# Patient Record
Sex: FEMALE | Race: BLACK OR AFRICAN AMERICAN | Employment: OTHER | URBAN - METROPOLITAN AREA
[De-identification: names, ages, dates, MRNs, and addresses within clinical notes are randomized per-mention and may not be internally consistent; named-entity substitution may affect disease eponyms.]

---

## 2017-01-30 ENCOUNTER — OFFICE VISIT (OUTPATIENT)
Dept: NEUROLOGY | Age: 57
End: 2017-01-30

## 2017-01-30 VITALS
BODY MASS INDEX: 33.49 KG/M2 | HEIGHT: 65 IN | HEART RATE: 86 BPM | WEIGHT: 201 LBS | SYSTOLIC BLOOD PRESSURE: 150 MMHG | DIASTOLIC BLOOD PRESSURE: 80 MMHG | OXYGEN SATURATION: 98 %

## 2017-01-30 DIAGNOSIS — D47.2 IGG MONOCLONAL PROTEIN DISORDER: Primary | ICD-10-CM

## 2017-01-30 DIAGNOSIS — R20.0 NUMBNESS AND TINGLING OF LEFT SIDE OF FACE: ICD-10-CM

## 2017-01-30 DIAGNOSIS — R20.2 NUMBNESS AND TINGLING OF LEFT SIDE OF FACE: ICD-10-CM

## 2017-01-30 NOTE — PATIENT INSTRUCTIONS
1.  Refer to rheumatology  2. Follow-up as needed      A Healthy Lifestyle: Care Instructions  Your Care Instructions  A healthy lifestyle can help you feel good, stay at a healthy weight, and have plenty of energy for both work and play. A healthy lifestyle is something you can share with your whole family. A healthy lifestyle also can lower your risk for serious health problems, such as high blood pressure, heart disease, and diabetes. You can follow a few steps listed below to improve your health and the health of your family. Follow-up care is a key part of your treatment and safety. Be sure to make and go to all appointments, and call your doctor if you are having problems. Its also a good idea to know your test results and keep a list of the medicines you take. How can you care for yourself at home? · Do not eat too much sugar, fat, or fast foods. You can still have dessert and treats now and then. The goal is moderation. · Start small to improve your eating habits. Pay attention to portion sizes, drink less juice and soda pop, and eat more fruits and vegetables. ¨ Eat a healthy amount of food. A 3-ounce serving of meat, for example, is about the size of a deck of cards. Fill the rest of your plate with vegetables and whole grains. ¨ Limit the amount of soda and sports drinks you have every day. Drink more water when you are thirsty. ¨ Eat at least 5 servings of fruits and vegetables every day. It may seem like a lot, but it is not hard to reach this goal. A serving or helping is 1 piece of fruit, 1 cup of vegetables, or 2 cups of leafy, raw vegetables. Have an apple or some carrot sticks as an afternoon snack instead of a candy bar. Try to have fruits and/or vegetables at every meal.  · Make exercise part of your daily routine. You may want to start with simple activities, such as walking, bicycling, or slow swimming. Try to be active 30 to 60 minutes every day.  You do not need to do all 30 to 60 minutes all at once. For example, you can exercise 3 times a day for 10 or 20 minutes. Moderate exercise is safe for most people, but it is always a good idea to talk to your doctor before starting an exercise program.  · Keep moving. Kathy Wolff the lawn, work in the garden, or [employer]. Take the stairs instead of the elevator at work. · If you smoke, quit. People who smoke have an increased risk for heart attack, stroke, cancer, and other lung illnesses. Quitting is hard, but there are ways to boost your chance of quitting tobacco for good. ¨ Use nicotine gum, patches, or lozenges. ¨ Ask your doctor about stop-smoking programs and medicines. ¨ Keep trying. In addition to reducing your risk of diseases in the future, you will notice some benefits soon after you stop using tobacco. If you have shortness of breath or asthma symptoms, they will likely get better within a few weeks after you quit. · Limit how much alcohol you drink. Moderate amounts of alcohol (up to 2 drinks a day for men, 1 drink a day for women) are okay. But drinking too much can lead to liver problems, high blood pressure, and other health problems. Family health  If you have a family, there are many things you can do together to improve your health. · Eat meals together as a family as often as possible. · Eat healthy foods. This includes fruits, vegetables, lean meats and dairy, and whole grains. · Include your family in your fitness plan. Most people think of activities such as jogging or tennis as the way to fitness, but there are many ways you and your family can be more active. Anything that makes you breathe hard and gets your heart pumping is exercise. Here are some tips:  ¨ Walk to do errands or to take your child to school or the bus. ¨ Go for a family bike ride after dinner instead of watching TV. Where can you learn more? Go to http://elsie-baljeet.info/.   Enter R096 in the search box to learn more about \"A Healthy Lifestyle: Care Instructions. \"  Current as of: July 26, 2016  Content Version: 11.1  © 3253-3139 [employer]. Care instructions adapted under license by [employer] (which disclaims liability or warranty for this information). If you have questions about a medical condition or this instruction, always ask your healthcare professional. [address] any warranty or liability for your use of this information.

## 2017-01-30 NOTE — LETTER
1/31/2017 8:57 AM 
 
RE:    [facility] [place] [address] [id number] Thank you for agreeing to see Bandar Cuellar I am referring my patient to you for evaluation of IgG monoclonal proteins with kappa light chain. Please see her 
pertinent patient information below. NEUROLOGY follow-up note REFERRED BY: 
Vu Manzo MD 
 
01/30/17 Chief Complaint Patient presents with  Follow-up Numbness in left side of face better Subjective Aye Canas is a 60 y.o. female who presented to the neurology office for management of left facial numbness. To recap, she states that it started suddenly and it is in the left V2 and V3 distribution along with some numbness on the left side of the tongue. It is a constant numbness with no aggravating or relieving factors. She has not noticed any weakness associated with that. It is moderate in severity. She did also have some left eye pain but that has subsided. She does also have poorly controlled diabetes. 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 Rest of the blood work was normal.  She states that the left facial numbness is getting better. Current Outpatient Prescriptions Medication Sig  
 atorvastatin (LIPITOR) 40 mg tablet Take 1 Tab by mouth daily. For cholesterol  sodium chloride (YULIA 128) 2 % ophthalmic solution Administer 2 Drops to both eyes two (2) times a day.  carvedilol (COREG) 12.5 mg tablet TAKE 1 TABLET BY MOUTH TWICE A DAY WITH MEALS  valsartan (DIOVAN) 40 mg tablet TAKE 1 TABLET BY MOUTH EVERY DAY  Cholecalciferol, Vitamin D3, (VITAMIN D3) 1,000 unit cap Take  by mouth daily.  calcium 500 mg Tab Take 600 mg by mouth daily.  cyanocobalamin (VITAMIN B-12) 1,000 mcg tablet Take 2,000 mcg by mouth daily. No current facility-administered medications for this visit.    
 
REVIEW OF SYSTEMS:  
 A ten system review of constitutional, cardiovascular, respiratory, musculoskeletal, endocrine, skin, SHEENT, genitourinary, psychiatric and neurologic systems was obtained and is unremarkable except as stated in HPI EXAMINATION:  
Visit Vitals  /80  Pulse 86  Ht 5' 5\" (1.651 m)  Wt 201 lb (91.2 kg)  SpO2 98%  BMI 33.45 kg/m2 General:  
General appearance: Pt is in no acute distress Distal pulses are preserved Fundoscopic Exam: Normal 
 
Neurological Examination:  
Mental Status: AAO x3. Speech is fluent. Follows commands, has normal fund of knowledge, attention, short term recall, comprehension and insight. Cranial Nerves: Visual fields are full. PERRL, Extraocular movements are full. Facial sensation is decreased in the left V2 and V3 distribution. Facial movement intact, symmetric. Hearing intact to conversation. Palate elevates symmetrically. Shoulder shrug symmetric. Tongue midline. Motor: Strength is 5/5 in all 4 ext. No atrophy. Tone: Normal 
 
Sensation: Decreased pinprick sensation in the left side of the face Coordination/Cerebellar: Intact to finger-nose-finger Gait: Romberg is negative and casual gait is normal.  
 
Skin: No significant bruising or lacerations. Laboratory review:  
Results for orders placed or performed in visit on 12/07/16 VITAMIN B12 Result Value Ref Range Vitamin B12 411 211 - 946 pg/mL TSH AND FREE T4 Result Value Ref Range TSH 1.100 0.450 - 4.500 uIU/mL T4, Free 1.07 0.82 - 1.77 ng/dL SPEP AND HANSEL, SERUM Result Value Ref Range Immunoglobulin G, Qt. 941 700 - 1600 mg/dL Immunoglobulin A, Qt. 202 87 - 352 mg/dL Immunoglobulin M, Qt. 22 (L) 26 - 217 mg/dL Albumin 3.6 2.9 - 4.4 g/dL Alpha-1-Globulin 0.2 0.0 - 0.4 g/dL Alpha-2-Globulin 0.8 0.4 - 1.0 g/dL Beta Globulin 1.1 0.7 - 1.3 g/dL Gamma Globulin 0.9 0.4 - 1.8 g/dL M-Mika 0.3 (H) Not Observed g/dL Globulin, total 3.0 2.2 - 3.9 g/dL A/G ratio 1.3 0.7 - 1.7 Immunofixation Result Comment Please note Comment ANGIOTENSIN CONVERTING ENZYME Result Value Ref Range Angiotensin Converting Enzyme (ACE) 25 14 - 82 U/L  
BHARATI COMPREHENSIVE PLUS PANEL Result Value Ref Range Anti-DNA (DS) Ab, QT <1 0 - 9 IU/mL  
 RNP Abs <0.2 0.0 - 0.9 AI Epps Abs <0.2 0.0 - 0.9 AI Epps/RNP Abs <0.2 0.0 - 0.9 AI Scleroderma-70 Ab <0.2 0.0 - 0.9 AI Sjogren's Anti-SS-A <0.2 0.0 - 0.9 AI Sjogren's Anti-SS-B <0.2 0.0 - 0.9 AI Antichromatin Ab <0.2 0.0 - 0.9 AI Anti Ribosomal P Ab <0.2 0.0 - 0.9 AI Anti-Delmi-1 <0.2 0.0 - 0.9 AI Centromere B Ab <0.2 0.0 - 0.9 AI See below Comment CBC WITH AUTOMATED DIFF Result Value Ref Range WBC 8.2 3.4 - 10.8 x10E3/uL  
 RBC 4.46 3.77 - 5.28 x10E6/uL HGB 13.7 11.1 - 15.9 g/dL HCT 41.9 34.0 - 46.6 % MCV 94 79 - 97 fL  
 MCH 30.7 26.6 - 33.0 pg  
 MCHC 32.7 31.5 - 35.7 g/dL  
 RDW 14.5 12.3 - 15.4 % PLATELET 580 401 - 623 x10E3/uL NEUTROPHILS 55 % Lymphocytes 36 % MONOCYTES 7 % EOSINOPHILS 2 % BASOPHILS 0 %  
 ABS. NEUTROPHILS 4.5 1.4 - 7.0 x10E3/uL Abs Lymphocytes 2.9 0.7 - 3.1 x10E3/uL  
 ABS. MONOCYTES 0.6 0.1 - 0.9 x10E3/uL  
 ABS. EOSINOPHILS 0.1 0.0 - 0.4 x10E3/uL  
 ABS. BASOPHILS 0.0 0.0 - 0.2 x10E3/uL IMMATURE GRANULOCYTES 0 %  
 ABS. IMM. GRANS. 0.0 0.0 - 0.1 x10E3/uL METABOLIC PANEL, COMPREHENSIVE Result Value Ref Range Glucose 117 (H) 65 - 99 mg/dL BUN 14 6 - 24 mg/dL Creatinine 0.87 0.57 - 1.00 mg/dL GFR est non-AA 74 >59 mL/min/1.73 GFR est AA 85 >59 mL/min/1.73  
 BUN/Creatinine ratio 16 9 - 23 Sodium 142 136 - 144 mmol/L Potassium 3.9 3.5 - 5.2 mmol/L Chloride 105 97 - 106 mmol/L  
 CO2 24 18 - 29 mmol/L Calcium 9.1 8.7 - 10.2 mg/dL Protein, total 6.6 6.0 - 8.5 g/dL Albumin 4.1 3.5 - 5.5 g/dL GLOBULIN, TOTAL 2.5 1.5 - 4.5 g/dL A-G Ratio 1.6 1.1 - 2.5 Bilirubin, total 0.6 0.0 - 1.2 mg/dL Alk. phosphatase 80 39 - 117 IU/L  
 AST 14 0 - 40 IU/L  
 ALT 16 0 - 32 IU/L  
CRP, HIGH SENSITIVITY Result Value Ref Range C-Reactive Protein, Cardiac 1.17 0.00 - 3.00 mg/L  
 
12/7/2016 Vitamin B12 411, thyroid function test normal, SPEP reveals presence of monoclonal free lambda light chains. Suggest urine HANSEL for Bence-Lei proteins. Immunofixation shows IgG monoclonal proteins with kappa light chain. ACE 25, BHARATI normal CBC normal, CMP normal, CRP 1.17 Imaging review: CT head and cervical spine are normal. I personally reviewed the images in PACS and this is my impression. Documentation review: 
None Assessment/Plan:  
Neil Rivas is a 60 y.o. female who presented to the neurology office for management of left facial numbness for the last 6 months. The left facial numbness is getting better. The etiology is unclear. Imaging studies of the brain are normal.  MRI could not be performed because of the defibrillator. 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 I do plan to send her to hematology for evaluation of monoclonal proteins. Follow-up as needed Rina Montgomery MD 
Neurologist and Clinical Neurophysiologist 
 
CC: Kingsley Hunter MD 
Fax: [phone] This note will not be viewable in [address] I appreciate your assistance in Ms. Ansari's care  and look forward to your findings and recommendations. Sincerely, Rina Montgomery MD

## 2017-01-30 NOTE — LETTER
1/30/2017 2:01 PM 
 
Patient:    Garret Harrell YOB: 1960 Date of Visit:    1/30/2017 Dear Fatemeh Lim MD 
 
Thank you for referring Ms. Zarina Esquivel to me for evaluation/treatment. Below are the relevant portions of my assessment and plan of care. NEUROLOGY follow-up note REFERRED BY: 
Fatemeh Lim MD 
 
01/30/17 Chief Complaint Patient presents with  Follow-up Numbness in left side of face better Subjective Garret aHrrell is a 60 y.o. female who presented to the neurology office for management of left facial numbness. To recap, she states that it started suddenly and it is in the left V2 and V3 distribution along with some numbness on the left side of the tongue. It is a constant numbness with no aggravating or relieving factors. She has not noticed any weakness associated with that. It is moderate in severity. She did also have some left eye pain but that has subsided. She does also have poorly controlled diabetes. 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 Rest of the blood work was normal.  She states that the left facial numbness is getting better. Current Outpatient Prescriptions Medication Sig  
 atorvastatin (LIPITOR) 40 mg tablet Take 1 Tab by mouth daily. For cholesterol  sodium chloride (YULIA 128) 2 % ophthalmic solution Administer 2 Drops to both eyes two (2) times a day.  carvedilol (COREG) 12.5 mg tablet TAKE 1 TABLET BY MOUTH TWICE A DAY WITH MEALS  valsartan (DIOVAN) 40 mg tablet TAKE 1 TABLET BY MOUTH EVERY DAY  Cholecalciferol, Vitamin D3, (VITAMIN D3) 1,000 unit cap Take  by mouth daily.  calcium 500 mg Tab Take 600 mg by mouth daily.  cyanocobalamin (VITAMIN B-12) 1,000 mcg tablet Take 2,000 mcg by mouth daily. No current facility-administered medications for this visit.    
 
REVIEW OF SYSTEMS:  
 A ten system review of constitutional, cardiovascular, respiratory, musculoskeletal, endocrine, skin, SHEENT, genitourinary, psychiatric and neurologic systems was obtained and is unremarkable except as stated in HPI EXAMINATION:  
Visit Vitals  /80  Pulse 86  Ht 5' 5\" (1.651 m)  Wt 201 lb (91.2 kg)  SpO2 98%  BMI 33.45 kg/m2 General:  
General appearance: Pt is in no acute distress Distal pulses are preserved Fundoscopic Exam: Normal 
 
Neurological Examination:  
Mental Status: AAO x3. Speech is fluent. Follows commands, has normal fund of knowledge, attention, short term recall, comprehension and insight. Cranial Nerves: Visual fields are full. PERRL, Extraocular movements are full. Facial sensation is decreased in the left V2 and V3 distribution. Facial movement intact, symmetric. Hearing intact to conversation. Palate elevates symmetrically. Shoulder shrug symmetric. Tongue midline. Motor: Strength is 5/5 in all 4 ext. No atrophy. Tone: Normal 
 
Sensation: Decreased pinprick sensation in the left side of the face Coordination/Cerebellar: Intact to finger-nose-finger Gait: Romberg is negative and casual gait is normal.  
 
Skin: No significant bruising or lacerations. Laboratory review:  
Results for orders placed or performed in visit on 12/07/16 VITAMIN B12 Result Value Ref Range Vitamin B12 411 211 - 946 pg/mL TSH AND FREE T4 Result Value Ref Range TSH 1.100 0.450 - 4.500 uIU/mL T4, Free 1.07 0.82 - 1.77 ng/dL SPEP AND HANSEL, SERUM Result Value Ref Range Immunoglobulin G, Qt. 941 700 - 1600 mg/dL Immunoglobulin A, Qt. 202 87 - 352 mg/dL Immunoglobulin M, Qt. 22 (L) 26 - 217 mg/dL Albumin 3.6 2.9 - 4.4 g/dL Alpha-1-Globulin 0.2 0.0 - 0.4 g/dL Alpha-2-Globulin 0.8 0.4 - 1.0 g/dL Beta Globulin 1.1 0.7 - 1.3 g/dL Gamma Globulin 0.9 0.4 - 1.8 g/dL M-Mika 0.3 (H) Not Observed g/dL Globulin, total 3.0 2.2 - 3.9 g/dL A/G ratio 1.3 0.7 - 1.7 Immunofixation Result Comment Please note Comment ANGIOTENSIN CONVERTING ENZYME Result Value Ref Range Angiotensin Converting Enzyme (ACE) 25 14 - 82 U/L  
BHARATI COMPREHENSIVE PLUS PANEL Result Value Ref Range Anti-DNA (DS) Ab, QT <1 0 - 9 IU/mL  
 RNP Abs <0.2 0.0 - 0.9 AI Epps Abs <0.2 0.0 - 0.9 AI Epps/RNP Abs <0.2 0.0 - 0.9 AI Scleroderma-70 Ab <0.2 0.0 - 0.9 AI Sjogren's Anti-SS-A <0.2 0.0 - 0.9 AI Sjogren's Anti-SS-B <0.2 0.0 - 0.9 AI Antichromatin Ab <0.2 0.0 - 0.9 AI Anti Ribosomal P Ab <0.2 0.0 - 0.9 AI Anti-Delmi-1 <0.2 0.0 - 0.9 AI Centromere B Ab <0.2 0.0 - 0.9 AI See below Comment CBC WITH AUTOMATED DIFF Result Value Ref Range WBC 8.2 3.4 - 10.8 x10E3/uL  
 RBC 4.46 3.77 - 5.28 x10E6/uL HGB 13.7 11.1 - 15.9 g/dL HCT 41.9 34.0 - 46.6 % MCV 94 79 - 97 fL  
 MCH 30.7 26.6 - 33.0 pg  
 MCHC 32.7 31.5 - 35.7 g/dL  
 RDW 14.5 12.3 - 15.4 % PLATELET 768 279 - 893 x10E3/uL NEUTROPHILS 55 % Lymphocytes 36 % MONOCYTES 7 % EOSINOPHILS 2 % BASOPHILS 0 %  
 ABS. NEUTROPHILS 4.5 1.4 - 7.0 x10E3/uL Abs Lymphocytes 2.9 0.7 - 3.1 x10E3/uL  
 ABS. MONOCYTES 0.6 0.1 - 0.9 x10E3/uL  
 ABS. EOSINOPHILS 0.1 0.0 - 0.4 x10E3/uL  
 ABS. BASOPHILS 0.0 0.0 - 0.2 x10E3/uL IMMATURE GRANULOCYTES 0 %  
 ABS. IMM. GRANS. 0.0 0.0 - 0.1 x10E3/uL METABOLIC PANEL, COMPREHENSIVE Result Value Ref Range Glucose 117 (H) 65 - 99 mg/dL BUN 14 6 - 24 mg/dL Creatinine 0.87 0.57 - 1.00 mg/dL GFR est non-AA 74 >59 mL/min/1.73 GFR est AA 85 >59 mL/min/1.73  
 BUN/Creatinine ratio 16 9 - 23 Sodium 142 136 - 144 mmol/L Potassium 3.9 3.5 - 5.2 mmol/L Chloride 105 97 - 106 mmol/L  
 CO2 24 18 - 29 mmol/L Calcium 9.1 8.7 - 10.2 mg/dL Protein, total 6.6 6.0 - 8.5 g/dL Albumin 4.1 3.5 - 5.5 g/dL GLOBULIN, TOTAL 2.5 1.5 - 4.5 g/dL A-G Ratio 1.6 1.1 - 2.5 Bilirubin, total 0.6 0.0 - 1.2 mg/dL Alk. phosphatase 80 39 - 117 IU/L  
 AST 14 0 - 40 IU/L  
 ALT 16 0 - 32 IU/L  
CRP, HIGH SENSITIVITY Result Value Ref Range C-Reactive Protein, Cardiac 1.17 0.00 - 3.00 mg/L  
 
12/7/2016 Vitamin B12 411, thyroid function test normal, SPEP reveals presence of monoclonal free lambda light chains. Suggest urine HANSEL for Bence-Lei proteins. Immunofixation shows IgG monoclonal proteins with kappa light chain. ACE 25, BHARATI normal CBC normal, CMP normal, CRP 1.17 Imaging review: CT head and cervical spine are normal. I personally reviewed the images in PACS and this is my impression. Documentation review: 
None Assessment/Plan:  
Tripp Seth is a 60 y.o. female who presented to the neurology office for management of left facial numbness for the last 6 months. The left facial numbness is getting better. The etiology is unclear. Imaging studies of the brain are normal.  MRI could not be performed because of the defibrillator. 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 I do plan to send her to hematology for evaluation of monoclonal proteins. Follow-up as needed Jeremy Sotomayor MD 
Neurologist and Clinical Neurophysiologist 
 
CC: Hammad Tello MD 
Fax: [phone] This note will not be viewable in [address] If you have questions, please do not hesitate to call me. I look forward to following Ms. Kodak Yeh along with you. Sincerely, Jeremy Sotomayor MD

## 2017-01-30 NOTE — PROGRESS NOTES
NEUROLOGY follow-up note      REFERRED BY:  Ray Begum MD    01/30/17    Chief Complaint   Patient presents with    Follow-up     Numbness in left side of face better       Subjective  Sintia Morrow is a 60 y.o. female who presented to the neurology office for management of left facial numbness. To recap, she states that it started suddenly and it is in the left V2 and V3 distribution along with some numbness on the left side of the tongue. It is a constant numbness with no aggravating or relieving factors. She has not noticed any weakness associated with that. It is moderate in severity. She did also have some left eye pain but that has subsided. She does also have poorly controlled diabetes. 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 Rest of the blood work was normal.  She states that the left facial numbness is getting better. Current Outpatient Prescriptions   Medication Sig    atorvastatin (LIPITOR) 40 mg tablet Take 1 Tab by mouth daily. For cholesterol    sodium chloride (YULIA 128) 2 % ophthalmic solution Administer 2 Drops to both eyes two (2) times a day.  carvedilol (COREG) 12.5 mg tablet TAKE 1 TABLET BY MOUTH TWICE A DAY WITH MEALS    valsartan (DIOVAN) 40 mg tablet TAKE 1 TABLET BY MOUTH EVERY DAY    Cholecalciferol, Vitamin D3, (VITAMIN D3) 1,000 unit cap Take  by mouth daily.  calcium 500 mg Tab Take 600 mg by mouth daily.  cyanocobalamin (VITAMIN B-12) 1,000 mcg tablet Take 2,000 mcg by mouth daily. No current facility-administered medications for this visit.       REVIEW OF SYSTEMS:   A ten system review of constitutional, cardiovascular, respiratory, musculoskeletal, endocrine, skin, SHEENT, genitourinary, psychiatric and neurologic systems was obtained and is unremarkable except as stated in HPI     EXAMINATION:   Visit Vitals    /80    Pulse 86    Ht 5' 5\" (1.651 m)    Wt 201 lb (91.2 kg)    SpO2 98%    BMI 33.45 kg/m2        General:   General appearance: Pt is in no acute distress   Distal pulses are preserved  Fundoscopic Exam: Normal    Neurological Examination:   Mental Status: AAO x3. Speech is fluent. Follows commands, has normal fund of knowledge, attention, short term recall, comprehension and insight. Cranial Nerves: Visual fields are full. PERRL, Extraocular movements are full. Facial sensation is decreased in the left V2 and V3 distribution. Facial movement intact, symmetric. Hearing intact to conversation. Palate elevates symmetrically. Shoulder shrug symmetric. Tongue midline. Motor: Strength is 5/5 in all 4 ext. No atrophy. Tone: Normal    Sensation: Decreased pinprick sensation in the left side of the face    Coordination/Cerebellar: Intact to finger-nose-finger     Gait: Romberg is negative and casual gait is normal.     Skin: No significant bruising or lacerations.     Laboratory review:   Results for orders placed or performed in visit on 12/07/16   VITAMIN B12   Result Value Ref Range    Vitamin B12 411 211 - 946 pg/mL   TSH AND FREE T4   Result Value Ref Range    TSH 1.100 0.450 - 4.500 uIU/mL    T4, Free 1.07 0.82 - 1.77 ng/dL   SPEP AND HANSEL, SERUM   Result Value Ref Range    Immunoglobulin G, Qt. 941 700 - 1600 mg/dL    Immunoglobulin A, Qt. 202 87 - 352 mg/dL    Immunoglobulin M, Qt. 22 (L) 26 - 217 mg/dL    Albumin 3.6 2.9 - 4.4 g/dL    Alpha-1-Globulin 0.2 0.0 - 0.4 g/dL    Alpha-2-Globulin 0.8 0.4 - 1.0 g/dL    Beta Globulin 1.1 0.7 - 1.3 g/dL    Gamma Globulin 0.9 0.4 - 1.8 g/dL    M-Mika 0.3 (H) Not Observed g/dL    Globulin, total 3.0 2.2 - 3.9 g/dL    A/G ratio 1.3 0.7 - 1.7    Immunofixation Result Comment     Please note Comment    ANGIOTENSIN CONVERTING ENZYME   Result Value Ref Range    Angiotensin Converting Enzyme (ACE) 25 14 - 82 U/L   BHARATI COMPREHENSIVE PLUS PANEL   Result Value Ref Range    Anti-DNA (DS) Ab, QT <1 0 - 9 IU/mL    RNP Abs <0.2 0.0 - 0.9 AI    Epps Abs <0.2 0.0 - 0.9 AI    Epps/RNP Abs <0.2 0.0 - 0.9 AI    Scleroderma-70 Ab <0.2 0.0 - 0.9 AI    Sjogren's Anti-SS-A <0.2 0.0 - 0.9 AI    Sjogren's Anti-SS-B <0.2 0.0 - 0.9 AI    Antichromatin Ab <0.2 0.0 - 0.9 AI    Anti Ribosomal P Ab <0.2 0.0 - 0.9 AI    Anti-Delmi-1 <0.2 0.0 - 0.9 AI    Centromere B Ab <0.2 0.0 - 0.9 AI    See below Comment    CBC WITH AUTOMATED DIFF   Result Value Ref Range    WBC 8.2 3.4 - 10.8 x10E3/uL    RBC 4.46 3.77 - 5.28 x10E6/uL    HGB 13.7 11.1 - 15.9 g/dL    HCT 41.9 34.0 - 46.6 %    MCV 94 79 - 97 fL    MCH 30.7 26.6 - 33.0 pg    MCHC 32.7 31.5 - 35.7 g/dL    RDW 14.5 12.3 - 15.4 %    PLATELET 176 608 - 953 x10E3/uL    NEUTROPHILS 55 %    Lymphocytes 36 %    MONOCYTES 7 %    EOSINOPHILS 2 %    BASOPHILS 0 %    ABS. NEUTROPHILS 4.5 1.4 - 7.0 x10E3/uL    Abs Lymphocytes 2.9 0.7 - 3.1 x10E3/uL    ABS. MONOCYTES 0.6 0.1 - 0.9 x10E3/uL    ABS. EOSINOPHILS 0.1 0.0 - 0.4 x10E3/uL    ABS. BASOPHILS 0.0 0.0 - 0.2 x10E3/uL    IMMATURE GRANULOCYTES 0 %    ABS. IMM. GRANS. 0.0 0.0 - 0.1 F91Q3/VA   METABOLIC PANEL, COMPREHENSIVE   Result Value Ref Range    Glucose 117 (H) 65 - 99 mg/dL    BUN 14 6 - 24 mg/dL    Creatinine 0.87 0.57 - 1.00 mg/dL    GFR est non-AA 74 >59 mL/min/1.73    GFR est AA 85 >59 mL/min/1.73    BUN/Creatinine ratio 16 9 - 23    Sodium 142 136 - 144 mmol/L    Potassium 3.9 3.5 - 5.2 mmol/L    Chloride 105 97 - 106 mmol/L    CO2 24 18 - 29 mmol/L    Calcium 9.1 8.7 - 10.2 mg/dL    Protein, total 6.6 6.0 - 8.5 g/dL    Albumin 4.1 3.5 - 5.5 g/dL    GLOBULIN, TOTAL 2.5 1.5 - 4.5 g/dL    A-G Ratio 1.6 1.1 - 2.5    Bilirubin, total 0.6 0.0 - 1.2 mg/dL    Alk. 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 Suggest urine HANSEL for Bence-Lei proteins. Immunofixation shows IgG monoclonal proteins with kappa light chain. ACE 25, BHARATI normal CBC normal, CMP normal, CRP 1.17    Imaging review:  CT head and cervical spine are normal. I personally reviewed the images in PACS and this is my impression. Documentation review:  None    Assessment/Plan:   Rosetta Lim is a 60 y.o. female who presented to the neurology office for management of left facial numbness for the last 6 months. The left facial numbness is getting better. The etiology is unclear. Imaging studies of the brain are normal.  MRI could not be performed because of the defibrillator. 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 I do plan to send her to hematology for evaluation of monoclonal proteins. Follow-up as needed   Leonides Goldberg, MD  Neurologist and Clinical Neurophysiologist    CC: Elida Webster MD  Fax: [phone]    This note will not be viewable in [address]

## 2017-01-30 NOTE — MR AVS SNAPSHOT
Visit Information Date & Time Provider Department Dept. Phone Encounter #  
 1/30/2017  1:40 PM Qi Mariscal MD [facility] [phone] [id number] Your Appointments 3/10/2017  9:15 AM  
6 MONTH with Chance Anaya, [address] [facility]) Appt Note: . [id number] [facility]  
[phone] [id number] [facility] 5/17/2017  9:00 AM  
ROUTINE CARE with Deacon Hollingsworth MD  
[facility]) Appt Note: 6 month follow up  
 [address] [facility] [place] [facility]"" [facility] [id number] [phone] [facility] [phone]  
  
    
  
 2/22/2017  8:00 AM  
REMOTE OFFICE VISIT with [facility]) Appt Note: [facility] AN [facility]ICE VISIT - [facility]  
 [phone] [facility]  
[phone] [id number] [facility] Upcoming Health Maintenance Date Due COLONOSCOPY 9/20/1976 PAP AKA CERVICAL CYTOLOGY 4/2/2015 EYE EXAM RETINAL OR DILATED Q1 4/15/2017 FOOT EXAM Q1 5/17/2017 HEMOGLOBIN A1C Q6M 5/17/2017 MICROALBUMIN Q1 5/17/2017 LIPID PANEL Q1 11/17/2017 BREAST CANCER SCRN MAMMOGRAM 3/8/2018 GLAUCOMA SCREENING Q2Y 4/26/2018 DTaP/Tdap/Td series (2 - Td) 5/17/2026 Allergies as of 1/30/2017  Review Complete On: 1/30/2017 By: Qi Mariscal MD  
  
 Severity Noted Reaction Type Reactions Ace Inhibitors  08/06/2010    Cough Current Immunizations  Reviewed on 11/7/2011 No immunizations on file. Not reviewed this visit You Were Diagnosed With   
  
 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  
 150/80 86 5' 5\" (1.651 m) 201 lb (91.2 kg) 98% 33.45 kg/m2 OB Status Smoking Status Hysterectomy Never Smoker Vitals History BMI and BSA Data Body Mass Index Body Surface Area  
 33.45 kg/m 2 2.05 m 2 Preferred Pharmacy Pharmacy Name Phone [facility] [address] [place] [address] [place] [address] [phone] Your Updated Medication List  
  
   
This list is accurate as of: 1/30/17  2:00 PM.  Always use your most recent med list.  
  
  
  
  
 atorvastatin 40 mg tablet Commonly known as:  LIPITOR Take 1 Tab by mouth daily. For cholesterol  
  
 calcium 500 mg Tab Take 600 mg by mouth daily. carvedilol 12.5 mg tablet Commonly known as:  COREG  
TAKE 1 TABLET BY MOUTH TWICE A DAY WITH MEALS  
  
 sodium chloride 2 % ophthalmic solution Commonly known as:  YULIA 128 Administer 2 Drops to both eyes two (2) times a day. valsartan 40 mg tablet Commonly known as:  DIOVAN  
TAKE 1 TABLET BY MOUTH EVERY DAY  
  
 VITAMIN B-12 1,000 mcg tablet Generic drug:  cyanocobalamin Take 2,000 mcg by mouth daily. VITAMIN D3 1,000 unit Cap Generic drug:  cholecalciferol Take  by mouth daily. 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 Please evaluate. Referral Information Referral ID Referred By [facility]erred To  
  
 [id number] [address] [place] [address] [address] [place], [address] [place] [address] Ave Phone: [phone] Fax: [phone] Visits Status Start Date End Date 1 New Request 1/30/17 1/30/18 If your referral has a status of pending review or denied, additional information will be sent to support the outcome of this decision. Patient Instructions 1. Refer to rheumatology 2.  Follow-up as needed A Healthy Lifestyle: Care Instructions Your Care Instructions A healthy lifestyle can help you feel good, stay at a healthy weight, and have plenty of energy for both work and play. A healthy lifestyle is something you can share with your whole family. A healthy lifestyle also can lower your risk for serious health problems, such as high blood pressure, heart disease, and diabetes. You can follow a few steps listed below to improve your health and the health of your family. Follow-up care is a key part of your treatment and safety. Be sure to make and go to all appointments, and call your doctor if you are having problems. Its also a good idea to know your test results and keep a list of the medicines you take. How can you care for yourself at home? · Do not eat too much sugar, fat, or fast foods. You can still have dessert and treats now and then. The goal is moderation. · Start small to improve your eating habits. Pay attention to portion sizes, drink less juice and soda pop, and eat more fruits and vegetables. ¨ Eat a healthy amount of food. A 3-ounce serving of meat, for example, is about the size of a deck of cards. Fill the rest of your plate with vegetables and whole grains. ¨ Limit the amount of soda and sports drinks you have every day. Drink more water when you are thirsty. ¨ Eat at least 5 servings of fruits and vegetables every day. It may seem like a lot, but it is not hard to reach this goal. A serving or helping is 1 piece of fruit, 1 cup of vegetables, or 2 cups of leafy, raw vegetables. Have an apple or some carrot sticks as an afternoon snack instead of a candy bar. Try to have fruits and/or vegetables at every meal. 
· Make exercise part of your daily routine. You may want to start with simple activities, such as walking, bicycling, or slow swimming. Try to be active 30 to 60 minutes every day.  You do not need to do all 30 to 60 minutes all at once. For example, you can exercise 3 times a day for 10 or 20 minutes. Moderate exercise is safe for most people, but it is always a good idea to talk to your doctor before starting an exercise program. 
· Keep moving. Clarisa Zuniga the lawn, work in the garden, or [employer]. Take the stairs instead of the elevator at work. · If you smoke, quit. People who smoke have an increased risk for heart attack, stroke, cancer, and other lung illnesses. Quitting is hard, but there are ways to boost your chance of quitting tobacco for good. ¨ Use nicotine gum, patches, or lozenges. ¨ Ask your doctor about stop-smoking programs and medicines. ¨ Keep trying. In addition to reducing your risk of diseases in the future, you will notice some benefits soon after you stop using tobacco. If you have shortness of breath or asthma symptoms, they will likely get better within a few weeks after you quit. · Limit how much alcohol you drink. Moderate amounts of alcohol (up to 2 drinks a day for men, 1 drink a day for women) are okay. But drinking too much can lead to liver problems, high blood pressure, and other health problems. Family health If you have a family, there are many things you can do together to improve your health. · Eat meals together as a family as often as possible. · Eat healthy foods. This includes fruits, vegetables, lean meats and dairy, and whole grains. · Include your family in your fitness plan. Most people think of activities such as jogging or tennis as the way to fitness, but there are many ways you and your family can be more active. Anything that makes you breathe hard and gets your heart pumping is exercise. Here are some tips: 
¨ Walk to do errands or to take your child to school or the bus. ¨ Go for a family bike ride after dinner instead of watching TV. Where can you learn more? Go to http://elsie-baljeet.info/. Enter Y231 in the search box to learn more about \"A Healthy Lifestyle: Care Instructions. \" Current as of: July 26, 2016 Content Version: 11.1 © 8517-8169 [employer]. Care instructions adapted under license by [employer] (which disclaims liability or warranty for this information). If you have questions about a medical condition or this instruction, always ask your healthcare professional. [address] any warranty or liability for your use of this information. Introducing hospitals & HEALTH SERVICES! Nhung Roca introduces [employer] patient portal. Now you can access parts of your medical record, email your doctor's office, and request medication refills online. 1. In your internet browser, go to [email or web address] 2. Click on the First Time User? Click Here link in the Sign In box. You will see the New Member Sign Up page. 3. Enter your [employer] Access Code exactly as it appears below. You will not need to use this code after youve completed the sign-up process. If you do not sign up before the expiration date, you must request a new code. · [employer] Access Code: [email or web address] Expires: 2/21/2017  8:23 AM 
 
4. Enter the last four digits of your Social Security Number (xxxx) and Date of Birth (mm/dd/yyyy) as indicated and click Submit. You will be taken to the next sign-up page. 5. Create a [employer] ID. This will be your [employer] login ID and cannot be changed, so think of one that is secure and easy to remember. 6. Create a [employer] password. You can change your password at any time. 7. Enter your Password Reset Question and Answer. This can be used at a later time if you forget your password. 8. Enter your e-mail address. You will receive e-mail notification when new information is available in [address] 9. Click Sign Up. You can now view and download portions of your medical record. 10. Click the Download Summary menu link to download a portable copy of your medical information. If you have questions, please visit the Frequently Asked Questions section of the [employer] website. Remember, [employer] is NOT to be used for urgent needs. For medical emergencies, dial 911. Now available from your iPhone and Android! Please provide this summary of care documentation to your next provider. Your primary care clinician is listed as Hope Krzysztof. If you have any questions after today's visit, please call [phone].

## 2017-01-31 NOTE — LETTER
1/31/2017 8:46 AM 
 
RE:    Reesa Blizzard [facility] [address] [phone] Thank you for agreeing to see Lara Alvares I am referring my patient to you for evaluation of ***. Please see her 
pertinent patient information below. No notes on file I appreciate your assistance in Ms. Ansari's care  and look forward to your findings and recommendations. Sincerely, Delphine Lopez MD

## 2017-02-13 ENCOUNTER — OFFICE VISIT (OUTPATIENT)
Dept: ONCOLOGY | Age: 57
End: 2017-02-13

## 2017-02-13 VITALS
TEMPERATURE: 97.8 F | WEIGHT: 199 LBS | HEIGHT: 65 IN | DIASTOLIC BLOOD PRESSURE: 88 MMHG | HEART RATE: 88 BPM | OXYGEN SATURATION: 97 % | BODY MASS INDEX: 33.15 KG/M2 | SYSTOLIC BLOOD PRESSURE: 140 MMHG | RESPIRATION RATE: 18 BRPM

## 2017-02-13 DIAGNOSIS — E53.8 VITAMIN B12 DEFICIENCY: ICD-10-CM

## 2017-02-13 DIAGNOSIS — G62.9 NEUROPATHY: ICD-10-CM

## 2017-02-13 DIAGNOSIS — D47.2 MGUS (MONOCLONAL GAMMOPATHY OF UNKNOWN SIGNIFICANCE): Primary | ICD-10-CM

## 2017-02-13 DIAGNOSIS — I42.9 CARDIOMYOPATHY (HCC): ICD-10-CM

## 2017-02-13 NOTE — PROGRESS NOTES
Hematology Consult    REASON FOR CONSULT: Paraproteinemia  REQUESTED BY: Dr. Barba Lexie: Ms. Shu Sevilla is a 60 y.o. female with DM who presents for evaluation of  Paraproteinemia    Patient has had left facial numbness which started in the summer of 2016. This started abruptly and was not associated with rashes, pain, jaw weakness. She has had some intermittent hyperemia of the L eye. No tearing. She has been evaluated by Dr. Garth Meyers with Neurology and an extensive work up was only notable for presence of a 0.3 g/dl M spike. Other tests were unremarkable: Vitamin B12 411, thyroid function test normal, ACE 25, BHARATI normal CBC normal, CMP normal, CRP 1.17, CT head and cervical spine unremarkable. She now presents to discuss the evaluation and management of her paraproteinemia. She still has some numbness of the L face, she describes this as a slightly decreased sensation to touch on the L as opposed to the R side of her face. Has no relieving or aggravating factors. Has had no trouble swallowing, recent rash, fevers, pain or ear infections. Never had a colonoscopy. She has regular bowel movements with out any hematochezia. She states that in 2010 she had developed sudden SOB after a flu like illness when she was found to have decompensated heart failure and left BBB. Has had a pacemaker since without any recent CP, SOB and a normal ECO. She has not noted any new aches, no infections, no urinary complains, abdominal pian, new rashes, headaches or falls. NO change in weight ir appetite, no night sweats.         Past Medical History   Diagnosis Date    Cardiomyopathy     Diabetes mellitus type 2, controlled ([address] 12/10/2015    Heart failure (HCC)     Hyperlipidemia     Hypertension      controlled    Orthostatic hypotension     Secondary cardiomyopathy, unspecified ([address]     Sinoatrial node dysfunction ([address]     Vitamin B12 deficiency 3/31/2010       Past Surgical History   Procedure Laterality Date    Hx hysterectomy         Allergies   Allergen Reactions    Ace Inhibitors Cough       Current Outpatient Prescriptions   Medication Sig Dispense Refill    atorvastatin (LIPITOR) 40 mg tablet Take 1 Tab by mouth daily. For cholesterol 30 Tab 12    sodium chloride (YULIA 128) 2 % ophthalmic solution Administer 2 Drops to both eyes two (2) times a day. 15 mL 12    carvedilol (COREG) 12.5 mg tablet TAKE 1 TABLET BY MOUTH TWICE A DAY WITH MEALS 180 Tab 3    valsartan (DIOVAN) 40 mg tablet TAKE 1 TABLET BY MOUTH EVERY DAY 90 Tab 3    Cholecalciferol, Vitamin D3, (VITAMIN D3) 1,000 unit cap Take  by mouth daily.  calcium 500 mg Tab Take 600 mg by mouth daily.  cyanocobalamin (VITAMIN B-12) 1,000 mcg tablet Take 2,000 mcg by mouth daily. Social History     Social History    Marital status:      Spouse name: N/A    Number of children: N/A    Years of education: N/A     Social History Main Topics    Smoking status: Never Smoker    Smokeless tobacco: Never Used    Alcohol use No    Drug use: No    Sexual activity: Yes     Partners: Male     Other Topics Concern    None     Social History Narrative    , 2 children, 28 and 31. Works at [employer], for 35 years. 5 grandchildren       Family History   Problem Relation Age of Onset   Mushtaq Canales Cancer Mother     Heart Disease Mother      pacemaker   Mushtaq Canales Diabetes Mother     Hypertension Sister     Hypertension Brother     Diabetes Brother     Hypertension Sister     Hypertension Sister     Hypertension Sister     Hypertension Sister     Diabetes Sister        ROS  A 12 point review of systems was obtained and is negative except as listed in HPI.   ECOG PS is 0      Physical Examination:   Visit Vitals    /88 (BP 1 Location: Left arm, BP Patient Position: Sitting)    Pulse 88    Temp 97.8 °F (36.6 °C) (Oral)    Resp 18    Ht 5' 5\" (1.651 m)    Wt 199 lb (90.3 kg)    SpO2 97%    BMI 33.12 kg/m2     General appearance - alert, well appearing, and in no distress  Mental status - oriented to person, place, and time  Mouth - mucous membranes moist, pharynx normal without lesions  Neck - supple, no significant adenopathy  Lymphatics - no palpable lymphadenopathy, no hepatosplenomegaly  Chest - clear to auscultation, no wheezes, rales or rhonchi, symmetric air entry  Heart - normal rate, regular rhythm, normal S1, S2, no murmurs, rubs, clicks or gallops  Abdomen - soft, nontender, nondistended, no masses or organomegaly, bowel sounds present  Back exam - full range of motion, no tenderness, palpable spasm or pain on motion  Neurological - normal speech, no focal findings or movement disorder noted  Musculoskeletal - no joint tenderness, deformity or swelling  Extremities - peripheral pulses normal, no pedal edema, no clubbing or cyanosis  Skin - normal coloration and turgor, no rashes, no suspicious skin lesions noted    LABS  Lab Results   Component Value Date/Time    WBC 8.2 12/07/2016 02:58 PM    HGB 13.7 12/07/2016 02:58 PM    HCT 41.9 12/07/2016 02:58 PM    PLATELET 179 31/13/1195 02:58 PM    MCV 94 12/07/2016 02:58 PM    ABS. NEUTROPHILS 4.5 12/07/2016 02:58 PM     Lab Results   Component Value Date/Time    Sodium 142 12/07/2016 02:58 PM    Potassium 3.9 12/07/2016 02:58 PM    Chloride 105 12/07/2016 02:58 PM    CO2 24 12/07/2016 02:58 PM    Glucose 117 12/07/2016 02:58 PM    BUN 14 12/07/2016 02:58 PM    Creatinine 0.87 12/07/2016 02:58 PM    GFR est AA 85 12/07/2016 02:58 PM    GFR est non-AA 74 12/07/2016 02:58 PM    Calcium 9.1 12/07/2016 02:58 PM     Lab Results   Component Value Date/Time    AST (SGOT) 14 12/07/2016 02:58 PM    Alk. phosphatase 80 12/07/2016 02:58 PM    Protein, total 6.6 12/07/2016 02:58 PM    Albumin 4.1 12/07/2016 02:58 PM    Globulin 3.8 07/27/2010 03:57 PM    A-G Ratio 1.6 12/07/2016 02:58 PM     Serum HANSEL reveals the presence of monoclonal free lambda light chains. Immunofixation shows IgA monoclonal protein with kappa light chain   Specificity. Immunoglobulin G, Qt. 941    Immunoglobulin A, Qt. 202    Immunoglobulin M, Qt. 22 (L)     M-Mika 0.3 (H)         CT head and spine reviewed personally    ASSESSMENT  Ms. Shu Sevilla is a 60 y.o. female with  1. MGUS: 0.3 g/dl of  IgA monoclonal protein with kappa light chain and possibly presence of monoclonal free light chains. No evidence of end organ dysfunction  2. 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 Poorly controlled DM. DISCUSSION    Discussed the spectrum of Plasma cell disorders (MGUS/ Smoldering Myeloma/ Multiple Myeloma/ Amyloidosis). We would need to proceed with further work up to evaluate this which may also involve a BM biopsy. The other question is the etiology of there asymmetrial facial nerve sensory neuropathy. Myeloma associated neuropathy is usually symmetric and usually associated with an IgM paraprotein. Amyloid/ POEMS be other possible explanations. Rarely, radiculopathy from direct compression due to an extra osseous plasmacytoma may lead to unilateral symptoms, which however seems unlikely based on her recent imaging. Hence, regardless of what the underlying plasma cell disorder might be, it may not be contributing to her neuropathy    PLAN    - SPEP with HANSEL, UPEP with HANSEL, serum free light chains, CBC, CMP, LDH, Beta 2 microglobulin, Skeletal survery, quantitative immunoglobulins    RTC 1-2 weeks.  Will also likely need a BM biopsy based on these results    Rosetta Perez MD

## 2017-02-13 NOTE — MR AVS SNAPSHOT
Visit Information Date & Time Provider Department Dept. Phone Encounter #  
 2/13/2017  1:30 PM Claudetta Cruel,  [facility] at [address] [id number] Your Appointments 3/6/2017 10:30 AM  
Follow Up with Claudetta Cruel, MD  
[address] [facility] at [facility] Appt Note: 3wk f/u  
 [address] [place] [address]  
[phone]  
  
   
 [address] [address] [facility] [address] [address]  
  
    
 3/10/2017  9:15 AM  
6 MONTH with Mariola Ernandez, [address] [facility]) Appt Note: . [address] [place] [address]  
[phone] [address] [place] [address] 5/17/2017  9:00 AM  
ROUTINE CARE with Na Mueller MD  
[facility]) Appt Note: 6 month follow up  
 [address] [place] [phone] [phone]  
  
    
  
 2/22/2017  8:00 AM  
REMOTE OFFICE VISIT with [facility]) Appt Note: NOT AN OFFICE VISIT - REMOTE BSC BIVICD  
 [phone] [facility]  
[phone] [address] [place] [address] Upcoming Health Maintenance Date Due COLONOSCOPY 9/20/1976 Pneumococcal 19-64 Highest Risk (1 of 3 - PCV13) 9/20/1977 PAP AKA CERVICAL CYTOLOGY 4/2/2015 EYE EXAM RETINAL OR DILATED Q1 4/15/2017 FOOT EXAM Q1 5/17/2017 HEMOGLOBIN A1C Q6M 5/17/2017 MICROALBUMIN Q1 5/17/2017 LIPID PANEL Q1 11/17/2017 BREAST CANCER SCRN MAMMOGRAM 3/8/2018 GLAUCOMA SCREENING Q2Y 4/26/2018 DTaP/Tdap/Td series (2 - Td) 5/17/2026 Allergies as of 2/13/2017  Review Complete On: 2/13/2017 By: Dinh Barker LPN Severity Noted Reaction Type Reactions Ace Inhibitors  08/06/2010    Cough Current Immunizations  Reviewed on 11/7/2011 No immunizations on file. Not reviewed this visit You Were Diagnosed With   
  
 Codes Comments MGUS (monoclonal gammopathy of unknown significance)    -  Primary ICD-10-CM: B49.9 ICD-9-CM: 273.1 Vitals BP Pulse Temp Resp Height(growth percentile) Weight(growth percentile) 140/88 (BP 1 Location: Left arm, BP Patient Position: Sitting) 88 97.8 °F (36.6 °C) (Oral) 18 5' 5\" (1.651 m) 199 lb (90.3 kg) SpO2 BMI OB Status Smoking Status 97% 33.12 kg/m2 Hysterectomy Never Smoker BMI and BSA Data Body Mass Index Body Surface Area  
 33.12 kg/m 2 2.04 m 2 Preferred Pharmacy Pharmacy Name Phone [facility] [address] [place] [address] [phone] Your Updated Medication List  
  
   
This list is accurate as of: 2/13/17  2:20 PM.  Always use your most recent med list.  
  
  
  
  
 atorvastatin 40 mg tablet Commonly known as:  LIPITOR Take 1 Tab by mouth daily. For cholesterol  
  
 calcium 500 mg Tab Take 600 mg by mouth daily. carvedilol 12.5 mg tablet Commonly known as:  COREG  
TAKE 1 TABLET BY MOUTH TWICE A DAY WITH MEALS  
  
 sodium chloride 2 % ophthalmic solution Commonly known as:  YULIA 128 Administer 2 Drops to both eyes two (2) times a day. valsartan 40 mg tablet Commonly known as:  DIOVAN  
TAKE 1 TABLET BY MOUTH EVERY DAY  
  
 VITAMIN B-12 1,000 mcg tablet Generic drug:  cyanocobalamin Take 2,000 mcg by mouth daily. VITAMIN D3 1,000 unit Cap Generic drug:  cholecalciferol Take  by mouth daily. We Performed the Following BETA-2 MICROGLOBULIN [id number] CPT(R)] CBC WITH AUTOMATED DIFF [48933 CPT(R)] FREE LIGHT CHAINS, KAPPA/LAMBDA, QT [02480 CPT(R)] IMMUNOELECTROPHORESIS [facility].) [id number] CPT(R)] IMMUNOFIXATION ELECT. URINE [JJG5003 Custom] IMMUNOGLOBULINS, G/A/M, QT. [34630 CPT(R)] LD [27428 CPT(R)] METABOLIC PANEL, COMPREHENSIVE [81758 CPT(R)] PROTEIN ELECTROPHORESIS, URINE RANDOM [57166 CPT(R)] PROTEIN ELECTROPHORESIS [83651 CPT(R)] To-Do List   
 02/14/2017 Imaging:  XR BONE SURVEY COMP Introducing [facility]! [place] introduces [employer] patient portal. Now you can access parts of your medical record, email your doctor's office, and request medication refills online. 1. In your internet browser, go to https://[facility]. [employer]/[facility] 2. Click on the First Time User? Click Here link in the Sign In box. You will see the New Member Sign Up page. 3. Enter your [employer] Access Code exactly as it appears below. You will not need to use this code after youve completed the sign-up process. If you do not sign up before the expiration date, you must request a new code. · [employer] Access Code: [email or web address] Expires: 2/21/2017  8:23 AM 
 
4. Enter the last four digits of your Social Security Number (xxxx) and Date of Birth (mm/dd/yyyy) as indicated and click Submit. You will be taken to the next sign-up page. 5. Create a [employer] ID. This will be your [employer] login ID and cannot be changed, so think of one that is secure and easy to remember. 6. Create a [employer] password. You can change your password at any time. 7. Enter your Password Reset Question and Answer. This can be used at a later time if you forget your password. 8. Enter your e-mail address. You will receive e-mail notification when new information is available in [address] 9. Click Sign Up. You can now view and download portions of your medical record. 10. Click the Download Summary menu link to download a portable copy of your medical information. If you have questions, please visit the Frequently Asked Questions section of the [employer] website. Remember, [employer] is NOT to be used for urgent needs. For medical emergencies, dial 911. Now available from your iPhone and Android! Please provide this summary of care documentation to your next provider. Your primary care clinician is listed as Alex Fabian. If you have any questions after today's visit, please call [phone].

## 2017-02-13 NOTE — PROGRESS NOTES
Chief Complaint   Patient presents with    Follow-up     Chief Complaint   Patient presents with   [facility].

## 2017-02-15 DIAGNOSIS — D89.2 PARAPROTEINEMIA: Primary | ICD-10-CM

## 2017-02-20 LAB
ALBUMIN MFR UR ELPH: 29.1 %
ALBUMIN SERPL ELPH-MCNC: 3.7 G/DL (ref 2.9–4.4)
ALBUMIN SERPL-MCNC: 4.2 G/DL (ref 3.5–5.5)
ALBUMIN/GLOB SERPL: 1.2 {RATIO} (ref 0.7–1.7)
ALBUMIN/GLOB SERPL: 1.6 {RATIO} (ref 1.1–2.5)
ALP SERPL-CCNC: 94 IU/L (ref 39–117)
ALPHA1 GLOB MFR UR ELPH: 3.6 %
ALPHA1 GLOB SERPL ELPH-MCNC: 0.2 G/DL (ref 0–0.4)
ALPHA2 GLOB MFR UR ELPH: 16 %
ALPHA2 GLOB SERPL ELPH-MCNC: 0.9 G/DL (ref 0.4–1)
ALT SERPL-CCNC: 19 IU/L (ref 0–32)
AST SERPL-CCNC: 15 IU/L (ref 0–40)
B-GLOBULIN MFR UR ELPH: 29.5 %
B-GLOBULIN SERPL ELPH-MCNC: 1.3 G/DL (ref 0.7–1.3)
B2 MICROGLOB SERPL-MCNC: 1.8 MG/L (ref 0.6–2.4)
BASOPHILS # BLD AUTO: 0 X10E3/UL (ref 0–0.2)
BASOPHILS NFR BLD AUTO: 0 %
BILIRUB SERPL-MCNC: 0.7 MG/DL (ref 0–1.2)
BUN SERPL-MCNC: 11 MG/DL (ref 6–24)
BUN/CREAT SERPL: 14 (ref 9–23)
CALCIUM SERPL-MCNC: 9.5 MG/DL (ref 8.7–10.2)
CHLORIDE SERPL-SCNC: 102 MMOL/L (ref 96–106)
CO2 SERPL-SCNC: 22 MMOL/L (ref 18–29)
CREAT SERPL-MCNC: 0.81 MG/DL (ref 0.57–1)
EOSINOPHIL # BLD AUTO: 0.1 X10E3/UL (ref 0–0.4)
EOSINOPHIL NFR BLD AUTO: 2 %
ERYTHROCYTE [DISTWIDTH] IN BLOOD BY AUTOMATED COUNT: 14.9 % (ref 12.3–15.4)
GAMMA GLOB MFR UR ELPH: 21.9 %
GAMMA GLOB SERPL ELPH-MCNC: 0.9 G/DL (ref 0.4–1.8)
GLOBULIN SER CALC-MCNC: 2.7 G/DL (ref 1.5–4.5)
GLOBULIN SER CALC-MCNC: 3.2 G/DL (ref 2.2–3.9)
GLUCOSE SERPL-MCNC: 222 MG/DL (ref 65–99)
HCT VFR BLD AUTO: 43.1 % (ref 34–46.6)
HGB BLD-MCNC: 14.4 G/DL (ref 11.1–15.9)
IGA SERPL-MCNC: 225 MG/DL (ref 87–352)
IGG SERPL-MCNC: 1002 MG/DL (ref 700–1600)
IGM SERPL-MCNC: 27 MG/DL (ref 26–217)
IMM GRANULOCYTES # BLD: 0 X10E3/UL (ref 0–0.1)
IMM GRANULOCYTES NFR BLD: 0 %
INTERPRETATION UR IFE-IMP: NORMAL
KAPPA LC FREE SER-MCNC: 17.98 MG/L (ref 3.3–19.4)
KAPPA LC FREE/LAMBDA FREE SER: 0.01 {RATIO} (ref 0.26–1.65)
LAMBDA LC FREE SERPL-MCNC: 2416 MG/L (ref 5.71–26.3)
LDH SERPL-CCNC: 174 IU/L (ref 119–226)
LYMPHOCYTES # BLD AUTO: 2.1 X10E3/UL (ref 0.7–3.1)
LYMPHOCYTES NFR BLD AUTO: 28 %
M PROTEIN MFR UR ELPH: NORMAL %
M PROTEIN SERPL ELPH-MCNC: 0.2 G/DL
MCH RBC QN AUTO: 31 PG (ref 26.6–33)
MCHC RBC AUTO-ENTMCNC: 33.4 G/DL (ref 31.5–35.7)
MCV RBC AUTO: 93 FL (ref 79–97)
MONOCYTES # BLD AUTO: 0.6 X10E3/UL (ref 0.1–0.9)
MONOCYTES NFR BLD AUTO: 8 %
NEUTROPHILS # BLD AUTO: 4.7 X10E3/UL (ref 1.4–7)
NEUTROPHILS NFR BLD AUTO: 62 %
PLATELET # BLD AUTO: 311 X10E3/UL (ref 150–379)
PLEASE NOTE, 011150: ABNORMAL
PLEASE NOTE:, 133800: NORMAL
POTASSIUM SERPL-SCNC: 4.3 MMOL/L (ref 3.5–5.2)
PROT PATTERN SERPL IFE-IMP: NORMAL
PROT SERPL-MCNC: 6.9 G/DL (ref 6–8.5)
PROT UR-MCNC: 44.1 MG/DL
RBC # BLD AUTO: 4.64 X10E6/UL (ref 3.77–5.28)
SODIUM SERPL-SCNC: 142 MMOL/L (ref 134–144)
WBC # BLD AUTO: 7.5 X10E3/UL (ref 3.4–10.8)

## 2017-02-21 ENCOUNTER — HOSPITAL ENCOUNTER (OUTPATIENT)
Dept: GENERAL RADIOLOGY | Age: 57
Discharge: HOME | End: 2017-02-21
Attending: INTERNAL MEDICINE
Payer: COMMERCIAL

## 2017-02-21 DIAGNOSIS — D47.2 MGUS (MONOCLONAL GAMMOPATHY OF UNKNOWN SIGNIFICANCE): ICD-10-CM

## 2017-02-21 PROCEDURE — 77075 RADEX OSSEOUS SURVEY COMPL: CPT

## 2017-02-22 ENCOUNTER — OFFICE VISIT (OUTPATIENT)
Dept: CARDIOLOGY CLINIC | Age: 57
End: 2017-02-22

## 2017-02-22 ENCOUNTER — TELEPHONE (OUTPATIENT)
Dept: NEUROLOGY | Age: 57
End: 2017-02-22

## 2017-02-22 DIAGNOSIS — Z95.810 AICD (AUTOMATIC CARDIOVERTER/DEFIBRILLATOR) PRESENT: Primary | ICD-10-CM

## 2017-02-22 DIAGNOSIS — I42.9 CARDIOMYOPATHY (HCC): ICD-10-CM

## 2017-02-22 DIAGNOSIS — I50.22 CHRONIC SYSTOLIC HEART FAILURE (HCC): ICD-10-CM

## 2017-02-24 ENCOUNTER — HOSPITAL ENCOUNTER (OUTPATIENT)
Dept: CT IMAGING | Age: 57
Discharge: HOME | End: 2017-02-24
Attending: INTERNAL MEDICINE
Payer: COMMERCIAL

## 2017-02-24 VITALS
WEIGHT: 194 LBS | RESPIRATION RATE: 16 BRPM | BODY MASS INDEX: 33.12 KG/M2 | HEIGHT: 64 IN | HEART RATE: 74 BPM | DIASTOLIC BLOOD PRESSURE: 69 MMHG | OXYGEN SATURATION: 95 % | TEMPERATURE: 98.5 F | SYSTOLIC BLOOD PRESSURE: 125 MMHG

## 2017-02-24 DIAGNOSIS — D89.2 PARAPROTEINEMIA: ICD-10-CM

## 2017-02-24 LAB
BASOPHILS # BLD AUTO: 0 K/UL (ref 0–0.1)
BASOPHILS # BLD: 0 % (ref 0–1)
EOSINOPHIL # BLD: 0.1 K/UL (ref 0–0.4)
EOSINOPHIL NFR BLD: 2 % (ref 0–7)
ERYTHROCYTE [DISTWIDTH] IN BLOOD BY AUTOMATED COUNT: 14.1 % (ref 11.5–14.5)
HCT VFR BLD AUTO: 41.4 % (ref 35–47)
HGB BLD-MCNC: 13.9 G/DL (ref 11.5–16)
LYMPHOCYTES # BLD AUTO: 35 % (ref 12–49)
LYMPHOCYTES # BLD: 2.7 K/UL (ref 0.8–3.5)
MCH RBC QN AUTO: 30.6 PG (ref 26–34)
MCHC RBC AUTO-ENTMCNC: 33.6 G/DL (ref 30–36.5)
MCV RBC AUTO: 91.2 FL (ref 80–99)
MONOCYTES # BLD: 0.7 K/UL (ref 0–1)
MONOCYTES NFR BLD AUTO: 9 % (ref 5–13)
NEUTS SEG # BLD: 4.1 K/UL (ref 1.8–8)
NEUTS SEG NFR BLD AUTO: 54 % (ref 32–75)
PLATELET # BLD AUTO: 277 K/UL (ref 150–400)
RBC # BLD AUTO: 4.54 M/UL (ref 3.8–5.2)
WBC # BLD AUTO: 7.6 K/UL (ref 3.6–11)

## 2017-02-24 PROCEDURE — 77030028872 HC BN BIOP NDL ON CNTRL TY TELE -C

## 2017-02-24 PROCEDURE — 38221 DX BONE MARROW BIOPSIES: CPT

## 2017-02-24 PROCEDURE — 74011000250 HC RX REV CODE- 250: Performed by: RADIOLOGY

## 2017-02-24 PROCEDURE — 88342 IMHCHEM/IMCYTCHM 1ST ANTB: CPT | Performed by: INTERNAL MEDICINE

## 2017-02-24 PROCEDURE — 85097 BONE MARROW INTERPRETATION: CPT | Performed by: INTERNAL MEDICINE

## 2017-02-24 PROCEDURE — 36415 COLL VENOUS BLD VENIPUNCTURE: CPT | Performed by: INTERNAL MEDICINE

## 2017-02-24 PROCEDURE — 77030003375 HC MRK BIOP BEEK -A

## 2017-02-24 PROCEDURE — 74011250636 HC RX REV CODE- 250/636: Performed by: RADIOLOGY

## 2017-02-24 PROCEDURE — 77030003666 HC NDL SPINAL BD -A

## 2017-02-24 PROCEDURE — 88311 DECALCIFY TISSUE: CPT | Performed by: INTERNAL MEDICINE

## 2017-02-24 PROCEDURE — 88365 INSITU HYBRIDIZATION (FISH): CPT | Performed by: INTERNAL MEDICINE

## 2017-02-24 PROCEDURE — 85025 COMPLETE CBC W/AUTO DIFF WBC: CPT | Performed by: INTERNAL MEDICINE

## 2017-02-24 PROCEDURE — 88313 SPECIAL STAINS GROUP 2: CPT | Performed by: INTERNAL MEDICINE

## 2017-02-24 PROCEDURE — 88305 TISSUE EXAM BY PATHOLOGIST: CPT | Performed by: INTERNAL MEDICINE

## 2017-02-24 RX ORDER — SODIUM CHLORIDE 9 MG/ML
25 INJECTION, SOLUTION INTRAVENOUS CONTINUOUS
Status: DISCONTINUED | OUTPATIENT
Start: 2017-02-24 | End: 2017-02-28 | Stop reason: HOSPADM

## 2017-02-24 RX ORDER — MIDAZOLAM HYDROCHLORIDE 1 MG/ML
5 INJECTION, SOLUTION INTRAMUSCULAR; INTRAVENOUS
Status: DISCONTINUED | OUTPATIENT
Start: 2017-02-24 | End: 2017-02-28 | Stop reason: HOSPADM

## 2017-02-24 RX ORDER — FENTANYL CITRATE 50 UG/ML
100 INJECTION, SOLUTION INTRAMUSCULAR; INTRAVENOUS
Status: DISCONTINUED | OUTPATIENT
Start: 2017-02-24 | End: 2017-02-28 | Stop reason: HOSPADM

## 2017-02-24 RX ADMIN — FENTANYL CITRATE 50 MCG: 50 INJECTION, SOLUTION INTRAMUSCULAR; INTRAVENOUS at 10:55

## 2017-02-24 RX ADMIN — FENTANYL CITRATE 25 MCG: 50 INJECTION, SOLUTION INTRAMUSCULAR; INTRAVENOUS at 10:48

## 2017-02-24 RX ADMIN — SODIUM BICARBONATE 2 ML: 0.2 INJECTION, SOLUTION INTRAVENOUS at 11:10

## 2017-02-24 RX ADMIN — FENTANYL CITRATE 25 MCG: 50 INJECTION, SOLUTION INTRAMUSCULAR; INTRAVENOUS at 10:42

## 2017-02-24 RX ADMIN — MIDAZOLAM HYDROCHLORIDE 1 MG: 1 INJECTION INTRAMUSCULAR; INTRAVENOUS at 10:42

## 2017-02-24 RX ADMIN — MIDAZOLAM HYDROCHLORIDE 1 MG: 1 INJECTION INTRAMUSCULAR; INTRAVENOUS at 10:46

## 2017-02-24 RX ADMIN — MIDAZOLAM HYDROCHLORIDE 2 MG: 1 INJECTION INTRAMUSCULAR; INTRAVENOUS at 10:53

## 2017-02-24 NOTE — DISCHARGE INSTRUCTIONS
[address] [facility]  Special Procedures/Radiology Department      Radiologist:  Dr Amanda Mohan      Date: 02/24/2017         Bone Marrow Biopsy    You may have an aching pain in the biopsy site tonight. Take Tylenol, as directed on the label, for pain, or take your prescribed pain medication. Avoid ibuprofen and aspirin for the next 48 hours as these drugs may cause you to bruise or bleed. Watch for signs of infection:  Redness, pain, drainage, fever and chills. If this occurs, call your doctor. Resume your previous diet and follow medication reconciliation form. Rest today. Because you received sedation, do not drive or sign any documents until tomorrow morning. It may take up to 7 days for your test results to become available to your physician. Call your physician if you have not heard anything after 7 business days. If you have any questions or concerns, please call [phone] and ask to speak to the nurse on-call.

## 2017-02-24 NOTE — ROUTINE PROCESS
D/c'd. Received + verbal feedback from the instructions provided. To pov via w/c. In NAD at time of d/c.

## 2017-02-24 NOTE — H&P
Radiology History and Physical    Patient: Flavio Morales 60 y.o. female       Chief Complaint: No chief complaint on file. History of Present Illness: for bone marrow bx    History:    Past Medical History:   Diagnosis Date    Cardiomyopathy     Diabetes mellitus type 2, controlled ([address] 12/10/2015    Heart failure (HCC)     Hyperlipidemia     Hypertension     controlled    Orthostatic hypotension     Secondary cardiomyopathy, unspecified ([address]     Sinoatrial node dysfunction ([address]     Vitamin B12 deficiency 3/31/2010     Family History   Problem Relation Age of Onset    Cancer Mother     Heart Disease Mother      pacemaker    Diabetes Mother     Hypertension Sister     Hypertension Brother     Diabetes Brother     Hypertension Sister     Hypertension Sister     Hypertension Sister     Hypertension Sister     Diabetes Sister      Social History     Social History    Marital status:      Spouse name: N/A    Number of children: N/A    Years of education: N/A     Occupational History    Not on file. Social History Main Topics    Smoking status: Never Smoker    Smokeless tobacco: Never Used    Alcohol use No    Drug use: No    Sexual activity: Yes     Partners: Male     Other Topics Concern    Not on file     Social History Narrative    , 2 children, 29 and 31. Works at [employer], for 35 years. 5 grandchildren       Allergies: Allergies   Allergen Reactions    Ace Inhibitors Cough       Current Medications:  Current Outpatient Prescriptions   Medication Sig    atorvastatin (LIPITOR) 40 mg tablet Take 1 Tab by mouth daily. For cholesterol    sodium chloride (Y[address] 128) 2 % ophthalmic solution Administer 2 Drops to both eyes two (2) times a day.     carvedilol (COREG) 12.5 mg tablet TAKE 1 TABLET BY MOUTH TWICE A DAY WITH MEALS    valsartan (DIOVAN) 40 mg tablet TAKE 1 TABLET BY MOUTH EVERY DAY    Cholecalciferol, Vitamin D3, (VITAMIN D3) 1,000 unit cap Take  by mouth daily.  calcium 500 mg Tab Take 600 mg by mouth daily.  cyanocobalamin (VITAMIN B-12) 1,000 mcg tablet Take 2,000 mcg by mouth daily. Current Facility-Administered Medications   Medication Dose Route Frequency    fentaNYL citrate (PF) injection 100 mcg  100 mcg IntraVENous Multiple    0.9% sodium chloride infusion  25 mL/hr IntraVENous CONTINUOUS    midazolam (VERSED) injection 5 mg  5 mg IntraVENous Multiple        Physical Exam:  Blood pressure 126/70, pulse 75, temperature 98.5 °F (36.9 °C), resp. rate 16, height 5' 4\" (1.626 m), weight 88 kg (194 lb), SpO2 96 %, not currently breastfeeding. LUNG: clear to auscultation bilaterally, HEART: regular rate and rhythm      Alerts:    Hospital Problems  Date Reviewed: 2/14/2017    None          Laboratory:      Recent Labs      02/24/17   0930   HGB  13.9   HCT  41.4   WBC  7.6   PLT  277         Plan of Care/Planned Procedure:  Risks, benefits, and alternatives reviewed with patient and she agrees to proceed with the procedure.        Paula Blankenship MD

## 2017-02-24 NOTE — IP AVS SNAPSHOT
Summary of Care Report The Summary of Care report has been created to help improve care coordination. Users with access to [employer] or [address] (Web-based application) may access additional patient information including the Discharge Summary. If you are not currently a [address] user and need more information, please call the number listed below in the [place] section and ask to be connected with Medical Records. Facility Information Name Address Phone [address] [address] [phone] Patient Information Patient Name Sex DOB Mardeen Holstein ([id number]) Female 1960 Discharge Information Admitting Provider Service Area Unit  
 (none) [address] [place] [address] / [phone] Discharge Provider Discharge Date/Time Discharge Disposition Destination (none) (none) (none) (none) Patient Language Language ENGLISH [13] Problem List as of 2017  Date Reviewed: 2017 Codes Priority Class Noted - Resolved Vitamin B12 deficiency ICD-10-CM: E53.8 ICD-9-CM: 266.2   3/31/2010 - Present RESOLVED: Heart failure (HCC) ICD-10-CM: I50.9 ICD-9-CM: 428.9   Unknown - 3/28/2011 Cardiomyopathy ([facility] ICD-10-CM: I42.9 ICD-9-CM: 425.4   3/28/2011 - Present Chronic systolic heart failure (HCC) ICD-10-CM: I50.22 ICD-9-CM: 428.22   2012 - Present Obesity, unspecified ICD-10-CM: E66.9 ICD-9-CM: 278.00   2012 - Present Other left bundle branch block ICD-10-CM: I44.7 ICD-9-CM: 426.3   2012 - Present AICD (automatic cardioverter/defibrillator) present ICD-10-CM: Z95.810 ICD-9-CM: V45.02   2013 - Present RESOLVED: Hypertension ICD-10-CM: I10 
ICD-9-CM: 401.9   Unknown - 2015 Overview Signed 2014 10:49 AM by Mary Howe NP  
  controlled Hyperlipidemia ICD-10-CM: E78.5 ICD-9-CM: 272.4   Unknown - Present Essential hypertension ICD-10-CM: I10 
ICD-9-CM: 401.9   11/17/2015 - Present Diabetes mellitus type 2, controlled ([facility] ICD-10-CM: E11.9 ICD-9-CM: 250.00   12/10/2015 - Present You are allergic to the following Allergen Reactions Ace Inhibitors Cough Current Discharge Medication List  
  
ASK your doctor about these medications Dose & Instructions Dispensing Information Comments  
 atorvastatin 40 mg tablet Commonly known as:  LIPITOR Dose:  40 mg Take 1 Tab by mouth daily. For cholesterol Quantity:  30 Tab Refills:  12  
   
 calcium 500 mg Tab Dose:  600 mg Take 600 mg by mouth daily. Refills:  0  
   
 carvedilol 12.5 mg tablet Commonly known as:  COREG  
 TAKE 1 TABLET BY MOUTH TWICE A DAY WITH MEALS Quantity:  180 Tab Refills:  3  
   
 sodium chloride 2 % ophthalmic solution Commonly known as:  YULIA 128 Dose:  2 Drop Administer 2 Drops to both eyes two (2) times a day. Quantity:  15 mL Refills:  12  
   
 valsartan 40 mg tablet Commonly known as:  DIOVAN  
 TAKE 1 TABLET BY MOUTH EVERY DAY Quantity:  90 Tab Refills:  3 VITAMIN B-12 1,000 mcg tablet Generic drug:  cyanocobalamin Dose:  2000 mcg Take 2,000 mcg by mouth daily. Refills:  0  
   
 VITAMIN D3 1,000 unit Cap Generic drug:  cholecalciferol Take  by mouth daily. Refills:  0 Follow-up Information None Discharge Instructions [facility] Special Procedures/Radiology Department Radiologist:  Dr Lennon Dire Date: 02/24/2017 Bone Marrow Biopsy You may have an aching pain in the biopsy site tonight. Take Tylenol, as directed on the label, for pain, or take your prescribed pain medication. Avoid ibuprofen and aspirin for the next 48 hours as these drugs may cause you to bruise or bleed. Watch for signs of infection:  Redness, pain, drainage, fever and chills. If this occurs, call your doctor. Resume your previous diet and follow medication reconciliation form. Rest today. Because you received sedation, do not drive or sign any documents until tomorrow morning. It may take up to 7 days for your test results to become available to your physician. Call your physician if you have not heard anything after 7 business days. If you have any questions or concerns, please call [phone] and ask to speak to the nurse on-call. Chart Review Routing History Recipient Method Report Sent By Dale Penny MD  
Phone: [phone] In Basket Notes Report Damián Barbosa MD [[id number]] 2/14/2017 12:24 PM 2/14/2017 David Butler MD  
Phone: [phone] In Basket Notes Report Damián Barbosa MD [[id number]] 2/14/2017 12:24 PM 2/14/2017

## 2017-03-06 ENCOUNTER — DOCUMENTATION ONLY (OUTPATIENT)
Dept: ONCOLOGY | Age: 57
End: 2017-03-06

## 2017-03-06 ENCOUNTER — OFFICE VISIT (OUTPATIENT)
Dept: ONCOLOGY | Age: 57
End: 2017-03-06

## 2017-03-06 ENCOUNTER — TELEPHONE (OUTPATIENT)
Dept: ONCOLOGY | Age: 57
End: 2017-03-06

## 2017-03-06 VITALS
RESPIRATION RATE: 20 BRPM | SYSTOLIC BLOOD PRESSURE: 157 MMHG | HEART RATE: 75 BPM | TEMPERATURE: 97.5 F | WEIGHT: 200.8 LBS | OXYGEN SATURATION: 96 % | BODY MASS INDEX: 34.28 KG/M2 | HEIGHT: 64 IN | DIASTOLIC BLOOD PRESSURE: 86 MMHG

## 2017-03-06 DIAGNOSIS — I42.9 CARDIOMYOPATHY (HCC): ICD-10-CM

## 2017-03-06 DIAGNOSIS — G62.9 NEUROPATHY: ICD-10-CM

## 2017-03-06 DIAGNOSIS — C90.00 MULTIPLE MYELOMA NOT HAVING ACHIEVED REMISSION (HCC): Primary | ICD-10-CM

## 2017-03-06 NOTE — PROGRESS NOTES
Had the opportunity to meet patient and , Ronnell Shakira. Provided chemo care information of VRD (velcade, revlimid and dexamethasone). Briefly reviewed expectations of care, specialty pharmacy. To return 3/13, Monday at 3:30 for teaching and form completion. To call with any additional questions until then.

## 2017-03-06 NOTE — PROGRESS NOTES
Hematology Consult    REASON FOR CONSULT: Multiple Myeloma  REQUESTED BY: Dr. Barba Lexie: Ms. Shu Sevilla is a 60 y.o. female with DM who presents to review results of recently done labs and BM biopsy for evaluation of paraproteinemia    Oncologic history  Patient had left facial numbness which started in the summer of 2016. This started abruptly and was not associated with rashes, pain, jaw weakness. She has had some intermittent hyperemia of the L eye. No tearing. She has been evaluated by Dr. Garth Meyers with Neurology and an extensive work up was only notable for presence of a 0.3 g/dl M spike. Other tests were unremarkable: Vitamin B12 411, thyroid function test normal, ACE 25, BHARATI normal CBC normal, CMP normal, CRP 1.17, CT head and cervical spine unremarkable. Further evaluation revealed findings consistent with Multiple Myeloma    CBC 2/24 Unremarkable. Kappa 17 mg/dl, Lambda 2416 (ratio 0.01), SPEP 0.2 g/dl M spike, HANSEL showed monoclonal free lambda light chains, UPEP negative, Beta 2 Microglobulin 1.8 mg/L, Skeletal survey negative for lytic lesions, Bone marrow biopsy on 2/24/17 showed a Normocellular marrow with mild monoclonal plasmacytosis (15-20%). Trilineage hematopoiesis with maturation. Past Medical History:   Diagnosis Date    Cardiomyopathy     Diabetes mellitus type 2, controlled ([address] 12/10/2015    Heart failure (HCC)     Hyperlipidemia     Hypertension     controlled    Orthostatic hypotension     Secondary cardiomyopathy, unspecified ([address]     Sinoatrial node dysfunction [facility]     Vitamin B12 deficiency 3/31/2010       Past Surgical History:   Procedure Laterality Date    HX HYSTERECTOMY         Allergies   Allergen Reactions    Ace Inhibitors Cough       Current Outpatient Prescriptions   Medication Sig Dispense Refill    atorvastatin (LIPITOR) 40 mg tablet Take 1 Tab by mouth daily.  For cholesterol 30 Tab 12    sodium chloride (YULIA 128) 2 % ophthalmic solution Administer 2 Drops to both eyes two (2) times a day. 15 mL 12    carvedilol (COREG) 12.5 mg tablet TAKE 1 TABLET BY MOUTH TWICE A DAY WITH MEALS 180 Tab 3    valsartan (DIOVAN) 40 mg tablet TAKE 1 TABLET BY MOUTH EVERY DAY 90 Tab 3    Cholecalciferol, Vitamin D3, (VITAMIN D3) 1,000 unit cap Take  by mouth daily.  calcium 500 mg Tab Take 600 mg by mouth daily.  cyanocobalamin (VITAMIN B-12) 1,000 mcg tablet Take 2,000 mcg by mouth daily. Social History     Social History    Marital status:      Spouse name: N/A    Number of children: N/A    Years of education: N/A     Social History Main Topics    Smoking status: Never Smoker    Smokeless tobacco: Never Used    Alcohol use No    Drug use: No    Sexual activity: Yes     Partners: Male     Other Topics Concern    Not on file     Social History Narrative    , 2 children, 29 and 32. Works at [employer], for 35 years. 5 grandchildren       Family History   Problem Relation Age of Onset   [facility] Cancer Mother     Heart Disease Mother      pacemaker   [facility] Diabetes Mother     Hypertension Sister     Hypertension Brother     Diabetes Brother     Hypertension Sister     Hypertension Sister     Hypertension Sister     Hypertension Sister     Diabetes Sister        ROS  A 12 point review of systems was obtained and is negative except as listed in HPI. She still has some numbness of the L face, she describes this as a slightly decreased sensation to touch on the L as opposed to the R side of her face. Has no relieving or aggravating factors. Has had no trouble swallowing, recent rash, fevers, pain or ear infections. No fevers, chills, CP, fatigue, SOB, bleeding, weight or appetite changes.  Has a pacemaker    ECOG PS is 0      Physical Examination:   Visit Vitals    Ht 5' 4\" (1.626 m)     General appearance - alert, well appearing, and in no distress  Mental status - oriented to person, place, and time  Mouth - mucous membranes moist, pharynx normal without lesions  Lymphatics - no palpable lymphadenopathy, no hepatosplenomegaly  Chest - clear to auscultation, no wheezes, rales or rhonchi, symmetric air entry  Heart - normal rate, regular rhythm, normal S1, S2, no murmurs, rubs, clicks or gallops  Abdomen - soft, nontender, nondistended, no masses or organomegaly, bowel sounds present  Back exam - full range of motion, no tenderness, palpable spasm or pain on motion  Neurological - normal speech, no focal findings or movement disorder noted  Musculoskeletal - no joint tenderness, deformity or swelling  Extremities - peripheral pulses normal, no pedal edema, no clubbing or cyanosis  Skin - normal coloration and turgor, no rashes, no suspicious skin lesions noted    LABS  Lab Results   Component Value Date/Time    WBC 7.6 02/24/2017 09:30 AM    HGB 13.9 02/24/2017 09:30 AM    HCT 41.4 02/24/2017 09:30 AM    PLATELET 021 45/26/2478 09:30 AM    MCV 91.2 02/24/2017 09:30 AM    ABS. NEUTROPHILS 4.1 02/24/2017 09:30 AM     Lab Results   Component Value Date/Time    Sodium 142 02/16/2017 09:18 AM    Potassium 4.3 02/16/2017 09:18 AM    Chloride 102 02/16/2017 09:18 AM    CO2 22 02/16/2017 09:18 AM    Glucose 222 02/16/2017 09:18 AM    BUN 11 02/16/2017 09:18 AM    Creatinine 0.81 02/16/2017 09:18 AM    GFR est AA 93 02/16/2017 09:18 AM    GFR est non-AA 80 02/16/2017 09:18 AM    Calcium 9.5 02/16/2017 09:18 AM     Lab Results   Component Value Date/Time    AST (SGOT) 15 02/16/2017 09:18 AM    Alk.  phosphatase 94 02/16/2017 09:18 AM    Protein, total 6.9 02/16/2017 09:18 AM    Albumin 4.2 02/16/2017 09:18 AM    Globulin 3.8 07/27/2010 03:57 PM    A-G Ratio 1.6 02/16/2017 09:18 AM     FLC 2/24/17   Free Kappa Lt Chains, serum 17.98      Free Lambda Lt Chains, serum 2416.00 (H)             Kappa/Lambda ratio, serum 0.01 (L)     HANSEL: Monoclonal light chains    M spike 0.2 g/L    No bence Lei proteinuria    Skeletal survey negative    Bone marrow biopsy reviewed    ASSESSMENT  Ms. Bartolo Marroquin is a 60 y.o. female with  1. Multiple Myeloma by revised IMWG criteria (Lancet Oncol 2014), due to Involved : uninvolved serum free light chain ratio > 100. Has no anemia, hypercalcemia, bone lesions or renal failure  1. 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 Poorly controlled DM. DISCUSSION    Discussed the natural history of multiple myeloma. Not curable yet, though with favorable molecular profile patients responding to therapy and receiving an auto transplant may live for decades. The other question is the etiology of there asymmetrial facial nerve sensory neuropathy. Myeloma associated neuropathy is usually symmetric and usually associated with an IgM paraprotein. Rarely, radiculopathy from direct compression due to an extra osseous plasmacytoma may lead to unilateral symptoms, which however seems unlikely based on her recent imaging. Her prior HANSEL had indicated an IgA Monoclonal band as well which may account for 15% of paraproteinemia associated neuropathy, as discussed above clinical features in her case are not consistent with this either. We discussed that the immediate goal is to rapidly decrease the abnormal protein and obtain a deep response. Consolidation with an autotransplant would attain the best chances of long term DFS. I reviewed VRD (Velcade, Revlimid and Dex)  and its side effects. Patient is not ready for a formal teach today. Information provided in written for MM and for the drug regimen    PLAN    - Chemotherapy teaching followed by initiation of VRd asap.    - I have reached out to [facility] and will refer patient to them at some point    RTC 2 weeks after starting Gege Cote MD

## 2017-03-08 ENCOUNTER — TELEPHONE (OUTPATIENT)
Dept: ONCOLOGY | Age: 57
End: 2017-03-08

## 2017-03-08 NOTE — TELEPHONE ENCOUNTER
David/pathology states    The add on FISH requested is was completed and within the report under addendum.

## 2017-03-09 ENCOUNTER — TELEPHONE (OUTPATIENT)
Dept: ONCOLOGY | Age: 57
End: 2017-03-09

## 2017-03-09 NOTE — TELEPHONE ENCOUNTER
Detailed message on patient identified VM that plan would be discussed at teaching visit 3/13/17/3p, but to return call if questions.

## 2017-03-09 NOTE — TELEPHONE ENCOUNTER
Ms. Regina Olson is a new patient of Dr. Aliyah Bassett and was a bit confused about what is to happen next for her. She states that she was told not to schedule a follow up when she left the office. She would just like to make sure she will be contacted with what is to happen next.

## 2017-03-10 ENCOUNTER — OFFICE VISIT (OUTPATIENT)
Dept: CARDIOLOGY CLINIC | Age: 57
End: 2017-03-10

## 2017-03-10 DIAGNOSIS — T45.1X5A CHEMOTHERAPY-INDUCED NAUSEA: ICD-10-CM

## 2017-03-10 DIAGNOSIS — C90.00 MULTIPLE MYELOMA NOT HAVING ACHIEVED REMISSION (HCC): Primary | ICD-10-CM

## 2017-03-10 DIAGNOSIS — R11.0 CHEMOTHERAPY-INDUCED NAUSEA: ICD-10-CM

## 2017-03-10 RX ORDER — PROCHLORPERAZINE MALEATE 10 MG
10 TABLET ORAL
Qty: 30 TAB | Refills: 2 | Status: SHIPPED | OUTPATIENT
Start: 2017-03-10 | End: 2017-03-17

## 2017-03-10 RX ORDER — DEXAMETHASONE 4 MG/1
TABLET ORAL
Qty: 60 TAB | Refills: 2 | Status: SHIPPED | OUTPATIENT
Start: 2017-03-10 | End: 2017-09-08 | Stop reason: SDUPTHER

## 2017-03-10 RX ORDER — LENALIDOMIDE 25 MG/1
25 CAPSULE ORAL DAILY
Qty: 14 CAP | Refills: 2 | Status: SHIPPED | OUTPATIENT
Start: 2017-03-10 | End: 2017-05-05 | Stop reason: SDUPTHER

## 2017-03-10 NOTE — PROGRESS NOTES
Chemo orders completed for VRD(bortezomib, lenalidomide, dexamethasone) to initiate treatment for Multiple Myeloma. Script for dexamethasone and compazine sent to pharmacy on file. Revlimid script printed and to nursing for specialty pharmacy approval and delivery. Due to increased risk of arterial and venous thromboembolic events, will need to be placed on DVT/PE prophylaxis. Ms. Harlo Nissen to take 325mg aspirin daily per Dr. Brittany Lamar.

## 2017-03-13 ENCOUNTER — TELEPHONE (OUTPATIENT)
Dept: ONCOLOGY | Age: 57
End: 2017-03-13

## 2017-03-13 NOTE — PROGRESS NOTES
Revlimid script and clinical documentation faxed complete to [facility] for processing. Call to [address] @ [phone]. Enrollment. Patient ID:  [id number]. Enrollment forms to be faxed.   (30 min)

## 2017-03-13 NOTE — TELEPHONE ENCOUNTER
Stated script transferred to [address] [facility] due to insurance. Phone:  [phone]  Fax:  [phone]  May need PA. Script and clinical info faxed to above #/complete.

## 2017-03-13 NOTE — PROGRESS NOTES
Chemo teach with patient and . Reviewed [facility] written info on velcade/revlimid and decadron. Reviewed 2 drug chemo journal for revlimid and decadron. Patient-physician agreement for [facility] program reviewed and copy to patient. Gave contact # of [facility] and explained role. Advise of need for DVT prophylaxis and to start aspirin 325 mg daily. Reviewed symptoms of DVT per Micromedex. Reviewed decadron and compazine scripts. Questions answered and consent for palliative therapy obtained. [facility] tour given. Copy of all info given to patient. Support given. Patient to contact [employer]  3/14/17 for patient survey. Patient-physician agreement faxed complete to [facility]. Will take provider survey on receipt of program enrollment. Anticipates start date for chemo 3/20 or 3/21 and prefers morning appts. Prior auth faxed to # on form/complete. Chemo orders faxed again to [facility] KEON OPIMARICARMEN/Farhana/Damaso MCGUIRE/consent and Revlimid Patient-Physician agreement to [facility] for scanning.

## 2017-03-14 ENCOUNTER — TELEPHONE (OUTPATIENT)
Dept: ONCOLOGY | Age: 57
End: 2017-03-14

## 2017-03-14 DIAGNOSIS — T45.1X5A IMMUNOSUPPRESSED DUE TO CHEMOTHERAPY (HCC): ICD-10-CM

## 2017-03-14 DIAGNOSIS — C90.00 MULTIPLE MYELOMA NOT HAVING ACHIEVED REMISSION (HCC): Primary | ICD-10-CM

## 2017-03-14 DIAGNOSIS — D84.821 IMMUNOSUPPRESSED DUE TO CHEMOTHERAPY (HCC): ICD-10-CM

## 2017-03-14 DIAGNOSIS — Z79.899 IMMUNOSUPPRESSED DUE TO CHEMOTHERAPY (HCC): ICD-10-CM

## 2017-03-14 RX ORDER — ACYCLOVIR 400 MG/1
400 TABLET ORAL 2 TIMES DAILY
Qty: 60 TAB | Refills: 3 | Status: SHIPPED | OUTPATIENT
Start: 2017-03-14 | End: 2018-09-21 | Stop reason: ALTCHOICE

## 2017-03-14 NOTE — TELEPHONE ENCOUNTER
Call returned to [facility]/Joan MCGUIRE on file. . Reviewed revlimid script. Pharmacy to contact patient for delivery.

## 2017-03-14 NOTE — PROGRESS NOTES
Acyclovir 400mg BID sent to pharmacy on file. Ms. Wade Perales needs to initiate this when chemotherapy started. Encourage OTC antacid of her choice such as ranitidine or PPI to initiate as well. Please let patient know.

## 2017-03-14 NOTE — TELEPHONE ENCOUNTER
HIPAA verified. Reviewed need for PPI and that acyclovir script sent in. Rational for meds reviewed. Patient verbalized understanding and will call office with any questions. Thanked for call.

## 2017-03-14 NOTE — TELEPHONE ENCOUNTER
Authorization:  [id number]  Valid for 30 days  Risk category:  Woman of non childbearing potential.    (4:34 min)

## 2017-03-14 NOTE — TELEPHONE ENCOUNTER
Voicemail: 3/13/17 @ 4:44 pm    [facility] at [address] calling to speak to someone. They need to clarify information for the Revlimid Prescription for the patient. Please call them back at [phone] and use the reference number below.     Reference Number: [id number]

## 2017-03-14 NOTE — PROGRESS NOTES
Chemo orders faxed to [facility] KEON CHRISTENSEN/Marie/Farhana.  Will need revlimid prior to starting chemo. Anticipates start week of 3/20/17.

## 2017-03-16 ENCOUNTER — TELEPHONE (OUTPATIENT)
Dept: ONCOLOGY | Age: 57
End: 2017-03-16

## 2017-03-16 NOTE — TELEPHONE ENCOUNTER
Li is calling to let the nurse know that [facility] April will be shipping this patients medication tomorrow.   Thank you,  Angelique Bunn

## 2017-03-17 ENCOUNTER — TELEPHONE (OUTPATIENT)
Dept: ONCOLOGY | Age: 57
End: 2017-03-17

## 2017-03-17 NOTE — TELEPHONE ENCOUNTER
Call to patient's cell phone. Message left to return call re:  revlimid recd. Need to review medications with patient (ie.   Aspirin/Acyclovir/gastric secretion inhibitor)

## 2017-03-17 NOTE — TELEPHONE ENCOUNTER
Patient calling back to speak to Pete. Patient is receiving medication on Saturday and wanted to know if she could start. Asked patient to call back on Monday to be given all the information for the patient.  Thanks

## 2017-03-17 NOTE — TELEPHONE ENCOUNTER
Call to [facility]/[phone]. Spoke with [facility]. Per [facility] med is to be delivered tomorrow.   Thanked for assist.

## 2017-03-20 ENCOUNTER — DOCUMENTATION ONLY (OUTPATIENT)
Dept: ONCOLOGY | Age: 57
End: 2017-03-20

## 2017-03-20 NOTE — TELEPHONE ENCOUNTER
ML for patient to return call. Advised not to start medication until chemo appt 3/23/17, but to return call. Need to review medications.

## 2017-03-20 NOTE — TELEPHONE ENCOUNTER
Voicemail: 3/20/17 @ 12:07 pm    Patient calling to speak to [facility], Dr. Narayanan [facility] Nurse. Please call the patient back [phone].  Thanks

## 2017-03-20 NOTE — TELEPHONE ENCOUNTER
HIPAA verified. Confirmed appt for 3/23/17 to begin treatment. Stated has revlimid. Safe handling of chemo reviewed. Reviewed medications:  Acyclovir/aspirin/nexium/decadron. Verbalized understanding. Is aware of plan and questions answered. To start VRD on 3/23/17.

## 2017-03-21 NOTE — TELEPHONE ENCOUNTER
Call to Ms. Harlo Nissen. HIPAA verified. Infusion appointment to begin chemotherapy moved from Thursday 3/23/17 to Friday 3/24/17 at 0800. She is to receive bortezomib on days 1,4,8,11 and chemotherapy is not done on the weekends in [place]. She stated understanding of necessary schedule change to Friday/Monday/Friday/Monday. Will also move her office follow up with Dr. Brittany Lamar to Friday 3/24/17. Our office will call her with updated appointment. Plan to go to [place] at 0800 to have labs carmen, up to our office for follow up, and back to [place] for chemo. She stated understanding of all information given.

## 2017-03-23 ENCOUNTER — HOSPITAL ENCOUNTER (OUTPATIENT)
Dept: INFUSION THERAPY | Age: 57
End: 2017-03-23
Payer: COMMERCIAL

## 2017-03-24 ENCOUNTER — HOSPITAL ENCOUNTER (OUTPATIENT)
Dept: INFUSION THERAPY | Age: 57
Discharge: HOME | End: 2017-03-24
Payer: COMMERCIAL

## 2017-03-24 ENCOUNTER — DOCUMENTATION ONLY (OUTPATIENT)
Dept: ONCOLOGY | Age: 57
End: 2017-03-24

## 2017-03-24 ENCOUNTER — OFFICE VISIT (OUTPATIENT)
Dept: ONCOLOGY | Age: 57
End: 2017-03-24

## 2017-03-24 VITALS
HEIGHT: 64 IN | DIASTOLIC BLOOD PRESSURE: 91 MMHG | HEART RATE: 69 BPM | BODY MASS INDEX: 34.08 KG/M2 | WEIGHT: 199.6 LBS | RESPIRATION RATE: 18 BRPM | OXYGEN SATURATION: 98 % | SYSTOLIC BLOOD PRESSURE: 159 MMHG | TEMPERATURE: 97.7 F

## 2017-03-24 VITALS — HEIGHT: 64 IN | WEIGHT: 199.5 LBS | BODY MASS INDEX: 34.06 KG/M2

## 2017-03-24 DIAGNOSIS — C90.00 MULTIPLE MYELOMA NOT HAVING ACHIEVED REMISSION (HCC): Primary | ICD-10-CM

## 2017-03-24 DIAGNOSIS — I42.9 CARDIOMYOPATHY (HCC): ICD-10-CM

## 2017-03-24 DIAGNOSIS — E11.9 CONTROLLED TYPE 2 DIABETES MELLITUS WITHOUT COMPLICATION, WITHOUT LONG-TERM CURRENT USE OF INSULIN (HCC): ICD-10-CM

## 2017-03-24 LAB
ALBUMIN SERPL BCP-MCNC: 3.7 G/DL (ref 3.5–5)
ALBUMIN/GLOB SERPL: 1.2 {RATIO} (ref 1.1–2.2)
ALP SERPL-CCNC: 103 U/L (ref 45–117)
ALT SERPL-CCNC: 23 U/L (ref 12–78)
ANION GAP BLD CALC-SCNC: 10 MMOL/L (ref 5–15)
AST SERPL W P-5'-P-CCNC: 9 U/L (ref 15–37)
BASOPHILS # BLD AUTO: 0 K/UL (ref 0–0.1)
BASOPHILS # BLD: 0 % (ref 0–1)
BILIRUB DIRECT SERPL-MCNC: 0.2 MG/DL (ref 0–0.2)
BILIRUB SERPL-MCNC: 0.6 MG/DL (ref 0.2–1)
BUN SERPL-MCNC: 10 MG/DL (ref 6–20)
BUN/CREAT SERPL: 11 (ref 12–20)
CALCIUM SERPL-MCNC: 8.7 MG/DL (ref 8.5–10.1)
CHLORIDE SERPL-SCNC: 107 MMOL/L (ref 97–108)
CO2 SERPL-SCNC: 23 MMOL/L (ref 21–32)
CREAT SERPL-MCNC: 0.91 MG/DL (ref 0.55–1.02)
EOSINOPHIL # BLD: 0.1 K/UL (ref 0–0.4)
EOSINOPHIL NFR BLD: 2 % (ref 0–7)
ERYTHROCYTE [DISTWIDTH] IN BLOOD BY AUTOMATED COUNT: 14.1 % (ref 11.5–14.5)
GLOBULIN SER CALC-MCNC: 3.2 G/DL (ref 2–4)
GLUCOSE SERPL-MCNC: 211 MG/DL (ref 65–100)
HCT VFR BLD AUTO: 42 % (ref 35–47)
HGB BLD-MCNC: 13.9 G/DL (ref 11.5–16)
IGA SERPL-MCNC: 208 MG/DL (ref 70–400)
IGG SERPL-MCNC: 968 MG/DL (ref 700–1600)
IGM SERPL-MCNC: 21 MG/DL (ref 40–230)
LYMPHOCYTES # BLD AUTO: 38 % (ref 12–49)
LYMPHOCYTES # BLD: 2.6 K/UL (ref 0.8–3.5)
MCH RBC QN AUTO: 30.3 PG (ref 26–34)
MCHC RBC AUTO-ENTMCNC: 33.1 G/DL (ref 30–36.5)
MCV RBC AUTO: 91.5 FL (ref 80–99)
MONOCYTES # BLD: 0.2 K/UL (ref 0–1)
MONOCYTES NFR BLD AUTO: 4 % (ref 5–13)
NEUTS SEG # BLD: 3.8 K/UL (ref 1.8–8)
NEUTS SEG NFR BLD AUTO: 56 % (ref 32–75)
PLATELET # BLD AUTO: 270 K/UL (ref 150–400)
POTASSIUM SERPL-SCNC: 3.9 MMOL/L (ref 3.5–5.1)
PROT SERPL-MCNC: 6.9 G/DL (ref 6.4–8.2)
RBC # BLD AUTO: 4.59 M/UL (ref 3.8–5.2)
SODIUM SERPL-SCNC: 140 MMOL/L (ref 136–145)
WBC # BLD AUTO: 6.7 K/UL (ref 3.6–11)

## 2017-03-24 PROCEDURE — 85025 COMPLETE CBC W/AUTO DIFF WBC: CPT | Performed by: INTERNAL MEDICINE

## 2017-03-24 PROCEDURE — 80048 BASIC METABOLIC PNL TOTAL CA: CPT | Performed by: INTERNAL MEDICINE

## 2017-03-24 PROCEDURE — 74011250636 HC RX REV CODE- 250/636: Performed by: INTERNAL MEDICINE

## 2017-03-24 PROCEDURE — 84165 PROTEIN E-PHORESIS SERUM: CPT | Performed by: INTERNAL MEDICINE

## 2017-03-24 PROCEDURE — 36415 COLL VENOUS BLD VENIPUNCTURE: CPT | Performed by: INTERNAL MEDICINE

## 2017-03-24 PROCEDURE — 96401 CHEMO ANTI-NEOPL SQ/IM: CPT

## 2017-03-24 PROCEDURE — 80076 HEPATIC FUNCTION PANEL: CPT | Performed by: INTERNAL MEDICINE

## 2017-03-24 PROCEDURE — 83883 ASSAY NEPHELOMETRY NOT SPEC: CPT | Performed by: INTERNAL MEDICINE

## 2017-03-24 PROCEDURE — 82784 ASSAY IGA/IGD/IGG/IGM EACH: CPT | Performed by: INTERNAL MEDICINE

## 2017-03-24 PROCEDURE — 74011250636 HC RX REV CODE- 250/636

## 2017-03-24 PROCEDURE — 74011000250 HC RX REV CODE- 250: Performed by: INTERNAL MEDICINE

## 2017-03-24 RX ORDER — PROCHLORPERAZINE MALEATE 10 MG
5 TABLET ORAL
COMMUNITY
End: 2017-08-18

## 2017-03-24 RX ORDER — PANTOPRAZOLE SODIUM 40 MG/1
40 TABLET, DELAYED RELEASE ORAL DAILY
Qty: 30 TAB | Refills: 6 | Status: SHIPPED | OUTPATIENT
Start: 2017-03-24 | End: 2017-12-12 | Stop reason: ALTCHOICE

## 2017-03-24 RX ADMIN — SODIUM CHLORIDE 2.6 MG: 9 INJECTION INTRAMUSCULAR; INTRAVENOUS; SUBCUTANEOUS at 11:15

## 2017-03-24 NOTE — PROGRESS NOTES
[facility] Note:  1130Pt arrived at [facility] ambulatory and in no distress for first velcade. Assessment stableo no complaints voiced. Pt anxious about getting labs and needles. Talked with her about her home meds and the schedule. Narciso Said came down and did patient teaching. Saw MD    Medications received:  Velcade in left abdomen    1130 Tolerated treatment well, no adverse reaction noted. D/Cd from [facility] ambulatory and in no distress accompanied by spouse. Next appt Monday 1000  Visit Vitals    Ht 5' 4.17\" (1.63 m)    Wt 90.5 kg (199 lb 8 oz)    BMI 34.06 kg/m2     Recent Results (from the past 12 hour(s))   CBC WITH AUTOMATED DIFF    Collection Time: 03/24/17  8:36 AM   Result Value Ref Range    WBC 6.7 3.6 - 11.0 K/uL    RBC 4.59 3.80 - 5.20 M/uL    HGB 13.9 11.5 - 16.0 g/dL    HCT 42.0 35.0 - 47.0 %    MCV 91.5 80.0 - 99.0 FL    MCH 30.3 26.0 - 34.0 PG    MCHC 33.1 30.0 - 36.5 g/dL    RDW 14.1 11.5 - 14.5 %    PLATELET 372 869 - 747 K/uL    NEUTROPHILS 56 32 - 75 %    LYMPHOCYTES 38 12 - 49 %    MONOCYTES 4 (L) 5 - 13 %    EOSINOPHILS 2 0 - 7 %    BASOPHILS 0 0 - 1 %    ABS. NEUTROPHILS 3.8 1.8 - 8.0 K/UL    ABS. LYMPHOCYTES 2.6 0.8 - 3.5 K/UL    ABS. MONOCYTES 0.2 0.0 - 1.0 K/UL    ABS. EOSINOPHILS 0.1 0.0 - 0.4 K/UL    ABS.  BASOPHILS 0.0 0.0 - 0.1 K/UL   METABOLIC PANEL, BASIC    Collection Time: 03/24/17  8:36 AM   Result Value Ref Range    Sodium 140 136 - 145 mmol/L    Potassium 3.9 3.5 - 5.1 mmol/L    Chloride 107 97 - 108 mmol/L    CO2 23 21 - 32 mmol/L    Anion gap 10 5 - 15 mmol/L    Glucose 211 (H) 65 - 100 mg/dL    BUN 10 6 - 20 MG/DL    Creatinine 0.91 0.55 - 1.02 MG/DL    BUN/Creatinine ratio 11 (L) 12 - 20      GFR est AA >60 >60 ml/min/1.73m2    GFR est non-AA >60 >60 ml/min/1.73m2    Calcium 8.7 8.5 - 10.1 MG/DL   HEPATIC FUNCTION PANEL    Collection Time: 03/24/17  8:36 AM   Result Value Ref Range    Protein, total 6.9 6.4 - 8.2 g/dL    Albumin 3.7 3.5 - 5.0 g/dL    Globulin 3.2 2.0 - 4.0 g/dL    A-G Ratio 1.2 1.1 - 2.2      Bilirubin, total 0.6 0.2 - 1.0 MG/DL    Bilirubin, direct 0.2 0.0 - 0.2 MG/DL    Alk. phosphatase 103 45 - 117 U/L    AST (SGOT) 9 (L) 15 - 37 U/L    ALT (SGPT) 23 12 - 78 U/L   IMMUNOGLOBULINS, G/A/M, QT.     Collection Time: 03/24/17  8:36 AM   Result Value Ref Range    Immunoglobulin G 968 700 - 1600 mg/dL    Immunoglobulin A 208 70 - 400 mg/dL    Immunoglobulin M 21 (L) 40 - 230 mg/dL

## 2017-03-24 NOTE — PROGRESS NOTES
Met with patient in [facility] to review medications for current treatment. Chemo journal reviewed and revlimid and decadron denoted on calendar. Reviewed daily medications:  protonix/acyclovir and aspirin in detail with doses and reasons for use. Reviewed as needed use of compazine. Patient given written instructions and advised to bring journal with each visit. Patient able to verbalize medication frequency. Encouraged use of pill reminder or other system to facilitate med adherence. Patient appreciative of info. Will take decadron with food in am and revlimid at bedtime. Reviewed safe handling. Verbalized understanding. To call with any questions.

## 2017-03-24 NOTE — PROGRESS NOTES
Ember Edmondson is a 60 y.o. female here today for Multiple Myeloma f/u. Elevated B/P. Patient states her B/P is always elevated when she has to go to the doctor and [place] the interstate.

## 2017-03-24 NOTE — PROGRESS NOTES
Hematology follow up    REASON FOR VISIT: Multiple Myeloma  REQUESTED BY: Dr. Regine Canada: Ms. Antoinette Diamond is a 60 y.o. female with DM and newly diagnosed Multiple Myeloma here to start Cycle 1 of VRD. Oncologic history  Patient had left facial numbness which started in the summer of 2016. This started abruptly and was not associated with rashes, pain, jaw weakness. She has had some intermittent hyperemia of the L eye. No tearing. She has been evaluated by Dr. Guero Lee with Neurology and an extensive work up was only notable for presence of a 0.3 g/dl M spike. Other tests were unremarkable: Vitamin B12 411, thyroid function test normal, ACE 25, BHARATI normal CBC normal, CMP normal, CRP 1.17, CT head and cervical spine unremarkable. Further evaluation revealed findings consistent with Multiple Myeloma    CBC 2/24 Unremarkable. Kappa 17 mg/dl, Lambda 2416 (ratio 0.01), SPEP 0.2 g/dl M spike, HANSEL showed monoclonal free lambda light chains, UPEP negative, Beta 2 Microglobulin 1.8 mg/L, Skeletal survey negative for lytic lesions, Bone marrow biopsy on 2/24/17 showed a Normocellular marrow with mild monoclonal plasmacytosis (15-20%). Trilineage hematopoiesis with maturation. Past Medical History:   Diagnosis Date    Cardiomyopathy     Diabetes mellitus type 2, controlled ([facility] [address] 12/10/2015    Heart failure (HCC)     Hyperlipidemia     Hypertension     controlled    Orthostatic hypotension     Secondary cardiomyopathy, unspecified ([facility] [address]     Sinoatrial node dysfunction ([facility] [address]     Vitamin B12 deficiency 3/31/2010       Past Surgical History:   Procedure Laterality Date    HX HYSTERECTOMY         Allergies   Allergen Reactions    Ace Inhibitors Cough       Current Outpatient Prescriptions   Medication Sig Dispense Refill    atorvastatin (LIPITOR) 40 mg tablet Take 1 Tab by mouth daily.  For cholesterol 30 Tab 12    carvedilol (COREG) 12.5 mg tablet TAKE 1 TABLET BY MOUTH TWICE A DAY WITH MEALS 180 Tab 3    valsartan (DIOVAN) 40 mg tablet TAKE 1 TABLET BY MOUTH EVERY DAY 90 Tab 3    cyanocobalamin (VITAMIN B-12) 1,000 mcg tablet Take 2,000 mcg by mouth daily.  prochlorperazine (COMPAZINE) 10 mg tablet Take 5 mg by mouth every six (6) hours as needed.  acyclovir (ZOVIRAX) 400 mg tablet Take 1 Tab by mouth two (2) times a day. Indications: PREVENTION OF HERPES ZOSTER IN IMMUNOCOMPROMISED PATIENT 60 Tab 3    dexamethasone (DECADRON) 4 mg tablet Take 40mg (ten tablets) PO weekly on Days 1, 8, and 15 with chemotherapy  Indications: MULTIPLE MYELOMA 60 Tab 2    lenalidomide 25 mg cap Take 25 mg by mouth daily. Indications: MULTIPLE MYELOMA 14 Cap 2    sodium chloride (YULIA 128) 2 % ophthalmic solution Administer 2 Drops to both eyes two (2) times a day. 15 mL 12    Cholecalciferol, Vitamin D3, (VITAMIN D3) 1,000 unit cap Take  by mouth daily.  calcium 500 mg Tab Take 600 mg by mouth daily. Facility-Administered Medications Ordered in Other Visits   Medication Dose Route Frequency Provider Last Rate Last Dose    bortezomib (VELCADE) 2.6 mg in sodium chloride 0.9 % SQ chemo syringe  2.6 mg SubCUTAneous ONCE Link Moyer MD        [START ON 3/27/2017] bortezomib (VELCADE) 2.6 mg in sodium chloride 0.9 % SQ chemo syringe  2.6 mg SubCUTAneous ONCE Link Moyer MD           Social History     Social History    Marital status:      Spouse name: N/A    Number of children: N/A    Years of education: N/A     Social History Main Topics    Smoking status: Never Smoker    Smokeless tobacco: Never Used    Alcohol use No    Drug use: No    Sexual activity: Yes     Partners: Male     Other Topics Concern    None     Social History Narrative    , 2 children, 28 and 31. Works at [employer], for 35 years.  5 grandchildren       Family History   Problem Relation Age of Onset   Velta Ganser Cancer Mother     Heart Disease Mother      pacemaker    Diabetes Mother     Hypertension Sister     Hypertension Brother     Diabetes Brother     Hypertension Sister     Hypertension Sister     Hypertension Sister     Hypertension Sister     Diabetes Sister        ROS  A 12 point review of systems was obtained and is negative except as listed in HPI. She still has some numbness of the L face, she describes this as a slightly decreased sensation to touch on the L as opposed to the R side of her face. Has no relieving or aggravating factors. Has had no trouble swallowing, recent rash, fevers, pain or ear infections. No fevers, chills, CP, fatigue, SOB, bleeding, weight or appetite changes. Has a pacemaker    ECOG PS is 0      Physical Examination:   Visit Vitals    BP (!) 159/91 (BP 1 Location: Right arm, BP Patient Position: Sitting)    Pulse 69    Temp 97.7 °F (36.5 °C) (Oral)    Resp 18    Ht 5' 4.17\" (1.63 m)    Wt 199 lb 9.6 oz (90.5 kg)    SpO2 98%    BMI 34.08 kg/m2     General appearance - alert, well appearing, and in no distress  Mental status - oriented to person, place, and time  Mouth - mucous membranes moist, pharynx normal without lesions  Lymphatics - no palpable lymphadenopathy, no hepatosplenomegaly  Chest - clear to auscultation, no wheezes, rales or rhonchi, symmetric air entry  Heart - normal rate, regular rhythm, normal S1, S2, no murmurs, rubs, clicks or gallops  Abdomen - soft, nontender, nondistended, no masses or organomegaly, bowel sounds present  Ext: No edema    LABS  Lab Results   Component Value Date/Time    WBC 6.7 03/24/2017 08:36 AM    HGB 13.9 03/24/2017 08:36 AM    HCT 42.0 03/24/2017 08:36 AM    PLATELET 631 36/65/4213 08:36 AM    MCV 91.5 03/24/2017 08:36 AM    ABS.  NEUTROPHILS 3.8 03/24/2017 08:36 AM     Lab Results   Component Value Date/Time    Sodium 142 02/16/2017 09:18 AM    Potassium 4.3 02/16/2017 09:18 AM    Chloride 102 02/16/2017 09:18 AM    CO2 22 02/16/2017 09:18 AM    Glucose 222 02/16/2017 09:18 AM    BUN 11 02/16/2017 09:18 AM    Creatinine 0.81 02/16/2017 09:18 AM    GFR est AA 93 02/16/2017 09:18 AM    GFR est non-AA 80 02/16/2017 09:18 AM    Calcium 9.5 02/16/2017 09:18 AM     Lab Results   Component Value Date/Time    AST (SGOT) 15 02/16/2017 09:18 AM    Alk. phosphatase 94 02/16/2017 09:18 AM    Protein, total 6.9 02/16/2017 09:18 AM    Albumin 4.2 02/16/2017 09:18 AM    Globulin 3.8 07/27/2010 03:57 PM    A-G Ratio 1.6 02/16/2017 09:18 AM     FLC 2/24/17   Free Kappa Lt Chains, serum 17.98      Free Lambda Lt Chains, serum 2416.00 (H)             Kappa/Lambda ratio, serum 0.01 (L)     HANSEL: Monoclonal light chains    M spike 0.2 g/L    No bence Lei proteinuria    Skeletal survey negative    Bone marrow biopsy reviewed    FISH molecular analysis:   Positive for IgH gene rearrangement (IgH complex FISH study pending); Normal results with 1, 5, 9, 13, 15, and 17 (TP53) probe sets. ASSESSMENT  Ms. Cristal Aaron is a 60 y.o. female with  1. Multiple Myeloma by revised IMWG criteria (Lancet Oncol 2014), due to Involved : uninvolved serum free light chain ratio > 100. Has no anemia, hypercalcemia, bone lesions or renal failure. FISH not resulted for accurate risk stratification. 1. 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 Poorly controlled DM. DISCUSSION    Discussed the natural history of multiple myeloma again today as last week [facility] was understandably overwhelmed. Not curable yet, though with favorable molecular profile patients responding to therapy and receiving an auto transplant may live for decades. The other question is the etiology of there asymmetrial facial nerve sensory neuropathy. Myeloma associated neuropathy is usually symmetric and usually associated with an IgM paraprotein. Rarely, radiculopathy from direct compression due to an extra osseous plasmacytoma may lead to unilateral symptoms, which however seems unlikely based on her recent imaging.  Her prior HANSEL had indicated an IgA Monoclonal band as well which may account for 15% of paraproteinemia associated neuropathy, as discussed above clinical features in her case are not consistent with this either. We discussed that the immediate goal is to rapidly decrease the abnormal protein and obtain a deep response. Consolidation with an autotransplant would attain the best chances of long term DFS. I reviewed VRD (Velcade, Revlimid and Dex)  and its side effects. She is here to start Cycle 1 day 1 of VRD and has questions about the schedule. I reviewed the schedule in detail. Each cycle is 21 days  Regimen: Velcade 1.3 mg/m² subcutaneously on days 1-4 then 8-11 and then 1 week off  Revlimid 25 mg from days 1 through 14  Dexamethasone 40 mg on days 1 8 and 15  Aspirin  Acyclovir      PLAN    - Start Cycle 1 day 1 Velcade, Revlimid and Dexamethasone  - RTC per protocol for days 4, 8, 11 of Velcade   - RTC to see me on days 1-8 of every cycle. - ASA daily and Acyclovir BID   - Compazine prn nausea  - Protonix while on Decadron  - I have reached out to [facility] .  Will plan on referring her after cycle 1        Saji Tam MD

## 2017-03-24 NOTE — PROGRESS NOTES
Problem: Knowledge Deficit  Goal: *Verbalizes understanding of procedures and medications  Outcome: Progressing Towards Goal  velcade

## 2017-03-24 NOTE — PATIENT INSTRUCTIONS
Take your chemotherapy as below    Lenalidomide 25 mg on days 1-14 ( for this cycle that would be 3/24 through 4/6/17)  Dexamethasone 40 mg on days 1, 8, 15 ( for cycle 1 that would be 3/24/17, 3/31/17 and 4/7/17)      You will be given Velcade under your skin in the [facility] on days 1, 4, 8, 11 of each cycle (for this cycle that would be 3/24, 3/27, 3/31, 4/3)    We repeat this treatment every 21 days (next cycle 4/14/17)    Take these medications daily to prevent complications from chemotherapy  Aspirin to prevent clots  Protonix to prevent ulcers    Take this medication 2 times daily to prevent complications from chemotherapy  Acyclovir     Dr. Gabriella Traore will see you every on days 1 and 8 of each cycle.

## 2017-03-27 ENCOUNTER — HOSPITAL ENCOUNTER (OUTPATIENT)
Dept: INFUSION THERAPY | Age: 57
Discharge: HOME | End: 2017-03-27
Payer: COMMERCIAL

## 2017-03-27 ENCOUNTER — TELEPHONE (OUTPATIENT)
Dept: ONCOLOGY | Age: 57
End: 2017-03-27

## 2017-03-27 VITALS
SYSTOLIC BLOOD PRESSURE: 156 MMHG | HEIGHT: 64 IN | DIASTOLIC BLOOD PRESSURE: 94 MMHG | TEMPERATURE: 97.1 F | RESPIRATION RATE: 18 BRPM | BODY MASS INDEX: 34.4 KG/M2 | HEART RATE: 68 BPM | WEIGHT: 201.5 LBS

## 2017-03-27 LAB
ALBUMIN SERPL ELPH-MCNC: 3.5 G/DL (ref 2.9–4.4)
ALBUMIN/GLOB SERPL: 1.1 {RATIO} (ref 0.7–1.7)
ALPHA1 GLOB SERPL ELPH-MCNC: 0.2 G/DL (ref 0–0.4)
ALPHA2 GLOB SERPL ELPH-MCNC: 0.8 G/DL (ref 0.4–1)
B-GLOBULIN SERPL ELPH-MCNC: 1.2 G/DL (ref 0.7–1.3)
BASOPHILS # BLD AUTO: 0 K/UL (ref 0–0.1)
BASOPHILS # BLD: 0 % (ref 0–1)
EOSINOPHIL # BLD: 0.1 K/UL (ref 0–0.4)
EOSINOPHIL NFR BLD: 1 % (ref 0–7)
ERYTHROCYTE [DISTWIDTH] IN BLOOD BY AUTOMATED COUNT: 13.9 % (ref 11.5–14.5)
GAMMA GLOB SERPL ELPH-MCNC: 0.9 G/DL (ref 0.4–1.8)
GLOBULIN SER CALC-MCNC: 3.1 G/DL (ref 2.2–3.9)
HCT VFR BLD AUTO: 41.1 % (ref 35–47)
HGB BLD-MCNC: 14.4 G/DL (ref 11.5–16)
IGA SERPL-MCNC: 216 MG/DL (ref 87–352)
IGG SERPL-MCNC: 918 MG/DL (ref 700–1600)
IGM SERPL-MCNC: 22 MG/DL (ref 26–217)
KAPPA LC FREE SER-MCNC: 17.96 MG/L (ref 3.3–19.4)
KAPPA LC FREE/LAMBDA FREE SER: 0.01 {RATIO} (ref 0.26–1.65)
LAMBDA LC FREE SERPL-MCNC: 1978 MG/L (ref 5.71–26.3)
LYMPHOCYTES # BLD AUTO: 38 % (ref 12–49)
LYMPHOCYTES # BLD: 4.5 K/UL (ref 0.8–3.5)
M PROTEIN SERPL ELPH-MCNC: 0.2 G/DL
MCH RBC QN AUTO: 31.4 PG (ref 26–34)
MCHC RBC AUTO-ENTMCNC: 35 G/DL (ref 30–36.5)
MCV RBC AUTO: 89.5 FL (ref 80–99)
MONOCYTES # BLD: 1.1 K/UL (ref 0–1)
MONOCYTES NFR BLD AUTO: 9 % (ref 5–13)
NEUTS SEG # BLD: 6.2 K/UL (ref 1.8–8)
NEUTS SEG NFR BLD AUTO: 52 % (ref 32–75)
PLATELET # BLD AUTO: 273 K/UL (ref 150–400)
PROT PATTERN SERPL IFE-IMP: ABNORMAL
PROT SERPL-MCNC: 6.6 G/DL (ref 6–8.5)
RBC # BLD AUTO: 4.59 M/UL (ref 3.8–5.2)
WBC # BLD AUTO: 12 K/UL (ref 3.6–11)

## 2017-03-27 PROCEDURE — 74011250636 HC RX REV CODE- 250/636: Performed by: INTERNAL MEDICINE

## 2017-03-27 PROCEDURE — 74011250636 HC RX REV CODE- 250/636

## 2017-03-27 PROCEDURE — 96401 CHEMO ANTI-NEOPL SQ/IM: CPT

## 2017-03-27 PROCEDURE — 36415 COLL VENOUS BLD VENIPUNCTURE: CPT | Performed by: INTERNAL MEDICINE

## 2017-03-27 PROCEDURE — 74011000250 HC RX REV CODE- 250: Performed by: INTERNAL MEDICINE

## 2017-03-27 PROCEDURE — 85025 COMPLETE CBC W/AUTO DIFF WBC: CPT | Performed by: INTERNAL MEDICINE

## 2017-03-27 RX ADMIN — SODIUM CHLORIDE 2.6 MG: 9 INJECTION INTRAMUSCULAR; INTRAVENOUS; SUBCUTANEOUS at 15:08

## 2017-03-27 NOTE — PROGRESS NOTES
Outpatient Infusion Center - Chemotherapy Progress Note    1350 Pt admit to [facility] for Velcade/C1D4 ambulatory in stable condition. Assessment completed. No new concerns voiced. CBC drawn peripherally and sent for processing. Visit Vitals    BP (!) 156/94    Pulse 68    Temp 97.1 °F (36.2 °C)    Resp 18    Ht 5' 4\" (1.626 m)    Wt 91.4 kg (201 lb 8 oz)    BMI 34.59 kg/m2       Medications:  Velcade (SC R abd)    1515 Pt tolerated treatment well. D/c home ambulatory in no distress. Pt aware of next [facility] appointment scheduled for 03/31/2017. Recent Results (from the past 12 hour(s))   CBC WITH AUTOMATED DIFF    Collection Time: 03/27/17  1:54 PM   Result Value Ref Range    WBC 12.0 (H) 3.6 - 11.0 K/uL    RBC 4.59 3.80 - 5.20 M/uL    HGB 14.4 11.5 - 16.0 g/dL    HCT 41.1 35.0 - 47.0 %    MCV 89.5 80.0 - 99.0 FL    MCH 31.4 26.0 - 34.0 PG    MCHC 35.0 30.0 - 36.5 g/dL    RDW 13.9 11.5 - 14.5 %    PLATELET 866 652 - 888 K/uL    NEUTROPHILS 52 32 - 75 %    LYMPHOCYTES 38 12 - 49 %    MONOCYTES 9 5 - 13 %    EOSINOPHILS 1 0 - 7 %    BASOPHILS 0 0 - 1 %    ABS. NEUTROPHILS 6.2 1.8 - 8.0 K/UL    ABS. LYMPHOCYTES 4.5 (H) 0.8 - 3.5 K/UL    ABS. MONOCYTES 1.1 (H) 0.0 - 1.0 K/UL    ABS. EOSINOPHILS 0.1 0.0 - 0.4 K/UL    ABS.  BASOPHILS 0.0 0.0 - 0.1 K/UL

## 2017-03-29 NOTE — TELEPHONE ENCOUNTER
Call to patient post cycle 1 VDR. HIPAA verified. Stated is feeling good. Denied questions about medications and is having no side effects. Thanked for call. Plan:  Day 8 on 3/31/17. To provider.

## 2017-03-30 ENCOUNTER — APPOINTMENT (OUTPATIENT)
Dept: INFUSION THERAPY | Age: 57
End: 2017-03-30
Payer: COMMERCIAL

## 2017-03-30 ENCOUNTER — DOCUMENTATION ONLY (OUTPATIENT)
Dept: ONCOLOGY | Age: 57
End: 2017-03-30

## 2017-03-30 NOTE — PROGRESS NOTES
Modified VRD orders completed and faxed to [facility]. She will initiate weekly bortezomib on 4/14/17.

## 2017-03-31 ENCOUNTER — HOSPITAL ENCOUNTER (OUTPATIENT)
Dept: INFUSION THERAPY | Age: 57
Discharge: HOME | End: 2017-03-31
Payer: COMMERCIAL

## 2017-03-31 ENCOUNTER — TELEPHONE (OUTPATIENT)
Dept: ONCOLOGY | Age: 57
End: 2017-03-31

## 2017-03-31 ENCOUNTER — OFFICE VISIT (OUTPATIENT)
Dept: ONCOLOGY | Age: 57
End: 2017-03-31

## 2017-03-31 VITALS
HEART RATE: 73 BPM | BODY MASS INDEX: 34.4 KG/M2 | WEIGHT: 201.5 LBS | RESPIRATION RATE: 18 BRPM | SYSTOLIC BLOOD PRESSURE: 177 MMHG | TEMPERATURE: 97.5 F | HEIGHT: 64 IN | DIASTOLIC BLOOD PRESSURE: 84 MMHG | OXYGEN SATURATION: 97 %

## 2017-03-31 VITALS
DIASTOLIC BLOOD PRESSURE: 90 MMHG | BODY MASS INDEX: 25.85 KG/M2 | RESPIRATION RATE: 20 BRPM | HEART RATE: 73 BPM | SYSTOLIC BLOOD PRESSURE: 153 MMHG | HEIGHT: 64 IN | OXYGEN SATURATION: 99 % | TEMPERATURE: 98.3 F | WEIGHT: 151.4 LBS

## 2017-03-31 DIAGNOSIS — G62.9 NEUROPATHY: ICD-10-CM

## 2017-03-31 DIAGNOSIS — C90.00 MULTIPLE MYELOMA NOT HAVING ACHIEVED REMISSION (HCC): Primary | ICD-10-CM

## 2017-03-31 DIAGNOSIS — E11.9 CONTROLLED TYPE 2 DIABETES MELLITUS WITHOUT COMPLICATION, WITHOUT LONG-TERM CURRENT USE OF INSULIN (HCC): ICD-10-CM

## 2017-03-31 DIAGNOSIS — I42.9 CARDIOMYOPATHY (HCC): ICD-10-CM

## 2017-03-31 PROCEDURE — 74011250636 HC RX REV CODE- 250/636

## 2017-03-31 PROCEDURE — 74011000250 HC RX REV CODE- 250: Performed by: INTERNAL MEDICINE

## 2017-03-31 PROCEDURE — 96401 CHEMO ANTI-NEOPL SQ/IM: CPT

## 2017-03-31 PROCEDURE — 74011250636 HC RX REV CODE- 250/636: Performed by: INTERNAL MEDICINE

## 2017-03-31 RX ADMIN — SODIUM CHLORIDE 2.6 MG: 9 INJECTION INTRAMUSCULAR; INTRAVENOUS; SUBCUTANEOUS at 11:18

## 2017-03-31 NOTE — TELEPHONE ENCOUNTER
Call to Suzan krishna. Authorization for revlimid refill [id number]. Completed [facility] request form to provider for signature.

## 2017-03-31 NOTE — PROGRESS NOTES
Sindhu Vaz is a 60 y.o. female here today for follow up. Having some discomfort to tongue, white film.

## 2017-03-31 NOTE — PROGRESS NOTES
Hematology follow up    REASON FOR VISIT: Multiple Myeloma  REQUESTED BY: Dr. Naresh Mcneal: Ms. Aurelia Yeung is a 60 y.o. female with DM and newly diagnosed Multiple Myeloma here to continue Cycle 1 of VRD. Oncologic history  Patient had left facial numbness which started in the summer of 2016. This started abruptly and was not associated with rashes, pain, jaw weakness. She has had some intermittent hyperemia of the L eye. No tearing. She has been evaluated by Dr. Andria Hill with Neurology and an extensive work up was only notable for presence of a 0.3 g/dl M spike. Other tests were unremarkable: Vitamin B12 411, thyroid function test normal, ACE 25, BHARATI normal CBC normal, CMP normal, CRP 1.17, CT head and cervical spine unremarkable. Further evaluation revealed findings consistent with Multiple Myeloma    CBC 2/24 Unremarkable. Kappa 17 mg/dl, Lambda 2416 (ratio 0.01), SPEP 0.2 g/dl M spike, HANSEL showed monoclonal free lambda light chains, UPEP negative, Beta 2 Microglobulin 1.8 mg/L, Skeletal survey negative for lytic lesions, Bone marrow biopsy on 2/24/17 showed a Normocellular marrow with mild monoclonal plasmacytosis (15-20%). Trilineage hematopoiesis with maturation. Treatment- Palliative  3/24/17: VRD Cycle 1    Past Medical History:   Diagnosis Date    Cardiomyopathy     Diabetes mellitus type 2, controlled ([address] 12/10/2015    Heart failure (HCC)     Hyperlipidemia     Hypertension     controlled    Orthostatic hypotension     Secondary cardiomyopathy, unspecified ([address]     Sinoatrial node dysfunction ([address]     Vitamin B12 deficiency 3/31/2010       Past Surgical History:   Procedure Laterality Date    HX HYSTERECTOMY         Allergies   Allergen Reactions    Ace Inhibitors Cough       Current Outpatient Prescriptions   Medication Sig Dispense Refill    prochlorperazine (COMPAZINE) 10 mg tablet Take 5 mg by mouth every six (6) hours as needed.       pantoprazole (PROTONIX) 40 mg tablet Take 1 Tab by mouth daily. 30 Tab 6    acyclovir (ZOVIRAX) 400 mg tablet Take 1 Tab by mouth two (2) times a day. Indications: PREVENTION OF HERPES ZOSTER IN IMMUNOCOMPROMISED PATIENT 60 Tab 3    dexamethasone (DECADRON) 4 mg tablet Take 40mg (ten tablets) PO weekly on Days 1, 8, and 15 with chemotherapy  Indications: MULTIPLE MYELOMA 60 Tab 2    lenalidomide 25 mg cap Take 25 mg by mouth daily. Indications: MULTIPLE MYELOMA 14 Cap 2    atorvastatin (LIPITOR) 40 mg tablet Take 1 Tab by mouth daily. For cholesterol 30 Tab 12    sodium chloride (YULIA 128) 2 % ophthalmic solution Administer 2 Drops to both eyes two (2) times a day. 15 mL 12    carvedilol (COREG) 12.5 mg tablet TAKE 1 TABLET BY MOUTH TWICE A DAY WITH MEALS 180 Tab 3    valsartan (DIOVAN) 40 mg tablet TAKE 1 TABLET BY MOUTH EVERY DAY 90 Tab 3    cyanocobalamin (VITAMIN B-12) 1,000 mcg tablet Take 2,000 mcg by mouth daily.  Cholecalciferol, Vitamin D3, (VITAMIN D3) 1,000 unit cap Take  by mouth daily.  calcium 500 mg Tab Take 600 mg by mouth daily. Facility-Administered Medications Ordered in Other Visits   Medication Dose Route Frequency Provider Last Rate Last Dose    bortezomib (VELCADE) 2.6 mg in sodium chloride 0.9 % SQ chemo syringe  2.6 mg SubCUTAneous ONCE Colette Roberts MD        [START ON 4/3/2017] bortezomib (VELCADE) 2.6 mg in sodium chloride 0.9 % SQ chemo syringe  2.6 mg SubCUTAneous ONCE Colette Roberts MD           Social History     Social History    Marital status:      Spouse name: N/A    Number of children: N/A    Years of education: N/A     Social History Main Topics    Smoking status: Never Smoker    Smokeless tobacco: Never Used    Alcohol use No    Drug use: No    Sexual activity: Yes     Partners: Male     Other Topics Concern    None     Social History Narrative    , 2 children, 28 and 31. Works at [id number], for 35 years.  5 grandchildren Family History   Problem Relation Age of Onset   Immanuel Hodges Cancer Mother     Heart Disease Mother      pacemaker   Immanuel Hodges Diabetes Mother     Hypertension Sister     Hypertension Brother     Diabetes Brother     Hypertension Sister     Hypertension Sister     Hypertension Sister     Hypertension Sister     Diabetes Sister        ROS  A 12 point review of systems was obtained and is negative except as listed in HPI. Tolerating treatment. She has a funny feeling of coating on her tongue. No nausea or vomiting. No leg swelling, no CP, SOB, numbness, headache, diarrhea. Has some constipation. Decreased sensation to touch on the L as opposed to the R side of her face. Has no relieving or aggravating factors. Has had no trouble swallowing, recent rash, fevers, pain or ear infections. ECOG PS is 0      Physical Examination:   Visit Vitals    /90    Pulse 73    Temp 98.3 °F (36.8 °C)    Resp 20    Ht 5' 4\" (1.626 m)    Wt 151 lb 6.4 oz (68.7 kg)    SpO2 99%    BMI 25.99 kg/m2     General appearance - alert, well appearing, and in no distress  Mental status - oriented to person, place, and time  Mouth - mucous membranes moist, pharynx normal without lesions  Lymphatics - no palpable lymphadenopathy, no hepatosplenomegaly  Chest - clear to auscultation, no wheezes, rales or rhonchi, symmetric air entry  Heart - normal rate, regular rhythm, normal S1, S2, no murmurs, rubs, clicks or gallops  Abdomen - soft, nontender, nondistended, no masses or organomegaly, bowel sounds present  Ext: No edema    LABS  Lab Results   Component Value Date/Time    WBC 12.0 03/27/2017 01:54 PM    HGB 14.4 03/27/2017 01:54 PM    HCT 41.1 03/27/2017 01:54 PM    PLATELET 514 64/88/1643 01:54 PM    MCV 89.5 03/27/2017 01:54 PM    ABS.  NEUTROPHILS 6.2 03/27/2017 01:54 PM     Lab Results   Component Value Date/Time    Sodium 140 03/24/2017 08:36 AM    Potassium 3.9 03/24/2017 08:36 AM    Chloride 107 03/24/2017 08:36 AM    CO2 23 03/24/2017 08:36 AM    Glucose 211 03/24/2017 08:36 AM    BUN 10 03/24/2017 08:36 AM    Creatinine 0.91 03/24/2017 08:36 AM    GFR est AA >60 03/24/2017 08:36 AM    GFR est non-AA >60 03/24/2017 08:36 AM    Calcium 8.7 03/24/2017 08:36 AM     Lab Results   Component Value Date/Time    AST (SGOT) 9 03/24/2017 08:36 AM    Alk. phosphatase 103 03/24/2017 08:36 AM    Protein, total 6.9 03/24/2017 08:36 AM    Protein, total 6.6 03/24/2017 08:36 AM    Albumin 3.7 03/24/2017 08:36 AM    Globulin 3.2 03/24/2017 08:36 AM    A-G Ratio 1.2 03/24/2017 08:36 AM     FLC 2/24/17   Free Kappa Lt Chains, serum 17.98      Free Lambda Lt Chains, serum 2416.00 (H)             Kappa/Lambda ratio, serum 0.01 (L)     HANSEL: Monoclonal light chains    M spike 0.2 g/L    No bence Lei proteinuria    Skeletal survey negative    Bone marrow biopsy reviewed    FISH molecular analysis:   Positive for IgH gene rearrangement (IgH complex FISH study pending); Normal results with 1, 5, 9, 13, 15, and 17 (TP53) probe sets. ASSESSMENT  Ms. Marleny Burger is a 60 y.o. female with  1. Multiple Myeloma by revised IMWG criteria (Lancet Oncol 2014), due to Involved : uninvolved serum free light chain ratio > 100. Has no anemia, hypercalcemia, bone lesions or renal failure. FISH not resulted for accurate risk stratification. 1. 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 Poorly controlled DM. Started palliative VRD Cycle 1. She is here for Cycle 1 day 8 today. Tolerating well. DISCUSSION    Multiple myeloma is Not curable yet, though with favorable molecular profile patients responding to therapy and receiving an auto transplant may live for decades. The other question is the etiology of there asymmetrial facial nerve sensory neuropathy. Myeloma associated neuropathy is usually symmetric and usually associated with an IgM paraprotein.  Rarely, radiculopathy from direct compression due to an extra osseous plasmacytoma may lead to unilateral symptoms, which however seems unlikely based on her recent imaging. Her prior HANSEL had indicated an IgA Monoclonal band as well which may account for 15% of paraproteinemia associated neuropathy, as discussed above clinical features in her case are not consistent with this either. We discussed that the immediate goal is to rapidly decrease the abnormal protein and obtain a deep response. Consolidation with an autotransplant would attain the best chances of long term DFS. Continue VRD as below for Cycle 1  Each cycle is 21 days  Regimen: Velcade 1.3 mg/m² subcutaneously on days 1-4 then 8-11 and then 1 week off  Revlimid 25 mg from days 1 through 14  Dexamethasone 40 mg on days 1 8 and 15  Aspirin  Acyclovir      PLAN    - Proceed with Cycle 1 day 8 Velcade, Revlimid and Dexamethasone  - RTC per protocol for days 4, 8, 11 of Velcade   - RTC to see me on D1 of every cycle  - ASA daily and Acyclovir BID   - Compazine prn nausea  - Protonix while on Decadron  - I have reached out to [facility] . Will plan on referring her after cycle 1    RTC D1 Cycle 2.  From Cycle 2 onwards will do Velcade weekly only    Misty Pagan MD

## 2017-03-31 NOTE — PROGRESS NOTES
1000 Pt admit to [facility] for C1 D8 VRD ambulatory in stable condition. Assessment completed, states \"my tongue feels weird. \" tongue evaluated, found to have thick white coating. Offered to contact provider, as patient has no appt. Patient states \"i was told to just come by her office today. \". No new concerns voiced. No labs due, patient ambulatory to MD office. .    Visit Vitals    /84 (BP 1 Location: Right arm, BP Patient Position: Sitting)    Pulse 73    Temp 97.5 °F (36.4 °C)    Resp 18    Ht 5' 4\" (1.626 m)    Wt 91.4 kg (201 lb 8 oz)    SpO2 97%    BMI 34.59 kg/m2         Medications:  Velcade 2.6 MG SQ in left abdomen    1120 Pt tolerated treatment well. D/c home ambulatory in no distress. Pt aware of next appointment scheduled for 4/3/17.

## 2017-04-03 ENCOUNTER — HOSPITAL ENCOUNTER (OUTPATIENT)
Dept: INFUSION THERAPY | Age: 57
Discharge: HOME | End: 2017-04-03
Payer: COMMERCIAL

## 2017-04-03 ENCOUNTER — TELEPHONE (OUTPATIENT)
Dept: ONCOLOGY | Age: 57
End: 2017-04-03

## 2017-04-03 ENCOUNTER — DOCUMENTATION ONLY (OUTPATIENT)
Dept: ONCOLOGY | Age: 57
End: 2017-04-03

## 2017-04-03 VITALS
TEMPERATURE: 97.1 F | WEIGHT: 201.4 LBS | HEART RATE: 70 BPM | BODY MASS INDEX: 34.38 KG/M2 | DIASTOLIC BLOOD PRESSURE: 90 MMHG | HEIGHT: 64 IN | SYSTOLIC BLOOD PRESSURE: 186 MMHG | RESPIRATION RATE: 18 BRPM | OXYGEN SATURATION: 95 %

## 2017-04-03 LAB
BASOPHILS # BLD AUTO: 0 K/UL (ref 0–0.1)
BASOPHILS # BLD: 0 % (ref 0–1)
EOSINOPHIL # BLD: 0.1 K/UL (ref 0–0.4)
EOSINOPHIL NFR BLD: 1 % (ref 0–7)
ERYTHROCYTE [DISTWIDTH] IN BLOOD BY AUTOMATED COUNT: 14.2 % (ref 11.5–14.5)
HCT VFR BLD AUTO: 38.4 % (ref 35–47)
HGB BLD-MCNC: 13.4 G/DL (ref 11.5–16)
LYMPHOCYTES # BLD AUTO: 26 % (ref 12–49)
LYMPHOCYTES # BLD: 2.8 K/UL (ref 0.8–3.5)
MCH RBC QN AUTO: 31.2 PG (ref 26–34)
MCHC RBC AUTO-ENTMCNC: 34.9 G/DL (ref 30–36.5)
MCV RBC AUTO: 89.5 FL (ref 80–99)
MONOCYTES # BLD: 1.7 K/UL (ref 0–1)
MONOCYTES NFR BLD AUTO: 16 % (ref 5–13)
NEUTS SEG # BLD: 6.1 K/UL (ref 1.8–8)
NEUTS SEG NFR BLD AUTO: 57 % (ref 32–75)
PLATELET # BLD AUTO: 205 K/UL (ref 150–400)
RBC # BLD AUTO: 4.29 M/UL (ref 3.8–5.2)
WBC # BLD AUTO: 10.6 K/UL (ref 3.6–11)

## 2017-04-03 PROCEDURE — 36415 COLL VENOUS BLD VENIPUNCTURE: CPT | Performed by: INTERNAL MEDICINE

## 2017-04-03 PROCEDURE — 96401 CHEMO ANTI-NEOPL SQ/IM: CPT

## 2017-04-03 PROCEDURE — 74011250636 HC RX REV CODE- 250/636: Performed by: INTERNAL MEDICINE

## 2017-04-03 PROCEDURE — 85025 COMPLETE CBC W/AUTO DIFF WBC: CPT | Performed by: INTERNAL MEDICINE

## 2017-04-03 PROCEDURE — 74011000250 HC RX REV CODE- 250: Performed by: INTERNAL MEDICINE

## 2017-04-03 PROCEDURE — 74011250636 HC RX REV CODE- 250/636

## 2017-04-03 RX ADMIN — SODIUM CHLORIDE 2.6 MG: 9 INJECTION INTRAMUSCULAR; INTRAVENOUS; SUBCUTANEOUS at 12:43

## 2017-04-03 NOTE — PROGRESS NOTES
Outpatient Infusion Center - Chemotherapy Progress Note    1110 Pt admit to [facility] for Cycle 1 Day 11 Velcade ambulatory in stable condition. Assessment completed. No new concerns voiced. Labs drawn peripherally and sent for processing. Chemotherapy Flowsheet 4/3/2017   Cycle C1D11   Date 4/3/2017   Drug / Regimen Velcade   Notes -         Visit Vitals    /90 (BP 1 Location: Right arm, BP Patient Position: Sitting)    Pulse 70    Temp 97.1 °F (36.2 °C)    Resp 18    Ht 5' 4\" (1.626 m)    Wt 91.4 kg (201 lb 6.4 oz)    SpO2 95%    BMI 34.57 kg/m2       Medications:  Velcade SQ to right abdomen    1255 Pt tolerated treatment well. D/c home ambulatory in no distress. Pt aware of next appointment scheduled for 4/14/17 at 0900 for Cycle 2 Day 1 Velcade. Recent Results (from the past 12 hour(s))   CBC WITH AUTOMATED DIFF    Collection Time: 04/03/17 11:19 AM   Result Value Ref Range    WBC 10.6 3.6 - 11.0 K/uL    RBC 4.29 3.80 - 5.20 M/uL    HGB 13.4 11.5 - 16.0 g/dL    HCT 38.4 35.0 - 47.0 %    MCV 89.5 80.0 - 99.0 FL    MCH 31.2 26.0 - 34.0 PG    MCHC 34.9 30.0 - 36.5 g/dL    RDW 14.2 11.5 - 14.5 %    PLATELET 070 462 - 796 K/uL    NEUTROPHILS 57 32 - 75 %    LYMPHOCYTES 26 12 - 49 %    MONOCYTES 16 (H) 5 - 13 %    EOSINOPHILS 1 0 - 7 %    BASOPHILS 0 0 - 1 %    ABS. NEUTROPHILS 6.1 1.8 - 8.0 K/UL    ABS. LYMPHOCYTES 2.8 0.8 - 3.5 K/UL    ABS. MONOCYTES 1.7 (H) 0.0 - 1.0 K/UL    ABS. EOSINOPHILS 0.1 0.0 - 0.4 K/UL    ABS.  BASOPHILS 0.0 0.0 - 0.1 K/UL

## 2017-04-05 ENCOUNTER — TELEPHONE (OUTPATIENT)
Dept: ONCOLOGY | Age: 57
End: 2017-04-05

## 2017-04-05 NOTE — TELEPHONE ENCOUNTER
Per Reza. Dr. Moustapha Baez is not on consult service in April and May. Requested if any provider can see patient. Advised will forward to provider and return call.

## 2017-04-07 NOTE — TELEPHONE ENCOUNTER
Call placed to transplant coordinator at [address] [facility] [address] office, message left to return call. Per provider, any provider at office may evaluate patient.

## 2017-04-12 ENCOUNTER — TELEPHONE (OUTPATIENT)
Dept: ONCOLOGY | Age: 57
End: 2017-04-12

## 2017-04-12 NOTE — TELEPHONE ENCOUNTER
HIPAA verified. Stated rash under nose started Thursday and spread around mouth. Stated applied warm packs and neosporin to white head areas. Stated area now red and peeling. Denied drainage. Stated some itching now. Denied fever/chills or tenderness mouth. Advised would forward to provider and notify of plan. Thanked for call.

## 2017-04-12 NOTE — TELEPHONE ENCOUNTER
Appt: 4/13/17 @ 10:15a with Quynh gill Ernestina. Call to patient. HIPAA verified. Advised of provider note re:  Benadryl and re;  appt 4/13/17. Verbalized understanding and thanked for call.

## 2017-04-13 ENCOUNTER — APPOINTMENT (OUTPATIENT)
Dept: INFUSION THERAPY | Age: 57
End: 2017-04-13
Payer: COMMERCIAL

## 2017-04-13 ENCOUNTER — OFFICE VISIT (OUTPATIENT)
Dept: ONCOLOGY | Age: 57
End: 2017-04-13

## 2017-04-13 VITALS
BODY MASS INDEX: 33.87 KG/M2 | OXYGEN SATURATION: 98 % | DIASTOLIC BLOOD PRESSURE: 84 MMHG | SYSTOLIC BLOOD PRESSURE: 162 MMHG | WEIGHT: 198.4 LBS | HEIGHT: 64 IN | RESPIRATION RATE: 20 BRPM | TEMPERATURE: 98.6 F | HEART RATE: 82 BPM

## 2017-04-13 DIAGNOSIS — R21 RASH OF FACE: Primary | ICD-10-CM

## 2017-04-13 RX ORDER — DEXAMETHASONE 4 MG/1
20 TABLET ORAL DAILY
Qty: 30 TAB | Refills: 0 | Status: SHIPPED | OUTPATIENT
Start: 2017-04-13 | End: 2017-08-22 | Stop reason: ALTCHOICE

## 2017-04-13 NOTE — PROGRESS NOTES
Hematology Follow Up    REASON FOR VISIT: Multiple Myeloma    HISTORY OF PRESENT ILLNESS: [facility] 60 y.o. female with DM and newly diagnosed Multiple Myeloma here to evaluate facial rash. Tomorrow, initiate cycle 2 VRD. Oncologic history  Patient had left facial numbness which started in the summer of 2016. This started abruptly and was not associated with rashes, pain, jaw weakness. She has had some intermittent hyperemia of the L eye. No tearing. She has been evaluated by Dr. Maninder Correa with Neurology and an extensive work up was only notable for presence of a 0.3 g/dl M spike. Other tests were unremarkable: Vitamin B12 411, thyroid function test normal, ACE 25, BHARATI normal CBC normal, CMP normal, CRP 1.17, CT head and cervical spine unremarkable. Further evaluation revealed findings consistent with Multiple Myeloma    CBC 2/24 Unremarkable. Kappa 17 mg/dl, Lambda 2416 (ratio 0.01), SPEP 0.2 g/dl M spike, HANSEL showed monoclonal free lambda light chains, UPEP negative, Beta 2 Microglobulin 1.8 mg/L, Skeletal survey negative for lytic lesions, Bone marrow biopsy on 2/24/17 showed a Normocellular marrow with mild monoclonal plasmacytosis (15-20%). Trilineage hematopoiesis with maturation. Treatment- Palliative  3/24/17: VRD Cycle 1  4/14/17: VRD Cycle 2    Past Medical History:   Diagnosis Date    Cardiomyopathy     Diabetes mellitus type 2, controlled ([address] 12/10/2015    Heart failure (HCC)     Hyperlipidemia     Hypertension     controlled    Orthostatic hypotension     Secondary cardiomyopathy, unspecified     Sinoatrial node dysfunction ([address]     Vitamin B12 deficiency 3/31/2010       Past Surgical History:   Procedure Laterality Date    HX HYSTERECTOMY         Allergies   Allergen Reactions    Ace Inhibitors Cough       Current Outpatient Prescriptions   Medication Sig Dispense Refill    dexamethasone (DECADRON) 4 mg tablet Take 5 Tabs by mouth daily for 6 days.  30 Tab 0    BORTEZOMIB INJECTION 2.6 mg by Injection route. Indications: SQ Day 1, 4, 8, 11 of each 21 Day Cycle.  prochlorperazine (COMPAZINE) 10 mg tablet Take 5 mg by mouth every six (6) hours as needed.  pantoprazole (PROTONIX) 40 mg tablet Take 1 Tab by mouth daily. 30 Tab 6    acyclovir (ZOVIRAX) 400 mg tablet Take 1 Tab by mouth two (2) times a day. Indications: PREVENTION OF HERPES ZOSTER IN IMMUNOCOMPROMISED PATIENT 60 Tab 3    dexamethasone (DECADRON) 4 mg tablet Take 40mg (ten tablets) PO weekly on Days 1, 8, and 15 with chemotherapy  Indications: MULTIPLE MYELOMA 60 Tab 2    lenalidomide 25 mg cap Take 25 mg by mouth daily. Indications: MULTIPLE MYELOMA 14 Cap 2    atorvastatin (LIPITOR) 40 mg tablet Take 1 Tab by mouth daily. For cholesterol 30 Tab 12    sodium chloride (YULIA 128) 2 % ophthalmic solution Administer 2 Drops to both eyes two (2) times a day. 15 mL 12    carvedilol (COREG) 12.5 mg tablet TAKE 1 TABLET BY MOUTH TWICE A DAY WITH MEALS 180 Tab 3    valsartan (DIOVAN) 40 mg tablet TAKE 1 TABLET BY MOUTH EVERY DAY 90 Tab 3    cyanocobalamin (VITAMIN B-12) 1,000 mcg tablet Take 2,000 mcg by mouth daily.  Cholecalciferol, Vitamin D3, (VITAMIN D3) 1,000 unit cap Take  by mouth daily.  calcium 500 mg Tab Take 600 mg by mouth daily.        Facility-Administered Medications Ordered in Other Visits   Medication Dose Route Frequency Provider Last Rate Last Dose    [START ON 4/14/2017] bortezomib (VELCADE) 2.6 mg in sodium chloride 0.9 % SQ chemo syringe  2.6 mg SubCUTAneous ONCE Misty Sin MD           Social History     Social History    Marital status:      Spouse name: N/A    Number of children: N/A    Years of education: N/A     Social History Main Topics    Smoking status: Never Smoker    Smokeless tobacco: Never Used    Alcohol use No    Drug use: No    Sexual activity: Yes     Partners: Male     Other Topics Concern    None     Social History Narrative , 2 children, 28 and 31. Works at [employer], for 35 years. 5 grandchildren       Family History   Problem Relation Age of Onset   Dugan Cancer Mother     Heart Disease Mother      pacemaker   Dugan Diabetes Mother     Hypertension Sister     Hypertension Brother     Diabetes Brother     Hypertension Sister     Hypertension Sister     Hypertension Sister     Hypertension Sister     Diabetes Sister        ROS  A 12 point review of systems was obtained and is negative except as listed in HPI. [facility] today for new facial rash. This began last Thursday with large vesicles that she describes at \"white heads\" around her nose that then spread to around her mouth. Rash is not present anywhere else on the body. She states she used warm compresses until the vesicles disappeared and then placed neosporin on the rash. This has been itchy, not painful. This has gradually gotten better since last week but is still bothersome to her. She is currently on her rest week of VRD and will initiate cycle 2 tomorrow. She denies any associated fevers or chills. Denies sores in her mouth or throat. She states that her appetite has been decreased and her taste has changed. She is trying to eat different foods and fruit. Denies nausea, vomiting, diarrhea. Denies headaches, dizziness. Denies shortness of breath, cough.      ECOG PS is 0    Physical Examination:   Visit Vitals    /84    Pulse 82    Temp 98.6 °F (37 °C)    Resp 20    Ht 5' 4\" (1.626 m)    Wt 198 lb 6.4 oz (90 kg)    SpO2 98%    BMI 34.06 kg/m2     General appearance - alert, well appearing, and in no distress  Mental status - oriented to person, place, and time  Face - macular rash noted under her nose and around her mouth, no vesicles noted, peeling skin  Mouth - mucous membranes moist, pharynx normal without lesions  Lymphatics - no palpable lymphadenopathy, no hepatosplenomegaly  Chest - clear to auscultation, no wheezes, rales or rhonchi, symmetric air entry  Heart - normal rate, regular rhythm, normal S1, S2, no murmurs, rubs, clicks or gallops  Abdomen - soft, nontender, nondistended, no masses or organomegaly, bowel sounds present  Ext - No edema    LABS  Lab Results   Component Value Date/Time    WBC 10.6 04/03/2017 11:19 AM    HGB 13.4 04/03/2017 11:19 AM    HCT 38.4 04/03/2017 11:19 AM    PLATELET 043 37/81/9859 11:19 AM    MCV 89.5 04/03/2017 11:19 AM    ABS. NEUTROPHILS 6.1 04/03/2017 11:19 AM     Lab Results   Component Value Date/Time    Sodium 140 03/24/2017 08:36 AM    Potassium 3.9 03/24/2017 08:36 AM    Chloride 107 03/24/2017 08:36 AM    CO2 23 03/24/2017 08:36 AM    Glucose 211 03/24/2017 08:36 AM    BUN 10 03/24/2017 08:36 AM    Creatinine 0.91 03/24/2017 08:36 AM    GFR est AA >60 03/24/2017 08:36 AM    GFR est non-AA >60 03/24/2017 08:36 AM    Calcium 8.7 03/24/2017 08:36 AM     Lab Results   Component Value Date/Time    AST (SGOT) 9 03/24/2017 08:36 AM    Alk. phosphatase 103 03/24/2017 08:36 AM    Protein, total 6.9 03/24/2017 08:36 AM    Protein, total 6.6 03/24/2017 08:36 AM    Albumin 3.7 03/24/2017 08:36 AM    Globulin 3.2 03/24/2017 08:36 AM    A-G Ratio 1.2 03/24/2017 08:36 AM     FLC 2/24/17   Free Kappa Lt Chains, serum 17.98      Free Lambda Lt Chains, serum 2416.00 (H)             Kappa/Lambda ratio, serum 0.01 (L)     HANSEL: Monoclonal light chains    M spike 0.2 g/L    No bence Lei proteinuria    Skeletal survey negative    Bone marrow biopsy reviewed    FISH molecular analysis:   Positive for IgH gene rearrangement (IgH complex FISH study pending); Normal results with 1, 5, 9, 13, 15, and 17 (TP53) probe sets. ASSESSMENT  Ms. Harish Varela is a 60 y.o. female with  1. Multiple Myeloma by revised IMWG criteria (Lancet Oncol 2014), due to Involved : uninvolved serum free light chain ratio > 100. Has no anemia, hypercalcemia, bone lesions or renal failure.  FISH not resulted for accurate risk stratification. 2.   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 Poorly controlled DM. 4.   Perioral macular rash    Started palliative VRD Cycle 1. She is here for Cycle 1 day 8 today. Tolerating well. DISCUSSION    Multiple myeloma is not curable yet, though with favorable molecular profile patients responding to therapy and receiving an auto transplant may live for decades. The other question is the etiology of there asymmetrial facial nerve sensory neuropathy. Myeloma associated neuropathy is usually symmetric and usually associated with an IgM paraprotein. Rarely, radiculopathy from direct compression due to an extra osseous plasmacytoma may lead to unilateral symptoms, which however seems unlikely based on her recent imaging. Her prior HANSEL had indicated an IgA Monoclonal band as well which may account for 15% of paraproteinemia associated neuropathy, as discussed above clinical features in her case are not consistent with this either. We discussed that the immediate goal is to rapidly decrease the abnormal protein and obtain a deep response. Consolidation with an autotransplant would attain the best chances of long term DFS. 1. Multiple Myeloma. Continue VRD as scheduled. Tomorrow will initiate cycle 2. Each cycle is 21 days. Regimen: Velcade 1.3 mg/m² subcutaneously on days 1-4 then 8-11 and then 1 week off. Revlimid 25 mg from days 1 through 14. Dexamethasone 40 mg on days 1, 8, and 15. Continue daily aspirin, acyclovir, PPI. 2. Perioral Rash, New. Unable to visualize at the time the rash erupted, but per her reports there were vesicles with some serous drainage and erythema. Rash has improved since that time and today appears macular with skin peeling. This is likely due to Revlimid. Improvement may be related to erupting one day prior to rest week.  Unable to appropriately grade rash without visualization, but per her reports appears to be grade 2-3. Per Revlimid guidelines, interruption of treatment would be recommended at that time. Currently, grade 1 rash. Encouraged use of benadryl 25mg PO for symptoms. Will add steroid cream.     PLAN    - Utilize PO benadryl and steroid cream on facial rash. Call if rash does not improve or worsens with initiation of cycle 2. At that time, will interrupt cycle 2 and hold Revlimid. - Encourage her to call with any new areas of rash. Encourage her to proceed to ED with any signs of angioedema to include difficulty breathing or difficulty swallowing.  - Day 1, Cycle 2 VRD tomorrow. - Encouraged her to establish Bone Marrow referral with [facility]. They contacted her during an inconvenient time yesterday. Nursing has spoken with [facility] who will reach back out to her. Seen with Dr. Surinder Vallecillo.     Dyllan Villegas NP

## 2017-04-13 NOTE — PROGRESS NOTES
Verdel Closs is a 60 y.o. female here today for follow up multiple myeloma. States she broke out in a facial rash following two doses of the compazine.

## 2017-04-13 NOTE — TELEPHONE ENCOUNTER
Patient in office today. Patient states that [facility] contacted her to set up appointment and she was unable to do so at that time. Call placed to [facility] message left for them to reach back out to patient to establish appointment.

## 2017-04-14 ENCOUNTER — HOSPITAL ENCOUNTER (OUTPATIENT)
Dept: INFUSION THERAPY | Age: 57
Discharge: HOME | End: 2017-04-14
Payer: COMMERCIAL

## 2017-04-14 VITALS
HEART RATE: 75 BPM | OXYGEN SATURATION: 99 % | RESPIRATION RATE: 18 BRPM | DIASTOLIC BLOOD PRESSURE: 82 MMHG | TEMPERATURE: 97.7 F | BODY MASS INDEX: 34.29 KG/M2 | WEIGHT: 200.84 LBS | SYSTOLIC BLOOD PRESSURE: 125 MMHG | HEIGHT: 64 IN

## 2017-04-14 LAB
ALBUMIN SERPL BCP-MCNC: 3.5 G/DL (ref 3.5–5)
ALBUMIN/GLOB SERPL: 1.1 {RATIO} (ref 1.1–2.2)
ALP SERPL-CCNC: 104 U/L (ref 45–117)
ALT SERPL-CCNC: 28 U/L (ref 12–78)
ANION GAP BLD CALC-SCNC: 8 MMOL/L (ref 5–15)
AST SERPL W P-5'-P-CCNC: 7 U/L (ref 15–37)
BASOPHILS # BLD AUTO: 0 K/UL (ref 0–0.1)
BASOPHILS # BLD: 0 % (ref 0–1)
BILIRUB DIRECT SERPL-MCNC: 0.2 MG/DL (ref 0–0.2)
BILIRUB SERPL-MCNC: 0.9 MG/DL (ref 0.2–1)
BUN SERPL-MCNC: 11 MG/DL (ref 6–20)
BUN/CREAT SERPL: 12 (ref 12–20)
CALCIUM SERPL-MCNC: 8.4 MG/DL (ref 8.5–10.1)
CHLORIDE SERPL-SCNC: 107 MMOL/L (ref 97–108)
CO2 SERPL-SCNC: 24 MMOL/L (ref 21–32)
CREAT SERPL-MCNC: 0.92 MG/DL (ref 0.55–1.02)
EOSINOPHIL # BLD: 0.2 K/UL (ref 0–0.4)
EOSINOPHIL NFR BLD: 2 % (ref 0–7)
ERYTHROCYTE [DISTWIDTH] IN BLOOD BY AUTOMATED COUNT: 14.3 % (ref 11.5–14.5)
GLOBULIN SER CALC-MCNC: 3.1 G/DL (ref 2–4)
GLUCOSE SERPL-MCNC: 295 MG/DL (ref 65–100)
HCT VFR BLD AUTO: 39.2 % (ref 35–47)
HGB BLD-MCNC: 13.2 G/DL (ref 11.5–16)
IGA SERPL-MCNC: 89 MG/DL (ref 70–400)
IGG SERPL-MCNC: 621 MG/DL (ref 700–1600)
IGM SERPL-MCNC: 21 MG/DL (ref 40–230)
LYMPHOCYTES # BLD AUTO: 25 % (ref 12–49)
LYMPHOCYTES # BLD: 1.8 K/UL (ref 0.8–3.5)
MCH RBC QN AUTO: 30.7 PG (ref 26–34)
MCHC RBC AUTO-ENTMCNC: 33.7 G/DL (ref 30–36.5)
MCV RBC AUTO: 91.2 FL (ref 80–99)
MONOCYTES # BLD: 0.7 K/UL (ref 0–1)
MONOCYTES NFR BLD AUTO: 10 % (ref 5–13)
NEUTS SEG # BLD: 4.4 K/UL (ref 1.8–8)
NEUTS SEG NFR BLD AUTO: 63 % (ref 32–75)
PLATELET # BLD AUTO: 322 K/UL (ref 150–400)
POTASSIUM SERPL-SCNC: 3.7 MMOL/L (ref 3.5–5.1)
PROT SERPL-MCNC: 6.6 G/DL (ref 6.4–8.2)
RBC # BLD AUTO: 4.3 M/UL (ref 3.8–5.2)
SODIUM SERPL-SCNC: 139 MMOL/L (ref 136–145)
WBC # BLD AUTO: 7.1 K/UL (ref 3.6–11)

## 2017-04-14 PROCEDURE — 80076 HEPATIC FUNCTION PANEL: CPT | Performed by: INTERNAL MEDICINE

## 2017-04-14 PROCEDURE — 85025 COMPLETE CBC W/AUTO DIFF WBC: CPT | Performed by: INTERNAL MEDICINE

## 2017-04-14 PROCEDURE — 74011250636 HC RX REV CODE- 250/636: Performed by: INTERNAL MEDICINE

## 2017-04-14 PROCEDURE — 36415 COLL VENOUS BLD VENIPUNCTURE: CPT | Performed by: INTERNAL MEDICINE

## 2017-04-14 PROCEDURE — 83883 ASSAY NEPHELOMETRY NOT SPEC: CPT | Performed by: INTERNAL MEDICINE

## 2017-04-14 PROCEDURE — 82784 ASSAY IGA/IGD/IGG/IGM EACH: CPT | Performed by: INTERNAL MEDICINE

## 2017-04-14 PROCEDURE — 74011250636 HC RX REV CODE- 250/636

## 2017-04-14 PROCEDURE — 96401 CHEMO ANTI-NEOPL SQ/IM: CPT

## 2017-04-14 PROCEDURE — 84165 PROTEIN E-PHORESIS SERUM: CPT | Performed by: INTERNAL MEDICINE

## 2017-04-14 PROCEDURE — 74011000250 HC RX REV CODE- 250: Performed by: INTERNAL MEDICINE

## 2017-04-14 PROCEDURE — 80048 BASIC METABOLIC PNL TOTAL CA: CPT | Performed by: INTERNAL MEDICINE

## 2017-04-14 RX ADMIN — SODIUM CHLORIDE 2.6 MG: 9 INJECTION INTRAMUSCULAR; INTRAVENOUS; SUBCUTANEOUS at 11:20

## 2017-04-14 NOTE — PROGRESS NOTES
Problem: Patient Education: Go to Education Activity  Goal: Patient/Family Education  Outcome: Progressing Towards Goal  Discussed velcade.

## 2017-04-14 NOTE — PROGRESS NOTES
[facility] - Chemotherapy Progress Note     0910 Pt admit to [facility] for Velcade/C2D1 ambulatory in stable condition. Assessment completed. No new concerns voiced. CBC drawn peripherally and sent for processing.      Patient Vitals for the past 12 hrs:   Temp Pulse Resp BP SpO2   04/14/17 0935 97.7 °F (36.5 °C) 75 18 125/82 99 %       Medications:  Velcade (SC Left abd)     1120 Pt tolerated treatment well. D/c home ambulatory in no distress. Pt aware of next OPI appointment scheduled for 04/21/2017. Recent Results (from the past 12 hour(s))   CBC WITH AUTOMATED DIFF    Collection Time: 04/14/17  9:21 AM   Result Value Ref Range    WBC 7.1 3.6 - 11.0 K/uL    RBC 4.30 3.80 - 5.20 M/uL    HGB 13.2 11.5 - 16.0 g/dL    HCT 39.2 35.0 - 47.0 %    MCV 91.2 80.0 - 99.0 FL    MCH 30.7 26.0 - 34.0 PG    MCHC 33.7 30.0 - 36.5 g/dL    RDW 14.3 11.5 - 14.5 %    PLATELET 213 015 - 264 K/uL    NEUTROPHILS 63 32 - 75 %    LYMPHOCYTES 25 12 - 49 %    MONOCYTES 10 5 - 13 %    EOSINOPHILS 2 0 - 7 %    BASOPHILS 0 0 - 1 %    ABS. NEUTROPHILS 4.4 1.8 - 8.0 K/UL    ABS. LYMPHOCYTES 1.8 0.8 - 3.5 K/UL    ABS. MONOCYTES 0.7 0.0 - 1.0 K/UL    ABS. EOSINOPHILS 0.2 0.0 - 0.4 K/UL    ABS.  BASOPHILS 0.0 0.0 - 0.1 K/UL   METABOLIC PANEL, BASIC    Collection Time: 04/14/17  9:21 AM   Result Value Ref Range    Sodium 139 136 - 145 mmol/L    Potassium 3.7 3.5 - 5.1 mmol/L    Chloride 107 97 - 108 mmol/L    CO2 24 21 - 32 mmol/L    Anion gap 8 5 - 15 mmol/L    Glucose 295 (H) 65 - 100 mg/dL    BUN 11 6 - 20 MG/DL    Creatinine 0.92 0.55 - 1.02 MG/DL    BUN/Creatinine ratio 12 12 - 20      GFR est AA >60 >60 ml/min/1.73m2    GFR est non-AA >60 >60 ml/min/1.73m2    Calcium 8.4 (L) 8.5 - 10.1 MG/DL   HEPATIC FUNCTION PANEL    Collection Time: 04/14/17  9:21 AM   Result Value Ref Range    Protein, total 6.6 6.4 - 8.2 g/dL    Albumin 3.5 3.5 - 5.0 g/dL    Globulin 3.1 2.0 - 4.0 g/dL    A-G Ratio 1.1 1.1 - 2.2      Bilirubin, total 0.9 0.2 - 1.0 MG/DL    Bilirubin, direct 0.2 0.0 - 0.2 MG/DL    Alk. phosphatase 104 45 - 117 U/L    AST (SGOT) 7 (L) 15 - 37 U/L    ALT (SGPT) 28 12 - 78 U/L   IMMUNOGLOBULINS, G/A/M, QT.     Collection Time: 04/14/17  9:21 AM   Result Value Ref Range    Immunoglobulin G 621 (L) 700 - 1600 mg/dL    Immunoglobulin A 89 70 - 400 mg/dL    Immunoglobulin M 21 (L) 40 - 230 mg/dL

## 2017-04-14 NOTE — PROGRESS NOTES
Brief follow up today in [facility]. Patient seen yesterday in the office for new rash, likely related to Revlimid. See previous note. Rash continues to improve today. Discussed that this may worsen with beginning of cycle today. Encouraged her to call provider on call as soon as rash appears if it does. She denies any new fevers, chills, pain.      Pt not seen by me  Agree with NP as above

## 2017-04-17 ENCOUNTER — APPOINTMENT (OUTPATIENT)
Dept: INFUSION THERAPY | Age: 57
End: 2017-04-17
Payer: COMMERCIAL

## 2017-04-17 LAB
ALBUMIN SERPL ELPH-MCNC: 3.3 G/DL (ref 2.9–4.4)
ALBUMIN/GLOB SERPL: 1.3 {RATIO} (ref 0.7–1.7)
ALPHA1 GLOB SERPL ELPH-MCNC: 0.1 G/DL (ref 0–0.4)
ALPHA2 GLOB SERPL ELPH-MCNC: 0.9 G/DL (ref 0.4–1)
B-GLOBULIN SERPL ELPH-MCNC: 1 G/DL (ref 0.7–1.3)
GAMMA GLOB SERPL ELPH-MCNC: 0.6 G/DL (ref 0.4–1.8)
GLOBULIN SER CALC-MCNC: 2.6 G/DL (ref 2.2–3.9)
IGA SERPL-MCNC: 104 MG/DL (ref 87–352)
IGG SERPL-MCNC: 653 MG/DL (ref 700–1600)
IGM SERPL-MCNC: 18 MG/DL (ref 26–217)
KAPPA LC FREE SER-MCNC: 10.92 MG/L (ref 3.3–19.4)
KAPPA LC FREE/LAMBDA FREE SER: 0.04 {RATIO} (ref 0.26–1.65)
LAMBDA LC FREE SERPL-MCNC: 266.16 MG/L (ref 5.71–26.3)
M PROTEIN SERPL ELPH-MCNC: ABNORMAL G/DL
PROT PATTERN SERPL IFE-IMP: ABNORMAL
PROT SERPL-MCNC: 5.9 G/DL (ref 6–8.5)

## 2017-04-19 NOTE — TELEPHONE ENCOUNTER
Spoke with Reza with [facility] transplant coordination. Request sent over for medical records. Pertinent information had previously been sent for scheduling of appointment. Reza states she has reached out to patient and unable to reach. Will attempt again to set up appointment, will need records.

## 2017-04-19 NOTE — TELEPHONE ENCOUNTER
Late entry:    Received call from Reza on 4/18. States that she will attempt tp reach out to patient again to establish appointment.

## 2017-04-20 ENCOUNTER — APPOINTMENT (OUTPATIENT)
Dept: INFUSION THERAPY | Age: 57
End: 2017-04-20
Payer: COMMERCIAL

## 2017-04-20 ENCOUNTER — DOCUMENTATION ONLY (OUTPATIENT)
Dept: ONCOLOGY | Age: 57
End: 2017-04-20

## 2017-04-20 ENCOUNTER — TELEPHONE (OUTPATIENT)
Dept: ONCOLOGY | Age: 57
End: 2017-04-20

## 2017-04-20 NOTE — TELEPHONE ENCOUNTER
Received return call from [facility].  Patient has an appointment for eval with Dr. Rae Alvarado on 5/9/17 at 9 am.

## 2017-04-20 NOTE — PROGRESS NOTES
Requested medical records sent to [address] [place] [address] coordinator. Request sent to pathology to mail slide to [facility].

## 2017-04-21 ENCOUNTER — HOSPITAL ENCOUNTER (OUTPATIENT)
Dept: INFUSION THERAPY | Age: 57
Discharge: HOME | End: 2017-04-21
Payer: COMMERCIAL

## 2017-04-21 ENCOUNTER — OFFICE VISIT (OUTPATIENT)
Dept: ONCOLOGY | Age: 57
End: 2017-04-21

## 2017-04-21 VITALS
HEIGHT: 64 IN | SYSTOLIC BLOOD PRESSURE: 130 MMHG | DIASTOLIC BLOOD PRESSURE: 76 MMHG | RESPIRATION RATE: 20 BRPM | OXYGEN SATURATION: 96 % | HEART RATE: 68 BPM | TEMPERATURE: 98.6 F | BODY MASS INDEX: 33.46 KG/M2 | WEIGHT: 196 LBS

## 2017-04-21 VITALS
RESPIRATION RATE: 18 BRPM | DIASTOLIC BLOOD PRESSURE: 84 MMHG | SYSTOLIC BLOOD PRESSURE: 139 MMHG | TEMPERATURE: 98.8 F | HEART RATE: 83 BPM | HEIGHT: 64 IN | BODY MASS INDEX: 33.46 KG/M2 | WEIGHT: 196 LBS

## 2017-04-21 DIAGNOSIS — G62.9 NEUROPATHY: ICD-10-CM

## 2017-04-21 DIAGNOSIS — R21 RASH OF FACE: ICD-10-CM

## 2017-04-21 DIAGNOSIS — E11.9 CONTROLLED TYPE 2 DIABETES MELLITUS WITHOUT COMPLICATION, WITHOUT LONG-TERM CURRENT USE OF INSULIN (HCC): ICD-10-CM

## 2017-04-21 DIAGNOSIS — C90.00 MULTIPLE MYELOMA NOT HAVING ACHIEVED REMISSION (HCC): Primary | ICD-10-CM

## 2017-04-21 LAB
BASO+EOS+MONOS # BLD AUTO: 0.6 K/UL (ref 0.2–1.2)
BASO+EOS+MONOS # BLD AUTO: 7 % (ref 3.2–16.9)
DIFFERENTIAL METHOD BLD: ABNORMAL
ERYTHROCYTE [DISTWIDTH] IN BLOOD BY AUTOMATED COUNT: 14.8 % (ref 11.8–15.8)
HCT VFR BLD AUTO: 37.8 % (ref 35–47)
HGB BLD-MCNC: 13.3 G/DL (ref 11.5–16)
LYMPHOCYTES # BLD AUTO: 17 % (ref 12–49)
LYMPHOCYTES # BLD: 1.5 K/UL (ref 0.8–3.5)
MCH RBC QN AUTO: 31.3 PG (ref 26–34)
MCHC RBC AUTO-ENTMCNC: 35.2 G/DL (ref 30–36.5)
MCV RBC AUTO: 88.9 FL (ref 80–99)
NEUTS SEG # BLD: 6.6 K/UL (ref 1.8–8)
NEUTS SEG NFR BLD AUTO: 76 % (ref 32–75)
PLATELET # BLD AUTO: 241 K/UL (ref 150–400)
RBC # BLD AUTO: 4.25 M/UL (ref 3.8–5.2)
WBC # BLD AUTO: 8.7 K/UL (ref 3.6–11)

## 2017-04-21 PROCEDURE — 74011250636 HC RX REV CODE- 250/636

## 2017-04-21 PROCEDURE — 36415 COLL VENOUS BLD VENIPUNCTURE: CPT | Performed by: INTERNAL MEDICINE

## 2017-04-21 PROCEDURE — 74011250636 HC RX REV CODE- 250/636: Performed by: INTERNAL MEDICINE

## 2017-04-21 PROCEDURE — 85025 COMPLETE CBC W/AUTO DIFF WBC: CPT | Performed by: INTERNAL MEDICINE

## 2017-04-21 PROCEDURE — 96401 CHEMO ANTI-NEOPL SQ/IM: CPT

## 2017-04-21 PROCEDURE — 74011000250 HC RX REV CODE- 250: Performed by: INTERNAL MEDICINE

## 2017-04-21 RX ADMIN — SODIUM CHLORIDE 2.6 MG: 9 INJECTION INTRAMUSCULAR; INTRAVENOUS; SUBCUTANEOUS at 10:59

## 2017-04-21 NOTE — PROGRESS NOTES
Hematology follow up    REASON FOR VISIT: Multiple Myeloma  REQUESTED BY: Dr. Africa Chun: Ms. Amanda Rollins is a 60 y.o. female with DM and newly diagnosed Multiple Myeloma here to continue Cycle 2 of VRD. Oncologic history  Patient had left facial numbness which started in the summer of 2016. This started abruptly and was not associated with rashes, pain, jaw weakness. She has had some intermittent hyperemia of the L eye. No tearing. She has been evaluated by Dr. America Alvarez with Neurology and an extensive work up was only notable for presence of a 0.3 g/dl M spike. Other tests were unremarkable: Vitamin B12 411, thyroid function test normal, ACE 25, BHARATI normal CBC normal, CMP normal, CRP 1.17, CT head and cervical spine unremarkable. Further evaluation revealed findings consistent with Multiple Myeloma    CBC 2/24 Unremarkable. Kappa 17 mg/dl, Lambda 2416 (ratio 0.01), SPEP 0.2 g/dl M spike, HANSEL showed monoclonal free lambda light chains, UPEP negative, Beta 2 Microglobulin 1.8 mg/L, Skeletal survey negative for lytic lesions, Bone marrow biopsy on 2/24/17 showed a Normocellular marrow with mild monoclonal plasmacytosis (15-20%). Trilineage hematopoiesis with maturation.     Treatment- Palliative  Pretreatment FLC : Kappa 17.9: Lambda 1978: 0.01  3/24/17: VRD Cycle 1   FLC : Kappa 10.9: Lambda 266 : 0.04  4/17/17: VRD Cycle 2 (switched to weekly Velcade)    Past Medical History:   Diagnosis Date    Cardiomyopathy     Diabetes mellitus type 2, controlled ([address] 12/10/2015    Heart failure (HCC)     Hyperlipidemia     Hypertension     controlled    Orthostatic hypotension     Secondary cardiomyopathy, unspecified     Sinoatrial node dysfunction ([address]     Vitamin B12 deficiency 3/31/2010       Past Surgical History:   Procedure Laterality Date    HX HYSTERECTOMY         Allergies   Allergen Reactions    Ace Inhibitors Cough       Current Outpatient Prescriptions   Medication Sig Dispense Refill    BORTEZOMIB INJECTION 2.6 mg by Injection route. Indications: SQ Day 1, 4, 8, 11 of each 21 Day Cycle.  pantoprazole (PROTONIX) 40 mg tablet Take 1 Tab by mouth daily. 30 Tab 6    acyclovir (ZOVIRAX) 400 mg tablet Take 1 Tab by mouth two (2) times a day. Indications: PREVENTION OF HERPES ZOSTER IN IMMUNOCOMPROMISED PATIENT 60 Tab 3    dexamethasone (DECADRON) 4 mg tablet Take 40mg (ten tablets) PO weekly on Days 1, 8, and 15 with chemotherapy  Indications: MULTIPLE MYELOMA 60 Tab 2    lenalidomide 25 mg cap Take 25 mg by mouth daily. Indications: MULTIPLE MYELOMA 14 Cap 2    atorvastatin (LIPITOR) 40 mg tablet Take 1 Tab by mouth daily. For cholesterol 30 Tab 12    sodium chloride (YULIA 128) 2 % ophthalmic solution Administer 2 Drops to both eyes two (2) times a day. 15 mL 12    carvedilol (COREG) 12.5 mg tablet TAKE 1 TABLET BY MOUTH TWICE A DAY WITH MEALS 180 Tab 3    valsartan (DIOVAN) 40 mg tablet TAKE 1 TABLET BY MOUTH EVERY DAY 90 Tab 3    dexamethasone (DECADRON) 4 mg tablet Take 5 Tabs by mouth daily for 6 days. 30 Tab 0    prochlorperazine (COMPAZINE) 10 mg tablet Take 5 mg by mouth every six (6) hours as needed.  Cholecalciferol, Vitamin D3, (VITAMIN D3) 1,000 unit cap Take  by mouth daily.  calcium 500 mg Tab Take 600 mg by mouth daily.  cyanocobalamin (VITAMIN B-12) 1,000 mcg tablet Take 2,000 mcg by mouth daily.        Facility-Administered Medications Ordered in Other Visits   Medication Dose Route Frequency Provider Last Rate Last Dose    bortezomib (VELCADE) 2.6 mg in sodium chloride 0.9 % SQ chemo syringe  2.6 mg SubCUTAneous ONCE Marivel Kurtz MD           Social History     Social History    Marital status:      Spouse name: N/A    Number of children: N/A    Years of education: N/A     Social History Main Topics    Smoking status: Never Smoker    Smokeless tobacco: Never Used    Alcohol use No    Drug use: No    Sexual activity: Yes Partners: Male     Other Topics Concern    None     Social History Narrative    , 2 children, 29 and 31. Works at [employer], for 35 years. 5 grandchildren       Family History   Problem Relation Age of Onset   [place] Re Cancer Mother     Heart Disease Mother      pacemaker   [place] Re Diabetes Mother     Hypertension Sister     Hypertension Brother     Diabetes Brother     Hypertension Sister     Hypertension Sister     Hypertension Sister     Hypertension Sister     Diabetes Sister        ROS    Tolerating treatment. Had a rash around her mouth 1 week ago secondary to revlimid. This has resolved (applied neosporin). Still has a funny feeling of coating on her tongue. No nausea or vomiting. No leg swelling, no CP, SOB, numbness, headache, diarrhea. Has some constipation. Decreased sensation to touch on the L as opposed to the R side of her face but this has improved. She does note intermittent numbness on finger tips. ECOG PS is 0      Physical Examination:   Visit Vitals    /76    Pulse 68    Temp 98.6 °F (37 °C)    Resp 20    Ht 5' 4\" (1.626 m)    Wt 196 lb (88.9 kg)    SpO2 96%    BMI 33.64 kg/m2     General appearance - alert, well appearing, and in no distress  Mental status - oriented to person, place, and time  Mouth - mucous membranes moist, pharynx normal without lesions  Lymphatics - no palpable lymphadenopathy, no hepatosplenomegaly  Chest - clear to auscultation, no wheezes, rales or rhonchi, symmetric air entry  Heart - normal rate, regular rhythm, normal S1, S2, no murmurs, rubs, clicks or gallops  Abdomen - soft, nontender, nondistended, no masses or organomegaly, bowel sounds present  Ext: No edema    LABS  Lab Results   Component Value Date/Time    WBC 8.7 04/21/2017 08:15 AM    HGB 13.3 04/21/2017 08:15 AM    HCT 37.8 04/21/2017 08:15 AM    PLATELET 790 01/87/9415 08:15 AM    MCV 88.9 04/21/2017 08:15 AM    ABS.  NEUTROPHILS 6.6 04/21/2017 08:15 AM     Lab Results   Component Value Date/Time    Sodium 139 04/14/2017 09:21 AM    Potassium 3.7 04/14/2017 09:21 AM    Chloride 107 04/14/2017 09:21 AM    CO2 24 04/14/2017 09:21 AM    Glucose 295 04/14/2017 09:21 AM    BUN 11 04/14/2017 09:21 AM    Creatinine 0.92 04/14/2017 09:21 AM    GFR est AA >60 04/14/2017 09:21 AM    GFR est non-AA >60 04/14/2017 09:21 AM    Calcium 8.4 04/14/2017 09:21 AM     Lab Results   Component Value Date/Time    AST (SGOT) 7 04/14/2017 09:21 AM    Alk. phosphatase 104 04/14/2017 09:21 AM    Protein, total 6.6 04/14/2017 09:21 AM    Protein, total 5.9 04/14/2017 09:21 AM    Albumin 3.5 04/14/2017 09:21 AM    Globulin 3.1 04/14/2017 09:21 AM    A-G Ratio 1.1 04/14/2017 09:21 AM     4/14/17  Free Kappa Lt Chains, serum 10.92  3.30 - 19.40 mg/L Final      Free Lambda Lt Chains, serum 266.16 (H) 5.71 - 26.30 mg/L Final     Kappa/Lambda ratio, serum 0.04 (L)          Bone marrow biopsy reviewed    FISH molecular analysis:   Positive for IgH gene rearrangement (IgH complex FISH study pending); Normal results with 1, 5, 9, 13, 15, and 17 (TP53) probe sets. ASSESSMENT  Ms. Nishi Sena is a 60 y.o. female with  1. Multiple Myeloma by revised IMWG criteria (Lancet Oncol 2014), due to Involved : uninvolved serum free light chain ratio > 100. Has no anemia, hypercalcemia, bone lesions or renal failure. Likely low risk  2. 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 Poorly controlled DM. 4. Grade 1 finger neuropathy sec to Velcade  5. Grade 1 rash sec to Revlimid: Resolved    Started palliative VRD Cycle 1 3/24/17    She is here for Cycle 2 day 8 today. Tolerating well. Rash resolved, has grade 1 neuropathy. Has > MT after cycle 1. Seeing [facility] BMT on 5/9/17    DISCUSSION    Multiple myeloma is Not curable yet, though with favorable molecular profile patients responding to therapy and receiving an auto transplant may live for decades.       The other question is the etiology of there asymmetrial facial nerve sensory neuropathy. Myeloma associated neuropathy is usually symmetric and usually associated with an IgM paraprotein. Rarely, radiculopathy from direct compression due to an extra osseous plasmacytoma may lead to unilateral symptoms, which however seems unlikely based on her recent imaging. Her prior HANSEL had indicated an IgA Monoclonal band as well which may account for 15% of paraproteinemia associated neuropathy, as discussed above clinical features in her case are not consistent with this either. We discussed that the immediate goal is to rapidly decrease the abnormal protein and obtain a deep response. Consolidation with an autotransplant would attain the best chances of long term DFS.     PLAN    - Proceed with Cycle 2 day 8 Velcade, Revlimid and Dexamethasone  - RTC per protocol for days 15, 22  - RTC to see me on D8 of every cycle  - Myeloma markers on day 1 of every cycle  - ASA daily and Acyclovir BID   - Compazine prn nausea  - Protonix while on Decadron  - [facility] BMP appointment on May 9      Jose Novak MD

## 2017-04-21 NOTE — PROGRESS NOTES
Outpatient Infusion Center - Chemotherapy Progress Note    0800 Pt admit to [facility] for Velcade C2 D8    ambulatory in stable condition. Assessment completed. No new concerns voiced. PIV labs drawn and sent for processing. Pt left [facility] for MD appt with MD. Upon returning, medication ordered    Chemotherapy Flowsheet 4/21/2017   Cycle C2 D8   Date 4/21/2017   Drug / Regimen Velcade   Notes -       Visit Vitals    /84 (BP 1 Location: Right arm, BP Patient Position: At rest)    Pulse 83    Temp 98.8 °F (37.1 °C)    Resp 18    Ht 5' 4\" (1.626 m)    Wt 88.9 kg (196 lb)    BMI 33.64 kg/m2       Medications:  Velcade (SQ right abdomen)    1100 Pt tolerated treatment well. D/c home ambulatory in no distress. Pt aware of next appointment scheduled for 4/28/17    Recent Results (from the past 12 hour(s))   CBC WITH 3 PART DIFF    Collection Time: 04/21/17  8:15 AM   Result Value Ref Range    WBC 8.7 3.6 - 11.0 K/uL    RBC 4.25 3.80 - 5.20 M/uL    HGB 13.3 11.5 - 16.0 g/dL    HCT 37.8 35.0 - 47.0 %    MCV 88.9 80.0 - 99.0 FL    MCH 31.3 26.0 - 34.0 PG    MCHC 35.2 30.0 - 36.5 g/dL    RDW 14.8 11.8 - 15.8 %    PLATELET 376 320 - 651 K/uL    NEUTROPHILS 76 (H) 32 - 75 %    MIXED CELLS 7 3.2 - 16.9 %    LYMPHOCYTES 17 12 - 49 %    ABS. NEUTROPHILS 6.6 1.8 - 8.0 K/UL    ABS. MIXED CELLS 0.6 0.2 - 1.2 K/uL    ABS.  LYMPHOCYTES 1.5 0.8 - 3.5 K/UL    DF AUTOMATED

## 2017-04-24 ENCOUNTER — APPOINTMENT (OUTPATIENT)
Dept: INFUSION THERAPY | Age: 57
End: 2017-04-24
Payer: COMMERCIAL

## 2017-04-27 ENCOUNTER — APPOINTMENT (OUTPATIENT)
Dept: INFUSION THERAPY | Age: 57
End: 2017-04-27
Payer: COMMERCIAL

## 2017-04-28 ENCOUNTER — HOSPITAL ENCOUNTER (OUTPATIENT)
Dept: INFUSION THERAPY | Age: 57
Discharge: HOME | End: 2017-04-28
Payer: COMMERCIAL

## 2017-04-28 VITALS
HEIGHT: 64 IN | TEMPERATURE: 98 F | DIASTOLIC BLOOD PRESSURE: 84 MMHG | SYSTOLIC BLOOD PRESSURE: 128 MMHG | RESPIRATION RATE: 16 BRPM | WEIGHT: 196 LBS | HEART RATE: 79 BPM | BODY MASS INDEX: 33.46 KG/M2

## 2017-04-28 LAB
BASOPHILS # BLD AUTO: 0 K/UL (ref 0–0.1)
BASOPHILS # BLD: 0 % (ref 0–1)
EOSINOPHIL # BLD: 0.3 K/UL (ref 0–0.4)
EOSINOPHIL NFR BLD: 4 % (ref 0–7)
ERYTHROCYTE [DISTWIDTH] IN BLOOD BY AUTOMATED COUNT: 14.7 % (ref 11.5–14.5)
HCT VFR BLD AUTO: 38.9 % (ref 35–47)
HGB BLD-MCNC: 13.5 G/DL (ref 11.5–16)
LYMPHOCYTES # BLD AUTO: 17 % (ref 12–49)
LYMPHOCYTES # BLD: 1.5 K/UL (ref 0.8–3.5)
MCH RBC QN AUTO: 30.9 PG (ref 26–34)
MCHC RBC AUTO-ENTMCNC: 34.7 G/DL (ref 30–36.5)
MCV RBC AUTO: 89 FL (ref 80–99)
MONOCYTES # BLD: 0.7 K/UL (ref 0–1)
MONOCYTES NFR BLD AUTO: 8 % (ref 5–13)
NEUTS SEG # BLD: 6.2 K/UL (ref 1.8–8)
NEUTS SEG NFR BLD AUTO: 71 % (ref 32–75)
PLATELET # BLD AUTO: 204 K/UL (ref 150–400)
RBC # BLD AUTO: 4.37 M/UL (ref 3.8–5.2)
WBC # BLD AUTO: 8.8 K/UL (ref 3.6–11)

## 2017-04-28 PROCEDURE — 96401 CHEMO ANTI-NEOPL SQ/IM: CPT

## 2017-04-28 PROCEDURE — 85025 COMPLETE CBC W/AUTO DIFF WBC: CPT | Performed by: INTERNAL MEDICINE

## 2017-04-28 PROCEDURE — 74011000250 HC RX REV CODE- 250: Performed by: INTERNAL MEDICINE

## 2017-04-28 PROCEDURE — 74011250636 HC RX REV CODE- 250/636

## 2017-04-28 PROCEDURE — 36415 COLL VENOUS BLD VENIPUNCTURE: CPT | Performed by: INTERNAL MEDICINE

## 2017-04-28 PROCEDURE — 74011250636 HC RX REV CODE- 250/636: Performed by: INTERNAL MEDICINE

## 2017-04-28 RX ADMIN — SODIUM CHLORIDE 2.6 MG: 9 INJECTION INTRAMUSCULAR; INTRAVENOUS; SUBCUTANEOUS at 12:42

## 2017-04-28 NOTE — PROGRESS NOTES
Outpatient Infusion Center - Chemotherapy Progress Note    5260 Pt admit to [facility] for Velcade/C2D15 ambulatory in stable condition. Assessment completed. Pt reports fatigue and bilateral cramping and feet. No new concerns voiced. CBC drawn peripherally from L hand and sent for processing. Visit Vitals    /84    Pulse 79    Temp 98 °F (36.7 °C)    Resp 16    Ht 5' 4\" (1.626 m)    Wt 88.9 kg (196 lb)    BMI 33.64 kg/m2       Medications:  Velcade (SQ L abd)    1250 Pt tolerated treatment well. D/c home ambulatory in no distress. Pt aware of next [facility] appointment scheduled for 05/05/2017. Recent Results (from the past 12 hour(s))   CBC WITH AUTOMATED DIFF    Collection Time: 04/28/17 11:19 AM   Result Value Ref Range    WBC 8.8 3.6 - 11.0 K/uL    RBC 4.37 3.80 - 5.20 M/uL    HGB 13.5 11.5 - 16.0 g/dL    HCT 38.9 35.0 - 47.0 %    MCV 89.0 80.0 - 99.0 FL    MCH 30.9 26.0 - 34.0 PG    MCHC 34.7 30.0 - 36.5 g/dL    RDW 14.7 (H) 11.5 - 14.5 %    PLATELET 762 539 - 503 K/uL    NEUTROPHILS 71 32 - 75 %    LYMPHOCYTES 17 12 - 49 %    MONOCYTES 8 5 - 13 %    EOSINOPHILS 4 0 - 7 %    BASOPHILS 0 0 - 1 %    ABS. NEUTROPHILS 6.2 1.8 - 8.0 K/UL    ABS. LYMPHOCYTES 1.5 0.8 - 3.5 K/UL    ABS. MONOCYTES 0.7 0.0 - 1.0 K/UL    ABS. EOSINOPHILS 0.3 0.0 - 0.4 K/UL    ABS.  BASOPHILS 0.0 0.0 - 0.1 K/UL

## 2017-05-04 ENCOUNTER — APPOINTMENT (OUTPATIENT)
Dept: INFUSION THERAPY | Age: 57
End: 2017-05-04
Payer: COMMERCIAL

## 2017-05-05 ENCOUNTER — TELEPHONE (OUTPATIENT)
Dept: ONCOLOGY | Age: 57
End: 2017-05-05

## 2017-05-05 ENCOUNTER — HOSPITAL ENCOUNTER (OUTPATIENT)
Dept: INFUSION THERAPY | Age: 57
Discharge: HOME | End: 2017-05-05
Payer: COMMERCIAL

## 2017-05-05 VITALS
RESPIRATION RATE: 16 BRPM | DIASTOLIC BLOOD PRESSURE: 94 MMHG | TEMPERATURE: 97.6 F | HEIGHT: 64 IN | BODY MASS INDEX: 32.71 KG/M2 | HEART RATE: 85 BPM | SYSTOLIC BLOOD PRESSURE: 149 MMHG | WEIGHT: 191.6 LBS

## 2017-05-05 DIAGNOSIS — C90.00 MULTIPLE MYELOMA NOT HAVING ACHIEVED REMISSION (HCC): ICD-10-CM

## 2017-05-05 LAB
ALBUMIN SERPL BCP-MCNC: 3.3 G/DL (ref 3.5–5)
ALBUMIN/GLOB SERPL: 1.1 {RATIO} (ref 1.1–2.2)
ALP SERPL-CCNC: 106 U/L (ref 45–117)
ALT SERPL-CCNC: 27 U/L (ref 12–78)
ANION GAP BLD CALC-SCNC: 11 MMOL/L (ref 5–15)
AST SERPL W P-5'-P-CCNC: 11 U/L (ref 15–37)
BASOPHILS # BLD AUTO: 0 K/UL (ref 0–0.1)
BASOPHILS # BLD: 0 % (ref 0–1)
BILIRUB DIRECT SERPL-MCNC: 0.2 MG/DL (ref 0–0.2)
BILIRUB SERPL-MCNC: 1.3 MG/DL (ref 0.2–1)
BUN SERPL-MCNC: 9 MG/DL (ref 6–20)
BUN/CREAT SERPL: 9 (ref 12–20)
CALCIUM SERPL-MCNC: 8.8 MG/DL (ref 8.5–10.1)
CHLORIDE SERPL-SCNC: 104 MMOL/L (ref 97–108)
CO2 SERPL-SCNC: 22 MMOL/L (ref 21–32)
CREAT SERPL-MCNC: 0.96 MG/DL (ref 0.55–1.02)
EOSINOPHIL # BLD: 0.1 K/UL (ref 0–0.4)
EOSINOPHIL NFR BLD: 2 % (ref 0–7)
ERYTHROCYTE [DISTWIDTH] IN BLOOD BY AUTOMATED COUNT: 14.6 % (ref 11.5–14.5)
GLOBULIN SER CALC-MCNC: 2.9 G/DL (ref 2–4)
GLUCOSE SERPL-MCNC: 455 MG/DL (ref 65–100)
HCT VFR BLD AUTO: 38.2 % (ref 35–47)
HGB BLD-MCNC: 13 G/DL (ref 11.5–16)
IGA SERPL-MCNC: 55 MG/DL (ref 70–400)
IGG SERPL-MCNC: 487 MG/DL (ref 700–1600)
IGM SERPL-MCNC: <21 MG/DL (ref 40–230)
LYMPHOCYTES # BLD AUTO: 23 % (ref 12–49)
LYMPHOCYTES # BLD: 1.4 K/UL (ref 0.8–3.5)
MCH RBC QN AUTO: 30.6 PG (ref 26–34)
MCHC RBC AUTO-ENTMCNC: 34 G/DL (ref 30–36.5)
MCV RBC AUTO: 89.9 FL (ref 80–99)
MONOCYTES # BLD: 0.7 K/UL (ref 0–1)
MONOCYTES NFR BLD AUTO: 11 % (ref 5–13)
NEUTS SEG # BLD: 4 K/UL (ref 1.8–8)
NEUTS SEG NFR BLD AUTO: 64 % (ref 32–75)
PLATELET # BLD AUTO: 217 K/UL (ref 150–400)
POTASSIUM SERPL-SCNC: 3.9 MMOL/L (ref 3.5–5.1)
PROT SERPL-MCNC: 6.2 G/DL (ref 6.4–8.2)
RBC # BLD AUTO: 4.25 M/UL (ref 3.8–5.2)
SODIUM SERPL-SCNC: 137 MMOL/L (ref 136–145)
WBC # BLD AUTO: 6.3 K/UL (ref 3.6–11)

## 2017-05-05 PROCEDURE — 85025 COMPLETE CBC W/AUTO DIFF WBC: CPT | Performed by: INTERNAL MEDICINE

## 2017-05-05 PROCEDURE — 84165 PROTEIN E-PHORESIS SERUM: CPT | Performed by: INTERNAL MEDICINE

## 2017-05-05 PROCEDURE — 36415 COLL VENOUS BLD VENIPUNCTURE: CPT | Performed by: INTERNAL MEDICINE

## 2017-05-05 PROCEDURE — 74011000250 HC RX REV CODE- 250: Performed by: INTERNAL MEDICINE

## 2017-05-05 PROCEDURE — 80048 BASIC METABOLIC PNL TOTAL CA: CPT | Performed by: INTERNAL MEDICINE

## 2017-05-05 PROCEDURE — 96401 CHEMO ANTI-NEOPL SQ/IM: CPT

## 2017-05-05 PROCEDURE — 82784 ASSAY IGA/IGD/IGG/IGM EACH: CPT | Performed by: INTERNAL MEDICINE

## 2017-05-05 PROCEDURE — 74011250636 HC RX REV CODE- 250/636

## 2017-05-05 PROCEDURE — 80076 HEPATIC FUNCTION PANEL: CPT | Performed by: INTERNAL MEDICINE

## 2017-05-05 PROCEDURE — 83883 ASSAY NEPHELOMETRY NOT SPEC: CPT | Performed by: INTERNAL MEDICINE

## 2017-05-05 PROCEDURE — 74011250636 HC RX REV CODE- 250/636: Performed by: INTERNAL MEDICINE

## 2017-05-05 RX ORDER — LENALIDOMIDE 25 MG/1
25 CAPSULE ORAL DAILY
Qty: 14 CAP | Refills: 4 | Status: SHIPPED | OUTPATIENT
Start: 2017-05-05 | End: 2017-05-12 | Stop reason: DRUGHIGH

## 2017-05-05 RX ADMIN — SODIUM CHLORIDE 2.6 MG: 9 INJECTION INTRAMUSCULAR; INTRAVENOUS; SUBCUTANEOUS at 11:17

## 2017-05-05 NOTE — PROGRESS NOTES
Outpatient Infusion Center - Chemotherapy Progress Note    9558 Pt admit to [facility] for Cycle 3 Day 1 Velcade ambulatory in stable condition. Assessment completed. No new concerns voiced. Labs drawn peripherally and sent for processing. Chemotherapy Flowsheet 5/5/2017   Cycle C3D1   Date 5/5/2017   Drug / Regimen Velcade         Visit Vitals    BP (!) 149/94 (BP 1 Location: Left arm, BP Patient Position: Sitting)    Pulse 85    Temp 97.6 °F (36.4 °C)    Resp 16    Ht 5' 4\" (1.626 m)    Wt 86.9 kg (191 lb 9.6 oz)    Breastfeeding No    BMI 32.89 kg/m2       Medications:  Velcade SQ to right abdomen    1120 Pt tolerated treatment well. D/c home ambulatory in no distress. Pt aware of next appointment scheduled for 5/12/17 at 9:00 for Cycle 3 Day 8 Velcade. Recent Results (from the past 12 hour(s))   CBC WITH AUTOMATED DIFF    Collection Time: 05/05/17  9:31 AM   Result Value Ref Range    WBC 6.3 3.6 - 11.0 K/uL    RBC 4.25 3.80 - 5.20 M/uL    HGB 13.0 11.5 - 16.0 g/dL    HCT 38.2 35.0 - 47.0 %    MCV 89.9 80.0 - 99.0 FL    MCH 30.6 26.0 - 34.0 PG    MCHC 34.0 30.0 - 36.5 g/dL    RDW 14.6 (H) 11.5 - 14.5 %    PLATELET 571 725 - 817 K/uL    NEUTROPHILS 64 32 - 75 %    LYMPHOCYTES 23 12 - 49 %    MONOCYTES 11 5 - 13 %    EOSINOPHILS 2 0 - 7 %    BASOPHILS 0 0 - 1 %    ABS. NEUTROPHILS 4.0 1.8 - 8.0 K/UL    ABS. LYMPHOCYTES 1.4 0.8 - 3.5 K/UL    ABS. MONOCYTES 0.7 0.0 - 1.0 K/UL    ABS. EOSINOPHILS 0.1 0.0 - 0.4 K/UL    ABS.  BASOPHILS 0.0 0.0 - 0.1 K/UL   METABOLIC PANEL, BASIC    Collection Time: 05/05/17  9:31 AM   Result Value Ref Range    Sodium 137 136 - 145 mmol/L    Potassium 3.9 3.5 - 5.1 mmol/L    Chloride 104 97 - 108 mmol/L    CO2 22 21 - 32 mmol/L    Anion gap 11 5 - 15 mmol/L    Glucose 455 (H) 65 - 100 mg/dL    BUN 9 6 - 20 MG/DL    Creatinine 0.96 0.55 - 1.02 MG/DL    BUN/Creatinine ratio 9 (L) 12 - 20      GFR est AA >60 >60 ml/min/1.73m2    GFR est non-AA 60 (L) >60 ml/min/1.73m2    Calcium 8.8 8.5 - 10.1 MG/DL   HEPATIC FUNCTION PANEL    Collection Time: 05/05/17  9:31 AM   Result Value Ref Range    Protein, total 6.2 (L) 6.4 - 8.2 g/dL    Albumin 3.3 (L) 3.5 - 5.0 g/dL    Globulin 2.9 2.0 - 4.0 g/dL    A-G Ratio 1.1 1.1 - 2.2      Bilirubin, total 1.3 (H) 0.2 - 1.0 MG/DL    Bilirubin, direct 0.2 0.0 - 0.2 MG/DL    Alk. phosphatase 106 45 - 117 U/L    AST (SGOT) 11 (L) 15 - 37 U/L    ALT (SGPT) 27 12 - 78 U/L   IMMUNOGLOBULINS, G/A/M, QT.     Collection Time: 05/05/17  9:31 AM   Result Value Ref Range    Immunoglobulin G 487 (L) 700 - 1600 mg/dL    Immunoglobulin A 55 (L) 70 - 400 mg/dL    Immunoglobulin M <21 (L) 40 - 230 mg/dL

## 2017-05-05 NOTE — TELEPHONE ENCOUNTER
Processed revlimid refill on urgent basis to [facility]. Confirmation rec'd  Authorization number [id number]. Encouraged patient to call [facility] for urgent delivery via [address].

## 2017-05-08 ENCOUNTER — APPOINTMENT (OUTPATIENT)
Dept: INFUSION THERAPY | Age: 57
End: 2017-05-08
Payer: COMMERCIAL

## 2017-05-08 LAB
ALBUMIN SERPL ELPH-MCNC: 3.1 G/DL (ref 2.9–4.4)
ALBUMIN/GLOB SERPL: 1.3 {RATIO} (ref 0.7–1.7)
ALPHA1 GLOB SERPL ELPH-MCNC: 0.2 G/DL (ref 0–0.4)
ALPHA2 GLOB SERPL ELPH-MCNC: 0.8 G/DL (ref 0.4–1)
B-GLOBULIN SERPL ELPH-MCNC: 1 G/DL (ref 0.7–1.3)
GAMMA GLOB SERPL ELPH-MCNC: 0.4 G/DL (ref 0.4–1.8)
GLOBULIN SER CALC-MCNC: 2.3 G/DL (ref 2.2–3.9)
IGA SERPL-MCNC: 87 MG/DL (ref 87–352)
IGG SERPL-MCNC: 614 MG/DL (ref 700–1600)
IGM SERPL-MCNC: 21 MG/DL (ref 26–217)
KAPPA LC FREE SER-MCNC: 9.35 MG/L (ref 3.3–19.4)
KAPPA LC FREE/LAMBDA FREE SER: 0.13 {RATIO} (ref 0.26–1.65)
LAMBDA LC FREE SERPL-MCNC: 73.21 MG/L (ref 5.71–26.3)
M PROTEIN SERPL ELPH-MCNC: ABNORMAL G/DL
PROT PATTERN SERPL IFE-IMP: ABNORMAL
PROT SERPL-MCNC: 5.4 G/DL (ref 6–8.5)

## 2017-05-12 ENCOUNTER — OFFICE VISIT (OUTPATIENT)
Dept: ONCOLOGY | Age: 57
End: 2017-05-12

## 2017-05-12 ENCOUNTER — HOSPITAL ENCOUNTER (OUTPATIENT)
Dept: INFUSION THERAPY | Age: 57
Discharge: HOME | End: 2017-05-12
Payer: COMMERCIAL

## 2017-05-12 VITALS
HEIGHT: 64 IN | OXYGEN SATURATION: 94 % | RESPIRATION RATE: 16 BRPM | SYSTOLIC BLOOD PRESSURE: 152 MMHG | TEMPERATURE: 98.1 F | HEART RATE: 80 BPM | BODY MASS INDEX: 32.18 KG/M2 | DIASTOLIC BLOOD PRESSURE: 87 MMHG | WEIGHT: 188.5 LBS

## 2017-05-12 VITALS
TEMPERATURE: 97.7 F | DIASTOLIC BLOOD PRESSURE: 89 MMHG | RESPIRATION RATE: 16 BRPM | WEIGHT: 189.4 LBS | HEIGHT: 64 IN | BODY MASS INDEX: 32.33 KG/M2 | SYSTOLIC BLOOD PRESSURE: 186 MMHG | HEART RATE: 89 BPM

## 2017-05-12 DIAGNOSIS — E11.9 CONTROLLED TYPE 2 DIABETES MELLITUS WITHOUT COMPLICATION, WITHOUT LONG-TERM CURRENT USE OF INSULIN (HCC): ICD-10-CM

## 2017-05-12 DIAGNOSIS — R25.2 CRAMPS OF LEFT LOWER EXTREMITY: ICD-10-CM

## 2017-05-12 DIAGNOSIS — G62.9 NEUROPATHY: ICD-10-CM

## 2017-05-12 DIAGNOSIS — C90.00 MULTIPLE MYELOMA NOT HAVING ACHIEVED REMISSION (HCC): Primary | ICD-10-CM

## 2017-05-12 LAB
BASOPHILS # BLD AUTO: 0 K/UL (ref 0–0.1)
BASOPHILS # BLD: 0 % (ref 0–1)
EOSINOPHIL # BLD: 0.1 K/UL (ref 0–0.4)
EOSINOPHIL NFR BLD: 2 % (ref 0–7)
ERYTHROCYTE [DISTWIDTH] IN BLOOD BY AUTOMATED COUNT: 15.3 % (ref 11.5–14.5)
HCT VFR BLD AUTO: 38 % (ref 35–47)
HGB BLD-MCNC: 13.1 G/DL (ref 11.5–16)
LYMPHOCYTES # BLD AUTO: 19 % (ref 12–49)
LYMPHOCYTES # BLD: 1.3 K/UL (ref 0.8–3.5)
MCH RBC QN AUTO: 31 PG (ref 26–34)
MCHC RBC AUTO-ENTMCNC: 34.5 G/DL (ref 30–36.5)
MCV RBC AUTO: 89.8 FL (ref 80–99)
MONOCYTES # BLD: 0.6 K/UL (ref 0–1)
MONOCYTES NFR BLD AUTO: 9 % (ref 5–13)
NEUTS SEG # BLD: 4.7 K/UL (ref 1.8–8)
NEUTS SEG NFR BLD AUTO: 70 % (ref 32–75)
PLATELET # BLD AUTO: 233 K/UL (ref 150–400)
RBC # BLD AUTO: 4.23 M/UL (ref 3.8–5.2)
WBC # BLD AUTO: 6.7 K/UL (ref 3.6–11)

## 2017-05-12 PROCEDURE — 85025 COMPLETE CBC W/AUTO DIFF WBC: CPT | Performed by: INTERNAL MEDICINE

## 2017-05-12 PROCEDURE — 36415 COLL VENOUS BLD VENIPUNCTURE: CPT | Performed by: INTERNAL MEDICINE

## 2017-05-12 PROCEDURE — 96401 CHEMO ANTI-NEOPL SQ/IM: CPT

## 2017-05-12 PROCEDURE — 74011000250 HC RX REV CODE- 250: Performed by: INTERNAL MEDICINE

## 2017-05-12 PROCEDURE — 74011250636 HC RX REV CODE- 250/636: Performed by: INTERNAL MEDICINE

## 2017-05-12 PROCEDURE — 74011250636 HC RX REV CODE- 250/636

## 2017-05-12 RX ORDER — DEXAMETHASONE 4 MG/1
TABLET ORAL
COMMUNITY
Start: 2017-05-09 | End: 2017-05-12 | Stop reason: SDUPTHER

## 2017-05-12 RX ORDER — LENALIDOMIDE 20 MG/1
20 CAPSULE ORAL DAILY
Qty: 14 CAP | Refills: 0 | Status: SHIPPED | OUTPATIENT
Start: 2017-05-12 | End: 2017-06-19 | Stop reason: SDUPTHER

## 2017-05-12 RX ADMIN — SODIUM CHLORIDE 2.6 MG: 9 INJECTION INTRAMUSCULAR; INTRAVENOUS; SUBCUTANEOUS at 10:58

## 2017-05-12 NOTE — PROGRESS NOTES
Outpatient Infusion Center - Chemotherapy Progress Note    1629 Pt admit to [facility] for Cycle 3 Day 8 Velcade ambulatory in stable condition. Assessment completed. No new concerns voiced. Labs drawn peripherally from left hand per protocol and sent for processing. 7383 Patient upstairs to MD appointment. 1020 Patient back in [facility]. Chemotherapy Flowsheet 5/12/2017   Cycle C3D8   Date 5/12/2017   Drug / Regimen Velcade         Visit Vitals    /87 (BP 1 Location: Right arm, BP Patient Position: Sitting)    Pulse 80    Temp 98.1 °F (36.7 °C)    Resp 16    Ht 5' 4\" (1.626 m)    Wt 85.5 kg (188 lb 8 oz)    SpO2 94%    BMI 32.36 kg/m2       Medications:  Velcade SQ left abdomen    1100 Pt tolerated treatment well. D/c home ambulatory in no distress. Pt aware of next appointment scheduled for 5/19/17 at 1100. Recent Results (from the past 12 hour(s))   CBC WITH AUTOMATED DIFF    Collection Time: 05/12/17  9:18 AM   Result Value Ref Range    WBC 6.7 3.6 - 11.0 K/uL    RBC 4.23 3.80 - 5.20 M/uL    HGB 13.1 11.5 - 16.0 g/dL    HCT 38.0 35.0 - 47.0 %    MCV 89.8 80.0 - 99.0 FL    MCH 31.0 26.0 - 34.0 PG    MCHC 34.5 30.0 - 36.5 g/dL    RDW 15.3 (H) 11.5 - 14.5 %    PLATELET 325 322 - 117 K/uL    NEUTROPHILS 70 32 - 75 %    LYMPHOCYTES 19 12 - 49 %    MONOCYTES 9 5 - 13 %    EOSINOPHILS 2 0 - 7 %    BASOPHILS 0 0 - 1 %    ABS. NEUTROPHILS 4.7 1.8 - 8.0 K/UL    ABS. LYMPHOCYTES 1.3 0.8 - 3.5 K/UL    ABS. MONOCYTES 0.6 0.0 - 1.0 K/UL    ABS. EOSINOPHILS 0.1 0.0 - 0.4 K/UL    ABS.  BASOPHILS 0.0 0.0 - 0.1 K/UL

## 2017-05-12 NOTE — PROGRESS NOTES
Hematology follow up    REASON FOR VISIT: Multiple Myeloma  REQUESTED BY: Dr. Janak Quinones: Ms. Madelin Metzger is a 60 y.o. female with DM and newly diagnosed Multiple Myeloma here to continue Cycle 3 of VRD. Oncologic history  Patient had left facial numbness which started in the summer of 2016. This started abruptly and was not associated with rashes, pain, jaw weakness. She has had some intermittent hyperemia of the L eye. No tearing. She has been evaluated by Dr. Pati Rivera with Neurology and an extensive work up was only notable for presence of a 0.3 g/dl M spike. Other tests were unremarkable: Vitamin B12 411, thyroid function test normal, ACE 25, BHARATI normal CBC normal, CMP normal, CRP 1.17, CT head and cervical spine unremarkable. Further evaluation revealed findings consistent with Multiple Myeloma    CBC 2/24 Unremarkable. Kappa 17 mg/dl, Lambda 2416 (ratio 0.01), SPEP 0.2 g/dl M spike, HANSEL showed monoclonal free lambda light chains, UPEP negative, Beta 2 Microglobulin 1.8 mg/L, Skeletal survey negative for lytic lesions, Bone marrow biopsy on 2/24/17 showed a Normocellular marrow with mild monoclonal plasmacytosis (15-20%). Trilineage hematopoiesis with maturation.     Treatment- Palliative  Pretreatment FLC : Kappa 17.9: Lambda 1978: 0.01  3/24/17: VRD Cycle 1   FLC : Kappa 10.9: Lambda 266 : 0.04  4/17/17: VRD Cycle 2 (switched to weekly Velcade)  FLC : Kappa 9.3: Lambda 73 : 0.13  5/5/17:  VRD Cycle 3    Past Medical History:   Diagnosis Date    Cardiomyopathy     Diabetes mellitus type 2, controlled ([address] 12/10/2015    Heart failure ([address]     Hyperlipidemia     Hypertension     controlled    Multiple myeloma ([address]     Orthostatic hypotension     Secondary cardiomyopathy, unspecified     Sinoatrial node dysfunction ([address]     Vitamin B12 deficiency 3/31/2010       Past Surgical History:   Procedure Laterality Date    HX HYSTERECTOMY         Allergies   Allergen Reactions  Ace Inhibitors Cough       Current Outpatient Prescriptions   Medication Sig Dispense Refill    lenalidomide 25 mg cap Take 25 mg by mouth daily. Indications: MULTIPLE MYELOMA 14 Cap 4    dexamethasone (DECADRON) 4 mg tablet Take 5 Tabs by mouth daily for 6 days. 30 Tab 0    BORTEZOMIB INJECTION 2.6 mg by Injection route. Indications: SQ Day 1, 4, 8, 11 of each 21 Day Cycle.  prochlorperazine (COMPAZINE) 10 mg tablet Take 5 mg by mouth every six (6) hours as needed.  pantoprazole (PROTONIX) 40 mg tablet Take 1 Tab by mouth daily. 30 Tab 6    acyclovir (ZOVIRAX) 400 mg tablet Take 1 Tab by mouth two (2) times a day. Indications: PREVENTION OF HERPES ZOSTER IN IMMUNOCOMPROMISED PATIENT 60 Tab 3    dexamethasone (DECADRON) 4 mg tablet Take 40mg (ten tablets) PO weekly on Days 1, 8, and 15 with chemotherapy  Indications: MULTIPLE MYELOMA 60 Tab 2    atorvastatin (LIPITOR) 40 mg tablet Take 1 Tab by mouth daily. For cholesterol 30 Tab 12    sodium chloride (YULIA 128) 2 % ophthalmic solution Administer 2 Drops to both eyes two (2) times a day. 15 mL 12    carvedilol (COREG) 12.5 mg tablet TAKE 1 TABLET BY MOUTH TWICE A DAY WITH MEALS 180 Tab 3    valsartan (DIOVAN) 40 mg tablet TAKE 1 TABLET BY MOUTH EVERY DAY 90 Tab 3    Cholecalciferol, Vitamin D3, (VITAMIN D3) 1,000 unit cap Take  by mouth daily.  calcium 500 mg Tab Take 600 mg by mouth daily.  cyanocobalamin (VITAMIN B-12) 1,000 mcg tablet Take 2,000 mcg by mouth daily.        Facility-Administered Medications Ordered in Other Visits   Medication Dose Route Frequency Provider Last Rate Last Dose    bortezomib (VELCADE) 2.6 mg in sodium chloride 0.9 % SQ chemo syringe  2.6 mg SubCUTAneous ONCE Faizan Sevilla MD           Social History     Social History    Marital status:      Spouse name: N/A    Number of children: N/A    Years of education: N/A     Social History Main Topics    Smoking status: Never Smoker    Smokeless tobacco: Never Used    Alcohol use No    Drug use: No    Sexual activity: Yes     Partners: Male     Other Topics Concern    Not on file     Social History Narrative    , 2 children, 28 and 31. Works at [employer], for 35 years. 5 grandchildren       Family History   Problem Relation Age of Onset   Summer Marcelino Cancer Mother     Heart Disease Mother      pacemaker   Summer Marcelino Diabetes Mother     Hypertension Sister     Hypertension Brother     Diabetes Brother     Hypertension Sister     Hypertension Sister     Hypertension Sister     Hypertension Sister     Diabetes Sister        ROS    Tolerating treatment. No nausea or vomiting. No leg swelling, no CP, SOB, numbness, headache, diarrhea. Has some constipation. Facial numbness is better. She does note intermittent numbness on finger tips. This is transient. Feet cramp sometimes. This pain could sometimes be severe. No leg swelling. Has altered taste    ECOG PS is 0      Physical Examination:   Visit Vitals    Wt 189 lb 6.4 oz (85.9 kg)    BMI 32.51 kg/m2     General appearance - alert, well appearing, and in no distress  Mental status - oriented to person, place, and time  Mouth - mucous membranes moist, pharynx normal without lesions  Lymphatics - no palpable lymphadenopathy, no hepatosplenomegaly  Chest - clear to auscultation, no wheezes, rales or rhonchi, symmetric air entry  Heart - normal rate, regular rhythm, normal S1, S2, no murmurs, rubs, clicks or gallops  Abdomen - soft, nontender, nondistended, no masses or organomegaly, bowel sounds present  Ext: No edema    LABS  Lab Results   Component Value Date/Time    WBC 6.3 05/05/2017 09:31 AM    HGB 13.0 05/05/2017 09:31 AM    HCT 38.2 05/05/2017 09:31 AM    PLATELET 351 19/42/3690 09:31 AM    MCV 89.9 05/05/2017 09:31 AM    ABS.  NEUTROPHILS 4.0 05/05/2017 09:31 AM     Lab Results   Component Value Date/Time    Sodium 137 05/05/2017 09:31 AM    Potassium 3.9 05/05/2017 09:31 AM Chloride 104 05/05/2017 09:31 AM    CO2 22 05/05/2017 09:31 AM    Glucose 455 05/05/2017 09:31 AM    BUN 9 05/05/2017 09:31 AM    Creatinine 0.96 05/05/2017 09:31 AM    GFR est AA >60 05/05/2017 09:31 AM    GFR est non-AA 60 05/05/2017 09:31 AM    Calcium 8.8 05/05/2017 09:31 AM     Lab Results   Component Value Date/Time    AST (SGOT) 11 05/05/2017 09:31 AM    Alk. phosphatase 106 05/05/2017 09:31 AM    Protein, total 6.2 05/05/2017 09:31 AM    Protein, total 5.4 05/05/2017 09:31 AM    Albumin 3.3 05/05/2017 09:31 AM    Globulin 2.9 05/05/2017 09:31 AM    A-G Ratio 1.1 05/05/2017 09:31 AM     5/5/17  Free Kappa Lt Chains, serum 9.35  3.30 - 19.40 mg/L Final      Comment:     (NOTE)   **Effective June 5, 2017 Free [place] Lt Chains,S**    reference interval will be changing to:    3.3 - 19.4        Free Lambda Lt Chains, serum 73.21 (H) 5.71 - 26.30 mg/L Final     Comment:     (NOTE)   **Effective June 5, 2017 Free Lambda Lt Chains,S**    reference interval will be changing to:    5.7 - 26.3        Kappa/Lambda ratio, serum 0.13 (L)            Bone marrow biopsy reviewed    FISH molecular analysis:    Positive for IgH gene rearrangement (IgH complex FISH study pending); Normal results with 1, 5, 9, 13, 15, and 17 (TP53) probe sets. ASSESSMENT  Ms. Jony Merida is a 60 y.o. female with  1. Multiple Myeloma by revised IMWG criteria (Lancet Oncol 2014), due to Involved : uninvolved serum free light chain ratio > 100. Has no anemia, hypercalcemia, bone lesions or renal failure. Likely low risk  2. 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 Poorly controlled DM. 4. Grade 1 finger neuropathy sec to Velcade  5. Grade 1 rash sec to Revlimid: Resolved  6. Grade 2-3 muscle cramps Revlimid    Started palliative VRD Cycle 1 on 3/24/17    She is here for Cycle 3 day 8 today. Tolerating well with a partial response after 2 cycles. Seen by Dr. Curtis Salcedo at [facility].  Planning autotransplant after about 4-5 cycles. Will plan on holding Revlimid for cycle 4 or 5 depending on response and transplant date. Has severe muscle cramps, will reduce dose cycle 4    DISCUSSION    Multiple myeloma is Not curable yet, though with favorable molecular profile patients responding to therapy and receiving an auto transplant may live for decades. The other question is the etiology of there asymmetrial facial nerve sensory neuropathy. Myeloma associated neuropathy is usually symmetric and usually associated with an IgM paraprotein. Rarely, radiculopathy from direct compression due to an extra osseous plasmacytoma may lead to unilateral symptoms, which however seems unlikely based on her recent imaging. Her prior HANSEL had indicated an IgA Monoclonal band as well which may account for 15% of paraproteinemia associated neuropathy, as discussed above clinical features in her case are not consistent with this either. We discussed that the immediate goal is to rapidly decrease the abnormal protein and obtain a deep response. Consolidation with an autotransplant would attain the best chances of long term DFS.  She has met with [facility] B[facility]    PLAN    - Proceed with Cycle 3 day 8 Velcade, Revlimid and Dexamethasone  - RTC per protocol for days 15, 22  - RTC to see me on D8 of every cycle  - Myeloma markers on day 1 of every cycle  - ASA daily and Acyclovir BID   - Compazine prn nausea  - Protonix while on Decadron  - Counseled to call with leg pain or swelling  - Decrease Revlimid to 20 mg with Cycle 4, for muscle cramps  - Follow with [facility], Dr. Gricel Thomas MD

## 2017-05-12 NOTE — PROGRESS NOTES
Malgorzata Hernandes is a 60 y.o. female here today for Multiple Myeloma. Patient states she has bilat ankle pain 2/10.

## 2017-05-15 ENCOUNTER — APPOINTMENT (OUTPATIENT)
Dept: INFUSION THERAPY | Age: 57
End: 2017-05-15
Payer: COMMERCIAL

## 2017-05-17 ENCOUNTER — OFFICE VISIT (OUTPATIENT)
Dept: FAMILY MEDICINE CLINIC | Age: 57
End: 2017-05-17

## 2017-05-17 ENCOUNTER — TELEPHONE (OUTPATIENT)
Dept: ONCOLOGY | Age: 57
End: 2017-05-17

## 2017-05-17 VITALS
TEMPERATURE: 98.1 F | OXYGEN SATURATION: 96 % | HEIGHT: 64 IN | RESPIRATION RATE: 14 BRPM | DIASTOLIC BLOOD PRESSURE: 84 MMHG | HEART RATE: 83 BPM | SYSTOLIC BLOOD PRESSURE: 133 MMHG | WEIGHT: 188.4 LBS | BODY MASS INDEX: 32.17 KG/M2

## 2017-05-17 DIAGNOSIS — Z79.4 CONTROLLED TYPE 2 DIABETES MELLITUS WITHOUT COMPLICATION, WITH LONG-TERM CURRENT USE OF INSULIN (HCC): Primary | ICD-10-CM

## 2017-05-17 DIAGNOSIS — E11.9 CONTROLLED TYPE 2 DIABETES MELLITUS WITHOUT COMPLICATION, WITH LONG-TERM CURRENT USE OF INSULIN (HCC): Primary | ICD-10-CM

## 2017-05-17 LAB — HBA1C MFR BLD HPLC: 12.5 %

## 2017-05-17 RX ORDER — LENALIDOMIDE 25 MG/1
CAPSULE ORAL
COMMUNITY
Start: 2017-05-05 | End: 2017-06-07 | Stop reason: SDUPTHER

## 2017-05-17 RX ORDER — GLIPIZIDE 5 MG/1
5 TABLET ORAL 2 TIMES DAILY
Qty: 60 TAB | Refills: 12 | Status: SHIPPED | OUTPATIENT
Start: 2017-05-17 | End: 2018-02-19 | Stop reason: ALTCHOICE

## 2017-05-17 RX ORDER — METFORMIN HYDROCHLORIDE 500 MG/1
500 TABLET ORAL 2 TIMES DAILY WITH MEALS
Qty: 60 TAB | Refills: 12 | Status: SHIPPED | OUTPATIENT
Start: 2017-05-17 | End: 2017-06-01 | Stop reason: ALTCHOICE

## 2017-05-17 RX ORDER — DEXAMETHASONE 4 MG/1
TABLET ORAL
Refills: 2 | COMMUNITY
Start: 2017-05-09 | End: 2017-06-07 | Stop reason: SDUPTHER

## 2017-05-17 NOTE — PROGRESS NOTES
HISTORY OF PRESENT ILLNESS  Rolo Quiroz is a 60 y.o. female. HPI Pt. Comes in for blood pressure, cholesterol, and diabetes check. No complaints of chest pain, shortness of breath, TIAs, claudication or edema. Has also been dxed with Myeloma since last seen. Currently taking velcade, revlimid, and dexamethasone. Seeing Dr. Jamshid Antonio. Chemo has caused some tingling in feet. ROS    Physical Exam   Constitutional: She is oriented to person, place, and time. She appears well-developed and well-nourished. Neck: No thyromegaly present. Cardiovascular: Normal rate, regular rhythm and normal heart sounds. No murmur heard. Pulmonary/Chest: Effort normal and breath sounds normal. She has no wheezes. Abdominal: Soft. Bowel sounds are normal. She exhibits no distension. There is no tenderness. There is no rebound and no guarding. Musculoskeletal: Normal range of motion. She exhibits no edema. Lymphadenopathy:     She has no cervical adenopathy. Neurological: She is alert and oriented to person, place, and time. Nursing note and vitals reviewed. ASSESSMENT and PLAN  Orders Placed This Encounter    METABOLIC PANEL, COMPREHENSIVE    LIPID PANEL    MICROALBUMIN, UR, RAND W/ MICROALBUMIN/CREA RATIO    AMB POC HEMOGLOBIN A1C    metFORMIN (GLUCOPHAGE) 500 mg tablet    glipiZIDE (GLUCOTROL) 5 mg tablet     Gabby Elver was seen today for hypertension, diabetes and cholesterol problem. Diagnoses and all orders for this visit:    Controlled type 2 diabetes mellitus without complication, with long-term current use of insulin ([facility])  -     METABOLIC PANEL, COMPREHENSIVE  -     LIPID PANEL  -     Cancel:  DIABETES FOOT EXAM  -     MICROALBUMIN, UR, RAND W/ MICROALBUMIN/CREA RATIO  -     AMB POC HEMOGLOBIN A1C    Other orders  -     metFORMIN (GLUCOPHAGE) 500 mg tablet; Take 1 Tab by mouth two (2) times daily (with meals). For diabetes  -     glipiZIDE (GLUCOTROL) 5 mg tablet;  Take 1 Tab by mouth two (2) times a day. Follow-up Disposition:  Return in about 4 weeks (around 6/14/2017).

## 2017-05-17 NOTE — PROGRESS NOTES
Chief Complaint   Patient presents with    Hypertension    Diabetes    Cholesterol Problem     1. Have you been to the ER, urgent care clinic since your last visit? Hospitalized since your last visit? No    2. Have you seen or consulted any other health care providers outside of the [address] [place] [address] since your last visit? Include any pap smears or colon screening. No     Health Maintenance Due   Topic Date Due    COLONOSCOPY  09/20/1976    Pneumococcal 19-64 Highest Risk (1 of 3 - PCV13) 09/20/1977    PAP AKA CERVICAL CYTOLOGY  04/02/2015    EYE EXAM RETINAL OR DILATED Q1  04/15/2017    FOOT EXAM Q1  05/17/2017    HEMOGLOBIN A1C Q6M  05/17/2017    MICROALBUMIN Q1  05/17/2017       The Patient sees Dr. Barbra Corral in [place] in this coming July. Pt refused retinal scan here.

## 2017-05-17 NOTE — MR AVS SNAPSHOT
Visit Information Date & Time Provider Department Dept. Phone Encounter #  
 5/17/2017  9:00 AM Jina Larose MD [facility] [phone] [id number] Follow-up Instructions Return in about 4 weeks (around 6/14/2017). Your Appointments 6/2/2017  9:30 AM  
Office Visit with Hayley Campuzano MD  
[facility]) Appt Note: infusion day [address] [facility] [address] [facility] [address]  
[phone]  
  
   
 [address] [address] [facility] [address]  
  
    
 6/7/2017  9:00 AM  
ESTABLISHED PATIENT with Jimmy Braden MD  
[facility]) Appt Note: 6 MO. F/U  
 [address] [facility]  
[phone] [address]  
  
    
 6/15/2017  1:30 PM  
PACEMAKER with PACEMAKER, [facility]) Appt Note: PM check b4 Jada Dandy [address]  
[phone] [address]  
  
    
 6/15/2017  1:45 PM  
ANNUAL with Smita Landon NP [facility]) Appt Note: PM check b4 Jada Dandy [address]  
[phone] [address]  
  
    
  
 5/24/2017  8:00 AM  
REMOTE OFFICE VISIT with [facility]) Appt Note: NOT AN OFFICE VISIT - REMOTE BSC ICD  
 [address]  
[phone] [address] Upcoming Health Maintenance Date Due COLONOSCOPY 9/20/1976 Pneumococcal 19-64 Highest Risk (1 of 3 - PCV13) 9/20/1977 PAP AKA CERVICAL CYTOLOGY 4/2/2015 EYE EXAM RETINAL OR DILATED Q1 4/15/2017 FOOT EXAM Q1 5/17/2017 HEMOGLOBIN A1C Q6M 5/17/2017 MICROALBUMIN Q1 5/17/2017 INFLUENZA AGE 9 TO ADULT 8/1/2017 LIPID PANEL Q1 11/17/2017 BREAST CANCER SCRN MAMMOGRAM 3/8/2018 GLAUCOMA SCREENING Q2Y 4/26/2018 DTaP/Tdap/Td series (2 - Td) 5/17/2026 Allergies as of 5/17/2017  Review Complete On: 5/17/2017 By: Ariane Farrell MD  
  
 Severity Noted Reaction Type Reactions Ace Inhibitors  08/06/2010    Cough Current Immunizations  Reviewed on 5/12/2017 No immunizations on file. Not reviewed this visit You Were Diagnosed With   
  
 Codes Comments Controlled type 2 diabetes mellitus without complication, with long-term current use of insulin (HCC)    -  Primary ICD-10-CM: E11.9, Z79.4 ICD-9-CM: 250.00, V58.67 Vitals BP Pulse Temp Resp Height(growth percentile) Weight(growth percentile) 133/84 83 98.1 °F (36.7 °C) (Oral) 14 5' 4\" (1.626 m) 188 lb 6.4 oz (85.5 kg) SpO2 BMI OB Status Smoking Status 96% 32.34 kg/m2 Hysterectomy Never Smoker Vitals History BMI and BSA Data Body Mass Index Body Surface Area  
 32.34 kg/m 2 1.96 m 2 Preferred Pharmacy Pharmacy Name Phone [facility] [address] [place] [address] [phone] Your Updated Medication List  
  
   
This list is accurate as of: 5/17/17  9:38 AM.  Always use your most recent med list.  
  
  
  
  
 acyclovir 400 mg tablet Commonly known as:  ZOVIRAX Take 1 Tab by mouth two (2) times a day. Indications: PREVENTION OF HERPES ZOSTER IN IMMUNOCOMPROMISED PATIENT  
  
 atorvastatin 40 mg tablet Commonly known as:  LIPITOR Take 1 Tab by mouth daily. For cholesterol BORTEZOMIB INJECTION  
2.6 mg by Injection route. Indications: SQ Day 1, 4, 8, 11 of each 21 Day Cycle. calcium 500 mg Tab Take 600 mg by mouth daily. carvedilol 12.5 mg tablet Commonly known as:  COREG  
TAKE 1 TABLET BY MOUTH TWICE A DAY WITH MEALS  
  
 * dexamethasone 4 mg tablet Commonly known as:  DECADRON Take 40mg (ten tablets) PO weekly on Days 1, 8, and 15 with chemotherapy  Indications: MULTIPLE MYELOMA * dexamethasone 4 mg tablet Commonly known as:  DECADRON Take 5 Tabs by mouth daily for 6 days. * dexamethasone 4 mg tablet Commonly known as:  DECADRON  
TAKE 10 TABLETS BY MOUTH ONCE A WEEK ON DAYS 1, 8 AND 15 WITH CHEMOTHERAPY  
  
 glipiZIDE 5 mg tablet Commonly known as:  Ludie Null Take 1 Tab by mouth two (2) times a day. * REVLIMID 25 mg Cap Generic drug:  lenalidomide * lenalidomide 20 mg Cap Commonly known as:  REVLIMID Take 20 mg by mouth daily. 2 weeks on/1 week off  Indications: MULTIPLE MYELOMA  
  
 metFORMIN 500 mg tablet Commonly known as:  GLUCOPHAGE Take 1 Tab by mouth two (2) times daily (with meals). For diabetes  
  
 pantoprazole 40 mg tablet Commonly known as:  PROTONIX Take 1 Tab by mouth daily. prochlorperazine 10 mg tablet Commonly known as:  COMPAZINE Take 5 mg by mouth every six (6) hours as needed. sodium chloride 2 % ophthalmic solution Commonly known as:  YULIA 128 Administer 2 Drops to both eyes two (2) times a day. valsartan 40 mg tablet Commonly known as:  DIOVAN  
TAKE 1 TABLET BY MOUTH EVERY DAY  
  
 VITAMIN B-12 1,000 mcg tablet Generic drug:  cyanocobalamin Take 2,000 mcg by mouth daily. VITAMIN D3 1,000 unit Cap Generic drug:  cholecalciferol Take  by mouth daily. * Notice: This list has 5 medication(s) that are the same as other medications prescribed for you. Read the directions carefully, and ask your doctor or other care provider to review them with you. Prescriptions Sent to Pharmacy Refills  
 metFORMIN (GLUCOPHAGE) 500 mg tablet 12 Sig: Take 1 Tab by mouth two (2) times daily (with meals). For diabetes  Class: Normal  
 Pharmacy: [address] [place] [address] Ph #: [phone] Route: Oral  
 glipiZIDE (GLUCOTROL) 5 mg tablet 12 Sig: Take 1 Tab by mouth two (2) times a day. Class: Normal  
 Pharmacy: [address] [place] [address] Ph #: [phone] Route: Oral  
  
We Performed the Following AMB POC HEMOGLOBIN A1C [81589 CPT(R)] LIPID PANEL [46793 CPT(R)] METABOLIC PANEL, COMPREHENSIVE [16832 CPT(R)] MICROALBUMIN, UR, RAND W/ MICROALBUMIN/CREA RATIO J8073421 CPT(R)] Follow-up Instructions Return in about 4 weeks (around 6/14/2017). To-Do List   
 05/19/2017 11:00 AM  
  Appointment with [facility] ([phone])  
  
 05/26/2017 9:00 AM  
  Appointment with Ye Samaniego [facility] ([phone])  
  
 06/02/2017 9:00 AM  
  Appointment with [address] [place] [address] ([phone])  
  
 06/09/2017 9:00 AM  
  Appointment with [facility] ([phone])  
  
 06/12/2017 3:20 PM  
  Appointment with [place] [address] at [facility] ([phone]) Shower or bathe using soap and water. Do not use deodorant, powder, perfumes, or lotion the day of your exam.  If your prior mammograms were not performed at [facility] please bring films with you or forward prior images 2 days before your procedure. Check in at registration 15min before your appointment time unless you were instructed to do otherwise. A script is not necessary, but if you have one, please bring it on the day of the mammogram or have it faxed to the department. [facility] [phone] [facility]  [phone] [facility] [address] JULIO  [phone] [facility] [phone] [address] Adriane Hutchinson [phone] Introducing [facility]!    
 Ray Lackey introduces [employer] patient portal. Now you can access parts of your medical record, email your doctor's office, and request medication refills online. 1. In your internet browser, go to [email or web address] 2. Click on the First Time User? Click Here link in the Sign In box. You will see the New Member Sign Up page. 3. Enter your [employer] Access Code exactly as it appears below. You will not need to use this code after youve completed the sign-up process. If you do not sign up before the expiration date, you must request a new code. · [employer] Access Code: [email or web address] Expires: 5/22/2017  9:35 AM 
 
4. Enter the last four digits of your Social Security Number (xxxx) and Date of Birth (mm/dd/yyyy) as indicated and click Submit. You will be taken to the next sign-up page. 5. Create a [employer] ID. This will be your [employer] login ID and cannot be changed, so think of one that is secure and easy to remember. 6. Create a [employer] password. You can change your password at any time. 7. Enter your Password Reset Question and Answer. This can be used at a later time if you forget your password. 8. Enter your e-mail address. You will receive e-mail notification when new information is available in [address] 9. Click Sign Up. You can now view and download portions of your medical record. 10. Click the Download Summary menu link to download a portable copy of your medical information. If you have questions, please visit the Frequently Asked Questions section of the [employer] website. Remember, [employer] is NOT to be used for urgent needs. For medical emergencies, dial 911. Now available from your iPhone and Android! Please provide this summary of care documentation to your next provider. Your primary care clinician is listed as Jose Woody. If you have any questions after today's visit, please call [phone].

## 2017-05-18 ENCOUNTER — APPOINTMENT (OUTPATIENT)
Dept: INFUSION THERAPY | Age: 57
End: 2017-05-18
Payer: COMMERCIAL

## 2017-05-18 LAB
ALBUMIN SERPL-MCNC: 4 G/DL (ref 3.5–5.5)
ALBUMIN/CREAT UR: 39 MG/G CREAT (ref 0–30)
ALBUMIN/GLOB SERPL: 2.2 {RATIO} (ref 1.2–2.2)
ALP SERPL-CCNC: 97 IU/L (ref 39–117)
ALT SERPL-CCNC: 17 IU/L (ref 0–32)
AST SERPL-CCNC: 9 IU/L (ref 0–40)
BILIRUB SERPL-MCNC: 1 MG/DL (ref 0–1.2)
BUN SERPL-MCNC: 8 MG/DL (ref 6–24)
BUN/CREAT SERPL: 10 (ref 9–23)
CALCIUM SERPL-MCNC: 8.9 MG/DL (ref 8.7–10.2)
CHLORIDE SERPL-SCNC: 100 MMOL/L (ref 96–106)
CHOLEST SERPL-MCNC: 182 MG/DL (ref 100–199)
CO2 SERPL-SCNC: 20 MMOL/L (ref 18–29)
CREAT SERPL-MCNC: 0.77 MG/DL (ref 0.57–1)
CREAT UR-MCNC: 41.3 MG/DL
GLOBULIN SER CALC-MCNC: 1.8 G/DL (ref 1.5–4.5)
GLUCOSE SERPL-MCNC: 424 MG/DL (ref 65–99)
HDLC SERPL-MCNC: 75 MG/DL
INTERPRETATION, 910389: NORMAL
LDLC SERPL CALC-MCNC: 79 MG/DL (ref 0–99)
Lab: NORMAL
Lab: NORMAL
MICROALBUMIN UR-MCNC: 16.1 UG/ML
POTASSIUM SERPL-SCNC: 4.1 MMOL/L (ref 3.5–5.2)
PROT SERPL-MCNC: 5.8 G/DL (ref 6–8.5)
SODIUM SERPL-SCNC: 139 MMOL/L (ref 134–144)
TRIGL SERPL-MCNC: 139 MG/DL (ref 0–149)
VLDLC SERPL CALC-MCNC: 28 MG/DL (ref 5–40)

## 2017-05-19 ENCOUNTER — HOSPITAL ENCOUNTER (OUTPATIENT)
Dept: INFUSION THERAPY | Age: 57
Discharge: HOME | End: 2017-05-19
Payer: COMMERCIAL

## 2017-05-19 VITALS
BODY MASS INDEX: 32.17 KG/M2 | DIASTOLIC BLOOD PRESSURE: 72 MMHG | WEIGHT: 188.4 LBS | HEART RATE: 88 BPM | RESPIRATION RATE: 16 BRPM | TEMPERATURE: 98.1 F | HEIGHT: 64 IN | SYSTOLIC BLOOD PRESSURE: 159 MMHG

## 2017-05-19 LAB
BASOPHILS # BLD AUTO: 0 K/UL (ref 0–0.1)
BASOPHILS # BLD: 0 % (ref 0–1)
EOSINOPHIL # BLD: 0.1 K/UL (ref 0–0.4)
EOSINOPHIL NFR BLD: 3 % (ref 0–7)
ERYTHROCYTE [DISTWIDTH] IN BLOOD BY AUTOMATED COUNT: 15.1 % (ref 11.5–14.5)
HCT VFR BLD AUTO: 36 % (ref 35–47)
HGB BLD-MCNC: 12.4 G/DL (ref 11.5–16)
LYMPHOCYTES # BLD AUTO: 23 % (ref 12–49)
LYMPHOCYTES # BLD: 1.3 K/UL (ref 0.8–3.5)
MCH RBC QN AUTO: 30.9 PG (ref 26–34)
MCHC RBC AUTO-ENTMCNC: 34.4 G/DL (ref 30–36.5)
MCV RBC AUTO: 89.8 FL (ref 80–99)
MONOCYTES # BLD: 0.3 K/UL (ref 0–1)
MONOCYTES NFR BLD AUTO: 5 % (ref 5–13)
NEUTS SEG # BLD: 3.7 K/UL (ref 1.8–8)
NEUTS SEG NFR BLD AUTO: 69 % (ref 32–75)
PLATELET # BLD AUTO: 208 K/UL (ref 150–400)
RBC # BLD AUTO: 4.01 M/UL (ref 3.8–5.2)
WBC # BLD AUTO: 5.4 K/UL (ref 3.6–11)

## 2017-05-19 PROCEDURE — 36415 COLL VENOUS BLD VENIPUNCTURE: CPT | Performed by: INTERNAL MEDICINE

## 2017-05-19 PROCEDURE — 74011250636 HC RX REV CODE- 250/636: Performed by: INTERNAL MEDICINE

## 2017-05-19 PROCEDURE — 96401 CHEMO ANTI-NEOPL SQ/IM: CPT

## 2017-05-19 PROCEDURE — 74011250636 HC RX REV CODE- 250/636

## 2017-05-19 PROCEDURE — 74011000250 HC RX REV CODE- 250: Performed by: INTERNAL MEDICINE

## 2017-05-19 PROCEDURE — 85025 COMPLETE CBC W/AUTO DIFF WBC: CPT | Performed by: INTERNAL MEDICINE

## 2017-05-19 RX ADMIN — SODIUM CHLORIDE 2.6 MG: 9 INJECTION INTRAMUSCULAR; INTRAVENOUS; SUBCUTANEOUS at 12:28

## 2017-05-19 NOTE — PROGRESS NOTES
Outpatient Infusion Center - Chemotherapy Progress Note    2931 Pt admit to [facility] for Velcade ambulatory in stable condition. Assessment completed. No new concerns voiced. Labs drawn and sent for processing. Chemotherapy Flowsheet 5/19/2017   Cycle C3D15   Date 5/19/2017   Drug / Regimen Velcade    Notes given          Visit Vitals    /72    Pulse 88    Temp 98.1 °F (36.7 °C)    Resp 16    Ht 5' 4\" (1.626 m)    Wt 85.5 kg (188 lb 6.4 oz)    BMI 32.34 kg/m2       Medications:  Velcade SQ right abd     1240 Pt tolerated treatment well. D/c home ambulatory in no distress. Pt aware of next appointment scheduled for 5/26/17. Recent Results (from the past 12 hour(s))   CBC WITH AUTOMATED DIFF    Collection Time: 05/19/17 11:23 AM   Result Value Ref Range    WBC 5.4 3.6 - 11.0 K/uL    RBC 4.01 3.80 - 5.20 M/uL    HGB 12.4 11.5 - 16.0 g/dL    HCT 36.0 35.0 - 47.0 %    MCV 89.8 80.0 - 99.0 FL    MCH 30.9 26.0 - 34.0 PG    MCHC 34.4 30.0 - 36.5 g/dL    RDW 15.1 (H) 11.5 - 14.5 %    PLATELET 200 663 - 504 K/uL    NEUTROPHILS 69 32 - 75 %    LYMPHOCYTES 23 12 - 49 %    MONOCYTES 5 5 - 13 %    EOSINOPHILS 3 0 - 7 %    BASOPHILS 0 0 - 1 %    ABS. NEUTROPHILS 3.7 1.8 - 8.0 K/UL    ABS. LYMPHOCYTES 1.3 0.8 - 3.5 K/UL    ABS. MONOCYTES 0.3 0.0 - 1.0 K/UL    ABS. EOSINOPHILS 0.1 0.0 - 0.4 K/UL    ABS.  BASOPHILS 0.0 0.0 - 0.1 K/UL

## 2017-05-22 ENCOUNTER — APPOINTMENT (OUTPATIENT)
Dept: INFUSION THERAPY | Age: 57
End: 2017-05-22
Payer: COMMERCIAL

## 2017-05-24 ENCOUNTER — OFFICE VISIT (OUTPATIENT)
Dept: CARDIOLOGY CLINIC | Age: 57
End: 2017-05-24

## 2017-05-24 DIAGNOSIS — I42.9 CARDIOMYOPATHY, UNSPECIFIED TYPE (HCC): ICD-10-CM

## 2017-05-24 DIAGNOSIS — I50.22 CHRONIC SYSTOLIC HEART FAILURE (HCC): ICD-10-CM

## 2017-05-24 DIAGNOSIS — Z95.810 AICD (AUTOMATIC CARDIOVERTER/DEFIBRILLATOR) PRESENT: Primary | ICD-10-CM

## 2017-05-25 ENCOUNTER — APPOINTMENT (OUTPATIENT)
Dept: INFUSION THERAPY | Age: 57
End: 2017-05-25
Payer: COMMERCIAL

## 2017-05-26 ENCOUNTER — HOSPITAL ENCOUNTER (OUTPATIENT)
Dept: INFUSION THERAPY | Age: 57
Discharge: HOME | End: 2017-05-26
Payer: COMMERCIAL

## 2017-05-26 VITALS
SYSTOLIC BLOOD PRESSURE: 131 MMHG | WEIGHT: 188 LBS | DIASTOLIC BLOOD PRESSURE: 78 MMHG | HEART RATE: 85 BPM | HEIGHT: 64 IN | RESPIRATION RATE: 16 BRPM | BODY MASS INDEX: 32.1 KG/M2 | OXYGEN SATURATION: 96 % | TEMPERATURE: 98.8 F

## 2017-05-26 LAB
ALBUMIN SERPL BCP-MCNC: 3.2 G/DL (ref 3.5–5)
ALBUMIN/GLOB SERPL: 1.1 {RATIO} (ref 1.1–2.2)
ALP SERPL-CCNC: 91 U/L (ref 45–117)
ALT SERPL-CCNC: 27 U/L (ref 12–78)
ANION GAP BLD CALC-SCNC: 9 MMOL/L (ref 5–15)
AST SERPL W P-5'-P-CCNC: 13 U/L (ref 15–37)
BASOPHILS # BLD AUTO: 0 K/UL (ref 0–0.1)
BASOPHILS # BLD: 0 % (ref 0–1)
BILIRUB DIRECT SERPL-MCNC: 0.3 MG/DL (ref 0–0.2)
BILIRUB SERPL-MCNC: 1.1 MG/DL (ref 0.2–1)
BUN SERPL-MCNC: 7 MG/DL (ref 6–20)
BUN/CREAT SERPL: 9 (ref 12–20)
CALCIUM SERPL-MCNC: 8.7 MG/DL (ref 8.5–10.1)
CHLORIDE SERPL-SCNC: 108 MMOL/L (ref 97–108)
CO2 SERPL-SCNC: 23 MMOL/L (ref 21–32)
CREAT SERPL-MCNC: 0.82 MG/DL (ref 0.55–1.02)
EOSINOPHIL # BLD: 0.1 K/UL (ref 0–0.4)
EOSINOPHIL NFR BLD: 2 % (ref 0–7)
ERYTHROCYTE [DISTWIDTH] IN BLOOD BY AUTOMATED COUNT: 15.8 % (ref 11.5–14.5)
GLOBULIN SER CALC-MCNC: 2.9 G/DL (ref 2–4)
GLUCOSE SERPL-MCNC: 253 MG/DL (ref 65–100)
HCT VFR BLD AUTO: 36.1 % (ref 35–47)
HGB BLD-MCNC: 12.2 G/DL (ref 11.5–16)
IGA SERPL-MCNC: 43 MG/DL (ref 70–400)
IGG SERPL-MCNC: 440 MG/DL (ref 700–1600)
IGM SERPL-MCNC: <21 MG/DL (ref 40–230)
LYMPHOCYTES # BLD AUTO: 21 % (ref 12–49)
LYMPHOCYTES # BLD: 1.2 K/UL (ref 0.8–3.5)
MCH RBC QN AUTO: 30.5 PG (ref 26–34)
MCHC RBC AUTO-ENTMCNC: 33.8 G/DL (ref 30–36.5)
MCV RBC AUTO: 90.3 FL (ref 80–99)
MONOCYTES # BLD: 0.5 K/UL (ref 0–1)
MONOCYTES NFR BLD AUTO: 8 % (ref 5–13)
NEUTS SEG # BLD: 4 K/UL (ref 1.8–8)
NEUTS SEG NFR BLD AUTO: 69 % (ref 32–75)
PLATELET # BLD AUTO: 226 K/UL (ref 150–400)
POTASSIUM SERPL-SCNC: 3.2 MMOL/L (ref 3.5–5.1)
PROT SERPL-MCNC: 6.1 G/DL (ref 6.4–8.2)
RBC # BLD AUTO: 4 M/UL (ref 3.8–5.2)
SODIUM SERPL-SCNC: 140 MMOL/L (ref 136–145)
WBC # BLD AUTO: 5.8 K/UL (ref 3.6–11)

## 2017-05-26 PROCEDURE — 84165 PROTEIN E-PHORESIS SERUM: CPT | Performed by: INTERNAL MEDICINE

## 2017-05-26 PROCEDURE — 80048 BASIC METABOLIC PNL TOTAL CA: CPT | Performed by: INTERNAL MEDICINE

## 2017-05-26 PROCEDURE — 83883 ASSAY NEPHELOMETRY NOT SPEC: CPT | Performed by: INTERNAL MEDICINE

## 2017-05-26 PROCEDURE — 80076 HEPATIC FUNCTION PANEL: CPT | Performed by: INTERNAL MEDICINE

## 2017-05-26 PROCEDURE — 82784 ASSAY IGA/IGD/IGG/IGM EACH: CPT | Performed by: INTERNAL MEDICINE

## 2017-05-26 PROCEDURE — 36415 COLL VENOUS BLD VENIPUNCTURE: CPT | Performed by: INTERNAL MEDICINE

## 2017-05-26 PROCEDURE — 74011250637 HC RX REV CODE- 250/637: Performed by: NURSE PRACTITIONER

## 2017-05-26 PROCEDURE — 74011250636 HC RX REV CODE- 250/636

## 2017-05-26 PROCEDURE — 85025 COMPLETE CBC W/AUTO DIFF WBC: CPT | Performed by: INTERNAL MEDICINE

## 2017-05-26 PROCEDURE — 96402 CHEMO HORMON ANTINEOPL SQ/IM: CPT

## 2017-05-26 PROCEDURE — 74011250636 HC RX REV CODE- 250/636: Performed by: INTERNAL MEDICINE

## 2017-05-26 PROCEDURE — 74011000250 HC RX REV CODE- 250: Performed by: INTERNAL MEDICINE

## 2017-05-26 RX ORDER — POTASSIUM CHLORIDE 750 MG/1
40 TABLET, FILM COATED, EXTENDED RELEASE ORAL
Status: COMPLETED | OUTPATIENT
Start: 2017-05-26 | End: 2017-05-26

## 2017-05-26 RX ADMIN — SODIUM CHLORIDE 2.6 MG: 9 INJECTION INTRAMUSCULAR; INTRAVENOUS; SUBCUTANEOUS at 11:39

## 2017-05-26 RX ADMIN — POTASSIUM CHLORIDE 40 MEQ: 750 TABLET, FILM COATED, EXTENDED RELEASE ORAL at 11:39

## 2017-05-26 NOTE — PROGRESS NOTES
5094 Pt admit to [facility] for C4D1 Velcade ambulatory in stable condition. Assessment completed. No new concerns voiced. Labs drawn per order and sent for processing. Labs reviewed, potassium 3.2, spoke with Per Mchugh NP new orders received. Medication ordered. Visit Vitals    /78    Pulse 85    Temp 98.8 °F (37.1 °C)    Resp 16    Ht 5' 4\" (1.626 m)    Wt 85.3 kg (188 lb)    SpO2 96%    BMI 32.27 kg/m2       Medications:  Velcade SQ Left abdomen  Potassium 40 mEq PO    1145 Pt tolerated treatment well. D/c home ambulatory in no distress. Pt aware of next appointment scheduled for 6/2/17. Recent Results (from the past 12 hour(s))   CBC WITH AUTOMATED DIFF    Collection Time: 05/26/17  9:27 AM   Result Value Ref Range    WBC 5.8 3.6 - 11.0 K/uL    RBC 4.00 3.80 - 5.20 M/uL    HGB 12.2 11.5 - 16.0 g/dL    HCT 36.1 35.0 - 47.0 %    MCV 90.3 80.0 - 99.0 FL    MCH 30.5 26.0 - 34.0 PG    MCHC 33.8 30.0 - 36.5 g/dL    RDW 15.8 (H) 11.5 - 14.5 %    PLATELET 472 117 - 983 K/uL    NEUTROPHILS 69 32 - 75 %    LYMPHOCYTES 21 12 - 49 %    MONOCYTES 8 5 - 13 %    EOSINOPHILS 2 0 - 7 %    BASOPHILS 0 0 - 1 %    ABS. NEUTROPHILS 4.0 1.8 - 8.0 K/UL    ABS. LYMPHOCYTES 1.2 0.8 - 3.5 K/UL    ABS. MONOCYTES 0.5 0.0 - 1.0 K/UL    ABS. EOSINOPHILS 0.1 0.0 - 0.4 K/UL    ABS.  BASOPHILS 0.0 0.0 - 0.1 K/UL   METABOLIC PANEL, BASIC    Collection Time: 05/26/17  9:27 AM   Result Value Ref Range    Sodium 140 136 - 145 mmol/L    Potassium 3.2 (L) 3.5 - 5.1 mmol/L    Chloride 108 97 - 108 mmol/L    CO2 23 21 - 32 mmol/L    Anion gap 9 5 - 15 mmol/L    Glucose 253 (H) 65 - 100 mg/dL    BUN 7 6 - 20 MG/DL    Creatinine 0.82 0.55 - 1.02 MG/DL    BUN/Creatinine ratio 9 (L) 12 - 20      GFR est AA >60 >60 ml/min/1.73m2    GFR est non-AA >60 >60 ml/min/1.73m2    Calcium 8.7 8.5 - 10.1 MG/DL   HEPATIC FUNCTION PANEL    Collection Time: 05/26/17  9:27 AM   Result Value Ref Range    Protein, total 6.1 (L) 6.4 - 8.2 g/dL    Albumin 3.2 (L) 3.5 - 5.0 g/dL    Globulin 2.9 2.0 - 4.0 g/dL    A-G Ratio 1.1 1.1 - 2.2      Bilirubin, total 1.1 (H) 0.2 - 1.0 MG/DL    Bilirubin, direct 0.3 (H) 0.0 - 0.2 MG/DL    Alk. phosphatase 91 45 - 117 U/L    AST (SGOT) 13 (L) 15 - 37 U/L    ALT (SGPT) 27 12 - 78 U/L   IMMUNOGLOBULINS, G/A/M, QT.     Collection Time: 05/26/17  9:27 AM   Result Value Ref Range    Immunoglobulin G 440 (L) 700 - 1600 mg/dL    Immunoglobulin A 43 (L) 70 - 400 mg/dL    Immunoglobulin M <21 (L) 40 - 230 mg/dL

## 2017-05-30 LAB
ALBUMIN SERPL ELPH-MCNC: 3.2 G/DL (ref 2.9–4.4)
ALBUMIN/GLOB SERPL: 1.3 {RATIO} (ref 0.7–1.7)
ALPHA1 GLOB SERPL ELPH-MCNC: 0.2 G/DL (ref 0–0.4)
ALPHA2 GLOB SERPL ELPH-MCNC: 0.9 G/DL (ref 0.4–1)
B-GLOBULIN SERPL ELPH-MCNC: 0.9 G/DL (ref 0.7–1.3)
GAMMA GLOB SERPL ELPH-MCNC: 0.3 G/DL (ref 0.4–1.8)
GLOBULIN SER CALC-MCNC: 2.4 G/DL (ref 2.2–3.9)
KAPPA LC FREE SER-MCNC: 10.39 MG/L (ref 3.3–19.4)
KAPPA LC FREE/LAMBDA FREE SER: 0.12 {RATIO} (ref 0.26–1.65)
LAMBDA LC FREE SERPL-MCNC: 84.28 MG/L (ref 5.71–26.3)
M PROTEIN SERPL ELPH-MCNC: ABNORMAL G/DL
PROT SERPL-MCNC: 5.6 G/DL (ref 6–8.5)

## 2017-05-31 LAB
IGA SERPL-MCNC: 53 MG/DL (ref 87–352)
IGG SERPL-MCNC: 427 MG/DL (ref 700–1600)
IGM SERPL-MCNC: 12 MG/DL (ref 26–217)
PROT PATTERN SERPL IFE-IMP: ABNORMAL

## 2017-06-02 ENCOUNTER — OFFICE VISIT (OUTPATIENT)
Dept: ONCOLOGY | Age: 57
End: 2017-06-02

## 2017-06-02 ENCOUNTER — HOSPITAL ENCOUNTER (OUTPATIENT)
Dept: INFUSION THERAPY | Age: 57
Discharge: HOME | End: 2017-06-02
Payer: COMMERCIAL

## 2017-06-02 ENCOUNTER — TELEPHONE (OUTPATIENT)
Dept: FAMILY MEDICINE CLINIC | Age: 57
End: 2017-06-02

## 2017-06-02 VITALS
OXYGEN SATURATION: 100 % | HEIGHT: 64 IN | DIASTOLIC BLOOD PRESSURE: 83 MMHG | WEIGHT: 185 LBS | RESPIRATION RATE: 16 BRPM | SYSTOLIC BLOOD PRESSURE: 162 MMHG | HEART RATE: 76 BPM | BODY MASS INDEX: 31.58 KG/M2 | TEMPERATURE: 96.7 F

## 2017-06-02 VITALS
SYSTOLIC BLOOD PRESSURE: 139 MMHG | HEART RATE: 79 BPM | RESPIRATION RATE: 20 BRPM | BODY MASS INDEX: 31.58 KG/M2 | TEMPERATURE: 97.7 F | HEIGHT: 64 IN | OXYGEN SATURATION: 99 % | DIASTOLIC BLOOD PRESSURE: 88 MMHG | WEIGHT: 185 LBS

## 2017-06-02 DIAGNOSIS — E11.9 CONTROLLED TYPE 2 DIABETES MELLITUS WITHOUT COMPLICATION, WITHOUT LONG-TERM CURRENT USE OF INSULIN (HCC): ICD-10-CM

## 2017-06-02 DIAGNOSIS — I42.9 CARDIOMYOPATHY, UNSPECIFIED TYPE (HCC): ICD-10-CM

## 2017-06-02 DIAGNOSIS — C90.00 MULTIPLE MYELOMA NOT HAVING ACHIEVED REMISSION (HCC): Primary | ICD-10-CM

## 2017-06-02 DIAGNOSIS — G62.9 NEUROPATHY: ICD-10-CM

## 2017-06-02 LAB
BASOPHILS # BLD AUTO: 0 K/UL (ref 0–0.1)
BASOPHILS # BLD: 0 % (ref 0–1)
EOSINOPHIL # BLD: 0.1 K/UL (ref 0–0.4)
EOSINOPHIL NFR BLD: 3 % (ref 0–7)
ERYTHROCYTE [DISTWIDTH] IN BLOOD BY AUTOMATED COUNT: 16.1 % (ref 11.5–14.5)
HCT VFR BLD AUTO: 37 % (ref 35–47)
HGB BLD-MCNC: 12.5 G/DL (ref 11.5–16)
LYMPHOCYTES # BLD AUTO: 25 % (ref 12–49)
LYMPHOCYTES # BLD: 1.2 K/UL (ref 0.8–3.5)
MCH RBC QN AUTO: 31.1 PG (ref 26–34)
MCHC RBC AUTO-ENTMCNC: 33.8 G/DL (ref 30–36.5)
MCV RBC AUTO: 92 FL (ref 80–99)
MONOCYTES # BLD: 0.8 K/UL (ref 0–1)
MONOCYTES NFR BLD AUTO: 15 % (ref 5–13)
NEUTS SEG # BLD: 2.9 K/UL (ref 1.8–8)
NEUTS SEG NFR BLD AUTO: 57 % (ref 32–75)
PLATELET # BLD AUTO: 244 K/UL (ref 150–400)
RBC # BLD AUTO: 4.02 M/UL (ref 3.8–5.2)
WBC # BLD AUTO: 5 K/UL (ref 3.6–11)

## 2017-06-02 PROCEDURE — 85025 COMPLETE CBC W/AUTO DIFF WBC: CPT | Performed by: INTERNAL MEDICINE

## 2017-06-02 PROCEDURE — 96401 CHEMO ANTI-NEOPL SQ/IM: CPT

## 2017-06-02 PROCEDURE — 74011250636 HC RX REV CODE- 250/636

## 2017-06-02 PROCEDURE — 74011250636 HC RX REV CODE- 250/636: Performed by: INTERNAL MEDICINE

## 2017-06-02 PROCEDURE — 74011000250 HC RX REV CODE- 250: Performed by: INTERNAL MEDICINE

## 2017-06-02 PROCEDURE — 36415 COLL VENOUS BLD VENIPUNCTURE: CPT | Performed by: INTERNAL MEDICINE

## 2017-06-02 RX ORDER — METFORMIN HYDROCHLORIDE 500 MG/1
TABLET ORAL
Refills: 12 | COMMUNITY
Start: 2017-05-17 | End: 2018-02-19 | Stop reason: ALTCHOICE

## 2017-06-02 RX ADMIN — BORTEZOMIB 2 MG: 3.5 INJECTION, POWDER, LYOPHILIZED, FOR SOLUTION INTRAVENOUS; SUBCUTANEOUS at 11:08

## 2017-06-02 NOTE — PROGRESS NOTES
Hematology follow up    REASON FOR VISIT: Multiple Myeloma  REQUESTED BY: Dr. Cristina Liz: Ms. Marquis Toledo is a 60 y.o. female with DM and newly diagnosed Multiple Myeloma here to continue Cycle 4 of VRD. Oncologic history  Patient had left facial numbness which started in the summer of 2016. This started abruptly and was not associated with rashes, pain, jaw weakness. She has had some intermittent hyperemia of the L eye. No tearing. She has been evaluated by Dr. Thais Dempsey with Neurology and an extensive work up was only notable for presence of a 0.3 g/dl M spike. Other tests were unremarkable: Vitamin B12 411, thyroid function test normal, ACE 25, BHARATI normal CBC normal, CMP normal, CRP 1.17, CT head and cervical spine unremarkable. Further evaluation revealed findings consistent with Multiple Myeloma    CBC 2/24 Unremarkable. Kappa 17 mg/dl, Lambda 2416 (ratio 0.01), SPEP 0.2 g/dl M spike, HANSEL showed monoclonal free lambda light chains, UPEP negative, Beta 2 Microglobulin 1.8 mg/L, Skeletal survey negative for lytic lesions, Bone marrow biopsy on 2/24/17 showed a Normocellular marrow with mild monoclonal plasmacytosis (15-20%). Trilineage hematopoiesis with maturation. Treatment- Palliative  Pretreatment FLC : Kappa 17.9: Lambda 1978: 0.01  3/24/17: VRD Cycle 1   FLC : Kappa 10.9: Lambda 266 : 0.04  4/17/17: VRD Cycle 2 (switched to weekly Velcade)  FLC : Kappa 9.3: Lambda 73 : 0.13  5/5/17:  VRD Cycle 3  FLC : Kappa 9.3: Lambda 84 : 0.12  6/2/17: Cycle 4 with dose reduction in revlimid to 20 mg due to cramps.       Past Medical History:   Diagnosis Date    Cardiomyopathy     Diabetes mellitus type 2, controlled ([address] 12/10/2015    Heart failure (HCC)     Hyperlipidemia     Hypertension     controlled    Multiple myeloma ([address]     Orthostatic hypotension     Secondary cardiomyopathy, unspecified     Sinoatrial node dysfunction ([address]     Vitamin B12 deficiency 3/31/2010 Past Surgical History:   Procedure Laterality Date    HX HYSTERECTOMY         Allergies   Allergen Reactions    Ace Inhibitors Cough    Metformin Other (comments)     Blurred  vision       Current Outpatient Prescriptions   Medication Sig Dispense Refill    dexamethasone (DECADRON) 4 mg tablet TAKE 10 TABLETS BY MOUTH ONCE A WEEK ON DAYS 1, 8 AND 15 WITH CHEMOTHERAPY  2    REVLIMID 25 mg cap       glipiZIDE (GLUCOTROL) 5 mg tablet Take 1 Tab by mouth two (2) times a day. 60 Tab 12    lenalidomide (REVLIMID) 20 mg cap Take 20 mg by mouth daily. 2 weeks on/1 week off  Indications: MULTIPLE MYELOMA 14 Cap 0    BORTEZOMIB INJECTION 2.6 mg by Injection route. Indications: SQ Day 1, 4, 8, 11 of each 21 Day Cycle.  pantoprazole (PROTONIX) 40 mg tablet Take 1 Tab by mouth daily. 30 Tab 6    acyclovir (ZOVIRAX) 400 mg tablet Take 1 Tab by mouth two (2) times a day. Indications: PREVENTION OF HERPES ZOSTER IN IMMUNOCOMPROMISED PATIENT 60 Tab 3    dexamethasone (DECADRON) 4 mg tablet Take 40mg (ten tablets) PO weekly on Days 1, 8, and 15 with chemotherapy  Indications: MULTIPLE MYELOMA 60 Tab 2    atorvastatin (LIPITOR) 40 mg tablet Take 1 Tab by mouth daily. For cholesterol 30 Tab 12    carvedilol (COREG) 12.5 mg tablet TAKE 1 TABLET BY MOUTH TWICE A DAY WITH MEALS 180 Tab 3    valsartan (DIOVAN) 40 mg tablet TAKE 1 TABLET BY MOUTH EVERY DAY 90 Tab 3    metFORMIN (GLUCOPHAGE) 500 mg tablet TAKE 1 TABLET TWICE A DAY WITH MEALS  12    dexamethasone (DECADRON) 4 mg tablet Take 5 Tabs by mouth daily for 6 days. 30 Tab 0    prochlorperazine (COMPAZINE) 10 mg tablet Take 5 mg by mouth every six (6) hours as needed.  sodium chloride (YULIA 128) 2 % ophthalmic solution Administer 2 Drops to both eyes two (2) times a day. 15 mL 12    Cholecalciferol, Vitamin D3, (VITAMIN D3) 1,000 unit cap Take  by mouth daily.  calcium 500 mg Tab Take 600 mg by mouth daily.       cyanocobalamin (VITAMIN B-12) 1,000 mcg tablet Take 2,000 mcg by mouth daily. Facility-Administered Medications Ordered in Other Visits   Medication Dose Route Frequency Provider Last Rate Last Dose    bortezomib (VELCADE) 2.6 mg in sodium chloride 0.9 % SQ chemo syringe  2.6 mg SubCUTAneous ONCE Inés Friend MD           Social History     Social History    Marital status:      Spouse name: N/A    Number of children: N/A    Years of education: N/A     Social History Main Topics    Smoking status: Never Smoker    Smokeless tobacco: Never Used    Alcohol use No    Drug use: No    Sexual activity: Yes     Partners: Male     Other Topics Concern    None     Social History Narrative    , 2 children, 28 and 31. Works at [employer], for 35 years. 5 grandchildren       Family History   Problem Relation Age of Onset   [facility] [place] Cancer Mother     Heart Disease Mother      pacemaker   [facility] [place] Diabetes Mother     Hypertension Sister     Hypertension Brother     Diabetes Brother     Hypertension Sister     Hypertension Sister     Hypertension Sister     Hypertension Sister     Diabetes Sister        ROS    Tolerating treatment. No nausea or vomiting. No leg swelling, no CP, SOB, numbness, headache, diarrhea. Has some constipation. Facial numbness is better. She does note intermittent numbness on finger tips. Now with persistent tingling in her toes with occasional numbness. Feet cramps resolved . Has altered taste. No leg swelling.    ECOG PS is 0      Physical Examination:   Visit Vitals    /88    Pulse 79    Temp 97.7 °F (36.5 °C)    Resp 20    Ht 5' 4\" (1.626 m)    Wt 185 lb (83.9 kg)    SpO2 99%    BMI 31.76 kg/m2     General appearance - alert, well appearing, and in no distress  Mental status - oriented to person, place, and time  Mouth - mucous membranes moist, pharynx normal without lesions  Lymphatics - no palpable lymphadenopathy, no hepatosplenomegaly  Chest - clear to auscultation, no wheezes, rales or rhonchi, symmetric air entry  Heart - normal rate, regular rhythm, normal S1, S2, no murmurs, rubs, clicks or gallops  Abdomen - soft, nontender, nondistended, no masses or organomegaly, bowel sounds present  Ext: No edema    LABS  Lab Results   Component Value Date/Time    WBC 5.8 05/26/2017 09:27 AM    HGB 12.2 05/26/2017 09:27 AM    HCT 36.1 05/26/2017 09:27 AM    PLATELET 976 83/25/1432 09:27 AM    MCV 90.3 05/26/2017 09:27 AM    ABS. NEUTROPHILS 4.0 05/26/2017 09:27 AM     Lab Results   Component Value Date/Time    Sodium 140 05/26/2017 09:27 AM    Potassium 3.2 05/26/2017 09:27 AM    Chloride 108 05/26/2017 09:27 AM    CO2 23 05/26/2017 09:27 AM    Glucose 253 05/26/2017 09:27 AM    BUN 7 05/26/2017 09:27 AM    Creatinine 0.82 05/26/2017 09:27 AM    GFR est AA >60 05/26/2017 09:27 AM    GFR est non-AA >60 05/26/2017 09:27 AM    Calcium 8.7 05/26/2017 09:27 AM     Lab Results   Component Value Date/Time    AST (SGOT) 13 05/26/2017 09:27 AM    Alk. phosphatase 91 05/26/2017 09:27 AM    Protein, total 6.1 05/26/2017 09:27 AM    Protein, total 5.6 05/26/2017 09:27 AM    Albumin 3.2 05/26/2017 09:27 AM    Globulin 2.9 05/26/2017 09:27 AM    A-G Ratio 1.1 05/26/2017 09:27 AM       Bone marrow biopsy reviewed    FISH molecular analysis:    Positive for IgH gene rearrangement (IgH complex FISH study pending); Normal results with 1, 5, 9, 13, 15, and 17 (TP53) probe sets. ASSESSMENT  Ms. Girish Brady is a 60 y.o. female with  1. Multiple Myeloma by revised IMWG criteria (Lancet Oncol 2014), due to Involved : uninvolved serum free light chain ratio > 100. Has no anemia, hypercalcemia, bone lesions or renal failure. Likely low risk  2. 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 Poorly controlled DM. 4. Grade 2 finger and toe neuropathy sec to Velcade  5. Grade 1 rash sec to Revlimid: Resolved  6.  Grade 2-3 muscle cramps Revlimid    Started palliative VRD Cycle 1 on 3/24/17    She is here for Cycle 4 day 1 today. Seen by Dr. Melissa Stewart at [facility]. Planning autotransplant after about 4-5 cycles. Dose reduced Revlimid for cycle 4, due to cramps and will hold for cycle 5. Dose reduced Velcade 1 mg/m2 given neuropathy. Has had a VGPR after 3 cycles. However response is now plateuing. Will discuss with Dr. Tang Roman    - Proceed with Cycle 4 day 1 Velcade, Revlimid and Dexamethasone with dose reductions as above  - RTC per protocol for days 8, 15, 22  - RTC to see me on D8 this cycle  - Myeloma markers on day 1 of every cycle  - ASA daily and Acyclovir BID   - Compazine prn nausea  - Protonix while on Decadron  - Counseled to call with leg pain or swelling  - Follow with [facility], Dr. Melissa Stewart    I will call Dr. Melissa Stewart to discuss current response, may need change in therapy if a deeper response is needed  (KPD).  If ok with current response will ot give Revlimid for cycle 5     RTC Day 8    Jose Novak MD

## 2017-06-02 NOTE — PROGRESS NOTES
Outpatient Infusion Center - Chemotherapy Progress Note    0900 Pt admit to [facility] for Velcade/C4D8 ambulatory in stable condition. Assessment completed. No new concerns voiced. Labs drawn peripherally and sent for processing. Pt upstairs to MD appt. [address] EVELINE Rene [facility] resulted, WNL; awaiting pt's return    1015 Return to [facility]; Notified by Scout Lamas, s/w ELIZABETH Velasquez, updated dose reduced orders to be faxed    Visit Vitals    /83    Pulse 76    Temp 96.7 °F (35.9 °C)    Resp 16    Ht 5' 4\" (1.626 m)    Wt 83.9 kg (185 lb)    SpO2 100%    Breastfeeding No    BMI 31.76 kg/m2       Medications:  Velcade (SQ R abd)    1115 Pt tolerated treatment well. D/c home ambulatory in no distress. Pt aware of next [facility] appointment scheduled for 06/09/2017. Recent Results (from the past 12 hour(s))   CBC WITH AUTOMATED DIFF    Collection Time: 06/02/17  9:07 AM   Result Value Ref Range    WBC 5.0 3.6 - 11.0 K/uL    RBC 4.02 3.80 - 5.20 M/uL    HGB 12.5 11.5 - 16.0 g/dL    HCT 37.0 35.0 - 47.0 %    MCV 92.0 80.0 - 99.0 FL    MCH 31.1 26.0 - 34.0 PG    MCHC 33.8 30.0 - 36.5 g/dL    RDW 16.1 (H) 11.5 - 14.5 %    PLATELET 833 539 - 784 K/uL    NEUTROPHILS 57 32 - 75 %    LYMPHOCYTES 25 12 - 49 %    MONOCYTES 15 (H) 5 - 13 %    EOSINOPHILS 3 0 - 7 %    BASOPHILS 0 0 - 1 %    ABS. NEUTROPHILS 2.9 1.8 - 8.0 K/UL    ABS. LYMPHOCYTES 1.2 0.8 - 3.5 K/UL    ABS. MONOCYTES 0.8 0.0 - 1.0 K/UL    ABS. EOSINOPHILS 0.1 0.0 - 0.4 K/UL    ABS.  BASOPHILS 0.0 0.0 - 0.1 K/UL

## 2017-06-05 ENCOUNTER — OFFICE VISIT (OUTPATIENT)
Dept: FAMILY MEDICINE CLINIC | Age: 57
End: 2017-06-05

## 2017-06-05 VITALS
BODY MASS INDEX: 32.27 KG/M2 | HEART RATE: 79 BPM | TEMPERATURE: 97.7 F | DIASTOLIC BLOOD PRESSURE: 84 MMHG | HEIGHT: 64 IN | RESPIRATION RATE: 14 BRPM | OXYGEN SATURATION: 93 % | SYSTOLIC BLOOD PRESSURE: 148 MMHG | WEIGHT: 189 LBS

## 2017-06-05 DIAGNOSIS — E11.9 CONTROLLED TYPE 2 DIABETES MELLITUS WITHOUT COMPLICATION, WITHOUT LONG-TERM CURRENT USE OF INSULIN (HCC): Primary | ICD-10-CM

## 2017-06-05 LAB — GLUCOSE POC: 181 MG/DL

## 2017-06-05 NOTE — PROGRESS NOTES
Chief Complaint   Patient presents with    Blurred Vision     after metformin . pt stopped Metformin      1. Have you been to the ER, urgent care clinic since your last visit? Hospitalized since your last visit? No    2. Have you seen or consulted any other health care providers outside of the [address] [place] [address] since your last visit? Include any pap smears or colon screening.  No     Health Maintenance Due   Topic Date Due    COLONOSCOPY  09/20/1976    Pneumococcal 19-64 Highest Risk (1 of 3 - PCV13) 09/20/1977    PAP AKA CERVICAL CYTOLOGY  04/02/2015    EYE EXAM RETINAL OR DILATED Q1  04/15/2017    FOOT EXAM Q1  05/17/2017

## 2017-06-05 NOTE — PROGRESS NOTES
HISTORY OF PRESENT ILLNESS  Courtney Gonzales is a 60 y.o. female. HPI Started on Metformin, eyesight got blurred, stopped taking it last Thursday, but is still having blurred vision. Was just dxed one week ago with diabetes, also having polydipsia and polyuria. ROS    Physical Exam   Constitutional: She is oriented to person, place, and time. She appears well-developed and well-nourished. Neck: No thyromegaly present. Cardiovascular: Normal rate, regular rhythm and normal heart sounds. No murmur heard. Pulmonary/Chest: Effort normal and breath sounds normal. She has no wheezes. Abdominal: Soft. Bowel sounds are normal. She exhibits no distension. There is no tenderness. There is no rebound and no guarding. Musculoskeletal: Normal range of motion. She exhibits no edema. Lymphadenopathy:     She has no cervical adenopathy. Neurological: She is alert and oriented to person, place, and time. Nursing note and vitals reviewed. ASSESSMENT and PLAN  Orders Placed This Encounter    AMB POC GLUCOSE BLOOD, BY GLUCOSE MONITORING DEVICE     Scot Wagner was seen today for blurred vision. Diagnoses and all orders for this visit:    Controlled type 2 diabetes mellitus without complication, without long-term current use of insulin ([facility])  -     AMB POC GLUCOSE BLOOD, BY GLUCOSE MONITORING DEVICE      Follow-up Disposition:  Return in about 4 weeks (around 7/3/2017).

## 2017-06-05 NOTE — MR AVS SNAPSHOT
Visit Information Date & Time Provider Department Dept. Phone Encounter #  
 6/5/2017 11:30 AM Mary Marin MD [facility] [phone] [id number] Your Appointments 6/7/2017  9:00 AM  
ESTABLISHED PATIENT with Salvatore Muse MD  
[facility]) Appt Note: 6 [place]. F/U  
 [address] 1400 [address] [place], [place] [phone] [address]  
  
    
 6/15/2017  1:30 PM  
PACEMAKER with PACEMAKER, [facility] [address]) Appt Note: PM check b4 Jacinta Class [address]  
[phone] [address]  
  
    
 6/15/2017  1:45 PM  
ANNUAL with Amy Sutherland NP [facility] [address] [place] [address] Road) Appt Note: PM check b4 Jacinta Class [address]  
[phone] [address]  
  
    
 6/19/2017  9:30 AM  
ROUTINE CARE with Mary Marin MD  
[address]) Appt Note: f/u  
 [address] [facility] [phone]  
[phone] [address] [phone]  
  
    
  
 8/23/2017  8:00 AM  
REMOTE OFFICE VISIT with [facility] [address] [facility]) Appt Note: NOT AN OFFICE VISIT - REMOTE BSC BIV ICD  
 [address]  
[phone] [address] Upcoming Health Maintenance Date Due COLONOSCOPY 9/20/1976 Pneumococcal 19-64 Highest Risk (1 of 3 - PCV13) 9/20/1977 PAP AKA CERVICAL CYTOLOGY 4/2/2015 EYE EXAM RETINAL OR DILATED Q1 4/15/2017 FOOT EXAM Q1 5/17/2017 INFLUENZA AGE 9 TO ADULT 8/1/2017 HEMOGLOBIN A1C Q6M 11/17/2017 BREAST CANCER SCRN MAMMOGRAM 3/8/2018 GLAUCOMA SCREENING Q2Y 4/26/2018 MICROALBUMIN Q1 5/17/2018 LIPID PANEL Q1 5/17/2018 DTaP/Tdap/Td series (2 - Td) 5/17/2026 Allergies as of 6/5/2017  Review Complete On: 6/5/2017 By: Mary Redmond MD  
  
 Severity Noted Reaction Type Reactions Ace Inhibitors  08/06/2010    Cough Metformin  06/01/2017    Other (comments) Blurred  vision Current Immunizations  Reviewed on 6/2/2017 No immunizations on file. Not reviewed this visit You Were Diagnosed With   
  
 Codes Comments Controlled type 2 diabetes mellitus without complication, without long-term current use of insulin ([facility]    -  Primary ICD-10-CM: E11.9 ICD-9-CM: 250.00 Vitals BP Pulse Temp Resp Height(growth percentile) Weight(growth percentile) 148/84 79 97.7 °F (36.5 °C) (Oral) 14 5' 4\" (1.626 m) 189 lb (85.7 kg) SpO2 BMI OB Status Smoking Status 93% 32.44 kg/m2 Hysterectomy Never Smoker Vitals History BMI and BSA Data Body Mass Index Body Surface Area  
 32.44 kg/m 2 1.97 m 2 Preferred Pharmacy Pharmacy Name Phone [facility] [address] [place] [address] [phone] Your Updated Medication List  
  
   
This list is accurate as of: 6/5/17  1:22 PM.  Always use your most recent med list.  
  
  
  
  
 acyclovir 400 mg tablet Commonly known as:  ZOVIRAX Take 1 Tab by mouth two (2) times a day. Indications: PREVENTION OF HERPES ZOSTER IN IMMUNOCOMPROMISED PATIENT  
  
 atorvastatin 40 mg tablet Commonly known as:  LIPITOR Take 1 Tab by mouth daily. For cholesterol BORTEZOMIB INJECTION  
2.6 mg by Injection route. Indications: SQ Day 1, 4, 8, 11 of each 21 Day Cycle. calcium 500 mg Tab Take 600 mg by mouth daily. carvedilol 12.5 mg tablet Commonly known as:  COREG  
TAKE 1 TABLET BY MOUTH TWICE A DAY WITH MEALS  
  
 * dexamethasone 4 mg tablet Commonly known as:  DECADRON Take 40mg (ten tablets) PO weekly on Days 1, 8, and 15 with chemotherapy  Indications: MULTIPLE MYELOMA * dexamethasone 4 mg tablet Commonly known as:  DECADRON Take 5 Tabs by mouth daily for 6 days. * dexamethasone 4 mg tablet Commonly known as:  DECADRON  
TAKE 10 TABLETS BY MOUTH ONCE A WEEK ON DAYS 1, 8 AND 15 WITH CHEMOTHERAPY  
  
 glipiZIDE 5 mg tablet Commonly known as:  Thamas Sida Take 1 Tab by mouth two (2) times a day. * REVLIMID 25 mg Cap Generic drug:  lenalidomide * lenalidomide 20 mg Cap Commonly known as:  REVLIMID Take 20 mg by mouth daily. 2 weeks on/1 week off  Indications: MULTIPLE MYELOMA  
  
 metFORMIN 500 mg tablet Commonly known as:  GLUCOPHAGE  
TAKE 1 TABLET TWICE A DAY WITH MEALS  
  
 pantoprazole 40 mg tablet Commonly known as:  PROTONIX Take 1 Tab by mouth daily. prochlorperazine 10 mg tablet Commonly known as:  COMPAZINE Take 5 mg by mouth every six (6) hours as needed. sodium chloride 2 % ophthalmic solution Commonly known as:  YULIA 128 Administer 2 Drops to both eyes two (2) times a day. valsartan 40 mg tablet Commonly known as:  DIOVAN  
TAKE 1 TABLET BY MOUTH EVERY DAY  
  
 VITAMIN B-12 1,000 mcg tablet Generic drug:  cyanocobalamin Take 2,000 mcg by mouth daily. VITAMIN D3 1,000 unit Cap Generic drug:  cholecalciferol Take  by mouth daily. * Notice: This list has 5 medication(s) that are the same as other medications prescribed for you. Read the directions carefully, and ask your doctor or other care provider to review them with you. We Performed the Following AMB POC GLUCOSE BLOOD, BY GLUCOSE MONITORING DEVICE [87212 CPT(R)] To-Do List   
 06/09/2017 9:00 AM  
  Appointment with  [address] ([phone])  
  
 06/12/2017 3:20 PM  
  Appointment with [facility] 1 at [address] [place] [address] [place] ([phone]) Shower or bathe using soap and water. Do not use deodorant, powder, perfumes, or lotion the day of your exam.  If your prior mammograms were not performed at [facility] 6 please bring films with you or forward prior images 2 days before your procedure. Check in at registration 15min before your appointment time unless you were instructed to do otherwise. A script is not necessary, but if you have one, please bring it on the day of the mammogram or have it faxed to the department. [facility] [phone] [facility]  [phone] [facility] [address] [place]  [phone] [facility] [phone] [facility] 7321 [facility] [phone] Introducing [facility] SERVICES! Ulisses Sanderson introduces tuul patient portal. Now you can access parts of your medical record, email your doctor's office, and request medication refills online. 1. In your internet browser, go to [email or web address] 2. Click on the First Time User? Click Here link in the Sign In box. You will see the New Member Sign Up page. 3. Enter your tuul Access Code exactly as it appears below. You will not need to use this code after youve completed the sign-up process. If you do not sign up before the expiration date, you must request a new code. · tuul Access Code: [email or web address] Expires: 8/24/2017  3:25 PM 
 
4. Enter the last four digits of your Social Security Number (xxxx) and Date of Birth (mm/dd/yyyy) as indicated and click Submit. You will be taken to the next sign-up page. 5. Create a KlickExt ID. This will be your tuul login ID and cannot be changed, so think of one that is secure and easy to remember. 6. Create a tuul password. You can change your password at any time. 7. Enter your Password Reset Question and Answer. This can be used at a later time if you forget your password. 8. Enter your e-mail address. You will receive e-mail notification when new information is available in [address] 9. Click Sign Up. You can now view and download portions of your medical record. 10. Click the Download Summary menu link to download a portable copy of your medical information. If you have questions, please visit the Frequently Asked Questions section of the [employer] website. Remember, [employer] is NOT to be used for urgent needs. For medical emergencies, dial 911. Now available from your iPhone and Android! Please provide this summary of care documentation to your next provider. Your primary care clinician is listed as Eleonora Bledsoe. If you have any questions after today's visit, please call [phone].

## 2017-06-07 ENCOUNTER — OFFICE VISIT (OUTPATIENT)
Dept: CARDIOLOGY CLINIC | Age: 57
End: 2017-06-07

## 2017-06-07 VITALS
WEIGHT: 187 LBS | HEART RATE: 82 BPM | OXYGEN SATURATION: 97 % | DIASTOLIC BLOOD PRESSURE: 90 MMHG | HEIGHT: 64 IN | SYSTOLIC BLOOD PRESSURE: 170 MMHG | BODY MASS INDEX: 31.92 KG/M2

## 2017-06-07 DIAGNOSIS — E11.9 CONTROLLED TYPE 2 DIABETES MELLITUS WITHOUT COMPLICATION, WITH LONG-TERM CURRENT USE OF INSULIN (HCC): ICD-10-CM

## 2017-06-07 DIAGNOSIS — I50.22 CHRONIC SYSTOLIC HEART FAILURE (HCC): ICD-10-CM

## 2017-06-07 DIAGNOSIS — E78.2 MIXED HYPERLIPIDEMIA: ICD-10-CM

## 2017-06-07 DIAGNOSIS — I10 ESSENTIAL HYPERTENSION: Primary | ICD-10-CM

## 2017-06-07 DIAGNOSIS — Z79.4 CONTROLLED TYPE 2 DIABETES MELLITUS WITHOUT COMPLICATION, WITH LONG-TERM CURRENT USE OF INSULIN (HCC): ICD-10-CM

## 2017-06-07 DIAGNOSIS — I44.7 OTHER LEFT BUNDLE BRANCH BLOCK: ICD-10-CM

## 2017-06-07 RX ORDER — ASPIRIN 325 MG
325 TABLET ORAL DAILY
COMMUNITY
End: 2017-12-12 | Stop reason: ALTCHOICE

## 2017-06-07 NOTE — MR AVS SNAPSHOT
Visit Information Date & Time Provider Department Dept. Phone Encounter #  
 6/7/2017  9:00 AM Rodolfo Marshall MD [facility] at [facility]  Your Appointments 6/8/2017  8:00 AM  
Any with Aurelianoelizabeth Rodriguezanna, [address] [facility]) Appt Note: education [address] [facility] [place] [facility] [phone]  
[phone] [address] [facility] [address] 6/15/2017  1:30 PM  
PACEMAKER with PACEMAKER, [address] [facility]) Appt Note: PM check b4 [place] [address] [facility] [address]  
[phone] [address] [facility] [address]  
  
    
 6/15/2017  1:45 PM  
ANNUAL with Anibal Barrera NP [facility]) Appt Note: PM check b4 [place] Tg [address] [facility] [address]  
[phone] [address] [facility] [address]  
  
    
 6/19/2017  9:30 AM  
ROUTINE CARE with Marla Marquez MD  
[facility]) Appt Note: f/u  
 [address] [facility] [phone]  
[phone] [address] [place] [id number]  
  
    
  
 8/23/2017  8:00 AM  
REMOTE OFFICE VISIT with [facility]) Appt Note: NOT AN OFFICE VISIT - REMOTE BSC BIV ICD  
 [facility] [facility] [address]  
[phone] [address] [facility] [address] Upcoming Health Maintenance Date Due COLONOSCOPY 9/20/1976 Pneumococcal 19-64 Highest Risk (1 of 3 - PCV13) 9/20/1977 PAP AKA CERVICAL CYTOLOGY 4/2/2015 EYE EXAM RETINAL OR DILATED Q1 4/15/2017 FOOT EXAM Q1 5/17/2017 INFLUENZA AGE 9 TO ADULT 8/1/2017 HEMOGLOBIN A1C Q6M 11/17/2017 BREAST CANCER SCRN MAMMOGRAM 3/8/2018 GLAUCOMA SCREENING Q2Y 4/26/2018 MICROALBUMIN Q1 5/17/2018 LIPID PANEL Q1 5/17/2018 DTaP/Tdap/Td series (2 - Td) 5/17/2026 Allergies as of 6/7/2017  Review Complete On: 6/5/2017 By: Jey Tarango MD  
  
 Severity Noted Reaction Type Reactions Ace Inhibitors  08/06/2010    Cough Metformin  06/01/2017    Other (comments) Blurred  vision Current Immunizations  Reviewed on 6/2/2017 No immunizations on file. Not reviewed this visit You Were Diagnosed With   
  
 Codes Comments Essential hypertension    -  Primary ICD-10-CM: I10 
ICD-9-CM: 401.9 Chronic systolic heart failure (HCC)     ICD-10-CM: I50.22 ICD-9-CM: 428.22 Mixed hyperlipidemia     ICD-10-CM: E78.2 ICD-9-CM: 272.2 Other left bundle branch block     ICD-10-CM: I44.7 ICD-9-CM: 426.3 Controlled type 2 diabetes mellitus without complication, with long-term current use of insulin (HCC)     ICD-10-CM: E11.9, Z79.4 ICD-9-CM: 250.00, V58.67 Vitals BP Pulse Height(growth percentile) Weight(growth percentile) SpO2 BMI  
 170/90 82 5' 4\" (1.626 m) 187 lb (84.8 kg) 97% 32.1 kg/m2 OB Status Smoking Status Hysterectomy Never Smoker Vitals History BMI and BSA Data Body Mass Index Body Surface Area  
 32.1 kg/m 2 1.96 m 2 Preferred Pharmacy Pharmacy Name Phone [facility] [address] [place] [address] [phone] Your Updated Medication List  
  
   
This list is accurate as of: 6/7/17  9:44 AM.  Always use your most recent med list.  
  
  
  
  
 acyclovir 400 mg tablet Commonly known as:  ZOVIRAX Take 1 Tab by mouth two (2) times a day. Indications: PREVENTION OF HERPES ZOSTER IN IMMUNOCOMPROMISED PATIENT  
  
 aspirin 325 mg tablet Commonly known as:  ASPIRIN Take 325 mg by mouth daily. atorvastatin 40 mg tablet Commonly known as:  LIPITOR Take 1 Tab by mouth daily. For cholesterol BORTEZOMIB INJECTION  
2.6 mg by Injection route. Indications: SQ Day 1, 4, 8, 11 of each 21 Day Cycle. calcium 500 mg Tab Take 600 mg by mouth daily. carvedilol 12.5 mg tablet Commonly known as:  COREG  
TAKE 1 TABLET BY MOUTH TWICE A DAY WITH MEALS  
  
 * dexamethasone 4 mg tablet Commonly known as:  DECADRON Take 40mg (ten tablets) PO weekly on Days 1, 8, and 15 with chemotherapy  Indications: MULTIPLE MYELOMA * dexamethasone 4 mg tablet Commonly known as:  DECADRON Take 5 Tabs by mouth daily for 6 days. glipiZIDE 5 mg tablet Commonly known as:  Arleta Papa Take 1 Tab by mouth two (2) times a day. lenalidomide 20 mg Cap Commonly known as:  REVLIMID Take 20 mg by mouth daily. 2 weeks on/1 week off  Indications: MULTIPLE MYELOMA  
  
 metFORMIN 500 mg tablet Commonly known as:  GLUCOPHAGE  
TAKE 1 TABLET TWICE A DAY WITH MEALS  
  
 pantoprazole 40 mg tablet Commonly known as:  PROTONIX Take 1 Tab by mouth daily. prochlorperazine 10 mg tablet Commonly known as:  COMPAZINE Take 5 mg by mouth every six (6) hours as needed. sodium chloride 2 % ophthalmic solution Commonly known as:  YULIA 128 Administer 2 Drops to both eyes two (2) times a day. valsartan 40 mg tablet Commonly known as:  DIOVAN  
TAKE 1 TABLET BY MOUTH EVERY DAY  
  
 VITAMIN B-12 1,000 mcg tablet Generic drug:  cyanocobalamin Take 2,000 mcg by mouth daily. VITAMIN D3 1,000 unit Cap Generic drug:  cholecalciferol Take  by mouth daily. * Notice: This list has 2 medication(s) that are the same as other medications prescribed for you. Read the directions carefully, and ask your doctor or other care provider to review them with you. We Performed the Following AMB POC EKG ROUTINE W/ 12 LEADS, INTER & REP [95290 CPT(R)] To-Do List   
 06/09/2017  9:00 AM  
  Appointment with [address] [facility] at [address] [place] [address] ([phone]) 06/12/2017 3:20 PM  
  Appointment with [facility] at [facility] ([phone]) Shower or bathe using soap and water. Do not use deodorant, powder, perfumes, or lotion the day of your exam.  If your prior mammograms were not performed at [facility] 6 please bring films with you or forward prior images 2 days before your procedure. Check in at registration 15min before your appointment time unless you were instructed to do otherwise. A script is not necessary, but if you have one, please bring it on the day of the mammogram or have it faxed to the department. [facility] [phone] [facility]  [phone] [address]  [phone] [facility] [phone] [address] [phone] Introducing [facility] SERVICES! Cal Armstrong introduces [employer] patient portal. Now you can access parts of your medical record, email your doctor's office, and request medication refills online. 1. In your internet browser, go to [email or web address] 2. Click on the First Time User? Click Here link in the Sign In box. You will see the New Member Sign Up page. 3. Enter your [employer] Access Code exactly as it appears below. You will not need to use this code after youve completed the sign-up process. If you do not sign up before the expiration date, you must request a new code. · [employer] Access Code: [email or web address] Expires: 8/24/2017  3:25 PM 
 
4. Enter the last four digits of your Social Security Number (xxxx) and Date of Birth (mm/dd/yyyy) as indicated and click Submit. You will be taken to the next sign-up page. 5. Create a [employer] ID. This will be your [employer] login ID and cannot be changed, so think of one that is secure and easy to remember. 6. Create a [employer] password. You can change your password at any time. 7. Enter your Password Reset Question and Answer. This can be used at a later time if you forget your password. 8. Enter your e-mail address. You will receive e-mail notification when new information is available in [address] 9. Click Sign Up. You can now view and download portions of your medical record. 10. Click the Download Summary menu link to download a portable copy of your medical information. If you have questions, please visit the Frequently Asked Questions section of the [employer] website. Remember, [employer] is NOT to be used for urgent needs. For medical emergencies, dial 911. Now available from your iPhone and Android! Please provide this summary of care documentation to your next provider. Your primary care clinician is listed as Alireza Reno. If you have any questions after today's visit, please call [phone].

## 2017-06-07 NOTE — PROGRESS NOTES
[facility]   [address] [facility], [address] [place] [address]                                          NEW PATIENT HPI/FOLLOW-UP      NAME:  Claire Bourne   :   1960   MRN:   [id number]   PCP:  Francesca Null MD           Subjective: The patient is a 60y.o. year old female h/o cardiomyopathy, DM type II, CHF, HTN, hyperlipidemia, multiple myeloma, AICD in situ who returns for a routine follow-up. Since the last visit, patient reports no new symptoms. Denies change in exercise tolerance, chest pain, edema, medication intolerance, palpitations, shortness of breath, PND/orthopnea wheezing, sputum, syncope, dizziness or light headedness. Doing satisfactorily. Review of Systems  General: Pt denies excessive weight gain or loss. Pt is able to conduct ADL's. Respiratory: Denies shortness of breath, BARRERA, wheezing or stridor.   Cardiovascular: Denies precordial pain, palpitations, edema or PND  Gastrointestinal: Denies poor appetite, indigestion, abdominal pain or blood in stool  Peripheral vascular: Denies claudication, leg cramps  Neuropsychiatric: Denies paresthesias,tingling,numbness,anxiety,depression,fatigue  Musculoskeletal: Denies pain,tenderness, soreness,swelling      Past Medical History:   Diagnosis Date    Cardiomyopathy     Diabetes mellitus type 2, controlled ([address] 12/10/2015    Heart failure ([address]     Hyperlipidemia     Hypertension     controlled    Multiple myeloma ([address]     Orthostatic hypotension     Secondary cardiomyopathy, unspecified     Sinoatrial node dysfunction ([address]     Vitamin B12 deficiency 3/31/2010     Patient Active Problem List    Diagnosis Date Noted    Multiple myeloma ([address]     Neuropathy 2017    Rash of face 2017    Controlled type 2 diabetes mellitus without complication, without long-term current use of insulin ([address] 2017    Multiple myeloma not having achieved remission ([address] 2017    Diabetes mellitus type 2, [facility] 12/10/2015    Essential hypertension 11/17/2015    Hyperlipidemia     AICD (automatic cardioverter/defibrillator) present 09/19/2013    Chronic systolic heart failure [facility] 04/09/2012    Obesity, unspecified 04/09/2012    Other left bundle branch block 04/09/2012    Cardiomyopathy ([facility] 03/28/2011    Vitamin B12 deficiency 03/31/2010      Past Surgical History:   Procedure Laterality Date    HX HYSTERECTOMY       Allergies   Allergen Reactions    Ace Inhibitors Cough    Metformin Other (comments)     Blurred  vision      Family History   Problem Relation Age of Onset    Cancer Mother     Heart Disease Mother      pacemaker    Diabetes Mother     Hypertension Sister     Hypertension Brother     Diabetes Brother     Hypertension Sister     Hypertension Sister     Hypertension Sister     Hypertension Sister     Diabetes Sister       Social History     Social History    Marital status:      Spouse name: N/A    Number of children: N/A    Years of education: N/A     Occupational History    Not on file. Social History Main Topics    Smoking status: Never Smoker    Smokeless tobacco: Never Used    Alcohol use No    Drug use: No    Sexual activity: Yes     Partners: Male     Other Topics Concern    Not on file     Social History Narrative    , 2 children, 29 and 31. Works at [employer], for 35 years. 5 grandchildren      Current Outpatient Prescriptions   Medication Sig    aspirin (ASPIRIN) 325 mg tablet Take 325 mg by mouth daily.  metFORMIN (GLUCOPHAGE) 500 mg tablet TAKE 1 TABLET TWICE A DAY WITH MEALS    glipiZIDE (GLUCOTROL) 5 mg tablet Take 1 Tab by mouth two (2) times a day.  lenalidomide (REVLIMID) 20 mg cap Take 20 mg by mouth daily. 2 weeks on/1 week off  Indications: MULTIPLE MYELOMA    BORTEZOMIB INJECTION 2.6 mg by Injection route. Indications: SQ Day 1, 4, 8, 11 of each 21 Day Cycle.     pantoprazole (PROTONIX) 40 mg tablet Take 1 Tab by mouth daily.  acyclovir (ZOVIRAX) 400 mg tablet Take 1 Tab by mouth two (2) times a day. Indications: PREVENTION OF HERPES ZOSTER IN IMMUNOCOMPROMISED PATIENT    dexamethasone (DECADRON) 4 mg tablet Take 40mg (ten tablets) PO weekly on Days 1, 8, and 15 with chemotherapy  Indications: MULTIPLE MYELOMA    atorvastatin (LIPITOR) 40 mg tablet Take 1 Tab by mouth daily. For cholesterol    carvedilol (COREG) 12.5 mg tablet TAKE 1 TABLET BY MOUTH TWICE A DAY WITH MEALS    valsartan (DIOVAN) 40 mg tablet TAKE 1 TABLET BY MOUTH EVERY DAY    dexamethasone (DECADRON) 4 mg tablet Take 5 Tabs by mouth daily for 6 days.  prochlorperazine (COMPAZINE) 10 mg tablet Take 5 mg by mouth every six (6) hours as needed.  sodium chloride ([address]ULIA 128) 2 % ophthalmic solution Administer 2 Drops to both eyes two (2) times a day.  Cholecalciferol, Vitamin D3, (VITAMIN D3) 1,000 unit cap Take  by mouth daily.  calcium 500 mg Tab Take 600 mg by mouth daily.  cyanocobalamin (VITAMIN B-12) 1,000 mcg tablet Take 2,000 mcg by mouth daily. No current facility-administered medications for this visit. I have reviewed the MAs notes, vitals, problem list, allergy list, medical history, family medical, social history and medications. Objective:     Physical Exam:     Vitals:    06/07/17 0904   BP: 170/90   Pulse: 82   SpO2: 97%   Weight: 187 lb (84.8 kg)   Height: 5' 4\" (1.626 m)    Body mass index is 32.1 kg/(m^2). General: WDWN adult female, in no acute distress. Full affect. HEENT: No carotid bruits, no JVD, trach is midline. Heart:  Normal S1/S2 negative S3 or S4. Regular, no murmur, gallop or rub.   Respiratory: Clear bilaterally, no wheezing or rales  Abdomen:   Soft, non-tender, bowel sounds are active.   Extremities:  No edema, normal cap refill, no cyanosis. Neuro: A&Ox3, speech clear, gait stable. Skin: Skin color is normal. No rashes or lesions.  No diaphoresis. Vascular: 2+ pulses symmetric in all extremities      Data Review:       Cardiographics:    EKG: paced    Cardiology Labs:    Results for orders placed or performed during the hospital encounter of 08/06/10   EKG, 12 LEAD, INITIAL   Result Value Ref Range    Ventricular Rate 60 BPM    Atrial Rate 60 BPM    P-R Interval 92 ms    QRS Duration 146 ms    Q-T Interval 530 ms    QTC Calculation (Bezet) 530 ms    Calculated R Axis -103 degrees    Calculated T Axis 58 degrees    Diagnosis       AV sequential or dual chamber electronic pacemaker  No previous ECGs available  Confirmed by Elzbieta Cooper ([id number]) on 8/7/2010 9:11:48 PM   Results for orders placed or performed in visit on 03/31/10   AMB POC EKG ROUTINE W/ 12 LEADS, INTER & REP    Narrative    LBBB, left ventricular hypertrophy       Lab Results   Component Value Date/Time    Cholesterol, total 182 05/17/2017 09:25 AM    HDL Cholesterol 75 05/17/2017 09:25 AM    LDL, calculated 79 05/17/2017 09:25 AM    Triglyceride 139 05/17/2017 09:25 AM    CHOL/HDL Ratio 4.3 10/08/2009 10:45 AM       Lab Results   Component Value Date/Time    Sodium 140 05/26/2017 09:27 AM    Potassium 3.2 05/26/2017 09:27 AM    Chloride 108 05/26/2017 09:27 AM    CO2 23 05/26/2017 09:27 AM    Anion gap 9 05/26/2017 09:27 AM    Glucose 253 05/26/2017 09:27 AM    BUN 7 05/26/2017 09:27 AM    Creatinine 0.82 05/26/2017 09:27 AM    BUN/Creatinine ratio 9 05/26/2017 09:27 AM    GFR est AA >60 05/26/2017 09:27 AM    GFR est non-AA >60 05/26/2017 09:27 AM    Calcium 8.7 05/26/2017 09:27 AM    Bilirubin, total 1.1 05/26/2017 09:27 AM    AST (SGOT) 13 05/26/2017 09:27 AM    Alk. phosphatase 91 05/26/2017 09:27 AM    Protein, total 6.1 05/26/2017 09:27 AM    Protein, total 5.6 05/26/2017 09:27 AM    Albumin 3.2 05/26/2017 09:27 AM    Globulin 2.9 05/26/2017 09:27 AM    A-G Ratio 1.1 05/26/2017 09:27 AM    ALT (SGPT) 27 05/26/2017 09:27 AM          Assessment:       ICD-10-CM ICD-9-CM    1. Essential hypertension I10 401.9 AMB POC EKG ROUTINE W/ 12 LEADS, INTER & REP   2. Chronic systolic heart failure (HCC) I50.22 428.22 AMB POC EKG ROUTINE W/ 12 LEADS, INTER & REP   3. Mixed hyperlipidemia E78.2 272.2    4. Other left bundle branch block I44.7 426.3    5. Controlled type 2 diabetes mellitus without complication, with long-term current use of insulin (HCC) E11.9 250.00     Z79.4 V58.67          Discussion: Patient presents at this time stable from a cardiac perspective. Pleased with present status. Plan: 1. Continue same meds. Lipid profile and labs followed by PCP. 2.Encouraged to exercise to tolerance, and follow low fat, low cholesterol, low sodium predominantly Plant-based (consider Mediterranean) diet. Call with questions or concerns. Will follow up any test results by phone and/or f/u here in office if needed. Harles Old 3.Follow up: 6 months    I have discussed the diagnosis with the patient and the intended plan as seen in the above orders. The patient has received an after-visit summary and questions were answered concerning future plans. I have discussed any concerning medication side effects and warnings with the patient as well. Patient seen and examined. All pertinent data reviewed. I have reviewed detailed note as outlined by Mona Law. Case discussed with Nursing/medical assistant staff and Mona Law. Patient cardiac stable. States she is undergoing medical rx for [facility] at [facility] and will need echoes to follow heart function. Expressed to her and  that we can perform and send to [facility] as an option if need be. Plans as outlined.       Saskia Richmond PA-C  6/7/2017     DEREK Cui

## 2017-06-08 ENCOUNTER — OFFICE VISIT (OUTPATIENT)
Dept: FAMILY MEDICINE CLINIC | Age: 57
End: 2017-06-08

## 2017-06-08 VITALS — SYSTOLIC BLOOD PRESSURE: 140 MMHG | DIASTOLIC BLOOD PRESSURE: 88 MMHG

## 2017-06-08 DIAGNOSIS — E11.9 CONTROLLED TYPE 2 DIABETES MELLITUS WITHOUT COMPLICATION, WITHOUT LONG-TERM CURRENT USE OF INSULIN (HCC): Primary | ICD-10-CM

## 2017-06-08 NOTE — PROGRESS NOTES
CC:  Diabetes Education     S/O: Tina Rodarte is a 60 y.o. female referred by Dr. Shannan Pérez for diabetes management with an A1c of 12.5% on 5/17/17. Current diabetes regimen consists of the following: metformin 500 mg twice daily, glipizide 5 mg twice daily. Patient denies the following signs/symptoms of low or high blood sugar, but she was having some blurry vision that is getting better. She has a strong family history of diabetes in her Mom and 5 brothers and sisters. Started on decadron once weekly for multiply myeloma and this may have been a contributor to elevated blood sugar. She is aware of this and will find out tomorrow when she sees her oncologist more about if she will stay on this and other treatments and the duration of treatment moving forward. Since getting the diagnosis of diabetes, pt has made changes in her diet which she said started with the multiply myeloma treatment when her taste buds changed. She has cut down on bread, candy, and ice cream and hardly eats sweets now. She has cut down on fried foods, chips, and sodas. She drinks a lot of water to help with the medications she takes for her multiple myeloma. Patient does not currently check her BG - have a meter in clinic to demonstrate to her today so that I can see if she is willing to get a meter / strips and start checking her BG. We check her sugar on it today and it is 163, post prandial after a \"bite or two of a banana and water\" - she does not think she can stick her finger to check the blood sugar and states she does not want a meter right now. She will think about it. Exercise consists of none currently; wants to get back into walking. Patient reports adherence with her medications. Patient denies adverse effects from her medications.     Past Medical History:   Diagnosis Date    Cardiomyopathy     Diabetes mellitus type 2, controlled ([address] 12/10/2015    Heart failure ([address]     Hyperlipidemia     Hypertension     controlled    Multiple myeloma (HCC)     Orthostatic hypotension     Secondary cardiomyopathy, unspecified     [facility]     Vitamin B12 deficiency 3/31/2010     Past Surgical History:   Procedure Laterality Date    HX HYSTERECTOMY       Family History   Problem Relation Age of Onset    Cancer Mother     Heart Disease Mother      pacemaker    Diabetes Mother     Hypertension Sister     Hypertension Brother     Diabetes Brother     Hypertension Sister     Hypertension Sister     Hypertension Sister     Hypertension Sister     Diabetes Sister      Social History     Social History    Marital status:      Spouse name: N/A    Number of children: N/A    Years of education: N/A     Social History Main Topics    Smoking status: Never Smoker    Smokeless tobacco: Never Used    Alcohol use No    Drug use: No    Sexual activity: Yes     Partners: Male     Other Topics Concern    Not on file     Social History Narrative    , 2 children, 28 and 32. Works at [employer], for 35 years. 5 grandchildren     Allergies   Allergen Reactions    Ace Inhibitors Cough     Current Outpatient Prescriptions   Medication Sig    metFORMIN (GLUCOPHAGE) 500 mg tablet TAKE 1 TABLET TWICE A DAY WITH MEALS    glipiZIDE (GLUCOTROL) 5 mg tablet Take 1 Tab by mouth two (2) times a day.  dexamethasone (DECADRON) 4 mg tablet Take 40mg (ten tablets) PO weekly on Days 1, 8, and 15 with chemotherapy  Indications: MULTIPLE MYELOMA    atorvastatin (LIPITOR) 40 mg tablet Take 1 Tab by mouth daily. For cholesterol    aspirin (ASPIRIN) 325 mg tablet Take 325 mg by mouth daily.  lenalidomide (REVLIMID) 20 mg cap Take 20 mg by mouth daily. 2 weeks on/1 week off  Indications: MULTIPLE MYELOMA    dexamethasone (DECADRON) 4 mg tablet Take 5 Tabs by mouth daily for 6 days.  BORTEZOMIB INJECTION 2.6 mg by Injection route.  Indications: SQ Day 1, 4, 8, 11 of each 21 Day Cycle.  prochlorperazine (COMPAZINE) 10 mg tablet Take 5 mg by mouth every six (6) hours as needed.  pantoprazole (PROTONIX) 40 mg tablet Take 1 Tab by mouth daily.  acyclovir (ZOVIRAX) 400 mg tablet Take 1 Tab by mouth two (2) times a day. Indications: PREVENTION OF HERPES ZOSTER IN IMMUNOCOMPROMISED PATIENT    sodium chloride (YULIA 128) 2 % ophthalmic solution Administer 2 Drops to both eyes two (2) times a day.  carvedilol (COREG) 12.5 mg tablet TAKE 1 TABLET BY MOUTH TWICE A DAY WITH MEALS    valsartan (DIOVAN) 40 mg tablet TAKE 1 TABLET BY MOUTH EVERY DAY    Cholecalciferol, Vitamin D3, (VITAMIN D3) 1,000 unit cap Take  by mouth daily.  calcium 500 mg Tab Take 600 mg by mouth daily.  cyanocobalamin (VITAMIN B-12) 1,000 mcg tablet Take 2,000 mcg by mouth daily. No current facility-administered medications for this visit. Facility-Administered Medications Ordered in Other Visits   Medication Dose Route Frequency    [START ON 6/9/2017] bortezomib (VELCADE) 2 mg in sodium chloride 0.9 % SQ chemo syringe  2 mg SubCUTAneous ONCE     Visit Vitals    /88       Data reviewed:  Lab Results   Component Value Date/Time    Hemoglobin A1c (POC) 12.5 05/17/2017 09:25 AM     Lab Results   Component Value Date/Time    Cholesterol, total 182 05/17/2017 09:25 AM    HDL Cholesterol 75 05/17/2017 09:25 AM    LDL, calculated 79 05/17/2017 09:25 AM    VLDL, calculated 28 05/17/2017 09:25 AM    Triglyceride 139 05/17/2017 09:25 AM    CHOL/HDL Ratio 4.3 10/08/2009 10:45 AM     Lab Results   Component Value Date/Time    ALT (SGPT) 27 05/26/2017 09:27 AM    AST (SGOT) 13 05/26/2017 09:27 AM    Alk.  phosphatase 91 05/26/2017 09:27 AM    Bilirubin, direct 0.3 05/26/2017 09:27 AM    Bilirubin, total 1.1 05/26/2017 09:27 AM     Lab Results   Component Value Date/Time    Creatinine 0.82 05/26/2017 09:27 AM     Lab Results   Component Value Date/Time    Microalb/Creat ratio (ug/mg creat.) 39.0 05/17/2017 09:25 AM    Microalbumin, urine 16.1 05/17/2017 09:25 AM       Assessment/Plan:     New diagnosis of diabetes with 12.5% A1c, likely steroids and genetics contributors with her strong family hx of diabetes. BG today in clinic is 163 which is is controlled postprandially, so she is responding to medications and it appears her BG is improving from a 12.5% A1c. If pt remains on steroids for tx of multiple myeloma, ideally, she would be able to check her BG as those days she may need intensified therapy for diabetes. In patients who are able to, NPH insulin is often effective for reducing afternoon spikes in BG typically seen when steroids are administered in this way. Discussed with patient. Will re address after she sees oncology later this week. Also room to increase both metformin and glipizide if indicated. Extensive Diabetes Education Reviewed with patient today and written materials given:   Education today focused mainly nutrition, healthy plate, meal planning, portion control with carbs, exercise, s/s of low and high BG and how to handle, medications for diabetes including exactly how they work, Reviewed monitoring, goals, meaning of A1c and how that related to BG, and preventing complications of diabetes, in addition to the \"ABCs\" of diabetes with monitoring cholesterol, BP, kidneys, eyes, and recommended vaccines to discuss with her doctors    My information given if any questions after session today. Pt has follow up with her PCP in 2 weeks; follow up with me as indicated. Patient verbalized understanding of information presented. Answered all of the patient's questions.       Milan Miles, MICHAELD, CDE

## 2017-06-09 ENCOUNTER — OFFICE VISIT (OUTPATIENT)
Dept: ONCOLOGY | Age: 57
End: 2017-06-09

## 2017-06-09 ENCOUNTER — HOSPITAL ENCOUNTER (OUTPATIENT)
Dept: INFUSION THERAPY | Age: 57
Discharge: HOME | End: 2017-06-09
Payer: COMMERCIAL

## 2017-06-09 VITALS
BODY MASS INDEX: 31.58 KG/M2 | WEIGHT: 185 LBS | HEART RATE: 81 BPM | HEIGHT: 64 IN | TEMPERATURE: 98.2 F | RESPIRATION RATE: 16 BRPM | DIASTOLIC BLOOD PRESSURE: 81 MMHG | OXYGEN SATURATION: 96 % | SYSTOLIC BLOOD PRESSURE: 136 MMHG

## 2017-06-09 VITALS
HEART RATE: 85 BPM | OXYGEN SATURATION: 97 % | TEMPERATURE: 97.4 F | DIASTOLIC BLOOD PRESSURE: 79 MMHG | HEIGHT: 64 IN | RESPIRATION RATE: 18 BRPM | BODY MASS INDEX: 31.18 KG/M2 | WEIGHT: 182.6 LBS | SYSTOLIC BLOOD PRESSURE: 136 MMHG

## 2017-06-09 DIAGNOSIS — C90.00 MULTIPLE MYELOMA NOT HAVING ACHIEVED REMISSION (HCC): Primary | ICD-10-CM

## 2017-06-09 DIAGNOSIS — I42.9 CARDIOMYOPATHY, UNSPECIFIED TYPE (HCC): ICD-10-CM

## 2017-06-09 DIAGNOSIS — E11.9 CONTROLLED TYPE 2 DIABETES MELLITUS WITHOUT COMPLICATION, WITHOUT LONG-TERM CURRENT USE OF INSULIN (HCC): ICD-10-CM

## 2017-06-09 DIAGNOSIS — G62.9 NEUROPATHY: ICD-10-CM

## 2017-06-09 LAB
BASOPHILS # BLD AUTO: 0 K/UL (ref 0–0.1)
BASOPHILS # BLD: 0 % (ref 0–1)
EOSINOPHIL # BLD: 0.1 K/UL (ref 0–0.4)
EOSINOPHIL NFR BLD: 3 % (ref 0–7)
ERYTHROCYTE [DISTWIDTH] IN BLOOD BY AUTOMATED COUNT: 16.9 % (ref 11.5–14.5)
HCT VFR BLD AUTO: 37.5 % (ref 35–47)
HGB BLD-MCNC: 12.4 G/DL (ref 11.5–16)
LYMPHOCYTES # BLD AUTO: 20 % (ref 12–49)
LYMPHOCYTES # BLD: 1.1 K/UL (ref 0.8–3.5)
MCH RBC QN AUTO: 30.9 PG (ref 26–34)
MCHC RBC AUTO-ENTMCNC: 33.1 G/DL (ref 30–36.5)
MCV RBC AUTO: 93.5 FL (ref 80–99)
MONOCYTES # BLD: 0.3 K/UL (ref 0–1)
MONOCYTES NFR BLD AUTO: 6 % (ref 5–13)
NEUTS SEG # BLD: 3.9 K/UL (ref 1.8–8)
NEUTS SEG NFR BLD AUTO: 71 % (ref 32–75)
PLATELET # BLD AUTO: 245 K/UL (ref 150–400)
RBC # BLD AUTO: 4.01 M/UL (ref 3.8–5.2)
WBC # BLD AUTO: 5.5 K/UL (ref 3.6–11)

## 2017-06-09 PROCEDURE — 74011250636 HC RX REV CODE- 250/636: Performed by: INTERNAL MEDICINE

## 2017-06-09 PROCEDURE — 74011250636 HC RX REV CODE- 250/636

## 2017-06-09 PROCEDURE — 36415 COLL VENOUS BLD VENIPUNCTURE: CPT | Performed by: INTERNAL MEDICINE

## 2017-06-09 PROCEDURE — 96401 CHEMO ANTI-NEOPL SQ/IM: CPT

## 2017-06-09 PROCEDURE — 74011000250 HC RX REV CODE- 250: Performed by: INTERNAL MEDICINE

## 2017-06-09 PROCEDURE — 85025 COMPLETE CBC W/AUTO DIFF WBC: CPT | Performed by: INTERNAL MEDICINE

## 2017-06-09 RX ADMIN — SODIUM CHLORIDE 2 MG: 9 INJECTION INTRAMUSCULAR; INTRAVENOUS; SUBCUTANEOUS at 12:04

## 2017-06-09 NOTE — PROGRESS NOTES
Hematology follow up    REASON FOR VISIT: Multiple Myeloma  REQUESTED BY: Dr. Janice Favre: Ms. Golden Rausch is a 60 y.o. female with DM and newly diagnosed Multiple Myeloma here to continue Cycle 4 of VRD. Oncologic history  Patient had left facial numbness which started in the summer of 2016. This started abruptly and was not associated with rashes, pain, jaw weakness. She has had some intermittent hyperemia of the L eye. No tearing. She has been evaluated by Dr. Lilibeth Chambers with Neurology and an extensive work up was only notable for presence of a 0.3 g/dl M spike. Other tests were unremarkable: Vitamin B12 411, thyroid function test normal, ACE 25, BHARATI normal CBC normal, CMP normal, CRP 1.17, CT head and cervical spine unremarkable. Further evaluation revealed findings consistent with Multiple Myeloma    CBC 2/24 Unremarkable. Kappa 17 mg/dl, Lambda 2416 (ratio 0.01), SPEP 0.2 g/dl M spike, HANSEL showed monoclonal free lambda light chains, UPEP negative, Beta 2 Microglobulin 1.8 mg/L, Skeletal survey negative for lytic lesions, Bone marrow biopsy on 2/24/17 showed a Normocellular marrow with mild monoclonal plasmacytosis (15-20%). Trilineage hematopoiesis with maturation. Treatment- Palliative  Pretreatment FLC : Kappa 17.9: Lambda 1978: 0.01  3/24/17: VRD Cycle 1   FLC : Kappa 10.9: Lambda 266 : 0.04  4/17/17: VRD Cycle 2 (switched to weekly Velcade)  FLC : Kappa 9.3: Lambda 73 : 0.13  5/5/17:  VRD Cycle 3  FLC : Kappa 9.3: Lambda 84 : 0.12  6/2/17: Cycle 4 with dose reduction in revlimid to 20 mg due to cramps.       Past Medical History:   Diagnosis Date    Cardiomyopathy     Diabetes mellitus type 2, controlled ([address] 12/10/2015    Heart failure (HCC)     Hyperlipidemia     Hypertension     controlled    Multiple myeloma ([address]     Orthostatic hypotension     Secondary cardiomyopathy, unspecified     Sinoatrial node dysfunction ([address]     Vitamin B12 deficiency 3/31/2010 Past Surgical History:   Procedure Laterality Date    HX HYSTERECTOMY         Allergies   Allergen Reactions    Ace Inhibitors Cough       Current Outpatient Prescriptions   Medication Sig Dispense Refill    aspirin (ASPIRIN) 325 mg tablet Take 325 mg by mouth daily.  metFORMIN (GLUCOPHAGE) 500 mg tablet TAKE 1 TABLET TWICE A DAY WITH MEALS  12    glipiZIDE (GLUCOTROL) 5 mg tablet Take 1 Tab by mouth two (2) times a day. 60 Tab 12    lenalidomide (REVLIMID) 20 mg cap Take 20 mg by mouth daily. 2 weeks on/1 week off  Indications: MULTIPLE MYELOMA 14 Cap 0    dexamethasone (DECADRON) 4 mg tablet Take 5 Tabs by mouth daily for 6 days. 30 Tab 0    BORTEZOMIB INJECTION 2.6 mg by Injection route. Indications: SQ Day 1, 4, 8, 11 of each 21 Day Cycle.  prochlorperazine (COMPAZINE) 10 mg tablet Take 5 mg by mouth every six (6) hours as needed.  pantoprazole (PROTONIX) 40 mg tablet Take 1 Tab by mouth daily. 30 Tab 6    acyclovir (ZOVIRAX) 400 mg tablet Take 1 Tab by mouth two (2) times a day. Indications: PREVENTION OF HERPES ZOSTER IN IMMUNOCOMPROMISED PATIENT 60 Tab 3    dexamethasone (DECADRON) 4 mg tablet Take 40mg (ten tablets) PO weekly on Days 1, 8, and 15 with chemotherapy  Indications: MULTIPLE MYELOMA 60 Tab 2    atorvastatin (LIPITOR) 40 mg tablet Take 1 Tab by mouth daily. For cholesterol 30 Tab 12    sodium chloride (YULIA 128) 2 % ophthalmic solution Administer 2 Drops to both eyes two (2) times a day. 15 mL 12    carvedilol (COREG) 12.5 mg tablet TAKE 1 TABLET BY MOUTH TWICE A DAY WITH MEALS 180 Tab 3    valsartan (DIOVAN) 40 mg tablet TAKE 1 TABLET BY MOUTH EVERY DAY 90 Tab 3    Cholecalciferol, Vitamin D3, (VITAMIN D3) 1,000 unit cap Take  by mouth daily.  calcium 500 mg Tab Take 600 mg by mouth daily.  cyanocobalamin (VITAMIN B-12) 1,000 mcg tablet Take 2,000 mcg by mouth daily.        Facility-Administered Medications Ordered in Other Visits   Medication Dose Route Frequency Provider Last Rate Last Dose    bortezomib (VELCADE) 2 mg in sodium chloride 0.9 % SQ chemo syringe  2 mg SubCUTAneous ONCE Joycelyn Brewster MD           Social History     Social History    Marital status:      Spouse name: N/A    Number of children: N/A    Years of education: N/A     Social History Main Topics    Smoking status: Never Smoker    Smokeless tobacco: Never Used    Alcohol use No    Drug use: No    Sexual activity: Yes     Partners: Male     Other Topics Concern    Not on file     Social History Narrative    , 2 children, 29 and 31. Works at [employer], for 35 years. 5 grandchildren       Family History   Problem Relation Age of Onset   [facility] Cancer Mother     Heart Disease Mother      pacemaker   [facility] Diabetes Mother     Hypertension Sister     Hypertension Brother     Diabetes Brother     Hypertension Sister     Hypertension Sister     Hypertension Sister     Hypertension Sister     Diabetes Sister        ROS    Tolerating treatment. No nausea or vomiting. No leg swelling, no CP, SOB, numbness, headache, diarrhea. Has some constipation. Facial numbness is better. She does note intermittent numbness on finger tips. Now with persistent tingling in her toes with occasional numbness. Feet cramps resolved . Has altered taste. No leg swelling.    ECOG PS is 0      Physical Examination:   Visit Vitals    /81 (BP 1 Location: Right arm, BP Patient Position: Sitting)    Pulse 81    Temp 98.2 °F (36.8 °C) (Oral)    Resp 16    Ht 5' 4\" (1.626 m)    Wt 185 lb (83.9 kg)    SpO2 96%    BMI 31.76 kg/m2     General appearance - alert, well appearing, and in no distress  Mental status - oriented to person, place, and time  Mouth - mucous membranes moist, pharynx normal without lesions  Lymphatics - no palpable lymphadenopathy, no hepatosplenomegaly  Chest - clear to auscultation, no wheezes, rales or rhonchi, symmetric air entry  Heart - normal rate, regular rhythm, normal S1, S2, no murmurs, rubs, clicks or gallops  Abdomen - soft, nontender, nondistended, no masses or organomegaly, bowel sounds present  Ext: No edema    LABS  Lab Results   Component Value Date/Time    WBC 5.0 06/02/2017 09:07 AM    HGB 12.5 06/02/2017 09:07 AM    HCT 37.0 06/02/2017 09:07 AM    PLATELET 781 84/85/9503 09:07 AM    MCV 92.0 06/02/2017 09:07 AM    ABS. NEUTROPHILS 2.9 06/02/2017 09:07 AM     Lab Results   Component Value Date/Time    Sodium 140 05/26/2017 09:27 AM    Potassium 3.2 05/26/2017 09:27 AM    Chloride 108 05/26/2017 09:27 AM    CO2 23 05/26/2017 09:27 AM    Glucose 253 05/26/2017 09:27 AM    BUN 7 05/26/2017 09:27 AM    Creatinine 0.82 05/26/2017 09:27 AM    GFR est AA >60 05/26/2017 09:27 AM    GFR est non-AA >60 05/26/2017 09:27 AM    Calcium 8.7 05/26/2017 09:27 AM     Lab Results   Component Value Date/Time    AST (SGOT) 13 05/26/2017 09:27 AM    Alk. phosphatase 91 05/26/2017 09:27 AM    Protein, total 6.1 05/26/2017 09:27 AM    Protein, total 5.6 05/26/2017 09:27 AM    Albumin 3.2 05/26/2017 09:27 AM    Globulin 2.9 05/26/2017 09:27 AM    A-G Ratio 1.1 05/26/2017 09:27 AM       Bone marrow biopsy reviewed    FISH molecular analysis:    Positive for IgH gene rearrangement (IgH complex FISH study pending); Normal results with 1, 5, 9, 13, 15, and 17 (TP53) probe sets. ASSESSMENT  Ms. Jony Merida is a 60 y.o. female with  1. Multiple Myeloma by revised IMWG criteria (Lancet Oncol 2014), due to Involved : uninvolved serum free light chain ratio > 100. Has no anemia, hypercalcemia, bone lesions or renal failure. Likely low risk  2. 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 Poorly controlled DM. 4. Grade 2 finger and toe neuropathy sec to Velcade  5. Grade 1 rash sec to Revlimid: Resolved  6. Grade 2-3 muscle cramps Revlimid    Started palliative VRD Cycle 1 on 3/24/17    She is here for Cycle 4 day 8 today.   Seen by  Efren at [facility]. Planning autotransplant after about 4-5 cycles. Dose reduced Revlimid for cycle 4, due to cramps and will hold for cycle 5. Dose reduced Velcade 1 mg/m2 given neuropathy. Has had a VGPR after 3 cycles. However response is now plateuing. I spoke with BMT on service physician at [facility] to discuss whether a change of treatment was indicated for a deeper response. He reiterated that a VGPR was adequate. We did not have this information available during the visit but Ms. Rosa Ahn was called later and updated about recommendations.  Will complete cycle 4 VRD and then hold Revlimid for cycle 5    PLAN    - Proceed with Cycle 4 day 8 Velcade, Revlimid and Dexamethasone with dose reductions as above  - RTC per protocol for days 15, 22  - RTC to see me on Cycle 5D1   - Myeloma markers on day 1 of every cycle  - ASA daily and Acyclovir BID   - Compazine prn nausea  - Protonix while on Decadron  - Counseled to call with leg pain or swelling  - Follow with [facility], Dr. Katie Randall- Their co-ordinator will reach out to her        Faizan Sevilla MD

## 2017-06-09 NOTE — PROGRESS NOTES
Remi Pardo is a 60 y.o. female here today for Multiple Myeloma f/u. Patient stated she still has tingling in her feet.

## 2017-06-09 NOTE — PROGRESS NOTES
[facility] Progress Note    Date: 2017    Name: Yasir Sample    MRN: [id number]         : 1960    Ms. Best Kaba Arrived ambulatory and in no distress for cycle 4 day 15 of Velcade regimen. Assessment was completed, no acute issues at this time, no new complaints voiced. Labs drawn and in process. Chemotherapy Flowsheet 2017   Cycle C4D15   Date 2017   Drug / Regimen Velcade   Notes given       0940:  Proceeded to appt with Dr. Froilan Garcia  Patient returned to clinic with no new orders. Ms. Saul Jiménez vitals were reviewed. Visit Vitals    /79 (BP 1 Location: Right arm, BP Patient Position: At rest)    Pulse 85    Temp 97.4 °F (36.3 °C)    Resp 18    Ht 5' 4\" (1.626 m)    Wt 82.8 kg (182 lb 9.6 oz)    SpO2 97%    BMI 31.34 kg/m2       Lab results were obtained and reviewed. Recent Results (from the past 12 hour(s))   CBC WITH AUTOMATED DIFF    Collection Time: 17  9:41 AM   Result Value Ref Range    WBC 5.5 3.6 - 11.0 K/uL    RBC 4.01 3.80 - 5.20 M/uL    HGB 12.4 11.5 - 16.0 g/dL    HCT 37.5 35.0 - 47.0 %    MCV 93.5 80.0 - 99.0 FL    MCH 30.9 26.0 - 34.0 PG    MCHC 33.1 30.0 - 36.5 g/dL    RDW 16.9 (H) 11.5 - 14.5 %    PLATELET 320 062 - 410 K/uL    NEUTROPHILS 71 32 - 75 %    LYMPHOCYTES 20 12 - 49 %    MONOCYTES 6 5 - 13 %    EOSINOPHILS 3 0 - 7 %    BASOPHILS 0 0 - 1 %    ABS. NEUTROPHILS 3.9 1.8 - 8.0 K/UL    ABS. LYMPHOCYTES 1.1 0.8 - 3.5 K/UL    ABS. MONOCYTES 0.3 0.0 - 1.0 K/UL    ABS. EOSINOPHILS 0.1 0.0 - 0.4 K/UL    ABS. BASOPHILS 0.0 0.0 - 0.1 K/UL       Chemo Received:   bortezomib (VELCADE) 2 mg in sodium chloride 0.9 % SQ chemo syringe     Ms. Ansari tolerated treatment well and was discharged from [place] [id number] in stable condition at 1210. She is to return on  at 0900 for her next appointment.     Margarito Kyle RN  2017

## 2017-06-12 ENCOUNTER — HOSPITAL ENCOUNTER (OUTPATIENT)
Dept: MAMMOGRAPHY | Age: 57
Discharge: HOME | End: 2017-06-12
Attending: FAMILY MEDICINE
Payer: COMMERCIAL

## 2017-06-12 DIAGNOSIS — Z12.31 VISIT FOR SCREENING MAMMOGRAM: ICD-10-CM

## 2017-06-12 PROCEDURE — 77067 SCR MAMMO BI INCL CAD: CPT

## 2017-06-13 ENCOUNTER — DOCUMENTATION ONLY (OUTPATIENT)
Dept: ONCOLOGY | Age: 57
End: 2017-06-13

## 2017-06-13 ENCOUNTER — TELEPHONE (OUTPATIENT)
Dept: ONCOLOGY | Age: 57
End: 2017-06-13

## 2017-06-15 ENCOUNTER — CLINICAL SUPPORT (OUTPATIENT)
Dept: CARDIOLOGY CLINIC | Age: 57
End: 2017-06-15

## 2017-06-15 ENCOUNTER — OFFICE VISIT (OUTPATIENT)
Dept: CARDIOLOGY CLINIC | Age: 57
End: 2017-06-15

## 2017-06-15 VITALS
RESPIRATION RATE: 16 BRPM | HEIGHT: 64 IN | OXYGEN SATURATION: 94 % | DIASTOLIC BLOOD PRESSURE: 78 MMHG | WEIGHT: 184.8 LBS | SYSTOLIC BLOOD PRESSURE: 120 MMHG | BODY MASS INDEX: 31.55 KG/M2 | HEART RATE: 74 BPM

## 2017-06-15 DIAGNOSIS — Z95.810 AICD (AUTOMATIC CARDIOVERTER/DEFIBRILLATOR) PRESENT: Primary | ICD-10-CM

## 2017-06-15 DIAGNOSIS — I50.22 CHRONIC SYSTOLIC HEART FAILURE (HCC): Primary | ICD-10-CM

## 2017-06-15 DIAGNOSIS — I50.22 CHRONIC SYSTOLIC HEART FAILURE (HCC): ICD-10-CM

## 2017-06-15 DIAGNOSIS — I10 ESSENTIAL HYPERTENSION: ICD-10-CM

## 2017-06-15 DIAGNOSIS — E78.2 MIXED HYPERLIPIDEMIA: ICD-10-CM

## 2017-06-15 DIAGNOSIS — I42.9 CARDIOMYOPATHY, UNSPECIFIED TYPE (HCC): ICD-10-CM

## 2017-06-15 DIAGNOSIS — Z95.810 AICD (AUTOMATIC CARDIOVERTER/DEFIBRILLATOR) PRESENT: ICD-10-CM

## 2017-06-15 DIAGNOSIS — E11.9 CONTROLLED TYPE 2 DIABETES MELLITUS WITHOUT COMPLICATION, WITHOUT LONG-TERM CURRENT USE OF INSULIN (HCC): ICD-10-CM

## 2017-06-15 NOTE — PROGRESS NOTES
Subjective:      Ade Ohara is a 60 y.o. female is here for her annual biv-icd. The patient denies chest pain/ shortness of breath, orthopnea, PND, LE edema, palpitations, syncope, or presyncope.        Patient Active Problem List    Diagnosis Date Noted    Multiple myeloma ([address]     Neuropathy 04/21/2017    Rash of face 04/21/2017    Controlled type 2 diabetes mellitus without complication, without long-term current use of insulin ([address] 03/31/2017    Multiple myeloma not having achieved remission ([address] 03/06/2017    Diabetes mellitus type 2, controlled ([address] 12/10/2015    Essential hypertension 11/17/2015    Hyperlipidemia     AICD (automatic cardioverter/defibrillator) present 09/19/2013    Chronic systolic heart failure ([address] 04/09/2012    Obesity, unspecified 04/09/2012    Other left bundle branch block 04/09/2012    Cardiomyopathy ([address] 03/28/2011    Vitamin B12 deficiency 03/31/2010      Jey Tarango MD  Past Medical History:   Diagnosis Date    Cardiomyopathy     Diabetes mellitus type 2, controlled ([address] 12/10/2015    Heart failure (HCC)     Hyperlipidemia     Hypertension     controlled    Multiple myeloma ([address]     Orthostatic hypotension     Secondary cardiomyopathy, unspecified     Sinoatrial node dysfunction ([address]     Vitamin B12 deficiency 3/31/2010      Past Surgical History:   Procedure Laterality Date    HX HYSTERECTOMY       Allergies   Allergen Reactions    Ace Inhibitors Cough    Compazine [Prochlorperazine] Nausea Only     rash      Family History   Problem Relation Age of Onset    Cancer Mother     Heart Disease Mother      pacemaker    Diabetes Mother     Breast Cancer Mother     Hypertension Sister     Hypertension Brother     Diabetes Brother     Hypertension Sister     Hypertension Sister     Hypertension Sister     Hypertension Sister     Diabetes Sister     negative for cardiac disease  Social History     Social History    Marital status:      Spouse name: N/A    Number of children: N/A    Years of education: N/A     Social History Main Topics    Smoking status: Never Smoker    Smokeless tobacco: Never Used    Alcohol use No    Drug use: No    Sexual activity: Yes     Partners: Male     Other Topics Concern    None     Social History Narrative    , 2 children, 28 and 31. Works at [employer], for 35 years. 5 grandchildren     Current Outpatient Prescriptions   Medication Sig    aspirin (ASPIRIN) 325 mg tablet Take 325 mg by mouth daily.  metFORMIN (GLUCOPHAGE) 500 mg tablet TAKE 1 TABLET TWICE A DAY WITH MEALS    glipiZIDE (GLUCOTROL) 5 mg tablet Take 1 Tab by mouth two (2) times a day.  lenalidomide (REVLIMID) 20 mg cap Take 20 mg by mouth daily. 2 weeks on/1 week off  Indications: MULTIPLE MYELOMA    BORTEZOMIB INJECTION 2.6 mg by Injection route. Indications: SQ Day 1, 4, 8, 11 of each 21 Day Cycle.  pantoprazole (PROTONIX) 40 mg tablet Take 1 Tab by mouth daily.  acyclovir (ZOVIRAX) 400 mg tablet Take 1 Tab by mouth two (2) times a day. Indications: PREVENTION OF HERPES ZOSTER IN IMMUNOCOMPROMISED PATIENT    dexamethasone (DECADRON) 4 mg tablet Take 40mg (ten tablets) PO weekly on Days 1, 8, and 15 with chemotherapy  Indications: MULTIPLE MYELOMA    atorvastatin (LIPITOR) 40 mg tablet Take 1 Tab by mouth daily. For cholesterol    carvedilol (COREG) 12.5 mg tablet TAKE 1 TABLET BY MOUTH TWICE A DAY WITH MEALS    valsartan (DIOVAN) 40 mg tablet TAKE 1 TABLET BY MOUTH EVERY DAY    dexamethasone (DECADRON) 4 mg tablet Take 5 Tabs by mouth daily for 6 days.  prochlorperazine (COMPAZINE) 10 mg tablet Take 5 mg by mouth every six (6) hours as needed.  sodium chloride (YULIA 128) 2 % ophthalmic solution Administer 2 Drops to both eyes two (2) times a day.  Cholecalciferol, Vitamin D3, (VITAMIN D3) 1,000 unit cap Take  by mouth daily.     calcium 500 mg Tab Take 600 mg by mouth daily.    cyanocobalamin (VITAMIN B-12) 1,000 mcg tablet Take 2,000 mcg by mouth daily. No current facility-administered medications for this visit. Vitals:    06/15/17 1355   BP: 120/78   Pulse: 74   Resp: 16   SpO2: 94%   Weight: 184 lb 12.8 oz (83.8 kg)   Height: 5' 4\" (1.626 m)       I have reviewed the nurses notes, vitals, problem list, allergy list, medical history, family, social history and medications. Review of Symptoms:    General: Pt denies excessive weight gain or loss. Pt is able to conduct ADL's  HEENT: Denies blurred vision, headaches, epistaxis and difficulty swallowing. Respiratory: Denies shortness of breath, BARRERA, wheezing or stridor. Cardiovascular: Denies precordial pain, palpitations, edema or PND  Gastrointestinal: Denies poor appetite, indigestion, abdominal pain or blood in stool  Urinary: Denies dysuria, pyuria  Musculoskeletal: Denies pain or swelling from muscles or joints  Neurologic: Denies tremor, paresthesias, or sensory motor disturbance  Skin: Denies rash, itching or texture change. Psych: Denies depression      Physical Exam:      General: Well developed, in no acute distress. HEENT: Eyes - PERRL, no jvd  Heart:  Normal S1/S2 negative S3 or S4. Regular, no murmur, gallop or rub.   Respiratory: Clear bilaterally x 4, no wheezing or rales  Abdomen:   Soft, non-tender, bowel sounds are active.   Extremities:  No edema, normal cap refill, no cyanosis. Musculoskeletal: No clubbing  Neuro: A&Ox3, speech clear, gait stable. Skin: Skin color is normal. No rashes or lesions.  Non diaphoretic  Vascular: 2+ pulses symmetric in all extremities    Cardiographics    Ekg: V paced, HR 73    Diagnosia BIV-ICD- 100% V paced  No events  3 year battery life      Results for orders placed or performed during the hospital encounter of 08/06/10   EKG, 12 LEAD, INITIAL   Result Value Ref Range    Ventricular Rate 60 BPM    Atrial Rate 60 BPM    P-R Interval 92 ms    QRS Duration 146 ms    Q-T Interval 530 ms    QTC Calculation (Bezet) 530 ms    Calculated R Axis -103 degrees    Calculated T Axis 58 degrees    Diagnosis       AV sequential or dual chamber electronic pacemaker  No previous ECGs available  Confirmed by Zachary Milan ([id number]) on 8/7/2010 9:11:48 PM   Results for orders placed or performed in visit on 03/31/10   AMB POC EKG ROUTINE W/ 12 LEADS, INTER & REP    Narrative    LBBB, left ventricular hypertrophy         Lab Results   Component Value Date/Time    WBC 5.5 06/09/2017 09:41 AM    HGB 12.4 06/09/2017 09:41 AM    HCT 37.5 06/09/2017 09:41 AM    PLATELET 029 48/65/9736 09:41 AM    MCV 93.5 06/09/2017 09:41 AM      Lab Results   Component Value Date/Time    Sodium 140 05/26/2017 09:27 AM    Potassium 3.2 05/26/2017 09:27 AM    Chloride 108 05/26/2017 09:27 AM    CO2 23 05/26/2017 09:27 AM    Anion gap 9 05/26/2017 09:27 AM    Glucose 253 05/26/2017 09:27 AM    BUN 7 05/26/2017 09:27 AM    Creatinine 0.82 05/26/2017 09:27 AM    BUN/Creatinine ratio 9 05/26/2017 09:27 AM    GFR est AA >60 05/26/2017 09:27 AM    GFR est non-AA >60 05/26/2017 09:27 AM    Calcium 8.7 05/26/2017 09:27 AM    Bilirubin, total 1.1 05/26/2017 09:27 AM    AST (SGOT) 13 05/26/2017 09:27 AM    Alk. phosphatase 91 05/26/2017 09:27 AM    Protein, total 6.1 05/26/2017 09:27 AM    Protein, total 5.6 05/26/2017 09:27 AM    Albumin 3.2 05/26/2017 09:27 AM    Globulin 2.9 05/26/2017 09:27 AM    A-G Ratio 1.1 05/26/2017 09:27 AM    ALT (SGPT) 27 05/26/2017 09:27 AM         Assessment:     Assessment:        ICD-10-CM ICD-9-CM    1. Chronic systolic heart failure (HCC) I50.22 428.22    2. Cardiomyopathy, unspecified type I42.9 425.4    3. Essential hypertension I10 401.9 AMB POC EKG ROUTINE W/ 12 LEADS, INTER & REP   4. Mixed hyperlipidemia E78.2 272.2    5. AICD (automatic cardioverter/defibrillator) present Z95.810 V45.02    6.  Controlled type 2 diabetes mellitus without complication, without long-term current use of insulin ([facility] E11.9 250.00      Orders Placed This Encounter    AMB POC EKG ROUTINE W/ 12 LEADS, INTER & REP     Order Specific Question:   Reason for Exam:     Answer:   routine        Plan:   Ms Amanda Rollins is here today for her annual BIV-ICD f/u appt and denies cardiac complaints with V pacing 100%. BP is normotensive. Continue medical management for CHF, HTN, DM, and hyperlipidemia  F/U in a year, device clinic 6 months. Thank you for allowing me to participate in [facility] 's care.     Johanna Ortiz MD, Norma Cool

## 2017-06-19 ENCOUNTER — OFFICE VISIT (OUTPATIENT)
Dept: FAMILY MEDICINE CLINIC | Age: 57
End: 2017-06-19

## 2017-06-19 VITALS
HEIGHT: 64 IN | RESPIRATION RATE: 18 BRPM | OXYGEN SATURATION: 98 % | HEART RATE: 68 BPM | SYSTOLIC BLOOD PRESSURE: 151 MMHG | TEMPERATURE: 97.3 F | BODY MASS INDEX: 31.41 KG/M2 | WEIGHT: 184 LBS | DIASTOLIC BLOOD PRESSURE: 80 MMHG

## 2017-06-19 DIAGNOSIS — E11.9 CONTROLLED TYPE 2 DIABETES MELLITUS WITHOUT COMPLICATION, WITHOUT LONG-TERM CURRENT USE OF INSULIN (HCC): Primary | ICD-10-CM

## 2017-06-19 LAB
GLUCOSE POC: 147 MG/DL
HBA1C MFR BLD HPLC: 9.6 %

## 2017-06-19 NOTE — TELEPHONE ENCOUNTER
Pt called and need a refill on lenalidomide 20 mg [facility]     Requested Prescriptions     Pending Prescriptions Disp Refills    lenalidomide (REVLIMID) 20 mg cap 14 Cap 0     Sig: Take 1 Cap by mouth daily.  2 weeks on/1 week off  Indications: MULTIPLE MYELOMA

## 2017-06-19 NOTE — MR AVS SNAPSHOT
Visit Information Date & Time Provider Department Dept. Phone Encounter #  
 6/19/2017  9:30 AM Lisa Lowe MD [facility] [phone] [id number] Your Appointments 6/30/2017  9:30 AM  
Any with Zulma Jones MD  
[facility]) Appt Note: Infusion day follow up [address] [facility] [address] [facility] [address]  
[phone]  
  
   
 [address] [address] [facility] [address]  
  
    
 12/12/2017  9:00 AM  
ESTABLISHED PATIENT with Herrera Salazar MD  
[facility] at [facility]) Appt Note: 6 mo. f/u  
 [address]  
[phone] [address]  
  
    
  
 8/23/2017  8:00 AM  
REMOTE OFFICE VISIT with [facility]) Appt Note: NOT AN OFFICE VISIT - REMOTE BSC BIV ICD  
 [address] [facility]  
[phone] [address] Upcoming Health Maintenance Date Due COLONOSCOPY 9/20/1976 Pneumococcal 19-64 Highest Risk (1 of 3 - PCV13) 9/20/1977 PAP AKA CERVICAL CYTOLOGY 4/2/2015 EYE EXAM RETINAL OR DILATED Q1 4/15/2017 FOOT EXAM Q1 5/17/2017 INFLUENZA AGE 9 TO ADULT 8/1/2017 HEMOGLOBIN A1C Q6M 11/17/2017 GLAUCOMA SCREENING Q2Y 4/26/2018 MICROALBUMIN Q1 5/17/2018 LIPID PANEL Q1 5/17/2018 BREAST CANCER SCRN MAMMOGRAM 6/12/2019 DTaP/Tdap/Td series (2 - Td) 5/17/2026 Allergies as of 6/19/2017  Review Complete On: 6/19/2017 By: Xi Jean LPN Severity Noted Reaction Type Reactions Ace Inhibitors  08/06/2010    Cough Compazine [Prochlorperazine]  06/15/2017    Nausea Only  
 rash Current Immunizations  Reviewed on 6/9/2017 No immunizations on file. Not reviewed this visit You Were Diagnosed With   
  
 Codes Comments Controlled type 2 diabetes mellitus without complication, without long-term current use of insulin ([facility]    -  Primary ICD-10-CM: E11.9 ICD-9-CM: 250.00 Vitals BP Pulse Temp Resp Height(growth percentile) Weight(growth percentile) 151/80 (BP 1 Location: Left arm, BP Patient Position: Sitting) 68 97.3 °F (36.3 °C) (Oral) 18 5' 4\" (1.626 m) 184 lb (83.5 kg) SpO2 BMI OB Status Smoking Status 98% 31.58 kg/m2 Hysterectomy Never Smoker Vitals History BMI and BSA Data Body Mass Index Body Surface Area 31.58 kg/m 2 1.94 m 2 Preferred Pharmacy Pharmacy Name Phone [facility] [address] [place] [address] [phone] Your Updated Medication List  
  
   
This list is accurate as of: 6/19/17 10:06 AM.  Always use your most recent med list.  
  
  
  
  
 acyclovir 400 mg tablet Commonly known as:  ZOVIRAX Take 1 Tab by mouth two (2) times a day. Indications: PREVENTION OF HERPES ZOSTER IN IMMUNOCOMPROMISED PATIENT  
  
 aspirin 325 mg tablet Commonly known as:  ASPIRIN Take 325 mg by mouth daily. atorvastatin 40 mg tablet Commonly known as:  LIPITOR Take 1 Tab by mouth daily. For cholesterol BORTEZOMIB INJECTION  
2.6 mg by Injection route. Indications: SQ Day 1, 4, 8, 11 of each 21 Day Cycle. calcium 500 mg Tab Take 600 mg by mouth daily. carvedilol 12.5 mg tablet Commonly known as:  COREG  
TAKE 1 TABLET BY MOUTH TWICE A DAY WITH MEALS  
  
 * dexamethasone 4 mg tablet Commonly known as:  DECADRON Take 40mg (ten tablets) PO weekly on Days 1, 8, and 15 with chemotherapy  Indications: MULTIPLE MYELOMA * dexamethasone 4 mg tablet Commonly known as:  DECADRON Take 5 Tabs by mouth daily for 6 days. glipiZIDE 5 mg tablet Commonly known as:  Arleta Papa Take 1 Tab by mouth two (2) times a day. lenalidomide 20 mg Cap Commonly known as:  REVLIMID Take 20 mg by mouth daily. 2 weeks on/1 week off  Indications: MULTIPLE MYELOMA  
  
 metFORMIN 500 mg tablet Commonly known as:  GLUCOPHAGE  
TAKE 1 TABLET TWICE A DAY WITH MEALS  
  
 pantoprazole 40 mg tablet Commonly known as:  PROTONIX Take 1 Tab by mouth daily. prochlorperazine 10 mg tablet Commonly known as:  COMPAZINE Take 5 mg by mouth every six (6) hours as needed. sodium chloride 2 % ophthalmic solution Commonly known as:  YULIA 128 Administer 2 Drops to both eyes two (2) times a day. valsartan 40 mg tablet Commonly known as:  DIOVAN  
TAKE 1 TABLET BY MOUTH EVERY DAY  
  
 VITAMIN B-12 1,000 mcg tablet Generic drug:  cyanocobalamin Take 2,000 mcg by mouth daily. VITAMIN D3 1,000 unit Cap Generic drug:  cholecalciferol Take  by mouth daily. * Notice: This list has 2 medication(s) that are the same as other medications prescribed for you. Read the directions carefully, and ask your doctor or other care provider to review them with you. We Performed the Following AMB POC GLUCOSE BLOOD, BY GLUCOSE MONITORING DEVICE [55479 CPT(R)] AMB POC HEMOGLOBIN A1C [26321 CPT(R)] To-Do List   
 06/23/2017 1:00 PM  
  Appointment with [address] [place] [address] ([phone])  
  
 06/30/2017 9:00 AM  
  Appointment with [address] [place] [address] ([phone])  
  
 07/07/2017 9:00 AM  
  Appointment with [address] [place] [address] ([phone])  
  
 07/14/2017 9:00 AM  
  Appointment with [address] [place] [address] ([phone])  
  
 07/21/2017 9:00 AM  
  Appointment with [address] [place] [address] ([phone])  
  
 07/28/2017 9:00 AM  
  Appointment with [address] [place] [address] ([phone]) Introducing [facility]!    
 Cal Armstrong introduces [employer] patient portal. Now you can access parts of your medical record, email your doctor's office, and request medication refills online. 1. In your internet browser, go to [email or web address]. [facility]/[employer] 2. Click on the First Time User? Click Here link in the Sign In box. You will see the New Member Sign Up page. 3. Enter your [email or web address] Access Code exactly as it appears below. You will not need to use this code after youve completed the sign-up process. If you do not sign up before the expiration date, you must request a new code. · [email or web address] Access Code: [email or web address] Expires: 8/24/2017  3:25 PM 
 
4. Enter the last four digits of your Social Security Number (xxxx) and Date of Birth (mm/dd/yyyy) as indicated and click Submit. You will be taken to the next sign-up page. 5. Create a [email or web address] ID. This will be your [email or web address] login ID and cannot be changed, so think of one that is secure and easy to remember. 6. Create a [email or web address] password. You can change your password at any time. 7. Enter your Password Reset Question and Answer. This can be used at a later time if you forget your password. 8. Enter your e-mail address. You will receive e-mail notification when new information is available in [address] 9. Click Sign Up. You can now view and download portions of your medical record. 10. Click the Download Summary menu link to download a portable copy of your medical information. If you have questions, please visit the Frequently Asked Questions section of the [email or web address] website. Remember, [email or web address] is NOT to be used for urgent needs. For medical emergencies, dial 911. Now available from your iPhone and Android! Please provide this summary of care documentation to your next provider. Your primary care clinician is listed as Suri Nance. If you have any questions after today's visit, please call [phone].

## 2017-06-20 ENCOUNTER — TELEPHONE (OUTPATIENT)
Dept: ONCOLOGY | Age: 57
End: 2017-06-20

## 2017-06-20 RX ORDER — LENALIDOMIDE 20 MG/1
20 CAPSULE ORAL DAILY
Qty: 14 CAP | Refills: 0 | Status: SHIPPED | OUTPATIENT
Start: 2017-06-20 | End: 2017-08-18

## 2017-06-23 ENCOUNTER — HOSPITAL ENCOUNTER (OUTPATIENT)
Dept: INFUSION THERAPY | Age: 57
Discharge: HOME | End: 2017-06-23
Payer: COMMERCIAL

## 2017-06-23 VITALS
WEIGHT: 184.6 LBS | OXYGEN SATURATION: 97 % | SYSTOLIC BLOOD PRESSURE: 131 MMHG | DIASTOLIC BLOOD PRESSURE: 75 MMHG | HEIGHT: 64 IN | HEART RATE: 70 BPM | TEMPERATURE: 98.6 F | BODY MASS INDEX: 31.51 KG/M2 | RESPIRATION RATE: 18 BRPM

## 2017-06-23 LAB
ALBUMIN SERPL BCP-MCNC: 3.3 G/DL (ref 3.5–5)
ALBUMIN/GLOB SERPL: 1.2 {RATIO} (ref 1.1–2.2)
ALP SERPL-CCNC: 76 U/L (ref 45–117)
ALT SERPL-CCNC: 22 U/L (ref 12–78)
ANION GAP BLD CALC-SCNC: 9 MMOL/L (ref 5–15)
AST SERPL W P-5'-P-CCNC: 14 U/L (ref 15–37)
BASOPHILS # BLD AUTO: 0 K/UL (ref 0–0.1)
BASOPHILS # BLD: 0 % (ref 0–1)
BILIRUB DIRECT SERPL-MCNC: 0.2 MG/DL (ref 0–0.2)
BILIRUB SERPL-MCNC: 1.3 MG/DL (ref 0.2–1)
BUN SERPL-MCNC: 7 MG/DL (ref 6–20)
BUN/CREAT SERPL: 11 (ref 12–20)
CALCIUM SERPL-MCNC: 8.3 MG/DL (ref 8.5–10.1)
CHLORIDE SERPL-SCNC: 110 MMOL/L (ref 97–108)
CO2 SERPL-SCNC: 25 MMOL/L (ref 21–32)
CREAT SERPL-MCNC: 0.64 MG/DL (ref 0.55–1.02)
EOSINOPHIL # BLD: 0.1 K/UL (ref 0–0.4)
EOSINOPHIL NFR BLD: 2 % (ref 0–7)
ERYTHROCYTE [DISTWIDTH] IN BLOOD BY AUTOMATED COUNT: 16.3 % (ref 11.5–14.5)
GLOBULIN SER CALC-MCNC: 2.8 G/DL (ref 2–4)
GLUCOSE SERPL-MCNC: 104 MG/DL (ref 65–100)
HCT VFR BLD AUTO: 36.3 % (ref 35–47)
HGB BLD-MCNC: 12.4 G/DL (ref 11.5–16)
IGG SERPL-MCNC: 494 MG/DL (ref 700–1600)
LYMPHOCYTES # BLD AUTO: 27 % (ref 12–49)
LYMPHOCYTES # BLD: 1.6 K/UL (ref 0.8–3.5)
MCH RBC QN AUTO: 31.6 PG (ref 26–34)
MCHC RBC AUTO-ENTMCNC: 34.2 G/DL (ref 30–36.5)
MCV RBC AUTO: 92.6 FL (ref 80–99)
MONOCYTES # BLD: 0.7 K/UL (ref 0–1)
MONOCYTES NFR BLD AUTO: 12 % (ref 5–13)
NEUTS SEG # BLD: 3.5 K/UL (ref 1.8–8)
NEUTS SEG NFR BLD AUTO: 59 % (ref 32–75)
PLATELET # BLD AUTO: 257 K/UL (ref 150–400)
POTASSIUM SERPL-SCNC: 3.6 MMOL/L (ref 3.5–5.1)
PROT SERPL-MCNC: 6.1 G/DL (ref 6.4–8.2)
RBC # BLD AUTO: 3.92 M/UL (ref 3.8–5.2)
SODIUM SERPL-SCNC: 144 MMOL/L (ref 136–145)
WBC # BLD AUTO: 5.9 K/UL (ref 3.6–11)

## 2017-06-23 PROCEDURE — 80048 BASIC METABOLIC PNL TOTAL CA: CPT | Performed by: INTERNAL MEDICINE

## 2017-06-23 PROCEDURE — 36415 COLL VENOUS BLD VENIPUNCTURE: CPT | Performed by: INTERNAL MEDICINE

## 2017-06-23 PROCEDURE — 82784 ASSAY IGA/IGD/IGG/IGM EACH: CPT | Performed by: INTERNAL MEDICINE

## 2017-06-23 PROCEDURE — 74011000250 HC RX REV CODE- 250: Performed by: INTERNAL MEDICINE

## 2017-06-23 PROCEDURE — 96401 CHEMO ANTI-NEOPL SQ/IM: CPT

## 2017-06-23 PROCEDURE — 84165 PROTEIN E-PHORESIS SERUM: CPT | Performed by: INTERNAL MEDICINE

## 2017-06-23 PROCEDURE — 74011250636 HC RX REV CODE- 250/636

## 2017-06-23 PROCEDURE — 85025 COMPLETE CBC W/AUTO DIFF WBC: CPT | Performed by: INTERNAL MEDICINE

## 2017-06-23 PROCEDURE — 80076 HEPATIC FUNCTION PANEL: CPT | Performed by: INTERNAL MEDICINE

## 2017-06-23 PROCEDURE — 74011250636 HC RX REV CODE- 250/636: Performed by: INTERNAL MEDICINE

## 2017-06-23 PROCEDURE — 83883 ASSAY NEPHELOMETRY NOT SPEC: CPT | Performed by: INTERNAL MEDICINE

## 2017-06-23 RX ADMIN — BORTEZOMIB 2 MG: 3.5 INJECTION, POWDER, LYOPHILIZED, FOR SOLUTION INTRAVENOUS; SUBCUTANEOUS at 15:11

## 2017-06-23 NOTE — PROGRESS NOTES
[facility] - Chemotherapy Progress Note    1320 Pt admit to [facility] for Velcade. Chemotherapy Flowsheet 6/23/2017   Cycle C5D1   Date 6/23/2017   Drug / Regimen Velcade   Notes Given     Pt ambulatory in stable condition. Assessment completed. No new concerns voiced. Labs drawn peripherally as ordered and sent. Recent Results (from the past 12 hour(s))   CBC WITH AUTOMATED DIFF    Collection Time: 06/23/17  1:17 PM   Result Value Ref Range    WBC 5.9 3.6 - 11.0 K/uL    RBC 3.92 3.80 - 5.20 M/uL    HGB 12.4 11.5 - 16.0 g/dL    HCT 36.3 35.0 - 47.0 %    MCV 92.6 80.0 - 99.0 FL    MCH 31.6 26.0 - 34.0 PG    MCHC 34.2 30.0 - 36.5 g/dL    RDW 16.3 (H) 11.5 - 14.5 %    PLATELET 688 658 - 585 K/uL    NEUTROPHILS 59 32 - 75 %    LYMPHOCYTES 27 12 - 49 %    MONOCYTES 12 5 - 13 %    EOSINOPHILS 2 0 - 7 %    BASOPHILS 0 0 - 1 %    ABS. NEUTROPHILS 3.5 1.8 - 8.0 K/UL    ABS. LYMPHOCYTES 1.6 0.8 - 3.5 K/UL    ABS. MONOCYTES 0.7 0.0 - 1.0 K/UL    ABS. EOSINOPHILS 0.1 0.0 - 0.4 K/UL    ABS. BASOPHILS 0.0 0.0 - 0.1 K/UL   METABOLIC PANEL, BASIC    Collection Time: 06/23/17  1:17 PM   Result Value Ref Range    Sodium 144 136 - 145 mmol/L    Potassium 3.6 3.5 - 5.1 mmol/L    Chloride 110 (H) 97 - 108 mmol/L    CO2 25 21 - 32 mmol/L    Anion gap 9 5 - 15 mmol/L    Glucose 104 (H) 65 - 100 mg/dL    BUN 7 6 - 20 MG/DL    Creatinine 0.64 0.55 - 1.02 MG/DL    BUN/Creatinine ratio 11 (L) 12 - 20      GFR est AA >60 >60 ml/min/1.73m2    GFR est non-AA >60 >60 ml/min/1.73m2    Calcium 8.3 (L) 8.5 - 10.1 MG/DL   HEPATIC FUNCTION PANEL    Collection Time: 06/23/17  1:17 PM   Result Value Ref Range    Protein, total 6.1 (L) 6.4 - 8.2 g/dL    Albumin 3.3 (L) 3.5 - 5.0 g/dL    Globulin 2.8 2.0 - 4.0 g/dL    A-G Ratio 1.2 1.1 - 2.2      Bilirubin, total 1.3 (H) 0.2 - 1.0 MG/DL    Bilirubin, direct 0.2 0.0 - 0.2 MG/DL    Alk.  phosphatase 76 45 - 117 U/L    AST (SGOT) 14 (L) 15 - 37 U/L    ALT (SGPT) 22 12 - 78 U/L   IMMUNOGLOBULIN G, QT    Collection Time: 06/23/17  1:17 PM   Result Value Ref Range    Immunoglobulin G 494 (L) 700 - 1600 mg/dL       Visit Vitals    /75 (BP 1 Location: Left arm, BP Patient Position: Sitting)    Pulse 70    Temp 98.6 °F (37 °C)    Resp 18    Ht 5' 4\" (1.626 m)    Wt 83.7 kg (184 lb 9.6 oz)    SpO2 97%    BMI 31.69 kg/m2       Medications:  Velcade-SQ-left abdominal tissue    1515 Pt tolerated treatment well. D/c home ambulatory in no distress. Pt aware of next [facility] appointment scheduled for 06/30/17 at 0900.

## 2017-06-29 LAB
ALBUMIN SERPL ELPH-MCNC: 3.5 G/DL (ref 2.9–4.4)
ALBUMIN/GLOB SERPL: 1.3 {RATIO} (ref 0.7–1.7)
ALPHA1 GLOB SERPL ELPH-MCNC: 0.2 G/DL (ref 0–0.4)
ALPHA2 GLOB SERPL ELPH-MCNC: 1 G/DL (ref 0.4–1)
B-GLOBULIN SERPL ELPH-MCNC: 1 G/DL (ref 0.7–1.3)
GAMMA GLOB SERPL ELPH-MCNC: 0.5 G/DL (ref 0.4–1.8)
GLOBULIN SER CALC-MCNC: 2.7 G/DL (ref 2.2–3.9)
IGA SERPL-MCNC: 35 MG/DL (ref 87–352)
IGG SERPL-MCNC: 454 MG/DL (ref 700–1600)
IGM SERPL-MCNC: 11 MG/DL (ref 26–217)
KAPPA LC FREE SER-MCNC: 9.5 MG/L (ref 3.3–19.4)
KAPPA LC FREE/LAMBDA FREE SER: 0.14 {RATIO} (ref 0.26–1.65)
LAMBDA LC FREE SERPL-MCNC: 70 MG/L (ref 5.7–26.3)
M PROTEIN SERPL ELPH-MCNC: NORMAL G/DL
PROT PATTERN SERPL IFE-IMP: ABNORMAL
PROT SERPL-MCNC: 6.2 G/DL (ref 6–8.5)

## 2017-06-30 ENCOUNTER — TELEPHONE (OUTPATIENT)
Dept: ONCOLOGY | Age: 57
End: 2017-06-30

## 2017-06-30 ENCOUNTER — HOSPITAL ENCOUNTER (OUTPATIENT)
Dept: INFUSION THERAPY | Age: 57
Discharge: HOME | End: 2017-06-30
Payer: COMMERCIAL

## 2017-06-30 ENCOUNTER — OFFICE VISIT (OUTPATIENT)
Dept: ONCOLOGY | Age: 57
End: 2017-06-30

## 2017-06-30 VITALS
RESPIRATION RATE: 20 BRPM | HEART RATE: 74 BPM | HEIGHT: 64 IN | DIASTOLIC BLOOD PRESSURE: 85 MMHG | SYSTOLIC BLOOD PRESSURE: 138 MMHG | WEIGHT: 185 LBS | TEMPERATURE: 97.3 F | OXYGEN SATURATION: 98 % | BODY MASS INDEX: 31.58 KG/M2

## 2017-06-30 VITALS
DIASTOLIC BLOOD PRESSURE: 79 MMHG | RESPIRATION RATE: 16 BRPM | HEIGHT: 64 IN | OXYGEN SATURATION: 99 % | HEART RATE: 81 BPM | WEIGHT: 185.6 LBS | TEMPERATURE: 97.9 F | BODY MASS INDEX: 31.69 KG/M2 | SYSTOLIC BLOOD PRESSURE: 167 MMHG

## 2017-06-30 DIAGNOSIS — G62.9 NEUROPATHY: ICD-10-CM

## 2017-06-30 DIAGNOSIS — E11.9 CONTROLLED TYPE 2 DIABETES MELLITUS WITHOUT COMPLICATION, WITHOUT LONG-TERM CURRENT USE OF INSULIN (HCC): ICD-10-CM

## 2017-06-30 DIAGNOSIS — C90.00 MULTIPLE MYELOMA NOT HAVING ACHIEVED REMISSION (HCC): Primary | ICD-10-CM

## 2017-06-30 DIAGNOSIS — I42.9 CARDIOMYOPATHY, UNSPECIFIED TYPE (HCC): ICD-10-CM

## 2017-06-30 LAB
BASO+EOS+MONOS # BLD AUTO: 0.6 K/UL (ref 0.2–1.2)
BASO+EOS+MONOS # BLD AUTO: 13 % (ref 3.2–16.9)
DIFFERENTIAL METHOD BLD: ABNORMAL
ERYTHROCYTE [DISTWIDTH] IN BLOOD BY AUTOMATED COUNT: 17.3 % (ref 11.8–15.8)
HCT VFR BLD AUTO: 35.1 % (ref 35–47)
HGB BLD-MCNC: 12 G/DL (ref 11.5–16)
LYMPHOCYTES # BLD AUTO: 23 % (ref 12–49)
LYMPHOCYTES # BLD: 1.1 K/UL (ref 0.8–3.5)
MCH RBC QN AUTO: 32.3 PG (ref 26–34)
MCHC RBC AUTO-ENTMCNC: 34.2 G/DL (ref 30–36.5)
MCV RBC AUTO: 94.6 FL (ref 80–99)
NEUTS SEG # BLD: 3.1 K/UL (ref 1.8–8)
NEUTS SEG NFR BLD AUTO: 64 % (ref 32–75)
PLATELET # BLD AUTO: 261 K/UL (ref 150–400)
RBC # BLD AUTO: 3.71 M/UL (ref 3.8–5.2)
WBC # BLD AUTO: 4.8 K/UL (ref 3.6–11)

## 2017-06-30 PROCEDURE — 74011000250 HC RX REV CODE- 250: Performed by: INTERNAL MEDICINE

## 2017-06-30 PROCEDURE — 74011250636 HC RX REV CODE- 250/636

## 2017-06-30 PROCEDURE — 74011250636 HC RX REV CODE- 250/636: Performed by: INTERNAL MEDICINE

## 2017-06-30 PROCEDURE — 96401 CHEMO ANTI-NEOPL SQ/IM: CPT

## 2017-06-30 PROCEDURE — 36415 COLL VENOUS BLD VENIPUNCTURE: CPT | Performed by: INTERNAL MEDICINE

## 2017-06-30 PROCEDURE — 85025 COMPLETE CBC W/AUTO DIFF WBC: CPT | Performed by: INTERNAL MEDICINE

## 2017-06-30 RX ORDER — LENALIDOMIDE 25 MG/1
CAPSULE ORAL
COMMUNITY
Start: 2017-05-05 | End: 2017-08-18

## 2017-06-30 RX ORDER — DEXAMETHASONE 4 MG/1
TABLET ORAL
Refills: 2 | COMMUNITY
Start: 2017-05-09 | End: 2017-08-22 | Stop reason: SDUPTHER

## 2017-06-30 RX ADMIN — BORTEZOMIB 1.4 MG: 3.5 INJECTION, POWDER, LYOPHILIZED, FOR SOLUTION INTRAVENOUS; SUBCUTANEOUS at 11:15

## 2017-06-30 NOTE — PROGRESS NOTES
Hematology follow up    REASON FOR VISIT: Multiple Myeloma  REQUESTED BY: Dr. Draper Simpler: Ms. Denice Ventura is a 60 y.o. female with DM and newly diagnosed Multiple Myeloma here to continue Cycle 4 of VRD. Oncologic history  Patient had left facial numbness which started in the summer of 2016. This started abruptly and was not associated with rashes, pain, jaw weakness. She has had some intermittent hyperemia of the L eye. No tearing. She has been evaluated by Dr. Wilton Durham with Neurology and an extensive work up was only notable for presence of a 0.3 g/dl M spike. Other tests were unremarkable: Vitamin B12 411, thyroid function test normal, ACE 25, BHARATI normal CBC normal, CMP normal, CRP 1.17, CT head and cervical spine unremarkable. Further evaluation revealed findings consistent with Multiple Myeloma    CBC 2/24 Unremarkable. Kappa 17 mg/dl, Lambda 2416 (ratio 0.01), SPEP 0.2 g/dl M spike, HANSEL showed monoclonal free lambda light chains, UPEP negative, Beta 2 Microglobulin 1.8 mg/L, Skeletal survey negative for lytic lesions, Bone marrow biopsy on 2/24/17 showed a Normocellular marrow with mild monoclonal plasmacytosis (15-20%). Trilineage hematopoiesis with maturation. Treatment- Palliative  Pretreatment FLC : Kappa 17.9: Lambda 1978: 0.01  3/24/17: VRD Cycle 1   FLC : Kappa 10.9: Lambda 266 : 0.04  4/17/17: VRD Cycle 2 (switched to weekly Velcade)  FLC : Kappa 9.3: Lambda 73 : 0.13  5/5/17:  VRD Cycle 3  FLC : Kappa 9.3: Lambda 84 : 0.12  6/2/17: Cycle 4 with dose reduction in revlimid to 20 mg due to cramps. FLC : Kappa 9.5: Lambda 70 : 0.14  6/30/17: Cycle 5 .  Stop Revlimid per [facility] BMT      Past Medical History:   Diagnosis Date    Cardiomyopathy     Diabetes mellitus type 2, controlled ([address] 12/10/2015    Heart failure (HCC)     Hyperlipidemia     Hypertension     controlled    Multiple myeloma (HCC)     Orthostatic hypotension     Secondary cardiomyopathy, unspecified  Sinoatrial node dysfunction (HCC)     Vitamin B12 deficiency 3/31/2010       Past Surgical History:   Procedure Laterality Date    HX HYSTERECTOMY         Allergies   Allergen Reactions    Ace Inhibitors Cough    Compazine [Prochlorperazine] Nausea Only     rash       Current Outpatient Prescriptions   Medication Sig Dispense Refill    dexamethasone (DECADRON) 4 mg tablet TAKE 10 TABLETS BY MOUTH ONCE A WEEK ON DAYS 1, 8 AND 15 WITH CHEMOTHERAPY  2    REVLIMID 25 mg cap       lenalidomide (REVLIMID) 20 mg cap Take 1 Cap by mouth daily. 2 weeks on/1 week off  Indications: MULTIPLE MYELOMA 14 Cap 0    aspirin (ASPIRIN) 325 mg tablet Take 325 mg by mouth daily.  glipiZIDE (GLUCOTROL) 5 mg tablet Take 1 Tab by mouth two (2) times a day. 60 Tab 12    BORTEZOMIB INJECTION 2.6 mg by Injection route. Indications: SQ Day 1, 4, 8, 11 of each 21 Day Cycle.  pantoprazole (PROTONIX) 40 mg tablet Take 1 Tab by mouth daily. 30 Tab 6    acyclovir (ZOVIRAX) 400 mg tablet Take 1 Tab by mouth two (2) times a day. Indications: PREVENTION OF HERPES ZOSTER IN IMMUNOCOMPROMISED PATIENT 60 Tab 3    dexamethasone (DECADRON) 4 mg tablet Take 40mg (ten tablets) PO weekly on Days 1, 8, and 15 with chemotherapy  Indications: MULTIPLE MYELOMA 60 Tab 2    atorvastatin (LIPITOR) 40 mg tablet Take 1 Tab by mouth daily. For cholesterol 30 Tab 12    carvedilol (COREG) 12.5 mg tablet TAKE 1 TABLET BY MOUTH TWICE A DAY WITH MEALS 180 Tab 3    valsartan (DIOVAN) 40 mg tablet TAKE 1 TABLET BY MOUTH EVERY DAY 90 Tab 3    metFORMIN (GLUCOPHAGE) 500 mg tablet TAKE 1 TABLET TWICE A DAY WITH MEALS  12    dexamethasone (DECADRON) 4 mg tablet Take 5 Tabs by mouth daily for 6 days. 30 Tab 0    prochlorperazine (COMPAZINE) 10 mg tablet Take 5 mg by mouth every six (6) hours as needed.  sodium chloride (YULIA 128) 2 % ophthalmic solution Administer 2 Drops to both eyes two (2) times a day.  15 mL 12    Cholecalciferol, Vitamin D3, (VITAMIN D3) 1,000 unit cap Take  by mouth daily.  calcium 500 mg Tab Take 600 mg by mouth daily.  cyanocobalamin (VITAMIN B-12) 1,000 mcg tablet Take 2,000 mcg by mouth daily. Facility-Administered Medications Ordered in Other Visits   Medication Dose Route Frequency Provider Last Rate Last Dose    bortezomib (VELCADE) 2 mg in sodium chloride 0.9 % SQ chemo syringe  2 mg SubCUTAneous ONCE Inés Friend MD           Social History     Social History    Marital status:      Spouse name: N/A    Number of children: N/A    Years of education: N/A     Social History Main Topics    Smoking status: Never Smoker    Smokeless tobacco: Never Used    Alcohol use No    Drug use: No    Sexual activity: Yes     Partners: Male     Other Topics Concern    None     Social History Narrative    , 2 children, 28 and 31. Works at [employer], for 35 years. 5 grandchildren       Family History   Problem Relation Age of Onset   Ellen Lopez Cancer Mother     Heart Disease Mother      pacemaker   Ellen Lopez Diabetes Mother     Breast Cancer Mother     Hypertension Sister     Hypertension Brother     Diabetes Brother     Hypertension Sister     Hypertension Sister     Hypertension Sister     Hypertension Sister     Diabetes Sister        ROS    Tolerating treatment. Her feet are tingling and cold all the time. No cramps, no swelling, no pain. She has had no CP, SOB, numbness, headache, diarrhea. Her BS has been high and was started on Glyburide and Metformin. Has altered taste. No leg swelling.    ECOG PS is 0      Physical Examination:   Visit Vitals    /85    Pulse 74    Temp 97.3 °F (36.3 °C)    Resp 20    Ht 5' 4\" (1.626 m)    Wt 185 lb (83.9 kg)    SpO2 98%    BMI 31.76 kg/m2     General appearance - alert, well appearing, and in no distress  Mental status - oriented to person, place, and time  Mouth - mucous membranes moist, pharynx normal without lesions  Lymphatics - no palpable lymphadenopathy, no hepatosplenomegaly  Chest - clear to auscultation, no wheezes, rales or rhonchi, symmetric air entry  Heart - normal rate, regular rhythm, normal S1, S2, no murmurs, rubs, clicks or gallops  Abdomen - soft, nontender, nondistended, no masses or organomegaly, bowel sounds present  Ext: No edema    LABS  Lab Results   Component Value Date/Time    WBC 4.8 06/30/2017 09:19 AM    HGB 12.0 06/30/2017 09:19 AM    HCT 35.1 06/30/2017 09:19 AM    PLATELET 546 48/93/1382 09:19 AM    MCV 94.6 06/30/2017 09:19 AM    ABS. NEUTROPHILS 3.1 06/30/2017 09:19 AM     Lab Results   Component Value Date/Time    Sodium 144 06/23/2017 01:17 PM    Potassium 3.6 06/23/2017 01:17 PM    Chloride 110 06/23/2017 01:17 PM    CO2 25 06/23/2017 01:17 PM    Glucose 104 06/23/2017 01:17 PM    BUN 7 06/23/2017 01:17 PM    Creatinine 0.64 06/23/2017 01:17 PM    GFR est AA >60 06/23/2017 01:17 PM    GFR est non-AA >60 06/23/2017 01:17 PM    Calcium 8.3 06/23/2017 01:17 PM     Lab Results   Component Value Date/Time    AST (SGOT) 14 06/23/2017 01:17 PM    Alk. phosphatase 76 06/23/2017 01:17 PM    Protein, total 6.1 06/23/2017 01:17 PM    Protein, total 6.2 06/23/2017 01:17 PM    Albumin 3.3 06/23/2017 01:17 PM    Globulin 2.8 06/23/2017 01:17 PM    A-G Ratio 1.2 06/23/2017 01:17 PM       Bone marrow biopsy reviewed    FISH molecular analysis:    Positive for IgH gene rearrangement (IgH complex FISH study pending); Normal results with 1, 5, 9, 13, 15, and 17 (TP53) probe sets. ASSESSMENT  Ms. Shabbir Mota is a 60 y.o. female with  1. Multiple Myeloma by revised IMWG criteria (Lancet Oncol 2014), due to Involved : uninvolved serum free light chain ratio > 100. Has no anemia, hypercalcemia, bone lesions or renal failure. Likely low risk  2. 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 Poorly controlled DM.   4. Grade 3 neuropathy    Started palliative VRD Cycle 1 on 3/24/17    Seen by Dr. Gurvinder Farfan at [facility]. Planning autotransplant after about 4-5 cycles. Dose reduced Revlimid for cycle 4, due to cramps and will hold for cycle 5. Dose reduced Velcade 1 mg/m2 given neuropathy. Will dose reduce the latter further to 0.7 g/m2    Has had a VGPR after 4 cycles. Response is now plateuing. I spoke with BMT on service physician at [facility] to discuss whether a change of treatment was indicated for a deeper response. He reiterated that a VGPR was adequate    Per [facility] recommendations will hold Revlimid hereon to aid in stem cell collection    PLAN    - Proceed with Cycle 5 day 1 Velcade, dose reduced to 0.7 g/m2. Stop Revlimid. Continue Dexamethasone   - If neuropathy worsens will stop further velcade  - RTC per protocol for days 8,15, 22  - Myeloma markers on day 1 of every cycle  - ASA daily and Acyclovir BID   - Compazine prn nausea  - Protonix while on Decadron      Will reach out to [facility] as its looking like the 6th cycle may not be feasible with increasing neurotoxicity.         Gabriela Miles MD

## 2017-06-30 NOTE — PROGRESS NOTES
Outpatient Infusion Center - Chemotherapy Progress Note    5333 Pt admit to [facility] for Cycle 5 Day 8 Velcade ambulatory in stable condition. Assessment completed. No new concerns voiced. Labs drawn peripherally by Sugar Acosta, phlebotomist, and sent for processing. 5106 Patient upstairs to MD appointment. [phone]  Patient back in [facility]. Call received from [facility], [address] from MD office stating they were reducing chemotherapy dose today. Orders received and scanned. Chemotherapy Flowsheet 6/30/2017   Cycle C5 D8   Date 6/30/2017   Drug / Regimen Velcade   Notes -         Visit Vitals    /79 (BP 1 Location: Left arm, BP Patient Position: Sitting)    Pulse 81    Temp 97.9 °F (36.6 °C)    Resp 16    Ht 5' 4\" (1.626 m)    Wt 84.2 kg (185 lb 9.6 oz)    SpO2 99%    BMI 31.86 kg/m2       Medications:  Velcade SQ to abdomen- dose reduced    1120 Pt tolerated treatment well. D/c home ambulatory in no distress. Pt aware of next appointment scheduled for 7/7/17 at 9:00 for Cycle 5 Day 15 Velcade. Recent Results (from the past 12 hour(s))   CBC WITH 3 PART DIFF    Collection Time: 06/30/17  9:19 AM   Result Value Ref Range    WBC 4.8 3.6 - 11.0 K/uL    RBC 3.71 (L) 3.80 - 5.20 M/uL    HGB 12.0 11.5 - 16.0 g/dL    HCT 35.1 35.0 - 47.0 %    MCV 94.6 80.0 - 99.0 FL    MCH 32.3 26.0 - 34.0 PG    MCHC 34.2 30.0 - 36.5 g/dL    RDW 17.3 (H) 11.8 - 15.8 %    PLATELET 485 200 - 374 K/uL    NEUTROPHILS 64 32 - 75 %    MIXED CELLS 13 3.2 - 16.9 %    LYMPHOCYTES 23 12 - 49 %    ABS. NEUTROPHILS 3.1 1.8 - 8.0 K/UL    ABS. MIXED CELLS 0.6 0.2 - 1.2 K/uL    ABS.  LYMPHOCYTES 1.1 0.8 - 3.5 K/UL    DF AUTOMATED

## 2017-06-30 NOTE — TELEPHONE ENCOUNTER
Call to patient on home and cell. LM to return call. She is aware to hold revlimid. Do not throw drug away. Must call [phone]/[employer]. They will advise procedures to dispose/return. Or, she can keep in dry room temperature safe storage away from moisture, heat and direct light. We can then determine if she will need after bone marrow transplant.

## 2017-06-30 NOTE — TELEPHONE ENCOUNTER
Call placed to bone marrow transplant office with [address], Dr. Serafin Sheffield. Requested patients office visit note to establish what plan is going forward. To be faxed over to office today.

## 2017-07-06 ENCOUNTER — TELEPHONE (OUTPATIENT)
Dept: ONCOLOGY | Age: 57
End: 2017-07-06

## 2017-07-06 NOTE — TELEPHONE ENCOUNTER
Call placed to [facility] transplant coordination to check on status of patient transplant. Per Valarie Melendez (coordinator) patient received cardiac clearance on 6/21. They will reach out to patient at this time regarding scheduling of pre-testing. Instructions to continue treatment until transplant has been scheduled. Updated that patient Revlimid has been stopped as per previous instruction. Per miguel, their office will update us with scheduled testing dates and plan. Thanked for call.

## 2017-07-07 ENCOUNTER — OFFICE VISIT (OUTPATIENT)
Dept: ONCOLOGY | Age: 57
End: 2017-07-07

## 2017-07-07 ENCOUNTER — HOSPITAL ENCOUNTER (OUTPATIENT)
Dept: INFUSION THERAPY | Age: 57
Discharge: HOME | End: 2017-07-07
Payer: COMMERCIAL

## 2017-07-07 VITALS
DIASTOLIC BLOOD PRESSURE: 90 MMHG | TEMPERATURE: 98.2 F | OXYGEN SATURATION: 98 % | RESPIRATION RATE: 16 BRPM | SYSTOLIC BLOOD PRESSURE: 158 MMHG | BODY MASS INDEX: 31.41 KG/M2 | HEART RATE: 73 BPM | HEIGHT: 64 IN | WEIGHT: 184 LBS

## 2017-07-07 VITALS
SYSTOLIC BLOOD PRESSURE: 145 MMHG | DIASTOLIC BLOOD PRESSURE: 83 MMHG | BODY MASS INDEX: 31.43 KG/M2 | RESPIRATION RATE: 16 BRPM | HEIGHT: 64 IN | WEIGHT: 184.08 LBS | OXYGEN SATURATION: 98 % | TEMPERATURE: 98.4 F | HEART RATE: 73 BPM

## 2017-07-07 DIAGNOSIS — G62.9 NEUROPATHY: ICD-10-CM

## 2017-07-07 DIAGNOSIS — I42.9 CARDIOMYOPATHY, UNSPECIFIED TYPE (HCC): ICD-10-CM

## 2017-07-07 DIAGNOSIS — C90.00 MULTIPLE MYELOMA NOT HAVING ACHIEVED REMISSION (HCC): Primary | ICD-10-CM

## 2017-07-07 DIAGNOSIS — E11.9 CONTROLLED TYPE 2 DIABETES MELLITUS WITHOUT COMPLICATION, WITHOUT LONG-TERM CURRENT USE OF INSULIN (HCC): ICD-10-CM

## 2017-07-07 LAB
BASOPHILS # BLD AUTO: 0 K/UL (ref 0–0.1)
BASOPHILS # BLD: 0 % (ref 0–1)
EOSINOPHIL # BLD: 0.1 K/UL (ref 0–0.4)
EOSINOPHIL NFR BLD: 2 % (ref 0–7)
ERYTHROCYTE [DISTWIDTH] IN BLOOD BY AUTOMATED COUNT: 16.5 % (ref 11.5–14.5)
HCT VFR BLD AUTO: 36.4 % (ref 35–47)
HGB BLD-MCNC: 12.3 G/DL (ref 11.5–16)
LYMPHOCYTES # BLD AUTO: 23 % (ref 12–49)
LYMPHOCYTES # BLD: 1.5 K/UL (ref 0.8–3.5)
MCH RBC QN AUTO: 32 PG (ref 26–34)
MCHC RBC AUTO-ENTMCNC: 33.8 G/DL (ref 30–36.5)
MCV RBC AUTO: 94.8 FL (ref 80–99)
MONOCYTES # BLD: 0.5 K/UL (ref 0–1)
MONOCYTES NFR BLD AUTO: 8 % (ref 5–13)
NEUTS SEG # BLD: 4.3 K/UL (ref 1.8–8)
NEUTS SEG NFR BLD AUTO: 67 % (ref 32–75)
PLATELET # BLD AUTO: 324 K/UL (ref 150–400)
RBC # BLD AUTO: 3.84 M/UL (ref 3.8–5.2)
WBC # BLD AUTO: 6.4 K/UL (ref 3.6–11)

## 2017-07-07 PROCEDURE — 85025 COMPLETE CBC W/AUTO DIFF WBC: CPT | Performed by: INTERNAL MEDICINE

## 2017-07-07 PROCEDURE — 36415 COLL VENOUS BLD VENIPUNCTURE: CPT | Performed by: INTERNAL MEDICINE

## 2017-07-07 RX ORDER — GABAPENTIN 300 MG/1
300 CAPSULE ORAL 3 TIMES DAILY
Qty: 90 CAP | Refills: 2 | Status: SHIPPED | OUTPATIENT
Start: 2017-07-07 | End: 2017-12-12 | Stop reason: ALTCHOICE

## 2017-07-07 NOTE — PROGRESS NOTES
Problem: Knowledge Deficit  Goal: *Verbalizes understanding of procedures and medications  Outcome: Progressing Towards Goal  Discussed neuropathy in feet

## 2017-07-07 NOTE — PROGRESS NOTES
Hematology follow up    REASON FOR VISIT: Multiple Myeloma  REQUESTED BY: Dr. Skyler Morelos: Ms. Aby Morgan is a 60 y.o. female with DM and newly diagnosed Multiple Myeloma here to continue Cycle 5 of VRD- D15    Oncologic history  Patient had left facial numbness which started in the summer of 2016. This started abruptly and was not associated with rashes, pain, jaw weakness. She has had some intermittent hyperemia of the L eye. No tearing. She has been evaluated by Dr. Reina Monroy with Neurology and an extensive work up was only notable for presence of a 0.3 g/dl M spike. Other tests were unremarkable: Vitamin B12 411, thyroid function test normal, ACE 25, BHARATI normal CBC normal, CMP normal, CRP 1.17, CT head and cervical spine unremarkable. Further evaluation revealed findings consistent with Multiple Myeloma    CBC 2/24 Unremarkable. Kappa 17 mg/dl, Lambda 2416 (ratio 0.01), SPEP 0.2 g/dl M spike, HANSEL showed monoclonal free lambda light chains, UPEP negative, Beta 2 Microglobulin 1.8 mg/L, Skeletal survey negative for lytic lesions, Bone marrow biopsy on 2/24/17 showed a Normocellular marrow with mild monoclonal plasmacytosis (15-20%). Trilineage hematopoiesis with maturation. Treatment- Palliative  Pretreatment FLC : Kappa 17.9: Lambda 1978: 0.01  3/24/17: VRD Cycle 1   FLC : Kappa 10.9: Lambda 266 : 0.04  4/17/17: VRD Cycle 2 (switched to weekly Velcade)  FLC : Kappa 9.3: Lambda 73 : 0.13  5/5/17:  VRD Cycle 3  FLC : Kappa 9.3: Lambda 84 : 0.12  6/2/17: Cycle 4 with dose reduction in revlimid to 20 mg due to cramps. FLC : Kappa 9.5: Lambda 70 : 0.14  6/30/17: Cycle 5 . Stop Revlimid per [facility] BMT.  Velcade dose reduced to 0.7 due to neuropathy      Past Medical History:   Diagnosis Date    Cardiomyopathy     Diabetes mellitus type 2, controlled ([facility] [address] 12/10/2015    Heart failure (HCC)     Hyperlipidemia     Hypertension     controlled    Multiple myeloma (HCC)     Orthostatic hypotension     Secondary cardiomyopathy, unspecified     Sinoatrial node dysfunction (HCC)     Vitamin B12 deficiency 3/31/2010       Past Surgical History:   Procedure Laterality Date    HX HYSTERECTOMY         Allergies   Allergen Reactions    Ace Inhibitors Cough    Compazine [Prochlorperazine] Nausea Only     rash       Current Outpatient Prescriptions   Medication Sig Dispense Refill    dexamethasone (DECADRON) 4 mg tablet TAKE 10 TABLETS BY MOUTH ONCE A WEEK ON DAYS 1, 8 AND 15 WITH CHEMOTHERAPY  2    REVLIMID 25 mg cap       aspirin (ASPIRIN) 325 mg tablet Take 325 mg by mouth daily.  metFORMIN (GLUCOPHAGE) 500 mg tablet TAKE 1 TABLET TWICE A DAY WITH MEALS  12    glipiZIDE (GLUCOTROL) 5 mg tablet Take 1 Tab by mouth two (2) times a day. 60 Tab 12    BORTEZOMIB INJECTION 2.6 mg by Injection route. Indications: SQ Day 1, 4, 8, 11 of each 21 Day Cycle.  pantoprazole (PROTONIX) 40 mg tablet Take 1 Tab by mouth daily. 30 Tab 6    acyclovir (ZOVIRAX) 400 mg tablet Take 1 Tab by mouth two (2) times a day. Indications: PREVENTION OF HERPES ZOSTER IN IMMUNOCOMPROMISED PATIENT 60 Tab 3    dexamethasone (DECADRON) 4 mg tablet Take 40mg (ten tablets) PO weekly on Days 1, 8, and 15 with chemotherapy  Indications: MULTIPLE MYELOMA 60 Tab 2    atorvastatin (LIPITOR) 40 mg tablet Take 1 Tab by mouth daily. For cholesterol 30 Tab 12    carvedilol (COREG) 12.5 mg tablet TAKE 1 TABLET BY MOUTH TWICE A DAY WITH MEALS 180 Tab 3    valsartan (DIOVAN) 40 mg tablet TAKE 1 TABLET BY MOUTH EVERY DAY 90 Tab 3    lenalidomide (REVLIMID) 20 mg cap Take 1 Cap by mouth daily. 2 weeks on/1 week off  Indications: MULTIPLE MYELOMA 14 Cap 0    dexamethasone (DECADRON) 4 mg tablet Take 5 Tabs by mouth daily for 6 days. 30 Tab 0    prochlorperazine (COMPAZINE) 10 mg tablet Take 5 mg by mouth every six (6) hours as needed.       sodium chloride (YULIA 128) 2 % ophthalmic solution Administer 2 Drops to both eyes two (2) times a day. 15 mL 12    Cholecalciferol, Vitamin D3, (VITAMIN D3) 1,000 unit cap Take  by mouth daily.  calcium 500 mg Tab Take 600 mg by mouth daily.  cyanocobalamin (VITAMIN B-12) 1,000 mcg tablet Take 2,000 mcg by mouth daily. Facility-Administered Medications Ordered in Other Visits   Medication Dose Route Frequency Provider Last Rate Last Dose    bortezomib (VELCADE) 1.4 mg in sodium chloride 0.9 % SQ chemo syringe  1.4 mg SubCUTAneous ONCE Eva Lira MD           Social History     Social History    Marital status:      Spouse name: N/A    Number of children: N/A    Years of education: N/A     Social History Main Topics    Smoking status: Never Smoker    Smokeless tobacco: Never Used    Alcohol use No    Drug use: No    Sexual activity: Yes     Partners: Male     Other Topics Concern    None     Social History Narrative    , 2 children, 28 and 31. Works at [employer], for 35 years. 5 grandchildren       Family History   Problem Relation Age of Onset   Pretty Kraft Cancer Mother     Heart Disease Mother      pacemaker   Pretty Kraft Diabetes Mother     Breast Cancer Mother     Hypertension Sister     Hypertension Brother     Diabetes Brother     Hypertension Sister     Hypertension Sister     Hypertension Sister     Hypertension Sister     Diabetes Sister        ROS    Tolerating treatment. Her feet are tingling and cold all the time. She has pain and burning. No cramps, no swelling, no pain. She has had no CP, SOB, numbness, headache, diarrhea. Her BS has been high and was started on Glyburide and Metformin. Has altered taste. No leg swelling.    ECOG PS is 0      Physical Examination:   Visit Vitals    /90    Pulse 73    Temp 98.2 °F (36.8 °C)    Resp 16    Ht 5' 4\" (1.626 m)    Wt 184 lb (83.5 kg)    SpO2 98%    BMI 31.58 kg/m2     General appearance - alert, well appearing, and in no distress  Mental status - oriented to person, place, and time  Mouth - mucous membranes moist, pharynx normal without lesions  Lymphatics - no palpable lymphadenopathy, no hepatosplenomegaly  Chest - clear to auscultation, no wheezes, rales or rhonchi, symmetric air entry  Heart - normal rate, regular rhythm, normal S1, S2, no murmurs, rubs, clicks or gallops  Abdomen - soft, nontender, nondistended, no masses or organomegaly, bowel sounds present  Ext: No edema    LABS  Lab Results   Component Value Date/Time    WBC 4.8 06/30/2017 09:19 AM    HGB 12.0 06/30/2017 09:19 AM    HCT 35.1 06/30/2017 09:19 AM    PLATELET 573 09/71/3622 09:19 AM    MCV 94.6 06/30/2017 09:19 AM    ABS. NEUTROPHILS 3.1 06/30/2017 09:19 AM     Lab Results   Component Value Date/Time    Sodium 144 06/23/2017 01:17 PM    Potassium 3.6 06/23/2017 01:17 PM    Chloride 110 06/23/2017 01:17 PM    CO2 25 06/23/2017 01:17 PM    Glucose 104 06/23/2017 01:17 PM    BUN 7 06/23/2017 01:17 PM    Creatinine 0.64 06/23/2017 01:17 PM    GFR est AA >60 06/23/2017 01:17 PM    GFR est non-AA >60 06/23/2017 01:17 PM    Calcium 8.3 06/23/2017 01:17 PM     Lab Results   Component Value Date/Time    AST (SGOT) 14 06/23/2017 01:17 PM    Alk. phosphatase 76 06/23/2017 01:17 PM    Protein, total 6.1 06/23/2017 01:17 PM    Protein, total 6.2 06/23/2017 01:17 PM    Albumin 3.3 06/23/2017 01:17 PM    Globulin 2.8 06/23/2017 01:17 PM    A-G Ratio 1.2 06/23/2017 01:17 PM       Bone marrow biopsy reviewed    FISH molecular analysis:    Positive for IgH gene rearrangement (IgH complex FISH study pending); Normal results with 1, 5, 9, 13, 15, and 17 (TP53) probe sets. ASSESSMENT  Ms. Brianne Nova is a 60 y.o. female with  1. Multiple Myeloma by revised IMWG criteria (Lancet Oncol 2014), due to Involved : uninvolved serum free light chain ratio > 100. Has no anemia, hypercalcemia, bone lesions or renal failure. Likely low risk  2. 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  Poorly controlled DM. 4. Grade 3 neuropathy    Started palliative VRD Cycle 1 on 3/24/17    Seen by Dr. Christine Holman at [facility]. Planning autotransplant after about 5- 6 cycles. Dose reduced Revlimid for cycle 4, due to cramps and will hold for cycle 5. Dose reduced Velcade 1 mg/m2 given neuropathy. Dose reduced latter further to 0.7 g/m2 for cycle 5 day 8. Today with persistent grade 3 neuropathy. I spoke with BMT on service physician at [facility] to discuss whether a change of treatment was indicated for a deeper response. He reiterated that a VGPR was adequate. Talked to Dr. Sammye Krabbe who suggested patient reach out to them so transplant scheduling moves forward. If no further velcade given, ok to give Dexamethasone only       PLAN    - Hold C5D15 Velcade. Have already stopped Revlimid. Continue Dexamethasone   - Neurontin 300 mg TID  - She will return on C5D22 for reassessment.  If Neuropathy grade 2 then will receive 0.7 mg/ m2 of Velcade and Dexamethasone  - RTC per protocol for C6D1 if still not transplanted  - Myeloma markers on day 1 of every cycle  - ASA daily and Acyclovir BID   - Compazine prn nausea  - Protonix while on Decadron    See weekly    Teddy Mascorro MD

## 2017-07-07 NOTE — PROGRESS NOTES
Outpatient Infusion Center - Chemotherapy Progress Note     0910 Pt admit to [facility] for Cycle 5 Day 15 Velcade ambulatory in stable condition. Assessment completed. No new concerns voiced. Labs drawn peripherally  and sent for processing.      0930 Patient upstairs to MD appointment. Per RN Cheri Base will be held today due to worsening numbness in feet.      Chemotherapy Flowsheet 7/7/2017   Cycle C5 D15   Date 7/7/2017   Drug / Regimen Velcade   Notes HELD         Patient Vitals for the past 12 hrs:   Temp Pulse Resp BP SpO2   07/07/17 0921 98.4 °F (36.9 °C) 73 16 145/83 98 %          Medications:  VELCADE HELD DUE TO NEUROPATHY      Pt Velcade dose held today per Zenaida Sears RN. D/c home ambulatory in no distress. Pt aware of next appointment scheduled for 7/14/17 at 9:00.

## 2017-07-10 ENCOUNTER — TELEPHONE (OUTPATIENT)
Dept: ONCOLOGY | Age: 57
End: 2017-07-10

## 2017-07-12 ENCOUNTER — DOCUMENTATION ONLY (OUTPATIENT)
Dept: ONCOLOGY | Age: 57
End: 2017-07-12

## 2017-07-13 ENCOUNTER — TELEPHONE (OUTPATIENT)
Dept: ONCOLOGY | Age: 57
End: 2017-07-13

## 2017-07-13 NOTE — TELEPHONE ENCOUNTER
Call placed to patient, message left on identified voicemail, advised patient that provider will be out of the office until Mon 7/17/ will need to follow up with provider regarding plan. Encouraged patient to contact office with any further needs in the interim.

## 2017-07-13 NOTE — TELEPHONE ENCOUNTER
Pt called because Dr. Anali Thurman had taken her off of Revlimid 20 mg tablet, was going to have a stem cell transplant but not right now.   Pt was seen at [facility] and was told to call Dr. Anali Thurman to get  Back on the medication

## 2017-07-13 NOTE — TELEPHONE ENCOUNTER
Call placed to BMT clinic to clarify, per Dr. Molly Villafuerte (and last note) plan was clarified with Dr. Chloe Feliz. Office to research and call back to advise.

## 2017-07-14 ENCOUNTER — HOSPITAL ENCOUNTER (OUTPATIENT)
Dept: INFUSION THERAPY | Age: 57
Discharge: HOME | End: 2017-07-14
Payer: COMMERCIAL

## 2017-07-14 VITALS
TEMPERATURE: 98.9 F | RESPIRATION RATE: 16 BRPM | SYSTOLIC BLOOD PRESSURE: 168 MMHG | DIASTOLIC BLOOD PRESSURE: 101 MMHG | HEART RATE: 84 BPM

## 2017-07-14 LAB
ALBUMIN SERPL BCP-MCNC: 3.5 G/DL (ref 3.5–5)
ALBUMIN/GLOB SERPL: 1.3 {RATIO} (ref 1.1–2.2)
ALP SERPL-CCNC: 82 U/L (ref 45–117)
ALT SERPL-CCNC: 20 U/L (ref 12–78)
ANION GAP BLD CALC-SCNC: 8 MMOL/L (ref 5–15)
AST SERPL W P-5'-P-CCNC: 8 U/L (ref 15–37)
BASOPHILS # BLD AUTO: 0 K/UL (ref 0–0.1)
BASOPHILS # BLD: 0 % (ref 0–1)
BILIRUB SERPL-MCNC: 1.1 MG/DL (ref 0.2–1)
BUN SERPL-MCNC: 9 MG/DL (ref 6–20)
BUN/CREAT SERPL: 11 (ref 12–20)
CALCIUM SERPL-MCNC: 8.5 MG/DL (ref 8.5–10.1)
CHLORIDE SERPL-SCNC: 108 MMOL/L (ref 97–108)
CO2 SERPL-SCNC: 26 MMOL/L (ref 21–32)
CREAT SERPL-MCNC: 0.8 MG/DL (ref 0.55–1.02)
EOSINOPHIL # BLD: 0.2 K/UL (ref 0–0.4)
EOSINOPHIL NFR BLD: 2 % (ref 0–7)
ERYTHROCYTE [DISTWIDTH] IN BLOOD BY AUTOMATED COUNT: 16.8 % (ref 11.5–14.5)
GLOBULIN SER CALC-MCNC: 2.6 G/DL (ref 2–4)
GLUCOSE SERPL-MCNC: 160 MG/DL (ref 65–100)
HCT VFR BLD AUTO: 38.1 % (ref 35–47)
HGB BLD-MCNC: 12.7 G/DL (ref 11.5–16)
IGG SERPL-MCNC: 536 MG/DL (ref 700–1600)
LYMPHOCYTES # BLD AUTO: 22 % (ref 12–49)
LYMPHOCYTES # BLD: 1.6 K/UL (ref 0.8–3.5)
MCH RBC QN AUTO: 31.9 PG (ref 26–34)
MCHC RBC AUTO-ENTMCNC: 33.3 G/DL (ref 30–36.5)
MCV RBC AUTO: 95.7 FL (ref 80–99)
MONOCYTES # BLD: 0.5 K/UL (ref 0–1)
MONOCYTES NFR BLD AUTO: 7 % (ref 5–13)
NEUTS SEG # BLD: 4.8 K/UL (ref 1.8–8)
NEUTS SEG NFR BLD AUTO: 69 % (ref 32–75)
PLATELET # BLD AUTO: 289 K/UL (ref 150–400)
POTASSIUM SERPL-SCNC: 3.5 MMOL/L (ref 3.5–5.1)
PROT SERPL-MCNC: 6.1 G/DL (ref 6.4–8.2)
RBC # BLD AUTO: 3.98 M/UL (ref 3.8–5.2)
SODIUM SERPL-SCNC: 142 MMOL/L (ref 136–145)
WBC # BLD AUTO: 7 K/UL (ref 3.6–11)

## 2017-07-14 PROCEDURE — 74011250636 HC RX REV CODE- 250/636: Performed by: INTERNAL MEDICINE

## 2017-07-14 PROCEDURE — 96401 CHEMO ANTI-NEOPL SQ/IM: CPT

## 2017-07-14 PROCEDURE — 82784 ASSAY IGA/IGD/IGG/IGM EACH: CPT | Performed by: INTERNAL MEDICINE

## 2017-07-14 PROCEDURE — 80053 COMPREHEN METABOLIC PANEL: CPT | Performed by: INTERNAL MEDICINE

## 2017-07-14 PROCEDURE — 36415 COLL VENOUS BLD VENIPUNCTURE: CPT | Performed by: INTERNAL MEDICINE

## 2017-07-14 PROCEDURE — 74011250636 HC RX REV CODE- 250/636

## 2017-07-14 PROCEDURE — 85025 COMPLETE CBC W/AUTO DIFF WBC: CPT | Performed by: INTERNAL MEDICINE

## 2017-07-14 PROCEDURE — 74011000250 HC RX REV CODE- 250: Performed by: INTERNAL MEDICINE

## 2017-07-14 PROCEDURE — 84165 PROTEIN E-PHORESIS SERUM: CPT | Performed by: INTERNAL MEDICINE

## 2017-07-14 PROCEDURE — 83883 ASSAY NEPHELOMETRY NOT SPEC: CPT | Performed by: INTERNAL MEDICINE

## 2017-07-14 RX ADMIN — BORTEZOMIB 1.4 MG: 3.5 INJECTION, POWDER, LYOPHILIZED, FOR SOLUTION INTRAVENOUS; SUBCUTANEOUS at 11:17

## 2017-07-14 NOTE — PROGRESS NOTES
Outpatient Infusion Center - Chemotherapy Progress Note    0915 Pt admit to [facility] for Velcade ambulatory in stable condition. Assessment completed. No new concerns voiced. Labs drawn peripherally and sent for processing. Ro Cosby NP at the bedside    Chemotherapy Flowsheet 7/14/2017   Cycle C6 D1   Date 7/14/2017   Drug / Regimen Velcade   Notes -       Visit Vitals    BP (!) 168/101 (BP 1 Location: Left arm, BP Patient Position: At rest)    Pulse 84    Temp 98.9 °F (37.2 °C)    Resp 16       Medications:  Velcade SQ left lower abdomen    1115 Pt tolerated treatment well. D/c home ambulatory in no distress. Pt aware of next appointment scheduled for 7/21/17 at 18 [facility] Results (from the past 12 hour(s))   CBC WITH AUTOMATED DIFF    Collection Time: 07/14/17  9:17 AM   Result Value Ref Range    WBC 7.0 3.6 - 11.0 K/uL    RBC 3.98 3.80 - 5.20 M/uL    HGB 12.7 11.5 - 16.0 g/dL    HCT 38.1 35.0 - 47.0 %    MCV 95.7 80.0 - 99.0 FL    MCH 31.9 26.0 - 34.0 PG    MCHC 33.3 30.0 - 36.5 g/dL    RDW 16.8 (H) 11.5 - 14.5 %    PLATELET 878 687 - 633 K/uL    NEUTROPHILS 69 32 - 75 %    LYMPHOCYTES 22 12 - 49 %    MONOCYTES 7 5 - 13 %    EOSINOPHILS 2 0 - 7 %    BASOPHILS 0 0 - 1 %    ABS. NEUTROPHILS 4.8 1.8 - 8.0 K/UL    ABS. LYMPHOCYTES 1.6 0.8 - 3.5 K/UL    ABS. MONOCYTES 0.5 0.0 - 1.0 K/UL    ABS. EOSINOPHILS 0.2 0.0 - 0.4 K/UL    ABS.  BASOPHILS 0.0 0.0 - 0.1 K/UL   METABOLIC PANEL, COMPREHENSIVE    Collection Time: 07/14/17  9:17 AM   Result Value Ref Range    Sodium 142 136 - 145 mmol/L    Potassium 3.5 3.5 - 5.1 mmol/L    Chloride 108 97 - 108 mmol/L    CO2 26 21 - 32 mmol/L    Anion gap 8 5 - 15 mmol/L    Glucose 160 (H) 65 - 100 mg/dL    BUN 9 6 - 20 MG/DL    Creatinine 0.80 0.55 - 1.02 MG/DL    BUN/Creatinine ratio 11 (L) 12 - 20      GFR est AA >60 >60 ml/min/1.73m2    GFR est non-AA >60 >60 ml/min/1.73m2    Calcium 8.5 8.5 - 10.1 MG/DL    Bilirubin, total 1.1 (H) 0.2 - 1.0 MG/DL    ALT (SGPT) 20 12 - 78 U/L    AST (SGOT) 8 (L) 15 - 37 U/L    Alk.  phosphatase 82 45 - 117 U/L    Protein, total 6.1 (L) 6.4 - 8.2 g/dL    Albumin 3.5 3.5 - 5.0 g/dL    Globulin 2.6 2.0 - 4.0 g/dL    A-G Ratio 1.3 1.1 - 2.2     IMMUNOGLOBULIN G, QT    Collection Time: 07/14/17  9:17 AM   Result Value Ref Range    Immunoglobulin G 536 (L) 700 - 1600 mg/dL

## 2017-07-14 NOTE — PROGRESS NOTES
Patient seen in infusion center for evaluation of cycle 6, day 1 RVD. Revlimid has been on hold due to planning to proceed with transplant. Patient had call from transplant coordinator and they wanted to admit on 8/11/17. She didn't feel comfortable moving that quickly and needed to get everything together with work commitments. She asked them to hold off. Told by transplant coordinator if she wasn't proceeding with transplant needed to go back on Revlimid. Velcade held cycle 5, day 15 due to neuropathy. States neuropathy now is much improved. Feet feel normal now. No symptoms in hands/fingers. Will proceed with cycle 6, day 1 Velcade as ordered. Will get confirmation with Dr. Ramya Zapien regarding resuming Revlimid. She has 7 day supply left, will need new prescription for rest of this cycle of therapy.

## 2017-07-17 ENCOUNTER — TELEPHONE (OUTPATIENT)
Dept: ONCOLOGY | Age: 57
End: 2017-07-17

## 2017-07-17 LAB
ALBUMIN SERPL ELPH-MCNC: 3.5 G/DL (ref 2.9–4.4)
ALBUMIN/GLOB SERPL: 1.4 {RATIO} (ref 0.7–1.7)
ALPHA1 GLOB SERPL ELPH-MCNC: 0.2 G/DL (ref 0–0.4)
ALPHA2 GLOB SERPL ELPH-MCNC: 0.9 G/DL (ref 0.4–1)
B-GLOBULIN SERPL ELPH-MCNC: 1 G/DL (ref 0.7–1.3)
GAMMA GLOB SERPL ELPH-MCNC: 0.4 G/DL (ref 0.4–1.8)
GLOBULIN SER CALC-MCNC: 2.5 G/DL (ref 2.2–3.9)
KAPPA LC FREE SER-MCNC: 9.4 MG/L (ref 3.3–19.4)
KAPPA LC FREE/LAMBDA FREE SER: 0.27 {RATIO} (ref 0.26–1.65)
LAMBDA LC FREE SERPL-MCNC: 34.9 MG/L (ref 5.7–26.3)
M PROTEIN SERPL ELPH-MCNC: NORMAL G/DL
PROT SERPL-MCNC: 6 G/DL (ref 6–8.5)

## 2017-07-18 LAB
IGA SERPL-MCNC: 54 MG/DL (ref 87–352)
IGG SERPL-MCNC: 499 MG/DL (ref 700–1600)
IGM SERPL-MCNC: 11 MG/DL (ref 26–217)
PROT PATTERN SERPL IFE-IMP: ABNORMAL

## 2017-07-18 NOTE — TELEPHONE ENCOUNTER
Call received from patient, HIPAA verified. Patient updated on provider recommendation not to take Revlimid this cycle. Patient verbalized understanding and thanked for call.

## 2017-07-18 NOTE — TELEPHONE ENCOUNTER
----- Message from Elliott Cortes NP sent at 7/17/2017  9:15 AM EDT -----  Regarding: FW:  Please call her and let her know Dr. Silver Chin doesn't want her to take Revlimid this cycle.       ----- Message -----     From: Zane Brennan MD     Sent: 7/15/2017   4:02 PM       To: Elliott Cortes NP  Subject: RE:                                              No Revlimid  ----- Message -----     From: Elliott Cortes NP     Sent: 7/14/2017  11:01 AM       To: Zane Brennan MD    She doesn't want to go to transplant now. Proceeding with cycle 6. Given Velcade and Dex today. Do you want to resume Revlimid?

## 2017-07-21 ENCOUNTER — HOSPITAL ENCOUNTER (OUTPATIENT)
Dept: INFUSION THERAPY | Age: 57
Discharge: HOME | End: 2017-07-21
Payer: COMMERCIAL

## 2017-07-21 VITALS
HEIGHT: 64 IN | BODY MASS INDEX: 31.92 KG/M2 | HEART RATE: 82 BPM | SYSTOLIC BLOOD PRESSURE: 136 MMHG | WEIGHT: 187 LBS | TEMPERATURE: 98.6 F | RESPIRATION RATE: 16 BRPM | DIASTOLIC BLOOD PRESSURE: 88 MMHG

## 2017-07-21 LAB
BASO+EOS+MONOS # BLD AUTO: 0.6 K/UL (ref 0.2–1.2)
BASO+EOS+MONOS # BLD AUTO: 8 % (ref 3.2–16.9)
DIFFERENTIAL METHOD BLD: ABNORMAL
ERYTHROCYTE [DISTWIDTH] IN BLOOD BY AUTOMATED COUNT: 16.6 % (ref 11.8–15.8)
HCT VFR BLD AUTO: 36.8 % (ref 35–47)
HGB BLD-MCNC: 12.9 G/DL (ref 11.5–16)
LYMPHOCYTES # BLD AUTO: 19 % (ref 12–49)
LYMPHOCYTES # BLD: 1.5 K/UL (ref 0.8–3.5)
MCH RBC QN AUTO: 33.9 PG (ref 26–34)
MCHC RBC AUTO-ENTMCNC: 35.1 G/DL (ref 30–36.5)
MCV RBC AUTO: 96.6 FL (ref 80–99)
NEUTS SEG # BLD: 5.4 K/UL (ref 1.8–8)
NEUTS SEG NFR BLD AUTO: 73 % (ref 32–75)
PLATELET # BLD AUTO: 221 K/UL (ref 150–400)
RBC # BLD AUTO: 3.81 M/UL (ref 3.8–5.2)
WBC # BLD AUTO: 7.5 K/UL (ref 3.6–11)

## 2017-07-21 PROCEDURE — 74011250636 HC RX REV CODE- 250/636: Performed by: INTERNAL MEDICINE

## 2017-07-21 PROCEDURE — 96401 CHEMO ANTI-NEOPL SQ/IM: CPT

## 2017-07-21 PROCEDURE — 36415 COLL VENOUS BLD VENIPUNCTURE: CPT | Performed by: INTERNAL MEDICINE

## 2017-07-21 PROCEDURE — 74011250636 HC RX REV CODE- 250/636

## 2017-07-21 PROCEDURE — 85025 COMPLETE CBC W/AUTO DIFF WBC: CPT | Performed by: INTERNAL MEDICINE

## 2017-07-21 PROCEDURE — 74011000250 HC RX REV CODE- 250: Performed by: INTERNAL MEDICINE

## 2017-07-21 RX ADMIN — BORTEZOMIB 1.4 MG: 3.5 INJECTION, POWDER, LYOPHILIZED, FOR SOLUTION INTRAVENOUS; SUBCUTANEOUS at 10:27

## 2017-07-21 NOTE — PROGRESS NOTES
Outpatient Infusion Center - Chemotherapy Progress Note    3116 Pt admit to [facility] for C6D8 Velcade ambulatory in stable condition. Assessment completed. No new concerns voiced. Labs drawn perpherially by Maribel Ferreira CPT per order and sent. Patient Vitals for the past 12 hrs:   Temp Pulse Resp BP   07/21/17 0907 98.6 °F (37 °C) 82 16 136/88     Medications:  Velcade SQ right ABD    1030 Pt tolerated treatment well. D/c home ambulatory in no distress. Pt aware of next [facility] appointment scheduled for 7/28/17. Recent Results (from the past 12 hour(s))   CBC WITH 3 PART DIFF    Collection Time: 07/21/17  9:14 AM   Result Value Ref Range    WBC 7.5 3.6 - 11.0 K/uL    RBC 3.81 3.80 - 5.20 M/uL    HGB 12.9 11.5 - 16.0 g/dL    HCT 36.8 35.0 - 47.0 %    MCV 96.6 80.0 - 99.0 FL    MCH 33.9 26.0 - 34.0 PG    MCHC 35.1 30.0 - 36.5 g/dL    RDW 16.6 (H) 11.8 - 15.8 %    PLATELET 837 083 - 021 K/uL    NEUTROPHILS 73 32 - 75 %    MIXED CELLS 8 3.2 - 16.9 %    LYMPHOCYTES 19 12 - 49 %    ABS. NEUTROPHILS 5.4 1.8 - 8.0 K/UL    ABS. MIXED CELLS 0.6 0.2 - 1.2 K/uL    ABS.  LYMPHOCYTES 1.5 0.8 - 3.5 K/UL    DF AUTOMATED

## 2017-07-28 ENCOUNTER — OFFICE VISIT (OUTPATIENT)
Dept: ONCOLOGY | Age: 57
End: 2017-07-28

## 2017-07-28 ENCOUNTER — HOSPITAL ENCOUNTER (OUTPATIENT)
Dept: INFUSION THERAPY | Age: 57
Discharge: HOME | End: 2017-07-28
Payer: COMMERCIAL

## 2017-07-28 VITALS
DIASTOLIC BLOOD PRESSURE: 89 MMHG | SYSTOLIC BLOOD PRESSURE: 156 MMHG | WEIGHT: 190 LBS | HEART RATE: 85 BPM | OXYGEN SATURATION: 98 % | HEIGHT: 64 IN | RESPIRATION RATE: 18 BRPM | TEMPERATURE: 97.8 F | BODY MASS INDEX: 32.44 KG/M2

## 2017-07-28 VITALS
DIASTOLIC BLOOD PRESSURE: 85 MMHG | BODY MASS INDEX: 32.54 KG/M2 | SYSTOLIC BLOOD PRESSURE: 135 MMHG | TEMPERATURE: 99 F | RESPIRATION RATE: 18 BRPM | HEIGHT: 64 IN | WEIGHT: 190.6 LBS | HEART RATE: 85 BPM

## 2017-07-28 DIAGNOSIS — I42.9 CARDIOMYOPATHY, UNSPECIFIED TYPE (HCC): ICD-10-CM

## 2017-07-28 DIAGNOSIS — G62.9 NEUROPATHY: ICD-10-CM

## 2017-07-28 DIAGNOSIS — C90.00 MULTIPLE MYELOMA NOT HAVING ACHIEVED REMISSION (HCC): Primary | ICD-10-CM

## 2017-07-28 LAB
BASO+EOS+MONOS # BLD AUTO: 0.5 K/UL (ref 0.2–1.2)
BASO+EOS+MONOS # BLD AUTO: 6 % (ref 3.2–16.9)
DIFFERENTIAL METHOD BLD: ABNORMAL
ERYTHROCYTE [DISTWIDTH] IN BLOOD BY AUTOMATED COUNT: 16.3 % (ref 11.8–15.8)
HCT VFR BLD AUTO: 36.7 % (ref 35–47)
HGB BLD-MCNC: 12.5 G/DL (ref 11.5–16)
LYMPHOCYTES # BLD AUTO: 23 % (ref 12–49)
LYMPHOCYTES # BLD: 1.7 K/UL (ref 0.8–3.5)
MCH RBC QN AUTO: 33.2 PG (ref 26–34)
MCHC RBC AUTO-ENTMCNC: 34.1 G/DL (ref 30–36.5)
MCV RBC AUTO: 97.6 FL (ref 80–99)
NEUTS SEG # BLD: 5.1 K/UL (ref 1.8–8)
NEUTS SEG NFR BLD AUTO: 71 % (ref 32–75)
PLATELET # BLD AUTO: 280 K/UL (ref 150–400)
RBC # BLD AUTO: 3.76 M/UL (ref 3.8–5.2)
WBC # BLD AUTO: 7.3 K/UL (ref 3.6–11)

## 2017-07-28 PROCEDURE — 96401 CHEMO ANTI-NEOPL SQ/IM: CPT

## 2017-07-28 PROCEDURE — 74011250636 HC RX REV CODE- 250/636: Performed by: INTERNAL MEDICINE

## 2017-07-28 PROCEDURE — 74011250636 HC RX REV CODE- 250/636

## 2017-07-28 PROCEDURE — 36415 COLL VENOUS BLD VENIPUNCTURE: CPT | Performed by: INTERNAL MEDICINE

## 2017-07-28 PROCEDURE — 74011000250 HC RX REV CODE- 250: Performed by: INTERNAL MEDICINE

## 2017-07-28 PROCEDURE — 85025 COMPLETE CBC W/AUTO DIFF WBC: CPT | Performed by: INTERNAL MEDICINE

## 2017-07-28 RX ADMIN — BORTEZOMIB 1.4 MG: 3.5 INJECTION, POWDER, LYOPHILIZED, FOR SOLUTION INTRAVENOUS; SUBCUTANEOUS at 11:58

## 2017-07-28 NOTE — PROGRESS NOTES
Outpatient Infusion Center - Chemotherapy Progress Note    0920 Pt admit to [facility] for Velcade ambulatory in stable condition. Assessment completed. No new concerns voiced. CBC drawn peripherally and sent for processing. Pt left [facility] for MD appt. Chemotherapy Flowsheet 7/28/2017   Cycle C6 D15   Date 7/28/2017   Drug / Regimen Velcade   Notes given       Visit Vitals    /85    Pulse 85    Temp 99 °F (37.2 °C)    Resp 18    Ht 5' 4.17\" (1.63 m)    Wt 86.5 kg (190 lb 9.6 oz)    BMI 32.54 kg/m2       Medications:  Velcade SQ left abdomen    1200 Pt tolerated treatment well. D/c home ambulatory in no distress. Pt has no follow appt at this time. Awaiting news from [facility] about transplant. Recent Results (from the past 12 hour(s))   CBC WITH 3 PART DIFF    Collection Time: 07/28/17  9:23 AM   Result Value Ref Range    WBC 7.3 3.6 - 11.0 K/uL    RBC 3.76 (L) 3.80 - 5.20 M/uL    HGB 12.5 11.5 - 16.0 g/dL    HCT 36.7 35.0 - 47.0 %    MCV 97.6 80.0 - 99.0 FL    MCH 33.2 26.0 - 34.0 PG    MCHC 34.1 30.0 - 36.5 g/dL    RDW 16.3 (H) 11.8 - 15.8 %    PLATELET 918 642 - 202 K/uL    NEUTROPHILS 71 32 - 75 %    MIXED CELLS 6 3.2 - 16.9 %    LYMPHOCYTES 23 12 - 49 %    ABS. NEUTROPHILS 5.1 1.8 - 8.0 K/UL    ABS. MIXED CELLS 0.5 0.2 - 1.2 K/uL    ABS.  LYMPHOCYTES 1.7 0.8 - 3.5 K/UL    DF AUTOMATED

## 2017-07-28 NOTE — PROGRESS NOTES
Hematology follow up    REASON FOR VISIT: Multiple Myeloma  REQUESTED BY: Dr. Susan Magallanes: Ms. Adah Kawasaki is a 60 y.o. female with DM and newly diagnosed Multiple Myeloma here to continue Cycle 5 of VRD- D15    Oncologic history  Patient had left facial numbness which started in the summer of 2016. This started abruptly and was not associated with rashes, pain, jaw weakness. She has had some intermittent hyperemia of the L eye. No tearing. She has been evaluated by Dr. Harish De La Torre with Neurology and an extensive work up was only notable for presence of a 0.3 g/dl M spike. Other tests were unremarkable: Vitamin B12 411, thyroid function test normal, ACE 25, BHARATI normal CBC normal, CMP normal, CRP 1.17, CT head and cervical spine unremarkable. Further evaluation revealed findings consistent with Multiple Myeloma    CBC 2/24 Unremarkable. Kappa 17 mg/dl, Lambda 2416 (ratio 0.01), SPEP 0.2 g/dl M spike, HANSEL showed monoclonal free lambda light chains, UPEP negative, Beta 2 Microglobulin 1.8 mg/L, Skeletal survey negative for lytic lesions, Bone marrow biopsy on 2/24/17 showed a Normocellular marrow with mild monoclonal plasmacytosis (15-20%). Trilineage hematopoiesis with maturation. Treatment- Palliative  Pretreatment FLC : Kappa 17.9: Lambda 1978: 0.01  3/24/17: VRD Cycle 1   FLC : Kappa 10.9: Lambda 266 : 0.04  4/17/17: VRD Cycle 2 (switched to weekly Velcade)  FLC : Kappa 9.3: Lambda 73 : 0.13  5/5/17:  VRD Cycle 3  FLC : Kappa 9.3: Lambda 84 : 0.12  6/2/17: Cycle 4 with dose reduction in revlimid to 20 mg due to cramps. FLC : Kappa 9.5: Lambda 70 : 0.14  6/30/17: Cycle 5 . Stop Revlimid per [facility] BMT.  Velcade dose reduced to 0.7 due to neuropathy   Kappa 9.4: Lambda 34: 0.27  7/28/17: C6     Past Medical History:   Diagnosis Date    Cardiomyopathy     Diabetes mellitus type 2, controlled ([address] 12/10/2015    Heart failure ([address]     Hyperlipidemia     Hypertension     controlled    Multiple myeloma (HCC)     Orthostatic hypotension     Secondary cardiomyopathy, unspecified     Sinoatrial node dysfunction (HCC)     Vitamin B12 deficiency 3/31/2010       Past Surgical History:   Procedure Laterality Date    HX HYSTERECTOMY         Allergies   Allergen Reactions    Ace Inhibitors Cough    Compazine [Prochlorperazine] Nausea Only     rash       Current Outpatient Prescriptions   Medication Sig Dispense Refill    gabapentin (NEURONTIN) 300 mg capsule Take 1 Cap by mouth three (3) times daily. Indications: NEUROPATHIC PAIN 90 Cap 2    dexamethasone (DECADRON) 4 mg tablet TAKE 10 TABLETS BY MOUTH ONCE A WEEK ON DAYS 1, 8 AND 15 WITH CHEMOTHERAPY  2    REVLIMID 25 mg cap       aspirin (ASPIRIN) 325 mg tablet Take 325 mg by mouth daily.  metFORMIN (GLUCOPHAGE) 500 mg tablet TAKE 1 TABLET TWICE A DAY WITH MEALS  12    glipiZIDE (GLUCOTROL) 5 mg tablet Take 1 Tab by mouth two (2) times a day. 60 Tab 12    pantoprazole (PROTONIX) 40 mg tablet Take 1 Tab by mouth daily. 30 Tab 6    acyclovir (ZOVIRAX) 400 mg tablet Take 1 Tab by mouth two (2) times a day. Indications: PREVENTION OF HERPES ZOSTER IN IMMUNOCOMPROMISED PATIENT 60 Tab 3    dexamethasone (DECADRON) 4 mg tablet Take 40mg (ten tablets) PO weekly on Days 1, 8, and 15 with chemotherapy  Indications: MULTIPLE MYELOMA 60 Tab 2    atorvastatin (LIPITOR) 40 mg tablet Take 1 Tab by mouth daily. For cholesterol 30 Tab 12    carvedilol (COREG) 12.5 mg tablet TAKE 1 TABLET BY MOUTH TWICE A DAY WITH MEALS 180 Tab 3    valsartan (DIOVAN) 40 mg tablet TAKE 1 TABLET BY MOUTH EVERY DAY 90 Tab 3    lenalidomide (REVLIMID) 20 mg cap Take 1 Cap by mouth daily. 2 weeks on/1 week off  Indications: MULTIPLE MYELOMA 14 Cap 0    dexamethasone (DECADRON) 4 mg tablet Take 5 Tabs by mouth daily for 6 days. 30 Tab 0    BORTEZOMIB INJECTION 2.6 mg by Injection route. Indications: SQ Day 1, 4, 8, 11 of each 21 Day Cycle.       prochlorperazine (COMPAZINE) 10 mg tablet Take 5 mg by mouth every six (6) hours as needed.  sodium chloride (YULIA 128) 2 % ophthalmic solution Administer 2 Drops to both eyes two (2) times a day. 15 mL 12    Cholecalciferol, Vitamin D3, (VITAMIN D3) 1,000 unit cap Take  by mouth daily.  calcium 500 mg Tab Take 600 mg by mouth daily.  cyanocobalamin (VITAMIN B-12) 1,000 mcg tablet Take 2,000 mcg by mouth daily. Facility-Administered Medications Ordered in Other Visits   Medication Dose Route Frequency Provider Last Rate Last Dose    bortezomib (VELCADE) 1.4 mg in sodium chloride 0.9 % SQ chemo syringe  1.4 mg SubCUTAneous ONCE Eda Sethi MD           Social History     Social History    Marital status:      Spouse name: N/A    Number of children: N/A    Years of education: N/A     Social History Main Topics    Smoking status: Never Smoker    Smokeless tobacco: Never Used    Alcohol use No    Drug use: No    Sexual activity: Yes     Partners: Male     Other Topics Concern    None     Social History Narrative    , 2 children, 28 and 31. Works at [employer], for 35 years. 5 grandchildren       Family History   Problem Relation Age of Onset   [facility] Cancer Mother     Heart Disease Mother      pacemaker   [facility] Diabetes Mother     Breast Cancer Mother     Hypertension Sister     Hypertension Brother     Diabetes Brother     Hypertension Sister     Hypertension Sister     Hypertension Sister     Hypertension Sister     Diabetes Sister        ROS    Tolerating treatment. Her feet are tingling and cold all the time, has some numbness, pain has resolved. No cramps, no swelling, no pain. She has had no CP, SOB, numbness, headache, diarrhea. She has a R eye lid stye. Has altered taste. No leg swelling.    ECOG PS is 0      Physical Examination:   Visit Vitals    /89    Pulse 85    Temp 97.8 °F (36.6 °C)    Resp 18    Ht 5' 4\" (1.626 m)    Wt 190 lb (86.2 kg)  SpO2 98%    BMI 32.61 kg/m2     General appearance - alert, well appearing, and in no distress  Mental status - oriented to person, place, and time  Eye: L upper lid stye  Mouth - mucous membranes moist, pharynx normal without lesions  Lymphatics - no palpable lymphadenopathy, no hepatosplenomegaly  Chest - clear to auscultation, no wheezes, rales or rhonchi, symmetric air entry  Heart - normal rate, regular rhythm, normal S1, S2, no murmurs, rubs, clicks or gallops  Abdomen - soft, nontender, nondistended, no masses or organomegaly, bowel sounds present  Ext: No edema    LABS  Lab Results   Component Value Date/Time    WBC 7.3 07/28/2017 09:23 AM    HGB 12.5 07/28/2017 09:23 AM    HCT 36.7 07/28/2017 09:23 AM    PLATELET 746 96/84/1601 09:23 AM    MCV 97.6 07/28/2017 09:23 AM    ABS. NEUTROPHILS 5.1 07/28/2017 09:23 AM     Lab Results   Component Value Date/Time    Sodium 142 07/14/2017 09:17 AM    Potassium 3.5 07/14/2017 09:17 AM    Chloride 108 07/14/2017 09:17 AM    CO2 26 07/14/2017 09:17 AM    Glucose 160 07/14/2017 09:17 AM    BUN 9 07/14/2017 09:17 AM    Creatinine 0.80 07/14/2017 09:17 AM    GFR est AA >60 07/14/2017 09:17 AM    GFR est non-AA >60 07/14/2017 09:17 AM    Calcium 8.5 07/14/2017 09:17 AM     Lab Results   Component Value Date/Time    AST (SGOT) 8 07/14/2017 09:17 AM    Alk. phosphatase 82 07/14/2017 09:17 AM    Protein, total 6.1 07/14/2017 09:17 AM    Protein, total 6.0 07/14/2017 09:17 AM    Albumin 3.5 07/14/2017 09:17 AM    Globulin 2.6 07/14/2017 09:17 AM    A-G Ratio 1.3 07/14/2017 09:17 AM       Bone marrow biopsy reviewed    FISH molecular analysis:    Positive for IgH gene rearrangement (IgH complex FISH study pending); Normal results with 1, 5, 9, 13, 15, and 17 (TP53) probe sets. ASSESSMENT  Ms. Jorge Low is a 60 y.o. female with  1. Multiple Myeloma by revised IMWG criteria (Lancet Oncol 2014), due to Involved : uninvolved serum free light chain ratio > 100.  Has no anemia, hypercalcemia, bone lesions or renal failure. Likely low risk  2. 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 Poorly controlled DM. 4. Grade 3 neuropathy on Velcade    Started palliative VRD Cycle 1 on 3/24/17    Seen by Dr. Agata Gan at [facility]. Planning autotransplant after about 5- 6 cycles. Dose reduced Revlimid for cycle 4, due to cramps and held after cycle 5 per [facility] instructions. Dose reduced Velcade 1 mg/m2 given neuropathy. Dose reduced latter further to 0.7 g/m2 for cycle 5 day 8. She has had improvement in neuropathy to grade 2 and continues to have a > VGPR    I have placed a call to [facility] as the patient delayed her transplant which was scheduled on 8/11/17. I would prefer to not put her back on Revelimid if this is a 2-4 week delay only but may have to resume if > 2 months. For now will continue with Vd only    PLAN    - Hold C6d1 Velcade at 0.7 mg/m2 . Have already stopped Revlimid.   Continue Dexamethasone   - Neurontin 300 mg TID  - RTC per protocol for C7D1 if still not transplanted  - ASA daily and Acyclovir BID   - Compazine prn nausea  - Protonix while on Decadron    RTC C7D1  Await call back from [facility] regarding Isaiah Nugent MD

## 2017-08-04 ENCOUNTER — HOSPITAL ENCOUNTER (OUTPATIENT)
Dept: INFUSION THERAPY | Age: 57
Discharge: HOME | End: 2017-08-04
Payer: COMMERCIAL

## 2017-08-04 VITALS
DIASTOLIC BLOOD PRESSURE: 71 MMHG | SYSTOLIC BLOOD PRESSURE: 119 MMHG | BODY MASS INDEX: 32.74 KG/M2 | HEART RATE: 77 BPM | WEIGHT: 191.8 LBS | RESPIRATION RATE: 18 BRPM | HEIGHT: 64 IN | TEMPERATURE: 97.6 F

## 2017-08-04 LAB
ALBUMIN SERPL BCP-MCNC: 3.4 G/DL (ref 3.5–5)
ALBUMIN/GLOB SERPL: 1.3 {RATIO} (ref 1.1–2.2)
ALP SERPL-CCNC: 81 U/L (ref 45–117)
ALT SERPL-CCNC: 21 U/L (ref 12–78)
ANION GAP BLD CALC-SCNC: 5 MMOL/L (ref 5–15)
AST SERPL W P-5'-P-CCNC: 8 U/L (ref 15–37)
BASOPHILS # BLD AUTO: 0 K/UL (ref 0–0.1)
BASOPHILS # BLD: 0 % (ref 0–1)
BILIRUB DIRECT SERPL-MCNC: 0.2 MG/DL (ref 0–0.2)
BILIRUB SERPL-MCNC: 0.9 MG/DL (ref 0.2–1)
BUN SERPL-MCNC: 13 MG/DL (ref 6–20)
BUN/CREAT SERPL: 14 (ref 12–20)
CALCIUM SERPL-MCNC: 8.8 MG/DL (ref 8.5–10.1)
CHLORIDE SERPL-SCNC: 109 MMOL/L (ref 97–108)
CO2 SERPL-SCNC: 30 MMOL/L (ref 21–32)
CREAT SERPL-MCNC: 0.94 MG/DL (ref 0.55–1.02)
EOSINOPHIL # BLD: 0.1 K/UL (ref 0–0.4)
EOSINOPHIL NFR BLD: 2 % (ref 0–7)
ERYTHROCYTE [DISTWIDTH] IN BLOOD BY AUTOMATED COUNT: 15.5 % (ref 11.5–14.5)
GLOBULIN SER CALC-MCNC: 2.7 G/DL (ref 2–4)
GLUCOSE SERPL-MCNC: 129 MG/DL (ref 65–100)
HCT VFR BLD AUTO: 38.4 % (ref 35–47)
HGB BLD-MCNC: 12.7 G/DL (ref 11.5–16)
IGG SERPL-MCNC: 580 MG/DL (ref 700–1600)
LYMPHOCYTES # BLD AUTO: 24 % (ref 12–49)
LYMPHOCYTES # BLD: 1.5 K/UL (ref 0.8–3.5)
MCH RBC QN AUTO: 32.1 PG (ref 26–34)
MCHC RBC AUTO-ENTMCNC: 33.1 G/DL (ref 30–36.5)
MCV RBC AUTO: 97 FL (ref 80–99)
MONOCYTES # BLD: 0.4 K/UL (ref 0–1)
MONOCYTES NFR BLD AUTO: 6 % (ref 5–13)
NEUTS SEG # BLD: 4.4 K/UL (ref 1.8–8)
NEUTS SEG NFR BLD AUTO: 68 % (ref 32–75)
PLATELET # BLD AUTO: 303 K/UL (ref 150–400)
POTASSIUM SERPL-SCNC: 4.1 MMOL/L (ref 3.5–5.1)
PROT SERPL-MCNC: 6.1 G/DL (ref 6.4–8.2)
RBC # BLD AUTO: 3.96 M/UL (ref 3.8–5.2)
SODIUM SERPL-SCNC: 144 MMOL/L (ref 136–145)
WBC # BLD AUTO: 6.3 K/UL (ref 3.6–11)

## 2017-08-04 PROCEDURE — 80048 BASIC METABOLIC PNL TOTAL CA: CPT | Performed by: INTERNAL MEDICINE

## 2017-08-04 PROCEDURE — 84165 PROTEIN E-PHORESIS SERUM: CPT | Performed by: INTERNAL MEDICINE

## 2017-08-04 PROCEDURE — 74011250636 HC RX REV CODE- 250/636: Performed by: INTERNAL MEDICINE

## 2017-08-04 PROCEDURE — 36415 COLL VENOUS BLD VENIPUNCTURE: CPT | Performed by: INTERNAL MEDICINE

## 2017-08-04 PROCEDURE — 74011000250 HC RX REV CODE- 250: Performed by: INTERNAL MEDICINE

## 2017-08-04 PROCEDURE — 83883 ASSAY NEPHELOMETRY NOT SPEC: CPT | Performed by: INTERNAL MEDICINE

## 2017-08-04 PROCEDURE — 80076 HEPATIC FUNCTION PANEL: CPT | Performed by: INTERNAL MEDICINE

## 2017-08-04 PROCEDURE — 82784 ASSAY IGA/IGD/IGG/IGM EACH: CPT | Performed by: INTERNAL MEDICINE

## 2017-08-04 PROCEDURE — 96401 CHEMO ANTI-NEOPL SQ/IM: CPT

## 2017-08-04 PROCEDURE — 74011250636 HC RX REV CODE- 250/636

## 2017-08-04 PROCEDURE — 85025 COMPLETE CBC W/AUTO DIFF WBC: CPT | Performed by: INTERNAL MEDICINE

## 2017-08-04 RX ADMIN — BORTEZOMIB 1.4 MG: 3.5 INJECTION, POWDER, LYOPHILIZED, FOR SOLUTION INTRAVENOUS; SUBCUTANEOUS at 12:38

## 2017-08-04 NOTE — PROGRESS NOTES
[facility] Outpatient Infusion Center Note:  1000Pt arrived at [facility] ambulatory and in no distress for C7D1. Assessment stable, no new complaints voiced. Has foot neuropathy; is diabetic. Is thinking about transplant. Pt dislikes needles. Medications received:  Velcade    1240 Tolerated treatment well, no adverse reaction noted. D/Cd from [facility] ambulatory and in no distress accompanied by self. Next appt 8/11  0900  Visit Vitals    /71    Pulse 77    Temp 97.6 °F (36.4 °C)    Resp 18    Ht 5' 4.17\" (1.63 m)    Wt 87 kg (191 lb 12.8 oz)    BMI 32.75 kg/m2     Recent Results (from the past 12 hour(s))   CBC WITH AUTOMATED DIFF    Collection Time: 08/04/17 10:13 AM   Result Value Ref Range    WBC 6.3 3.6 - 11.0 K/uL    RBC 3.96 3.80 - 5.20 M/uL    HGB 12.7 11.5 - 16.0 g/dL    HCT 38.4 35.0 - 47.0 %    MCV 97.0 80.0 - 99.0 FL    MCH 32.1 26.0 - 34.0 PG    MCHC 33.1 30.0 - 36.5 g/dL    RDW 15.5 (H) 11.5 - 14.5 %    PLATELET 833 992 - 583 K/uL    NEUTROPHILS 68 32 - 75 %    LYMPHOCYTES 24 12 - 49 %    MONOCYTES 6 5 - 13 %    EOSINOPHILS 2 0 - 7 %    BASOPHILS 0 0 - 1 %    ABS. NEUTROPHILS 4.4 1.8 - 8.0 K/UL    ABS. LYMPHOCYTES 1.5 0.8 - 3.5 K/UL    ABS. MONOCYTES 0.4 0.0 - 1.0 K/UL    ABS. EOSINOPHILS 0.1 0.0 - 0.4 K/UL    ABS.  BASOPHILS 0.0 0.0 - 0.1 K/UL   METABOLIC PANEL, BASIC    Collection Time: 08/04/17 10:13 AM   Result Value Ref Range    Sodium 144 136 - 145 mmol/L    Potassium 4.1 3.5 - 5.1 mmol/L    Chloride 109 (H) 97 - 108 mmol/L    CO2 30 21 - 32 mmol/L    Anion gap 5 5 - 15 mmol/L    Glucose 129 (H) 65 - 100 mg/dL    BUN 13 6 - 20 MG/DL    Creatinine 0.94 0.55 - 1.02 MG/DL    BUN/Creatinine ratio 14 12 - 20      GFR est AA >60 >60 ml/min/1.73m2    GFR est non-AA >60 >60 ml/min/1.73m2    Calcium 8.8 8.5 - 10.1 MG/DL   HEPATIC FUNCTION PANEL    Collection Time: 08/04/17 10:13 AM   Result Value Ref Range    Protein, total 6.1 (L) 6.4 - 8.2 g/dL    Albumin 3.4 (L) 3.5 - 5.0 g/dL    Globulin 2.7 2.0 - 4.0 g/dL    A-G Ratio 1.3 1.1 - 2.2      Bilirubin, total 0.9 0.2 - 1.0 MG/DL    Bilirubin, direct 0.2 0.0 - 0.2 MG/DL    Alk.  phosphatase 81 45 - 117 U/L    AST (SGOT) 8 (L) 15 - 37 U/L    ALT (SGPT) 21 12 - 78 U/L   IMMUNOGLOBULIN G, QT    Collection Time: 08/04/17 10:13 AM   Result Value Ref Range    Immunoglobulin G 580 (L) 700 - 1600 mg/dL

## 2017-08-07 LAB
ALBUMIN SERPL ELPH-MCNC: 3.5 G/DL (ref 2.9–4.4)
ALBUMIN/GLOB SERPL: 1.6 {RATIO} (ref 0.7–1.7)
ALPHA1 GLOB SERPL ELPH-MCNC: 0.1 G/DL (ref 0–0.4)
ALPHA2 GLOB SERPL ELPH-MCNC: 0.8 G/DL (ref 0.4–1)
B-GLOBULIN SERPL ELPH-MCNC: 1 G/DL (ref 0.7–1.3)
GAMMA GLOB SERPL ELPH-MCNC: 0.3 G/DL (ref 0.4–1.8)
GLOBULIN SER CALC-MCNC: 2.2 G/DL (ref 2.2–3.9)
KAPPA LC FREE SER-MCNC: 8.1 MG/L (ref 3.3–19.4)
KAPPA LC FREE/LAMBDA FREE SER: 0.25 {RATIO} (ref 0.26–1.65)
LAMBDA LC FREE SERPL-MCNC: 32.2 MG/L (ref 5.7–26.3)
M PROTEIN SERPL ELPH-MCNC: ABNORMAL G/DL
PROT SERPL-MCNC: 5.7 G/DL (ref 6–8.5)

## 2017-08-08 LAB
IGA SERPL-MCNC: 60 MG/DL (ref 87–352)
IGG SERPL-MCNC: 521 MG/DL (ref 700–1600)
IGM SERPL-MCNC: 10 MG/DL (ref 26–217)
PROT PATTERN SERPL IFE-IMP: ABNORMAL

## 2017-08-11 ENCOUNTER — HOSPITAL ENCOUNTER (OUTPATIENT)
Dept: INFUSION THERAPY | Age: 57
Discharge: HOME | End: 2017-08-11
Payer: COMMERCIAL

## 2017-08-11 ENCOUNTER — APPOINTMENT (OUTPATIENT)
Dept: INFUSION THERAPY | Age: 57
End: 2017-08-11

## 2017-08-11 VITALS
TEMPERATURE: 98.4 F | WEIGHT: 192 LBS | BODY MASS INDEX: 32.78 KG/M2 | DIASTOLIC BLOOD PRESSURE: 76 MMHG | HEIGHT: 64 IN | SYSTOLIC BLOOD PRESSURE: 145 MMHG | HEART RATE: 77 BPM | RESPIRATION RATE: 16 BRPM

## 2017-08-11 LAB
BASOPHILS # BLD AUTO: 0 K/UL (ref 0–0.1)
BASOPHILS # BLD: 0 % (ref 0–1)
EOSINOPHIL # BLD: 0.1 K/UL (ref 0–0.4)
EOSINOPHIL NFR BLD: 1 % (ref 0–7)
ERYTHROCYTE [DISTWIDTH] IN BLOOD BY AUTOMATED COUNT: 15 % (ref 11.5–14.5)
HCT VFR BLD AUTO: 38.7 % (ref 35–47)
HGB BLD-MCNC: 12.9 G/DL (ref 11.5–16)
LYMPHOCYTES # BLD AUTO: 17 % (ref 12–49)
LYMPHOCYTES # BLD: 1.5 K/UL (ref 0.8–3.5)
MCH RBC QN AUTO: 32.2 PG (ref 26–34)
MCHC RBC AUTO-ENTMCNC: 33.3 G/DL (ref 30–36.5)
MCV RBC AUTO: 96.5 FL (ref 80–99)
MONOCYTES # BLD: 0.5 K/UL (ref 0–1)
MONOCYTES NFR BLD AUTO: 6 % (ref 5–13)
NEUTS SEG # BLD: 6.5 K/UL (ref 1.8–8)
NEUTS SEG NFR BLD AUTO: 76 % (ref 32–75)
PLATELET # BLD AUTO: 245 K/UL (ref 150–400)
RBC # BLD AUTO: 4.01 M/UL (ref 3.8–5.2)
WBC # BLD AUTO: 8.6 K/UL (ref 3.6–11)

## 2017-08-11 PROCEDURE — 74011250636 HC RX REV CODE- 250/636

## 2017-08-11 PROCEDURE — 36415 COLL VENOUS BLD VENIPUNCTURE: CPT | Performed by: INTERNAL MEDICINE

## 2017-08-11 PROCEDURE — 85025 COMPLETE CBC W/AUTO DIFF WBC: CPT | Performed by: INTERNAL MEDICINE

## 2017-08-11 PROCEDURE — 74011250636 HC RX REV CODE- 250/636: Performed by: INTERNAL MEDICINE

## 2017-08-11 PROCEDURE — 74011000250 HC RX REV CODE- 250: Performed by: INTERNAL MEDICINE

## 2017-08-11 PROCEDURE — 96401 CHEMO ANTI-NEOPL SQ/IM: CPT

## 2017-08-11 RX ADMIN — BORTEZOMIB 1.4 MG: 3.5 INJECTION, POWDER, LYOPHILIZED, FOR SOLUTION INTRAVENOUS; SUBCUTANEOUS at 10:27

## 2017-08-11 NOTE — PROGRESS NOTES
Outpatient Infusion Center - Chemotherapy Progress Note    0060 Pt admit to [facility] for Velcade/C7D8 ambulatory in stable condition. Assessment completed. No new concerns voiced. CBC drawn peripherally and sent for processing. 0950 CBC resulted, WNL; Velcade ordered    Visit Vitals    /76    Pulse 77    Temp 98.4 °F (36.9 °C)    Resp 16    Ht 5' 4.17\" (1.63 m)    Wt 87.1 kg (192 lb)    Breastfeeding No    BMI 32.78 kg/m2       Medications:  Velcade (SC L abd)    1030 Pt tolerated treatment well. D/c home ambulatory in no distress. Pt aware of next [facility] appointment scheduled for 08/18/2017. Recent Results (from the past 12 hour(s))   CBC WITH AUTOMATED DIFF    Collection Time: 08/11/17  9:14 AM   Result Value Ref Range    WBC 8.6 3.6 - 11.0 K/uL    RBC 4.01 3.80 - 5.20 M/uL    HGB 12.9 11.5 - 16.0 g/dL    HCT 38.7 35.0 - 47.0 %    MCV 96.5 80.0 - 99.0 FL    MCH 32.2 26.0 - 34.0 PG    MCHC 33.3 30.0 - 36.5 g/dL    RDW 15.0 (H) 11.5 - 14.5 %    PLATELET 721 972 - 059 K/uL    NEUTROPHILS 76 (H) 32 - 75 %    LYMPHOCYTES 17 12 - 49 %    MONOCYTES 6 5 - 13 %    EOSINOPHILS 1 0 - 7 %    BASOPHILS 0 0 - 1 %    ABS. NEUTROPHILS 6.5 1.8 - 8.0 K/UL    ABS. LYMPHOCYTES 1.5 0.8 - 3.5 K/UL    ABS. MONOCYTES 0.5 0.0 - 1.0 K/UL    ABS. EOSINOPHILS 0.1 0.0 - 0.4 K/UL    ABS.  BASOPHILS 0.0 0.0 - 0.1 K/UL

## 2017-08-18 ENCOUNTER — OFFICE VISIT (OUTPATIENT)
Dept: ONCOLOGY | Age: 57
End: 2017-08-18

## 2017-08-18 ENCOUNTER — HOSPITAL ENCOUNTER (OUTPATIENT)
Dept: INFUSION THERAPY | Age: 57
Discharge: HOME | End: 2017-08-18
Payer: COMMERCIAL

## 2017-08-18 VITALS
HEART RATE: 76 BPM | HEIGHT: 64 IN | DIASTOLIC BLOOD PRESSURE: 82 MMHG | BODY MASS INDEX: 33.29 KG/M2 | WEIGHT: 195 LBS | SYSTOLIC BLOOD PRESSURE: 155 MMHG | OXYGEN SATURATION: 98 % | RESPIRATION RATE: 16 BRPM | TEMPERATURE: 97.7 F

## 2017-08-18 VITALS
HEIGHT: 64 IN | SYSTOLIC BLOOD PRESSURE: 135 MMHG | BODY MASS INDEX: 33.43 KG/M2 | WEIGHT: 195.8 LBS | HEART RATE: 75 BPM | DIASTOLIC BLOOD PRESSURE: 77 MMHG | TEMPERATURE: 97.6 F | RESPIRATION RATE: 16 BRPM

## 2017-08-18 DIAGNOSIS — G62.9 NEUROPATHY: ICD-10-CM

## 2017-08-18 DIAGNOSIS — C90.00 MULTIPLE MYELOMA NOT HAVING ACHIEVED REMISSION (HCC): Primary | ICD-10-CM

## 2017-08-18 LAB
BASOPHILS # BLD: 0 K/UL (ref 0–0.1)
BASOPHILS NFR BLD: 0 % (ref 0–1)
EOSINOPHIL # BLD: 0.1 K/UL (ref 0–0.4)
EOSINOPHIL NFR BLD: 1 % (ref 0–7)
ERYTHROCYTE [DISTWIDTH] IN BLOOD BY AUTOMATED COUNT: 14.9 % (ref 11.5–14.5)
HCT VFR BLD AUTO: 37.9 % (ref 35–47)
HGB BLD-MCNC: 12.6 G/DL (ref 11.5–16)
LYMPHOCYTES # BLD: 1.6 K/UL (ref 0.8–3.5)
LYMPHOCYTES NFR BLD: 23 % (ref 12–49)
MCH RBC QN AUTO: 32.6 PG (ref 26–34)
MCHC RBC AUTO-ENTMCNC: 33.2 G/DL (ref 30–36.5)
MCV RBC AUTO: 97.9 FL (ref 80–99)
MONOCYTES # BLD: 0.4 K/UL (ref 0–1)
MONOCYTES NFR BLD: 5 % (ref 5–13)
NEUTS SEG # BLD: 5 K/UL (ref 1.8–8)
NEUTS SEG NFR BLD: 71 % (ref 32–75)
PLATELET # BLD AUTO: 249 K/UL (ref 150–400)
RBC # BLD AUTO: 3.87 M/UL (ref 3.8–5.2)
WBC # BLD AUTO: 7 K/UL (ref 3.6–11)

## 2017-08-18 PROCEDURE — 74011250636 HC RX REV CODE- 250/636: Performed by: INTERNAL MEDICINE

## 2017-08-18 PROCEDURE — 96401 CHEMO ANTI-NEOPL SQ/IM: CPT

## 2017-08-18 PROCEDURE — 85025 COMPLETE CBC W/AUTO DIFF WBC: CPT | Performed by: INTERNAL MEDICINE

## 2017-08-18 PROCEDURE — 74011250636 HC RX REV CODE- 250/636

## 2017-08-18 PROCEDURE — 74011000250 HC RX REV CODE- 250: Performed by: INTERNAL MEDICINE

## 2017-08-18 PROCEDURE — 36415 COLL VENOUS BLD VENIPUNCTURE: CPT | Performed by: INTERNAL MEDICINE

## 2017-08-18 RX ADMIN — BORTEZOMIB 1.4 MG: 3.5 INJECTION, POWDER, LYOPHILIZED, FOR SOLUTION INTRAVENOUS; SUBCUTANEOUS at 11:21

## 2017-08-18 NOTE — PROGRESS NOTES
Outpatient Infusion Center - Chemotherapy Progress Note     Pt admit to [facility] for Velcade/C7D15 ambulatory in stable condition. Assessment completed. No new concerns voiced. Labs drawn peripherally and sent for processing. Pt upstairs to MD appt. 1020 Return to [facility]; labs resulted, WNL; Velcade ordered    Visit Vitals    /77    Pulse 75    Temp 97.6 °F (36.4 °C)    Resp 16    Wt 88.8 kg (195 lb 12.8 oz)    BMI 33.43 kg/m2       Medications:  Velcade (SC R abd)    1130 Pt tolerated treatment well. D/c home ambulatory in no distress. Pt aware of next [facility] appointment scheduled for 09/01/2017. Recent Results (from the past 12 hour(s))   CBC WITH AUTOMATED DIFF    Collection Time: 08/18/17  9:28 AM   Result Value Ref Range    WBC 7.0 3.6 - 11.0 K/uL    RBC 3.87 3.80 - 5.20 M/uL    HGB 12.6 11.5 - 16.0 g/dL    HCT 37.9 35.0 - 47.0 %    MCV 97.9 80.0 - 99.0 FL    MCH 32.6 26.0 - 34.0 PG    MCHC 33.2 30.0 - 36.5 g/dL    RDW 14.9 (H) 11.5 - 14.5 %    PLATELET 199 606 - 997 K/uL    NEUTROPHILS 71 32 - 75 %    LYMPHOCYTES 23 12 - 49 %    MONOCYTES 5 5 - 13 %    EOSINOPHILS 1 0 - 7 %    BASOPHILS 0 0 - 1 %    ABS. NEUTROPHILS 5.0 1.8 - 8.0 K/UL    ABS. LYMPHOCYTES 1.6 0.8 - 3.5 K/UL    ABS. MONOCYTES 0.4 0.0 - 1.0 K/UL    ABS. EOSINOPHILS 0.1 0.0 - 0.4 K/UL    ABS.  BASOPHILS 0.0 0.0 - 0.1 K/UL

## 2017-08-18 NOTE — PROGRESS NOTES
Hematology follow up    REASON FOR VISIT: Multiple Myeloma    HISTORY OF PRESENT ILLNESS: Ms. Curry Antonio is a 60 y.o. female with DM and newly diagnosed Multiple Myeloma here to continue Cycle 5 of VRD- D15    Oncologic history  Patient had left facial numbness which started in the summer of 2016. This started abruptly and was not associated with rashes, pain, jaw weakness. She has had some intermittent hyperemia of the L eye. No tearing. She has been evaluated by Dr. Russell Salvage with Neurology and an extensive work up was only notable for presence of a 0.3 g/dl M spike. Other tests were unremarkable: Vitamin B12 411, thyroid function test normal, ACE 25, BHARATI normal CBC normal, CMP normal, CRP 1.17, CT head and cervical spine unremarkable. Further evaluation revealed findings consistent with Multiple Myeloma    CBC 2/24 Unremarkable. Kappa 17 mg/dl, Lambda 2416 (ratio 0.01), SPEP 0.2 g/dl M spike, HANSEL showed monoclonal free lambda light chains, UPEP negative, Beta 2 Microglobulin 1.8 mg/L, Skeletal survey negative for lytic lesions, Bone marrow biopsy on 2/24/17 showed a Normocellular marrow with mild monoclonal plasmacytosis (15-20%). Trilineage hematopoiesis with maturation. Treatment- Palliative  Pretreatment FLC : Kappa 17.9: Lambda 1978: 0.01  3/24/17: VRD Cycle 1   FLC : Kappa 10.9: Lambda 266 : 0.04  4/17/17: VRD Cycle 2 (switched to weekly Velcade)  FLC : Kappa 9.3: Lambda 73 : 0.13  5/5/17:  VRD Cycle 3  FLC : Kappa 9.3: Lambda 84 : 0.12  6/2/17: Cycle 4 with dose reduction in revlimid to 20 mg due to cramps. FLC : Kappa 9.5: Lambda 70 : 0.14  6/30/17: Cycle 5 . Stop Revlimid per [facility] BMT.  Velcade dose reduced to 0.7mg/m2 due to neuropathy  Kappa 9.4: Lambda 34: 0.27  7/28/17: C6   8/18/17: C7    Past Medical History:   Diagnosis Date    Cardiomyopathy     Diabetes mellitus type 2, controlled ([address] 12/10/2015    Heart failure ([address]     Hyperlipidemia     Hypertension     controlled    Multiple myeloma (HCC)     Orthostatic hypotension     Secondary cardiomyopathy, unspecified     Sinoatrial node dysfunction (HCC)     Vitamin B12 deficiency 3/31/2010       Past Surgical History:   Procedure Laterality Date    HX HYSTERECTOMY         Allergies   Allergen Reactions    Ace Inhibitors Cough    Compazine [Prochlorperazine] Nausea Only     rash       Current Outpatient Prescriptions   Medication Sig Dispense Refill    gabapentin (NEURONTIN) 300 mg capsule Take 1 Cap by mouth three (3) times daily. Indications: NEUROPATHIC PAIN 90 Cap 2    dexamethasone (DECADRON) 4 mg tablet TAKE 10 TABLETS BY MOUTH ONCE A WEEK ON DAYS 1, 8 AND 15 WITH CHEMOTHERAPY  2    aspirin (ASPIRIN) 325 mg tablet Take 325 mg by mouth daily.  metFORMIN (GLUCOPHAGE) 500 mg tablet TAKE 1 TABLET TWICE A DAY WITH MEALS  12    glipiZIDE (GLUCOTROL) 5 mg tablet Take 1 Tab by mouth two (2) times a day. 60 Tab 12    BORTEZOMIB INJECTION 2.6 mg by Injection route. Indications: SQ Day 1, 4, 8, 11 of each 21 Day Cycle.  pantoprazole (PROTONIX) 40 mg tablet Take 1 Tab by mouth daily. 30 Tab 6    acyclovir (ZOVIRAX) 400 mg tablet Take 1 Tab by mouth two (2) times a day. Indications: PREVENTION OF HERPES ZOSTER IN IMMUNOCOMPROMISED PATIENT 60 Tab 3    dexamethasone (DECADRON) 4 mg tablet Take 40mg (ten tablets) PO weekly on Days 1, 8, and 15 with chemotherapy  Indications: MULTIPLE MYELOMA 60 Tab 2    atorvastatin (LIPITOR) 40 mg tablet Take 1 Tab by mouth daily. For cholesterol 30 Tab 12    carvedilol (COREG) 12.5 mg tablet TAKE 1 TABLET BY MOUTH TWICE A DAY WITH MEALS 180 Tab 3    valsartan (DIOVAN) 40 mg tablet TAKE 1 TABLET BY MOUTH EVERY DAY 90 Tab 3    dexamethasone (DECADRON) 4 mg tablet Take 5 Tabs by mouth daily for 6 days.  30 Tab 0     Facility-Administered Medications Ordered in Other Visits   Medication Dose Route Frequency Provider Last Rate Last Dose    bortezomib (VELCADE) 1.4 mg in sodium chloride 0.9 % SQ chemo syringe  1.4 mg SubCUTAneous ONCE Ramez Yeh MD           Social History     Social History    Marital status:      Spouse name: N/A    Number of children: N/A    Years of education: N/A     Social History Main Topics    Smoking status: Never Smoker    Smokeless tobacco: Never Used    Alcohol use No    Drug use: No    Sexual activity: Yes     Partners: Male     Other Topics Concern    None     Social History Narrative    , 2 children, 28 and 31. Works at [employer], for 35 years. 5 grandchildren       Family History   Problem Relation Age of Onset   24 [facility] Cancer Mother     Heart Disease Mother      pacemaker   [address] [facility] Diabetes Mother    [address] [facility] Breast Cancer Mother     Hypertension Sister     Hypertension Brother     Diabetes Brother     Hypertension Sister     Hypertension Sister     Hypertension Sister     Hypertension Sister     Diabetes Sister        ROS  Numbness of feet continues. On balls of both feet. Worse with increased walking. This causing it to burn. Not painful typically. No symptoms in fingers. No shooting pain up her legs. Taking gabapentin 2 tabs, but not more than 2 times daily. She keeps forgetting to take in the morning. No new aches or pains. No dizziness or headache. Some weight gain. Appetite is stable. No mouth sores. Denies change in bowels. No diarrhea. Hasn't seen [facility] again yet. May be planning transplant in September. Continues to work full time.      ECOG PS is 0      Physical Examination:   Visit Vitals    /82    Pulse 76    Temp 97.7 °F (36.5 °C)    Resp 16    Ht 5' 4.17\" (1.63 m)    Wt 195 lb (88.5 kg)    SpO2 98%    BMI 33.29 kg/m2     General appearance - alert, well appearing, and in no distress  Mental status - oriented to person, place, and time  Eye: L upper lid stye  Mouth - mucous membranes moist, pharynx normal without lesions  Lymphatics - no palpable lymphadenopathy, no hepatosplenomegaly  Chest - clear to auscultation, no wheezes, rales or rhonchi, symmetric air entry  Heart - normal rate, regular rhythm, normal S1, S2, no murmurs, rubs, clicks or gallops  Abdomen - soft, nontender, nondistended, no masses or organomegaly, bowel sounds present  Ext: No edema    LABS  Lab Results   Component Value Date/Time    WBC 7.0 08/18/2017 09:28 AM    HGB 12.6 08/18/2017 09:28 AM    HCT 37.9 08/18/2017 09:28 AM    PLATELET 039 92/16/2264 09:28 AM    MCV 97.9 08/18/2017 09:28 AM    ABS. NEUTROPHILS 5.0 08/18/2017 09:28 AM     Lab Results   Component Value Date/Time    Sodium 144 08/04/2017 10:13 AM    Potassium 4.1 08/04/2017 10:13 AM    Chloride 109 08/04/2017 10:13 AM    CO2 30 08/04/2017 10:13 AM    Glucose 129 08/04/2017 10:13 AM    BUN 13 08/04/2017 10:13 AM    Creatinine 0.94 08/04/2017 10:13 AM    GFR est AA >60 08/04/2017 10:13 AM    GFR est non-AA >60 08/04/2017 10:13 AM    Calcium 8.8 08/04/2017 10:13 AM     Lab Results   Component Value Date/Time    AST (SGOT) 8 08/04/2017 10:13 AM    Alk. phosphatase 81 08/04/2017 10:13 AM    Protein, total 6.1 08/04/2017 10:13 AM    Protein, total 5.7 08/04/2017 10:13 AM    Albumin 3.4 08/04/2017 10:13 AM    Globulin 2.7 08/04/2017 10:13 AM    A-G Ratio 1.3 08/04/2017 10:13 AM       Bone marrow biopsy reviewed    FISH molecular analysis:    Positive for IgH gene rearrangement (IgH complex FISH study pending); Normal results with 1, 5, 9, 13, 15, and 17 (TP53) probe sets. ASSESSMENT  Ms. Sandi Salvador is a 60 y.o. female with  1. Multiple Myeloma by revised IMWG criteria (Lancet Oncol 2014), due to Involved : uninvolved serum free light chain ratio > 100. Has no anemia, hypercalcemia, bone lesions or renal failure. Likely low risk  2. 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 Poorly controlled DM. 4. Grade 2 neuropathy on Velcade    Started palliative VRD Cycle 1 on 3/24/17    Seen by Dr. Caitie Siddiqi at [facility].  Planning autotransplant after about 5- 6 cycles. Dose reduced Revlimid for cycle 4, due to cramps and held after cycle 5 per [facility] instructions. Dose reduced Velcade 1 mg/m2 given neuropathy. Dose reduced latter further to 0.7 g/m2 for cycle 5 day 8. She has had improvement in neuropathy to grade 2 and continues to have a > VGPR    I have placed a call to [facility] as the patient delayed her transplant which was scheduled on 8/11/17. I would prefer to not put her back on Revlimid if this is a 2-4 week delay only but may have to resume if > 2 months. For now will continue with Vd only    PLAN    - Continue Vd therapy, holding Revlimid. - Velcade dose reduced to 0.7mg/m2 due to neuropathy. Continue this dose. - Patient to call [facility] transplant regarding following up there for pre-transplant work up/repeat biopsy.   - Neurontin 300 mg TID, can increase to 2 tabs TID.   - ASA daily and Acyclovir BID   - Compazine prn nausea  - Protonix while on Decadron    Follow up in 1 month.      Ramez Yeh MD

## 2017-08-22 ENCOUNTER — OFFICE VISIT (OUTPATIENT)
Dept: FAMILY MEDICINE CLINIC | Age: 57
End: 2017-08-22

## 2017-08-22 VITALS
SYSTOLIC BLOOD PRESSURE: 118 MMHG | TEMPERATURE: 96.7 F | HEIGHT: 64 IN | BODY MASS INDEX: 33.29 KG/M2 | OXYGEN SATURATION: 94 % | DIASTOLIC BLOOD PRESSURE: 63 MMHG | WEIGHT: 195 LBS | RESPIRATION RATE: 14 BRPM | HEART RATE: 73 BPM

## 2017-08-22 DIAGNOSIS — Z79.4 CONTROLLED TYPE 2 DIABETES MELLITUS WITHOUT COMPLICATION, WITH LONG-TERM CURRENT USE OF INSULIN (HCC): Primary | ICD-10-CM

## 2017-08-22 DIAGNOSIS — E11.9 CONTROLLED TYPE 2 DIABETES MELLITUS WITHOUT COMPLICATION, WITH LONG-TERM CURRENT USE OF INSULIN (HCC): Primary | ICD-10-CM

## 2017-08-22 LAB
GLUCOSE POC: 139 MG/DL
HBA1C MFR BLD HPLC: 7.4 %

## 2017-08-22 NOTE — PROGRESS NOTES
HISTORY OF PRESENT ILLNESS  Eliza Coto is a 60 y.o. female. HPI In for diabetes recheck. Currently taking decadron once a week for myeloma. NO hypoglycemic episodes. Not checking blood sugars. Breathing has been doing ok. Some numbness in both feet from myeloma meds. ROS    Physical Exam   Constitutional: She is oriented to person, place, and time. She appears well-developed and well-nourished. Neck: No thyromegaly present. Cardiovascular: Normal rate, regular rhythm and normal heart sounds. No murmur heard. Pulmonary/Chest: Effort normal and breath sounds normal. She has no wheezes. Abdominal: Soft. Bowel sounds are normal. She exhibits no distension. There is no tenderness. There is no rebound and no guarding. Musculoskeletal: Normal range of motion. She exhibits no edema. Lymphadenopathy:     She has no cervical adenopathy. Neurological: She is alert and oriented to person, place, and time. Nursing note and vitals reviewed. ASSESSMENT and PLAN  Orders Placed This Encounter    AMB POC HEMOGLOBIN A1C    AMB POC GLUCOSE BLOOD, BY GLUCOSE MONITORING DEVICE     DIABETES FOOT EXAM     Diagnoses and all orders for this visit:    1. Controlled type 2 diabetes mellitus without complication, with long-term current use of insulin ([facility])  -     AMB POC HEMOGLOBIN A1C  -     AMB POC GLUCOSE BLOOD, BY GLUCOSE MONITORING DEVICE  -      DIABETES FOOT EXAM      Follow-up Disposition:  Return in about 3 months (around 11/22/2017).

## 2017-08-22 NOTE — PROGRESS NOTES
Chief Complaint   Patient presents with    Diabetes     1. Have you been to the ER, urgent care clinic since your last visit? Hospitalized since your last visit? No    2. Have you seen or consulted any other health care providers outside of the [address] [place] [address] since your last visit? Include any pap smears or colon screening.  No     Health Maintenance Due   Topic Date Due    COLONOSCOPY  09/20/1976    Pneumococcal 19-64 Highest Risk (1 of 3 - PCV13) 09/20/1977    PAP AKA CERVICAL CYTOLOGY  04/02/2015    EYE EXAM RETINAL OR DILATED Q1  04/15/2017    FOOT EXAM Q1  05/17/2017    INFLUENZA AGE 9 TO ADULT  08/01/2017     Pt refused retinal scan

## 2017-08-22 NOTE — MR AVS SNAPSHOT
Visit Information Date & Time Provider Department Dept. Phone Encounter #  
 8/22/2017  9:45 AM Abdullahi Jackson MD [facility] [phone] [id number] Follow-up Instructions Return in about 3 months (around 11/22/2017). Your Appointments 12/12/2017  9:00 AM  
ESTABLISHED PATIENT with Won Guillen MD  
[facility] at [facility]) Appt Note: 6 mo. f/u  
 [address] [place]  
[phone] [address]  
  
    
  
 8/23/2017  8:00 AM  
REMOTE OFFICE VISIT with [facility]) Appt Note: NOT AN OFFICE VISIT - REMOTE BSC BIV ICD  
 [address] [facility]  
[phone] [id number] [facility] Upcoming Health Maintenance Date Due COLONOSCOPY 9/20/1976 Pneumococcal 19-64 Highest Risk (1 of 3 - PCV13) 9/20/1977 PAP AKA CERVICAL CYTOLOGY 4/2/2015 EYE EXAM RETINAL OR DILATED Q1 4/15/2017 FOOT EXAM Q1 5/17/2017 INFLUENZA AGE 9 TO ADULT 8/1/2017 HEMOGLOBIN A1C Q6M 12/19/2017 GLAUCOMA SCREENING Q2Y 4/26/2018 MICROALBUMIN Q1 5/17/2018 LIPID PANEL Q1 5/17/2018 BREAST CANCER SCRN MAMMOGRAM 6/12/2019 DTaP/Tdap/Td series (2 - Td) 5/17/2026 Allergies as of 8/22/2017  Review Complete On: 8/22/2017 By: Abdullahi Jackson MD  
  
 Severity Noted Reaction Type Reactions Ace Inhibitors  08/06/2010    Cough Compazine [Prochlorperazine]  06/15/2017    Nausea Only  
 rash Current Immunizations  Reviewed on 8/18/2017 No immunizations on file. Not reviewed this visit You Were Diagnosed With   
  
 Codes Comments Controlled type 2 diabetes mellitus without complication, with long-term current use of insulin (HCC)    -  Primary ICD-10-CM: E11.9, Z79.4 ICD-9-CM: 250.00, V58.67 Vitals BP Pulse Temp Resp Height(growth percentile) Weight(growth percentile)  
 118/63 73 96.7 °F (35.9 °C) (Oral) 14 5' 4.17\" (1.63 m) 195 lb (88.5 kg) SpO2 BMI OB Status Smoking Status 94% 33.29 kg/m2 Hysterectomy Never Smoker BMI and BSA Data Body Mass Index Body Surface Area  
 33.29 kg/m 2 2 m 2 Preferred Pharmacy Pharmacy Name Phone [facility] [address] [place] [address] [phone] Your Updated Medication List  
  
   
This list is accurate as of: 8/22/17 11:11 AM.  Always use your most recent med list.  
  
  
  
  
 acyclovir 400 mg tablet Commonly known as:  ZOVIRAX Take 1 Tab by mouth two (2) times a day. Indications: PREVENTION OF HERPES ZOSTER IN IMMUNOCOMPROMISED PATIENT  
  
 aspirin 325 mg tablet Commonly known as:  ASPIRIN Take 325 mg by mouth daily. atorvastatin 40 mg tablet Commonly known as:  LIPITOR Take 1 Tab by mouth daily. For cholesterol BORTEZOMIB INJECTION  
2.6 mg by Injection route. Indications: SQ Day 1, 4, 8, 11 of each 21 Day Cycle. carvedilol 12.5 mg tablet Commonly known as:  COREG  
TAKE 1 TABLET BY MOUTH TWICE A DAY WITH MEALS  
  
 dexamethasone 4 mg tablet Commonly known as:  DECADRON Take 40mg (ten tablets) PO weekly on Days 1, 8, and 15 with chemotherapy  Indications: MULTIPLE MYELOMA  
  
 gabapentin 300 mg capsule Commonly known as:  NEURONTIN Take 1 Cap by mouth three (3) times daily. Indications: NEUROPATHIC PAIN  
  
 glipiZIDE 5 mg tablet Commonly known as:  Jamal Schillings Take 1 Tab by mouth two (2) times a day. metFORMIN 500 mg tablet Commonly known as:  GLUCOPHAGE  
TAKE 1 TABLET TWICE A DAY WITH MEALS  
  
 pantoprazole 40 mg tablet Commonly known as:  PROTONIX Take 1 Tab by mouth daily. valsartan 40 mg tablet Commonly known as:  DIOVAN  
TAKE 1 TABLET BY MOUTH EVERY DAY We Performed the Following AMB POC GLUCOSE BLOOD, BY GLUCOSE MONITORING DEVICE [89068 CPT(R)] AMB POC HEMOGLOBIN A1C [70260 CPT(R)]  DIABETES FOOT EXAM [HM7 Custom] Follow-up Instructions Return in about 3 months (around 11/22/2017). To-Do List   
 09/01/2017 9:00 AM  
  Appointment with [facility] ([phone])  
  
 09/08/2017 9:00 AM  
  Appointment with [facility] ([phone])  
  
 09/15/2017 10:00 AM  
  Appointment with [facility] ([phone])  
  
 09/22/2017 9:00 AM  
  Appointment with [facility] ([phone])  
  
 09/29/2017 9:00 AM  
  Appointment with [facility] ([phone]) 10/06/2017 9:00 AM  
  Appointment with [facility] ([phone])  
  
 10/13/2017 9:00 AM  
  Appointment with [facility] ([phone])  
  
 10/20/2017 9:00 AM  
  Appointment with [facility] ([phone]) Introducing [facility] SERVICES! Albin Arzate introduces [employer] patient portal. Now you can access parts of your medical record, email your doctor's office, and request medication refills online. 1. In your internet browser, go to [email or web address] 2. Click on the First Time User? Click Here link in the Sign In box. You will see the New Member Sign Up page. 3. Enter your [employer] Access Code exactly as it appears below. You will not need to use this code after youve completed the sign-up process. If you do not sign up before the expiration date, you must request a new code. · [employer] Access Code: [email or web address] Expires: 8/24/2017  3:25 PM 
 
4. Enter the last four digits of your Social Security Number (xxxx) and Date of Birth (mm/dd/yyyy) as indicated and click Submit. You will be taken to the next sign-up page. 5. Create a new test company ID. This will be your new test company login ID and cannot be changed, so think of one that is secure and easy to remember. 6. Create a new test company password. You can change your password at any time. 7. Enter your Password Reset Question and Answer. This can be used at a later time if you forget your password. 8. Enter your e-mail address. You will receive e-mail notification when new information is available in [address] 9. Click Sign Up. You can now view and download portions of your medical record. 10. Click the Download Summary menu link to download a portable copy of your medical information. If you have questions, please visit the Frequently Asked Questions section of the new test company website. Remember, new test company is NOT to be used for urgent needs. For medical emergencies, dial 911. Now available from your iPhone and Android! Please provide this summary of care documentation to your next provider. Your primary care clinician is listed as Prasanth Jain. If you have any questions after today's visit, please call [phone].

## 2017-08-23 ENCOUNTER — OFFICE VISIT (OUTPATIENT)
Dept: CARDIOLOGY CLINIC | Age: 57
End: 2017-08-23

## 2017-08-23 DIAGNOSIS — I50.22 CHRONIC SYSTOLIC HEART FAILURE (HCC): ICD-10-CM

## 2017-08-23 DIAGNOSIS — Z95.810 AICD (AUTOMATIC CARDIOVERTER/DEFIBRILLATOR) PRESENT: Primary | ICD-10-CM

## 2017-08-23 DIAGNOSIS — I42.9 CARDIOMYOPATHY, UNSPECIFIED TYPE (HCC): ICD-10-CM

## 2017-09-01 ENCOUNTER — HOSPITAL ENCOUNTER (OUTPATIENT)
Dept: INFUSION THERAPY | Age: 57
Discharge: HOME | End: 2017-09-01
Payer: COMMERCIAL

## 2017-09-01 ENCOUNTER — APPOINTMENT (OUTPATIENT)
Dept: INFUSION THERAPY | Age: 57
End: 2017-09-01

## 2017-09-01 VITALS
DIASTOLIC BLOOD PRESSURE: 78 MMHG | SYSTOLIC BLOOD PRESSURE: 150 MMHG | TEMPERATURE: 97.8 F | RESPIRATION RATE: 16 BRPM | HEART RATE: 82 BPM | BODY MASS INDEX: 32.71 KG/M2 | HEIGHT: 64 IN | WEIGHT: 191.6 LBS

## 2017-09-01 LAB
ALBUMIN SERPL-MCNC: 3.3 G/DL (ref 3.5–5)
ALBUMIN/GLOB SERPL: 0.9 {RATIO} (ref 1.1–2.2)
ALP SERPL-CCNC: 102 U/L (ref 45–117)
ALT SERPL-CCNC: 17 U/L (ref 12–78)
ANION GAP SERPL CALC-SCNC: 6 MMOL/L (ref 5–15)
AST SERPL-CCNC: 9 U/L (ref 15–37)
BASOPHILS # BLD: 0 K/UL (ref 0–0.1)
BASOPHILS NFR BLD: 0 % (ref 0–1)
BILIRUB DIRECT SERPL-MCNC: 0.1 MG/DL (ref 0–0.2)
BILIRUB SERPL-MCNC: 0.5 MG/DL (ref 0.2–1)
BUN SERPL-MCNC: 11 MG/DL (ref 6–20)
BUN/CREAT SERPL: 12 (ref 12–20)
CALCIUM SERPL-MCNC: 8.8 MG/DL (ref 8.5–10.1)
CHLORIDE SERPL-SCNC: 109 MMOL/L (ref 97–108)
CO2 SERPL-SCNC: 26 MMOL/L (ref 21–32)
CREAT SERPL-MCNC: 0.89 MG/DL (ref 0.55–1.02)
EOSINOPHIL # BLD: 0.1 K/UL (ref 0–0.4)
EOSINOPHIL NFR BLD: 1 % (ref 0–7)
ERYTHROCYTE [DISTWIDTH] IN BLOOD BY AUTOMATED COUNT: 14.3 % (ref 11.5–14.5)
GLOBULIN SER CALC-MCNC: 3.7 G/DL (ref 2–4)
GLUCOSE SERPL-MCNC: 177 MG/DL (ref 65–100)
HCT VFR BLD AUTO: 38.7 % (ref 35–47)
HGB BLD-MCNC: 13 G/DL (ref 11.5–16)
IGG SERPL-MCNC: 561 MG/DL (ref 700–1600)
LYMPHOCYTES # BLD: 1.5 K/UL (ref 0.8–3.5)
LYMPHOCYTES NFR BLD: 19 % (ref 12–49)
MCH RBC QN AUTO: 32.1 PG (ref 26–34)
MCHC RBC AUTO-ENTMCNC: 33.6 G/DL (ref 30–36.5)
MCV RBC AUTO: 95.6 FL (ref 80–99)
MONOCYTES # BLD: 0.5 K/UL (ref 0–1)
MONOCYTES NFR BLD: 7 % (ref 5–13)
NEUTS SEG # BLD: 5.8 K/UL (ref 1.8–8)
NEUTS SEG NFR BLD: 73 % (ref 32–75)
PLATELET # BLD AUTO: 316 K/UL (ref 150–400)
POTASSIUM SERPL-SCNC: 3.7 MMOL/L (ref 3.5–5.1)
PROT SERPL-MCNC: 7 G/DL (ref 6.4–8.2)
RBC # BLD AUTO: 4.05 M/UL (ref 3.8–5.2)
SODIUM SERPL-SCNC: 141 MMOL/L (ref 136–145)
WBC # BLD AUTO: 7.9 K/UL (ref 3.6–11)

## 2017-09-01 PROCEDURE — 83883 ASSAY NEPHELOMETRY NOT SPEC: CPT | Performed by: INTERNAL MEDICINE

## 2017-09-01 PROCEDURE — 74011250636 HC RX REV CODE- 250/636

## 2017-09-01 PROCEDURE — 82784 ASSAY IGA/IGD/IGG/IGM EACH: CPT | Performed by: INTERNAL MEDICINE

## 2017-09-01 PROCEDURE — 80048 BASIC METABOLIC PNL TOTAL CA: CPT | Performed by: INTERNAL MEDICINE

## 2017-09-01 PROCEDURE — 74011250636 HC RX REV CODE- 250/636: Performed by: INTERNAL MEDICINE

## 2017-09-01 PROCEDURE — 74011000250 HC RX REV CODE- 250: Performed by: INTERNAL MEDICINE

## 2017-09-01 PROCEDURE — 80076 HEPATIC FUNCTION PANEL: CPT | Performed by: INTERNAL MEDICINE

## 2017-09-01 PROCEDURE — 85025 COMPLETE CBC W/AUTO DIFF WBC: CPT | Performed by: INTERNAL MEDICINE

## 2017-09-01 PROCEDURE — 96402 CHEMO HORMON ANTINEOPL SQ/IM: CPT

## 2017-09-01 PROCEDURE — 84165 PROTEIN E-PHORESIS SERUM: CPT | Performed by: INTERNAL MEDICINE

## 2017-09-01 PROCEDURE — 36415 COLL VENOUS BLD VENIPUNCTURE: CPT | Performed by: INTERNAL MEDICINE

## 2017-09-01 RX ADMIN — SODIUM CHLORIDE 1.4 MG: 9 INJECTION INTRAMUSCULAR; INTRAVENOUS; SUBCUTANEOUS at 10:52

## 2017-09-01 NOTE — PROGRESS NOTES
Outpatient Infusion Center - Chemotherapy Progress Note    0900 Pt admit to [facility] for Velcade ambulatory in stable condition. Assessment completed. No new concerns voiced. Labs drawn and sent for processing. Labs back and reviewed. Treatment started. Chemotherapy Flowsheet 9/1/2017   Cycle C8D1   Date 9/1/2017   Drug / Regimen Velcade   Notes given          Visit Vitals    /78    Pulse 82    Temp 97.8 °F (36.6 °C)    Resp 16    Ht 5' 4\" (1.626 m)    Wt 86.9 kg (191 lb 9.6 oz)    Breastfeeding No    BMI 32.89 kg/m2       Medications:  Velcade SQ abd     1100 Pt tolerated treatment well. D/c home ambulatory in no distress. Pt aware of next appointment scheduled for 9/8/17. Recent Results (from the past 12 hour(s))   CBC WITH AUTOMATED DIFF    Collection Time: 09/01/17  8:59 AM   Result Value Ref Range    WBC 7.9 3.6 - 11.0 K/uL    RBC 4.05 3.80 - 5.20 M/uL    HGB 13.0 11.5 - 16.0 g/dL    HCT 38.7 35.0 - 47.0 %    MCV 95.6 80.0 - 99.0 FL    MCH 32.1 26.0 - 34.0 PG    MCHC 33.6 30.0 - 36.5 g/dL    RDW 14.3 11.5 - 14.5 %    PLATELET 312 273 - 844 K/uL    NEUTROPHILS 73 32 - 75 %    LYMPHOCYTES 19 12 - 49 %    MONOCYTES 7 5 - 13 %    EOSINOPHILS 1 0 - 7 %    BASOPHILS 0 0 - 1 %    ABS. NEUTROPHILS 5.8 1.8 - 8.0 K/UL    ABS. LYMPHOCYTES 1.5 0.8 - 3.5 K/UL    ABS. MONOCYTES 0.5 0.0 - 1.0 K/UL    ABS. EOSINOPHILS 0.1 0.0 - 0.4 K/UL    ABS.  BASOPHILS 0.0 0.0 - 0.1 K/UL   METABOLIC PANEL, BASIC    Collection Time: 09/01/17  8:59 AM   Result Value Ref Range    Sodium 141 136 - 145 mmol/L    Potassium 3.7 3.5 - 5.1 mmol/L    Chloride 109 (H) 97 - 108 mmol/L    CO2 26 21 - 32 mmol/L    Anion gap 6 5 - 15 mmol/L    Glucose 177 (H) 65 - 100 mg/dL    BUN 11 6 - 20 MG/DL    Creatinine 0.89 0.55 - 1.02 MG/DL    BUN/Creatinine ratio 12 12 - 20      GFR est AA >60 >60 ml/min/1.73m2    GFR est non-AA >60 >60 ml/min/1.73m2    Calcium 8.8 8.5 - 10.1 MG/DL   HEPATIC FUNCTION PANEL    Collection Time: 09/01/17  8:59 AM Result Value Ref Range    Protein, total 7.0 6.4 - 8.2 g/dL    Albumin 3.3 (L) 3.5 - 5.0 g/dL    Globulin 3.7 2.0 - 4.0 g/dL    A-G Ratio 0.9 (L) 1.1 - 2.2      Bilirubin, total 0.5 0.2 - 1.0 MG/DL    Bilirubin, direct 0.1 0.0 - 0.2 MG/DL    Alk.  phosphatase 102 45 - 117 U/L    AST (SGOT) 9 (L) 15 - 37 U/L    ALT (SGPT) 17 12 - 78 U/L   IMMUNOGLOBULIN G, QT    Collection Time: 09/01/17  8:59 AM   Result Value Ref Range    Immunoglobulin G 561 (L) 700 - 1600 mg/dL

## 2017-09-05 LAB
ALBUMIN SERPL ELPH-MCNC: 3.3 G/DL (ref 2.9–4.4)
ALBUMIN/GLOB SERPL: 1.2 {RATIO} (ref 0.7–1.7)
ALPHA1 GLOB SERPL ELPH-MCNC: 0.2 G/DL (ref 0–0.4)
ALPHA2 GLOB SERPL ELPH-MCNC: 1 G/DL (ref 0.4–1)
B-GLOBULIN SERPL ELPH-MCNC: 1 G/DL (ref 0.7–1.3)
GAMMA GLOB SERPL ELPH-MCNC: 0.5 G/DL (ref 0.4–1.8)
GLOBULIN SER CALC-MCNC: 2.7 G/DL (ref 2.2–3.9)
IGA SERPL-MCNC: 66 MG/DL (ref 87–352)
IGG SERPL-MCNC: 552 MG/DL (ref 700–1600)
IGM SERPL-MCNC: 11 MG/DL (ref 26–217)
KAPPA LC FREE SER-MCNC: 9.1 MG/L (ref 3.3–19.4)
KAPPA LC FREE/LAMBDA FREE SER: 0.23 {RATIO} (ref 0.26–1.65)
LAMBDA LC FREE SERPL-MCNC: 40.4 MG/L (ref 5.7–26.3)
M PROTEIN SERPL ELPH-MCNC: NORMAL G/DL
PROT PATTERN SERPL IFE-IMP: ABNORMAL
PROT SERPL-MCNC: 6 G/DL (ref 6–8.5)

## 2017-09-08 ENCOUNTER — TELEPHONE (OUTPATIENT)
Dept: ONCOLOGY | Age: 57
End: 2017-09-08

## 2017-09-08 ENCOUNTER — HOSPITAL ENCOUNTER (OUTPATIENT)
Dept: INFUSION THERAPY | Age: 57
Discharge: HOME | End: 2017-09-08
Payer: COMMERCIAL

## 2017-09-08 VITALS
WEIGHT: 193.8 LBS | HEART RATE: 81 BPM | HEIGHT: 64 IN | TEMPERATURE: 97.3 F | BODY MASS INDEX: 33.09 KG/M2 | SYSTOLIC BLOOD PRESSURE: 129 MMHG | DIASTOLIC BLOOD PRESSURE: 76 MMHG | RESPIRATION RATE: 16 BRPM

## 2017-09-08 DIAGNOSIS — R11.0 CHEMOTHERAPY-INDUCED NAUSEA: ICD-10-CM

## 2017-09-08 DIAGNOSIS — C90.00 MULTIPLE MYELOMA NOT HAVING ACHIEVED REMISSION (HCC): ICD-10-CM

## 2017-09-08 DIAGNOSIS — T45.1X5A CHEMOTHERAPY-INDUCED NAUSEA: ICD-10-CM

## 2017-09-08 LAB
ALBUMIN SERPL-MCNC: 3.3 G/DL (ref 3.5–5)
ALBUMIN/GLOB SERPL: 1 {RATIO} (ref 1.1–2.2)
ALP SERPL-CCNC: 88 U/L (ref 45–117)
ALT SERPL-CCNC: 18 U/L (ref 12–78)
ANION GAP SERPL CALC-SCNC: 6 MMOL/L (ref 5–15)
AST SERPL-CCNC: 8 U/L (ref 15–37)
BASOPHILS # BLD: 0 K/UL (ref 0–0.1)
BASOPHILS NFR BLD: 0 % (ref 0–1)
BILIRUB DIRECT SERPL-MCNC: 0.2 MG/DL (ref 0–0.2)
BILIRUB SERPL-MCNC: 0.7 MG/DL (ref 0.2–1)
BUN SERPL-MCNC: 9 MG/DL (ref 6–20)
BUN/CREAT SERPL: 9 (ref 12–20)
CALCIUM SERPL-MCNC: 8.7 MG/DL (ref 8.5–10.1)
CHLORIDE SERPL-SCNC: 107 MMOL/L (ref 97–108)
CO2 SERPL-SCNC: 27 MMOL/L (ref 21–32)
CREAT SERPL-MCNC: 0.97 MG/DL (ref 0.55–1.02)
EOSINOPHIL # BLD: 0.1 K/UL (ref 0–0.4)
EOSINOPHIL NFR BLD: 1 % (ref 0–7)
ERYTHROCYTE [DISTWIDTH] IN BLOOD BY AUTOMATED COUNT: 14 % (ref 11.5–14.5)
GLOBULIN SER CALC-MCNC: 3.4 G/DL (ref 2–4)
GLUCOSE SERPL-MCNC: 160 MG/DL (ref 65–100)
HCT VFR BLD AUTO: 38.5 % (ref 35–47)
HGB BLD-MCNC: 13 G/DL (ref 11.5–16)
LYMPHOCYTES # BLD: 1.7 K/UL (ref 0.8–3.5)
LYMPHOCYTES NFR BLD: 24 % (ref 12–49)
MCH RBC QN AUTO: 31.9 PG (ref 26–34)
MCHC RBC AUTO-ENTMCNC: 33.8 G/DL (ref 30–36.5)
MCV RBC AUTO: 94.4 FL (ref 80–99)
MONOCYTES # BLD: 0.4 K/UL (ref 0–1)
MONOCYTES NFR BLD: 6 % (ref 5–13)
NEUTS SEG # BLD: 4.9 K/UL (ref 1.8–8)
NEUTS SEG NFR BLD: 69 % (ref 32–75)
PLATELET # BLD AUTO: 318 K/UL (ref 150–400)
POTASSIUM SERPL-SCNC: 3.6 MMOL/L (ref 3.5–5.1)
PROT SERPL-MCNC: 6.7 G/DL (ref 6.4–8.2)
RBC # BLD AUTO: 4.08 M/UL (ref 3.8–5.2)
SODIUM SERPL-SCNC: 140 MMOL/L (ref 136–145)
WBC # BLD AUTO: 7.1 K/UL (ref 3.6–11)

## 2017-09-08 PROCEDURE — 36415 COLL VENOUS BLD VENIPUNCTURE: CPT | Performed by: INTERNAL MEDICINE

## 2017-09-08 PROCEDURE — 85025 COMPLETE CBC W/AUTO DIFF WBC: CPT | Performed by: INTERNAL MEDICINE

## 2017-09-08 PROCEDURE — 80048 BASIC METABOLIC PNL TOTAL CA: CPT | Performed by: INTERNAL MEDICINE

## 2017-09-08 PROCEDURE — 74011250636 HC RX REV CODE- 250/636

## 2017-09-08 PROCEDURE — 96402 CHEMO HORMON ANTINEOPL SQ/IM: CPT

## 2017-09-08 PROCEDURE — 80076 HEPATIC FUNCTION PANEL: CPT | Performed by: INTERNAL MEDICINE

## 2017-09-08 PROCEDURE — 74011250636 HC RX REV CODE- 250/636: Performed by: INTERNAL MEDICINE

## 2017-09-08 PROCEDURE — 74011000250 HC RX REV CODE- 250: Performed by: INTERNAL MEDICINE

## 2017-09-08 RX ORDER — DEXAMETHASONE 4 MG/1
TABLET ORAL
Qty: 60 TAB | Refills: 2 | Status: SHIPPED | OUTPATIENT
Start: 2017-09-08 | End: 2017-12-12 | Stop reason: ALTCHOICE

## 2017-09-08 RX ADMIN — SODIUM CHLORIDE 1.4 MG: 9 INJECTION INTRAMUSCULAR; INTRAVENOUS; SUBCUTANEOUS at 10:52

## 2017-09-08 NOTE — PROGRESS NOTES
Outpatient Infusion Center - Chemotherapy Progress Note    0900 Pt admit to [facility] for Velcade ambulatory in stable condition. Assessment completed. No new concerns voiced. Labs drawn and sent for processing. Labs back and reviewed and treatment ordered. Chemotherapy Flowsheet 9/8/2017   Cycle C8D8   Date 9/8/2017   Drug / Regimen Velcade    Notes given          Visit Vitals    /76    Pulse 81    Temp 97.3 °F (36.3 °C)    Resp 16    Ht 5' 4\" (1.626 m)    Wt 87.9 kg (193 lb 12.8 oz)    BMI 33.27 kg/m2       Medications:  Velcade SQ abd    1100 Pt tolerated treatment well. D/c home ambulatory in no distress. Pt aware of next appointment scheduled for 9/15/17. Recent Results (from the past 12 hour(s))   CBC WITH AUTOMATED DIFF    Collection Time: 09/08/17  9:13 AM   Result Value Ref Range    WBC 7.1 3.6 - 11.0 K/uL    RBC 4.08 3.80 - 5.20 M/uL    HGB 13.0 11.5 - 16.0 g/dL    HCT 38.5 35.0 - 47.0 %    MCV 94.4 80.0 - 99.0 FL    MCH 31.9 26.0 - 34.0 PG    MCHC 33.8 30.0 - 36.5 g/dL    RDW 14.0 11.5 - 14.5 %    PLATELET 921 638 - 228 K/uL    NEUTROPHILS 69 32 - 75 %    LYMPHOCYTES 24 12 - 49 %    MONOCYTES 6 5 - 13 %    EOSINOPHILS 1 0 - 7 %    BASOPHILS 0 0 - 1 %    ABS. NEUTROPHILS 4.9 1.8 - 8.0 K/UL    ABS. LYMPHOCYTES 1.7 0.8 - 3.5 K/UL    ABS. MONOCYTES 0.4 0.0 - 1.0 K/UL    ABS. EOSINOPHILS 0.1 0.0 - 0.4 K/UL    ABS.  BASOPHILS 0.0 0.0 - 0.1 K/UL   METABOLIC PANEL, BASIC    Collection Time: 09/08/17  9:13 AM   Result Value Ref Range    Sodium 140 136 - 145 mmol/L    Potassium 3.6 3.5 - 5.1 mmol/L    Chloride 107 97 - 108 mmol/L    CO2 27 21 - 32 mmol/L    Anion gap 6 5 - 15 mmol/L    Glucose 160 (H) 65 - 100 mg/dL    BUN 9 6 - 20 MG/DL    Creatinine 0.97 0.55 - 1.02 MG/DL    BUN/Creatinine ratio 9 (L) 12 - 20      GFR est AA >60 >60 ml/min/1.73m2    GFR est non-AA 59 (L) >60 ml/min/1.73m2    Calcium 8.7 8.5 - 10.1 MG/DL   HEPATIC FUNCTION PANEL    Collection Time: 09/08/17  9:13 AM   Result Value Ref Range    Protein, total 6.7 6.4 - 8.2 g/dL    Albumin 3.3 (L) 3.5 - 5.0 g/dL    Globulin 3.4 2.0 - 4.0 g/dL    A-G Ratio 1.0 (L) 1.1 - 2.2      Bilirubin, total 0.7 0.2 - 1.0 MG/DL    Bilirubin, direct 0.2 0.0 - 0.2 MG/DL    Alk.  phosphatase 88 45 - 117 U/L    AST (SGOT) 8 (L) 15 - 37 U/L    ALT (SGPT) 18 12 - 78 U/L

## 2017-09-15 ENCOUNTER — HOSPITAL ENCOUNTER (OUTPATIENT)
Dept: INFUSION THERAPY | Age: 57
Discharge: HOME | End: 2017-09-15
Payer: COMMERCIAL

## 2017-09-15 VITALS
WEIGHT: 194.2 LBS | TEMPERATURE: 98 F | BODY MASS INDEX: 33.16 KG/M2 | RESPIRATION RATE: 16 BRPM | SYSTOLIC BLOOD PRESSURE: 147 MMHG | HEIGHT: 64 IN | HEART RATE: 83 BPM | DIASTOLIC BLOOD PRESSURE: 81 MMHG

## 2017-09-15 LAB
BASO+EOS+MONOS # BLD AUTO: 0.3 K/UL (ref 0.2–1.2)
BASO+EOS+MONOS # BLD AUTO: 4 % (ref 3.2–16.9)
DIFFERENTIAL METHOD BLD: ABNORMAL
ERYTHROCYTE [DISTWIDTH] IN BLOOD BY AUTOMATED COUNT: 14.6 % (ref 11.8–15.8)
HCT VFR BLD AUTO: 40.5 % (ref 35–47)
HGB BLD-MCNC: 13.8 G/DL (ref 11.5–16)
LYMPHOCYTES # BLD: 1.5 K/UL (ref 0.8–3.5)
LYMPHOCYTES NFR BLD: 18 % (ref 12–49)
MCH RBC QN AUTO: 33.2 PG (ref 26–34)
MCHC RBC AUTO-ENTMCNC: 34.1 G/DL (ref 30–36.5)
MCV RBC AUTO: 97.4 FL (ref 80–99)
NEUTS SEG # BLD: 6.7 K/UL (ref 1.8–8)
NEUTS SEG NFR BLD: 78 % (ref 32–75)
PLATELET # BLD AUTO: 289 K/UL (ref 150–400)
RBC # BLD AUTO: 4.16 M/UL (ref 3.8–5.2)
WBC # BLD AUTO: 8.5 K/UL (ref 3.6–11)

## 2017-09-15 PROCEDURE — 85025 COMPLETE CBC W/AUTO DIFF WBC: CPT | Performed by: INTERNAL MEDICINE

## 2017-09-15 PROCEDURE — 74011250636 HC RX REV CODE- 250/636

## 2017-09-15 PROCEDURE — 74011250636 HC RX REV CODE- 250/636: Performed by: INTERNAL MEDICINE

## 2017-09-15 PROCEDURE — 74011000250 HC RX REV CODE- 250: Performed by: INTERNAL MEDICINE

## 2017-09-15 PROCEDURE — 36415 COLL VENOUS BLD VENIPUNCTURE: CPT | Performed by: INTERNAL MEDICINE

## 2017-09-15 PROCEDURE — 96401 CHEMO ANTI-NEOPL SQ/IM: CPT

## 2017-09-15 RX ADMIN — SODIUM CHLORIDE 1.4 MG: 9 INJECTION INTRAMUSCULAR; INTRAVENOUS; SUBCUTANEOUS at 11:36

## 2017-09-15 NOTE — PROGRESS NOTES
Outpatient [facility]fusion [facility] - Chemotherapy Progress Note    8348 Pt admit to [facility] for Cycle 8 Day 15 Velcade ambulatory in stable condition. Assessment completed. No new concerns voiced. Labs drawn peripherally by Cayla Mccabe phlebotomist and sent for processing. Chemotherapy Flowsheet 9/15/2017   Cycle C8D15   Date 9/15/2017   Drug / Regimen Velcade   Notes given         Visit Vitals    /81 (BP 1 Location: Left arm, BP Patient Position: Sitting)    Pulse 83    Temp 98 °F (36.7 °C)    Resp 16    Ht 5' 4\" (1.626 m)    Wt 88.1 kg (194 lb 3.2 oz)    Breastfeeding No    BMI 33.33 kg/m2       Medications:  Velcade SQ to abdome    1140 Pt tolerated treatment well. D/c home ambulatory in no distress. Pt aware of next appointment scheduled for 9/22/17 at 9:00 for Cycle 9 Day 1 Velcade. Recent Results (from the past 12 hour(s))   CBC WITH 3 PART DIFF    Collection Time: 09/15/17 10:09 AM   Result Value Ref Range    WBC 8.5 3.6 - 11.0 K/uL    RBC 4.16 3.80 - 5.20 M/uL    HGB 13.8 11.5 - 16.0 g/dL    HCT 40.5 35.0 - 47.0 %    MCV 97.4 80.0 - 99.0 FL    MCH 33.2 26.0 - 34.0 PG    MCHC 34.1 30.0 - 36.5 g/dL    RDW 14.6 11.8 - 15.8 %    PLATELET 395 129 - 875 K/uL    NEUTROPHILS 78 (H) 32 - 75 %    MIXED CELLS 4 3.2 - 16.9 %    LYMPHOCYTES 18 12 - 49 %    ABS. NEUTROPHILS 6.7 1.8 - 8.0 K/UL    ABS. MIXED CELLS 0.3 0.2 - 1.2 K/uL    ABS.  LYMPHOCYTES 1.5 0.8 - 3.5 K/UL    DF AUTOMATED

## 2017-09-22 ENCOUNTER — APPOINTMENT (OUTPATIENT)
Dept: INFUSION THERAPY | Age: 57
End: 2017-09-22

## 2017-09-22 ENCOUNTER — HOSPITAL ENCOUNTER (OUTPATIENT)
Dept: INFUSION THERAPY | Age: 57
Discharge: HOME | End: 2017-09-22
Payer: COMMERCIAL

## 2017-09-22 ENCOUNTER — OFFICE VISIT (OUTPATIENT)
Dept: ONCOLOGY | Age: 57
End: 2017-09-22

## 2017-09-22 VITALS
RESPIRATION RATE: 20 BRPM | WEIGHT: 195 LBS | SYSTOLIC BLOOD PRESSURE: 170 MMHG | BODY MASS INDEX: 33.29 KG/M2 | HEART RATE: 85 BPM | DIASTOLIC BLOOD PRESSURE: 87 MMHG | TEMPERATURE: 98.7 F | OXYGEN SATURATION: 97 % | HEIGHT: 64 IN

## 2017-09-22 VITALS
SYSTOLIC BLOOD PRESSURE: 153 MMHG | HEIGHT: 64 IN | RESPIRATION RATE: 16 BRPM | HEART RATE: 87 BPM | WEIGHT: 195.6 LBS | DIASTOLIC BLOOD PRESSURE: 87 MMHG | BODY MASS INDEX: 33.39 KG/M2 | TEMPERATURE: 97.9 F

## 2017-09-22 DIAGNOSIS — G62.9 NEUROPATHY: ICD-10-CM

## 2017-09-22 DIAGNOSIS — C90.00 MULTIPLE MYELOMA NOT HAVING ACHIEVED REMISSION (HCC): Primary | ICD-10-CM

## 2017-09-22 DIAGNOSIS — I42.9 CARDIOMYOPATHY, UNSPECIFIED TYPE (HCC): ICD-10-CM

## 2017-09-22 LAB
ALBUMIN SERPL-MCNC: 3.4 G/DL (ref 3.5–5)
ALBUMIN/GLOB SERPL: 1 {RATIO} (ref 1.1–2.2)
ALP SERPL-CCNC: 82 U/L (ref 45–117)
ALT SERPL-CCNC: 19 U/L (ref 12–78)
ANION GAP SERPL CALC-SCNC: 8 MMOL/L (ref 5–15)
AST SERPL-CCNC: 14 U/L (ref 15–37)
BASO+EOS+MONOS # BLD AUTO: 0.5 K/UL (ref 0.2–1.2)
BASO+EOS+MONOS # BLD AUTO: 7 % (ref 3.2–16.9)
BILIRUB SERPL-MCNC: 0.8 MG/DL (ref 0.2–1)
BUN SERPL-MCNC: 11 MG/DL (ref 6–20)
BUN/CREAT SERPL: 11 (ref 12–20)
CALCIUM SERPL-MCNC: 8.5 MG/DL (ref 8.5–10.1)
CHLORIDE SERPL-SCNC: 110 MMOL/L (ref 97–108)
CO2 SERPL-SCNC: 24 MMOL/L (ref 21–32)
CREAT SERPL-MCNC: 0.97 MG/DL (ref 0.55–1.02)
DIFFERENTIAL METHOD BLD: NORMAL
ERYTHROCYTE [DISTWIDTH] IN BLOOD BY AUTOMATED COUNT: 14.4 % (ref 11.8–15.8)
GLOBULIN SER CALC-MCNC: 3.4 G/DL (ref 2–4)
GLUCOSE SERPL-MCNC: 198 MG/DL (ref 65–100)
HCT VFR BLD AUTO: 39.9 % (ref 35–47)
HGB BLD-MCNC: 13.5 G/DL (ref 11.5–16)
LYMPHOCYTES # BLD: 1.6 K/UL (ref 0.8–3.5)
LYMPHOCYTES NFR BLD: 23 % (ref 12–49)
MCH RBC QN AUTO: 32.9 PG (ref 26–34)
MCHC RBC AUTO-ENTMCNC: 33.8 G/DL (ref 30–36.5)
MCV RBC AUTO: 97.3 FL (ref 80–99)
NEUTS SEG # BLD: 5 K/UL (ref 1.8–8)
NEUTS SEG NFR BLD: 71 % (ref 32–75)
PLATELET # BLD AUTO: 258 K/UL (ref 150–400)
POTASSIUM SERPL-SCNC: 3.7 MMOL/L (ref 3.5–5.1)
PROT SERPL-MCNC: 6.8 G/DL (ref 6.4–8.2)
RBC # BLD AUTO: 4.1 M/UL (ref 3.8–5.2)
SODIUM SERPL-SCNC: 142 MMOL/L (ref 136–145)
WBC # BLD AUTO: 7.1 K/UL (ref 3.6–11)

## 2017-09-22 PROCEDURE — 74011000250 HC RX REV CODE- 250: Performed by: INTERNAL MEDICINE

## 2017-09-22 PROCEDURE — 96401 CHEMO ANTI-NEOPL SQ/IM: CPT

## 2017-09-22 PROCEDURE — 85025 COMPLETE CBC W/AUTO DIFF WBC: CPT | Performed by: INTERNAL MEDICINE

## 2017-09-22 PROCEDURE — 80053 COMPREHEN METABOLIC PANEL: CPT | Performed by: INTERNAL MEDICINE

## 2017-09-22 PROCEDURE — 74011250636 HC RX REV CODE- 250/636

## 2017-09-22 PROCEDURE — 74011250636 HC RX REV CODE- 250/636: Performed by: INTERNAL MEDICINE

## 2017-09-22 PROCEDURE — 36415 COLL VENOUS BLD VENIPUNCTURE: CPT | Performed by: INTERNAL MEDICINE

## 2017-09-22 RX ADMIN — BORTEZOMIB 1.4 MG: 3.5 INJECTION, POWDER, LYOPHILIZED, FOR SOLUTION INTRAVENOUS; SUBCUTANEOUS at 11:26

## 2017-09-22 NOTE — PROGRESS NOTES
[facility] VISIT NOTE    0910 hrs   Pt arrived at [facility] ambulatory and in no distress for Modified VRd C9D1. Assessment completed, pt voiced no new concerns at this time. Labs drawn per orders. Pt proceeded to MD visit. Blood pressure 153/87, pulse 87, temperature 97.9 °F (36.6 °C), resp. rate 16, height 5' 4.17\" (1.63 m), weight 88.7 kg (195 lb 9.6 oz). Recent Results (from the past 12 hour(s))   CBC WITH 3 PART DIFF    Collection Time: 09/22/17  9:15 AM   Result Value Ref Range    WBC 7.1 3.6 - 11.0 K/uL    RBC 4.10 3.80 - 5.20 M/uL    HGB 13.5 11.5 - 16.0 g/dL    HCT 39.9 35.0 - 47.0 %    MCV 97.3 80.0 - 99.0 FL    MCH 32.9 26.0 - 34.0 PG    MCHC 33.8 30.0 - 36.5 g/dL    RDW 14.4 11.8 - 15.8 %    PLATELET 472 333 - 184 K/uL    NEUTROPHILS 71 32 - 75 %    MIXED CELLS 7 3.2 - 16.9 %    LYMPHOCYTES 23 12 - 49 %    ABS. NEUTROPHILS 5.0 1.8 - 8.0 K/UL    ABS. MIXED CELLS 0.5 0.2 - 1.2 K/uL    ABS. LYMPHOCYTES 1.6 0.8 - 3.5 K/UL    DF AUTOMATED     METABOLIC PANEL, COMPREHENSIVE    Collection Time: 09/22/17  9:15 AM   Result Value Ref Range    Sodium 142 136 - 145 mmol/L    Potassium 3.7 3.5 - 5.1 mmol/L    Chloride 110 (H) 97 - 108 mmol/L    CO2 24 21 - 32 mmol/L    Anion gap 8 5 - 15 mmol/L    Glucose 198 (H) 65 - 100 mg/dL    BUN 11 6 - 20 MG/DL    Creatinine 0.97 0.55 - 1.02 MG/DL    BUN/Creatinine ratio 11 (L) 12 - 20      GFR est AA >60 >60 ml/min/1.73m2    GFR est non-AA 59 (L) >60 ml/min/1.73m2    Calcium 8.5 8.5 - 10.1 MG/DL    Bilirubin, total 0.8 0.2 - 1.0 MG/DL    ALT (SGPT) 19 12 - 78 U/L    AST (SGOT) 14 (L) 15 - 37 U/L    Alk. phosphatase 82 45 - 117 U/L    Protein, total 6.8 6.4 - 8.2 g/dL    Albumin 3.4 (L) 3.5 - 5.0 g/dL    Globulin 3.4 2.0 - 4.0 g/dL    A-G Ratio 1.0 (L) 1.1 - 2.2         Medications received:  Velcade SC Abdomen    Tolerated treatment well, no adverse reaction noted. 1130 hrs   D/C'd from [facility] ambulatory and in no distress. Next appointment is 09/29/17 @ 0900 hrs.

## 2017-09-22 NOTE — PROGRESS NOTES
Hematology follow up    REASON FOR VISIT: Multiple Myeloma    HISTORY OF PRESENT ILLNESS: Ms. Patrice Franklin is a 59 y.o. female with DM and newly diagnosed Multiple Myeloma here to continue Cycle 5 of VRD- D15    Oncologic history  Patient had left facial numbness which started in the summer of 2016. This started abruptly and was not associated with rashes, pain, jaw weakness. She has had some intermittent hyperemia of the L eye. No tearing. She has been evaluated by Dr. Radha Bruce with Neurology and an extensive work up was only notable for presence of a 0.3 g/dl M spike. Other tests were unremarkable: Vitamin B12 411, thyroid function test normal, ACE 25, BHARATI normal CBC normal, CMP normal, CRP 1.17, CT head and cervical spine unremarkable. Further evaluation revealed findings consistent with Multiple Myeloma    CBC 2/24 Unremarkable. Kappa 17 mg/dl, Lambda 2416 (ratio 0.01), SPEP 0.2 g/dl M spike, HANSEL showed monoclonal free lambda light chains, UPEP negative, Beta 2 Microglobulin 1.8 mg/L, Skeletal survey negative for lytic lesions, Bone marrow biopsy on 2/24/17 showed a Normocellular marrow with mild monoclonal plasmacytosis (15-20%). Trilineage hematopoiesis with maturation. Treatment- Palliative  Pretreatment FLC : Kappa 17.9: Lambda 1978: 0.01  3/24/17: VRD Cycle 1   FLC : Kappa 10.9: Lambda 266 : 0.04  4/17/17: VRD Cycle 2 (switched to weekly Velcade)  FLC : Kappa 9.3: Lambda 73 : 0.13  5/5/17:  VRD Cycle 3  FLC : Kappa 9.3: Lambda 84 : 0.12  6/2/17: Cycle 4 with dose reduction in revlimid to 20 mg due to cramps. FLC : Kappa 9.5: Lambda 70 : 0.14  6/30/17: Cycle 5 . Stop Revlimid per [facility] BMT.  Velcade dose reduced to 0.7mg/m2 due to neuropathy  Kappa 9.4: Lambda 34: 0.27  7/28/17: C6   8/18/17: C7  9/15/17: C8    Past Medical History:   Diagnosis Date    Cardiomyopathy     Diabetes mellitus type 2, controlled ([address] 12/10/2015    Heart failure ([address]     Hyperlipidemia     Hypertension     controlled    Multiple myeloma (HCC)     Orthostatic hypotension     Secondary cardiomyopathy, unspecified     [facility] node dysfunction [facility]     Vitamin B12 deficiency 3/31/2010       Past Surgical History:   Procedure Laterality Date    HX HYSTERECTOMY         Allergies   Allergen Reactions    Ace Inhibitors Cough    Compazine [Prochlorperazine] Nausea Only     rash       Current Outpatient Prescriptions   Medication Sig Dispense Refill    dexamethasone (DECADRON) 4 mg tablet Take 40mg (ten tablets) PO weekly on Days 1, 8, and 15 with chemotherapy  Indications: MULTIPLE MYELOMA 60 Tab 2    gabapentin (NEURONTIN) 300 mg capsule Take 1 Cap by mouth three (3) times daily. Indications: NEUROPATHIC PAIN 90 Cap 2    aspirin (ASPIRIN) 325 mg tablet Take 325 mg by mouth daily.  metFORMIN (GLUCOPHAGE) 500 mg tablet TAKE 1 TABLET TWICE A DAY WITH MEALS  12    glipiZIDE (GLUCOTROL) 5 mg tablet Take 1 Tab by mouth two (2) times a day. 60 Tab 12    BORTEZOMIB INJECTION 2.6 mg by Injection route. Indications: SQ Day 1, 4, 8, 11 of each 21 Day Cycle.  pantoprazole (PROTONIX) 40 mg tablet Take 1 Tab by mouth daily. 30 Tab 6    acyclovir (ZOVIRAX) 400 mg tablet Take 1 Tab by mouth two (2) times a day. Indications: PREVENTION OF HERPES ZOSTER IN IMMUNOCOMPROMISED PATIENT 60 Tab 3    atorvastatin (LIPITOR) 40 mg tablet Take 1 Tab by mouth daily.  For cholesterol 30 Tab 12    carvedilol (COREG) 12.5 mg tablet TAKE 1 TABLET BY MOUTH TWICE A DAY WITH MEALS 180 Tab 3    valsartan (DIOVAN) 40 mg tablet TAKE 1 TABLET BY MOUTH EVERY DAY 90 Tab 3     Facility-Administered Medications Ordered in Other Visits   Medication Dose Route Frequency Provider Last Rate Last Dose    bortezomib (VELCADE) 1.4 mg in sodium chloride 0.9 % SQ chemo syringe  1.4 mg SubCUTAneous ONCE Anselmo Wagner MD           Social History     Social History    Marital status:      Spouse name: N/A    Number of children: N/A    Years of education: N/A     Social History Main Topics    Smoking status: Never Smoker    Smokeless tobacco: Never Used    Alcohol use No    Drug use: No    Sexual activity: Yes     Partners: Male     Other Topics Concern    None     Social History Narrative    , 2 children, 28 and 31. Works at [employer], for 35 years. 5 grandchildren       Family History   Problem Relation Age of Onset   24 [facility] Cancer Mother     Heart Disease Mother      pacemaker   24 [facility] Diabetes Mother     Breast Cancer Mother     Hypertension Sister     Hypertension Brother     Diabetes Brother     Hypertension Sister     Hypertension Sister     Hypertension Sister     Hypertension Sister     Diabetes Sister        ROS  Numbness in feet continues, about the same as it has been. No nausea or vomiting. No numbness/tingling in fingers/hands. Seen by [facility] on Monday, had bone marrow biopsy done. Planning to move forward with transplant on 10/11/17. ECOG PS is 0      Physical Examination:   Visit Vitals    /87    Pulse 85    Temp 98.7 °F (37.1 °C)    Resp 20    Ht 5' 4\" (1.626 m)    Wt 195 lb (88.5 kg)    SpO2 97%    BMI 33.47 kg/m2     General appearance - alert, well appearing, and in no distress  Mental status - oriented to person, place, and time  Eye: L upper lid stye  Mouth - mucous membranes moist, pharynx normal without lesions  Lymphatics - no palpable lymphadenopathy, no hepatosplenomegaly  Chest - clear to auscultation, no wheezes, rales or rhonchi, symmetric air entry  Heart - normal rate, regular rhythm, normal S1, S2, no murmurs, rubs, clicks or gallops  Abdomen - soft, nontender, nondistended, no masses or organomegaly, bowel sounds present  Ext: No edema    LABS  Lab Results   Component Value Date/Time    WBC 7.1 09/22/2017 09:15 AM    HGB 13.5 09/22/2017 09:15 AM    HCT 39.9 09/22/2017 09:15 AM    PLATELET 196 27/83/1747 09:15 AM    MCV 97.3 09/22/2017 09:15 AM    ABS.  NEUTROPHILS 5.0 09/22/2017 09:15 AM     Lab Results   Component Value Date/Time    Sodium 140 09/08/2017 09:13 AM    Potassium 3.6 09/08/2017 09:13 AM    Chloride 107 09/08/2017 09:13 AM    CO2 27 09/08/2017 09:13 AM    Glucose 160 09/08/2017 09:13 AM    BUN 9 09/08/2017 09:13 AM    Creatinine 0.97 09/08/2017 09:13 AM    GFR est AA >60 09/08/2017 09:13 AM    GFR est non-AA 59 09/08/2017 09:13 AM    Calcium 8.7 09/08/2017 09:13 AM     Lab Results   Component Value Date/Time    AST (SGOT) 8 09/08/2017 09:13 AM    Alk. phosphatase 88 09/08/2017 09:13 AM    Protein, total 6.7 09/08/2017 09:13 AM    Albumin 3.3 09/08/2017 09:13 AM    Globulin 3.4 09/08/2017 09:13 AM    A-G Ratio 1.0 09/08/2017 09:13 AM       Bone marrow biopsy reviewed    FISH molecular analysis:    Positive for IgH gene rearrangement (IgH complex FISH study pending); Normal results with 1, 5, 9, 13, 15, and 17 (TP53) probe sets. ASSESSMENT  Ms. Jules Schrader is a 59 y.o. female with  1. Multiple Myeloma by revised IMWG criteria (Lancet Oncol 2014), due to Involved : uninvolved serum free light chain ratio > 100. Has no anemia, hypercalcemia, bone lesions or renal failure. Likely low risk  2. 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 Poorly controlled DM. 4. Grade 2 neuropathy on Velcade    Started palliative VRD Cycle 1 on 3/24/17    Seen by Dr. Vibha Bourgeois at [place]. Planning autotransplant after about 5- 6 cycles. Dose reduced Revlimid for cycle 4, due to cramps and held after cycle 5 per [facility] instructions. Dose reduced Velcade 1 mg/m2 given neuropathy. Dose reduced latter further to 0.7 g/m2 for cycle 5 day 8. She has had improvement in neuropathy to grade 2 and continues to have a > VGPR      PLAN    - Continue Vd therapy, last dose Velcade today prior to transplant.   - Velcade dose reduced to 0.7mg/m2 due to neuropathy  - She is to f/u with [facility] on 9/29/17 for port placement and to start Neupogen  - Transplant scheduled for 10/11/17. - Discussed with clinical trial coordinator at [facility] to confirm Velcade dosing today. Follow up in about 2 -3 months following bone marrow transplant.       Patient was seen with Gricelda Stark MD

## 2017-09-29 ENCOUNTER — HOSPITAL ENCOUNTER (OUTPATIENT)
Dept: INFUSION THERAPY | Age: 57
End: 2017-09-29
Payer: COMMERCIAL

## 2017-10-06 ENCOUNTER — APPOINTMENT (OUTPATIENT)
Dept: INFUSION THERAPY | Age: 57
End: 2017-10-06

## 2017-10-13 ENCOUNTER — APPOINTMENT (OUTPATIENT)
Dept: INFUSION THERAPY | Age: 57
End: 2017-10-13

## 2017-10-20 ENCOUNTER — APPOINTMENT (OUTPATIENT)
Dept: INFUSION THERAPY | Age: 57
End: 2017-10-20

## 2017-11-03 ENCOUNTER — APPOINTMENT (OUTPATIENT)
Dept: INFUSION THERAPY | Age: 57
End: 2017-11-03
Payer: COMMERCIAL

## 2017-11-22 ENCOUNTER — OFFICE VISIT (OUTPATIENT)
Dept: CARDIOLOGY CLINIC | Age: 57
End: 2017-11-22

## 2017-11-22 DIAGNOSIS — Z95.810 AICD (AUTOMATIC CARDIOVERTER/DEFIBRILLATOR) PRESENT: Primary | ICD-10-CM

## 2017-11-22 DIAGNOSIS — I50.22 CHRONIC SYSTOLIC HEART FAILURE (HCC): ICD-10-CM

## 2017-11-22 DIAGNOSIS — I42.9 CARDIOMYOPATHY, UNSPECIFIED TYPE (HCC): ICD-10-CM

## 2017-11-24 ENCOUNTER — APPOINTMENT (OUTPATIENT)
Dept: INFUSION THERAPY | Age: 57
End: 2017-11-24
Payer: COMMERCIAL

## 2017-11-24 RX ORDER — ATORVASTATIN CALCIUM 40 MG/1
40 TABLET, FILM COATED ORAL DAILY
Qty: 30 TAB | Refills: 12 | Status: CANCELLED | OUTPATIENT
Start: 2017-11-24

## 2017-11-28 RX ORDER — ATORVASTATIN CALCIUM 40 MG/1
40 TABLET, FILM COATED ORAL DAILY
Qty: 30 TAB | Refills: 12 | Status: SHIPPED | OUTPATIENT
Start: 2017-11-28 | End: 2018-12-08 | Stop reason: SDUPTHER

## 2017-12-12 ENCOUNTER — OFFICE VISIT (OUTPATIENT)
Dept: CARDIOLOGY CLINIC | Age: 57
End: 2017-12-12

## 2017-12-12 VITALS
BODY MASS INDEX: 30.56 KG/M2 | DIASTOLIC BLOOD PRESSURE: 75 MMHG | HEART RATE: 89 BPM | WEIGHT: 179 LBS | SYSTOLIC BLOOD PRESSURE: 130 MMHG | OXYGEN SATURATION: 97 % | HEIGHT: 64 IN

## 2017-12-12 DIAGNOSIS — C90.00 MULTIPLE MYELOMA, REMISSION STATUS UNSPECIFIED (HCC): ICD-10-CM

## 2017-12-12 DIAGNOSIS — Z95.810 AICD (AUTOMATIC CARDIOVERTER/DEFIBRILLATOR) PRESENT: ICD-10-CM

## 2017-12-12 DIAGNOSIS — I42.9 CARDIOMYOPATHY, UNSPECIFIED TYPE (HCC): ICD-10-CM

## 2017-12-12 DIAGNOSIS — E11.9 CONTROLLED TYPE 2 DIABETES MELLITUS WITHOUT COMPLICATION, WITHOUT LONG-TERM CURRENT USE OF INSULIN (HCC): ICD-10-CM

## 2017-12-12 DIAGNOSIS — I10 ESSENTIAL HYPERTENSION: Primary | ICD-10-CM

## 2017-12-12 DIAGNOSIS — I50.22 CHRONIC SYSTOLIC HEART FAILURE (HCC): ICD-10-CM

## 2017-12-12 RX ORDER — LIDOCAINE AND PRILOCAINE 25; 25 MG/G; MG/G
CREAM TOPICAL
Refills: 5 | COMMUNITY
Start: 2017-11-27 | End: 2017-12-12 | Stop reason: ALTCHOICE

## 2017-12-12 RX ORDER — LOPERAMIDE HCL 2 MG
2 TABLET ORAL AS NEEDED
COMMUNITY

## 2017-12-12 RX ORDER — ENOXAPARIN SODIUM 100 MG/ML
INJECTION SUBCUTANEOUS
Refills: 1 | COMMUNITY
Start: 2017-10-09 | End: 2017-12-12 | Stop reason: ALTCHOICE

## 2017-12-12 RX ORDER — VALSARTAN 80 MG/1
TABLET ORAL
Refills: 6 | COMMUNITY
Start: 2017-11-29 | End: 2018-06-05 | Stop reason: SDUPTHER

## 2017-12-12 RX ORDER — ATOVAQUONE 750 MG/5ML
SUSPENSION ORAL
Refills: 4 | COMMUNITY
Start: 2017-12-07 | End: 2018-07-19

## 2017-12-12 RX ORDER — FAMOTIDINE 20 MG/1
TABLET, FILM COATED ORAL
COMMUNITY
Start: 2017-10-31 | End: 2017-12-12 | Stop reason: ALTCHOICE

## 2017-12-12 RX ORDER — RIVAROXABAN 15 MG/1
TABLET, FILM COATED ORAL
Refills: 0 | COMMUNITY
Start: 2017-11-02 | End: 2017-12-12 | Stop reason: ALTCHOICE

## 2017-12-12 RX ORDER — SULFAMETHOXAZOLE AND TRIMETHOPRIM 800; 160 MG/1; MG/1
TABLET ORAL
Refills: 6 | COMMUNITY
Start: 2017-11-08 | End: 2017-12-12 | Stop reason: ALTCHOICE

## 2017-12-12 RX ORDER — CARVEDILOL 25 MG/1
TABLET ORAL 2 TIMES DAILY
COMMUNITY
Start: 2017-10-31 | End: 2019-03-21 | Stop reason: SDUPTHER

## 2017-12-12 RX ORDER — RIVAROXABAN 20 MG/1
TABLET, FILM COATED ORAL
Refills: 1 | COMMUNITY
Start: 2017-11-02 | End: 2018-01-30 | Stop reason: ALTCHOICE

## 2017-12-12 NOTE — MR AVS SNAPSHOT
Visit Information Date & Time Provider Department Dept. Phone Encounter #  
 12/12/2017  9:00 AM Deisy Jonas MD [facility] 1 [id number]  
  
 2/21/2018  8:00 AM  
REMOTE OFFICE VISIT with [facility] [address] [facility]) Appt Note: NOT AN OFFICE VISIT - REMOTE BSC BIVICD  
 [id number] [facility]  
[phone] [address] Upcoming Health Maintenance Date Due COLONOSCOPY 9/20/1976 Pneumococcal 19-64 Highest Risk (1 of 3 - PCV13) 9/20/1977 PAP AKA CERVICAL CYTOLOGY 4/2/2015 Influenza Age 5 to Adult 8/1/2017 HEMOGLOBIN A1C Q6M 2/22/2018 MICROALBUMIN Q1 5/17/2018 LIPID PANEL Q1 5/17/2018 FOOT EXAM Q1 8/22/2018 EYE EXAM RETINAL OR DILATED Q1 9/19/2018 BREAST CANCER SCRN MAMMOGRAM 6/12/2019 GLAUCOMA SCREENING Q2Y 9/19/2019 DTaP/Tdap/Td series (2 - Td) 5/17/2026 Allergies as of 12/12/2017  Review Complete On: 9/25/2017 By: Link Moyer MD  
  
 Severity Noted Reaction Type Reactions Ace Inhibitors  08/06/2010    Cough Compazine [Prochlorperazine]  06/15/2017    Nausea Only  
 rash Current Immunizations  Reviewed on 9/22/2017 No immunizations on file. Not reviewed this visit You Were Diagnosed With   
  
 Codes Comments Essential hypertension    -  Primary ICD-10-CM: I10 
ICD-9-CM: 401.9 Chronic systolic heart failure (HCC)     ICD-10-CM: I50.22 ICD-9-CM: 428.22 Cardiomyopathy, unspecified type ([facility]     ICD-10-CM: I42.9 ICD-9-CM: 425.4 AICD (automatic cardioverter/defibrillator) present     ICD-10-CM: Z95.810 ICD-9-CM: V45.02 Vitals BP Pulse Height(growth percentile) Weight(growth percentile) SpO2 BMI  
 130/75 89 5' 4\" (1.626 m) 179 lb (81.2 kg) 97% 30.73 kg/m2 OB Status Smoking Status Hysterectomy Never Smoker Vitals History BMI and BSA Data Body Mass Index Body Surface Area 30.73 kg/m 2 1.91 m 2 Preferred Pharmacy Pharmacy Name Phone [facility], [address] [place] [address] [phone] Your Updated Medication List  
  
   
This list is accurate as of: 12/12/17  9:27 AM.  Always use your most recent med list.  
  
  
  
  
 acyclovir 400 mg tablet Commonly known as:  ZOVIRAX Take 1 Tab by mouth two (2) times a day. Indications: PREVENTION OF HERPES ZOSTER IN IMMUNOCOMPROMISED PATIENT  
  
 atorvastatin 40 mg tablet Commonly known as:  LIPITOR Take 1 Tab by mouth daily. For cholesterol  
  
 atovaquone 750 mg/5 mL suspension Commonly known as:  MEPRON  
TAKE 5 ML BY MOUTH TWICE A DAY  
  
 carvedilol 25 mg tablet Commonly known as:  COREG  
two (2) times a day. glipiZIDE 5 mg tablet Commonly known as:  Brooksie Fillers Take 1 Tab by mouth two (2) times a day. loperamide 2 mg tablet Commonly known as:  IMMODIUM Take 2 mg by mouth as needed for Diarrhea. metFORMIN 500 mg tablet Commonly known as:  GLUCOPHAGE  
TAKE 1 TABLET TWICE A DAY WITH MEALS  
  
 valsartan 80 mg tablet Commonly known as:  DIOVAN  
TAKE 2 TABLETS BY MOUTH DAILY XARELTO 20 mg Tab tablet Generic drug:  rivaroxaban TAKE 1 TABLET EVERY DAY START ON DAY 22 AFTER TAKING XARELTO 15MG We Performed the Following AMB POC EKG ROUTINE W/ 12 LEADS, INTER & REP [68227 CPT(R)] Introducing [facility]! Manpreet Mcgrath introduces [employer] patient portal. Now you can access parts of your medical record, email your doctor's office, and request medication refills online. 1. In your internet browser, go to [email or web address] 2. Click on the First Time User? Click Here link in the Sign In box. You will see the New Member Sign Up page. 3. Enter your [employer] Access Code exactly as it appears below.  You will not need to use this code after youve completed the sign-up process. If you do not sign up before the expiration date, you must request a new code. · [employer] Access Code: [email or web address] Expires: 3/12/2018  9:27 AM 
 
4. Enter the last four digits of your Social Security Number (xxxx) and Date of Birth (mm/dd/yyyy) as indicated and click Submit. You will be taken to the next sign-up page. 5. Create a [employer] ID. This will be your [employer] login ID and cannot be changed, so think of one that is secure and easy to remember. 6. Create a [employer] password. You can change your password at any time. 7. Enter your Password Reset Question and Answer. This can be used at a later time if you forget your password. 8. Enter your e-mail address. You will receive e-mail notification when new information is available in [address] 9. Click Sign Up. You can now view and download portions of your medical record. 10. Click the Download Summary menu link to download a portable copy of your medical information. If you have questions, please visit the Frequently Asked Questions section of the [employer] website. Remember, [employer] is NOT to be used for urgent needs. For medical emergencies, dial 911. Now available from your iPhone and Android! Please provide this summary of care documentation to your next provider. Your primary care clinician is listed as Richelle Skelton. If you have any questions after today's visit, please call [phone].

## 2017-12-12 NOTE — PROGRESS NOTES
[facility]   [address] [facility], [address] [place] [address]                                          NEW PATIENT HPI/FOLLOW-UP      NAME:  Lesa Crowder   :   1960   MRN:   [id number]   PCP:  Deacon Hollingsworth MD           Subjective: The patient is a 59y.o. year old female  who returns for a routine follow-up. Since the last visit, patient reports no new symptoms. Denies change in exercise tolerance, chest pain, edema, medication intolerance, palpitations, shortness of breath, PND/orthopnea wheezing, sputum, syncope, dizziness or light headedness. Doing satisfactorily. Review of Systems  General: Pt denies excessive weight gain or loss. Pt is able to conduct ADL's. Respiratory: Denies shortness of breath, BARRERA, wheezing or stridor.   Cardiovascular: Denies precordial pain, palpitations, edema or PND  Gastrointestinal: Denies poor appetite, indigestion, abdominal pain or blood in stool  Peripheral vascular: Denies claudication, leg cramps  Neuropsychiatric: Denies paresthesias,tingling,numbness,anxiety,depression,fatigue  Musculoskeletal: Denies pain,tenderness, soreness,swelling      Past Medical History:   Diagnosis Date    Cardiomyopathy     Diabetes mellitus type 2, controlled ([address] 12/10/2015    Heart failure ([address]     Hyperlipidemia     Hypertension     controlled    Multiple myeloma ([address]     Orthostatic hypotension     Secondary cardiomyopathy, unspecified     Sinoatrial node dysfunction ([address]     Vitamin B12 deficiency 3/31/2010     Patient Active Problem List    Diagnosis Date Noted    Multiple myeloma ([address]     Neuropathy 2017    Rash of face 2017    Controlled type 2 diabetes mellitus without complication, without long-term current use of insulin ([address] 2017    Multiple myeloma not having achieved remission ([address] 2017    Diabetes mellitus type 2, controlled ([address] 12/10/2015    Essential hypertension 2015    Hyperlipidemia  AICD (automatic cardioverter/defibrillator) present 09/19/2013    Chronic systolic heart failure ([facility] 04/09/2012    Obesity, unspecified 04/09/2012    Other left bundle branch block 04/09/2012    Cardiomyopathy ([facility] 03/28/2011    Vitamin B12 deficiency 03/31/2010      Past Surgical History:   Procedure Laterality Date    HX HYSTERECTOMY       Allergies   Allergen Reactions    Ace Inhibitors Cough    Compazine [Prochlorperazine] Nausea Only     rash      Family History   Problem Relation Age of Onset    Cancer Mother     Heart Disease Mother      pacemaker    Diabetes Mother     Breast Cancer Mother     Hypertension Sister     Hypertension Brother     Diabetes Brother     Hypertension Sister     Hypertension Sister     Hypertension Sister     Hypertension Sister     Diabetes Sister       Social History     Social History    Marital status:      Spouse name: N/A    Number of children: N/A    Years of education: N/A     Occupational History    Not on file. Social History Main Topics    Smoking status: Never Smoker    Smokeless tobacco: Never Used    Alcohol use No    Drug use: No    Sexual activity: Yes     Partners: Male     Other Topics Concern    Not on file     Social History Narrative    , 2 children, 29 and 31. Works at [employer], for 35 years. 5 grandchildren      Current Outpatient Prescriptions   Medication Sig    carvedilol (COREG) 25 mg tablet two (2) times a day.  atovaquone (MEPRON) 750 mg/5 mL suspension TAKE 5 ML BY MOUTH TWICE A DAY    valsartan (DIOVAN) 80 mg tablet TAKE 2 TABLETS BY MOUTH DAILY    XARELTO 20 mg tab tablet TAKE 1 TABLET EVERY DAY START ON DAY 22 AFTER TAKING XARELTO 15MG    loperamide (IMMODIUM) 2 mg tablet Take 2 mg by mouth as needed for Diarrhea.  atorvastatin (LIPITOR) 40 mg tablet Take 1 Tab by mouth daily.  For cholesterol    acyclovir (ZOVIRAX) 400 mg tablet Take 1 Tab by mouth two (2) times a day. Indications: PREVENTION OF HERPES ZOSTER IN IMMUNOCOMPROMISED PATIENT    metFORMIN (GLUCOPHAGE) 500 mg tablet TAKE 1 TABLET TWICE A DAY WITH MEALS    glipiZIDE (GLUCOTROL) 5 mg tablet Take 1 Tab by mouth two (2) times a day. No current facility-administered medications for this visit. I have reviewed the nurses notes, vitals, problem list, allergy list, medical history, family medical, social history and medications. Objective:     Physical Exam:     Vitals:    12/12/17 0857   BP: 130/75   Pulse: 89   SpO2: 97%   Weight: 179 lb (81.2 kg)   Height: 5' 4\" (1.626 m)    Body mass index is 30.73 kg/(m^2). General: Well developed, in no acute distress. HEENT: No carotid bruits, no JVD, trach is midline. Heart:  Normal S1/S2 negative S3 or S4. Regular, no murmur, gallop or rub.   Respiratory: Clear bilaterally, no wheezing or rales  Abdomen:   Soft, non-tender, bowel sounds are active.   Extremities:  No edema, normal cap refill, no cyanosis. Neuro: A&Ox3, speech clear, gait stable. Skin: Skin color is normal. No rashes or lesions. No diaphoresis.   Vascular: 2+ pulses symmetric in all extremities        Data Review:       Cardiographics:    EKG: Electronic ventricular pacemaker    Cardiology Labs:    Results for orders placed or performed during the hospital encounter of 08/06/10   EKG, 12 LEAD, INITIAL   Result Value Ref Range    Ventricular Rate 60 BPM    Atrial Rate 60 BPM    P-R Interval 92 ms    QRS Duration 146 ms    Q-T Interval 530 ms    QTC Calculation (Bezet) 530 ms    Calculated R Axis -103 degrees    Calculated T Axis 58 degrees    Diagnosis       AV sequential or dual chamber electronic pacemaker  No previous ECGs available  Confirmed by Karis Camejo ([id number]) on 8/7/2010 9:11:48 PM   Results for orders placed or performed in visit on 03/31/10   AMB POC EKG ROUTINE W/ 12 LEADS, INTER & REP    Narrative    LBBB, left ventricular hypertrophy       Lab Results   Component Value Date/Time    Cholesterol, total 182 05/17/2017 09:25 AM    HDL Cholesterol 75 05/17/2017 09:25 AM    LDL, calculated 79 05/17/2017 09:25 AM    Triglyceride 139 05/17/2017 09:25 AM    CHOL/HDL Ratio 4.3 10/08/2009 10:45 AM       Lab Results   Component Value Date/Time    Sodium 142 09/22/2017 09:15 AM    Potassium 3.7 09/22/2017 09:15 AM    Chloride 110 09/22/2017 09:15 AM    CO2 24 09/22/2017 09:15 AM    Anion gap 8 09/22/2017 09:15 AM    Glucose 198 09/22/2017 09:15 AM    BUN 11 09/22/2017 09:15 AM    Creatinine 0.97 09/22/2017 09:15 AM    BUN/Creatinine ratio 11 09/22/2017 09:15 AM    GFR est AA >60 09/22/2017 09:15 AM    GFR est non-AA 59 09/22/2017 09:15 AM    Calcium 8.5 09/22/2017 09:15 AM    Bilirubin, total 0.8 09/22/2017 09:15 AM    AST (SGOT) 14 09/22/2017 09:15 AM    Alk. phosphatase 82 09/22/2017 09:15 AM    Protein, total 6.8 09/22/2017 09:15 AM    Albumin 3.4 09/22/2017 09:15 AM    Globulin 3.4 09/22/2017 09:15 AM    A-G Ratio 1.0 09/22/2017 09:15 AM    ALT (SGPT) 19 09/22/2017 09:15 AM          Assessment:       ICD-10-CM ICD-9-CM    1. Essential hypertension I10 401.9 AMB POC EKG ROUTINE W/ 12 LEADS, INTER & REP   2. Chronic systolic heart failure (HCC) I50.22 428.22 AMB POC EKG ROUTINE W/ 12 LEADS, INTER & REP   3. Cardiomyopathy, unspecified type (HCC)--resolved I42.9 425.4    4. AICD (automatic cardioverter/defibrillator) present Z95.810 V45.02    5. Controlled type 2 diabetes mellitus without complication, without long-term current use of insulin ([facility]) E11.9 250.00    6. Multiple myeloma, remission status unspecified ([facility]) C90.00 203.00          Discussion: Patient presents at this time stable from a cardiac perspective. Cardiomyopathy resolved and SDCHF compensated. Pleased with present cardiac status. Plan: 1. Continue same meds. Lipid profile and labs followed by PCP.     2.Encouraged to exercise to tolerance, lose weight and follow low fat, low cholesterol, low sodium predominantly Plant-based (consider Mediterranean) diet. Call with questions or concerns. Will follow up any test results by phone and/or f/u here in office if needed. Christiano Mendoza 3.Follow up: 6 MONTHS    I have discussed the diagnosis with the patient and the intended plan as seen in the above orders. The patient has received an after-visit summary and questions were answered concerning future plans. I have discussed any concerning medication side effects and warnings with the patient as well.     Jonatan Carver MD  12/12/2017

## 2017-12-15 ENCOUNTER — APPOINTMENT (OUTPATIENT)
Dept: INFUSION THERAPY | Age: 57
End: 2017-12-15
Payer: COMMERCIAL

## 2018-01-11 ENCOUNTER — OFFICE VISIT (OUTPATIENT)
Dept: ONCOLOGY | Age: 58
End: 2018-01-11

## 2018-01-11 VITALS
HEIGHT: 64 IN | DIASTOLIC BLOOD PRESSURE: 82 MMHG | RESPIRATION RATE: 16 BRPM | WEIGHT: 182 LBS | HEART RATE: 81 BPM | TEMPERATURE: 97.6 F | BODY MASS INDEX: 31.07 KG/M2 | OXYGEN SATURATION: 97 % | SYSTOLIC BLOOD PRESSURE: 126 MMHG

## 2018-01-11 DIAGNOSIS — G62.9 NEUROPATHY: ICD-10-CM

## 2018-01-11 DIAGNOSIS — C90.00 MULTIPLE MYELOMA NOT HAVING ACHIEVED REMISSION (HCC): Primary | ICD-10-CM

## 2018-01-11 DIAGNOSIS — Z94.6 STATUS POST BONE TRANSPLANT: ICD-10-CM

## 2018-01-11 DIAGNOSIS — E11.9 CONTROLLED TYPE 2 DIABETES MELLITUS WITHOUT COMPLICATION, WITHOUT LONG-TERM CURRENT USE OF INSULIN (HCC): ICD-10-CM

## 2018-01-11 PROBLEM — E11.21 TYPE 2 DIABETES MELLITUS WITH NEPHROPATHY (HCC): Status: ACTIVE | Noted: 2018-01-11

## 2018-01-11 PROBLEM — E11.40 TYPE 2 DIABETES MELLITUS WITH DIABETIC NEUROPATHY (HCC): Status: ACTIVE | Noted: 2018-01-11

## 2018-01-11 NOTE — PROGRESS NOTES
Hematology follow up    REASON FOR VISIT: Multiple Myeloma    TREATMENT:   3/24/17- 9/15/17: Quinten CardenasAmada X 8   10/20/17: Autologous transplant at [address] [place] [address] Road: Ms. Ro Jenkins is a 59 y.o. female with DM and  Multiple Myeloma who presents to follow up after the ASCT     She has recovered really well from her transplant. She feels like her energy is coming back to normal.  She has no facial numbness or tingling anymore. She still has some persistent numbness of her toes. She has no fevers, chills, night sweats. Denies any cough, chest pain, shortness of breath. Has had alopecia. She has no new rashes. She has had no bleeding. Appetite and energy are improving day by day. Oncologic history  Patient had left facial numbness which started in the summer of 2016. This started abruptly and was not associated with rashes, pain, jaw weakness. She has had some intermittent hyperemia of the L eye. No tearing. She has been evaluated by Dr. Eduin Mc with Neurology and an extensive work up was only notable for presence of a 0.3 g/dl M spike. Other tests were unremarkable: Vitamin B12 411, thyroid function test normal, ACE 25, BHARATI normal CBC normal, CMP normal, CRP 1.17, CT head and cervical spine unremarkable. Further evaluation revealed findings consistent with Multiple Myeloma    CBC 2/24 Unremarkable. Kappa 17 mg/dl, Lambda 2416 (ratio 0.01), SPEP 0.2 g/dl M spike, HANSEL showed monoclonal free lambda light chains, UPEP negative, Beta 2 Microglobulin 1.8 mg/L, Skeletal survey negative for lytic lesions, Bone marrow biopsy on 2/24/17 showed a Normocellular marrow with mild monoclonal plasmacytosis (15-20%). Trilineage hematopoiesis with maturation. She had a very good partial response and 8 cycles of twice daily and then proceeded on to receive an autologous stem cell transplant on 10/20/17. She had a CR posttransplant.         Past Medical History:   Diagnosis Date    Cardiomyopathy     [facility] type [facility], [facility] 12/10/2015    Heart failure (HCC)     Hyperlipidemia     Hypertension     controlled    Multiple myeloma (HCC)     Orthostatic hypotension     Secondary cardiomyopathy, unspecified     Sinoatrial node dysfunction ([facility]     Vitamin B12 deficiency 3/31/2010       Past Surgical History:   Procedure Laterality Date    HX HYSTERECTOMY         Allergies   Allergen Reactions    Ace Inhibitors Cough    Compazine [Prochlorperazine] Nausea Only     rash       Current Outpatient Prescriptions   Medication Sig Dispense Refill    carvedilol (COREG) 25 mg tablet two (2) times a day.  atovaquone (MEPRON) 750 mg/5 mL suspension TAKE 5 ML BY MOUTH TWICE A DAY  4    valsartan (DIOVAN) 80 mg tablet TAKE 2 TABLETS BY MOUTH DAILY  6    loperamide (IMMODIUM) 2 mg tablet Take 2 mg by mouth as needed for Diarrhea.  atorvastatin (LIPITOR) 40 mg tablet Take 1 Tab by mouth daily. For cholesterol 30 Tab 12    metFORMIN (GLUCOPHAGE) 500 mg tablet TAKE 1 TABLET TWICE A DAY WITH MEALS  12    glipiZIDE (GLUCOTROL) 5 mg tablet Take 1 Tab by mouth two (2) times a day. 60 Tab 12    acyclovir (ZOVIRAX) 400 mg tablet Take 1 Tab by mouth two (2) times a day. Indications: PREVENTION OF HERPES ZOSTER IN IMMUNOCOMPROMISED PATIENT 60 Tab 3    XARELTO 20 mg tab tablet TAKE 1 TABLET EVERY DAY START ON DAY 22 AFTER TAKING XARELTO 15MG  1       Social History     Social History    Marital status:      Spouse name: N/A    Number of children: N/A    Years of education: N/A     Social History Main Topics    Smoking status: Never Smoker    Smokeless tobacco: Never Used    Alcohol use No    Drug use: No    Sexual activity: Yes     Partners: Male     Other Topics Concern    None     Social History Narrative    , 2 children, 28 and 31. Works at [employer], for 35 years.  5 grandchildren       Family History   Problem Relation Age of Onset    Cancer Mother    [facility] Heart Disease Mother      pacemaker   Tawny Sames Diabetes Mother    Ciera Felt Mother     Hypertension Sister     Hypertension Brother     Diabetes Brother     Hypertension Sister     Hypertension Sister     Hypertension Sister     Hypertension Sister     Diabetes Sister        ROS  As reviewed in the HPI all others reviewed and negative. ECOG PS is 0      Physical Examination:   Visit Vitals    /82 (BP 1 Location: Left arm, BP Patient Position: Sitting)    Pulse 81    Temp 97.6 °F (36.4 °C) (Oral)    Resp 16    Ht 5' 4\" (1.626 m)    Wt 182 lb (82.6 kg)    SpO2 97%    BMI 31.24 kg/m2     General appearance - alert, well appearing, and in no distress  Mental status - oriented to person, place, and time  Eye: L upper lid stye  Mouth - mucous membranes moist, pharynx normal without lesions  Lymphatics - no palpable lymphadenopathy, no hepatosplenomegaly  Chest - clear to auscultation, no wheezes, rales or rhonchi, symmetric air entry  Heart - normal rate, regular rhythm, normal S1, S2, no murmurs, rubs, clicks or gallops  Abdomen - soft, nontender, nondistended, no masses or organomegaly, bowel sounds present  Ext: No edema    LABS  Lab Results   Component Value Date/Time    WBC 7.1 09/22/2017 09:15 AM    HGB 13.5 09/22/2017 09:15 AM    HCT 39.9 09/22/2017 09:15 AM    PLATELET 218 18/91/3838 09:15 AM    MCV 97.3 09/22/2017 09:15 AM    ABS. NEUTROPHILS 5.0 09/22/2017 09:15 AM     Lab Results   Component Value Date/Time    Sodium 142 09/22/2017 09:15 AM    Potassium 3.7 09/22/2017 09:15 AM    Chloride 110 09/22/2017 09:15 AM    CO2 24 09/22/2017 09:15 AM    Glucose 198 09/22/2017 09:15 AM    BUN 11 09/22/2017 09:15 AM    Creatinine 0.97 09/22/2017 09:15 AM    GFR est AA >60 09/22/2017 09:15 AM    GFR est non-AA 59 09/22/2017 09:15 AM    Calcium 8.5 09/22/2017 09:15 AM     Lab Results   Component Value Date/Time    AST (SGOT) 14 09/22/2017 09:15 AM    Alk.  phosphatase 82 09/22/2017 09:15 AM Protein, total 6.8 09/22/2017 09:15 AM    Albumin 3.4 09/22/2017 09:15 AM    Globulin 3.4 09/22/2017 09:15 AM    A-G Ratio 1.0 09/22/2017 09:15 AM       Bone marrow biopsy reviewed    FISH molecular analysis:    Positive for IgH gene rearrangement (IgH complex FISH study pending); Normal results with 1, 5, 9, 13, 15, and 17 (TP53) probe sets. External records reviewed from [facility] transplant transplant bone marrow biopsy done on 12/19/17 showed no evidence of plasma cells. Variable cellularity from 0-50%, flow LUIS, FISH negative        ASSESSMENT  Ms. Yessica Esparza is a 59 y.o. female with standard risk multiple myeloma status post 6 cycles of twice daily and autologous stem cell transplant on 10/20/17. She is in a complete remission. Today we reviewed external records and went over recommendations for post transplant care from [facility]. We will continue with close surveillance in conjunction with her care team at [facility]  Multiple myeloma  -Further recommendations we will obtain a gammopathy evaluation including 24 hour urine for UPEP, beta-2 microglobulin, CBC, differential, CMP on 1/29/18 and repeat this every 3 months until she is 1 year out from her transplant and thereafter move on to -6 monthly labs. -We will start maintenance Revlimid at 10 mg continuously. If she tolerates this well we will plan to increase to 15 mg.  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 Discussed that this may take several months to improve and some of it may be permanent    Posttransplant follow-up and care        Flowsheet reviewed from [facility]. If Revlimid is well-tolerated  Return to clinic in 3 months.     James Braga MD

## 2018-01-11 NOTE — PROGRESS NOTES
Pancho So is a 59 y.o. female  Chief Complaint   Patient presents with    Multiple Myeloma     transplant 10/2017     Visit Vitals    /82 (BP 1 Location: Left arm, BP Patient Position: Sitting)    Pulse 81    Temp 97.6 °F (36.4 °C) (Oral)    Resp 16    Ht 5' 4\" (1.626 m)    Wt 182 lb (82.6 kg)    SpO2 97%    BMI 31.24 kg/m2     1. Have you been to the ER, urgent care clinic since your last visit? Hospitalized since your last visit? No    2. Have you seen or consulted any other health care providers outside of the [address] [place] [address] since your last visit? Include any pap smears or colon screening.  No

## 2018-01-12 RX ORDER — LENALIDOMIDE 10 MG/1
10 CAPSULE ORAL DAILY
Qty: 30 CAP | Refills: 0 | Status: SHIPPED | OUTPATIENT
Start: 2018-01-12 | End: 2018-07-27 | Stop reason: DRUGHIGH

## 2018-01-15 PROBLEM — Z94.6: Status: ACTIVE | Noted: 2018-01-15

## 2018-01-22 ENCOUNTER — TELEPHONE (OUTPATIENT)
Dept: ONCOLOGY | Age: 58
End: 2018-01-22

## 2018-01-22 NOTE — TELEPHONE ENCOUNTER
Call received from patient, HIPAA verified. Patient states that she has not received Revlimid and has not been contacted by specialty pharmacy regarding delivery. Call to [address], per Colin due to insurance they no longer will be able to supply patients medication. Patients prescription was transferred to [place] [address]. Per Marialuisa Alcala, they spoke with Anna at [place] [address] on 1/18 who stated that they received and began processing prescription. (call [email or web address])    Call to [place] [address], spoke with Edna Pope, on hold for 15 min and disconnected due to power outage. Call returned to [address] [place] [address], spoke with [facility] SHANTAL, new authorization code is needed to process prescription. Current authorization code is showing as invalid, will need to contact [facility] for new auth number. (call time 26min)    Call to [address] [facility], spoke with Martita García, old authorization number was deactivated when duplicate patient profile was deleted. New authorization number received. [id number]. Patient celgene ID [id number]. (call time 20min)    Call returned to [address] [facility], spoke with Charmayne Officer, authorization number given, they will process prescription and contact patient to set up delivery. (call time 6min)    Call returned to patient, HIPAA verified. Updated that she should be contacted by specialty pharmacy to set up delivery, advised to update our office of any issues. Patient verbalized understanding and thanked for assistance.

## 2018-01-24 ENCOUNTER — TELEPHONE (OUTPATIENT)
Dept: ONCOLOGY | Age: 58
End: 2018-01-24

## 2018-01-24 NOTE — TELEPHONE ENCOUNTER
Call received from patient, HIPAA verified. Patient states that she received Revlimid today. She will begin taking this evening. States that she does not have any questions at this time in regards to medications. Will be having labs drawn on Monday 1/29/18 that were ordered at last provider visit. Requesting call with results. States that she will need to see provider before 3 month follow up as she will be needing to return to work soon and needs a provider visit to do so. Advised that she can make appointment with provider as needed. Thanked for assistance.

## 2018-01-29 DIAGNOSIS — C90.00 MULTIPLE MYELOMA NOT HAVING ACHIEVED REMISSION (HCC): ICD-10-CM

## 2018-01-30 ENCOUNTER — OFFICE VISIT (OUTPATIENT)
Dept: FAMILY MEDICINE CLINIC | Age: 58
End: 2018-01-30

## 2018-01-30 VITALS
OXYGEN SATURATION: 97 % | SYSTOLIC BLOOD PRESSURE: 156 MMHG | DIASTOLIC BLOOD PRESSURE: 89 MMHG | WEIGHT: 187.4 LBS | HEIGHT: 64 IN | HEART RATE: 73 BPM | BODY MASS INDEX: 31.99 KG/M2 | TEMPERATURE: 96.3 F | RESPIRATION RATE: 14 BRPM

## 2018-01-30 DIAGNOSIS — H81.10 BENIGN PAROXYSMAL POSITIONAL VERTIGO, UNSPECIFIED LATERALITY: ICD-10-CM

## 2018-01-30 DIAGNOSIS — M79.605 LEFT LEG PAIN: Primary | ICD-10-CM

## 2018-01-30 RX ORDER — MECLIZINE HYDROCHLORIDE 25 MG/1
TABLET ORAL
Qty: 30 TAB | Refills: 1 | Status: SHIPPED | OUTPATIENT
Start: 2018-01-30 | End: 2018-07-19

## 2018-01-30 NOTE — PROGRESS NOTES
Chief Complaint   Patient presents with    Dizziness    Other     only left leg. .   \"feels warmer than normal  inside of leg \"      Peripheral Neuropathy     1. Have you been to the ER, urgent care clinic since your last visit? Hospitalized since your last visit? No    2. Have you seen or consulted any other health care providers outside of the [address] [place] [address] since your last visit? Include any pap smears or colon screening.  No       Health Maintenance Due   Topic Date Due    COLONOSCOPY  09/20/1976    PAP AKA CERVICAL CYTOLOGY  04/02/2015    HEMOGLOBIN A1C Q6M  02/22/2018

## 2018-01-30 NOTE — MR AVS SNAPSHOT
[facility] 
 
 
 [address] [facility] [place] [address] [address] 
[phone] Patient: Merlene Albright MRN: [id number] BJR:7/59/[id number] Visit Information Date & Time Provider Department Dept. Phone Encounter #  
 1/30/2018 10:30 AM Shola Abarca MD [address] [phone] [id number] Your Appointments 6/12/2018  9:15 AM  
ESTABLISHED PATIENT with Ronnie Rees MD  
[facility]) Appt Note: 6 MO. F/U  
 [address] [place]  
[phone] [address]  
  
    
  
 2/21/2018  8:00 AM  
REMOTE OFFICE VISIT with [facility]) Appt Note: NOT AN OFFICE VISIT - REMOTE BSC BIVICD  
 [id number] 705 [facility]  
[phone] [facility] Upcoming Health Maintenance Date Due COLONOSCOPY 9/20/1976 PAP AKA CERVICAL CYTOLOGY 4/2/2015 HEMOGLOBIN A1C Q6M 2/22/2018 MICROALBUMIN Q1 5/17/2018 LIPID PANEL Q1 5/17/2018 FOOT EXAM Q1 8/22/2018 EYE EXAM RETINAL OR DILATED Q1 9/19/2018 Pneumococcal 19-64 Highest Risk (2 of 3 - PCV13) 1/30/2019 BREAST CANCER SCRN MAMMOGRAM 6/12/2019 GLAUCOMA SCREENING Q2Y 9/19/2019 DTaP/Tdap/Td series (2 - Td) 5/17/2026 Allergies as of 1/30/2018  Review Complete On: 1/30/2018 By: Shola Abarca MD  
  
 Severity Noted Reaction Type Reactions Ace Inhibitors  08/06/2010    Cough Compazine [Prochlorperazine]  06/15/2017    Nausea Only  
 rash Current Immunizations  Reviewed on 1/11/2018 No immunizations on file. Not reviewed this visit You Were Diagnosed With   
  
 Codes Comments Left leg pain    -  Primary ICD-10-CM: M79.605 ICD-9-CM: 729.5 Benign paroxysmal positional vertigo, unspecified laterality     ICD-10-CM: H81.10 ICD-9-CM: 386.11 Vitals BP Pulse Temp Resp Height(growth percentile) Weight(growth percentile) 156/89 73 96.3 °F (35.7 °C) (Oral) 14 5' 4\" (1.626 m) 187 lb 6.4 oz (85 kg) SpO2 BMI OB Status Smoking Status 97% 32.17 kg/m2 Hysterectomy Never Smoker Vitals History BMI and BSA Data Body Mass Index Body Surface Area  
 32.17 kg/m 2 1.96 m 2 Preferred Pharmacy Pharmacy Name Phone [facility] Marla Keys, [address] [place] [address] [phone] Your Updated Medication List  
  
   
This list is accurate as of: 1/30/18 11:39 AM.  Always use your most recent med list.  
  
  
  
  
 acyclovir 400 mg tablet Commonly known as:  ZOVIRAX Take 1 Tab by mouth two (2) times a day. Indications: PREVENTION OF HERPES ZOSTER IN IMMUNOCOMPROMISED PATIENT  
  
 atorvastatin 40 mg tablet Commonly known as:  LIPITOR Take 1 Tab by mouth daily. For cholesterol  
  
 atovaquone 750 mg/5 mL suspension Commonly known as:  MEPRON  
TAKE 5 ML BY MOUTH TWICE A DAY  
  
 carvedilol 25 mg tablet Commonly known as:  COREG  
two (2) times a day. glipiZIDE 5 mg tablet Commonly known as:  Karlyne Bridges Take 1 Tab by mouth two (2) times a day. lenalidomide 10 mg Cap Commonly known as:  REVLIMID Take 1 Cap by mouth daily. loperamide 2 mg tablet Commonly known as:  IMMODIUM Take 2 mg by mouth as needed for Diarrhea. meclizine 25 mg tablet Commonly known as:  ANTIVERT One every 8 hrs as needed for vertigo  
  
 metFORMIN 500 mg tablet Commonly known as:  GLUCOPHAGE  
TAKE 1 TABLET TWICE A DAY WITH MEALS  
  
 valsartan 80 mg tablet Commonly known as:  DIOVAN  
TAKE 2 TABLETS BY MOUTH DAILY Prescriptions Sent to Pharmacy Refills  
 meclizine (ANTIVERT) 25 mg tablet 1 Sig: One every 8 hrs as needed for vertigo Class: Normal  
 Pharmacy: [address] [facility], [address] [place] [address] #: [phone] To-Do List   
 01/30/2018 Imaging:  DUPLEX LOWER EXT VENOUS BILAT   
  
 01/30/2018 Imaging:  XR TIB/FIB LT Introducing [facility] & HEALTH SERVICES! Cristel Navarro introduces [employer]ante patient portal. Now you can access parts of your medical record, email your doctor's office, and request medication refills online. 1. In your internet browser, go to [email or web address] 2. Click on the First Time User? Click Here link in the Sign In box. You will see the New Member Sign Up page. 3. Enter your Lavante Access Code exactly as it appears below. You will not need to use this code after youve completed the sign-up process. If you do not sign up before the expiration date, you must request a new code. · Lavante Access Code: [email or web address] Expires: 3/12/2018  9:27 AM 
 
4. Enter the last four digits of your Social Security Number (xxxx) and Date of Birth (mm/dd/yyyy) as indicated and click Submit. You will be taken to the next sign-up page. 5. Create a Lavante ID. This will be your Lavante login ID and cannot be changed, so think of one that is secure and easy to remember. 6. Create a Lavante password. You can change your password at any time. 7. Enter your Password Reset Question and Answer. This can be used at a later time if you forget your password. 8. Enter your e-mail address. You will receive e-mail notification when new information is available in [address] 9. Click Sign Up. You can now view and download portions of your medical record. 10. Click the Download Summary menu link to download a portable copy of your medical information. If you have questions, please visit the Frequently Asked Questions section of the Lavante website. Remember, Lavante is NOT to be used for urgent needs. For medical emergencies, dial 911. Now available from your iPhone and Android! Please provide this summary of care documentation to your next provider. Your primary care clinician is listed as Cedric Talavera. If you have any questions after today's visit, please call [phone].

## 2018-01-30 NOTE — PROGRESS NOTES
HISTORY OF PRESENT ILLNESS  Smith Sun is a 59 y.o. female. HPI Has been having vertigo for one week, feels like room spins, and that will fall, seems to be worse when stands up. Can also be precipitated by lying in bed, and moving her head. No associated nausea, vomiting. Hearing has been ok, no tinnitus. Started taking Revlimid 10 mg last Wednesday. CAnt remember if vertigo came on before this or not. Has had vertigo a few years ago, go better after taking meds. Has also had burning in L calf for over one week. This is usually worse when lies down. Pain not any worse when walks. No real swelling in this leg. ROS    Physical Exam   Constitutional: She appears well-developed and well-nourished. HENT:   Right Ear: External ear normal.   Left Ear: External ear normal.   Mouth/Throat: Oropharynx is clear and moist.   Neck: No thyromegaly present. Cardiovascular: Normal rate, regular rhythm, normal heart sounds and intact distal pulses. Pulmonary/Chest: Breath sounds normal. She has no wheezes. Abdominal: Soft. Bowel sounds are normal. She exhibits no distension and no mass. There is no tenderness. Musculoskeletal: She exhibits no edema. L knee- full rom  L calf- mild tenderness to palpation   Lymphadenopathy:     She has no cervical adenopathy. Neurological: No cranial nerve deficit. Nystagmus present when lies down. Cer- fnf intact  Gross motor intact  Gait- normal  Heel to toe - normal       Nursing note and vitals reviewed.       ASSESSMENT and PLAN  Orders Placed This Encounter    XR TIB/FIB LT     Standing Status:   Future     Standing Expiration Date:   2/28/2019     Order Specific Question:   Reason for Exam     Answer:   pain L calf, hx myeloma    DUPLEX LOWER EXT VENOUS BILAT     Standing Status:   Future     Standing Expiration Date:   2/28/2019    meclizine (ANTIVERT) 25 mg tablet     Sig: One every 8 hrs as needed for vertigo     Dispense:  30 Tab     Refill:  1     Orders Placed This Encounter    XR TIB/FIB LT    DUPLEX LOWER EXT VENOUS BILAT    meclizine (ANTIVERT) 25 mg tablet     Diagnoses and all orders for this visit:    1. Left leg pain  -     XR TIB/FIB LT; Future  -     DUPLEX LOWER EXT VENOUS BILAT; Future    2. Benign paroxysmal positional vertigo, unspecified laterality    Other orders  -     meclizine (ANTIVERT) 25 mg tablet;  One every 8 hrs as needed for vertigo

## 2018-01-31 LAB
ALBUMIN 24H MFR UR ELPH: 22.7 %
ALPHA1 GLOB 24H MFR UR ELPH: 7.4 %
ALPHA2 GLOB 24H MFR UR ELPH: 23.2 %
B-GLOBULIN MFR UR ELPH: 32.5 %
GAMMA GLOB 24H MFR UR ELPH: 14.2 %
INTERPRETATION UR IFE-IMP: ABNORMAL
M PROTEIN 24H MFR UR ELPH: ABNORMAL %
NOTE, 149533: ABNORMAL
PROT 24H UR-MRATE: 186 MG/24 HR (ref 30–150)
PROT UR-MCNC: 10.6 MG/DL

## 2018-02-01 ENCOUNTER — HOSPITAL ENCOUNTER (OUTPATIENT)
Dept: GENERAL RADIOLOGY | Age: 58
Discharge: HOME | End: 2018-02-01
Payer: COMMERCIAL

## 2018-02-01 ENCOUNTER — HOSPITAL ENCOUNTER (OUTPATIENT)
Dept: VASCULAR SURGERY | Age: 58
Discharge: HOME | End: 2018-02-01
Attending: FAMILY MEDICINE
Payer: COMMERCIAL

## 2018-02-01 DIAGNOSIS — M79.605 LEFT LEG PAIN: ICD-10-CM

## 2018-02-01 DIAGNOSIS — M79.606 LEG PAIN: ICD-10-CM

## 2018-02-01 LAB
ALBUMIN SERPL ELPH-MCNC: 3.7 G/DL (ref 2.9–4.4)
ALBUMIN SERPL-MCNC: 4.2 G/DL (ref 3.5–5.5)
ALBUMIN/GLOB SERPL: 1.5 {RATIO} (ref 0.7–1.7)
ALBUMIN/GLOB SERPL: 2 {RATIO} (ref 1.2–2.2)
ALP SERPL-CCNC: 79 IU/L (ref 39–117)
ALPHA1 GLOB SERPL ELPH-MCNC: 0.2 G/DL (ref 0–0.4)
ALPHA2 GLOB SERPL ELPH-MCNC: 0.8 G/DL (ref 0.4–1)
ALT SERPL-CCNC: 15 IU/L (ref 0–32)
AST SERPL-CCNC: 11 IU/L (ref 0–40)
B-GLOBULIN SERPL ELPH-MCNC: 1 G/DL (ref 0.7–1.3)
BASOPHILS # BLD AUTO: 0 X10E3/UL (ref 0–0.2)
BASOPHILS NFR BLD AUTO: 0 %
BILIRUB SERPL-MCNC: 0.6 MG/DL (ref 0–1.2)
BUN SERPL-MCNC: 8 MG/DL (ref 6–24)
BUN/CREAT SERPL: 9 (ref 9–23)
CALCIUM SERPL-MCNC: 9 MG/DL (ref 8.7–10.2)
CHLORIDE SERPL-SCNC: 105 MMOL/L (ref 96–106)
CO2 SERPL-SCNC: 22 MMOL/L (ref 18–29)
CREAT SERPL-MCNC: 0.87 MG/DL (ref 0.57–1)
EOSINOPHIL # BLD AUTO: 0.1 X10E3/UL (ref 0–0.4)
EOSINOPHIL NFR BLD AUTO: 2 %
ERYTHROCYTE [DISTWIDTH] IN BLOOD BY AUTOMATED COUNT: 15.3 % (ref 12.3–15.4)
GAMMA GLOB SERPL ELPH-MCNC: 0.6 G/DL (ref 0.4–1.8)
GFR SERPLBLD CREATININE-BSD FMLA CKD-EPI: 73 ML/MIN/1.73
GFR SERPLBLD CREATININE-BSD FMLA CKD-EPI: 84 ML/MIN/1.73
GLOBULIN SER CALC-MCNC: 2.1 G/DL (ref 1.5–4.5)
GLOBULIN SER-MCNC: 2.6 G/DL (ref 2.2–3.9)
GLUCOSE SERPL-MCNC: 165 MG/DL (ref 65–99)
HCT VFR BLD AUTO: 37.6 % (ref 34–46.6)
HGB BLD-MCNC: 12.4 G/DL (ref 11.1–15.9)
IGA SERPL-MCNC: 54 MG/DL (ref 87–352)
IGG SERPL-MCNC: 696 MG/DL (ref 700–1600)
IGM SERPL-MCNC: 11 MG/DL (ref 26–217)
IMM GRANULOCYTES # BLD: 0 X10E3/UL (ref 0–0.1)
IMM GRANULOCYTES NFR BLD: 0 %
INTERPRETATION SERPL IEP-IMP: ABNORMAL
KAPPA LC FREE SER-MCNC: 6.3 MG/L (ref 3.3–19.4)
KAPPA LC FREE/LAMBDA FREE SER: 0.9 {RATIO} (ref 0.26–1.65)
LAMBDA LC FREE SERPL-MCNC: 7 MG/L (ref 5.7–26.3)
LYMPHOCYTES # BLD AUTO: 1.6 X10E3/UL (ref 0.7–3.1)
LYMPHOCYTES NFR BLD AUTO: 33 %
M PROTEIN SERPL ELPH-MCNC: ABNORMAL G/DL
MCH RBC QN AUTO: 32.2 PG (ref 26.6–33)
MCHC RBC AUTO-ENTMCNC: 33 G/DL (ref 31.5–35.7)
MCV RBC AUTO: 98 FL (ref 79–97)
MONOCYTES # BLD AUTO: 0.4 X10E3/UL (ref 0.1–0.9)
MONOCYTES NFR BLD AUTO: 9 %
NEUTROPHILS # BLD AUTO: 2.7 X10E3/UL (ref 1.4–7)
NEUTROPHILS NFR BLD AUTO: 56 %
PLATELET # BLD AUTO: 239 X10E3/UL (ref 150–379)
PLEASE NOTE:, 149534: ABNORMAL
POTASSIUM SERPL-SCNC: 3.7 MMOL/L (ref 3.5–5.2)
PROT SERPL-MCNC: 6.3 G/DL (ref 6–8.5)
RBC # BLD AUTO: 3.85 X10E6/UL (ref 3.77–5.28)
SODIUM SERPL-SCNC: 144 MMOL/L (ref 134–144)
WBC # BLD AUTO: 4.8 X10E3/UL (ref 3.4–10.8)

## 2018-02-01 PROCEDURE — 93970 EXTREMITY STUDY: CPT

## 2018-02-01 PROCEDURE — 73590 X-RAY EXAM OF LOWER LEG: CPT

## 2018-02-01 NOTE — PROCEDURES
[facility]  *** FINAL REPORT ***    Name: Remer Sacks  MRN: [id number]    Outpatient  : 20 Sep 1960  HIS Order #: [id number]  [address] [facility] Visit #: [id number]  Date: 2018    TYPE OF TEST: Peripheral Venous Testing    REASON FOR TEST  Pain in limb, Limb swelling    Right Leg:-  Deep venous thrombosis:           No  Superficial venous thrombosis:    No  Deep venous insufficiency:        No  Superficial venous insufficiency: No    Left Leg:-  Deep venous thrombosis:           No  Superficial venous thrombosis:    No  Deep venous insufficiency:        No  Superficial venous insufficiency: No      INTERPRETATION/FINDINGS  PROCEDURE:  Venous duplex examination using B-mode, color flow and  spectral Doppler of the lower extremity veins. Right leg :  1. Deep vein(s) visualized include the common femoral, proximal  femoral, mid femoral, distal femoral, popliteal(above knee),  popliteal(fossa), popliteal(below knee), posterior tibial and peroneal   veins. 2. No evidence of deep venous thrombosis detected in the veins  visualized. 3. Superficial vein(s) visualized include the great saphenous vein. 4. No evidence of superficial thrombosis detected. 5. No evidence of reflux detected in the deep veins visualized. 6. No evidence of reflux detected in the superficial veins visualized. Left leg :  1. Deep vein(s) visualized include the common femoral, proximal  femoral, mid femoral, distal femoral, popliteal(above knee),  popliteal(fossa), popliteal(below knee), posterior tibial and peroneal   veins. 2. No evidence of deep venous thrombosis detected in the veins  visualized. 3. Superficial vein(s) visualized include the great saphenous vein. 4. No evidence of superficial thrombosis detected. 5. No evidence of reflux detected in the deep veins visualized. 6. No evidence of reflux detected in the superficial veins visualized.     ADDITIONAL COMMENTS    I have personally reviewed the data relevant to the interpretation of  this  study.     TECHNOLOGIST: Ellen Horn RVT  Signed: 02/01/2018 02:36 PM    PHYSICIAN: Mayur Falcon MD  Signed: 02/02/2018 05:11 PM

## 2018-02-01 NOTE — PROGRESS NOTES
[address] [facility]  Preliminary Report:  Venous Duplex Leg    Bilateral leg venous duplex was performed. All deep and superficial veins appear compressible with normal Doppler characteristics. Final report to follow.

## 2018-02-05 ENCOUNTER — TELEPHONE (OUTPATIENT)
Dept: ONCOLOGY | Age: 58
End: 2018-02-05

## 2018-02-05 NOTE — TELEPHONE ENCOUNTER
Call to patient, HIPAA verified. Inquired about if our office had filled out FMLA documentation for patient. Patient states that she had short term disability filled out and sent to employer by [facility] transplant. Advised that since they had taken her out of work, they would need to authorize her returning to work. If additional documentation was needed from our office, we would be able to assist. Patient verbalized understanding and will contact [facility].  Thanked for assistance, will update if our office needs to assist.

## 2018-02-05 NOTE — TELEPHONE ENCOUNTER
Patient called requesting a return call. Patient stated she is returning back to work on 2/28 and her manager is requesting a release back to work letter. Please return patient's call when ready [phone].   gurvinder

## 2018-02-13 ENCOUNTER — TELEPHONE (OUTPATIENT)
Dept: ONCOLOGY | Age: 58
End: 2018-02-13

## 2018-02-13 NOTE — TELEPHONE ENCOUNTER
No follow up appointment scheduled. Call to patient. HIPAA verified. Without acute problems. Taking revlimid daily. .  We will fax lab order form to [facility] to be drawn prior to next appointment. Transferred to  for appointment next week if possible.

## 2018-02-15 ENCOUNTER — DOCUMENTATION ONLY (OUTPATIENT)
Dept: ONCOLOGY | Age: 58
End: 2018-02-15

## 2018-02-15 DIAGNOSIS — C90.00 MULTIPLE MYELOMA NOT HAVING ACHIEVED REMISSION (HCC): ICD-10-CM

## 2018-02-15 DIAGNOSIS — C90.00 MULTIPLE MYELOMA NOT HAVING ACHIEVED REMISSION (HCC): Primary | ICD-10-CM

## 2018-02-19 ENCOUNTER — OFFICE VISIT (OUTPATIENT)
Dept: FAMILY MEDICINE CLINIC | Age: 58
End: 2018-02-19

## 2018-02-19 VITALS
TEMPERATURE: 96.3 F | RESPIRATION RATE: 14 BRPM | WEIGHT: 182.2 LBS | HEART RATE: 87 BPM | SYSTOLIC BLOOD PRESSURE: 129 MMHG | HEIGHT: 64 IN | BODY MASS INDEX: 31.1 KG/M2 | OXYGEN SATURATION: 98 % | DIASTOLIC BLOOD PRESSURE: 82 MMHG

## 2018-02-19 DIAGNOSIS — Z79.4 CONTROLLED TYPE 2 DIABETES MELLITUS WITHOUT COMPLICATION, WITH LONG-TERM CURRENT USE OF INSULIN (HCC): Primary | ICD-10-CM

## 2018-02-19 DIAGNOSIS — E11.9 CONTROLLED TYPE 2 DIABETES MELLITUS WITHOUT COMPLICATION, WITH LONG-TERM CURRENT USE OF INSULIN (HCC): Primary | ICD-10-CM

## 2018-02-19 DIAGNOSIS — J40 BRONCHITIS: ICD-10-CM

## 2018-02-19 LAB
GLUCOSE POC: 126 MG/DL
HBA1C MFR BLD HPLC: 6.7 %

## 2018-02-19 RX ORDER — PROMETHAZINE HYDROCHLORIDE AND DEXTROMETHORPHAN HYDROBROMIDE 6.25; 15 MG/5ML; MG/5ML
5 SYRUP ORAL
Qty: 240 ML | Refills: 2 | Status: SHIPPED | OUTPATIENT
Start: 2018-02-19 | End: 2018-06-12 | Stop reason: SDUPTHER

## 2018-02-19 RX ORDER — AMOXICILLIN AND CLAVULANATE POTASSIUM 500; 125 MG/1; MG/1
1 TABLET, FILM COATED ORAL 2 TIMES DAILY
Qty: 20 TAB | Refills: 0 | Status: SHIPPED | OUTPATIENT
Start: 2018-02-19 | End: 2018-03-01

## 2018-02-19 RX ORDER — GABAPENTIN 300 MG/1
300 CAPSULE ORAL 3 TIMES DAILY
COMMUNITY
End: 2018-05-29 | Stop reason: SDUPTHER

## 2018-02-19 NOTE — PROGRESS NOTES
HISTORY OF PRESENT ILLNESS  Norberto Lazaro is a 59 y.o. female. HPI 6 day hx sore throat, diarrhea, cough, watery eyes. Cough has been somewhat productive. Some fever, better now. Allergy tabs- some relief. Cough drops- some relief. No one else at home sick. ROS    Physical Exam   Constitutional: She appears well-developed and well-nourished. HENT:   Right Ear: External ear normal.   Left Ear: External ear normal.   Mouth/Throat: Oropharynx is clear and moist.   Neck: No thyromegaly present. Cardiovascular: Normal rate, regular rhythm, normal heart sounds and intact distal pulses. Pulmonary/Chest: Breath sounds normal. She has no wheezes. Abdominal: Soft. Bowel sounds are normal. She exhibits no distension and no mass. There is no tenderness. Musculoskeletal: She exhibits no edema. Lymphadenopathy:     She has no cervical adenopathy. Nursing note and vitals reviewed. ASSESSMENT and PLAN  Orders Placed This Encounter    AMB POC HEMOGLOBIN A1C    AMB POC GLUCOSE BLOOD, BY GLUCOSE MONITORING DEVICE    amoxicillin-clavulanate (AUGMENTIN) 500-125 mg per tablet    promethazine-dextromethorphan (PROMETHAZINE-DM) 6.25-15 mg/5 mL syrup     Diagnoses and all orders for this visit:    1. Controlled type 2 diabetes mellitus without complication, with long-term current use of insulin ([facility])  -     AMB POC HEMOGLOBIN A1C  -     AMB POC GLUCOSE BLOOD, BY GLUCOSE MONITORING DEVICE    2. Bronchitis    Other orders  -     amoxicillin-clavulanate (AUGMENTIN) 500-125 mg per tablet; Take 1 Tab by mouth two (2) times a day for 10 days. -     promethazine-dextromethorphan (PROMETHAZINE-DM) 6.25-15 mg/5 mL syrup; Take 5 mL by mouth four (4) times daily as needed for Cough. Follow-up Disposition:  Return in about 4 months (around 6/19/2018). Will hold off on glipizide, but check blood sugar in 4 months. Continue to keep weight down.

## 2018-02-19 NOTE — PROGRESS NOTES
Chief Complaint   Patient presents with    Cold Symptoms    Other     chest congestion     Cough     productive cough     worse at night/morning      1. Have you been to the ER, urgent care clinic since your last visit? Hospitalized since your last visit? No    2. Have you seen or consulted any other health care providers outside of the [address] [place] [address] since your last visit? Include any pap smears or colon screening.  No     Health Maintenance Due   Topic Date Due    COLONOSCOPY  09/20/1976    PAP AKA CERVICAL CYTOLOGY  04/02/2015    HEMOGLOBIN A1C Q6M  02/22/2018

## 2018-02-19 NOTE — MR AVS SNAPSHOT
[address] [facility] 
 
 
 [address] [facility] [place] [address] [id number] [phone] Patient: May De Anda MRN: [id number] MET:3[birth year]61/[birth year]92 Visit Information Date & Time Provider Department Dept. Phone Encounter #  
 2/19/2018 10:30 AM Mandy Vallecillo MD [facility] [phone] [id number] Follow-up Instructions Return in about 4 months (around 6/19/2018). Your Appointments 2/22/2018 10:00 AM  
Follow Up with David Dowling MD  
[facility]) Appt Note: fup per America Christopher  
 [address] [place] [address] [facility] [facility] [address]  
[phone]  
  
   
 [address] [facility] [address]  
  
    
 6/12/2018  9:15 AM  
ESTABLISHED PATIENT with Cuca Katz MD  
[facility]) Appt Note: 6 MO. F/U  
 [address] [place] [address]  
[phone] [address]  
  
    
  
 2/21/2018  8:00 AM  
REMOTE OFFICE VISIT with [facility] [address]) Appt Note: NOT AN OFFICE VISIT - REMOTE BSC BIVICD  
 8243 7[address] [facility]  
[phone] [address] Upcoming Health Maintenance Date Due COLONOSCOPY 9/20/1976 PAP AKA CERVICAL CYTOLOGY 4/2/2015 HEMOGLOBIN A1C Q6M 2/22/2018 MICROALBUMIN Q1 5/17/2018 LIPID PANEL Q1 5/17/2018 FOOT EXAM Q1 8/22/2018 EYE EXAM RETINAL OR DILATED Q1 9/19/2018 Pneumococcal 19-64 Highest Risk (2 of 3 - PCV13) 1/30/2019 BREAST CANCER SCRN MAMMOGRAM 6/12/2019 GLAUCOMA SCREENING Q2Y 9/19/2019 DTaP/Tdap/Td series (2 - Td) 5/17/2026 Allergies as of 2/19/2018  Review Complete On: 2/19/2018 By: Mandy Vallecillo MD  
  
 Severity Noted Reaction Type Reactions Ace Inhibitors  08/06/2010    Cough Compazine [Prochlorperazine]  06/15/2017    Nausea Only  
 rash Current Immunizations  Reviewed on 1/11/2018 No immunizations on file. Not reviewed this visit You Were Diagnosed With   
  
 Codes Comments Controlled type 2 diabetes mellitus without complication, with long-term current use of insulin (HCC)    -  Primary ICD-10-CM: E11.9, Z79.4 ICD-9-CM: 250.00, V58.67 Bronchitis     ICD-10-CM: J40 ICD-9-CM: 804 Vitals BP Pulse Temp Resp Height(growth percentile) Weight(growth percentile) 129/82 87 96.3 °F (35.7 °C) (Oral) 14 5' 4\" (1.626 m) 182 lb 3.2 oz (82.6 kg) SpO2 BMI OB Status Smoking Status 98% 31.27 kg/m2 Hysterectomy Never Smoker Vitals History BMI and BSA Data Body Mass Index Body Surface Area  
 31.27 kg/m 2 1.93 m 2 Preferred Pharmacy [facility] Phone [facility] [address] [place] [address] [phone] Your Updated Medication List  
  
   
This list is accurate as of: 2/19/18 11:42 AM.  Always use your most recent med list.  
  
  
  
  
 acyclovir 400 mg tablet Commonly known as:  ZOVIRAX Take 1 Tab by mouth two (2) times a day. Indications: PREVENTION OF HERPES ZOSTER IN IMMUNOCOMPROMISED PATIENT  
  
 amoxicillin-clavulanate 500-125 mg per tablet Commonly known as:  AUGMENTIN Take 1 Tab by mouth two (2) times a day for 10 days. atorvastatin 40 mg tablet Commonly known as:  LIPITOR Take 1 Tab by mouth daily. For cholesterol  
  
 atovaquone 750 mg/5 mL suspension Commonly known as:  MEPRON  
TAKE 5 ML BY MOUTH TWICE A DAY  
  
 carvedilol 25 mg tablet Commonly known as:  COREG  
two (2) times a day.  
  
 gabapentin 300 mg capsule Commonly known as:  NEURONTIN Take 300 mg by mouth three (3) times daily. lenalidomide 10 mg Cap Commonly known as:  REVLIMID Take 1 Cap by mouth daily. loperamide 2 mg tablet Commonly known as:  IMMODIUM  
 Take 2 mg by mouth as needed for Diarrhea. meclizine 25 mg tablet Commonly known as:  ANTIVERT One every 8 hrs as needed for vertigo  
  
 promethazine-dextromethorphan 6.25-15 mg/5 mL syrup Commonly known as:  PROMETHAZINE-DM Take 5 mL by mouth four (4) times daily as needed for Cough. valsartan 80 mg tablet Commonly known as:  DIOVAN  
TAKE 2 TABLETS BY MOUTH DAILY Prescriptions Sent to Pharmacy Refills  
 amoxicillin-clavulanate (AUGMENTIN) 500-125 mg per tablet 0 Sig: Take 1 Tab by mouth two (2) times a day for 10 days. Class: Normal  
 Pharmacy: [address] [place] [address] Ph #: [phone] Route: Oral  
 promethazine-dextromethorphan (PROMETHAZINE-DM) 6.25-15 mg/5 mL syrup 2 Sig: Take 5 mL by mouth four (4) times daily as needed for Cough. Class: Normal  
 Pharmacy: [address] [place] [address] Ph #: [phone] Route: Oral  
  
We Performed the Following AMB POC GLUCOSE BLOOD, BY GLUCOSE MONITORING DEVICE [40001 CPT(R)] AMB POC HEMOGLOBIN A1C [90037 CPT(R)] Follow-up Instructions Return in about 4 months (around 6/19/2018). Introducing [facility] & HEALTH SERVICES! [facility] introduces [employer] patient portal. Now you can access parts of your medical record, email your doctor's office, and request medication refills online. 1. In your internet browser, go to [email or web address] 2. Click on the First Time User? Click Here link in the Sign In box. You will see the New Member Sign Up page. 3. Enter your [employer] Access Code exactly as it appears below. You will not need to use this code after youve completed the sign-up process. If you do not sign up before the expiration date, you must request a new code. · [employer] Access Code: [email or web address] Expires: 3/12/2018  9:27 AM 
 
 4. Enter the last four digits of your Social Security Number (xxxx) and Date of Birth (mm/dd/yyyy) as indicated and click Submit. You will be taken to the next sign-up page. 5. Create a [employer] ID. This will be your [employer] login ID and cannot be changed, so think of one that is secure and easy to remember. 6. Create a [employer] password. You can change your password at any time. 7. Enter your Password Reset Question and Answer. This can be used at a later time if you forget your password. 8. Enter your e-mail address. You will receive e-mail notification when new information is available in [address] 9. Click Sign Up. You can now view and download portions of your medical record. 10. Click the Download Summary menu link to download a portable copy of your medical information. If you have questions, please visit the Frequently Asked Questions section of the [employer] website. Remember, [employer] is NOT to be used for urgent needs. For medical emergencies, dial 911. Now available from your iPhone and Android! Please provide this summary of care documentation to your next provider. Your primary care clinician is listed as Cedric Talavera. If you have any questions after today's visit, please call [phone].

## 2018-02-21 ENCOUNTER — OFFICE VISIT (OUTPATIENT)
Dept: CARDIOLOGY CLINIC | Age: 58
End: 2018-02-21

## 2018-02-21 DIAGNOSIS — I42.9 CARDIOMYOPATHY, UNSPECIFIED TYPE (HCC): ICD-10-CM

## 2018-02-21 DIAGNOSIS — Z95.810 AICD (AUTOMATIC CARDIOVERTER/DEFIBRILLATOR) PRESENT: Primary | ICD-10-CM

## 2018-02-21 DIAGNOSIS — I50.22 CHRONIC SYSTOLIC HEART FAILURE (HCC): ICD-10-CM

## 2018-02-22 ENCOUNTER — OFFICE VISIT (OUTPATIENT)
Dept: ONCOLOGY | Age: 58
End: 2018-02-22

## 2018-02-22 VITALS
HEIGHT: 64 IN | SYSTOLIC BLOOD PRESSURE: 106 MMHG | RESPIRATION RATE: 16 BRPM | HEART RATE: 67 BPM | BODY MASS INDEX: 31.18 KG/M2 | OXYGEN SATURATION: 97 % | DIASTOLIC BLOOD PRESSURE: 73 MMHG | TEMPERATURE: 97.6 F | WEIGHT: 182.6 LBS

## 2018-02-22 DIAGNOSIS — C90.01 MULTIPLE MYELOMA IN REMISSION (HCC): Primary | ICD-10-CM

## 2018-02-22 DIAGNOSIS — C90.00 MULTIPLE MYELOMA NOT HAVING ACHIEVED REMISSION (HCC): ICD-10-CM

## 2018-02-22 LAB
ALBUMIN SERPL ELPH-MCNC: 4 G/DL (ref 2.9–4.4)
ALBUMIN SERPL-MCNC: 4.3 G/DL (ref 3.5–5.5)
ALBUMIN/GLOB SERPL: 1.5 {RATIO} (ref 0.7–1.7)
ALBUMIN/GLOB SERPL: 1.8 {RATIO} (ref 1.2–2.2)
ALP SERPL-CCNC: 75 IU/L (ref 39–117)
ALPHA1 GLOB SERPL ELPH-MCNC: 0.2 G/DL (ref 0–0.4)
ALPHA2 GLOB SERPL ELPH-MCNC: 1 G/DL (ref 0.4–1)
ALT SERPL-CCNC: 17 IU/L (ref 0–32)
AST SERPL-CCNC: 18 IU/L (ref 0–40)
B-GLOBULIN SERPL ELPH-MCNC: 1 G/DL (ref 0.7–1.3)
BASOPHILS # BLD AUTO: 0.1 X10E3/UL (ref 0–0.2)
BASOPHILS NFR BLD AUTO: 1 %
BILIRUB SERPL-MCNC: 0.8 MG/DL (ref 0–1.2)
BUN SERPL-MCNC: 11 MG/DL (ref 6–24)
BUN/CREAT SERPL: 11 (ref 9–23)
CALCIUM SERPL-MCNC: 8.4 MG/DL (ref 8.7–10.2)
CHLORIDE SERPL-SCNC: 103 MMOL/L (ref 96–106)
CO2 SERPL-SCNC: 23 MMOL/L (ref 18–29)
CREAT SERPL-MCNC: 0.98 MG/DL (ref 0.57–1)
EOSINOPHIL # BLD AUTO: 0.1 X10E3/UL (ref 0–0.4)
EOSINOPHIL NFR BLD AUTO: 2 %
ERYTHROCYTE [DISTWIDTH] IN BLOOD BY AUTOMATED COUNT: 14.8 % (ref 12.3–15.4)
GAMMA GLOB SERPL ELPH-MCNC: 0.5 G/DL (ref 0.4–1.8)
GFR SERPLBLD CREATININE-BSD FMLA CKD-EPI: 63 ML/MIN/{1.73_M2}
GFR SERPLBLD CREATININE-BSD FMLA CKD-EPI: 73 ML/MIN/{1.73_M2}
GLOBULIN SER CALC-MCNC: 2.4 G/L (ref 1.5–4.5)
GLOBULIN SER-MCNC: 2.7 G/DL (ref 2.2–3.9)
GLUCOSE SERPL-MCNC: 153 MG/DL (ref 65–99)
HCT VFR BLD AUTO: 41 % (ref 34–46.6)
HGB BLD-MCNC: 13.7 G/DL (ref 11.1–15.9)
IGA SERPL-MCNC: 38 MG/DL (ref 87–352)
IGG SERPL-MCNC: 679 MG/DL (ref 700–1600)
IGM SERPL-MCNC: 44 MG/DL (ref 26–217)
IMM GRANULOCYTES # BLD: 0 X10E3/UL (ref 0–0.1)
IMM GRANULOCYTES NFR BLD: 0 %
INTERPRETATION SERPL IEP-IMP: ABNORMAL
KAPPA LC FREE SER-MCNC: 14.6 MG/L (ref 3.3–19.4)
KAPPA LC FREE/LAMBDA FREE SER: 1.2 {RATIO} (ref 0.26–1.65)
LAMBDA LC FREE SERPL-MCNC: 12.2 MG/L (ref 5.7–26.3)
LYMPHOCYTES # BLD AUTO: 2.3 X10E3/UL (ref 0.7–3.1)
LYMPHOCYTES NFR BLD AUTO: 54 %
M PROTEIN SERPL ELPH-MCNC: ABNORMAL G/DL
MCH RBC QN AUTO: 31.1 PG (ref 26.6–33)
MCHC RBC AUTO-ENTMCNC: 33.4 G/DL (ref 31.5–35.7)
MCV RBC AUTO: 93 FL (ref 79–97)
MONOCYTES # BLD AUTO: 0.4 X10E3/UL (ref 0.1–0.9)
MONOCYTES NFR BLD AUTO: 9 %
NEUTROPHILS # BLD AUTO: 1.4 X10E3/UL (ref 1.4–7)
NEUTROPHILS NFR BLD AUTO: 34 %
PLATELET # BLD AUTO: 186 X10E3/UL (ref 150–379)
PLEASE NOTE:, 149534: ABNORMAL
POTASSIUM SERPL-SCNC: 3.6 MMOL/L (ref 3.5–5.2)
PROT SERPL-MCNC: 6.7 G/DL (ref 6–8.5)
RBC # BLD AUTO: 4.41 X10E6/UL (ref 3.77–5.28)
SODIUM SERPL-SCNC: 145 MMOL/L (ref 134–144)
WBC # BLD AUTO: 4.2 X10E3/UL (ref 3.4–10.8)

## 2018-02-22 NOTE — PROGRESS NOTES
Hematology Follow Up    REASON FOR VISIT: Multiple Myeloma    TREATMENT:   3/24/17- 9/15/17: Irene Inman X 8   10/20/17: Autologous transplant at [facility]  2/22/18 - Currently on Revlimind 10mg daily    HISTORY OF PRESENT ILLNESS: Ms. Memo Ryder is a 59 y.o. female with DM and  Multiple Myeloma who presents to follow up after the ASCT and is on maintenance Revlimid 10mg daily. She had been feeling well up to last week when she became sick. She went on Monday to Dr. Ramona Mckay, PCP, with cough, body aches, fevers, and diarrhea. She was prescribed an antibiotic (Augmentin) x 10 days and a cough suppressant. She states she was not tested for the flu. She is starting to feel better on antibiotics except for the diarrhea which began with antibiotic use. No further fevers. Prior to illness, energy was continuing to improve. Appetite is okay. Now that she in on Revlimid, losing her ability to taste as well. States she still eats because she knows she needs to nutrition. No nausea or vomiting. No mouth sores. Continues with neuropathy which is stable. She states unfortunately this has not improved much. No chest pain or shortness of breath. No rashes. Oncologic history  Patient had left facial numbness which started in the summer of 2016. This started abruptly and was not associated with rashes, pain, jaw weakness. She has had some intermittent hyperemia of the L eye. No tearing. She has been evaluated by Dr. Nancy Mcnulty with Neurology and an extensive work up was only notable for presence of a 0.3 g/dl M spike. Other tests were unremarkable: Vitamin B12 411, thyroid function test normal, ACE 25, BHARATI normal CBC normal, CMP normal, CRP 1.17, CT head and cervical spine unremarkable. Further evaluation revealed findings consistent with Multiple Myeloma    CBC 2/24 Unremarkable.  Kappa 17 mg/dl, Lambda 2416 (ratio 0.01), SPEP 0.2 g/dl M spike, HANSEL showed monoclonal free lambda light chains, UPEP negative, Beta 2 Microglobulin 1.8 mg/L, Skeletal survey negative for lytic lesions, Bone marrow biopsy on 2/24/17 showed a Normocellular marrow with mild monoclonal plasmacytosis (15-20%). Trilineage hematopoiesis with maturation. She had a very good partial response and 8 cycles of twice daily and then proceeded on to receive an autologous stem cell transplant on 10/20/17. She had a CR posttransplant. Past Medical History:   Diagnosis Date    Cardiomyopathy     Diabetes mellitus type 2, controlled ([facility] 12/10/2015    Heart failure (HCC)     Hyperlipidemia     Hypertension     controlled    Multiple myeloma (HCC)     Orthostatic hypotension     Secondary cardiomyopathy, unspecified     Sinoatrial node dysfunction ([facility]     Vitamin B12 deficiency 3/31/2010       Past Surgical History:   Procedure Laterality Date    HX HYSTERECTOMY         Allergies   Allergen Reactions    Ace Inhibitors Cough    Compazine [Prochlorperazine] Nausea Only     rash       Current Outpatient Prescriptions   Medication Sig Dispense Refill    gabapentin (NEURONTIN) 300 mg capsule Take 300 mg by mouth three (3) times daily.  amoxicillin-clavulanate (AUGMENTIN) 500-125 mg per tablet Take 1 Tab by mouth two (2) times a day for 10 days. 20 Tab 0    promethazine-dextromethorphan (PROMETHAZINE-DM) 6.25-15 mg/5 mL syrup Take 5 mL by mouth four (4) times daily as needed for Cough. 240 mL 2    meclizine (ANTIVERT) 25 mg tablet One every 8 hrs as needed for vertigo 30 Tab 1    lenalidomide (REVLIMID) 10 mg cap Take 1 Cap by mouth daily. 30 Cap 0    carvedilol (COREG) 25 mg tablet two (2) times a day.  atovaquone (MEPRON) 750 mg/5 mL suspension TAKE 5 ML BY MOUTH TWICE A DAY  4    valsartan (DIOVAN) 80 mg tablet TAKE 2 TABLETS BY MOUTH DAILY  6    loperamide (IMMODIUM) 2 mg tablet Take 2 mg by mouth as needed for Diarrhea.  atorvastatin (LIPITOR) 40 mg tablet Take 1 Tab by mouth daily.  For cholesterol 30 Tab 12    acyclovir (ZOVIRAX) 400 mg tablet Take 1 Tab by mouth two (2) times a day. Indications: PREVENTION OF HERPES ZOSTER IN IMMUNOCOMPROMISED PATIENT 59 Tab 3       Social History     Social History    Marital status:      Spouse name: N/A    Number of children: N/A    Years of education: N/A     Social History Main Topics    Smoking status: Never Smoker    Smokeless tobacco: Never Used    Alcohol use No    Drug use: No    Sexual activity: Yes     Partners: Male     Other Topics Concern    None     Social History Narrative    , 2 children, 28 and 31. Works at [employer], for 35 years. 5 grandchildren       Family History   Problem Relation Age of Onset   [facility] Cancer Mother     Heart Disease Mother      pacemaker   [facility] Diabetes Mother    [facility] Breast Cancer Mother     Hypertension Sister     Hypertension Brother     Diabetes Brother     Hypertension Sister     Hypertension Sister     Hypertension Sister     Hypertension Sister     Diabetes Sister        ROS  As reviewed in the HPI all others reviewed and negative.   ECOG PS is 0    Physical Examination:   Visit Vitals    /73    Pulse 67    Temp 97.6 °F (36.4 °C)    Resp 16    Ht 5' 4\" (1.626 m)    Wt 182 lb 9.6 oz (82.8 kg)    SpO2 97%    BMI 31.34 kg/m2     General appearance - alert, well appearing, and in no distress  Mental status - oriented to person, place, and time  Mouth - mucous membranes moist, pharynx normal without lesions  Lymphatics - no palpable lymphadenopathy, no hepatosplenomegaly  Chest - clear to auscultation, no wheezes, rales or rhonchi, symmetric air entry  Heart - normal rate, regular rhythm, normal S1, S2, no murmurs, rubs, clicks or gallops  Abdomen - soft, nontender, nondistended, no masses or organomegaly, bowel sounds present  Ext -  No edema    LABS  Lab Results   Component Value Date/Time    WBC 4.8 01/29/2018 09:20 AM    HGB 12.4 01/29/2018 09:20 AM    HCT 37.6 01/29/2018 09:20 AM    PLATELET 993 22/85/0789 09:20 AM    MCV 98 (H) 01/29/2018 09:20 AM    ABS. NEUTROPHILS 2.7 01/29/2018 09:20 AM     Lab Results   Component Value Date/Time    Sodium 144 01/29/2018 09:20 AM    Potassium 3.7 01/29/2018 09:20 AM    Chloride 105 01/29/2018 09:20 AM    CO2 22 01/29/2018 09:20 AM    Glucose 165 (H) 01/29/2018 09:20 AM    BUN 8 01/29/2018 09:20 AM    Creatinine 0.87 01/29/2018 09:20 AM    GFR est AA 84 01/29/2018 09:20 AM    GFR est non-AA 73 01/29/2018 09:20 AM    Calcium 9.0 01/29/2018 09:20 AM     Lab Results   Component Value Date/Time    AST (SGOT) 11 01/29/2018 09:20 AM    Alk. phosphatase 79 01/29/2018 09:20 AM    Protein, total 6.3 01/29/2018 09:20 AM    Albumin 4.2 01/29/2018 09:20 AM    Globulin 3.4 09/22/2017 09:15 AM    A-G Ratio 2.0 01/29/2018 09:20 AM       Bone marrow biopsy reviewed    FISH molecular analysis:    Positive for IgH gene rearrangement (IgH complex FISH study pending); Normal results with 1, 5, 9, 13, 15, and 17 (TP53) probe sets. External records reviewed from [facility] transplant transplant bone marrow biopsy done on 12/19/17 showed no evidence of plasma cells. Variable cellularity from 0-50%, flow LUIS, FISH negative    ASSESSMENT  Ms. Gonzalo Betancourt is a 59 y.o. female with standard risk multiple myeloma status post 6 cycles of twice daily and autologous stem cell transplant on 10/20/17. She is in a complete remission. We will continue with close surveillance in conjunction [facility]  Multiple myeloma  -We will obtain a gammopathy evaluation including 24 hour urine for UPEP, beta-2 microglobulin, CBC with differential, CMP every 3 months until she is 1 year out from her transplant and thereafter move on to every 6 month labs. These have been ordered today to be obtained prior to next visit.  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  This has remained stable and unfortunately without much improvement. This may take several months to improve and some of it may be permanent    Posttransplant follow-up and care  Flowsheet reviewed from [facility]    Pneumonia, presumed  Currently on 10 day course of augmentin from PCP  No further fevers or signs of active infection  Cough improving    Possible dose increase of Revlimid to 15mg daily as she has been tolerating 10mg without difficulty. Return to clinic in 3 months with labs prior as above. Seen in conjunction with Alicia Soriano NP.     Signed by: Brenda Ryder MD                     April 4, 2018

## 2018-02-22 NOTE — MR AVS SNAPSHOT
[address] [facility] [address] 
[phone] Patient: Mendoza Scott MRN:  [id number] Visit Information Date & Time Provider Department Dept. Phone Encounter #  
 2/22/2018 10:00 AM Veto Briggs  [facility] at [address] [facility] [id number] Follow-up Instructions Return in about 3 months (around 5/22/2018). Your Appointments 5/23/2018  8:00 AM  
PROCEDURE with [facility] [address] [facility]) Appt Note: NOT AN OFFICE VISIT - REMOTE BSC ICD  
 [address]  
[phone] [address]  
  
    
 6/12/2018  9:15 AM  
ESTABLISHED PATIENT with Joy Dash MD  
[facility] at [facility]) Appt Note: 6 MO. F/U  
 [address] [facility]  
[phone] [address]  
  
    
 6/22/2018  9:30 AM  
ROUTINE CARE with Natasha Bains MD  
[facility] [address] [facility]) Appt Note: routine follow up  
 [address] [facility] 7 [id number] [phone] [address] [facility] [address] Upcoming Health Maintenance Date Due COLONOSCOPY 9/20/1976 PAP AKA CERVICAL CYTOLOGY 4/2/2015 MICROALBUMIN Q1 5/17/2018 LIPID PANEL Q1 5/17/2018 HEMOGLOBIN A1C Q6M 8/19/2018 FOOT EXAM Q1 8/22/2018 EYE EXAM RETINAL OR DILATED Q1 9/19/2018 Pneumococcal 19-64 Highest Risk (2 of 3 - PCV13) 1/30/2019 BREAST CANCER SCRN MAMMOGRAM 6/12/2019 GLAUCOMA SCREENING Q2Y 9/19/2019 DTaP/Tdap/Td series (2 - Td) 5/17/2026 Allergies as of 2/22/2018  Review Complete On: 2/22/2018 By: Veto Briggs NP Severity Noted Reaction Type Reactions Ace Inhibitors  08/06/2010    Cough Compazine [Prochlorperazine]  06/15/2017    Nausea Only  
 rash Current Immunizations  Reviewed on 1/11/2018 No immunizations on file. Not reviewed this visit You Were Diagnosed With   
  
 Codes Comments Multiple myeloma in remission ([facility]    -  Primary ICD-10-CM: C90.01 
ICD-9-CM: 203.01   
 Multiple myeloma not having achieved remission ([facility]     ICD-10-CM: C90.00 ICD-9-CM: 203.00 Vitals BP Pulse Temp Resp Height(growth percentile) Weight(growth percentile) 106/73 67 97.6 °F (36.4 °C) 16 5' 4\" (1.626 m) 182 lb 9.6 oz (82.8 kg) SpO2 BMI OB Status Smoking Status 97% 31.34 kg/m2 Hysterectomy Never Smoker Vitals History BMI and BSA Data Body Mass Index Body Surface Area  
 31.34 kg/m 2 1.93 m 2 Preferred Pharmacy Pharmacy Name Phone [facility] [address] [place] [address] [phone] Your Updated Medication List  
  
   
This list is accurate as of 2/22/18 11:08 AM.  Always use your most recent med list.  
  
  
  
  
 acyclovir 400 mg tablet Commonly known as:  ZOVIRAX Take 1 Tab by mouth two (2) times a day. Indications: PREVENTION OF HERPES ZOSTER IN IMMUNOCOMPROMISED PATIENT  
  
 amoxicillin-clavulanate 500-125 mg per tablet Commonly known as:  AUGMENTIN Take 1 Tab by mouth two (2) times a day for 10 days. atorvastatin 40 mg tablet Commonly known as:  LIPITOR Take 1 Tab by mouth daily. For cholesterol  
  
 atovaquone 750 mg/5 mL suspension Commonly known as:  MEPRON  
TAKE 5 ML BY MOUTH TWICE A DAY  
  
 carvedilol 25 mg tablet Commonly known as:  COREG  
two (2) times a day.  
  
 gabapentin 300 mg capsule Commonly known as:  NEURONTIN Take 300 mg by mouth three (3) times daily. lenalidomide 10 mg Cap Commonly known as:  REVLIMID Take 1 Cap by mouth daily. loperamide 2 mg tablet Commonly known as:  IMMODIUM Take 2 mg by mouth as needed for Diarrhea. meclizine 25 mg tablet Commonly known as:  ANTIVERT One every 8 hrs as needed for vertigo  
  
 promethazine-dextromethorphan 6.25-15 mg/5 mL syrup Commonly known as:  PROMETHAZINE-DM Take 5 mL by mouth four (4) times daily as needed for Cough. valsartan 80 mg tablet Commonly known as:  DIOVAN  
TAKE 2 TABLETS BY MOUTH DAILY We Performed the Following BETA-2 MICROGLOBULIN [id number] CPT(R)] CBC WITH AUTOMATED DIFF [58681 CPT(R)] FREE LIGHT CHAINS, KAPPA/LAMBDA, QT [87959 CPT(R)] IMMUNOELECTROPHORESIS [facility] [id number] CPT(R)] IMMUNOGLOBULINS, G/A/M, QT. [54732 CPT(R)] METABOLIC PANEL, COMPREHENSIVE [31988 CPT(R)] PROTEIN ELECTROPHORESIS + HANSEL, UR, 24HR [id number] CPT(R)] PROTEIN ELECTROPHORESIS [28141 CPT(R)] Follow-up Instructions Return in about 3 months (around 5/22/2018). To-Do List   
 07/10/2018 Lab:  CBC WITH AUTOMATED DIFF   
  
 07/10/2018 Lab:  GAMMOPATHY EVAL, SPEP/HANSEL, IG QT/FLC   
  
 07/10/2018 Lab:  METABOLIC PANEL, COMPREHENSIVE   
  
 07/10/2018 Lab:  PROTEIN ELECTROPHORESIS + HANSEL, UR, 24HR Introducing [facility]! [employer] introduces [employer] patient portal. Now you can access parts of your medical record, email your doctor's office, and request medication refills online. 1. In your internet browser, go to [email or web address] 2. Click on the First Time User? Click Here link in the Sign In box. You will see the New Member Sign Up page. 3. Enter your [employer] Access Code exactly as it appears below. You will not need to use this code after youve completed the sign-up process. If you do not sign up before the expiration date, you must request a new code. · [employer] Access Code: [email or web address] Expires: 3/12/2018  9:27 AM 
 
4. Enter the last four digits of your Social Security Number (xxxx) and Date of Birth (mm/dd/yyyy) as indicated and click Submit.  You will be taken to the next sign-up page. 5. Create a [employer] ID. This will be your [employer] login ID and cannot be changed, so think of one that is secure and easy to remember. 6. Create a [employer] password. You can change your password at any time. 7. Enter your Password Reset Question and Answer. This can be used at a later time if you forget your password. 8. Enter your e-mail address. You will receive e-mail notification when new information is available in [address] 9. Click Sign Up. You can now view and download portions of your medical record. 10. Click the Download Summary menu link to download a portable copy of your medical information. If you have questions, please visit the Frequently Asked Questions section of the [employer] website. Remember, [employer] is NOT to be used for urgent needs. For medical emergencies, dial 911. Now available from your iPhone and Android! Please provide this summary of care documentation to your next provider. Your primary care clinician is listed as Savage White. If you have any questions after today's visit, please call [phone].

## 2018-02-26 ENCOUNTER — TELEPHONE (OUTPATIENT)
Dept: ONCOLOGY | Age: 58
End: 2018-02-26

## 2018-02-26 NOTE — TELEPHONE ENCOUNTER
Spoke with [facility] - Wendie Dash. [phone]  HIPAA verified    Confirmed patient is due for follow up visit with them in April. Wendie Dash will call patient and schedule. She is to fax a copy of the protocol to my attention.

## 2018-02-27 PROBLEM — E11.9 CONTROLLED TYPE 2 DIABETES MELLITUS WITHOUT COMPLICATION (HCC): Status: ACTIVE | Noted: 2018-02-27

## 2018-03-12 ENCOUNTER — TELEPHONE (OUTPATIENT)
Dept: ONCOLOGY | Age: 58
End: 2018-03-12

## 2018-03-12 NOTE — TELEPHONE ENCOUNTER
Call to [facility] BMT, spoke with Mayela Hartmann. Per their system patient has appointment on 4/17/18 for labs and  Follow up appointment with provider on 4/27/18. Thanked for information.

## 2018-03-19 ENCOUNTER — DOCUMENTATION ONLY (OUTPATIENT)
Dept: ONCOLOGY | Age: 58
End: 2018-03-19

## 2018-03-29 ENCOUNTER — TELEPHONE (OUTPATIENT)
Dept: ONCOLOGY | Age: 58
End: 2018-03-29

## 2018-03-29 NOTE — TELEPHONE ENCOUNTER
Spoke to patient. States she went to PCP for cough, he had given her cough medication and antibiotic. She stopped antibiotic because it gave her diarrhea. She is having clear drainage from her nose. No fever or chills. Does cough a lot at work, not as much at home. She is getting her teeth cleaned, does not need antibiotic. Okay to dye her hair. Blood work is stable. She has follow up with BMT in April. No further concerns.

## 2018-03-29 NOTE — TELEPHONE ENCOUNTER
Call returned to patient. HIPAA verified. Stated ongoing clear nasal drainage x 3 weeks. Stated no fever/chills. Wanted to know if needed abx prior to teeth cleaning. Stated dentist refused to clean teeth. Also wanted to know if ok to dye hair. Advised would forward to provider and return call.

## 2018-03-29 NOTE — TELEPHONE ENCOUNTER
Pt has a cough, and some congestion, and runny nose. Pt has questions about does she need an antibiotic to get her teeth cleaned by her dentist.  Pt also has questions about dying her hair.

## 2018-04-16 ENCOUNTER — DOCUMENTATION ONLY (OUTPATIENT)
Dept: ONCOLOGY | Age: 58
End: 2018-04-16

## 2018-04-24 ENCOUNTER — HOSPITAL ENCOUNTER (OUTPATIENT)
Dept: GENERAL RADIOLOGY | Age: 58
Discharge: HOME | End: 2018-04-24
Payer: COMMERCIAL

## 2018-04-24 ENCOUNTER — DOCUMENTATION ONLY (OUTPATIENT)
Dept: FAMILY MEDICINE CLINIC | Age: 58
End: 2018-04-24

## 2018-04-24 ENCOUNTER — OFFICE VISIT (OUTPATIENT)
Dept: FAMILY MEDICINE CLINIC | Age: 58
End: 2018-04-24

## 2018-04-24 VITALS
BODY MASS INDEX: 31.5 KG/M2 | OXYGEN SATURATION: 96 % | WEIGHT: 184.5 LBS | TEMPERATURE: 97.3 F | HEART RATE: 79 BPM | DIASTOLIC BLOOD PRESSURE: 65 MMHG | HEIGHT: 64 IN | RESPIRATION RATE: 18 BRPM | SYSTOLIC BLOOD PRESSURE: 159 MMHG

## 2018-04-24 DIAGNOSIS — R05.9 COUGH: ICD-10-CM

## 2018-04-24 DIAGNOSIS — J40 BRONCHITIS: Primary | ICD-10-CM

## 2018-04-24 PROCEDURE — 71046 X-RAY EXAM CHEST 2 VIEWS: CPT

## 2018-04-24 RX ORDER — PROMETHAZINE HYDROCHLORIDE AND CODEINE PHOSPHATE 6.25; 1 MG/5ML; MG/5ML
1 SOLUTION ORAL
Qty: 240 ML | Refills: 1 | Status: SHIPPED | OUTPATIENT
Start: 2018-04-24 | End: 2018-07-19

## 2018-04-24 RX ORDER — LEVOFLOXACIN 500 MG/1
500 TABLET, FILM COATED ORAL DAILY
Qty: 10 TAB | Refills: 0 | Status: SHIPPED | OUTPATIENT
Start: 2018-04-24 | End: 2018-05-04

## 2018-04-24 NOTE — PROGRESS NOTES
HISTORY OF PRESENT ILLNESS  Nico Mcfarlane is a 59 y.o. female. HPI Had bone marrow transplant at [facility] Oct. 2018. Has had runny nose, sneezing, sore throat , cough for 2 weeks. Cough gets worse if lies down. Cough is now productive of green sputum. No fever, chills. Some wheezing and dyspnea. Nonsmoker. No past hx asthma. Has been taking Claritin. ROS    Physical Exam   Constitutional: She appears well-developed and well-nourished. HENT:   Right Ear: External ear normal.   Left Ear: External ear normal.   Mouth/Throat: Oropharynx is clear and moist.   Neck: No thyromegaly present. Cardiovascular: Normal rate, regular rhythm, normal heart sounds and intact distal pulses. Pulmonary/Chest: Breath sounds normal. She has no wheezes. Bilateral rhonchi   Abdominal: Soft. Bowel sounds are normal. She exhibits no distension and no mass. There is no tenderness. Musculoskeletal: She exhibits no edema. Lymphadenopathy:     She has no cervical adenopathy. Nursing note and vitals reviewed. ASSESSMENT and PLAN  Orders Placed This Encounter    XR CHEST PA LAT    levoFLOXacin (LEVAQUIN) 500 mg tablet    promethazine-codeine (PHENERGAN WITH CODEINE) 6.25-10 mg/5 mL syrup    ipratropium-albuterol (COMBIVENT RESPIMAT)  mcg/actuation inhaler     Diagnoses and all orders for this visit:    1. Bronchitis    2. Cough  -     promethazine-codeine (PHENERGAN WITH CODEINE) 6.25-10 mg/5 mL syrup; Take 5 mL by mouth every six (6) hours as needed for Cough. Max Daily Amount: 20 mL. -     XR CHEST PA LAT; Future    Other orders  -     levoFLOXacin (LEVAQUIN) 500 mg tablet; Take 1 Tab by mouth daily for 10 days. -     ipratropium-albuterol (COMBIVENT RESPIMAT)  mcg/actuation inhaler; Take 1 Puff by inhalation every four (4) hours as needed for Wheezing or Shortness of Breath.       Follow-up Disposition: Not on File

## 2018-04-24 NOTE — MR AVS SNAPSHOT
[address] [facility] [address] 
 
 
 [address] [facility] [place] [address] [address] 
[phone] Patient: Elham Douglas MRN: [id number]:[id number] Visit Information Date & Time Provider Department Dept. Phone Encounter #  
 4/24/2018  9:45 AM Abdullahi Jackson MD [facility] [phone] [id number] Your Appointments 5/23/2018  8:00 AM  
PROCEDURE with [facility] [address] [facility]) Appt Note: NOT AN OFFICE VISIT - REMOTE BSC ICD  
 [address] [facility]  
[phone] [address] [facility]  
  
    
 6/12/2018  9:15 AM  
ESTABLISHED PATIENT with Won Guillen MD  
[facility] at [facility]) Appt Note: 6 MO. F/U  
 [address] [facility] 13  
[phone] [address]  
  
    
 6/22/2018  9:30 AM  
ROUTINE CARE with Abdullahi Jackson MD  
[facility] [address] [facility]) Appt Note: routine follow up  
 [address] [facility] [place] [address] [id number] [phone] [address] [facility] [address] 7/19/2018 10:30 AM  
Follow Up with Marisabel Le MD  
[facility] Appt Note: 5mo fu (multiple myeloma) [address] [facility] [address] [facility] [address]  
[phone]  
  
   
 [address] [facility] [address] Upcoming Health Maintenance Date Due COLONOSCOPY 9/20/1976 PAP AKA CERVICAL CYTOLOGY 4/2/2015 MICROALBUMIN Q1 5/17/2018 LIPID PANEL Q1 5/17/2018 HEMOGLOBIN A1C Q6M 8/19/2018 FOOT EXAM Q1 8/22/2018 EYE EXAM RETINAL OR DILATED Q1 9/19/2018 Pneumococcal 19-64 Highest Risk (2 of 3 - PCV13) 1/30/2019 BREAST CANCER SCRN MAMMOGRAM 6/12/2019 GLAUCOMA SCREENING Q2Y 9/19/2019 DTaP/Tdap/Td series (2 - Td) 5/17/2026 Allergies as of 4/24/2018  Review Complete On: 4/24/2018 By: Lilibeth Antunez MD  
  
 Severity Noted Reaction Type Reactions Ace Inhibitors  08/06/2010    Cough Compazine [Prochlorperazine]  06/15/2017    Nausea Only  
 rash Current Immunizations  Reviewed on 1/11/2018 No immunizations on file. Not reviewed this visit You Were Diagnosed With   
  
 Codes Comments Bronchitis    -  Primary ICD-10-CM: Q40 ICD-9-CM: 291 Cough     ICD-10-CM: R05 ICD-9-CM: 662. 2 Vitals BP Pulse Temp Resp Height(growth percentile) Weight(growth percentile) 159/65 79 97.3 °F (36.3 °C) (Oral) 18 5' 4\" (1.626 m) 184 lb 8 oz (83.7 kg) SpO2 BMI OB Status Smoking Status 96% 31.67 kg/m2 Hysterectomy Never Smoker Vitals History BMI and BSA Data Body Mass Index Body Surface Area  
 31.67 kg/m 2 1.94 m 2 Preferred Pharmacy Pharmacy Name Phone [facility] [address] [place] [address] [phone] Your Updated Medication List  
  
   
This list is accurate as of 4/24/18 10:23 AM.  Always use your most recent med list.  
  
  
  
  
 acyclovir 400 mg tablet Commonly known as:  ZOVIRAX Take 1 Tab by mouth two (2) times a day. Indications: PREVENTION OF HERPES ZOSTER IN IMMUNOCOMPROMISED PATIENT  
  
 atorvastatin 40 mg tablet Commonly known as:  LIPITOR Take 1 Tab by mouth daily. For cholesterol  
  
 atovaquone 750 mg/5 mL suspension Commonly known as:  MEPRON  
TAKE 5 ML BY MOUTH TWICE A DAY  
  
 carvedilol 25 mg tablet Commonly known as:  COREG  
two (2) times a day.  
  
 gabapentin 300 mg capsule Commonly known as:  NEURONTIN Take 300 mg by mouth three (3) times daily. glimepiride 1 mg tablet Commonly known as:  AMARYL Take 1 Tab by mouth Daily (before breakfast). For diabetes  
  
 ipratropium-albuterol  mcg/actuation inhaler Commonly known as:  Yesica Perone Take 1 Puff by inhalation every four (4) hours as needed for Wheezing or Shortness of Breath. lenalidomide 10 mg Cap Commonly known as:  REVLIMID Take 1 Cap by mouth daily. levoFLOXacin 500 mg tablet Commonly known as:  Shellia Havers Take 1 Tab by mouth daily for 10 days. loperamide 2 mg tablet Commonly known as:  IMMODIUM Take 2 mg by mouth as needed for Diarrhea. meclizine 25 mg tablet Commonly known as:  ANTIVERT One every 8 hrs as needed for vertigo  
  
 promethazine-codeine 6.25-10 mg/5 mL syrup Commonly known as:  PHENERGAN with CODEINE Take 5 mL by mouth every six (6) hours as needed for Cough. Max Daily Amount: 20 mL. promethazine-dextromethorphan 6.25-15 mg/5 mL syrup Commonly known as:  PROMETHAZINE-DM Take 5 mL by mouth four (4) times daily as needed for Cough. valsartan 80 mg tablet Commonly known as:  DIOVAN  
TAKE 2 TABLETS BY MOUTH DAILY Prescriptions Printed Refills  
 promethazine-codeine (PHENERGAN WITH CODEINE) 6.25-10 mg/5 mL syrup 1 Sig: Take 5 mL by mouth every six (6) hours as needed for Cough. Max Daily Amount: 20 mL. Class: Print Route: Oral  
  
Prescriptions Sent to Pharmacy Refills  
 levoFLOXacin (LEVAQUIN) 500 mg tablet 0 Sig: Take 1 Tab by mouth daily for 10 days. Class: Normal  
 Pharmacy: [address] [place] [address] Ph #: [phone] Route: Oral  
 ipratropium-albuterol (COMBIVENT RESPIMAT)  mcg/actuation inhaler 12 Sig: Take 1 Puff by inhalation every four (4) hours as needed for Wheezing or Shortness of Breath. Class: Normal  
 Pharmacy: [address] [place] [address] Ph #: [phone] Route: Inhalation To-Do List   
 04/24/2018 Imaging:  XR CHEST PA LAT Introducing [facility]! Stefan Brooks introduces [employer] patient portal. Now you can access parts of your medical record, email your doctor's office, and request medication refills online. 1. In your internet browser, go to [email or web address] 2. Click on the First Time User? Click Here link in the Sign In box. You will see the New Member Sign Up page. 3. Enter your [employer] Access Code exactly as it appears below. You will not need to use this code after youve completed the sign-up process. If you do not sign up before the expiration date, you must request a new code. · [employer] Access Code: [email or web address] Expires: 7/23/2018 10:23 AM 
 
4. Enter the last four digits of your Social Security Number (xxxx) and Date of Birth (mm/dd/yyyy) as indicated and click Submit. You will be taken to the next sign-up page. 5. Create a [employer] ID. This will be your [employer] login ID and cannot be changed, so think of one that is secure and easy to remember. 6. Create a [employer] password. You can change your password at any time. 7. Enter your Password Reset Question and Answer. This can be used at a later time if you forget your password. 8. Enter your e-mail address. You will receive e-mail notification when new information is available in [address] 9. Click Sign Up. You can now view and download portions of your medical record. 10. Click the Download Summary menu link to download a portable copy of your medical information. If you have questions, please visit the Frequently Asked Questions section of the [employer] website. Remember, [employer] is NOT to be used for urgent needs. For medical emergencies, dial 911. Now available from your iPhone and Android! Please provide this summary of care documentation to your next provider. Your primary care clinician is listed as Magnus Brambila. If you have any questions after today's visit, please call [phone].

## 2018-04-24 NOTE — PROGRESS NOTES
Chief Complaint   Patient presents with    Cough     Pt here for having cold symptoms symptoms . Has had cold for a few weeks but now stated she has a uncontrollable cough. Nothing has helps. Tried  otc claritin and benadryl. Pt coughing up greenish mucus that started over a week ago. A lot of head pressure, and dry throat     Pt didn't take BP meds this  Am.

## 2018-05-15 ENCOUNTER — DOCUMENTATION ONLY (OUTPATIENT)
Dept: ONCOLOGY | Age: 58
End: 2018-05-15

## 2018-05-23 ENCOUNTER — OFFICE VISIT (OUTPATIENT)
Dept: CARDIOLOGY CLINIC | Age: 58
End: 2018-05-23

## 2018-05-23 DIAGNOSIS — I50.22 CHRONIC SYSTOLIC HEART FAILURE (HCC): ICD-10-CM

## 2018-05-23 DIAGNOSIS — Z95.810 ICD (IMPLANTABLE CARDIOVERTER-DEFIBRILLATOR), BIVENTRICULAR, IN SITU: Primary | ICD-10-CM

## 2018-05-23 DIAGNOSIS — I42.9 CARDIOMYOPATHY, UNSPECIFIED TYPE (HCC): ICD-10-CM

## 2018-05-29 RX ORDER — GABAPENTIN 300 MG/1
300 CAPSULE ORAL 3 TIMES DAILY
Qty: 90 CAP | Refills: 2 | Status: SHIPPED | OUTPATIENT
Start: 2018-05-29 | End: 2019-09-23 | Stop reason: ALTCHOICE

## 2018-06-05 RX ORDER — VALSARTAN 80 MG/1
TABLET ORAL
Qty: 60 TAB | Refills: 12 | Status: SHIPPED | OUTPATIENT
Start: 2018-06-05 | End: 2018-12-05 | Stop reason: ALTCHOICE

## 2018-06-05 NOTE — TELEPHONE ENCOUNTER
----- Message from Rodolfo Marie sent at 6/5/2018  4:13 PM EDT -----  Regarding: Dr. Fry/telephone/refill   Pt would like to request to have a refill on Rx \" Valsartan 80 mg\" with [facility].  Pharmacy callback : [phone] Pt's callback : [phone]

## 2018-06-11 ENCOUNTER — DOCUMENTATION ONLY (OUTPATIENT)
Dept: ONCOLOGY | Age: 58
End: 2018-06-11

## 2018-06-12 ENCOUNTER — OFFICE VISIT (OUTPATIENT)
Dept: CARDIOLOGY CLINIC | Age: 58
End: 2018-06-12

## 2018-06-12 VITALS
SYSTOLIC BLOOD PRESSURE: 106 MMHG | BODY MASS INDEX: 31.58 KG/M2 | RESPIRATION RATE: 12 BRPM | OXYGEN SATURATION: 97 % | HEIGHT: 64 IN | WEIGHT: 185 LBS | HEART RATE: 76 BPM | DIASTOLIC BLOOD PRESSURE: 72 MMHG

## 2018-06-12 DIAGNOSIS — E78.5 HYPERLIPIDEMIA, UNSPECIFIED HYPERLIPIDEMIA TYPE: ICD-10-CM

## 2018-06-12 DIAGNOSIS — C90.00 MULTIPLE MYELOMA NOT HAVING ACHIEVED REMISSION (HCC): ICD-10-CM

## 2018-06-12 DIAGNOSIS — I10 ESSENTIAL HYPERTENSION: ICD-10-CM

## 2018-06-12 DIAGNOSIS — Z95.810 AICD (AUTOMATIC CARDIOVERTER/DEFIBRILLATOR) PRESENT: ICD-10-CM

## 2018-06-12 DIAGNOSIS — I50.22 CHRONIC SYSTOLIC HEART FAILURE (HCC): Primary | ICD-10-CM

## 2018-06-12 DIAGNOSIS — E11.9 CONTROLLED TYPE 2 DIABETES MELLITUS WITHOUT COMPLICATION, UNSPECIFIED WHETHER LONG TERM INSULIN USE (HCC): ICD-10-CM

## 2018-06-12 RX ORDER — GLUCOSAMINE/CHONDR SU A SOD 750-600 MG
TABLET ORAL DAILY
COMMUNITY
End: 2019-09-23 | Stop reason: ALTCHOICE

## 2018-06-12 NOTE — PROGRESS NOTES
Chief Complaint   Patient presents with    Hypotension     pt c/o cold symptoms from allergies. 1. Have you been to the ER, urgent care clinic since your last visit? Hospitalized since your last visit? NO  2. Have you seen or consulted any other health care providers outside of the [facility] since your last visit? Include any pap smears or colon screening.  NO

## 2018-06-12 NOTE — PROGRESS NOTES
[facility]   [address] [facility], [address] [place] [address]                                          NEW PATIENT HPI/FOLLOW-UP      NAME:  Kristina You   :   1960   MRN:   [id number]   PCP:  Janak Lemus MD           Subjective: The patient is a 59y.o. year old female with h/o systolic CHF, AICD, HTN, hyperlipidemia, T2DM, multiple myeloma who returns for a routine follow-up. Since the last visit, patient reports no new symptoms. Having regular remote ICD checks with Dr. Janine Farley office. Denies change in exercise tolerance, chest pain, edema, medication intolerance, palpitations, shortness of breath, PND/orthopnea wheezing, sputum, syncope, dizziness or light headedness. Doing satisfactorily. Review of Systems  General: Pt denies excessive weight gain or loss. Pt is able to conduct ADL's. Respiratory: Denies shortness of breath, BARRERA, wheezing or stridor.   Cardiovascular: Denies precordial pain, palpitations, edema or PND  Gastrointestinal: Denies poor appetite, indigestion, abdominal pain or blood in stool  Peripheral vascular: Denies claudication, leg cramps  Neuropsychiatric: Denies paresthesias,tingling,numbness,anxiety,depression,fatigue  Musculoskeletal: Denies pain,tenderness, soreness,swelling      Past Medical History:   Diagnosis Date    Cardiomyopathy     Diabetes mellitus type 2, controlled ([address] 12/10/2015    Heart failure ([address]     Hyperlipidemia     Hypertension     controlled    Multiple myeloma ([address]     Orthostatic hypotension     Secondary cardiomyopathy, unspecified     Sinoatrial node dysfunction ([address]     Vitamin B12 deficiency 3/31/2010     Patient Active Problem List    Diagnosis Date Noted    Controlled type 2 diabetes mellitus without complication ([address]     Status post bone transplant 01/15/2018    Type 2 diabetes mellitus with nephropathy ([address] 2018    Type 2 diabetes mellitus with diabetic neuropathy ([address] 01/11/2018    Multiple myeloma ([facility]     Neuropathy 04/21/2017    Rash of face 04/21/2017    Controlled type 2 diabetes mellitus without complication, without long-term current use of insulin ([facility] 03/31/2017    Multiple myeloma not having achieved remission ([facility] 03/06/2017    Diabetes mellitus type 2, controlled ([facility] 12/10/2015    Essential hypertension 11/17/2015    Hyperlipidemia     AICD (automatic cardioverter/defibrillator) present 09/19/2013    Chronic systolic heart failure ([facility] 04/09/2012    Obesity, unspecified 04/09/2012    Other left bundle branch block 04/09/2012    Cardiomyopathy (HCC)--resolved 03/28/2011    Vitamin B12 deficiency 03/31/2010      Past Surgical History:   Procedure Laterality Date    HX HYSTERECTOMY       Allergies   Allergen Reactions    Ace Inhibitors Cough    Compazine [Prochlorperazine] Nausea Only     rash      Family History   Problem Relation Age of Onset    Cancer Mother     Heart Disease Mother      pacemaker    Diabetes Mother     Breast Cancer Mother     Hypertension Sister     Hypertension Brother     Diabetes Brother     Hypertension Sister     Hypertension Sister     Hypertension Sister     Hypertension Sister     Diabetes Sister       Social History     Social History    Marital status:      Spouse name: N/A    Number of children: N/A    Years of education: N/A     Occupational History    Not on file. Social History Main Topics    Smoking status: Never Smoker    Smokeless tobacco: Never Used    Alcohol use No    Drug use: No    Sexual activity: Yes     Partners: Male     Other Topics Concern    Not on file     Social History Narrative    , 2 children, 29 and 31. Works at [employer], for 35 years. 5 grandchildren      Current Outpatient Prescriptions   Medication Sig    Biotin 2,500 mcg cap Take  by mouth.     valsartan (DIOVAN) 80 mg tablet TAKE 2 TABLETS BY MOUTH DAILY    gabapentin (NEURONTIN) 300 mg capsule Take 1 Cap by mouth three (3) times daily.  promethazine-codeine (PHENERGAN WITH CODEINE) 6.25-10 mg/5 mL syrup Take 5 mL by mouth every six (6) hours as needed for Cough. Max Daily Amount: 20 mL.  glimepiride (AMARYL) 1 mg tablet Take 1 Tab by mouth Daily (before breakfast). For diabetes    lenalidomide (REVLIMID) 10 mg cap Take 1 Cap by mouth daily.  carvedilol (COREG) 25 mg tablet two (2) times a day.  loperamide (IMMODIUM) 2 mg tablet Take 2 mg by mouth as needed for Diarrhea.  atorvastatin (LIPITOR) 40 mg tablet Take 1 Tab by mouth daily. For cholesterol    acyclovir (ZOVIRAX) 400 mg tablet Take 1 Tab by mouth two (2) times a day. Indications: PREVENTION OF HERPES ZOSTER IN IMMUNOCOMPROMISED PATIENT    ipratropium-albuterol (COMBIVENT RESPIMAT)  mcg/actuation inhaler Take 1 Puff by inhalation every four (4) hours as needed for Wheezing or Shortness of Breath.  meclizine (ANTIVERT) 25 mg tablet One every 8 hrs as needed for vertigo    atovaquone (MEPRON) 750 mg/5 mL suspension TAKE 5 ML BY MOUTH TWICE A DAY     No current facility-administered medications for this visit. I have reviewed the nurses notes, vitals, problem list, allergy list, medical history, family medical, social history and medications. Objective:     Physical Exam:     Vitals:    06/12/18 0906 06/12/18 0909   BP: 113/73 106/72   Pulse: 71 76   Resp: 12    SpO2: 97%    Weight: 185 lb (83.9 kg)    Height: 5' 4\" (1.626 m)     Body mass index is 31.76 kg/(m^2). General: WDWN adult female, in no acute distress. Pleasant affect. HEENT: No carotid bruits, no JVD, trach is midline. Heart:  Normal S1/S2 negative S3 or S4. Regular, no murmur, gallop or rub.   Respiratory: Clear bilaterally, no wheezing or rales  Abdomen:   Soft, non-tender, bowel sounds are active.   Extremities:  No edema, normal cap refill, no cyanosis. Neuro: A&Ox3, speech clear, gait stable.    Skin: Skin color is normal. No rashes or lesions. No diaphoresis. Vascular: 2+ pulses symmetric in all extremities      Data Review:       Cardiographics:    EKG interpretation:  Paced rhythm    Cardiology Labs:    Results for orders placed or performed during the hospital encounter of 08/06/10   EKG, 12 LEAD, INITIAL   Result Value Ref Range    Ventricular Rate 60 BPM    Atrial Rate 60 BPM    P-R Interval 92 ms    QRS Duration 146 ms    Q-T Interval 530 ms    QTC Calculation (Bezet) 530 ms    Calculated R Axis -103 degrees    Calculated T Axis 58 degrees    Diagnosis       AV sequential or dual chamber electronic pacemaker  No previous ECGs available  Confirmed by Jamari Perry ([id number]) on 8/7/2010 9:11:48 PM   Results for orders placed or performed in visit on 03/31/10   AMB POC EKG ROUTINE W/ 12 LEADS, INTER & REP    Narrative    LBBB, left ventricular hypertrophy       Lab Results   Component Value Date/Time    Cholesterol, total 182 05/17/2017 09:25 AM    HDL Cholesterol 75 05/17/2017 09:25 AM    LDL, calculated 79 05/17/2017 09:25 AM    Triglyceride 139 05/17/2017 09:25 AM    CHOL/HDL Ratio 4.3 10/08/2009 10:45 AM       Lab Results   Component Value Date/Time    Sodium 145 (H) 02/19/2018 09:55 AM    Potassium 3.6 02/19/2018 09:55 AM    Chloride 103 02/19/2018 09:55 AM    CO2 23 02/19/2018 09:55 AM    Anion gap 8 09/22/2017 09:15 AM    Glucose 153 (H) 02/19/2018 09:55 AM    BUN 11 02/19/2018 09:55 AM    Creatinine 0.98 02/19/2018 09:55 AM    BUN/Creatinine ratio 11 02/19/2018 09:55 AM    GFR est AA 73 02/19/2018 09:55 AM    GFR est non-AA 63 02/19/2018 09:55 AM    Calcium 8.4 (L) 02/19/2018 09:55 AM    Bilirubin, total 0.8 02/19/2018 09:55 AM    AST (SGOT) 18 02/19/2018 09:55 AM    Alk.  phosphatase 75 02/19/2018 09:55 AM    Protein, total 6.7 02/19/2018 09:55 AM    Albumin 4.3 02/19/2018 09:55 AM    Globulin 3.4 09/22/2017 09:15 AM    A-G Ratio 1.8 02/19/2018 09:55 AM    ALT (SGPT) 17 02/19/2018 09:55 AM          Assessment: ICD-10-CM ICD-9-CM    1. Chronic systolic heart failure (HCC) I50.22 428.22    2. Hyperlipidemia, unspecified hyperlipidemia type E78.5 272.4 AMB POC EKG ROUTINE W/ 12 LEADS, INTER & REP   3. Essential hypertension I10 401.9    4. AICD (automatic cardioverter/defibrillator) present Z95.810 V45.02    5. Controlled type 2 diabetes mellitus without complication, unspecified whether long term insulin use (HCC) E11.9 250.00    6. Multiple myeloma not having achieved remission (HCC) C90.00 203.00          Discussion: Patient presents at this time stable from a cardiac perspective. BP was well controlled. Pleased with present status. Discussed/seen with Dr. Vinay Montero: 1. Continue same meds. 2.Encouraged to exercise to tolerance, and follow low fat, low cholesterol, low sodium predominantly Plant-based (consider Mediterranean) diet. 3.Follow up: 6 months   --  Call with questions or concerns. I have discussed the diagnosis with the patient and the intended plan as seen in the above orders. The patient has received an after-visit summary and questions were answered concerning future plans. I have discussed any concerning medication side effects and warnings with the patient as well. Sugey Dick PA-C  6/12/2018     Patient seen and examined. All pertinent data reviewed. I have reviewed detailed note as outlined by Karma Steen. Case discussed with Nursing/medical assistant staff and Karma Steen. Patient presents cardiac stable. CHF compensated. No complaints. Plans as outlined. Roddy Edmond

## 2018-06-12 NOTE — MR AVS SNAPSHOT
[address] [facility] 
 
 
 [address] [place] [address] 
[phone] Patient: Sanaz Mitchell MRN:  [id number] Visit Information Date & Time Provider Department Dept. Phone Encounter #  
 6/12/2018  9:15 AM MD [facility] [phone] [id number] Your Appointments 6/22/2018  9:30 AM  
ROUTINE CARE with Cynthia Lopez MD  
[facility]) Appt Note: routine follow up  
 [address] [facility] [id number] [phone] 600 [facility] [address] 7/19/2018 10:30 AM  
Follow Up with Vintondale Pool, MD  
[facility] at [facility] Appt Note: 5mo fu (multiple myeloma) [address] [facility] [address] [place] [address]  
[phone]  
  
   
 [address] [address]  
  
    
 8/22/2018  8:00 AM  
PROCEDURE with [facility] Appt Note: NOT AN OFFICE VISIT - REMOTE BSC ICD  
 [address]  
[phone] [address] Upcoming Health Maintenance Date Due COLONOSCOPY 9/20/1976 PAP AKA CERVICAL CYTOLOGY 4/2/2015 MICROALBUMIN Q1 5/17/2018 LIPID PANEL Q1 5/17/2018 Influenza Age 5 to Adult 8/1/2018 HEMOGLOBIN A1C Q6M 8/19/2018 FOOT EXAM Q1 8/22/2018 EYE EXAM RETINAL OR DILATED Q1 9/19/2018 Pneumococcal 19-64 Highest Risk (2 of 3 - PCV13) 1/30/2019 BREAST CANCER SCRN MAMMOGRAM 6/12/2019 GLAUCOMA SCREENING Q2Y 9/19/2019 DTaP/Tdap/Td series (2 - Td) 5/17/2026 Allergies as of 6/12/2018  Review Complete On: 6/12/2018 By: Abbey Hoskins PA-C Severity Noted Reaction Type Reactions Ace Inhibitors  08/06/2010    Cough Compazine [Prochlorperazine]  06/15/2017    Nausea Only  
 rash Current Immunizations  Reviewed on 1/11/2018 No immunizations on file. Not reviewed this visit You Were Diagnosed With   
  
 Codes Comments Hyperlipidemia, unspecified hyperlipidemia type    -  Primary ICD-10-CM: E78.5 ICD-9-CM: 272.4 Essential hypertension     ICD-10-CM: I10 
ICD-9-CM: 401.9 Chronic systolic heart failure (HCC)     ICD-10-CM: I50.22 ICD-9-CM: 428.22   
 AICD (automatic cardioverter/defibrillator) present     ICD-10-CM: Z95.810 ICD-9-CM: V45.02 Controlled type 2 diabetes mellitus without complication, unspecified whether long term insulin use ([facility]     ICD-10-CM: E11.9 ICD-9-CM: 250.00 Multiple myeloma not having achieved remission ([facility]     ICD-10-CM: C90.00 ICD-9-CM: 203.00 Vitals BP Pulse Resp Height(growth percentile) Weight(growth percentile) SpO2  
 106/72 (BP 1 Location: Right arm, BP Patient Position: Sitting) 76 12 5' 4\" (1.626 m) 185 lb (83.9 kg) 97% BMI OB Status Smoking Status 31.76 kg/m2 Hysterectomy Never Smoker Vitals History BMI and BSA Data Body Mass Index Body Surface Area 31.76 kg/m 2 1.95 m 2 Preferred Pharmacy Pharmacy Name Phone [facility] [address] [place] [address], [address] [place] [address] [phone] Your Updated Medication List  
  
   
This list is accurate as of 6/12/18  9:47 AM.  Always use your most recent med list.  
  
  
  
  
 acyclovir 400 mg tablet Commonly known as:  ZOVIRAX Take 1 Tab by mouth two (2) times a day. Indications: PREVENTION OF HERPES ZOSTER IN IMMUNOCOMPROMISED PATIENT  
  
 atorvastatin 40 mg tablet Commonly known as:  LIPITOR Take 1 Tab by mouth daily. For cholesterol  
  
 atovaquone 750 mg/5 mL suspension Commonly known as:  MEPRON  
TAKE 5 ML BY MOUTH TWICE A DAY Biotin 2,500 mcg Cap Take  by mouth. carvedilol 25 mg tablet Commonly known as:  COREG  
two (2) times a day.  
  
 gabapentin 300 mg capsule Commonly known as:  NEURONTIN Take 1 Cap by mouth three (3) times daily. glimepiride 1 mg tablet Commonly known as:  AMARYL Take 1 Tab by mouth Daily (before breakfast). For diabetes  
  
 ipratropium-albuterol  mcg/actuation inhaler Commonly known as:  Wanda Nest Take 1 Puff by inhalation every four (4) hours as needed for Wheezing or Shortness of Breath. lenalidomide 10 mg Cap Commonly known as:  REVLIMID Take 1 Cap by mouth daily. loperamide 2 mg tablet Commonly known as:  IMMODIUM Take 2 mg by mouth as needed for Diarrhea. meclizine 25 mg tablet Commonly known as:  ANTIVERT One every 8 hrs as needed for vertigo  
  
 promethazine-codeine 6.25-10 mg/5 mL syrup Commonly known as:  PHENERGAN with CODEINE Take 5 mL by mouth every six (6) hours as needed for Cough. Max Daily Amount: 20 mL.  
  
 valsartan 80 mg tablet Commonly known as:  DIOVAN  
TAKE 2 TABLETS BY MOUTH DAILY We Performed the Following AMB POC EKG ROUTINE W/ 12 LEADS, INTER & REP [72632 CPT(R)] Introducing \[facility]\"" & HEALTH SERVICES! [facility] introduces [employer] patient portal. Now you can access parts of your medical record, email your doctor's office, and request medication refills online. 1. In your internet browser, go to [email or web address] 2. Click on the First Time User? Click Here link in the Sign In box. You will see the New Member Sign Up page. 3. Enter your [employer] Access Code exactly as it appears below. You will not need to use this code after youve completed the sign-up process. If you do not sign up before the expiration date, you must request a new code. · [employer] Access Code: [email or web address] Expires: 7/23/2018 10:23 AM 
 
4.  Enter the last four digits of your Social Security Number (xxxx) and Date of Birth (mm/dd/yyyy) as indicated and click Submit. You will be taken to the next sign-up page. 5. Create a Apontador ID. This will be your [employer] login ID and cannot be changed, so think of one that is secure and easy to remember. 6. Create a Apontador password. You can change your password at any time. 7. Enter your Password Reset Question and Answer. This can be used at a later time if you forget your password. 8. Enter your e-mail address. You will receive e-mail notification when new information is available in [address] 9. Click Sign Up. You can now view and download portions of your medical record. 10. Click the Download Summary menu link to download a portable copy of your medical information. If you have questions, please visit the Frequently Asked Questions section of the [employer] website. Remember, [employer] is NOT to be used for urgent needs. For medical emergencies, dial 911. Now available from your iPhone and Android! Please provide this summary of care documentation to your next provider. Your primary care clinician is listed as Roman Levine. If you have any questions after today's visit, please call [phone].

## 2018-06-12 NOTE — PATIENT INSTRUCTIONS
You are doing well  Please make a 6 month follow up visit with Dr. Lou Leslie    Heart-Healthy Diet: Care Instructions  Your Care Instructions    A heart-healthy diet has lots of vegetables, fruits, nuts, beans, and whole grains, and is low in salt. It limits foods that are high in saturated fat, such as meats, cheeses, and fried foods. It may be hard to change your diet, but even small changes can lower your risk of heart attack and heart disease. Follow-up care is a key part of your treatment and safety. Be sure to make and go to all appointments, and call your doctor if you are having problems. It's also a good idea to know your test results and keep a list of the medicines you take. How can you care for yourself at home? Watch your portions  · Learn what a serving is. A \"serving\" and a \"portion\" are not always the same thing. Make sure that you are not eating larger portions than are recommended. For example, a serving of pasta is ½ cup. A serving size of meat is 2 to 3 ounces. A 3-ounce serving is about the size of a deck of cards. Measure serving sizes until you are good at [place]" them. Keep in mind that restaurants often serve portions that are 2 or 3 times the size of one serving. · To keep your energy level up and keep you from feeling hungry, eat often but in smaller portions. · Eat only the number of calories you need to stay at a healthy weight. If you need to lose weight, eat fewer calories than your body burns (through exercise and other physical activity). Eat more fruits and vegetables  · Eat a variety of fruit and vegetables every day. Dark green, deep orange, red, or yellow fruits and vegetables are especially good for you. Examples include spinach, carrots, peaches, and berries. · Keep carrots, celery, and other veggies handy for snacks. Buy fruit that is in season and store it where you can see it so that you will be tempted to eat it.   · Cook dishes that have a lot of veggies in them, such as stir-fries and soups. Limit saturated and trans fat  · Read food labels, and try to avoid saturated and trans fats. They increase your risk of heart disease. Trans fat is found in many processed foods such as cookies and crackers. · Use olive or canola oil when you cook. Try cholesterol-lowering spreads, such as Benecol or Take Control. · Bake, broil, grill, or steam foods instead of frying them. · Choose lean meats instead of high-fat meats such as hot dogs and sausages. Cut off all visible fat when you prepare meat. · Eat fish, skinless poultry, and meat alternatives such as soy products instead of high-fat meats. Soy products, such as tofu, may be especially good for your heart. · Choose low-fat or fat-free milk and dairy products. Eat fish  · Eat at least two servings of fish a week. Certain fish, such as salmon and tuna, contain omega-3 fatty acids, which may help reduce your risk of heart attack. Eat foods high in fiber  · Eat a variety of grain products every day. Include whole-grain foods that have lots of fiber and nutrients. Examples of whole-grain foods include oats, whole wheat bread, and brown rice. · Buy whole-grain breads and cereals, instead of white bread or pastries. Limit salt and sodium  · Limit how much salt and sodium you eat to help lower your blood pressure. · Taste food before you salt it. Add only a little salt when you think you need it. With time, your taste buds will adjust to less salt. · Eat fewer snack items, fast foods, and other high-salt, processed foods. Check food labels for the amount of sodium in packaged foods. · Choose low-sodium versions of canned goods (such as soups, vegetables, and beans). Limit sugar  · Limit drinks and foods with added sugar. These include candy, desserts, and soda pop. Limit alcohol  · Limit alcohol to no more than 2 drinks a day for men and 1 drink a day for women. Too much alcohol can cause health problems.   When should you call for help? Watch closely for changes in your health, and be sure to contact your doctor if:  ? · You would like help planning heart-healthy meals. Where can you learn more? Go to http://elsie-baljeet.info/. Enter V137 in the search box to learn more about \"Heart-Healthy Diet: Care Instructions. \"  Current as of: September 21, 2016  Content Version: 11.4  © 7857-1467 [employer]. Care instructions adapted under license by [employer] (which disclaims liability or warranty for this information). If you have questions about a medical condition or this instruction, always ask your healthcare professional. [address] any warranty or liability for your use of this information.

## 2018-06-22 ENCOUNTER — OFFICE VISIT (OUTPATIENT)
Dept: FAMILY MEDICINE CLINIC | Age: 58
End: 2018-06-22

## 2018-06-22 VITALS
WEIGHT: 185.2 LBS | BODY MASS INDEX: 31.62 KG/M2 | TEMPERATURE: 96.3 F | RESPIRATION RATE: 14 BRPM | OXYGEN SATURATION: 94 % | SYSTOLIC BLOOD PRESSURE: 112 MMHG | DIASTOLIC BLOOD PRESSURE: 65 MMHG | HEIGHT: 64 IN | HEART RATE: 65 BPM

## 2018-06-22 DIAGNOSIS — J40 BRONCHITIS: Primary | ICD-10-CM

## 2018-06-22 RX ORDER — LEVOFLOXACIN 500 MG/1
500 TABLET, FILM COATED ORAL DAILY
Qty: 10 TAB | Refills: 0 | Status: SHIPPED | OUTPATIENT
Start: 2018-06-22 | End: 2018-07-02

## 2018-06-22 NOTE — PROGRESS NOTES
Chief Complaint   Patient presents with    Nasal Congestion    Other     chest congestion     Cough     productive cough      1. Have you been to the ER, urgent care clinic since your last visit? Hospitalized since your last visit? No    2. Have you seen or consulted any other health care providers outside of the [address] [place] [address] since your last visit? Include any pap smears or colon screening.  No     Health Maintenance Due   Topic Date Due    COLONOSCOPY  09/20/1976    PAP AKA CERVICAL CYTOLOGY  04/02/2015    MICROALBUMIN Q1  05/17/2018    LIPID PANEL Q1  05/17/2018

## 2018-06-22 NOTE — MR AVS SNAPSHOT
303 [facility] 
 
 
 [address] [facility] [address] [address] 
[phone] Patient: Mike Ch MRN: [id number] AUD:[birth year] Visit Information Date & Time Provider Department Dept. Phone Encounter #  
 6/22/2018  9:30 AM Ligia Cortés MD [facility] [phone] [id number] Follow-up Instructions Return in about 3 months (around 9/22/2018). Your Appointments 7/19/2018 10:30 AM  
Follow Up with Esteban Rahman MD  
[facility] at [facility] Appt Note: 5mo fu (multiple myeloma) [address] [facility] [address]  
[phone]  
  
   
 [address] [address]  
  
    
 8/22/2018  8:00 AM  
PROCEDURE with [facility]) Appt Note: NOT AN OFFICE VISIT - REMOTE BSC ICD  
 [address] [facility]  
[phone] [address] [facility]  
  
    
 12/12/2018  9:15 AM  
ESTABLISHED PATIENT with Ceferino Dey MD  
[facility]) Appt Note: 6 MO. F/U  
 [address]  
[phone] [address] Upcoming Health Maintenance Date Due COLONOSCOPY 9/20/1976 PAP AKA CERVICAL CYTOLOGY 4/2/2015 MICROALBUMIN Q1 5/17/2018 LIPID PANEL Q1 5/17/2018 Influenza Age 5 to Adult 8/1/2018 HEMOGLOBIN A1C Q6M 8/19/2018 FOOT EXAM Q1 8/22/2018 EYE EXAM RETINAL OR DILATED Q1 9/19/2018 Pneumococcal 19-64 Highest Risk (2 of 3 - PCV13) 1/30/2019 BREAST CANCER SCRN MAMMOGRAM 6/12/2019 GLAUCOMA SCREENING Q2Y 9/19/2019 DTaP/Tdap/Td series (2 - Td) 5/17/2026 Allergies as of 6/22/2018  Review Complete On: 6/22/2018 By: Ligia Cortés MD  
  
 Severity Noted Reaction Type Reactions Ace Inhibitors  08/06/2010    Cough Compazine [Prochlorperazine]  06/15/2017    Nausea Only  
 rash Current Immunizations  Reviewed on 1/11/2018 No immunizations on file. Not reviewed this visit You Were Diagnosed With   
  
 Codes Comments Bronchitis    -  Primary ICD-10-CM: E39 ICD-9-CM: 190 Vitals BP Pulse Temp Resp Height(growth percentile) Weight(growth percentile) 112/65 65 96.3 °F (35.7 °C) (Oral) 14 5' 4\" (1.626 m) 185 lb 3.2 oz (84 kg) SpO2 BMI OB Status Smoking Status 94% 31.79 kg/m2 Hysterectomy Never Smoker Vitals History BMI and BSA Data Body Mass Index Body Surface Area 31.79 kg/m 2 1.95 m 2 Preferred Pharmacy Pharmacy Name Phone [facility] [address] [place] [address] [phone] Your Updated Medication List  
  
   
This list is accurate as of 6/22/18 10:52 AM.  Always use your most recent med list.  
  
  
  
  
 acyclovir 400 mg tablet Commonly known as:  ZOVIRAX Take 1 Tab by mouth two (2) times a day. Indications: PREVENTION OF HERPES ZOSTER IN IMMUNOCOMPROMISED PATIENT  
  
 atorvastatin 40 mg tablet Commonly known as:  LIPITOR Take 1 Tab by mouth daily. For cholesterol  
  
 atovaquone 750 mg/5 mL suspension Commonly known as:  MEPRON  
TAKE 5 ML BY MOUTH TWICE A DAY Biotin 2,500 mcg Cap Take  by mouth. carvedilol 25 mg tablet Commonly known as:  COREG  
two (2) times a day.  
  
 gabapentin 300 mg capsule Commonly known as:  NEURONTIN Take 1 Cap by mouth three (3) times daily. glimepiride 1 mg tablet Commonly known as:  AMARYL Take 1 Tab by mouth Daily (before breakfast). For diabetes  
  
 ipratropium-albuterol  mcg/actuation inhaler Commonly known as:  Diaz Sapna Take 1 Puff by inhalation every four (4) hours as needed for Wheezing or Shortness of Breath. lenalidomide 10 mg Cap Commonly known as:  REVLIMID Take 1 Cap by mouth daily. levoFLOXacin 500 mg tablet Commonly known as:  Romayne Potters Take 1 Tab by mouth daily for 10 days. loperamide 2 mg tablet Commonly known as:  IMMODIUM Take 2 mg by mouth as needed for Diarrhea. meclizine 25 mg tablet Commonly known as:  ANTIVERT One every 8 hrs as needed for vertigo  
  
 promethazine-codeine 6.25-10 mg/5 mL syrup Commonly known as:  PHENERGAN with CODEINE Take 5 mL by mouth every six (6) hours as needed for Cough. Max Daily Amount: 20 mL.  
  
 valsartan 80 mg tablet Commonly known as:  DIOVAN  
TAKE 2 TABLETS BY MOUTH DAILY Prescriptions Sent to Pharmacy Refills  
 levoFLOXacin (LEVAQUIN) 500 mg tablet 0 Sig: Take 1 Tab by mouth daily for 10 days. Class: Normal  
 Pharmacy: [address] [place] [address] #: [phone] Route: Oral  
  
Follow-up Instructions Return in about 3 months (around 9/22/2018). Introducing [facility]! [facility] introduces [employer] patient portal. Now you can access parts of your medical record, email your doctor's office, and request medication refills online. 1. In your internet browser, go to [email or web address] 2. Click on the First Time User? Click Here link in the Sign In box. You will see the New Member Sign Up page. 3. Enter your [employer] Access Code exactly as it appears below. You will not need to use this code after youve completed the sign-up process. If you do not sign up before the expiration date, you must request a new code. · [employer] Access Code: [email or web address] Expires: 7/23/2018 10:23 AM 
 
4. Enter the last four digits of your Social Security Number (xxxx) and Date of Birth (mm/dd/yyyy) as indicated and click Submit. You will be taken to the next sign-up page. 5. Create a [employer] ID.  This will be your [employer] login ID and cannot be changed, so think of one that is secure and easy to remember. 6. Create a ALEX[employer]ALEXA password. You can change your password at any time. 7. Enter your Password Reset Question and Answer. This can be used at a later time if you forget your password. 8. Enter your e-mail address. You will receive e-mail notification when new information is available in [address] 9. Click Sign Up. You can now view and download portions of your medical record. 10. Click the Download Summary menu link to download a portable copy of your medical information. If you have questions, please visit the Frequently Asked Questions section of the [employer] website. Remember, [employer] is NOT to be used for urgent needs. For medical emergencies, dial 911. Now available from your iPhone and Android! Please provide this summary of care documentation to your next provider. Your primary care clinician is listed as Marcus Johnson. If you have any questions after today's visit, please call [phone].

## 2018-06-22 NOTE — PROGRESS NOTES
HISTORY OF PRESENT ILLNESS  Sophie Patel is a 59 y.o. female. HPI has had cough for 2 weeks. Cough has been productive yellowish green sputum. No fever, chills, wheezing, edema. Not taking any meds for illness. Claritin- mild relief. ROS    Physical Exam   Constitutional: She appears well-developed and well-nourished. HENT:   Right Ear: External ear normal.   Left Ear: External ear normal.   Mouth/Throat: Oropharynx is clear and moist.   Neck: No thyromegaly present. Cardiovascular: Normal rate, regular rhythm, normal heart sounds and intact distal pulses. Pulmonary/Chest: Breath sounds normal. She has no wheezes. Abdominal: Soft. Bowel sounds are normal. She exhibits no distension and no mass. There is no tenderness. Musculoskeletal: She exhibits no edema. Lymphadenopathy:     She has no cervical adenopathy. Nursing note and vitals reviewed. ASSESSMENT and PLAN  Orders Placed This Encounter    levoFLOXacin (LEVAQUIN) 500 mg tablet     Diagnoses and all orders for this visit:    1. Bronchitis    Other orders  -     levoFLOXacin (LEVAQUIN) 500 mg tablet; Take 1 Tab by mouth daily for 10 days. Follow-up Disposition:  Return in about 3 months (around 9/22/2018).

## 2018-07-06 NOTE — TELEPHONE ENCOUNTER
Call placed to office of Dr. Kriss Posadas, transferred to  for referral for transplant evaluation of multiple myeloma. Coordinator to call office back for appointment information.
0

## 2018-07-10 DIAGNOSIS — C90.00 MULTIPLE MYELOMA NOT HAVING ACHIEVED REMISSION (HCC): ICD-10-CM

## 2018-07-12 LAB
ALBUMIN SERPL ELPH-MCNC: 3.5 G/DL (ref 2.9–4.4)
ALBUMIN SERPL-MCNC: 3.9 G/DL (ref 3.5–5.5)
ALBUMIN/GLOB SERPL: 1.5 {RATIO} (ref 0.7–1.7)
ALBUMIN/GLOB SERPL: 1.9 {RATIO} (ref 1.2–2.2)
ALP SERPL-CCNC: 77 IU/L (ref 39–117)
ALPHA1 GLOB SERPL ELPH-MCNC: 0.2 G/DL (ref 0–0.4)
ALPHA2 GLOB SERPL ELPH-MCNC: 0.7 G/DL (ref 0.4–1)
ALT SERPL-CCNC: 16 IU/L (ref 0–32)
AST SERPL-CCNC: 17 IU/L (ref 0–40)
B-GLOBULIN SERPL ELPH-MCNC: 0.9 G/DL (ref 0.7–1.3)
BASOPHILS # BLD AUTO: 0 X10E3/UL (ref 0–0.2)
BASOPHILS NFR BLD AUTO: 1 %
BILIRUB SERPL-MCNC: 1.1 MG/DL (ref 0–1.2)
BUN SERPL-MCNC: 9 MG/DL (ref 6–24)
BUN/CREAT SERPL: 9 (ref 9–23)
CALCIUM SERPL-MCNC: 8.6 MG/DL (ref 8.7–10.2)
CHLORIDE SERPL-SCNC: 109 MMOL/L (ref 96–106)
CO2 SERPL-SCNC: 22 MMOL/L (ref 20–29)
CREAT SERPL-MCNC: 0.95 MG/DL (ref 0.57–1)
EOSINOPHIL # BLD AUTO: 0.1 X10E3/UL (ref 0–0.4)
EOSINOPHIL NFR BLD AUTO: 3 %
ERYTHROCYTE [DISTWIDTH] IN BLOOD BY AUTOMATED COUNT: 17.8 % (ref 12.3–15.4)
GAMMA GLOB SERPL ELPH-MCNC: 0.7 G/DL (ref 0.4–1.8)
GLOBULIN SER CALC-MCNC: 2.1 G/DL (ref 1.5–4.5)
GLOBULIN SER-MCNC: 2.5 G/DL (ref 2.2–3.9)
GLUCOSE SERPL-MCNC: 110 MG/DL (ref 65–99)
HCT VFR BLD AUTO: 34.4 % (ref 34–46.6)
HGB BLD-MCNC: 11.5 G/DL (ref 11.1–15.9)
IGA SERPL-MCNC: 27 MG/DL (ref 87–352)
IGG SERPL-MCNC: 712 MG/DL (ref 700–1600)
IGM SERPL-MCNC: 10 MG/DL (ref 26–217)
IMM GRANULOCYTES # BLD: 0 X10E3/UL (ref 0–0.1)
IMM GRANULOCYTES NFR BLD: 0 %
INTERPRETATION SERPL IEP-IMP: ABNORMAL
KAPPA LC FREE SER-MCNC: 9.9 MG/L (ref 3.3–19.4)
KAPPA LC FREE/LAMBDA FREE SER: 0.98 {RATIO} (ref 0.26–1.65)
LAMBDA LC FREE SERPL-MCNC: 10.1 MG/L (ref 5.7–26.3)
LYMPHOCYTES # BLD AUTO: 1 X10E3/UL (ref 0.7–3.1)
LYMPHOCYTES NFR BLD AUTO: 59 %
M PROTEIN SERPL ELPH-MCNC: ABNORMAL G/DL
MCH RBC QN AUTO: 33.9 PG (ref 26.6–33)
MCHC RBC AUTO-ENTMCNC: 33.4 G/DL (ref 31.5–35.7)
MCV RBC AUTO: 102 FL (ref 79–97)
MONOCYTES # BLD AUTO: 0.2 X10E3/UL (ref 0.1–0.9)
MONOCYTES NFR BLD AUTO: 14 %
MORPHOLOGY BLD-IMP: ABNORMAL
NEUTROPHILS # BLD AUTO: 0.4 X10E3/UL (ref 1.4–7)
NEUTROPHILS NFR BLD AUTO: 23 %
PLATELET # BLD AUTO: 133 X10E3/UL (ref 150–379)
PLEASE NOTE:, 149534: ABNORMAL
POTASSIUM SERPL-SCNC: 3.9 MMOL/L (ref 3.5–5.2)
PROT SERPL-MCNC: 6 G/DL (ref 6–8.5)
RBC # BLD AUTO: 3.39 X10E6/UL (ref 3.77–5.28)
SODIUM SERPL-SCNC: 145 MMOL/L (ref 134–144)
WBC # BLD AUTO: 1.7 X10E3/UL (ref 3.4–10.8)

## 2018-07-13 LAB
ALBUMIN 24H MFR UR ELPH: 35 %
ALPHA1 GLOB 24H MFR UR ELPH: 2.6 %
ALPHA2 GLOB 24H MFR UR ELPH: 17.7 %
B-GLOBULIN MFR UR ELPH: 26.2 %
GAMMA GLOB 24H MFR UR ELPH: 18.4 %
INTERPRETATION UR IFE-IMP: NORMAL
M PROTEIN 24H MFR UR ELPH: NORMAL %
NOTE, 149533: NORMAL
PROT 24H UR-MRATE: 131 MG/24 HR (ref 30–150)
PROT UR-MCNC: 7.7 MG/DL

## 2018-07-19 ENCOUNTER — OFFICE VISIT (OUTPATIENT)
Dept: ONCOLOGY | Age: 58
End: 2018-07-19

## 2018-07-19 VITALS
DIASTOLIC BLOOD PRESSURE: 84 MMHG | WEIGHT: 186.4 LBS | HEART RATE: 65 BPM | BODY MASS INDEX: 31.82 KG/M2 | HEIGHT: 64 IN | RESPIRATION RATE: 20 BRPM | TEMPERATURE: 97.9 F | OXYGEN SATURATION: 97 % | SYSTOLIC BLOOD PRESSURE: 125 MMHG

## 2018-07-19 DIAGNOSIS — T45.1X5A CHEMOTHERAPY INDUCED NEUTROPENIA (HCC): ICD-10-CM

## 2018-07-19 DIAGNOSIS — C90.00 MULTIPLE MYELOMA NOT HAVING ACHIEVED REMISSION (HCC): Primary | ICD-10-CM

## 2018-07-19 DIAGNOSIS — E11.21 TYPE 2 DIABETES MELLITUS WITH NEPHROPATHY (HCC): ICD-10-CM

## 2018-07-19 DIAGNOSIS — D70.1 CHEMOTHERAPY INDUCED NEUTROPENIA (HCC): ICD-10-CM

## 2018-07-19 NOTE — PROGRESS NOTES
Hematology Follow Up    REASON FOR VISIT: Multiple Myeloma     TREATMENT:   3/24/17- 9/15/17: Bhanu Marus X 8   10/20/17: Autologous transplant at [facility]  2/22/18 - Currently on Revlimind 10mg daily    HISTORY OF PRESENT ILLNESS: Ms. Bartolo Marroquin is a 59 y.o. female with DM and  Multiple Myeloma who presents to follow up after the ASCT and is on maintenance Revlimid 10mg daily. Feeling well overall. Had a URI in April for which she was treated with antibiotic therapy. No residual cough, congestion, SOB or chest pain. No dizziness or headache. No burning with urination. No s/s of infection. Has been feeling well, tired in the evenings. Working full time. Sleeping well in the evening. Taking Revlimid at 8pm every evening. Oncologic history  Patient had left facial numbness which started in the summer of 2016. This started abruptly and was not associated with rashes, pain, jaw weakness. She has had some intermittent hyperemia of the L eye. No tearing. She has been evaluated by Dr. Salvador Matthew with Neurology and an extensive work up was only notable for presence of a 0.3 g/dl M spike. Other tests were unremarkable: Vitamin B12 411, thyroid function test normal, ACE 25, BHARATI normal CBC normal, CMP normal, CRP 1.17, CT head and cervical spine unremarkable. Further evaluation revealed findings consistent with Multiple Myeloma    CBC 2/24 Unremarkable. Kappa 17 mg/dl, Lambda 2416 (ratio 0.01), SPEP 0.2 g/dl M spike, HANSEL showed monoclonal free lambda light chains, UPEP negative, Beta 2 Microglobulin 1.8 mg/L, Skeletal survey negative for lytic lesions, Bone marrow biopsy on 2/24/17 showed a Normocellular marrow with mild monoclonal plasmacytosis (15-20%). Trilineage hematopoiesis with maturation. She had a very good partial response and 8 cycles of VRD and then proceeded on to receive an autologous stem cell transplant on 10/20/17 at [facility]. She had a CR posttransplant.  Started maintenance Revlimid in Jan 2018      Past Medical History:   Diagnosis Date    Cardiomyopathy     Diabetes mellitus type 2, controlled ([facility] [address] 12/10/2015    Heart failure (HCC)     Hyperlipidemia     Hypertension     controlled    Multiple myeloma (HCC)     Orthostatic hypotension     Secondary cardiomyopathy, unspecified     [facility]     Vitamin B12 deficiency 3/31/2010       Past Surgical History:   Procedure Laterality Date    HX HYSTERECTOMY         Allergies   Allergen Reactions    Ace Inhibitors Cough    Compazine [Prochlorperazine] Nausea Only     rash       Current Outpatient Prescriptions   Medication Sig Dispense Refill    Biotin 2,500 mcg cap Take  by mouth.  valsartan (DIOVAN) 80 mg tablet TAKE 2 TABLETS BY MOUTH DAILY 60 Tab 12    gabapentin (NEURONTIN) 300 mg capsule Take 1 Cap by mouth three (3) times daily. 90 Cap 2    glimepiride (AMARYL) 1 mg tablet Take 1 Tab by mouth Daily (before breakfast). For diabetes 30 Tab 12    lenalidomide (REVLIMID) 10 mg cap Take 1 Cap by mouth daily. 30 Cap 0    carvedilol (COREG) 25 mg tablet two (2) times a day.  loperamide (IMMODIUM) 2 mg tablet Take 2 mg by mouth as needed for Diarrhea.  atorvastatin (LIPITOR) 40 mg tablet Take 1 Tab by mouth daily. For cholesterol 30 Tab 12    acyclovir (ZOVIRAX) 400 mg tablet Take 1 Tab by mouth two (2) times a day. Indications: PREVENTION OF HERPES ZOSTER IN IMMUNOCOMPROMISED PATIENT 59 Tab 3       Social History     Social History    Marital status:      Spouse name: N/A    Number of children: N/A    Years of education: N/A     Social History Main Topics    Smoking status: Never Smoker    Smokeless tobacco: Never Used    Alcohol use No    Drug use: No    Sexual activity: Yes     Partners: Male     Other Topics Concern    None     Social History Narrative    , 2 children, 28 and 31. Works at [employer], for 35 years.  5 grandchildren       Family History   Problem Relation Age of Onset    Cancer Mother     Heart Disease Mother      pacemaker   [facility] Diabetes Mother     Breast Cancer Mother     Hypertension Sister     Hypertension Brother     Diabetes Brother     Hypertension Sister     Hypertension Sister     Hypertension Sister     Hypertension Sister     Diabetes Sister        ROS  As reviewed in the HPI all others reviewed and negative. ECOG PS is 0    Physical Examination:   Visit Vitals    /84    Pulse 65    Temp 97.9 °F (36.6 °C)    Resp 20    Ht 5' 4\" (1.626 m)    Wt 186 lb 6.4 oz (84.6 kg)    SpO2 97%    BMI 32 kg/m2     General appearance - alert, well appearing, and in no distress  Mental status - oriented to person, place, and time  Mouth - mucous membranes moist, pharynx normal without lesions  Lymphatics - no palpable lymphadenopathy, no hepatosplenomegaly  Chest - clear to auscultation, no wheezes, rales or rhonchi, symmetric air entry  Heart - normal rate, regular rhythm, normal S1, S2, no murmurs, rubs, clicks or gallops  Abdomen - soft, nontender, nondistended, no masses or organomegaly, bowel sounds present  Ext -  No edema    LABS  Lab Results   Component Value Date/Time    WBC 1.7 (LL) 07/10/2018 07:46 AM    HGB 11.5 07/10/2018 07:46 AM    HCT 34.4 07/10/2018 07:46 AM    PLATELET 585 (L) 03/03/2092 07:46 AM     (H) 07/10/2018 07:46 AM    ABS. NEUTROPHILS 0.4 (<) 07/10/2018 07:46 AM     Lab Results   Component Value Date/Time    Sodium 145 (H) 07/10/2018 07:46 AM    Potassium 3.9 07/10/2018 07:46 AM    Chloride 109 (H) 07/10/2018 07:46 AM    CO2 22 07/10/2018 07:46 AM    Glucose 110 (H) 07/10/2018 07:46 AM    BUN 9 07/10/2018 07:46 AM    Creatinine 0.95 07/10/2018 07:46 AM    GFR est AA 76 07/10/2018 07:46 AM    GFR est non-AA 66 07/10/2018 07:46 AM    Calcium 8.6 (L) 07/10/2018 07:46 AM     Lab Results   Component Value Date/Time    AST (SGOT) 17 07/10/2018 07:46 AM    Alk.  phosphatase 77 07/10/2018 07:46 AM    Protein, total 6.0 07/10/2018 07:46 AM    Albumin 3.9 07/10/2018 07:46 AM    Globulin 3.4 09/22/2017 09:15 AM    A-G Ratio 1.9 07/10/2018 07:46 AM       Bone marrow biopsy reviewed    FISH molecular analysis:    Positive for IgH gene rearrangement (IgH complex FISH study pending); Normal results with 1, 5, 9, 13, 15, and 17 (TP53) probe sets. External records reviewed from [facility] transplant transplant bone marrow biopsy done on 12/19/17 showed no evidence of plasma cells. Variable cellularity from 0-50%, flow LUIS, FISH negative    ASSESSMENT  Ms. Regina Olson is a 59 y.o. female with standard risk multiple myeloma status post 6 cycles of twice daily and autologous stem cell transplant on 10/20/17. She is in a complete remission. We will continue with close surveillance in conjunction with her care team at [facility]  Multiple myeloma  On revlimid maintenance of 10mg daily following transplant at [facility]. Due to follow up in May, but did not make appointment. Now with grade 4 neutropenia (kleber 0.4). Will HOLD Revlimid x 1 week. Recheck labs with port flush next week. Resume at 5mg daily if improved to grade 2 or better    Labs reviewed in detail. No evidence of recurrent disease. Repeat beta 2 micoglobulin, SPEP, UPEP, free light chains urine and serum, CBC, CMP, immunofixation every 3 months. Follow up at [facility] for bone marrow biopsy in September. 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 Overall stable. DM  Per PCP. Neutropenia, chemotherapy induced    Grade 4 currently. Recheck labs next week. Decrease dose of Revlimid to 5mg moving forward. Discussed infection control practices and notifying provider if temperature develops >100.4    Return to clinic in 3 months with labs prior as above.   Patient was seen with Stefania Hernandez NP        Signed by: Indu Conley MD                     July 19, 2018

## 2018-07-19 NOTE — MR AVS SNAPSHOT
[address] [facility] [address] [place] [address] 
[phone] Patient: Dee Siddiqi MRN:  [id number] Visit Information Date & Time Provider Department Dept. Phone Encounter #  
 7/19/2018 10:45 AM Norbert Mitchell NP [facility] at [address] [id number] Your Appointments 8/22/2018  8:00 AM  
PROCEDURE with [facility] [address]) Appt Note: NOT AN OFFICE VISIT - REMOTE BSC ICD  
 [id number] [facility]  
[phone] [address] [facility]  
  
    
 9/21/2018  9:00 AM  
ROUTINE CARE with Priscila Self MD  
[address]) Appt Note: routine follow up  
 [address] [facility] [address] [address]  
[phone] [address] [id number]  
  
    
 12/12/2018  9:15 AM  
ESTABLISHED PATIENT with Fahad Mora MD  
[facility]) Appt Note: 6 MO. F/U  
 [address] [place] [address]  
[phone] [address] Upcoming Health Maintenance Date Due COLONOSCOPY 9/20/1976 PAP AKA CERVICAL CYTOLOGY 4/2/2015 MICROALBUMIN Q1 5/17/2018 LIPID PANEL Q1 5/17/2018 HEMOGLOBIN A1C Q6M 8/19/2018 Influenza Age 5 to Adult 8/1/2018 FOOT EXAM Q1 8/22/2018 EYE EXAM RETINAL OR DILATED Q1 9/19/2018 Pneumococcal 19-64 Highest Risk (2 of 3 - PCV13) 1/30/2019 BREAST CANCER SCRN MAMMOGRAM 6/12/2019 GLAUCOMA SCREENING Q2Y 9/19/2019 DTaP/Tdap/Td series (2 - Td) 5/17/2026 Allergies as of 7/19/2018  Review Complete On: 7/19/2018 By: Norbert Mitchell NP Severity Noted Reaction Type Reactions Ace Inhibitors  08/06/2010    Cough Compazine [Prochlorperazine]  06/15/2017    Nausea Only  
 rash Current Immunizations  Reviewed on 1/11/2018 No immunizations on file. Not reviewed this visit You Were Diagnosed With   
  
 Codes Comments Multiple myeloma not having achieved remission ([facility] [address]    -  Primary ICD-10-CM: C90.00 ICD-9-CM: 203.00 Type 2 diabetes mellitus with nephropathy (HCC)     ICD-10-CM: E11.21 
ICD-9-CM: 250.40, 583.81 Vitals BP Pulse Temp Resp Height(growth percentile) Weight(growth percentile) 125/84 65 97.9 °F (36.6 °C) 20 5' 4\" (1.626 m) 186 lb 6.4 oz (84.6 kg) SpO2 BMI OB Status Smoking Status 97% 32 kg/m2 Hysterectomy Never Smoker Vitals History BMI and BSA Data Body Mass Index Body Surface Area 32 kg/m 2 1.95 m 2 Preferred Pharmacy Pharmacy Name Phone [facility] [address] [place] [address] [phone] Your Updated Medication List  
  
   
This list is accurate as of 7/19/18 11:44 AM.  Always use your most recent med list.  
  
  
  
  
 acyclovir 400 mg tablet Commonly known as:  ZOVIRAX Take 1 Tab by mouth two (2) times a day. Indications: PREVENTION OF HERPES ZOSTER IN IMMUNOCOMPROMISED PATIENT  
  
 atorvastatin 40 mg tablet Commonly known as:  LIPITOR Take 1 Tab by mouth daily. For cholesterol Biotin 2,500 mcg Cap Take  by mouth. carvedilol 25 mg tablet Commonly known as:  COREG  
two (2) times a day.  
  
 gabapentin 300 mg capsule Commonly known as:  NEURONTIN Take 1 Cap by mouth three (3) times daily. glimepiride 1 mg tablet Commonly known as:  AMARYL Take 1 Tab by mouth Daily (before breakfast). For diabetes  
  
 lenalidomide 10 mg Cap Commonly known as:  REVLIMID Take 1 Cap by mouth daily. loperamide 2 mg tablet Commonly known as:  IMMODIUM Take 2 mg by mouth as needed for Diarrhea.  
  
 valsartan 80 mg tablet Commonly known as:  DIOVAN  
 TAKE 2 TABLETS BY MOUTH DAILY We Performed the Following PROTEIN ELECTROPHORESIS + HANSEL, UR, 24HR [id number] CPT(R)] Introducing [place] [facility]! Tatyana Buckner introduces mobicanvas patient portal. Now you can access parts of your medical record, email your doctor's office, and request medication refills online. 1. In your internet browser, go to [email or web address] 2. Click on the First Time User? Click Here link in the Sign In box. You will see the New Member Sign Up page. 3. Enter your mobicanvas Access Code exactly as it appears below. You will not need to use this code after youve completed the sign-up process. If you do not sign up before the expiration date, you must request a new code. · mobicanvas Access Code: [email or web address] Expires: 7/23/2018 10:23 AM 
 
4. Enter the last four digits of your Social Security Number (xxxx) and Date of Birth (mm/dd/yyyy) as indicated and click Submit. You will be taken to the next sign-up page. 5. Create a mobicanvas ID. This will be your mobicanvas login ID and cannot be changed, so think of one that is secure and easy to remember. 6. Create a mobicanvas password. You can change your password at any time. 7. Enter your Password Reset Question and Answer. This can be used at a later time if you forget your password. 8. Enter your e-mail address. You will receive e-mail notification when new information is available in [address] 9. Click Sign Up. You can now view and download portions of your medical record. 10. Click the Download Summary menu link to download a portable copy of your medical information. If you have questions, please visit the Frequently Asked Questions section of the mobicanvas website. Remember, mobicanvas is NOT to be used for urgent needs. For medical emergencies, dial 911. Now available from your iPhone and Android! Please provide this summary of care documentation to your next provider. Your primary care clinician is listed as Alex Fabian. If you have any questions after today's visit, please call [phone].

## 2018-07-25 ENCOUNTER — HOSPITAL ENCOUNTER (OUTPATIENT)
Dept: INFUSION THERAPY | Age: 58
Discharge: HOME | End: 2018-07-25
Payer: COMMERCIAL

## 2018-07-25 VITALS
TEMPERATURE: 98.7 F | RESPIRATION RATE: 18 BRPM | SYSTOLIC BLOOD PRESSURE: 159 MMHG | HEART RATE: 67 BPM | DIASTOLIC BLOOD PRESSURE: 80 MMHG

## 2018-07-25 PROBLEM — D70.1 CHEMOTHERAPY INDUCED NEUTROPENIA (HCC): Status: ACTIVE | Noted: 2018-07-25

## 2018-07-25 PROBLEM — T45.1X5A CHEMOTHERAPY INDUCED NEUTROPENIA (HCC): Status: ACTIVE | Noted: 2018-07-25

## 2018-07-25 LAB
BASOPHILS # BLD: 0 K/UL (ref 0–0.1)
BASOPHILS NFR BLD: 1 % (ref 0–1)
DIFFERENTIAL METHOD BLD: ABNORMAL
EOSINOPHIL # BLD: 0.1 K/UL (ref 0–0.4)
EOSINOPHIL NFR BLD: 2 % (ref 0–7)
ERYTHROCYTE [DISTWIDTH] IN BLOOD BY AUTOMATED COUNT: 16.3 % (ref 11.5–14.5)
HCT VFR BLD AUTO: 33.3 % (ref 35–47)
HGB BLD-MCNC: 11 G/DL (ref 11.5–16)
IMM GRANULOCYTES # BLD: 0 K/UL (ref 0–0.04)
IMM GRANULOCYTES NFR BLD AUTO: 0 % (ref 0–0.5)
LYMPHOCYTES # BLD: 1.4 K/UL (ref 0.8–3.5)
LYMPHOCYTES NFR BLD: 44 % (ref 12–49)
MCH RBC QN AUTO: 34.7 PG (ref 26–34)
MCHC RBC AUTO-ENTMCNC: 33 G/DL (ref 30–36.5)
MCV RBC AUTO: 105 FL (ref 80–99)
MONOCYTES # BLD: 0.5 K/UL (ref 0–1)
MONOCYTES NFR BLD: 16 % (ref 5–13)
NEUTS SEG # BLD: 1.2 K/UL (ref 1.8–8)
NEUTS SEG NFR BLD: 38 % (ref 32–75)
NRBC # BLD: 0 K/UL (ref 0–0.01)
NRBC BLD-RTO: 0 PER 100 WBC
PLATELET # BLD AUTO: 180 K/UL (ref 150–400)
PMV BLD AUTO: 9.8 FL (ref 8.9–12.9)
RBC # BLD AUTO: 3.17 M/UL (ref 3.8–5.2)
WBC # BLD AUTO: 3.2 K/UL (ref 3.6–11)

## 2018-07-25 PROCEDURE — 36591 DRAW BLOOD OFF VENOUS DEVICE: CPT

## 2018-07-25 PROCEDURE — 77030012965 HC NDL HUBR BBMI -A

## 2018-07-25 PROCEDURE — 74011250636 HC RX REV CODE- 250/636: Performed by: NURSE PRACTITIONER

## 2018-07-25 PROCEDURE — 85025 COMPLETE CBC W/AUTO DIFF WBC: CPT | Performed by: NURSE PRACTITIONER

## 2018-07-25 PROCEDURE — 36415 COLL VENOUS BLD VENIPUNCTURE: CPT | Performed by: NURSE PRACTITIONER

## 2018-07-25 RX ORDER — SODIUM CHLORIDE 0.9 % (FLUSH) 0.9 %
5-10 SYRINGE (ML) INJECTION AS NEEDED
Status: ACTIVE | OUTPATIENT
Start: 2018-07-25 | End: 2018-07-26

## 2018-07-25 RX ORDER — SODIUM CHLORIDE 9 MG/ML
10 INJECTION INTRAMUSCULAR; INTRAVENOUS; SUBCUTANEOUS AS NEEDED
Status: ACTIVE | OUTPATIENT
Start: 2018-07-25 | End: 2018-07-26

## 2018-07-25 RX ORDER — HEPARIN 100 UNIT/ML
500 SYRINGE INTRAVENOUS AS NEEDED
Status: ACTIVE | OUTPATIENT
Start: 2018-07-25 | End: 2018-07-26

## 2018-07-25 RX ADMIN — SODIUM CHLORIDE 10 ML: 9 INJECTION INTRAMUSCULAR; INTRAVENOUS; SUBCUTANEOUS at 15:15

## 2018-07-25 RX ADMIN — SODIUM CHLORIDE, PRESERVATIVE FREE 500 UNITS: 5 INJECTION INTRAVENOUS at 15:20

## 2018-07-25 RX ADMIN — Medication 10 ML: at 15:20

## 2018-07-25 NOTE — PROGRESS NOTES
Outpatient Infusion Center Short Visit Progress Note    3631 Pt admit to [facility] for port flush/labs. Pt ambulatory in stable condition. Assessment completed. No new concerns voiced. Please review pending lab results in [facility]. Patient Vitals for the past 12 hrs:   Temp Pulse Resp BP   07/25/18 1509 98.7 °F (37.1 °C) 67 18 159/80       Port accessed but was not able to get blood return. Advised pt of CathFlo option. Pt declined. Manpreet labs peripherally and sent for processing. Line flushed, heparinized and de-accessed per protocol. 1530 Pt tolerated treatment well. D/c home ambulatory in no distress. Pt aware of next appointment scheduled for 08/22/18 at 0900.

## 2018-07-27 ENCOUNTER — DOCUMENTATION ONLY (OUTPATIENT)
Dept: ONCOLOGY | Age: 58
End: 2018-07-27

## 2018-07-27 ENCOUNTER — TELEPHONE (OUTPATIENT)
Dept: ONCOLOGY | Age: 58
End: 2018-07-27

## 2018-07-27 RX ORDER — LENALIDOMIDE 5 MG/1
5 CAPSULE ORAL DAILY
Qty: 30 CAP | Refills: 0 | Status: SHIPPED | OUTPATIENT
Start: 2018-07-27 | End: 2018-09-26 | Stop reason: SDUPTHER

## 2018-07-27 NOTE — TELEPHONE ENCOUNTER
Crissy Alvarado from [address] called would like a call back in reference to pt medication Revlimid. The call back number is [phone].   Authorization number:  [id number]

## 2018-07-27 NOTE — TELEPHONE ENCOUNTER
Spoke with Aurelio/Kaushal. HIPAA verified. Clarified dose of 5 mg revlimid per day. Authorization remains unchanged.

## 2018-08-01 ENCOUNTER — DOCUMENTATION ONLY (OUTPATIENT)
Dept: ONCOLOGY | Age: 58
End: 2018-08-01

## 2018-08-01 ENCOUNTER — TELEPHONE (OUTPATIENT)
Dept: ONCOLOGY | Age: 58
End: 2018-08-01

## 2018-08-01 NOTE — TELEPHONE ENCOUNTER
Call to patient. HIPAA verified. She has not yet received a call for delivery of her 5mg revlimid that was processed 7/27 to [facility]. .    Call to [facility]. Insurance rejected due to \"cost exceeds maximum\". Jacky Tarah will resubmit as urgent. Stated this happened once before in January. She feels also denied as there was a delay in treatment. Indicated 24 hour turnaround. Will follow.

## 2018-08-20 ENCOUNTER — TELEPHONE (OUTPATIENT)
Dept: FAMILY MEDICINE CLINIC | Age: 58
End: 2018-08-20

## 2018-08-22 ENCOUNTER — APPOINTMENT (OUTPATIENT)
Dept: INFUSION THERAPY | Age: 58
End: 2018-08-22
Payer: COMMERCIAL

## 2018-08-22 ENCOUNTER — HOSPITAL ENCOUNTER (OUTPATIENT)
Dept: INFUSION THERAPY | Age: 58
Discharge: HOME | End: 2018-08-22
Payer: COMMERCIAL

## 2018-08-22 ENCOUNTER — CLINICAL SUPPORT (OUTPATIENT)
Dept: CARDIOLOGY CLINIC | Age: 58
End: 2018-08-22

## 2018-08-22 VITALS
RESPIRATION RATE: 18 BRPM | HEART RATE: 61 BPM | SYSTOLIC BLOOD PRESSURE: 182 MMHG | TEMPERATURE: 97.9 F | DIASTOLIC BLOOD PRESSURE: 87 MMHG

## 2018-08-22 DIAGNOSIS — Z95.810 AICD (AUTOMATIC CARDIOVERTER/DEFIBRILLATOR) PRESENT: Primary | ICD-10-CM

## 2018-08-22 DIAGNOSIS — I42.9 CARDIOMYOPATHY, UNSPECIFIED TYPE (HCC): ICD-10-CM

## 2018-08-22 DIAGNOSIS — I50.22 CHRONIC SYSTOLIC HEART FAILURE (HCC): ICD-10-CM

## 2018-08-22 LAB
ALBUMIN SERPL-MCNC: 3.3 G/DL (ref 3.5–5)
ALBUMIN/GLOB SERPL: 1.2 {RATIO} (ref 1.1–2.2)
ALP SERPL-CCNC: 80 U/L (ref 45–117)
ALT SERPL-CCNC: 28 U/L (ref 12–78)
ANION GAP SERPL CALC-SCNC: 9 MMOL/L (ref 5–15)
AST SERPL-CCNC: 18 U/L (ref 15–37)
BASOPHILS # BLD: 0 K/UL (ref 0–0.1)
BASOPHILS NFR BLD: 0 % (ref 0–1)
BILIRUB SERPL-MCNC: 0.7 MG/DL (ref 0.2–1)
BUN SERPL-MCNC: 9 MG/DL (ref 6–20)
BUN/CREAT SERPL: 10 (ref 12–20)
CALCIUM SERPL-MCNC: 8.6 MG/DL (ref 8.5–10.1)
CHLORIDE SERPL-SCNC: 108 MMOL/L (ref 97–108)
CO2 SERPL-SCNC: 28 MMOL/L (ref 21–32)
CREAT SERPL-MCNC: 0.9 MG/DL (ref 0.55–1.02)
DIFFERENTIAL METHOD BLD: ABNORMAL
EOSINOPHIL # BLD: 0.1 K/UL (ref 0–0.4)
EOSINOPHIL NFR BLD: 4 % (ref 0–7)
ERYTHROCYTE [DISTWIDTH] IN BLOOD BY AUTOMATED COUNT: 15.4 % (ref 11.5–14.5)
GLOBULIN SER CALC-MCNC: 2.7 G/DL (ref 2–4)
GLUCOSE SERPL-MCNC: 103 MG/DL (ref 65–100)
HCT VFR BLD AUTO: 33.8 % (ref 35–47)
HGB BLD-MCNC: 11.6 G/DL (ref 11.5–16)
IMM GRANULOCYTES # BLD: 0 K/UL
IMM GRANULOCYTES NFR BLD AUTO: 0 %
LYMPHOCYTES # BLD: 1.5 K/UL (ref 0.8–3.5)
LYMPHOCYTES NFR BLD: 45 % (ref 12–49)
MCH RBC QN AUTO: 34.7 PG (ref 26–34)
MCHC RBC AUTO-ENTMCNC: 34.3 G/DL (ref 30–36.5)
MCV RBC AUTO: 101.2 FL (ref 80–99)
MONOCYTES # BLD: 0.4 K/UL (ref 0–1)
MONOCYTES NFR BLD: 13 % (ref 5–13)
NEUTS SEG # BLD: 1.2 K/UL (ref 1.8–8)
NEUTS SEG NFR BLD: 38 % (ref 32–75)
NRBC # BLD: 0 K/UL (ref 0–0.01)
NRBC BLD-RTO: 0 PER 100 WBC
PLATELET # BLD AUTO: 161 K/UL (ref 150–400)
PMV BLD AUTO: 9.8 FL (ref 8.9–12.9)
POTASSIUM SERPL-SCNC: 3.5 MMOL/L (ref 3.5–5.1)
PROT SERPL-MCNC: 6 G/DL (ref 6.4–8.2)
RBC # BLD AUTO: 3.34 M/UL (ref 3.8–5.2)
RBC MORPH BLD: ABNORMAL
RBC MORPH BLD: ABNORMAL
SODIUM SERPL-SCNC: 145 MMOL/L (ref 136–145)
WBC # BLD AUTO: 3.2 K/UL (ref 3.6–11)

## 2018-08-22 PROCEDURE — 80053 COMPREHEN METABOLIC PANEL: CPT | Performed by: NURSE PRACTITIONER

## 2018-08-22 PROCEDURE — 84165 PROTEIN E-PHORESIS SERUM: CPT | Performed by: NURSE PRACTITIONER

## 2018-08-22 PROCEDURE — 83883 ASSAY NEPHELOMETRY NOT SPEC: CPT | Performed by: NURSE PRACTITIONER

## 2018-08-22 PROCEDURE — 82784 ASSAY IGA/IGD/IGG/IGM EACH: CPT | Performed by: NURSE PRACTITIONER

## 2018-08-22 PROCEDURE — 85025 COMPLETE CBC W/AUTO DIFF WBC: CPT | Performed by: NURSE PRACTITIONER

## 2018-08-22 PROCEDURE — 36415 COLL VENOUS BLD VENIPUNCTURE: CPT | Performed by: NURSE PRACTITIONER

## 2018-08-22 PROCEDURE — 74011250636 HC RX REV CODE- 250/636: Performed by: NURSE PRACTITIONER

## 2018-08-22 RX ORDER — HEPARIN 100 UNIT/ML
500 SYRINGE INTRAVENOUS AS NEEDED
Status: ACTIVE | OUTPATIENT
Start: 2018-08-22 | End: 2018-08-23

## 2018-08-22 RX ORDER — SODIUM CHLORIDE 0.9 % (FLUSH) 0.9 %
5-10 SYRINGE (ML) INJECTION AS NEEDED
Status: ACTIVE | OUTPATIENT
Start: 2018-08-22 | End: 2018-08-23

## 2018-08-22 RX ADMIN — Medication 10 ML: at 16:10

## 2018-08-22 RX ADMIN — Medication 10 ML: at 16:08

## 2018-08-22 RX ADMIN — Medication 10 ML: at 16:07

## 2018-08-22 RX ADMIN — HEPARIN 500 UNITS: 100 SYRINGE at 16:13

## 2018-08-22 NOTE — PROGRESS NOTES
[facility] Progress Note    Date: 2018    Name: Alice Jimenez    MRN: [id number]         : 1960      1555 Pt arrived ambulatory and in no distress for Port flush and labs. Assessment complete. No new complaints voiced. Port accessed without difficulty. No blood received from port despite multiple attempts and manipulations. Labs drawn peripherally. Patient agreed to come early on her next appointment date and try using Cath Robert via port. Port flushed and heparinized and de accessed. *See [facility] for pending lab results. Visit Vitals    /87 (BP 1 Location: Right arm, BP Patient Position: Sitting)    Pulse 61    Temp 97.9 °F (36.6 °C)    Resp 18         1625 Discharged home ambulatory and in no distress, accompanied by self.      Next appointment 18, patient aware      Adrian Wagoner RN  2018

## 2018-08-22 NOTE — LETTER
8/22/2018 3:01 PM 
 
Ms. Garret Robles [address] [address] Dear Ms. Pradeep Dozier: You are due for your device check and visit with Dr. Supriya Boswell nurse practitioner. Please call our office at [phone] and schedule a follow up appointment for your continued care. Sincerely, Marion Holloway [address] [place] [address] Cardiology

## 2018-08-24 ENCOUNTER — DOCUMENTATION ONLY (OUTPATIENT)
Dept: ONCOLOGY | Age: 58
End: 2018-08-24

## 2018-08-24 LAB
ALBUMIN SERPL ELPH-MCNC: 3.6 G/DL (ref 2.9–4.4)
ALBUMIN/GLOB SERPL: 1.6 {RATIO} (ref 0.7–1.7)
ALPHA1 GLOB SERPL ELPH-MCNC: 0.2 G/DL (ref 0–0.4)
ALPHA2 GLOB SERPL ELPH-MCNC: 0.9 G/DL (ref 0.4–1)
B-GLOBULIN SERPL ELPH-MCNC: 0.8 G/DL (ref 0.7–1.3)
GAMMA GLOB SERPL ELPH-MCNC: 0.5 G/DL (ref 0.4–1.8)
GLOBULIN SER CALC-MCNC: 2.3 G/DL (ref 2.2–3.9)
KAPPA LC FREE SER-MCNC: 4.8 MG/L (ref 3.3–19.4)
KAPPA LC FREE/LAMBDA FREE SER: 0.68 {RATIO} (ref 0.26–1.65)
LAMBDA LC FREE SERPL-MCNC: 7.1 MG/L (ref 5.7–26.3)
M PROTEIN SERPL ELPH-MCNC: ABNORMAL G/DL
PROT SERPL-MCNC: 5.9 G/DL (ref 6–8.5)

## 2018-08-25 LAB
COLLECT DURATION TIME UR: 0 HR
KAPPA LC UR-MCNC: 12.9 MG/L (ref 1.35–24.19)
KAPPA LC/LAMBDA UR: 8.6 {RATIO} (ref 2.04–10.37)
LAMBDA LC UR-MCNC: 1.5 MG/L (ref 0.24–6.66)
SPECIMEN VOL ?TM UR: 0 ML

## 2018-08-27 LAB
IGA SERPL-MCNC: 12 MG/DL (ref 87–352)
IGG SERPL-MCNC: 598 MG/DL (ref 700–1600)
IGM SERPL-MCNC: 6 MG/DL (ref 26–217)
PROT PATTERN SERPL IFE-IMP: ABNORMAL

## 2018-09-21 ENCOUNTER — OFFICE VISIT (OUTPATIENT)
Dept: FAMILY MEDICINE CLINIC | Age: 58
End: 2018-09-21

## 2018-09-21 VITALS
HEIGHT: 64 IN | TEMPERATURE: 96.7 F | OXYGEN SATURATION: 96 % | SYSTOLIC BLOOD PRESSURE: 147 MMHG | HEART RATE: 69 BPM | RESPIRATION RATE: 18 BRPM | BODY MASS INDEX: 32.33 KG/M2 | WEIGHT: 189.4 LBS | DIASTOLIC BLOOD PRESSURE: 75 MMHG

## 2018-09-21 DIAGNOSIS — E11.9 CONTROLLED TYPE 2 DIABETES MELLITUS WITHOUT COMPLICATION, WITH LONG-TERM CURRENT USE OF INSULIN (HCC): Primary | ICD-10-CM

## 2018-09-21 DIAGNOSIS — Z79.4 CONTROLLED TYPE 2 DIABETES MELLITUS WITHOUT COMPLICATION, WITH LONG-TERM CURRENT USE OF INSULIN (HCC): Primary | ICD-10-CM

## 2018-09-21 LAB
GLUCOSE POC: 95 MG/DL
HBA1C MFR BLD HPLC: 5.7 %

## 2018-09-21 NOTE — MR AVS SNAPSHOT
[address] [facility] 
 
 
 [address] [facility] [address] [address] 
[phone] Patient: Tony Kraft MRN: [id number] QOC:4/09/0093 Visit Information Date & Time Provider Department Dept. Phone Encounter #  
 9/21/2018  9:00 AM Jony Healy MD [facility] [phone] [id number] Follow-up Instructions Return in about 6 months (around 3/21/2019). Your Appointments 10/19/2018  9:45 AM  
Follow Up with Pablo Ledesma  [facility] at [facility]) Appt Note: 3mo fu (Multiple Myeloma) [address] [facility] [address] [place] [address]  
[phone]  
  
   
 [address] [address]  
  
    
 11/21/2018  8:00 AM  
PROCEDURE with [facility]) Appt Note: NOT AN OFFICE VISIT - REMOTE BSC ICD  
 [address]  
[phone] [address]  
  
    
 12/12/2018  9:15 AM  
ESTABLISHED PATIENT with Veronika Garcia MD  
[facility]) Appt Note: 6 MO. F/U  
 [address] [place]  
[phone] [address] Upcoming Health Maintenance Date Due COLONOSCOPY 9/20/1976 PAP AKA CERVICAL CYTOLOGY 4/2/2015 ZOSTER VACCINE AGE 60> 7/20/2018 Influenza Age 5 to Adult 8/1/2018 EYE EXAM RETINAL OR DILATED Q1 9/19/2018 Pneumococcal 19-64 Highest Risk (2 of 3 - PCV13) 1/30/2019 HEMOGLOBIN A1C Q6M 3/21/2019 BREAST CANCER SCRN MAMMOGRAM 6/12/2019 GLAUCOMA SCREENING Q2Y 9/19/2019 FOOT EXAM Q1 9/21/2019 MICROALBUMIN Q1 9/21/2019 LIPID PANEL Q1 9/21/2019 DTaP/Tdap/Td series (2 - Td) 5/17/2026 Allergies as of 9/21/2018  Review Complete On: 9/21/2018 By: Jony Healy MD  
  
 Severity Noted Reaction Type Reactions Ace Inhibitors  08/06/2010    Cough Compazine [Prochlorperazine]  06/15/2017    Nausea Only  
 rash Current Immunizations  Reviewed on 8/22/2018 No immunizations on file. Not reviewed this visit You Were Diagnosed With   
  
 Codes Comments Controlled type 2 diabetes mellitus without complication, with long-term current use of insulin (HCC)    -  Primary ICD-10-CM: E11.9, Z79.4 ICD-9-CM: 250.00, V58.67 Vitals BP Pulse Temp Resp Height(growth percentile) Weight(growth percentile) 147/75 69 96.7 °F (35.9 °C) (Oral) 18 5' 4\" (1.626 m) 189 lb 6.4 oz (85.9 kg) SpO2 BMI OB Status Smoking Status 96% 32.51 kg/m2 Hysterectomy Never Smoker Vitals History BMI and BSA Data Body Mass Index Body Surface Area 32.51 kg/m 2 1.97 m 2 Preferred Pharmacy Pharmacy Name Phone [facility] [address] [place] [address] [phone] Your Updated Medication List  
  
   
This list is accurate as of 9/21/18  9:47 AM.  Always use your most recent med list.  
  
  
  
  
 atorvastatin 40 mg tablet Commonly known as:  LIPITOR Take 1 Tab by mouth daily. For cholesterol Biotin 2,500 mcg Cap Take  by mouth. carvedilol 25 mg tablet Commonly known as:  COREG  
two (2) times a day.  
  
 gabapentin 300 mg capsule Commonly known as:  NEURONTIN Take 1 Cap by mouth three (3) times daily. glimepiride 1 mg tablet Commonly known as:  AMARYL Take 1 Tab by mouth Daily (before breakfast). For diabetes  
  
 lenalidomide 5 mg Cap Commonly known as:  REVLIMID Take 1 Cap by mouth daily. loperamide 2 mg tablet Commonly known as:  IMMODIUM Take 2 mg by mouth as needed for Diarrhea.  
  
 valsartan 80 mg tablet Commonly known as:  DIOVAN  
TAKE 2 TABLETS BY MOUTH DAILY We Performed the Following AMB POC GLUCOSE BLOOD, BY GLUCOSE MONITORING DEVICE [75070 CPT(R)] AMB POC HEMOGLOBIN A1C [26896 CPT(R)] LIPID PANEL [75767 CPT(R)] MICROALBUMIN, UR, RAND W/ MICROALB/CREAT RATIO [id number] CPT(R)] Follow-up Instructions Return in about 6 months (around 3/21/2019). To-Do List   
 09/26/2018 4:00 PM  
  Appointment with [address] at [address] [place] [address] ([phone])  
  
 10/24/2018 4:00 PM  
  Appointment with [address] at [address] [place] [address] ([phone])  
  
 11/28/2018 4:00 PM  
  Appointment with [address] at [address] [place] [address] ([phone]) Introducing [facility]! Kandi Harrell introduces [employer] patient portal. Now you can access parts of your medical record, email your doctor's office, and request medication refills online. 1. In your internet browser, go to [email or web address] 2. Click on the First Time User? Click Here link in the Sign In box. You will see the New Member Sign Up page. 3. Enter your [employer] Access Code exactly as it appears below. You will not need to use this code after youve completed the sign-up process. If you do not sign up before the expiration date, you must request a new code. · [employer] Access Code: [email or web address] Expires: 11/20/2018  3:48 PM 
 
4. Enter the last four digits of your Social Security Number (xxxx) and Date of Birth (mm/dd/yyyy) as indicated and click Submit. You will be taken to the next sign-up page. 5. Create a Glassmapt ID. This will be your [employer] login ID and cannot be changed, so think of one that is secure and easy to remember. 6. Create a Glassmapt password. You can change your password at any time. 7. Enter your Password Reset Question and Answer. This can be used at a later time if you forget your password. 8. Enter your e-mail address. You will receive e-mail notification when new information is available in [address] 9. Click Sign Up. You can now view and download portions of your medical record. 10. Click the Download Summary menu link to download a portable copy of your medical information. If you have questions, please visit the Frequently Asked Questions section of the FeedBurner website. Remember, [employer] is NOT to be used for urgent needs. For medical emergencies, dial 911. Now available from your iPhone and Android! Please provide this summary of care documentation to your next provider. Your primary care clinician is listed as Lito Rutherford. If you have any questions after today's visit, please call [phone].

## 2018-09-21 NOTE — PROGRESS NOTES
HISTORY OF PRESENT ILLNESS Nesha Diamond is a 59 y.o. female. HPI In for diabetes recheck. Has gained 7 lbs since last Feb. Walks 30 minutes a day. ROS Physical Exam  
Constitutional: She appears well-developed and well-nourished. HENT:  
Right Ear: External ear normal.  
Left Ear: External ear normal.  
Mouth/Throat: Oropharynx is clear and moist.  
Neck: No thyromegaly present. Cardiovascular: Normal rate, regular rhythm, normal heart sounds and intact distal pulses. Pulmonary/Chest: Breath sounds normal. She has no wheezes. Abdominal: Soft. Bowel sounds are normal. She exhibits no distension and no mass. There is no tenderness. Musculoskeletal: She exhibits no edema. Lymphadenopathy:  
  She has no cervical adenopathy. Nursing note and vitals reviewed. ASSESSMENT and PLAN Orders Placed This Encounter  LIPID PANEL  
 MICROALBUMIN, UR, RAND W/ MICROALB/CREAT RATIO  AMB POC HEMOGLOBIN A1C  
 AMB POC GLUCOSE BLOOD, BY GLUCOSE MONITORING DEVICE Orders Placed This Encounter  LIPID PANEL  
 MICROALBUMIN, UR, RAND W/ MICROALB/CREAT RATIO  AMB POC HEMOGLOBIN A1C  
 AMB POC GLUCOSE BLOOD, BY GLUCOSE MONITORING DEVICE Diagnoses and all orders for this visit: 1. Controlled type 2 diabetes mellitus without complication, with long-term current use of insulin ([address] -     LIPID PANEL 
-     MICROALBUMIN, UR, RAND W/ MICROALB/CREAT RATIO 
-     AMB POC HEMOGLOBIN A1C 
-     AMB POC GLUCOSE BLOOD, BY GLUCOSE MONITORING DEVICE

## 2018-09-21 NOTE — PROGRESS NOTES
Chief Complaint Patient presents with  Cholesterol Problem  Diabetes  Hypertension Pt here for follow up Pt decline flu vaccine Pt last eye exam done by  2 weeks

## 2018-09-23 LAB
ALBUMIN/CREAT UR: 6.4 MG/G CREAT (ref 0–30)
CHOLEST SERPL-MCNC: 149 MG/DL (ref 100–199)
CREAT UR-MCNC: 168 MG/DL
HDLC SERPL-MCNC: 62 MG/DL
INTERPRETATION, 910389: NORMAL
LDLC SERPL CALC-MCNC: 75 MG/DL (ref 0–99)
Lab: NORMAL
MICROALBUMIN UR-MCNC: 10.7 UG/ML
TRIGL SERPL-MCNC: 61 MG/DL (ref 0–149)
VLDLC SERPL CALC-MCNC: 12 MG/DL (ref 5–40)

## 2018-09-24 ENCOUNTER — DOCUMENTATION ONLY (OUTPATIENT)
Dept: ONCOLOGY | Age: 58
End: 2018-09-24

## 2018-09-26 ENCOUNTER — HOSPITAL ENCOUNTER (OUTPATIENT)
Dept: INFUSION THERAPY | Age: 58
Discharge: HOME | End: 2018-09-26
Payer: COMMERCIAL

## 2018-09-26 VITALS
SYSTOLIC BLOOD PRESSURE: 181 MMHG | RESPIRATION RATE: 16 BRPM | HEART RATE: 77 BPM | DIASTOLIC BLOOD PRESSURE: 75 MMHG | TEMPERATURE: 98 F

## 2018-09-26 LAB
ALBUMIN SERPL-MCNC: 3.5 G/DL (ref 3.5–5)
ALBUMIN/GLOB SERPL: 1.2 {RATIO} (ref 1.1–2.2)
ALP SERPL-CCNC: 90 U/L (ref 45–117)
ALT SERPL-CCNC: 24 U/L (ref 12–78)
ANION GAP SERPL CALC-SCNC: 7 MMOL/L (ref 5–15)
AST SERPL-CCNC: 19 U/L (ref 15–37)
BASOPHILS # BLD: 0 K/UL (ref 0–0.1)
BASOPHILS NFR BLD: 0 % (ref 0–1)
BILIRUB SERPL-MCNC: 0.6 MG/DL (ref 0.2–1)
BUN SERPL-MCNC: 8 MG/DL (ref 6–20)
BUN/CREAT SERPL: 9 (ref 12–20)
CALCIUM SERPL-MCNC: 8.6 MG/DL (ref 8.5–10.1)
CHLORIDE SERPL-SCNC: 107 MMOL/L (ref 97–108)
CO2 SERPL-SCNC: 30 MMOL/L (ref 21–32)
CREAT SERPL-MCNC: 0.89 MG/DL (ref 0.55–1.02)
DIFFERENTIAL METHOD BLD: ABNORMAL
EOSINOPHIL # BLD: 0.1 K/UL (ref 0–0.4)
EOSINOPHIL NFR BLD: 4 % (ref 0–7)
ERYTHROCYTE [DISTWIDTH] IN BLOOD BY AUTOMATED COUNT: 13.9 % (ref 11.5–14.5)
GLOBULIN SER CALC-MCNC: 3 G/DL (ref 2–4)
GLUCOSE SERPL-MCNC: 82 MG/DL (ref 65–100)
HCT VFR BLD AUTO: 36.2 % (ref 35–47)
HGB BLD-MCNC: 12.2 G/DL (ref 11.5–16)
IMM GRANULOCYTES # BLD: 0 K/UL (ref 0–0.04)
IMM GRANULOCYTES NFR BLD AUTO: 0 % (ref 0–0.5)
LYMPHOCYTES # BLD: 1.1 K/UL (ref 0.8–3.5)
LYMPHOCYTES NFR BLD: 36 % (ref 12–49)
MCH RBC QN AUTO: 34.4 PG (ref 26–34)
MCHC RBC AUTO-ENTMCNC: 33.7 G/DL (ref 30–36.5)
MCV RBC AUTO: 102 FL (ref 80–99)
MONOCYTES # BLD: 0.5 K/UL (ref 0–1)
MONOCYTES NFR BLD: 14 % (ref 5–13)
NEUTS SEG # BLD: 1.4 K/UL (ref 1.8–8)
NEUTS SEG NFR BLD: 45 % (ref 32–75)
NRBC # BLD: 0 K/UL (ref 0–0.01)
NRBC BLD-RTO: 0 PER 100 WBC
PLATELET # BLD AUTO: 154 K/UL (ref 150–400)
PMV BLD AUTO: 9.1 FL (ref 8.9–12.9)
POTASSIUM SERPL-SCNC: 3.2 MMOL/L (ref 3.5–5.1)
PROT SERPL-MCNC: 6.5 G/DL (ref 6.4–8.2)
RBC # BLD AUTO: 3.55 M/UL (ref 3.8–5.2)
SODIUM SERPL-SCNC: 144 MMOL/L (ref 136–145)
WBC # BLD AUTO: 3.2 K/UL (ref 3.6–11)

## 2018-09-26 PROCEDURE — 83883 ASSAY NEPHELOMETRY NOT SPEC: CPT | Performed by: INTERNAL MEDICINE

## 2018-09-26 PROCEDURE — 82784 ASSAY IGA/IGD/IGG/IGM EACH: CPT | Performed by: INTERNAL MEDICINE

## 2018-09-26 PROCEDURE — 84165 PROTEIN E-PHORESIS SERUM: CPT | Performed by: INTERNAL MEDICINE

## 2018-09-26 PROCEDURE — 82232 ASSAY OF BETA-2 PROTEIN: CPT | Performed by: INTERNAL MEDICINE

## 2018-09-26 PROCEDURE — 74011000250 HC RX REV CODE- 250: Performed by: INTERNAL MEDICINE

## 2018-09-26 PROCEDURE — 80053 COMPREHEN METABOLIC PANEL: CPT | Performed by: INTERNAL MEDICINE

## 2018-09-26 PROCEDURE — 74011250636 HC RX REV CODE- 250/636: Performed by: INTERNAL MEDICINE

## 2018-09-26 PROCEDURE — 36415 COLL VENOUS BLD VENIPUNCTURE: CPT | Performed by: INTERNAL MEDICINE

## 2018-09-26 PROCEDURE — 77030012965 HC NDL HUBR BBMI -A

## 2018-09-26 PROCEDURE — 36593 DECLOT VASCULAR DEVICE: CPT

## 2018-09-26 PROCEDURE — 85025 COMPLETE CBC W/AUTO DIFF WBC: CPT | Performed by: INTERNAL MEDICINE

## 2018-09-26 RX ORDER — HEPARIN 100 UNIT/ML
500 SYRINGE INTRAVENOUS AS NEEDED
Status: ACTIVE | OUTPATIENT
Start: 2018-09-26 | End: 2018-09-27

## 2018-09-26 RX ORDER — SODIUM CHLORIDE 0.9 % (FLUSH) 0.9 %
5-10 SYRINGE (ML) INJECTION AS NEEDED
Status: ACTIVE | OUTPATIENT
Start: 2018-09-26 | End: 2018-09-27

## 2018-09-26 RX ADMIN — ALTEPLASE 1 MG: 2.2 INJECTION, POWDER, LYOPHILIZED, FOR SOLUTION INTRAVENOUS at 16:07

## 2018-09-26 RX ADMIN — Medication 10 ML: at 16:58

## 2018-09-26 RX ADMIN — Medication 10 ML: at 15:58

## 2018-09-26 RX ADMIN — HEPARIN 500 UNITS: 100 SYRINGE at 16:58

## 2018-09-26 NOTE — PROGRESS NOTES
[facility] Progress Note Date: 2018 Name: Deacon  MRN: [id number] : 1960 
 
 
1555 Pt arrived ambulatory and in no distress for Port flush and labs. Assessment complete. No new complaints voiced. Port accessed without difficulty. No blood received from port despite multiple attempts and manipulations. Cath jorge luis instilled and left to dwell for and hour with no results. Labs drawn peripherally. Port flushed and heparinized and de accessed. *See [facility] for pending lab results. Visit Vitals  /75  Pulse 77  Temp 98 °F (36.7 °C)  Resp 16  Breastfeeding No  
 
 
 
1710 Discharged home ambulatory and in no distress, accompanied by self. Next appointment 10/24/18, patient aware, patient states she may not come because she has an appointment two days before at [address] [place] [address] who will access her port then. Mundo Guy RN 2018

## 2018-09-26 NOTE — TELEPHONE ENCOUNTER
Pt is calling for refill on Revlimid     Requested Prescriptions     Pending Prescriptions Disp Refills    lenalidomide (REVLIMID) 5 mg cap 30 Cap 0     Sig: Take 1 Cap by mouth daily.

## 2018-09-28 LAB
ALBUMIN SERPL ELPH-MCNC: 3.5 G/DL (ref 2.9–4.4)
ALBUMIN/GLOB SERPL: 1.4 {RATIO} (ref 0.7–1.7)
ALPHA1 GLOB SERPL ELPH-MCNC: 0.2 G/DL (ref 0–0.4)
ALPHA2 GLOB SERPL ELPH-MCNC: 0.9 G/DL (ref 0.4–1)
B-GLOBULIN SERPL ELPH-MCNC: 0.9 G/DL (ref 0.7–1.3)
B2 MICROGLOB SERPL-MCNC: 1.7 MG/L (ref 0.6–2.4)
COLLECT DURATION TIME UR: 0 HR
GAMMA GLOB SERPL ELPH-MCNC: 0.5 G/DL (ref 0.4–1.8)
GLOBULIN SER CALC-MCNC: 2.5 G/DL (ref 2.2–3.9)
KAPPA LC FREE SER-MCNC: 5.4 MG/L (ref 3.3–19.4)
KAPPA LC FREE/LAMBDA FREE SER: 0.7 {RATIO} (ref 0.26–1.65)
KAPPA LC UR-MCNC: 19.5 MG/L (ref 1.35–24.19)
KAPPA LC/LAMBDA UR: 9.42 {RATIO} (ref 2.04–10.37)
LAMBDA LC FREE SERPL-MCNC: 7.7 MG/L (ref 5.7–26.3)
LAMBDA LC UR-MCNC: 2.07 MG/L (ref 0.24–6.66)
M PROTEIN SERPL ELPH-MCNC: 0.3 G/DL
PROT SERPL-MCNC: 6 G/DL (ref 6–8.5)
SPECIMEN VOL ?TM UR: 0 ML

## 2018-10-01 LAB
IGA SERPL-MCNC: 13 MG/DL (ref 87–352)
IGG SERPL-MCNC: 580 MG/DL (ref 700–1600)
IGM SERPL-MCNC: 5 MG/DL (ref 26–217)
PROT PATTERN SERPL IFE-IMP: ABNORMAL

## 2018-10-16 RX ORDER — LENALIDOMIDE 5 MG/1
5 CAPSULE ORAL DAILY
Qty: 30 CAP | Refills: 0 | Status: SHIPPED | OUTPATIENT
Start: 2018-10-16 | End: 2019-01-07 | Stop reason: SDUPTHER

## 2018-10-19 ENCOUNTER — TELEPHONE (OUTPATIENT)
Dept: ONCOLOGY | Age: 58
End: 2018-10-19

## 2018-10-19 NOTE — TELEPHONE ENCOUNTER
Pt called and stated her pharmacy called to get clarification on her medication and she would like a callback [phone]

## 2018-10-22 ENCOUNTER — TELEPHONE (OUTPATIENT)
Dept: ONCOLOGY | Age: 58
End: 2018-10-22

## 2018-10-22 NOTE — TELEPHONE ENCOUNTER
Call received from patient, clarified questions awaiting authorization number from specialty pharmacy for Revlimid refill to process. Thanked for clarification.

## 2018-10-24 ENCOUNTER — APPOINTMENT (OUTPATIENT)
Dept: INFUSION THERAPY | Age: 58
End: 2018-10-24
Payer: COMMERCIAL

## 2018-10-24 ENCOUNTER — APPOINTMENT (OUTPATIENT)
Dept: INFUSION THERAPY | Age: 58
End: 2018-10-24

## 2018-10-26 ENCOUNTER — TELEPHONE (OUTPATIENT)
Dept: ONCOLOGY | Age: 58
End: 2018-10-26

## 2018-10-26 NOTE — TELEPHONE ENCOUNTER
Call returned to [facility].  Auth # [id number] obtained 10/22/18/Disha. Info given.     (  6:30  min)

## 2018-10-26 NOTE — TELEPHONE ENCOUNTER
Ravi Liang with [address] called on patient's behalf regarding medication lenalidomide (REVLIMID) 5 mg cap. He wanted to know if we received an auth number for this medication. Please return call to discuss [phone] ext [phone].   gurvinder

## 2018-11-20 ENCOUNTER — DOCUMENTATION ONLY (OUTPATIENT)
Dept: ONCOLOGY | Age: 58
End: 2018-11-20

## 2018-11-21 ENCOUNTER — CLINICAL SUPPORT (OUTPATIENT)
Dept: CARDIOLOGY CLINIC | Age: 58
End: 2018-11-21

## 2018-11-21 DIAGNOSIS — I42.9 CARDIOMYOPATHY, UNSPECIFIED TYPE (HCC): ICD-10-CM

## 2018-11-21 DIAGNOSIS — Z95.810 PRESENCE OF AUTOMATIC CARDIOVERTER/DEFIBRILLATOR (AICD): Primary | ICD-10-CM

## 2018-11-28 ENCOUNTER — HOSPITAL ENCOUNTER (OUTPATIENT)
Dept: INFUSION THERAPY | Age: 58
Discharge: HOME | End: 2018-11-28
Payer: COMMERCIAL

## 2018-11-28 VITALS
SYSTOLIC BLOOD PRESSURE: 165 MMHG | DIASTOLIC BLOOD PRESSURE: 81 MMHG | HEART RATE: 72 BPM | RESPIRATION RATE: 18 BRPM | TEMPERATURE: 97.5 F

## 2018-11-28 LAB
ALBUMIN SERPL-MCNC: 3.8 G/DL (ref 3.5–5)
ALBUMIN/GLOB SERPL: 1.3 {RATIO} (ref 1.1–2.2)
ALP SERPL-CCNC: 99 U/L (ref 45–117)
ALT SERPL-CCNC: 27 U/L (ref 12–78)
ANION GAP SERPL CALC-SCNC: 11 MMOL/L (ref 5–15)
AST SERPL-CCNC: 16 U/L (ref 15–37)
BASOPHILS # BLD: 0 K/UL (ref 0–0.1)
BASOPHILS NFR BLD: 1 % (ref 0–1)
BILIRUB SERPL-MCNC: 1 MG/DL (ref 0.2–1)
BUN SERPL-MCNC: 12 MG/DL (ref 6–20)
BUN/CREAT SERPL: 10 (ref 12–20)
CALCIUM SERPL-MCNC: 8.7 MG/DL (ref 8.5–10.1)
CHLORIDE SERPL-SCNC: 106 MMOL/L (ref 97–108)
CO2 SERPL-SCNC: 28 MMOL/L (ref 21–32)
CREAT SERPL-MCNC: 1.17 MG/DL (ref 0.55–1.02)
DIFFERENTIAL METHOD BLD: ABNORMAL
EOSINOPHIL # BLD: 0.2 K/UL (ref 0–0.4)
EOSINOPHIL NFR BLD: 6 % (ref 0–7)
ERYTHROCYTE [DISTWIDTH] IN BLOOD BY AUTOMATED COUNT: 13.8 % (ref 11.5–14.5)
GLOBULIN SER CALC-MCNC: 2.9 G/DL (ref 2–4)
GLUCOSE SERPL-MCNC: 107 MG/DL (ref 65–100)
HCT VFR BLD AUTO: 40.1 % (ref 35–47)
HGB BLD-MCNC: 13.3 G/DL (ref 11.5–16)
IGA SERPL-MCNC: 36 MG/DL (ref 70–400)
IGG SERPL-MCNC: 701 MG/DL (ref 700–1600)
IGM SERPL-MCNC: 23 MG/DL (ref 40–230)
IMM GRANULOCYTES # BLD: 0 K/UL (ref 0–0.04)
IMM GRANULOCYTES NFR BLD AUTO: 0 % (ref 0–0.5)
LYMPHOCYTES # BLD: 1.4 K/UL (ref 0.8–3.5)
LYMPHOCYTES NFR BLD: 34 % (ref 12–49)
MCH RBC QN AUTO: 33.7 PG (ref 26–34)
MCHC RBC AUTO-ENTMCNC: 33.2 G/DL (ref 30–36.5)
MCV RBC AUTO: 101.5 FL (ref 80–99)
MONOCYTES # BLD: 0.4 K/UL (ref 0–1)
MONOCYTES NFR BLD: 9 % (ref 5–13)
NEUTS SEG # BLD: 2.1 K/UL (ref 1.8–8)
NEUTS SEG NFR BLD: 50 % (ref 32–75)
NRBC # BLD: 0 K/UL (ref 0–0.01)
NRBC BLD-RTO: 0 PER 100 WBC
PLATELET # BLD AUTO: 199 K/UL (ref 150–400)
PMV BLD AUTO: 9.2 FL (ref 8.9–12.9)
POTASSIUM SERPL-SCNC: 3.4 MMOL/L (ref 3.5–5.1)
PROT SERPL-MCNC: 6.7 G/DL (ref 6.4–8.2)
RBC # BLD AUTO: 3.95 M/UL (ref 3.8–5.2)
SODIUM SERPL-SCNC: 145 MMOL/L (ref 136–145)
WBC # BLD AUTO: 4.1 K/UL (ref 3.6–11)

## 2018-11-28 PROCEDURE — 83883 ASSAY NEPHELOMETRY NOT SPEC: CPT

## 2018-11-28 PROCEDURE — 36415 COLL VENOUS BLD VENIPUNCTURE: CPT

## 2018-11-28 PROCEDURE — 82784 ASSAY IGA/IGD/IGG/IGM EACH: CPT

## 2018-11-28 PROCEDURE — 85025 COMPLETE CBC W/AUTO DIFF WBC: CPT

## 2018-11-28 PROCEDURE — 84165 PROTEIN E-PHORESIS SERUM: CPT

## 2018-11-28 PROCEDURE — 80053 COMPREHEN METABOLIC PANEL: CPT

## 2018-11-28 NOTE — PROGRESS NOTES
[facility] Lab Visit: 
 
1600:  Pt arrived ambulatory and in no distress for port flush and labs. Pt reports her port is being removed tomorrow. Labs drawn peripherally without difficulty. Patient Vitals for the past 12 hrs: 
 Temp Pulse Resp BP  
11/28/18 1605 97.5 °F (36.4 °C) 72 18 165/81 LABS PENDING IN [facility] 
1615: Departed [facility] ambulatory and in no distress. Next appointment 12/26/18.

## 2018-11-30 LAB
ALBUMIN SERPL ELPH-MCNC: 3.7 G/DL (ref 2.9–4.4)
ALBUMIN/GLOB SERPL: 1.4 {RATIO} (ref 0.7–1.7)
ALPHA1 GLOB SERPL ELPH-MCNC: 0.2 G/DL (ref 0–0.4)
ALPHA2 GLOB SERPL ELPH-MCNC: 0.7 G/DL (ref 0.4–1)
B-GLOBULIN SERPL ELPH-MCNC: 1 G/DL (ref 0.7–1.3)
COLLECT DURATION TIME UR: 0 HR
GAMMA GLOB SERPL ELPH-MCNC: 0.7 G/DL (ref 0.4–1.8)
GLOBULIN SER CALC-MCNC: 2.6 G/DL (ref 2.2–3.9)
KAPPA LC FREE SER-MCNC: 9.8 MG/L (ref 3.3–19.4)
KAPPA LC FREE/LAMBDA FREE SER: 1.05 {RATIO} (ref 0.26–1.65)
KAPPA LC UR-MCNC: 77.3 MG/L (ref 1.35–24.19)
KAPPA LC/LAMBDA UR: 15.65 {RATIO} (ref 2.04–10.37)
LAMBDA LC FREE SERPL-MCNC: 9.3 MG/L (ref 5.7–26.3)
LAMBDA LC UR-MCNC: 4.94 MG/L (ref 0.24–6.66)
M PROTEIN SERPL ELPH-MCNC: NORMAL G/DL
PROT SERPL-MCNC: 6.3 G/DL (ref 6–8.5)
SPECIMEN VOL ?TM UR: 0 ML

## 2018-12-03 LAB
IGA SERPL-MCNC: 35 MG/DL (ref 87–352)
IGG SERPL-MCNC: 755 MG/DL (ref 700–1600)
IGM SERPL-MCNC: 17 MG/DL (ref 26–217)
PROT PATTERN SERPL IFE-IMP: ABNORMAL

## 2018-12-05 ENCOUNTER — OFFICE VISIT (OUTPATIENT)
Dept: FAMILY MEDICINE CLINIC | Age: 58
End: 2018-12-05

## 2018-12-05 VITALS — SYSTOLIC BLOOD PRESSURE: 159 MMHG | DIASTOLIC BLOOD PRESSURE: 74 MMHG

## 2018-12-05 DIAGNOSIS — L50.9 URTICARIA: Primary | ICD-10-CM

## 2018-12-05 RX ORDER — LATANOPROST 50 UG/ML
SOLUTION/ DROPS OPHTHALMIC
Refills: 1 | COMMUNITY
Start: 2018-09-21 | End: 2019-02-04

## 2018-12-05 RX ORDER — VALACYCLOVIR HYDROCHLORIDE 1 G/1
TABLET, FILM COATED ORAL
Refills: 0 | COMMUNITY
Start: 2018-11-30 | End: 2019-02-04

## 2018-12-05 RX ORDER — AMLODIPINE BESYLATE 5 MG/1
5 TABLET ORAL DAILY
Qty: 90 TAB | Refills: 3 | Status: SHIPPED | OUTPATIENT
Start: 2018-12-05 | End: 2019-03-21 | Stop reason: SDUPTHER

## 2018-12-05 RX ORDER — OXYCODONE AND ACETAMINOPHEN 5; 325 MG/1; MG/1
TABLET ORAL
Refills: 0 | COMMUNITY
Start: 2018-11-29 | End: 2019-02-04

## 2018-12-05 RX ORDER — CETIRIZINE HCL 10 MG
10 TABLET ORAL 2 TIMES DAILY
Qty: 60 TAB | Refills: 12 | Status: SHIPPED | OUTPATIENT
Start: 2018-12-05 | End: 2019-02-04

## 2018-12-05 NOTE — PROGRESS NOTES
Chief Complaint   Patient presents with    Rash     Pt here due to keep breaking out over body. Pt reports it itching really bad. Goes from face to arms and legs. [facility] . They stated pt hs shingles. Gave ABT valtrex. rash goes away then returns to another spot.

## 2018-12-05 NOTE — PROGRESS NOTES
HISTORY OF PRESENT ILLNESS  Christina Bolden is a 59 y.o. female. HPI Has been breaking out in rash intermittently for 6 months, will last for a few days. Can come on in different places. Occasionally eyelid will swell. No new meds. Has been using dove soap for several years. No new necklaces, Has been using a new perfume. ROS    Physical Exam   Constitutional: She appears well-developed and well-nourished. HENT:   Right Ear: External ear normal.   Left Ear: External ear normal.   Mouth/Throat: Oropharynx is clear and moist.   Neck: No thyromegaly present. Cardiovascular: Normal rate, regular rhythm, normal heart sounds and intact distal pulses. Pulmonary/Chest: Breath sounds normal. She has no wheezes. Abdominal: Soft. Bowel sounds are normal. She exhibits no distension and no mass. There is no tenderness. Musculoskeletal: She exhibits no edema. Lymphadenopathy:     She has no cervical adenopathy. Skin:   Several raised wheals on neck    Nursing note and vitals reviewed. ASSESSMENT and PLAN  Orders Placed This Encounter    cetirizine (ZYRTEC) 10 mg tablet    amLODIPine (NORVASC) 5 mg tablet     Diagnoses and all orders for this visit:    1. Urticaria    Other orders  -     cetirizine (ZYRTEC) 10 mg tablet; Take 1 Tab by mouth two (2) times a day. -     amLODIPine (NORVASC) 5 mg tablet; Take 1 Tab by mouth daily.  For blood pressure      Follow-up Disposition: Not on File

## 2018-12-08 RX ORDER — ATORVASTATIN CALCIUM 40 MG/1
40 TABLET, FILM COATED ORAL DAILY
Qty: 30 TAB | Refills: 9 | Status: SHIPPED | OUTPATIENT
Start: 2018-12-08 | End: 2019-10-10 | Stop reason: SDUPTHER

## 2018-12-17 ENCOUNTER — DOCUMENTATION ONLY (OUTPATIENT)
Dept: ONCOLOGY | Age: 58
End: 2018-12-17

## 2018-12-26 ENCOUNTER — HOSPITAL ENCOUNTER (OUTPATIENT)
Dept: INFUSION THERAPY | Age: 58
Discharge: HOME | End: 2018-12-26
Payer: COMMERCIAL

## 2018-12-26 VITALS
HEART RATE: 69 BPM | OXYGEN SATURATION: 100 % | DIASTOLIC BLOOD PRESSURE: 60 MMHG | SYSTOLIC BLOOD PRESSURE: 125 MMHG | RESPIRATION RATE: 18 BRPM | TEMPERATURE: 98 F

## 2018-12-26 LAB
ALBUMIN SERPL-MCNC: 3.1 G/DL (ref 3.5–5)
ALBUMIN/GLOB SERPL: 1 {RATIO} (ref 1.1–2.2)
ALP SERPL-CCNC: 86 U/L (ref 45–117)
ALT SERPL-CCNC: 24 U/L (ref 12–78)
ANION GAP SERPL CALC-SCNC: 12 MMOL/L (ref 5–15)
AST SERPL-CCNC: 15 U/L (ref 15–37)
BASOPHILS # BLD: 0 K/UL (ref 0–0.1)
BASOPHILS NFR BLD: 1 % (ref 0–1)
BILIRUB SERPL-MCNC: 0.9 MG/DL (ref 0.2–1)
BUN SERPL-MCNC: 10 MG/DL (ref 6–20)
BUN/CREAT SERPL: 11 (ref 12–20)
CALCIUM SERPL-MCNC: 8.3 MG/DL (ref 8.5–10.1)
CHLORIDE SERPL-SCNC: 108 MMOL/L (ref 97–108)
CO2 SERPL-SCNC: 24 MMOL/L (ref 21–32)
CREAT SERPL-MCNC: 0.89 MG/DL (ref 0.55–1.02)
DIFFERENTIAL METHOD BLD: ABNORMAL
EOSINOPHIL # BLD: 0.2 K/UL (ref 0–0.4)
EOSINOPHIL NFR BLD: 4 % (ref 0–7)
ERYTHROCYTE [DISTWIDTH] IN BLOOD BY AUTOMATED COUNT: 14.6 % (ref 11.5–14.5)
GLOBULIN SER CALC-MCNC: 3 G/DL (ref 2–4)
GLUCOSE SERPL-MCNC: 139 MG/DL (ref 65–100)
HCT VFR BLD AUTO: 35.7 % (ref 35–47)
HGB BLD-MCNC: 12.3 G/DL (ref 11.5–16)
IGA SERPL-MCNC: 33 MG/DL (ref 70–400)
IGG SERPL-MCNC: 646 MG/DL (ref 700–1600)
IGM SERPL-MCNC: <21 MG/DL (ref 40–230)
IMM GRANULOCYTES # BLD: 0 K/UL (ref 0–0.04)
IMM GRANULOCYTES NFR BLD AUTO: 0 % (ref 0–0.5)
LYMPHOCYTES # BLD: 1.4 K/UL (ref 0.8–3.5)
LYMPHOCYTES NFR BLD: 40 % (ref 12–49)
MCH RBC QN AUTO: 33.9 PG (ref 26–34)
MCHC RBC AUTO-ENTMCNC: 34.5 G/DL (ref 30–36.5)
MCV RBC AUTO: 98.3 FL (ref 80–99)
MONOCYTES # BLD: 0.4 K/UL (ref 0–1)
MONOCYTES NFR BLD: 11 % (ref 5–13)
NEUTS SEG # BLD: 1.6 K/UL (ref 1.8–8)
NEUTS SEG NFR BLD: 45 % (ref 32–75)
NRBC # BLD: 0 K/UL (ref 0–0.01)
NRBC BLD-RTO: 0 PER 100 WBC
PLATELET # BLD AUTO: 163 K/UL (ref 150–400)
PMV BLD AUTO: 9.2 FL (ref 8.9–12.9)
POTASSIUM SERPL-SCNC: 3.2 MMOL/L (ref 3.5–5.1)
PROT SERPL-MCNC: 6.1 G/DL (ref 6.4–8.2)
RBC # BLD AUTO: 3.63 M/UL (ref 3.8–5.2)
SODIUM SERPL-SCNC: 144 MMOL/L (ref 136–145)
WBC # BLD AUTO: 3.5 K/UL (ref 3.6–11)

## 2018-12-26 PROCEDURE — 84165 PROTEIN E-PHORESIS SERUM: CPT

## 2018-12-26 PROCEDURE — 83883 ASSAY NEPHELOMETRY NOT SPEC: CPT

## 2018-12-26 PROCEDURE — 82232 ASSAY OF BETA-2 PROTEIN: CPT

## 2018-12-26 PROCEDURE — 85025 COMPLETE CBC W/AUTO DIFF WBC: CPT

## 2018-12-26 PROCEDURE — 36415 COLL VENOUS BLD VENIPUNCTURE: CPT

## 2018-12-26 PROCEDURE — 82784 ASSAY IGA/IGD/IGG/IGM EACH: CPT

## 2018-12-26 PROCEDURE — 80053 COMPREHEN METABOLIC PANEL: CPT

## 2018-12-26 RX ORDER — HEPARIN 100 UNIT/ML
500 SYRINGE INTRAVENOUS AS NEEDED
Status: ACTIVE | OUTPATIENT
Start: 2018-12-26 | End: 2018-12-27

## 2018-12-26 RX ORDER — SODIUM CHLORIDE 0.9 % (FLUSH) 0.9 %
5-10 SYRINGE (ML) INJECTION AS NEEDED
Status: ACTIVE | OUTPATIENT
Start: 2018-12-26 | End: 2018-12-27

## 2018-12-26 NOTE — PROGRESS NOTES
[facility] Progress Note    Date: 2018    Name: Eloisa Card    MRN: [id number]         : 1960      0810 Pt arrived ambulatory and in no distress for labs. Assessment complete. No new complaints voiced. Labs drawn peripherally without difficulty. Labs drawn peripherally. Visit Vitals  /60 (BP 1 Location: Right arm, BP Patient Position: Sitting)   Pulse 69   Temp 98 °F (36.7 °C)   Resp 18   SpO2 100%   Breastfeeding? No       Recent Results (from the past 12 hour(s))   IMMUNOGLOBULINS, G/A/M, QT. Collection Time: 18  8:16 AM   Result Value Ref Range    Immunoglobulin G 646 (L) 700 - 1,600 mg/dL    Immunoglobulin A 33 (L) 70 - 400 mg/dL    Immunoglobulin M <21 (L) 40 - 230 mg/dL   CBC WITH AUTOMATED DIFF    Collection Time: 18  8:16 AM   Result Value Ref Range    WBC 3.5 (L) 3.6 - 11.0 K/uL    RBC 3.63 (L) 3.80 - 5.20 M/uL    HGB 12.3 11.5 - 16.0 g/dL    HCT 35.7 35.0 - 47.0 %    MCV 98.3 80.0 - 99.0 FL    MCH 33.9 26.0 - 34.0 PG    MCHC 34.5 30.0 - 36.5 g/dL    RDW 14.6 (H) 11.5 - 14.5 %    PLATELET 476 155 - 978 K/uL    MPV 9.2 8.9 - 12.9 FL    NRBC 0.0 0  WBC    ABSOLUTE NRBC 0.00 0.00 - 0.01 K/uL    NEUTROPHILS 45 32 - 75 %    LYMPHOCYTES 40 12 - 49 %    MONOCYTES 11 5 - 13 %    EOSINOPHILS 4 0 - 7 %    BASOPHILS 1 0 - 1 %    IMMATURE GRANULOCYTES 0 0.0 - 0.5 %    ABS. NEUTROPHILS 1.6 (L) 1.8 - 8.0 K/UL    ABS. LYMPHOCYTES 1.4 0.8 - 3.5 K/UL    ABS. MONOCYTES 0.4 0.0 - 1.0 K/UL    ABS. EOSINOPHILS 0.2 0.0 - 0.4 K/UL    ABS. BASOPHILS 0.0 0.0 - 0.1 K/UL    ABS. IMM.  GRANS. 0.0 0.00 - 0.04 K/UL    DF AUTOMATED     METABOLIC PANEL, COMPREHENSIVE    Collection Time: 18  8:16 AM   Result Value Ref Range    Sodium 144 136 - 145 mmol/L    Potassium 3.2 (L) 3.5 - 5.1 mmol/L    Chloride 108 97 - 108 mmol/L    CO2 24 21 - 32 mmol/L    Anion gap 12 5 - 15 mmol/L    Glucose 139 (H) 65 - 100 mg/dL    BUN 10 6 - 20 MG/DL    Creatinine 0.89 0.55 - 1.02 MG/DL BUN/Creatinine ratio 11 (L) 12 - 20      GFR est AA >60 >60 ml/min/1.73m2    GFR est non-AA >60 >60 ml/min/1.73m2    Calcium 8.3 (L) 8.5 - 10.1 MG/DL    Bilirubin, total 0.9 0.2 - 1.0 MG/DL    ALT (SGPT) 24 12 - 78 U/L    AST (SGOT) 15 15 - 37 U/L    Alk. phosphatase 86 45 - 117 U/L    Protein, total 6.1 (L) 6.4 - 8.2 g/dL    Albumin 3.1 (L) 3.5 - 5.0 g/dL    Globulin 3.0 2.0 - 4.0 g/dL    A-G Ratio 1.0 (L) 1.1 - 2.2           0825: Departed [facility] ambulatory and in no distress. Next appt is 1/23/19, patient aware.       Walter Reddy RN  December 26, 2018

## 2018-12-27 LAB
ALBUMIN SERPL ELPH-MCNC: 3.3 G/DL (ref 2.9–4.4)
ALBUMIN/GLOB SERPL: 1.4 {RATIO} (ref 0.7–1.7)
ALPHA1 GLOB SERPL ELPH-MCNC: 0.2 G/DL (ref 0–0.4)
ALPHA2 GLOB SERPL ELPH-MCNC: 0.7 G/DL (ref 0.4–1)
B-GLOBULIN SERPL ELPH-MCNC: 0.9 G/DL (ref 0.7–1.3)
B2 MICROGLOB SERPL-MCNC: 1.9 MG/L (ref 0.6–2.4)
GAMMA GLOB SERPL ELPH-MCNC: 0.6 G/DL (ref 0.4–1.8)
GLOBULIN SER CALC-MCNC: 2.4 G/DL (ref 2.2–3.9)
KAPPA LC FREE SER-MCNC: 8.6 MG/L (ref 3.3–19.4)
KAPPA LC FREE/LAMBDA FREE SER: 1.09 {RATIO} (ref 0.26–1.65)
LAMBDA LC FREE SERPL-MCNC: 7.9 MG/L (ref 5.7–26.3)
M PROTEIN SERPL ELPH-MCNC: ABNORMAL G/DL
PROT SERPL-MCNC: 5.7 G/DL (ref 6–8.5)

## 2018-12-28 LAB
IGA SERPL-MCNC: 30 MG/DL (ref 87–352)
IGG SERPL-MCNC: 651 MG/DL (ref 700–1600)
IGM SERPL-MCNC: 11 MG/DL (ref 26–217)
PROT PATTERN SERPL IFE-IMP: ABNORMAL

## 2019-01-07 RX ORDER — LENALIDOMIDE 5 MG/1
5 CAPSULE ORAL DAILY
Qty: 30 CAP | Refills: 0 | Status: SHIPPED | OUTPATIENT
Start: 2019-01-07 | End: 2019-04-01 | Stop reason: SDUPTHER

## 2019-01-16 ENCOUNTER — DOCUMENTATION ONLY (OUTPATIENT)
Dept: ONCOLOGY | Age: 59
End: 2019-01-16

## 2019-01-21 ENCOUNTER — DOCUMENTATION ONLY (OUTPATIENT)
Dept: ONCOLOGY | Age: 59
End: 2019-01-21

## 2019-01-23 ENCOUNTER — HOSPITAL ENCOUNTER (OUTPATIENT)
Dept: INFUSION THERAPY | Age: 59
Discharge: HOME | End: 2019-01-23
Payer: COMMERCIAL

## 2019-01-23 VITALS
RESPIRATION RATE: 18 BRPM | DIASTOLIC BLOOD PRESSURE: 61 MMHG | SYSTOLIC BLOOD PRESSURE: 139 MMHG | OXYGEN SATURATION: 98 % | TEMPERATURE: 97.3 F | HEART RATE: 70 BPM

## 2019-01-23 LAB
ALBUMIN SERPL-MCNC: 3.3 G/DL (ref 3.5–5)
ALBUMIN/GLOB SERPL: 1 {RATIO} (ref 1.1–2.2)
ALP SERPL-CCNC: 94 U/L (ref 45–117)
ALT SERPL-CCNC: 22 U/L (ref 12–78)
ANION GAP SERPL CALC-SCNC: 10 MMOL/L (ref 5–15)
AST SERPL-CCNC: 13 U/L (ref 15–37)
BASOPHILS # BLD: 0 K/UL (ref 0–0.1)
BASOPHILS NFR BLD: 1 % (ref 0–1)
BILIRUB SERPL-MCNC: 0.9 MG/DL (ref 0.2–1)
BUN SERPL-MCNC: 9 MG/DL (ref 6–20)
BUN/CREAT SERPL: 10 (ref 12–20)
CALCIUM SERPL-MCNC: 8.8 MG/DL (ref 8.5–10.1)
CHLORIDE SERPL-SCNC: 106 MMOL/L (ref 97–108)
CO2 SERPL-SCNC: 26 MMOL/L (ref 21–32)
CREAT SERPL-MCNC: 0.93 MG/DL (ref 0.55–1.02)
DIFFERENTIAL METHOD BLD: ABNORMAL
EOSINOPHIL # BLD: 0.3 K/UL (ref 0–0.4)
EOSINOPHIL NFR BLD: 7 % (ref 0–7)
ERYTHROCYTE [DISTWIDTH] IN BLOOD BY AUTOMATED COUNT: 14.8 % (ref 11.5–14.5)
GLOBULIN SER CALC-MCNC: 3.2 G/DL (ref 2–4)
GLUCOSE SERPL-MCNC: 145 MG/DL (ref 65–100)
HCT VFR BLD AUTO: 38.6 % (ref 35–47)
HGB BLD-MCNC: 12.9 G/DL (ref 11.5–16)
IGA SERPL-MCNC: 46 MG/DL (ref 70–400)
IGG SERPL-MCNC: 744 MG/DL (ref 700–1600)
IGM SERPL-MCNC: <21 MG/DL (ref 40–230)
IMM GRANULOCYTES # BLD AUTO: 0 K/UL (ref 0–0.04)
IMM GRANULOCYTES NFR BLD AUTO: 0 % (ref 0–0.5)
LYMPHOCYTES # BLD: 1.7 K/UL (ref 0.8–3.5)
LYMPHOCYTES NFR BLD: 41 % (ref 12–49)
MCH RBC QN AUTO: 33.1 PG (ref 26–34)
MCHC RBC AUTO-ENTMCNC: 33.4 G/DL (ref 30–36.5)
MCV RBC AUTO: 99 FL (ref 80–99)
MONOCYTES # BLD: 0.4 K/UL (ref 0–1)
MONOCYTES NFR BLD: 10 % (ref 5–13)
NEUTS SEG # BLD: 1.7 K/UL (ref 1.8–8)
NEUTS SEG NFR BLD: 42 % (ref 32–75)
NRBC # BLD: 0 K/UL (ref 0–0.01)
NRBC BLD-RTO: 0 PER 100 WBC
PLATELET # BLD AUTO: 209 K/UL (ref 150–400)
PMV BLD AUTO: 9 FL (ref 8.9–12.9)
POTASSIUM SERPL-SCNC: 3.6 MMOL/L (ref 3.5–5.1)
PROT SERPL-MCNC: 6.5 G/DL (ref 6.4–8.2)
RBC # BLD AUTO: 3.9 M/UL (ref 3.8–5.2)
SODIUM SERPL-SCNC: 142 MMOL/L (ref 136–145)
WBC # BLD AUTO: 4.2 K/UL (ref 3.6–11)

## 2019-01-23 PROCEDURE — 84165 PROTEIN E-PHORESIS SERUM: CPT

## 2019-01-23 PROCEDURE — 82784 ASSAY IGA/IGD/IGG/IGM EACH: CPT

## 2019-01-23 PROCEDURE — 83883 ASSAY NEPHELOMETRY NOT SPEC: CPT

## 2019-01-23 PROCEDURE — 80053 COMPREHEN METABOLIC PANEL: CPT

## 2019-01-23 PROCEDURE — 36415 COLL VENOUS BLD VENIPUNCTURE: CPT

## 2019-01-23 PROCEDURE — 85025 COMPLETE CBC W/AUTO DIFF WBC: CPT

## 2019-01-24 LAB
KAPPA LC FREE SER-MCNC: 12.1 MG/L (ref 3.3–19.4)
KAPPA LC FREE/LAMBDA FREE SER: 1.27 {RATIO} (ref 0.26–1.65)
LAMBDA LC FREE SERPL-MCNC: 9.5 MG/L (ref 5.7–26.3)

## 2019-01-25 LAB
ALBUMIN SERPL ELPH-MCNC: 3.4 G/DL (ref 2.9–4.4)
ALBUMIN/GLOB SERPL: 1.3 {RATIO} (ref 0.7–1.7)
ALPHA1 GLOB SERPL ELPH-MCNC: 0.2 G/DL (ref 0–0.4)
ALPHA2 GLOB SERPL ELPH-MCNC: 0.8 G/DL (ref 0.4–1)
B-GLOBULIN SERPL ELPH-MCNC: 1 G/DL (ref 0.7–1.3)
GAMMA GLOB SERPL ELPH-MCNC: 0.7 G/DL (ref 0.4–1.8)
GLOBULIN SER CALC-MCNC: 2.7 G/DL (ref 2.2–3.9)
IGA SERPL-MCNC: 41 MG/DL (ref 87–352)
IGG SERPL-MCNC: 802 MG/DL (ref 700–1600)
IGM SERPL-MCNC: 14 MG/DL (ref 26–217)
M PROTEIN SERPL ELPH-MCNC: NORMAL G/DL
PROT PATTERN SERPL IFE-IMP: ABNORMAL
PROT SERPL-MCNC: 6.1 G/DL (ref 6–8.5)

## 2019-01-30 ENCOUNTER — OFFICE VISIT (OUTPATIENT)
Dept: ONCOLOGY | Age: 59
End: 2019-01-30

## 2019-01-30 VITALS
HEART RATE: 74 BPM | WEIGHT: 196.2 LBS | TEMPERATURE: 97.9 F | SYSTOLIC BLOOD PRESSURE: 149 MMHG | BODY MASS INDEX: 33.49 KG/M2 | RESPIRATION RATE: 16 BRPM | HEIGHT: 64 IN | OXYGEN SATURATION: 96 % | DIASTOLIC BLOOD PRESSURE: 89 MMHG

## 2019-01-30 DIAGNOSIS — D70.1 CHEMOTHERAPY INDUCED NEUTROPENIA (HCC): Primary | ICD-10-CM

## 2019-01-30 DIAGNOSIS — C90.00 MULTIPLE MYELOMA NOT HAVING ACHIEVED REMISSION (HCC): ICD-10-CM

## 2019-01-30 DIAGNOSIS — R21 RASH: ICD-10-CM

## 2019-01-30 DIAGNOSIS — T45.1X5A CHEMOTHERAPY INDUCED NEUTROPENIA (HCC): Primary | ICD-10-CM

## 2019-01-30 DIAGNOSIS — E11.9 CONTROLLED TYPE 2 DIABETES MELLITUS WITHOUT COMPLICATION, UNSPECIFIED WHETHER LONG TERM INSULIN USE (HCC): ICD-10-CM

## 2019-01-30 NOTE — PROGRESS NOTES
Hematology Follow Up 
 
REASON FOR VISIT: Multiple Myeloma TREATMENT:  
3/24/17- 9/15/17: VRD X 8  
10/20/17: Autologous transplant at [facility] 
2/22/18 - Currently on Revlimind 10mg daily HISTORY OF PRESENT ILLNESS: Ms. Neita Nissen is a 59 y.o. female with DM and  Multiple Myeloma who presents to follow up after the ASCT and is on maintenance Revlimid 10mg daily. She was reduced to 5 mg due to cytopenias. Comes for follow up. She has had some skin breakouts in crops since Thanks giving 2018. She has itching, No sores in her mouth. She has constant numbness of her toes. No fevers, chills, sores. No Leg swelling. Has no change in weight or appetite. Oncologic history Patient had left facial numbness which started in the summer of 2016. This started abruptly and was not associated with rashes, pain, jaw weakness. She has had some intermittent hyperemia of the L eye. No tearing. She has been evaluated by Dr. Basil Ritter with Neurology and an extensive work up was only notable for presence of a 0.3 g/dl M spike. Other tests were unremarkable: Vitamin B12 411, thyroid function test normal, ACE 25, BHARATI normal CBC normal, CMP normal, CRP 1.17, CT head and cervical spine unremarkable. Further evaluation revealed findings consistent with Multiple Myeloma CBC 2/24 Unremarkable. Kappa 17 mg/dl, Lambda 2416 (ratio 0.01), SPEP 0.2 g/dl M spike, HANSEL showed monoclonal free lambda light chains, UPEP negative, Beta 2 Microglobulin 1.8 mg/L, Skeletal survey negative for lytic lesions, Bone marrow biopsy on 2/24/17 showed a Normocellular marrow with mild monoclonal plasmacytosis (15-20%). Trilineage hematopoiesis with maturation. She had a very good partial response and 8 cycles of VRD and then proceeded on to receive an autologous stem cell transplant on 10/20/17 at [facility]. She had a CR posttransplant. Started maintenance Revlimid in Jan 2018 Past Medical History:  
Diagnosis Date  Cardiomyopathy  Diabetes mellitus type 2, controlled ([facility] 12/10/2015  Heart failure ([facility]  Hyperlipidemia  Hypertension   
 controlled  Multiple myeloma ([facility]  Orthostatic hypotension  Secondary cardiomyopathy, unspecified  Sinoatrial node dysfunction (HCC)  Vitamin B12 deficiency 3/31/2010 Past Surgical History:  
Procedure Laterality Date  HX HYSTERECTOMY Allergies Allergen Reactions  Ace Inhibitors Cough  Compazine [Prochlorperazine] Nausea Only  
  rash Current Outpatient Medications Medication Sig Dispense Refill  lenalidomide (REVLIMID) 5 mg cap Take 5 mg by mouth daily. 30 Cap 0  
 atorvastatin (LIPITOR) 40 mg tablet TAKE 1 TAB BY MOUTH DAILY. FOR CHOLESTEROL 30 Tab 9  
 amLODIPine (NORVASC) 5 mg tablet Take 1 Tab by mouth daily. For blood pressure 90 Tab 3  Biotin 2,500 mcg cap Take  by mouth.  gabapentin (NEURONTIN) 300 mg capsule Take 1 Cap by mouth three (3) times daily. 90 Cap 2  
 glimepiride (AMARYL) 1 mg tablet Take 1 Tab by mouth Daily (before breakfast). For diabetes 30 Tab 12  carvedilol (COREG) 25 mg tablet two (2) times a day.  loperamide (IMMODIUM) 2 mg tablet Take 2 mg by mouth as needed for Diarrhea.  oxyCODONE-acetaminophen (PERCOCET) 5-325 mg per tablet TAKE 1 TABLET BY MOUTH EVERY 6 HOURS AS NEEDED FOR PAIN  0  
 latanoprost (XALATAN) 0.005 % ophthalmic solution INSTILL 1 DROP INTO BOTH EYES DAILY FOR 6 WEEKS  1  
 valACYclovir (VALTREX) 1 gram tablet TAKE 1 TABLET BY MOUTH THREE TIMES A DAY X 7 DAYS  0  
 cetirizine (ZYRTEC) 10 mg tablet Take 1 Tab by mouth two (2) times a day. 60 Tab 12 Social History Socioeconomic History  Marital status:  Spouse name: Not on file  Number of children: Not on file  Years of education: Not on file  Highest education level: Not on file Tobacco Use  Smoking status: Never Smoker  Smokeless tobacco: Never Used Substance and Sexual Activity  Alcohol use: No  
 Drug use: No  
 Sexual activity: Yes  
  Partners: Male Social History Narrative , 2 children, 28 and 31. Works at [employer], for 35 years. 5 grandchildren Family History Problem Relation Age of Onset  Cancer Mother  Heart Disease Mother   
     pacemaker  Diabetes Mother  Breast Cancer Mother  Hypertension Sister  Hypertension Brother  Diabetes Brother  Hypertension Sister  Hypertension Sister  Hypertension Sister  Hypertension Sister  Diabetes Sister ROS As reviewed in the HPI all others reviewed and negative. ECOG PS is 0 Physical Examination:  
Visit Vitals /89 (BP 1 Location: Left arm, BP Patient Position: Sitting) Pulse 74 Temp 97.9 °F (36.6 °C) (Oral) Resp 16 Ht 5' 4\" (1.626 m) Wt 196 lb 3.2 oz (89 kg) SpO2 96% BMI 33.68 kg/m² General appearance - alert, well appearing, and in no distress Mental status - oriented to person, place, and time Mouth - mucous membranes moist, pharynx normal without lesions Lymphatics - no palpable lymphadenopathy, no hepatosplenomegaly Chest - clear to auscultation, no wheezes, rales or rhonchi, symmetric air entry Heart - normal rate, regular rhythm, normal S1, S2, no murmurs, rubs, clicks or gallops Abdomen - soft, nontender, nondistended, no masses or organomegaly, bowel sounds present Ext -  No edema LABS Lab Results Component Value Date/Time WBC 4.2 01/23/2019 08:08 AM  
 HGB 12.9 01/23/2019 08:08 AM  
 HCT 38.6 01/23/2019 08:08 AM  
 PLATELET 934 06/78/2243 08:08 AM  
 MCV 99.0 01/23/2019 08:08 AM  
 ABS. NEUTROPHILS 1.7 (L) 01/23/2019 08:08 AM  
 
Lab Results Component Value Date/Time  Sodium 142 01/23/2019 08:08 AM  
 Potassium 3.6 01/23/2019 08:08 AM  
 Chloride 106 01/23/2019 08:08 AM  
 CO2 26 01/23/2019 08:08 AM  
 Glucose 145 (H) 01/23/2019 08:08 AM  
 BUN 9 01/23/2019 08:08 AM  
 Creatinine 0.93 01/23/2019 08:08 AM  
 GFR est AA >60 01/23/2019 08:08 AM  
 GFR est non-AA >60 01/23/2019 08:08 AM  
 Calcium 8.8 01/23/2019 08:08 AM  
 
Lab Results Component Value Date/Time AST (SGOT) 13 (L) 01/23/2019 08:08 AM  
 Alk. phosphatase 94 01/23/2019 08:08 AM  
 Protein, total 6.1 01/23/2019 08:09 AM  
 Albumin 3.3 (L) 01/23/2019 08:08 AM  
 Globulin 3.2 01/23/2019 08:08 AM  
 A-G Ratio 1.0 (L) 01/23/2019 08:08 AM  
 
Lab Results Component Value Date/Time Vitamin B12 411 12/07/2016 02:58 PM  
  02/16/2017 09:18 AM  
 Beta-2 Microglobulin, serum 1.9 12/26/2018 08:16 AM  
 C-Reactive Protein, Cardiac 1.17 12/07/2016 02:58 PM  
 TSH 1.100 12/07/2016 02:58 PM  
 M-Mika Not Observed 01/23/2019 08:09 AM  
 M-Mika Not Observed 07/10/2018 07:46 AM  
 Hep C Virus Ab <0.1 05/17/2016 09:56 AM  
 
Lab Results Component Value Date/Time INR 1.0 07/27/2010 03:57 PM  
 aPTT 31.5 07/27/2010 03:57 PM  
 
Component Latest Ref Rng & Units 12/26/2018  
 
      8:16 AM  
Free Kappa Lt Chains, serum 3.3 - 19.4 mg/L 8.6 Free Lambda Lt Chains, serum 5.7 - 26.3 mg/L 7.9 Kappa/Lambda ratio, serum 
    0.26 - 1.65   1.09 Bone marrow biopsy reviewed FISH molecular analysis:   
Positive for IgH gene rearrangement (IgH complex FISH study pending); Normal results with 1, 5, 9, 13, 15, and 17 (TP53) probe sets. External records reviewed from [facility] transplant transplant bone marrow biopsy done on 12/19/17 showed no evidence of plasma cells. Variable cellularity from 0-50%, flow LUIS, FISH negative ASSESSMENT Ms. Saroj Townsend is a 59 y.o. female with standard risk multiple myeloma status post 6 cycles of twice daily and autologous stem cell transplant on 10/20/17. She is in a complete remission. We will continue with close surveillance in conjunction with her care team at [facility] Multiple myeloma On revlimid maintenance of 10mg daily following transplant at [facility]. Dropped to 5 mg due to grade 3 neutropenia (kleber 0.4). She has done well with this but has a rash that appears allergic Remains in serologic remission Hold Revlimid for 1 week and call us in 1 week. If the rash is better then likely was due to Revlimid. Repeat beta 2 micoglobulin, SPEP, UPEP, free light chains urine and serum, CBC, CMP, immunofixation every 3 months. 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 Overall stable. DM Per PCP. Neutropenia, chemotherapy induced Resolved Rash Secondary to Revlimid? Hold REvlimid for a week and call us Return to clinic in 3 months with labs prior as above. Signed by: César Ruffin MD 
                   January 30, 2019

## 2019-01-30 NOTE — PROGRESS NOTES
Celine Salazar is a 59 y.o. female here today for follow up, multiple myeloma. Patient has been having \"breakouts\" skin reactions regularly since right before thanksgiving. Continues to have neuropathy discomfort. Has been getting regular vaccinations with stem cell transplant at [facility] 1. Have you been to the ER, urgent care clinic since your last visit? Hospitalized since your last visit[facility], prior to thanksgiving, skin rash 2. Have you seen or consulted any other health care providers outside of the [address] [place] [address] since your last visit? Include any pap smears or colon screening.  PCP

## 2019-02-04 ENCOUNTER — OFFICE VISIT (OUTPATIENT)
Dept: CARDIOLOGY CLINIC | Age: 59
End: 2019-02-04

## 2019-02-04 ENCOUNTER — CLINICAL SUPPORT (OUTPATIENT)
Dept: CARDIOLOGY CLINIC | Age: 59
End: 2019-02-04

## 2019-02-04 VITALS
OXYGEN SATURATION: 98 % | BODY MASS INDEX: 33.29 KG/M2 | HEIGHT: 64 IN | HEART RATE: 64 BPM | SYSTOLIC BLOOD PRESSURE: 114 MMHG | DIASTOLIC BLOOD PRESSURE: 74 MMHG | RESPIRATION RATE: 18 BRPM | WEIGHT: 195 LBS

## 2019-02-04 DIAGNOSIS — I42.9 CARDIOMYOPATHY, UNSPECIFIED TYPE (HCC): ICD-10-CM

## 2019-02-04 DIAGNOSIS — Z95.810 BIVENTRICULAR ICD (IMPLANTABLE CARDIOVERTER-DEFIBRILLATOR) IN PLACE: Primary | ICD-10-CM

## 2019-02-04 DIAGNOSIS — Z45.018 PACEMAKER REPROGRAMMING/CHECK: Primary | ICD-10-CM

## 2019-02-04 DIAGNOSIS — E11.9 CONTROLLED TYPE 2 DIABETES MELLITUS WITHOUT COMPLICATION, WITHOUT LONG-TERM CURRENT USE OF INSULIN (HCC): ICD-10-CM

## 2019-02-04 DIAGNOSIS — Z95.810 BIVENTRICULAR ICD (IMPLANTABLE CARDIOVERTER-DEFIBRILLATOR) IN PLACE: ICD-10-CM

## 2019-02-04 DIAGNOSIS — I10 ESSENTIAL HYPERTENSION: ICD-10-CM

## 2019-02-04 RX ORDER — MAGNESIUM 200 MG
1000 TABLET ORAL DAILY
COMMUNITY
End: 2020-01-14

## 2019-02-04 NOTE — PROGRESS NOTES
Subjective:      Mahesh Goldstein is a 59 y.o. female is here for annual follow up of her BIV ICD. She is doing well without cardiac complaints. The patient denies chest pain/ shortness of breath, orthopnea, PND, LE edema, palpitations, syncope, presyncope or fatigue.        Patient Active Problem List    Diagnosis Date Noted    Chemotherapy induced neutropenia ([address] 07/25/2018    Controlled type 2 diabetes mellitus without complication ([address] 72/13/6022    Status post bone transplant 01/15/2018    Type 2 diabetes mellitus with nephropathy ([address] 01/11/2018    Type 2 diabetes mellitus with diabetic neuropathy ([address] 01/11/2018    Multiple myeloma ([address]     Neuropathy 04/21/2017    Rash 04/21/2017    Controlled type 2 diabetes mellitus without complication, without long-term current use of insulin ([address] 03/31/2017    Multiple myeloma not having achieved remission ([address] 03/06/2017    Diabetes mellitus type 2, controlled ([address] 12/10/2015    Essential hypertension 11/17/2015    Hyperlipidemia     AICD (automatic cardioverter/defibrillator) present 09/19/2013    Chronic systolic heart failure ([address] 04/09/2012    Obesity, unspecified 04/09/2012    Other left bundle branch block 04/09/2012    Cardiomyopathy (HCC)--resolved 03/28/2011    Vitamin B12 deficiency 03/31/2010      Misha An MD  Past Medical History:   Diagnosis Date    Cardiomyopathy     Diabetes mellitus type 2, controlled ([address] 12/10/2015    Heart failure (HCC)     Hyperlipidemia     Hypertension     controlled    Multiple myeloma ([address]     Orthostatic hypotension     Secondary cardiomyopathy, unspecified     Sinoatrial node dysfunction ([address]     Vitamin B12 deficiency 3/31/2010      Past Surgical History:   Procedure Laterality Date    HX HYSTERECTOMY       Allergies   Allergen Reactions    Ace Inhibitors Cough    Compazine [Prochlorperazine] Nausea Only     rash      Family History   Problem Relation Age of Onset    Cancer Mother     Heart Disease Mother         pacemaker    Diabetes Mother    Fox Seth Mother     Hypertension Sister     Hypertension Brother     Diabetes Brother     Hypertension Sister     Hypertension Sister     Hypertension Sister     Hypertension Sister     Diabetes Sister     negative for cardiac disease  Social History     Socioeconomic History    Marital status:      Spouse name: Not on file    Number of children: Not on file    Years of education: Not on file    Highest education level: Not on file   Tobacco Use    Smoking status: Never Smoker    Smokeless tobacco: Never Used   Substance and Sexual Activity    Alcohol use: No    Drug use: No    Sexual activity: Yes     Partners: Male   Social History Narrative    , 2 children, 29 and 32. Works at [employer], for 35 years. 5 grandchildren     Current Outpatient Medications   Medication Sig    cyanocobalamin (VITAMIN B-12) 1,000 mcg sublingual tablet Take 1,000 mcg by mouth daily.  lenalidomide (REVLIMID) 5 mg cap Take 5 mg by mouth daily.  atorvastatin (LIPITOR) 40 mg tablet TAKE 1 TAB BY MOUTH DAILY. FOR CHOLESTEROL    amLODIPine (NORVASC) 5 mg tablet Take 1 Tab by mouth daily. For blood pressure    Biotin 2,500 mcg cap Take  by mouth daily.  gabapentin (NEURONTIN) 300 mg capsule Take 1 Cap by mouth three (3) times daily.  glimepiride (AMARYL) 1 mg tablet Take 1 Tab by mouth Daily (before breakfast). For diabetes    carvedilol (COREG) 25 mg tablet two (2) times a day.  loperamide (IMMODIUM) 2 mg tablet Take 2 mg by mouth as needed for Diarrhea. No current facility-administered medications for this visit.        Vitals:    02/04/19 1343   BP: 114/74   Pulse: 64   Resp: 18   SpO2: 98%   Weight: 195 lb (88.5 kg)   Height: 5' 4\" (1.626 m)       I have reviewed the nurses notes, vitals, problem list, allergy list, medical history, family, social history and medications. Review of Symptoms:    General: Pt denies excessive weight gain or loss. Pt is able to conduct ADL's  HEENT: Denies blurred vision, headaches, epistaxis and difficulty swallowing. Respiratory: Denies shortness of breath, BARRERA, wheezing or stridor. Cardiovascular: Denies precordial pain, palpitations, edema or PND  Gastrointestinal: Denies poor appetite, indigestion, abdominal pain or blood in stool  Urinary: Denies dysuria, pyuria  Musculoskeletal: Denies pain or swelling from muscles or joints  Neurologic: Denies tremor, paresthesias, or sensory motor disturbance  Skin: Denies rash, itching or texture change. Psych: Denies depression      Physical Exam:      General: Well developed, in no acute distress. HEENT: Eyes - PERRL, no jvd  Heart:  Normal S1/S2 negative S3 or S4. Regular, no murmur, gallop or rub.   Respiratory: Clear bilaterally x 4, no wheezing or rales  Abdomen:   Soft, non-tender, bowel sounds are active.   Extremities:  No edema, normal cap refill, no cyanosis. Musculoskeletal: No clubbing  Neuro: A&Ox3, speech clear, gait stable. Skin: Skin color is normal. No rashes or lesions.  Non diaphoretic  Vascular: 2+ pulses symmetric in all extremities    Cardiographics    Ekg: v pacing    Results for orders placed or performed during the hospital encounter of 08/06/10   EKG, 12 LEAD, INITIAL   Result Value Ref Range    Ventricular Rate 60 BPM    Atrial Rate 60 BPM    P-R Interval 92 ms    QRS Duration 146 ms    Q-T Interval 530 ms    QTC Calculation (Bezet) 530 ms    Calculated R Axis -103 degrees    Calculated T Axis 58 degrees    Diagnosis       AV sequential or dual chamber electronic pacemaker  No previous ECGs available  Confirmed by Jo Gaxiola ([id number]) on 8/7/2010 9:11:48 PM   Results for orders placed or performed in visit on 03/31/10   AMB POC EKG ROUTINE W/ 12 LEADS, INTER & REP    Narrative    LBBB, left ventricular hypertrophy         Lab Results   Component Value Date/Time    WBC 4.2 01/23/2019 08:08 AM    HGB 12.9 01/23/2019 08:08 AM    HCT 38.6 01/23/2019 08:08 AM    PLATELET 589 98/31/2700 08:08 AM    MCV 99.0 01/23/2019 08:08 AM      Lab Results   Component Value Date/Time    Sodium 142 01/23/2019 08:08 AM    Potassium 3.6 01/23/2019 08:08 AM    Chloride 106 01/23/2019 08:08 AM    CO2 26 01/23/2019 08:08 AM    Anion gap 10 01/23/2019 08:08 AM    Glucose 145 (H) 01/23/2019 08:08 AM    BUN 9 01/23/2019 08:08 AM    Creatinine 0.93 01/23/2019 08:08 AM    BUN/Creatinine ratio 10 (L) 01/23/2019 08:08 AM    GFR est AA >60 01/23/2019 08:08 AM    GFR est non-AA >60 01/23/2019 08:08 AM    Calcium 8.8 01/23/2019 08:08 AM    Bilirubin, total 0.9 01/23/2019 08:08 AM    AST (SGOT) 13 (L) 01/23/2019 08:08 AM    Alk. phosphatase 94 01/23/2019 08:08 AM    Protein, total 6.1 01/23/2019 08:09 AM    Albumin 3.3 (L) 01/23/2019 08:08 AM    Globulin 3.2 01/23/2019 08:08 AM    A-G Ratio 1.0 (L) 01/23/2019 08:08 AM    ALT (SGPT) 22 01/23/2019 08:08 AM         Assessment:     Assessment:        ICD-10-CM ICD-9-CM    1. Pacemaker reprogramming/check Z45.018 V53.31 AMB POC EKG ROUTINE W/ 12 LEADS, INTER & REP   2. Cardiomyopathy, unspecified type ([address] I42.9 425.4    3. Controlled type 2 diabetes mellitus without complication, without long-term current use of insulin (HCC) E11.9 250.00    4. Essential hypertension I10 401.9    5. Biventricular ICD (implantable cardioverter-defibrillator) in place Z95.810 V45.02      Orders Placed This Encounter    AMB POC EKG ROUTINE W/ 12 LEADS, INTER & REP     Order Specific Question:   Reason for Exam:     Answer:   routine    cyanocobalamin (VITAMIN B-12) 1,000 mcg sublingual tablet     Sig: Take 1,000 mcg by mouth daily. Plan:   Ms. Brianne Nova is here for annual BIV ICD follow up. She is doing well and denies cardiac complaints. Sees Dr. Jerome Stanley annually.  EKG today shows ventricular pacing and her device interrogation demonstrates 100% BIV pacing without events. Will continue current medical therapy and follow up in one year. Continue medical management for HTN, DM. Thank you for allowing me to participate in Mahesh Goldstein 's care.     Claudeen Coach, NP

## 2019-02-04 NOTE — PROGRESS NOTES
1. Have you been to the ER, urgent care clinic since your last visit? Hospitalized since your last visit? No    2. Have you seen or consulted any other health care providers outside of the [address] [place] [address] since your last visit? Include any pap smears or colon screening. No      Chief Complaint   Patient presents with    Cardiomyopathy     Denied cardiac symptoms.

## 2019-02-08 ENCOUNTER — TELEPHONE (OUTPATIENT)
Dept: ONCOLOGY | Age: 59
End: 2019-02-08

## 2019-02-08 NOTE — TELEPHONE ENCOUNTER
Call to patient, HIPAA verified. Has noted some improvement in rash \"break outs\" off of Revlimid. Continues to have some itchy bumps however not widespread rash  Or patch like before. Advised would update provider and advise regarding restart of drug.

## 2019-02-08 NOTE — TELEPHONE ENCOUNTER
Patient called and would like a callback to give a follow up on how she been doing since starting her medication.     [phone]

## 2019-02-08 NOTE — TELEPHONE ENCOUNTER
Discussed with Dr. Judah Arellano, call to patient, HIPAA verified. Advised that it is ok to start Revlimid as discussed in office visit. If rash progresses or gets worse, return call to office with update on status/symptoms. Patient verbalized understanding and thanked for call.

## 2019-02-11 ENCOUNTER — DOCUMENTATION ONLY (OUTPATIENT)
Dept: ONCOLOGY | Age: 59
End: 2019-02-11

## 2019-02-13 NOTE — TELEPHONE ENCOUNTER
[facility] calling reference pateint's Revlimid. Advising they need Runa Auth for the month.   Call back with auth number and reference case # [id number]

## 2019-02-27 ENCOUNTER — HOSPITAL ENCOUNTER (OUTPATIENT)
Dept: INFUSION THERAPY | Age: 59
Discharge: HOME | End: 2019-02-27
Payer: COMMERCIAL

## 2019-02-27 VITALS
OXYGEN SATURATION: 100 % | TEMPERATURE: 97.7 F | HEART RATE: 80 BPM | SYSTOLIC BLOOD PRESSURE: 123 MMHG | RESPIRATION RATE: 18 BRPM | DIASTOLIC BLOOD PRESSURE: 68 MMHG

## 2019-02-27 LAB
ALBUMIN SERPL-MCNC: 3.3 G/DL (ref 3.5–5)
ALBUMIN/GLOB SERPL: 1 {RATIO} (ref 1.1–2.2)
ALP SERPL-CCNC: 101 U/L (ref 45–117)
ALT SERPL-CCNC: 18 U/L (ref 12–78)
ANION GAP SERPL CALC-SCNC: 9 MMOL/L (ref 5–15)
AST SERPL-CCNC: 13 U/L (ref 15–37)
BASOPHILS # BLD: 0 K/UL (ref 0–0.1)
BASOPHILS NFR BLD: 1 % (ref 0–1)
BILIRUB SERPL-MCNC: 1.1 MG/DL (ref 0.2–1)
BUN SERPL-MCNC: 9 MG/DL (ref 6–20)
BUN/CREAT SERPL: 8 (ref 12–20)
CALCIUM SERPL-MCNC: 7.8 MG/DL (ref 8.5–10.1)
CHLORIDE SERPL-SCNC: 107 MMOL/L (ref 97–108)
CO2 SERPL-SCNC: 28 MMOL/L (ref 21–32)
CREAT SERPL-MCNC: 1.07 MG/DL (ref 0.55–1.02)
DIFFERENTIAL METHOD BLD: ABNORMAL
EOSINOPHIL # BLD: 0.2 K/UL (ref 0–0.4)
EOSINOPHIL NFR BLD: 5 % (ref 0–7)
ERYTHROCYTE [DISTWIDTH] IN BLOOD BY AUTOMATED COUNT: 14.8 % (ref 11.5–14.5)
GLOBULIN SER CALC-MCNC: 3.4 G/DL (ref 2–4)
GLUCOSE SERPL-MCNC: 139 MG/DL (ref 65–100)
HCT VFR BLD AUTO: 37.3 % (ref 35–47)
HGB BLD-MCNC: 12.7 G/DL (ref 11.5–16)
IGA SERPL-MCNC: 46 MG/DL (ref 70–400)
IGG SERPL-MCNC: 775 MG/DL (ref 700–1600)
IGM SERPL-MCNC: <21 MG/DL (ref 40–230)
IMM GRANULOCYTES # BLD AUTO: 0 K/UL (ref 0–0.04)
IMM GRANULOCYTES NFR BLD AUTO: 0 % (ref 0–0.5)
LYMPHOCYTES # BLD: 1.5 K/UL (ref 0.8–3.5)
LYMPHOCYTES NFR BLD: 35 % (ref 12–49)
MCH RBC QN AUTO: 33.3 PG (ref 26–34)
MCHC RBC AUTO-ENTMCNC: 34 G/DL (ref 30–36.5)
MCV RBC AUTO: 97.9 FL (ref 80–99)
MONOCYTES # BLD: 0.6 K/UL (ref 0–1)
MONOCYTES NFR BLD: 13 % (ref 5–13)
NEUTS SEG # BLD: 1.9 K/UL (ref 1.8–8)
NEUTS SEG NFR BLD: 45 % (ref 32–75)
NRBC # BLD: 0 K/UL (ref 0–0.01)
NRBC BLD-RTO: 0 PER 100 WBC
PLATELET # BLD AUTO: 182 K/UL (ref 150–400)
PMV BLD AUTO: 9.2 FL (ref 8.9–12.9)
POTASSIUM SERPL-SCNC: 3.3 MMOL/L (ref 3.5–5.1)
PROT SERPL-MCNC: 6.7 G/DL (ref 6.4–8.2)
RBC # BLD AUTO: 3.81 M/UL (ref 3.8–5.2)
SODIUM SERPL-SCNC: 144 MMOL/L (ref 136–145)
WBC # BLD AUTO: 4.3 K/UL (ref 3.6–11)

## 2019-02-27 PROCEDURE — 82784 ASSAY IGA/IGD/IGG/IGM EACH: CPT

## 2019-02-27 PROCEDURE — 80053 COMPREHEN METABOLIC PANEL: CPT

## 2019-02-27 PROCEDURE — 83883 ASSAY NEPHELOMETRY NOT SPEC: CPT

## 2019-02-27 PROCEDURE — 36415 COLL VENOUS BLD VENIPUNCTURE: CPT

## 2019-02-27 PROCEDURE — 85025 COMPLETE CBC W/AUTO DIFF WBC: CPT

## 2019-02-27 PROCEDURE — 84165 PROTEIN E-PHORESIS SERUM: CPT

## 2019-02-27 NOTE — PROGRESS NOTES
0800:  Pt arrived ambulatory and in no acute distress for labs. Labs drawn peripherally without difficulty. Patient Vitals for the past 12 hrs: 
 Temp Pulse Resp BP SpO2  
02/27/19 0805 97.7 °F (36.5 °C) 80 18 123/68 100 % Recent Results (from the past 12 hour(s)) CBC WITH AUTOMATED DIFF Collection Time: 02/27/19  8:09 AM  
Result Value Ref Range WBC 4.3 3.6 - 11.0 K/uL  
 RBC 3.81 3.80 - 5.20 M/uL  
 HGB 12.7 11.5 - 16.0 g/dL HCT 37.3 35.0 - 47.0 % MCV 97.9 80.0 - 99.0 FL  
 MCH 33.3 26.0 - 34.0 PG  
 MCHC 34.0 30.0 - 36.5 g/dL  
 RDW 14.8 (H) 11.5 - 14.5 % PLATELET 107 359 - 252 K/uL MPV 9.2 8.9 - 12.9 FL  
 NRBC 0.0 0  WBC ABSOLUTE NRBC 0.00 0.00 - 0.01 K/uL NEUTROPHILS 45 32 - 75 % LYMPHOCYTES 35 12 - 49 % MONOCYTES 13 5 - 13 % EOSINOPHILS 5 0 - 7 % BASOPHILS 1 0 - 1 % IMMATURE GRANULOCYTES 0 0.0 - 0.5 % ABS. NEUTROPHILS 1.9 1.8 - 8.0 K/UL  
 ABS. LYMPHOCYTES 1.5 0.8 - 3.5 K/UL  
 ABS. MONOCYTES 0.6 0.0 - 1.0 K/UL  
 ABS. EOSINOPHILS 0.2 0.0 - 0.4 K/UL  
 ABS. BASOPHILS 0.0 0.0 - 0.1 K/UL  
 ABS. IMM. GRANS. 0.0 0.00 - 0.04 K/UL  
 DF AUTOMATED METABOLIC PANEL, COMPREHENSIVE Collection Time: 02/27/19  8:09 AM  
Result Value Ref Range Sodium 144 136 - 145 mmol/L Potassium 3.3 (L) 3.5 - 5.1 mmol/L Chloride 107 97 - 108 mmol/L  
 CO2 28 21 - 32 mmol/L Anion gap 9 5 - 15 mmol/L Glucose 139 (H) 65 - 100 mg/dL BUN 9 6 - 20 MG/DL Creatinine 1.07 (H) 0.55 - 1.02 MG/DL  
 BUN/Creatinine ratio 8 (L) 12 - 20 GFR est AA >60 >60 ml/min/1.73m2 GFR est non-AA 52 (L) >60 ml/min/1.73m2 Calcium 7.8 (L) 8.5 - 10.1 MG/DL Bilirubin, total 1.1 (H) 0.2 - 1.0 MG/DL  
 ALT (SGPT) 18 12 - 78 U/L  
 AST (SGOT) 13 (L) 15 - 37 U/L Alk. phosphatase 101 45 - 117 U/L Protein, total 6.7 6.4 - 8.2 g/dL Albumin 3.3 (L) 3.5 - 5.0 g/dL Globulin 3.4 2.0 - 4.0 g/dL A-G Ratio 1.0 (L) 1.1 - 2.2 IMMUNOGLOBULINS, G/A/M, QT. Collection Time: 02/27/19  8:09 AM  
Result Value Ref Range Immunoglobulin G 775 700 - 1,600 mg/dL Immunoglobulin A 46 (L) 70 - 400 mg/dL Immunoglobulin M <21 (L) 40 - 230 mg/dL [address] Departed [facility] ambulatory and in no acute distress. Future Appointments Date Time Provider Rasheed Teague 3/21/2019  9:30 AM Mitchell Cuadra MD [address]  
3/27/2019  8:00 AM [facility] INFUSION NURSE 1 [facility]  
5/2/2019 10:00 AM Low Short MD [id number]  
5/8/2019  8:00 AM REMOTE_[facility] SCHED  
6/18/2019  9:15 AM Chi Leavitt MD [facility] SCHED  
3/5/2020  1:30 PM PACEMAKER, [facility] [address] [facility]  
3/5/2020  1:40 PM Ki Ferrer NP [address] [facility]

## 2019-02-28 LAB
ALBUMIN SERPL ELPH-MCNC: 3.5 G/DL (ref 2.9–4.4)
ALBUMIN/GLOB SERPL: 1.2 {RATIO} (ref 0.7–1.7)
ALPHA1 GLOB SERPL ELPH-MCNC: 0.2 G/DL (ref 0–0.4)
ALPHA2 GLOB SERPL ELPH-MCNC: 0.9 G/DL (ref 0.4–1)
B-GLOBULIN SERPL ELPH-MCNC: 1 G/DL (ref 0.7–1.3)
GAMMA GLOB SERPL ELPH-MCNC: 0.8 G/DL (ref 0.4–1.8)
GLOBULIN SER CALC-MCNC: 3 G/DL (ref 2.2–3.9)
KAPPA LC FREE SER-MCNC: 11.6 MG/L (ref 3.3–19.4)
KAPPA LC FREE/LAMBDA FREE SER: 1.22 {RATIO} (ref 0.26–1.65)
LAMBDA LC FREE SERPL-MCNC: 9.5 MG/L (ref 5.7–26.3)
M PROTEIN SERPL ELPH-MCNC: NORMAL G/DL
PROT SERPL-MCNC: 6.5 G/DL (ref 6–8.5)

## 2019-03-01 LAB
IGA SERPL-MCNC: 41 MG/DL (ref 87–352)
IGG SERPL-MCNC: 789 MG/DL (ref 700–1600)
IGM SERPL-MCNC: 11 MG/DL (ref 26–217)
PROT PATTERN SERPL IFE-IMP: ABNORMAL

## 2019-03-21 ENCOUNTER — OFFICE VISIT (OUTPATIENT)
Dept: FAMILY MEDICINE CLINIC | Age: 59
End: 2019-03-21

## 2019-03-21 VITALS
HEART RATE: 75 BPM | DIASTOLIC BLOOD PRESSURE: 65 MMHG | BODY MASS INDEX: 33.63 KG/M2 | OXYGEN SATURATION: 96 % | SYSTOLIC BLOOD PRESSURE: 114 MMHG | HEIGHT: 64 IN | WEIGHT: 197 LBS | RESPIRATION RATE: 14 BRPM | TEMPERATURE: 96.5 F

## 2019-03-21 DIAGNOSIS — E78.00 HYPERCHOLESTEROLEMIA: ICD-10-CM

## 2019-03-21 DIAGNOSIS — E11.9 CONTROLLED TYPE 2 DIABETES MELLITUS WITHOUT COMPLICATION, WITHOUT LONG-TERM CURRENT USE OF INSULIN (HCC): Primary | ICD-10-CM

## 2019-03-21 LAB — HBA1C MFR BLD HPLC: 6.7 %

## 2019-03-21 RX ORDER — AMLODIPINE BESYLATE 5 MG/1
5 TABLET ORAL DAILY
Qty: 30 TAB | Refills: 12 | Status: SHIPPED | OUTPATIENT
Start: 2019-03-21 | End: 2020-05-15

## 2019-03-21 RX ORDER — CARVEDILOL 25 MG/1
25 TABLET ORAL 2 TIMES DAILY
Qty: 60 TAB | Refills: 12 | Status: SHIPPED | OUTPATIENT
Start: 2019-03-21 | End: 2020-04-17

## 2019-03-21 RX ORDER — GLIMEPIRIDE 1 MG/1
1 TABLET ORAL
Qty: 30 TAB | Refills: 12 | Status: SHIPPED | OUTPATIENT
Start: 2019-03-21 | End: 2020-03-23

## 2019-03-21 NOTE — PROGRESS NOTES
HISTORY OF PRESENT ILLNESS Wendi Briseno is a 59 y.o. female. HPI Pt. Comes in for blood pressure and cholesterol check. No complaints of chest pain, shortness of breath, TIAs, claudication or edema. Needs diabetes checked also. Getting over a cold. Currently taking Revlimid for myeloma and is sp transplant. Some neuropathy in both toes. ROS Physical Exam  
Constitutional: She appears well-developed and well-nourished. HENT:  
Right Ear: External ear normal.  
Left Ear: External ear normal.  
Mouth/Throat: Oropharynx is clear and moist.  
Neck: No thyromegaly present. Cardiovascular: Normal rate, regular rhythm, normal heart sounds and intact distal pulses. Pulmonary/Chest: Breath sounds normal. She has no wheezes. Abdominal: Soft. Bowel sounds are normal. She exhibits no distension and no mass. There is no tenderness. Musculoskeletal: She exhibits no edema. Lymphadenopathy:  
  She has no cervical adenopathy. Nursing note and vitals reviewed. ASSESSMENT and PLAN Orders Placed This Encounter  LIPID PANEL  
 AMB POC HEMOGLOBIN A1C  
 glimepiride (AMARYL) 1 mg tablet  amLODIPine (NORVASC) 5 mg tablet  carvedilol (COREG) 25 mg tablet Diagnoses and all orders for this visit: 1. Controlled type 2 diabetes mellitus without complication, without long-term current use of insulin ([address] -     AMB POC HEMOGLOBIN A1C 2. Hypercholesterolemia -     LIPID PANEL Other orders 
-     glimepiride (AMARYL) 1 mg tablet; Take 1 Tab by mouth Daily (before breakfast). For diabetes 
-     amLODIPine (NORVASC) 5 mg tablet; Take 1 Tab by mouth daily. For blood pressure -     carvedilol (COREG) 25 mg tablet; Take 1 Tab by mouth two (2) times a day. Follow-up and Dispositions · Return in about 6 months (around 9/21/2019).

## 2019-03-21 NOTE — PROGRESS NOTES
Chief Complaint Patient presents with  Cholesterol Problem  Hypertension  Diabetes 1. Have you been to the ER, urgent care clinic since your last visit? Hospitalized since your last visit? No 
 
2. Have you seen or consulted any other health care providers outside of the [address] [place] [address] since your last visit? Include any pap smears or colon screening. No  
 
Health Maintenance Due Topic Date Due  Pneumococcal 0-64 years (1 of 3 - PCV13) 09/20/1964  COLONOSCOPY  09/20/1976  Shingrix Vaccine Age 50> (1 of 2) 09/20/2008  PAP AKA CERVICAL CYTOLOGY  04/02/2015  
 HEMOGLOBIN A1C Q6M  03/21/2019  BREAST CANCER SCRN MAMMOGRAM  06/12/2019

## 2019-03-22 LAB
CHOLEST SERPL-MCNC: 150 MG/DL (ref 100–199)
HDLC SERPL-MCNC: 60 MG/DL
INTERPRETATION, 910389: NORMAL
LDLC SERPL CALC-MCNC: 75 MG/DL (ref 0–99)
TRIGL SERPL-MCNC: 75 MG/DL (ref 0–149)
VLDLC SERPL CALC-MCNC: 15 MG/DL (ref 5–40)

## 2019-03-27 ENCOUNTER — HOSPITAL ENCOUNTER (OUTPATIENT)
Dept: INFUSION THERAPY | Age: 59
Discharge: HOME | End: 2019-03-27
Payer: COMMERCIAL

## 2019-03-27 VITALS
OXYGEN SATURATION: 100 % | HEART RATE: 78 BPM | RESPIRATION RATE: 18 BRPM | SYSTOLIC BLOOD PRESSURE: 128 MMHG | TEMPERATURE: 98.1 F | DIASTOLIC BLOOD PRESSURE: 56 MMHG

## 2019-03-27 LAB
ALBUMIN SERPL-MCNC: 3.2 G/DL (ref 3.5–5)
ALBUMIN/GLOB SERPL: 1.1 {RATIO} (ref 1.1–2.2)
ALP SERPL-CCNC: 99 U/L (ref 45–117)
ALT SERPL-CCNC: 21 U/L (ref 12–78)
ANION GAP SERPL CALC-SCNC: 12 MMOL/L (ref 5–15)
AST SERPL-CCNC: 11 U/L (ref 15–37)
BASOPHILS # BLD: 0 K/UL (ref 0–0.1)
BASOPHILS NFR BLD: 1 % (ref 0–1)
BILIRUB SERPL-MCNC: 0.7 MG/DL (ref 0.2–1)
BUN SERPL-MCNC: 11 MG/DL (ref 6–20)
BUN/CREAT SERPL: 12 (ref 12–20)
CALCIUM SERPL-MCNC: 8.6 MG/DL (ref 8.5–10.1)
CHLORIDE SERPL-SCNC: 107 MMOL/L (ref 97–108)
CO2 SERPL-SCNC: 24 MMOL/L (ref 21–32)
CREAT SERPL-MCNC: 0.94 MG/DL (ref 0.55–1.02)
DIFFERENTIAL METHOD BLD: ABNORMAL
EOSINOPHIL # BLD: 0.3 K/UL (ref 0–0.4)
EOSINOPHIL NFR BLD: 8 % (ref 0–7)
ERYTHROCYTE [DISTWIDTH] IN BLOOD BY AUTOMATED COUNT: 15 % (ref 11.5–14.5)
GLOBULIN SER CALC-MCNC: 3 G/DL (ref 2–4)
GLUCOSE SERPL-MCNC: 144 MG/DL (ref 65–100)
HCT VFR BLD AUTO: 36 % (ref 35–47)
HGB BLD-MCNC: 12.3 G/DL (ref 11.5–16)
IGA SERPL-MCNC: 51 MG/DL (ref 70–400)
IGG SERPL-MCNC: 832 MG/DL (ref 700–1600)
IGM SERPL-MCNC: <21 MG/DL (ref 40–230)
IMM GRANULOCYTES # BLD AUTO: 0 K/UL (ref 0–0.04)
IMM GRANULOCYTES NFR BLD AUTO: 0 % (ref 0–0.5)
LYMPHOCYTES # BLD: 1.9 K/UL (ref 0.8–3.5)
LYMPHOCYTES NFR BLD: 45 % (ref 12–49)
MCH RBC QN AUTO: 33.7 PG (ref 26–34)
MCHC RBC AUTO-ENTMCNC: 34.2 G/DL (ref 30–36.5)
MCV RBC AUTO: 98.6 FL (ref 80–99)
MONOCYTES # BLD: 0.4 K/UL (ref 0–1)
MONOCYTES NFR BLD: 11 % (ref 5–13)
NEUTS SEG # BLD: 1.4 K/UL (ref 1.8–8)
NEUTS SEG NFR BLD: 35 % (ref 32–75)
NRBC # BLD: 0 K/UL (ref 0–0.01)
NRBC BLD-RTO: 0 PER 100 WBC
PLATELET # BLD AUTO: 198 K/UL (ref 150–400)
PMV BLD AUTO: 9.1 FL (ref 8.9–12.9)
POTASSIUM SERPL-SCNC: 3.5 MMOL/L (ref 3.5–5.1)
PROT SERPL-MCNC: 6.2 G/DL (ref 6.4–8.2)
RBC # BLD AUTO: 3.65 M/UL (ref 3.8–5.2)
SODIUM SERPL-SCNC: 143 MMOL/L (ref 136–145)
WBC # BLD AUTO: 4.1 K/UL (ref 3.6–11)

## 2019-03-27 PROCEDURE — 82232 ASSAY OF BETA-2 PROTEIN: CPT

## 2019-03-27 PROCEDURE — 84165 PROTEIN E-PHORESIS SERUM: CPT

## 2019-03-27 PROCEDURE — 80053 COMPREHEN METABOLIC PANEL: CPT

## 2019-03-27 PROCEDURE — 85025 COMPLETE CBC W/AUTO DIFF WBC: CPT

## 2019-03-27 PROCEDURE — 36415 COLL VENOUS BLD VENIPUNCTURE: CPT

## 2019-03-27 PROCEDURE — 83883 ASSAY NEPHELOMETRY NOT SPEC: CPT

## 2019-03-27 PROCEDURE — 82784 ASSAY IGA/IGD/IGG/IGM EACH: CPT

## 2019-03-27 NOTE — PROGRESS NOTES
0800:  Pt arrived ambulatory and in no acute distress, labs drawn peripherally without difficulty. Patient Vitals for the past 12 hrs: 
 Temp Pulse Resp BP SpO2  
03/27/19 0815 98.1 °F (36.7 °C) 78 18 128/56 100 % Recent Results (from the past 12 hour(s)) CBC WITH AUTOMATED DIFF Collection Time: 03/27/19  8:07 AM  
Result Value Ref Range WBC 4.1 3.6 - 11.0 K/uL  
 RBC 3.65 (L) 3.80 - 5.20 M/uL  
 HGB 12.3 11.5 - 16.0 g/dL HCT 36.0 35.0 - 47.0 % MCV 98.6 80.0 - 99.0 FL  
 MCH 33.7 26.0 - 34.0 PG  
 MCHC 34.2 30.0 - 36.5 g/dL  
 RDW 15.0 (H) 11.5 - 14.5 % PLATELET 885 543 - 513 K/uL MPV 9.1 8.9 - 12.9 FL  
 NRBC 0.0 0  WBC ABSOLUTE NRBC 0.00 0.00 - 0.01 K/uL NEUTROPHILS 35 32 - 75 % LYMPHOCYTES 45 12 - 49 % MONOCYTES 11 5 - 13 % EOSINOPHILS 8 (H) 0 - 7 % BASOPHILS 1 0 - 1 % IMMATURE GRANULOCYTES 0 0.0 - 0.5 % ABS. NEUTROPHILS 1.4 (L) 1.8 - 8.0 K/UL  
 ABS. LYMPHOCYTES 1.9 0.8 - 3.5 K/UL  
 ABS. MONOCYTES 0.4 0.0 - 1.0 K/UL  
 ABS. EOSINOPHILS 0.3 0.0 - 0.4 K/UL  
 ABS. BASOPHILS 0.0 0.0 - 0.1 K/UL  
 ABS. IMM. GRANS. 0.0 0.00 - 0.04 K/UL  
 DF AUTOMATED    
 
 
0815 Departed [facility] ambulatory and in no acute distress. Pt aware of next appointment. Future Appointments Date Time Provider Rasheed Teague 4/24/2019  8:00  [facility] INFUSION NURSE 1 [address] [facility]  
5/2/2019 10:00 AM Nemo Short MD [id number]  
5/8/2019  8:00 AM REMOTE_[facility]  
5/22/2019  8:00  [facility] INFUSION NURSE 1 [facility]  
6/18/2019  9:15 AM Enmanuel Cullen MD [address]  
9/23/2019  8:30 AM Magdaleno Mcgowan MD [address]  
3/5/2020  1:30 PM [facility]AKER, [address] [facility]  
3/5/2020  1:40 PM Mohsen Wilde NP 1930 [facility],[address]

## 2019-03-28 LAB
ALBUMIN SERPL ELPH-MCNC: 3.5 G/DL (ref 2.9–4.4)
ALBUMIN/GLOB SERPL: 1.5 {RATIO} (ref 0.7–1.7)
ALPHA1 GLOB SERPL ELPH-MCNC: 0.2 G/DL (ref 0–0.4)
ALPHA2 GLOB SERPL ELPH-MCNC: 0.7 G/DL (ref 0.4–1)
B-GLOBULIN SERPL ELPH-MCNC: 0.9 G/DL (ref 0.7–1.3)
B2 MICROGLOB SERPL-MCNC: 1.4 MG/L (ref 0.6–2.4)
GAMMA GLOB SERPL ELPH-MCNC: 0.7 G/DL (ref 0.4–1.8)
GLOBULIN SER CALC-MCNC: 2.4 G/DL (ref 2.2–3.9)
KAPPA LC FREE SER-MCNC: 13.9 MG/L (ref 3.3–19.4)
KAPPA LC FREE/LAMBDA FREE SER: 1.19 {RATIO} (ref 0.26–1.65)
LAMBDA LC FREE SERPL-MCNC: 11.7 MG/L (ref 5.7–26.3)
M PROTEIN SERPL ELPH-MCNC: ABNORMAL G/DL
PROT SERPL-MCNC: 5.9 G/DL (ref 6–8.5)

## 2019-04-01 DIAGNOSIS — C90.01 MULTIPLE MYELOMA IN REMISSION (HCC): Primary | ICD-10-CM

## 2019-04-01 RX ORDER — LENALIDOMIDE 5 MG/1
5 CAPSULE ORAL DAILY
Qty: 30 CAP | Refills: 3 | Status: SHIPPED | OUTPATIENT
Start: 2019-04-01 | End: 2019-05-03 | Stop reason: SDUPTHER

## 2019-04-02 LAB
IGA SERPL-MCNC: 48 MG/DL (ref 87–352)
IGG SERPL-MCNC: 854 MG/DL (ref 700–1600)
IGM SERPL-MCNC: 14 MG/DL (ref 26–217)
PROT PATTERN SERPL IFE-IMP: ABNORMAL

## 2019-04-04 ENCOUNTER — TELEPHONE (OUTPATIENT)
Dept: ONCOLOGY | Age: 59
End: 2019-04-04

## 2019-04-04 NOTE — TELEPHONE ENCOUNTER
Call to [facility] [address], they required clarification of prescription for patients Revlimid. Advised patient continued to take 5mg daily when taking Revlimid. Thanked for assistance. Per pharmacist, Marielle Us, patients prior authorization has  and this has delayed shipment of drug. Advised our office has not received any correspondence regarding this. They will send over PA information, also provided with number to contact to start [phone], once they have received paid claim they will ship patients medication.

## 2019-04-04 NOTE — TELEPHONE ENCOUNTER
Patient called and stated that she spoke with her pharmacy about her revlimid 5mg prescription and she was told that they have tried to reach out to Dr. Akshat Lea team twice this week to verify her dosage and speak with a nurse.     Call back pharmacy [phone]    Patient [phone]

## 2019-04-24 ENCOUNTER — HOSPITAL ENCOUNTER (OUTPATIENT)
Dept: INFUSION THERAPY | Age: 59
Discharge: HOME | End: 2019-04-24
Payer: COMMERCIAL

## 2019-04-24 VITALS
DIASTOLIC BLOOD PRESSURE: 63 MMHG | RESPIRATION RATE: 18 BRPM | SYSTOLIC BLOOD PRESSURE: 141 MMHG | HEART RATE: 74 BPM | OXYGEN SATURATION: 100 % | TEMPERATURE: 97.4 F

## 2019-04-24 LAB
ALBUMIN SERPL-MCNC: 3.4 G/DL (ref 3.5–5)
ALBUMIN/GLOB SERPL: 1 {RATIO} (ref 1.1–2.2)
ALP SERPL-CCNC: 102 U/L (ref 45–117)
ALT SERPL-CCNC: 28 U/L (ref 12–78)
ANION GAP SERPL CALC-SCNC: 11 MMOL/L (ref 5–15)
AST SERPL-CCNC: 19 U/L (ref 15–37)
BASOPHILS # BLD: 0 K/UL (ref 0–0.1)
BASOPHILS NFR BLD: 1 % (ref 0–1)
BILIRUB SERPL-MCNC: 1 MG/DL (ref 0.2–1)
BUN SERPL-MCNC: 8 MG/DL (ref 6–20)
BUN/CREAT SERPL: 8 (ref 12–20)
CALCIUM SERPL-MCNC: 8.6 MG/DL (ref 8.5–10.1)
CHLORIDE SERPL-SCNC: 108 MMOL/L (ref 97–108)
CO2 SERPL-SCNC: 26 MMOL/L (ref 21–32)
CREAT SERPL-MCNC: 0.98 MG/DL (ref 0.55–1.02)
DIFFERENTIAL METHOD BLD: ABNORMAL
EOSINOPHIL # BLD: 0.2 K/UL (ref 0–0.4)
EOSINOPHIL NFR BLD: 4 % (ref 0–7)
ERYTHROCYTE [DISTWIDTH] IN BLOOD BY AUTOMATED COUNT: 14.6 % (ref 11.5–14.5)
GLOBULIN SER CALC-MCNC: 3.4 G/DL (ref 2–4)
GLUCOSE SERPL-MCNC: 125 MG/DL (ref 65–100)
HCT VFR BLD AUTO: 38.8 % (ref 35–47)
HGB BLD-MCNC: 13.1 G/DL (ref 11.5–16)
IGA SERPL-MCNC: 55 MG/DL (ref 70–400)
IGG SERPL-MCNC: 886 MG/DL (ref 700–1600)
IGM SERPL-MCNC: <21 MG/DL (ref 40–230)
IMM GRANULOCYTES # BLD AUTO: 0 K/UL (ref 0–0.04)
IMM GRANULOCYTES NFR BLD AUTO: 0 % (ref 0–0.5)
LYMPHOCYTES # BLD: 2.1 K/UL (ref 0.8–3.5)
LYMPHOCYTES NFR BLD: 47 % (ref 12–49)
MCH RBC QN AUTO: 33.7 PG (ref 26–34)
MCHC RBC AUTO-ENTMCNC: 33.8 G/DL (ref 30–36.5)
MCV RBC AUTO: 99.7 FL (ref 80–99)
MONOCYTES # BLD: 0.5 K/UL (ref 0–1)
MONOCYTES NFR BLD: 12 % (ref 5–13)
NEUTS SEG # BLD: 1.6 K/UL (ref 1.8–8)
NEUTS SEG NFR BLD: 36 % (ref 32–75)
NRBC # BLD: 0 K/UL (ref 0–0.01)
NRBC BLD-RTO: 0 PER 100 WBC
PLATELET # BLD AUTO: 214 K/UL (ref 150–400)
PMV BLD AUTO: 9.1 FL (ref 8.9–12.9)
POTASSIUM SERPL-SCNC: 3.4 MMOL/L (ref 3.5–5.1)
PROT SERPL-MCNC: 6.8 G/DL (ref 6.4–8.2)
RBC # BLD AUTO: 3.89 M/UL (ref 3.8–5.2)
SODIUM SERPL-SCNC: 145 MMOL/L (ref 136–145)
WBC # BLD AUTO: 4.4 K/UL (ref 3.6–11)

## 2019-04-24 PROCEDURE — 82784 ASSAY IGA/IGD/IGG/IGM EACH: CPT

## 2019-04-24 PROCEDURE — 83883 ASSAY NEPHELOMETRY NOT SPEC: CPT

## 2019-04-24 PROCEDURE — 36415 COLL VENOUS BLD VENIPUNCTURE: CPT

## 2019-04-24 PROCEDURE — 80053 COMPREHEN METABOLIC PANEL: CPT

## 2019-04-24 PROCEDURE — 84165 PROTEIN E-PHORESIS SERUM: CPT

## 2019-04-24 PROCEDURE — 85025 COMPLETE CBC W/AUTO DIFF WBC: CPT

## 2019-04-24 NOTE — PROGRESS NOTES
5094:  Pt arrived ambulatory and in no acute distress for labs. Labs drawn peripherally without difficulty. Patient Vitals for the past 12 hrs: 
 Temp Pulse Resp BP SpO2  
04/24/19 0800 97.4 °F (36.3 °C) 74 18 141/63 100 % Recent Results (from the past 12 hour(s)) CBC WITH AUTOMATED DIFF Collection Time: 04/24/19  8:03 AM  
Result Value Ref Range WBC 4.4 3.6 - 11.0 K/uL  
 RBC 3.89 3.80 - 5.20 M/uL  
 HGB 13.1 11.5 - 16.0 g/dL HCT 38.8 35.0 - 47.0 % MCV 99.7 (H) 80.0 - 99.0 FL  
 MCH 33.7 26.0 - 34.0 PG  
 MCHC 33.8 30.0 - 36.5 g/dL  
 RDW 14.6 (H) 11.5 - 14.5 % PLATELET 520 716 - 711 K/uL MPV 9.1 8.9 - 12.9 FL  
 NRBC 0.0 0  WBC ABSOLUTE NRBC 0.00 0.00 - 0.01 K/uL NEUTROPHILS 36 32 - 75 % LYMPHOCYTES 47 12 - 49 % MONOCYTES 12 5 - 13 % EOSINOPHILS 4 0 - 7 % BASOPHILS 1 0 - 1 % IMMATURE GRANULOCYTES 0 0.0 - 0.5 % ABS. NEUTROPHILS 1.6 (L) 1.8 - 8.0 K/UL  
 ABS. LYMPHOCYTES 2.1 0.8 - 3.5 K/UL  
 ABS. MONOCYTES 0.5 0.0 - 1.0 K/UL  
 ABS. EOSINOPHILS 0.2 0.0 - 0.4 K/UL  
 ABS. BASOPHILS 0.0 0.0 - 0.1 K/UL  
 ABS. IMM. GRANS. 0.0 0.00 - 0.04 K/UL  
 DF AUTOMATED METABOLIC PANEL, COMPREHENSIVE Collection Time: 04/24/19  8:03 AM  
Result Value Ref Range Sodium 145 136 - 145 mmol/L Potassium 3.4 (L) 3.5 - 5.1 mmol/L Chloride 108 97 - 108 mmol/L  
 CO2 26 21 - 32 mmol/L Anion gap 11 5 - 15 mmol/L Glucose 125 (H) 65 - 100 mg/dL BUN 8 6 - 20 MG/DL Creatinine 0.98 0.55 - 1.02 MG/DL  
 BUN/Creatinine ratio 8 (L) 12 - 20 GFR est AA >60 >60 ml/min/1.73m2 GFR est non-AA 58 (L) >60 ml/min/1.73m2 Calcium 8.6 8.5 - 10.1 MG/DL Bilirubin, total 1.0 0.2 - 1.0 MG/DL  
 ALT (SGPT) 28 12 - 78 U/L  
 AST (SGOT) 19 15 - 37 U/L Alk. phosphatase 102 45 - 117 U/L Protein, total 6.8 6.4 - 8.2 g/dL Albumin 3.4 (L) 3.5 - 5.0 g/dL Globulin 3.4 2.0 - 4.0 g/dL A-G Ratio 1.0 (L) 1.1 - 2.2 IMMUNOGLOBULINS, G/A/M, QT. Collection Time: 04/24/19  8:03 AM  
Result Value Ref Range Immunoglobulin G 886 700 - 1,600 mg/dL Immunoglobulin A 55 (L) 70 - 400 mg/dL Immunoglobulin M <21 (L) 40 - 230 mg/dL 4723 Departed [facility] ambulatory and in no acute distress. Pt aware of next appointment. Future Appointments Date Time Provider Rasheed Teague 5/2/2019 10:00 AM Harlan Short MD [facility] [address]  
5/8/2019  8:00 AM [address] [place] [address]  
5/22/2019  8:00 AM [facility] INFUSION NURSE 1 [facility]  
6/18/2019  9:15 AM Eli Smith MD [address] [place] [address]  
9/23/2019  8:30 AM Nadeem Lunsford MD [address]  
3/5/2020  1:30 PM PACEMAKER, [address]  
3/5/2020  1:40 PM Luz Elena Jensen NP 1930 [facility],[address]

## 2019-04-25 LAB
ALBUMIN SERPL ELPH-MCNC: 3.5 G/DL (ref 2.9–4.4)
ALBUMIN/GLOB SERPL: 1.3 {RATIO} (ref 0.7–1.7)
ALPHA1 GLOB SERPL ELPH-MCNC: 0.2 G/DL (ref 0–0.4)
ALPHA2 GLOB SERPL ELPH-MCNC: 0.7 G/DL (ref 0.4–1)
B-GLOBULIN SERPL ELPH-MCNC: 1 G/DL (ref 0.7–1.3)
GAMMA GLOB SERPL ELPH-MCNC: 0.8 G/DL (ref 0.4–1.8)
GLOBULIN SER CALC-MCNC: 2.7 G/DL (ref 2.2–3.9)
KAPPA LC FREE SER-MCNC: 13.8 MG/L (ref 3.3–19.4)
KAPPA LC FREE/LAMBDA FREE SER: 1.22 {RATIO} (ref 0.26–1.65)
LAMBDA LC FREE SERPL-MCNC: 11.3 MG/L (ref 5.7–26.3)
M PROTEIN SERPL ELPH-MCNC: NORMAL G/DL
PROT SERPL-MCNC: 6.2 G/DL (ref 6–8.5)

## 2019-04-26 ENCOUNTER — DOCUMENTATION ONLY (OUTPATIENT)
Dept: ONCOLOGY | Age: 59
End: 2019-04-26

## 2019-04-26 LAB
COLLECT DURATION TIME UR: 0 HR
KAPPA LC UR-MCNC: 60.3 MG/L (ref 1.35–24.19)
KAPPA LC/LAMBDA UR: 10.86 {RATIO} (ref 2.04–10.37)
LAMBDA LC UR-MCNC: 5.55 MG/L (ref 0.24–6.66)
SPECIMEN VOL ?TM UR: 0 ML

## 2019-04-29 LAB
IGA SERPL-MCNC: 58 MG/DL (ref 87–352)
IGG SERPL-MCNC: 895 MG/DL (ref 700–1600)
IGM SERPL-MCNC: 13 MG/DL (ref 26–217)
PROT PATTERN SERPL IFE-IMP: ABNORMAL

## 2019-05-02 ENCOUNTER — OFFICE VISIT (OUTPATIENT)
Dept: ONCOLOGY | Age: 59
End: 2019-05-02

## 2019-05-02 VITALS
RESPIRATION RATE: 16 BRPM | BODY MASS INDEX: 33.49 KG/M2 | HEIGHT: 64 IN | OXYGEN SATURATION: 95 % | WEIGHT: 196.2 LBS | SYSTOLIC BLOOD PRESSURE: 114 MMHG | TEMPERATURE: 98.2 F | DIASTOLIC BLOOD PRESSURE: 75 MMHG | HEART RATE: 64 BPM

## 2019-05-02 DIAGNOSIS — E11.9 CONTROLLED TYPE 2 DIABETES MELLITUS WITHOUT COMPLICATION, UNSPECIFIED WHETHER LONG TERM INSULIN USE (HCC): ICD-10-CM

## 2019-05-02 DIAGNOSIS — C90.01 MULTIPLE MYELOMA IN REMISSION (HCC): Primary | ICD-10-CM

## 2019-05-02 NOTE — PROGRESS NOTES
Aye Canas is a 59 y.o. female    Chief Complaint   Patient presents with    Follow-up     3 month. - myeloma. 1. Have you been to the ER, urgent care clinic since your last visit? Hospitalized since your last visit? No    2. Have you seen or consulted any other health care providers outside of the [address] [place] [address] since your last visit? Include any pap smears or colon screening.  No     Visit Vitals  /75 (BP 1 Location: Left arm, BP Patient Position: Sitting)   Pulse 64   Temp 98.2 °F (36.8 °C) (Oral)   Resp 16   Ht 5' 4\" (1.626 m)   Wt 196 lb 3.2 oz (89 kg)   SpO2 95%   BMI 33.68 kg/m²     Nolene Skiff, LPN

## 2019-05-02 NOTE — PROGRESS NOTES
Hematology Follow Up    REASON FOR VISIT: Multiple Myeloma     TREATMENT:   3/24/17- 9/15/17: Demetri Lona X 8   10/20/17: Autologous transplant at [facility]  2/22/18 - Currently on Revlimind 10mg daily    HISTORY OF PRESENT ILLNESS: Ms. Marleny Burger is a 59 y.o. female with DM and  Multiple Myeloma who presents to follow up after the ASCT and is on maintenance Revlimid 10mg daily. She was reduced to 5 mg due to cytopenias. Comes for follow up. She remains on Revlimid 5 mg daily. She had immunizations at [facility] 3/19. She still has neuropathy. No longer has a rash. No sores in her mouth. She has constant numbness of her toes. No fevers, chills, sores. No Leg swelling. Has no change in weight or appetite. No new pain    Oncologic history  Patient had left facial numbness which started in the summer of 2016. This started abruptly and was not associated with rashes, pain, jaw weakness. She has had some intermittent hyperemia of the L eye. No tearing. She has been evaluated by Dr. Oskar Guthrie with Neurology and an extensive work up was only notable for presence of a 0.3 g/dl M spike. Other tests were unremarkable: Vitamin B12 411, thyroid function test normal, ACE 25, BHARATI normal CBC normal, CMP normal, CRP 1.17, CT head and cervical spine unremarkable. Further evaluation revealed findings consistent with Multiple Myeloma    CBC 2/24 Unremarkable. Kappa 17 mg/dl, Lambda 2416 (ratio 0.01), SPEP 0.2 g/dl M spike, HANSEL showed monoclonal free lambda light chains, UPEP negative, Beta 2 Microglobulin 1.8 mg/L, Skeletal survey negative for lytic lesions, Bone marrow biopsy on 2/24/17 showed a Normocellular marrow with mild monoclonal plasmacytosis (15-20%). Trilineage hematopoiesis with maturation. She had a very good partial response and 8 cycles of VRD and then proceeded on to receive an autologous stem cell transplant on 10/20/17 at [facility]. She had a CR posttransplant.  Started maintenance Revlimid in Jan 2018      Past Medical History:   Diagnosis Date    Cardiomyopathy     Diabetes mellitus type 2, controlled ([facility] 12/10/2015    Heart failure (HCC)     Hyperlipidemia     Hypertension     controlled    Multiple myeloma (HCC)     Orthostatic hypotension     Secondary cardiomyopathy, unspecified     [facility]atrial node dysfunction ([facility]     Vitamin B12 deficiency 3/31/2010       Past Surgical History:   Procedure Laterality Date    HX HYSTERECTOMY         Allergies   Allergen Reactions    Ace Inhibitors Cough    Compazine [Prochlorperazine] Nausea Only     rash       Current Outpatient Medications   Medication Sig Dispense Refill    lenalidomide (REVLIMID) 5 mg cap Take 5 mg by mouth daily. 30 Cap 3    glimepiride (AMARYL) 1 mg tablet Take 1 Tab by mouth Daily (before breakfast). For diabetes 30 Tab 12    amLODIPine (NORVASC) 5 mg tablet Take 1 Tab by mouth daily. For blood pressure 30 Tab 12    carvedilol (COREG) 25 mg tablet Take 1 Tab by mouth two (2) times a day. 60 Tab 12    cyanocobalamin (VITAMIN B-12) 1,000 mcg sublingual tablet Take 1,000 mcg by mouth daily.  atorvastatin (LIPITOR) 40 mg tablet TAKE 1 TAB BY MOUTH DAILY. FOR CHOLESTEROL 30 Tab 9    Biotin 2,500 mcg cap Take  by mouth daily.  loperamide (IMMODIUM) 2 mg tablet Take 2 mg by mouth as needed for Diarrhea.  gabapentin (NEURONTIN) 300 mg capsule Take 1 Cap by mouth three (3) times daily. 80 Cap 2       Social History     Socioeconomic History    Marital status:      Spouse name: Not on file    Number of children: Not on file    Years of education: Not on file    Highest education level: Not on file   Tobacco Use    Smoking status: Never Smoker    Smokeless tobacco: Never Used   Substance and Sexual Activity    Alcohol use: No    Drug use: No    Sexual activity: Yes     Partners: Male   Social History Narrative    , 2 children, 28 and 31. Works at [employer], for 35 years.  5 grandchildren       Family History   Problem Relation Age of Onset    Cancer Mother     Heart Disease Mother         pacemaker   [facility] Robert Diabetes Mother    [facility] Mother     Hypertension Sister     Hypertension Brother     Diabetes Brother     Hypertension Sister     Hypertension Sister     Hypertension Sister     Hypertension Sister     Diabetes Sister        ROS  As reviewed in the HPI all others reviewed and negative. ECOG PS is 0    Physical Examination:   Visit Vitals  /75 (BP 1 Location: Left arm, BP Patient Position: Sitting)   Pulse 64   Temp 98.2 °F (36.8 °C) (Oral)   Resp 16   Ht 5' 4\" (1.626 m)   Wt 196 lb 3.2 oz (89 kg)   SpO2 95%   BMI 33.68 kg/m²     General appearance - alert, well appearing, and in no distress  Mental status - oriented to person, place, and time  Mouth - mucous membranes moist, pharynx normal without lesions  Lymphatics - no palpable lymphadenopathy, no hepatosplenomegaly  Chest - clear to auscultation, no wheezes, rales or rhonchi, symmetric air entry  Heart - normal rate, regular rhythm, normal S1, S2, no murmurs, rubs, clicks or gallops  Abdomen - soft, nontender, nondistended, no masses or organomegaly, bowel sounds present  Ext -  No edema    LABS  Lab Results   Component Value Date/Time    WBC 4.4 04/24/2019 08:03 AM    HGB 13.1 04/24/2019 08:03 AM    HCT 38.8 04/24/2019 08:03 AM    PLATELET 727 74/71/2029 08:03 AM    MCV 99.7 (H) 04/24/2019 08:03 AM    ABS. NEUTROPHILS 1.6 (L) 04/24/2019 08:03 AM     Lab Results   Component Value Date/Time    Sodium 145 04/24/2019 08:03 AM    Potassium 3.4 (L) 04/24/2019 08:03 AM    Chloride 108 04/24/2019 08:03 AM    CO2 26 04/24/2019 08:03 AM    Glucose 125 (H) 04/24/2019 08:03 AM    BUN 8 04/24/2019 08:03 AM    Creatinine 0.98 04/24/2019 08:03 AM    GFR est AA >60 04/24/2019 08:03 AM    GFR est non-AA 58 (L) 04/24/2019 08:03 AM    Calcium 8.6 04/24/2019 08:03 AM     Lab Results   Component Value Date/Time    AST (SGOT) 19 04/24/2019 08:03 AM    Alk.  phosphatase 102 04/24/2019 08:03 AM    Protein, total 6.8 04/24/2019 08:03 AM    Protein, total 6.2 04/24/2019 08:03 AM    Albumin 3.4 (L) 04/24/2019 08:03 AM    Globulin 3.4 04/24/2019 08:03 AM    A-G Ratio 1.0 (L) 04/24/2019 08:03 AM     Lab Results   Component Value Date/Time    Vitamin B12 411 12/07/2016 02:58 PM     02/16/2017 09:18 AM    Beta-2 Microglobulin, serum 1.4 03/27/2019 08:07 AM    C-Reactive Protein, Cardiac 1.17 12/07/2016 02:58 PM    TSH 1.100 12/07/2016 02:58 PM    M-Mika Not Observed 04/24/2019 08:03 AM    M-Mika Not Observed 07/10/2018 07:46 AM    Hep C Virus Ab <0.1 05/17/2016 09:56 AM     Lab Results   Component Value Date/Time    INR 1.0 07/27/2010 03:57 PM    aPTT 31.5 07/27/2010 03:57 PM       Bone marrow biopsy reviewed    FISH molecular analysis:    Positive for IgH gene rearrangement (IgH complex FISH study pending); Normal results with 1, 5, 9, 13, 15, and 17 (TP53) probe sets. External records reviewed from [address] [place] [address] transplant transplant bone marrow biopsy done on 12/19/17 showed no evidence of plasma cells. Variable cellularity from 0-50%, flow LUIS, FISH negative    ASSESSMENT  Ms. Jay Weiss is a 59 y.o. female with standard risk multiple myeloma status post 6 cycles of twice daily and autologous stem cell transplant on 10/20/17. She is in a complete remission. We will continue with close surveillance in conjunction with her care team at [facility]  Multiple myeloma  On revlimid maintenance of 10mg daily following transplant at [address] [place] [address]. Dropped to 5 mg due to grade 3 neutropenia (kleber 0.4). She has done well with this, rash has not recurred. Remains in serologic remission    Repeat beta 2 micoglobulin, SPEP, UPEP, free light chains urine and serum, CBC, CMP, immunofixation every 3 months. Follow up at [address] [place] [address] in Nov 2019    Neuropathy  Secondary to Velcade. Overall stable. DM  Per PCP. Neutropenia, chemotherapy induced    Resolved    HM  Discussed screening Colonoscopy.  She will speak with Dr. Vivi Mancilla about this      Return to clinic in 3 months with labs prior as above.           Signed by: Brianna Pacheco MD                     May 2, 2019

## 2019-05-03 ENCOUNTER — TELEPHONE (OUTPATIENT)
Dept: ONCOLOGY | Age: 59
End: 2019-05-03

## 2019-05-03 DIAGNOSIS — C90.01 MULTIPLE MYELOMA IN REMISSION (HCC): ICD-10-CM

## 2019-05-03 RX ORDER — LENALIDOMIDE 5 MG/1
5 CAPSULE ORAL DAILY
Qty: 30 CAP | Refills: 3 | Status: SHIPPED | OUTPATIENT
Start: 2019-05-03 | End: 2019-06-11 | Stop reason: SDUPTHER

## 2019-05-03 NOTE — TELEPHONE ENCOUNTER
Patient called to say that they need clarification on the prescription that was sent to them. They left messages but no one has called back.

## 2019-05-03 NOTE — TELEPHONE ENCOUNTER
5/3/19 5:19 PM: Returned call to patient, who stated that [address] had contacted her and needed clarification on patient's Revlimid order. Advised patient that this office will contact [facility]. Patient denied further questions or concerns. 5/3/19 5:25 PM: [address] and spoke to pharmacist Aleksey Patel, who stated that a signature is required on the prescription. Michelle Cota NP, provided signed prescription and RN faxed to [address] [place] [address] at [phone]. Paper prescription was shredded and discarded.

## 2019-05-08 ENCOUNTER — CLINICAL SUPPORT (OUTPATIENT)
Dept: CARDIOLOGY CLINIC | Age: 59
End: 2019-05-08

## 2019-05-08 DIAGNOSIS — Z95.810 BIVENTRICULAR ICD (IMPLANTABLE CARDIOVERTER-DEFIBRILLATOR) IN PLACE: Primary | ICD-10-CM

## 2019-05-08 DIAGNOSIS — I42.9 CARDIOMYOPATHY, UNSPECIFIED TYPE (HCC): ICD-10-CM

## 2019-05-22 ENCOUNTER — HOSPITAL ENCOUNTER (OUTPATIENT)
Dept: INFUSION THERAPY | Age: 59
Discharge: HOME | End: 2019-05-22
Payer: COMMERCIAL

## 2019-05-22 VITALS
OXYGEN SATURATION: 100 % | DIASTOLIC BLOOD PRESSURE: 67 MMHG | TEMPERATURE: 98.2 F | HEART RATE: 72 BPM | RESPIRATION RATE: 18 BRPM | SYSTOLIC BLOOD PRESSURE: 113 MMHG

## 2019-05-22 LAB
ALBUMIN SERPL-MCNC: 3.3 G/DL (ref 3.5–5)
ALBUMIN/GLOB SERPL: 1.1 {RATIO} (ref 1.1–2.2)
ALP SERPL-CCNC: 101 U/L (ref 45–117)
ALT SERPL-CCNC: 21 U/L (ref 12–78)
ANION GAP SERPL CALC-SCNC: 11 MMOL/L (ref 5–15)
AST SERPL-CCNC: 14 U/L (ref 15–37)
BASOPHILS # BLD: 0 K/UL (ref 0–0.1)
BASOPHILS NFR BLD: 1 % (ref 0–1)
BILIRUB SERPL-MCNC: 1 MG/DL (ref 0.2–1)
BUN SERPL-MCNC: 10 MG/DL (ref 6–20)
BUN/CREAT SERPL: 10 (ref 12–20)
CALCIUM SERPL-MCNC: 8.3 MG/DL (ref 8.5–10.1)
CHLORIDE SERPL-SCNC: 106 MMOL/L (ref 97–108)
CO2 SERPL-SCNC: 25 MMOL/L (ref 21–32)
CREAT SERPL-MCNC: 0.96 MG/DL (ref 0.55–1.02)
DIFFERENTIAL METHOD BLD: ABNORMAL
EOSINOPHIL # BLD: 0.2 K/UL (ref 0–0.4)
EOSINOPHIL NFR BLD: 4 % (ref 0–7)
ERYTHROCYTE [DISTWIDTH] IN BLOOD BY AUTOMATED COUNT: 14.6 % (ref 11.5–14.5)
GLOBULIN SER CALC-MCNC: 3.1 G/DL (ref 2–4)
GLUCOSE SERPL-MCNC: 158 MG/DL (ref 65–100)
HCT VFR BLD AUTO: 37.7 % (ref 35–47)
HGB BLD-MCNC: 12.6 G/DL (ref 11.5–16)
IGA SERPL-MCNC: 54 MG/DL (ref 70–400)
IGG SERPL-MCNC: 855 MG/DL (ref 700–1600)
IGM SERPL-MCNC: <21 MG/DL (ref 40–230)
IMM GRANULOCYTES # BLD AUTO: 0 K/UL (ref 0–0.04)
IMM GRANULOCYTES NFR BLD AUTO: 0 % (ref 0–0.5)
LYMPHOCYTES # BLD: 1.8 K/UL (ref 0.8–3.5)
LYMPHOCYTES NFR BLD: 40 % (ref 12–49)
MCH RBC QN AUTO: 33.2 PG (ref 26–34)
MCHC RBC AUTO-ENTMCNC: 33.4 G/DL (ref 30–36.5)
MCV RBC AUTO: 99.5 FL (ref 80–99)
MONOCYTES # BLD: 0.4 K/UL (ref 0–1)
MONOCYTES NFR BLD: 9 % (ref 5–13)
NEUTS SEG # BLD: 2.1 K/UL (ref 1.8–8)
NEUTS SEG NFR BLD: 46 % (ref 32–75)
NRBC # BLD: 0 K/UL (ref 0–0.01)
NRBC BLD-RTO: 0 PER 100 WBC
PLATELET # BLD AUTO: 200 K/UL (ref 150–400)
PMV BLD AUTO: 9.1 FL (ref 8.9–12.9)
POTASSIUM SERPL-SCNC: 3.4 MMOL/L (ref 3.5–5.1)
PROT SERPL-MCNC: 6.4 G/DL (ref 6.4–8.2)
RBC # BLD AUTO: 3.79 M/UL (ref 3.8–5.2)
SODIUM SERPL-SCNC: 142 MMOL/L (ref 136–145)
WBC # BLD AUTO: 4.6 K/UL (ref 3.6–11)

## 2019-05-22 PROCEDURE — 80053 COMPREHEN METABOLIC PANEL: CPT

## 2019-05-22 PROCEDURE — 83883 ASSAY NEPHELOMETRY NOT SPEC: CPT

## 2019-05-22 PROCEDURE — 85025 COMPLETE CBC W/AUTO DIFF WBC: CPT

## 2019-05-22 PROCEDURE — 84165 PROTEIN E-PHORESIS SERUM: CPT

## 2019-05-22 PROCEDURE — 36415 COLL VENOUS BLD VENIPUNCTURE: CPT

## 2019-05-22 PROCEDURE — 82784 ASSAY IGA/IGD/IGG/IGM EACH: CPT

## 2019-05-22 NOTE — PROGRESS NOTES
[facility] Lab Visit:    8485:  Pt arrived ambulatory and in no distress for labs. Labs drawn peripherally without difficulty. Patient Vitals for the past 12 hrs:   Temp Pulse Resp BP SpO2   05/22/19 0803 98.2 °F (36.8 °C) 72 18 113/67 100 %     Recent Results (from the past 12 hour(s))   CBC WITH AUTOMATED DIFF    Collection Time: 05/22/19  8:10 AM   Result Value Ref Range    WBC 4.6 3.6 - 11.0 K/uL    RBC 3.79 (L) 3.80 - 5.20 M/uL    HGB 12.6 11.5 - 16.0 g/dL    HCT 37.7 35.0 - 47.0 %    MCV 99.5 (H) 80.0 - 99.0 FL    MCH 33.2 26.0 - 34.0 PG    MCHC 33.4 30.0 - 36.5 g/dL    RDW 14.6 (H) 11.5 - 14.5 %    PLATELET 725 373 - 615 K/uL    MPV 9.1 8.9 - 12.9 FL    NRBC 0.0 0  WBC    ABSOLUTE NRBC 0.00 0.00 - 0.01 K/uL    NEUTROPHILS 46 32 - 75 %    LYMPHOCYTES 40 12 - 49 %    MONOCYTES 9 5 - 13 %    EOSINOPHILS 4 0 - 7 %    BASOPHILS 1 0 - 1 %    IMMATURE GRANULOCYTES 0 0.0 - 0.5 %    ABS. NEUTROPHILS 2.1 1.8 - 8.0 K/UL    ABS. LYMPHOCYTES 1.8 0.8 - 3.5 K/UL    ABS. MONOCYTES 0.4 0.0 - 1.0 K/UL    ABS. EOSINOPHILS 0.2 0.0 - 0.4 K/UL    ABS. BASOPHILS 0.0 0.0 - 0.1 K/UL    ABS. IMM. GRANS. 0.0 0.00 - 0.04 K/UL    DF AUTOMATED     METABOLIC PANEL, COMPREHENSIVE    Collection Time: 05/22/19  8:10 AM   Result Value Ref Range    Sodium 142 136 - 145 mmol/L    Potassium 3.4 (L) 3.5 - 5.1 mmol/L    Chloride 106 97 - 108 mmol/L    CO2 25 21 - 32 mmol/L    Anion gap 11 5 - 15 mmol/L    Glucose 158 (H) 65 - 100 mg/dL    BUN 10 6 - 20 MG/DL    Creatinine 0.96 0.55 - 1.02 MG/DL    BUN/Creatinine ratio 10 (L) 12 - 20      GFR est AA >60 >60 ml/min/1.73m2    GFR est non-AA 59 (L) >60 ml/min/1.73m2    Calcium 8.3 (L) 8.5 - 10.1 MG/DL    Bilirubin, total 1.0 0.2 - 1.0 MG/DL    ALT (SGPT) 21 12 - 78 U/L    AST (SGOT) 14 (L) 15 - 37 U/L    Alk.  phosphatase 101 45 - 117 U/L    Protein, total 6.4 6.4 - 8.2 g/dL    Albumin 3.3 (L) 3.5 - 5.0 g/dL    Globulin 3.1 2.0 - 4.0 g/dL    A-G Ratio 1.1 1.1 - 2.2     IMMUNOGLOBULINS, G/A/M, QT. Collection Time: 05/22/19  8:10 AM   Result Value Ref Range    Immunoglobulin G 855 700 - 1,600 mg/dL    Immunoglobulin A 54 (L) 70 - 400 mg/dL    Immunoglobulin M <21 (L) 40 - 230 mg/dL         0810: Departed [facility] ambulatory and in no distress. Pt aware of next appointment.   Future Appointments   Date Time Provider Rasheed Zahida   6/18/2019  9:15 AM Rod Horan MD [address] [place] [address]   6/26/2019  8:00  [facility] INFUSION NURSE 1 [address] [facility]   8/2/2019 10:00 AM Kye Baird MD [facility]   8/7/2019  8:00 AM [address] [facility]   9/23/2019  8:30 AM Jimena Sánchez MD [address]   3/5/2020  1:30 PM PACEMAKER, [facility] ZARA SCHED   3/5/2020  1:40 PM Gurpreet Baptiste NP [address] [facility]

## 2019-05-23 LAB
COLLECT DURATION TIME UR: ABNORMAL HR
KAPPA LC FREE SER-MCNC: 13 MG/L (ref 3.3–19.4)
KAPPA LC FREE/LAMBDA FREE SER: 1.51 {RATIO} (ref 0.26–1.65)
KAPPA LC UR-MCNC: 89.6 MG/L (ref 1.35–24.19)
KAPPA LC/LAMBDA UR: 20.65 {RATIO} (ref 2.04–10.37)
LAMBDA LC FREE SERPL-MCNC: 8.6 MG/L (ref 5.7–26.3)
LAMBDA LC UR-MCNC: 4.34 MG/L (ref 0.24–6.66)
SPECIMEN VOL ?TM UR: ABNORMAL ML

## 2019-05-24 LAB
ALBUMIN SERPL ELPH-MCNC: 3.5 G/DL (ref 2.9–4.4)
ALBUMIN/GLOB SERPL: 1.4 {RATIO} (ref 0.7–1.7)
ALPHA1 GLOB SERPL ELPH-MCNC: 0.2 G/DL (ref 0–0.4)
ALPHA2 GLOB SERPL ELPH-MCNC: 0.7 G/DL (ref 0.4–1)
B-GLOBULIN SERPL ELPH-MCNC: 0.9 G/DL (ref 0.7–1.3)
GAMMA GLOB SERPL ELPH-MCNC: 0.7 G/DL (ref 0.4–1.8)
GLOBULIN SER CALC-MCNC: 2.5 G/DL (ref 2.2–3.9)
IGA SERPL-MCNC: 57 MG/DL (ref 87–352)
IGG SERPL-MCNC: 870 MG/DL (ref 700–1600)
IGM SERPL-MCNC: 10 MG/DL (ref 26–217)
M PROTEIN SERPL ELPH-MCNC: NORMAL G/DL
PROT PATTERN SERPL IFE-IMP: ABNORMAL
PROT SERPL-MCNC: 6 G/DL (ref 6–8.5)

## 2019-06-11 ENCOUNTER — TELEPHONE (OUTPATIENT)
Dept: ONCOLOGY | Age: 59
End: 2019-06-11

## 2019-06-11 DIAGNOSIS — C90.01 MULTIPLE MYELOMA IN REMISSION (HCC): ICD-10-CM

## 2019-06-11 RX ORDER — LENALIDOMIDE 5 MG/1
5 CAPSULE ORAL DAILY
Qty: 30 CAP | Refills: 3 | Status: SHIPPED | OUTPATIENT
Start: 2019-06-11 | End: 2019-07-10 | Stop reason: SDUPTHER

## 2019-06-11 NOTE — TELEPHONE ENCOUNTER
Patient called and stated that she needs a nurse to [facility] about her revlimid script.     # [phone]

## 2019-06-12 NOTE — TELEPHONE ENCOUNTER
6/12/19 at 1 - Prescription faxed to [address] [facility] with [address] [facility] authorization number [id number].    6/12/19 at 12:46 pm - Fax confirmation received.

## 2019-06-18 ENCOUNTER — OFFICE VISIT (OUTPATIENT)
Dept: CARDIOLOGY CLINIC | Age: 59
End: 2019-06-18

## 2019-06-18 ENCOUNTER — HOSPITAL ENCOUNTER (OUTPATIENT)
Dept: INFUSION THERAPY | Age: 59
Discharge: HOME | End: 2019-06-18
Payer: COMMERCIAL

## 2019-06-18 VITALS
BODY MASS INDEX: 33.97 KG/M2 | RESPIRATION RATE: 16 BRPM | HEIGHT: 64 IN | OXYGEN SATURATION: 99 % | DIASTOLIC BLOOD PRESSURE: 71 MMHG | WEIGHT: 199 LBS | SYSTOLIC BLOOD PRESSURE: 106 MMHG | HEART RATE: 70 BPM

## 2019-06-18 DIAGNOSIS — E66.9 OBESITY (BMI 30-39.9): ICD-10-CM

## 2019-06-18 DIAGNOSIS — C90.00 MULTIPLE MYELOMA, REMISSION STATUS UNSPECIFIED (HCC): ICD-10-CM

## 2019-06-18 DIAGNOSIS — E11.21 TYPE 2 DIABETES MELLITUS WITH NEPHROPATHY (HCC): ICD-10-CM

## 2019-06-18 DIAGNOSIS — Z94.6 STATUS POST BONE TRANSPLANT: ICD-10-CM

## 2019-06-18 DIAGNOSIS — Z95.810 AICD (AUTOMATIC CARDIOVERTER/DEFIBRILLATOR) PRESENT: ICD-10-CM

## 2019-06-18 DIAGNOSIS — I42.9 CARDIOMYOPATHY, UNSPECIFIED TYPE (HCC): ICD-10-CM

## 2019-06-18 DIAGNOSIS — I10 ESSENTIAL HYPERTENSION: ICD-10-CM

## 2019-06-18 DIAGNOSIS — I50.22 CHRONIC SYSTOLIC HEART FAILURE (HCC): Primary | ICD-10-CM

## 2019-06-18 LAB
ALBUMIN SERPL-MCNC: 3.5 G/DL (ref 3.5–5)
ALBUMIN/GLOB SERPL: 1.1 {RATIO} (ref 1.1–2.2)
ALP SERPL-CCNC: 98 U/L (ref 45–117)
ALT SERPL-CCNC: 20 U/L (ref 12–78)
ANION GAP SERPL CALC-SCNC: 9 MMOL/L (ref 5–15)
AST SERPL-CCNC: 12 U/L (ref 15–37)
BASOPHILS # BLD: 0 K/UL (ref 0–0.1)
BASOPHILS NFR BLD: 1 % (ref 0–1)
BILIRUB SERPL-MCNC: 1.1 MG/DL (ref 0.2–1)
BUN SERPL-MCNC: 11 MG/DL (ref 6–20)
BUN/CREAT SERPL: 11 (ref 12–20)
CALCIUM SERPL-MCNC: 8.8 MG/DL (ref 8.5–10.1)
CHLORIDE SERPL-SCNC: 105 MMOL/L (ref 97–108)
CO2 SERPL-SCNC: 27 MMOL/L (ref 21–32)
CREAT SERPL-MCNC: 0.98 MG/DL (ref 0.55–1.02)
DIFFERENTIAL METHOD BLD: ABNORMAL
EOSINOPHIL # BLD: 0.2 K/UL (ref 0–0.4)
EOSINOPHIL NFR BLD: 3 % (ref 0–7)
ERYTHROCYTE [DISTWIDTH] IN BLOOD BY AUTOMATED COUNT: 14.5 % (ref 11.5–14.5)
GLOBULIN SER CALC-MCNC: 3.3 G/DL (ref 2–4)
GLUCOSE SERPL-MCNC: 115 MG/DL (ref 65–100)
HCT VFR BLD AUTO: 38.7 % (ref 35–47)
HGB BLD-MCNC: 12.9 G/DL (ref 11.5–16)
IGA SERPL-MCNC: 67 MG/DL (ref 70–400)
IGG SERPL-MCNC: 928 MG/DL (ref 700–1600)
IGM SERPL-MCNC: <21 MG/DL (ref 40–230)
IMM GRANULOCYTES # BLD AUTO: 0 K/UL (ref 0–0.04)
IMM GRANULOCYTES NFR BLD AUTO: 0 % (ref 0–0.5)
LYMPHOCYTES # BLD: 1.8 K/UL (ref 0.8–3.5)
LYMPHOCYTES NFR BLD: 41 % (ref 12–49)
MCH RBC QN AUTO: 33.3 PG (ref 26–34)
MCHC RBC AUTO-ENTMCNC: 33.3 G/DL (ref 30–36.5)
MCV RBC AUTO: 100 FL (ref 80–99)
MONOCYTES # BLD: 0.4 K/UL (ref 0–1)
MONOCYTES NFR BLD: 10 % (ref 5–13)
NEUTS SEG # BLD: 2 K/UL (ref 1.8–8)
NEUTS SEG NFR BLD: 45 % (ref 32–75)
NRBC # BLD: 0 K/UL (ref 0–0.01)
NRBC BLD-RTO: 0 PER 100 WBC
PLATELET # BLD AUTO: 223 K/UL (ref 150–400)
PMV BLD AUTO: 9.4 FL (ref 8.9–12.9)
POTASSIUM SERPL-SCNC: 3.5 MMOL/L (ref 3.5–5.1)
PROT SERPL-MCNC: 6.8 G/DL (ref 6.4–8.2)
RBC # BLD AUTO: 3.87 M/UL (ref 3.8–5.2)
SODIUM SERPL-SCNC: 141 MMOL/L (ref 136–145)
WBC # BLD AUTO: 4.4 K/UL (ref 3.6–11)

## 2019-06-18 PROCEDURE — 84165 PROTEIN E-PHORESIS SERUM: CPT

## 2019-06-18 PROCEDURE — 82784 ASSAY IGA/IGD/IGG/IGM EACH: CPT

## 2019-06-18 PROCEDURE — 80053 COMPREHEN METABOLIC PANEL: CPT

## 2019-06-18 PROCEDURE — 85025 COMPLETE CBC W/AUTO DIFF WBC: CPT

## 2019-06-18 PROCEDURE — 83883 ASSAY NEPHELOMETRY NOT SPEC: CPT

## 2019-06-18 PROCEDURE — 36415 COLL VENOUS BLD VENIPUNCTURE: CPT

## 2019-06-18 NOTE — PROGRESS NOTES
Chief Complaint   Patient presents with    Follow-up     Annual    CHF    Hypertension    Cardiomyopathy     1. Have you been to the ER, urgent care clinic since your last visit? Hospitalized since your last visit? No    2. Have you seen or consulted any other health care providers outside of the [address] [place] [address] since your last visit? Include any pap smears or colon screening.  No

## 2019-06-18 NOTE — PROGRESS NOTES
[facility]   [address] [facility], [address] [place] [address]                                          NEW PATIENT HPI/FOLLOW-UP      NAME:  Aurelia Fairchild   :   1960   MRN:   [id number]   PCP:  Marshall Ernandez MD           Subjective: The patient is a 59y.o. year old female  who returns for a routine follow-up. Since the last visit, patient reports no new symptoms. Remarkable doing well since resolution of cardiomyopathy of unknown etiology. Was felt to be due to \"virus\". Last echo 2016--LVEF 55-50%. Denies change in exercise tolerance, chest pain, edema, medication intolerance, palpitations, shortness of breath, PND/orthopnea wheezing, sputum, syncope, dizziness or light headedness. Doing satisfactorily. Review of Systems  General: Pt denies excessive weight gain or loss. Pt is able to conduct ADL's. Respiratory: Denies shortness of breath, BARRERA, wheezing or stridor.   Cardiovascular: Denies precordial pain, palpitations, edema or PND  Gastrointestinal: Denies poor appetite, indigestion, abdominal pain or blood in stool  Peripheral vascular: Denies claudication, leg cramps  Neuropsychiatric: Denies paresthesias,tingling,numbness,anxiety,depression,fatigue  Musculoskeletal: Denies pain,tenderness, soreness,swelling      Past Medical History:   Diagnosis Date    Cardiomyopathy     Diabetes mellitus type 2, controlled ([address] 12/10/2015    Heart failure ([address]     Hyperlipidemia     Hypertension     controlled    Multiple myeloma ([address]     Orthostatic hypotension     Secondary cardiomyopathy, unspecified     Sinoatrial node dysfunction ([address]     Vitamin B12 deficiency 3/31/2010     Patient Active Problem List    Diagnosis Date Noted    Chemotherapy induced neutropenia ([address] 2018    Controlled type 2 diabetes mellitus without complication ([address]     Status post bone transplant 01/15/2018    Type 2 diabetes mellitus with nephropathy ([address] 2018    Type 2 diabetes mellitus with diabetic neuropathy ([facility] 01/11/2018    Multiple myeloma ([facility]     Neuropathy 04/21/2017    Rash 04/21/2017    Controlled type 2 diabetes mellitus without complication, without long-term current use of insulin ([facility] 03/31/2017    Multiple myeloma not having achieved remission ([facility] 03/06/2017    Diabetes mellitus type 2, controlled ([facility] 12/10/2015    Essential hypertension 11/17/2015    Hyperlipidemia     AICD (automatic cardioverter/defibrillator) present 09/19/2013    Chronic systolic heart failure ([facility] 04/09/2012    Obesity, unspecified 04/09/2012    Other left bundle branch block 04/09/2012    Cardiomyopathy (HCC)--resolved 03/28/2011    Vitamin B12 deficiency 03/31/2010      Past Surgical History:   Procedure Laterality Date    HX HYSTERECTOMY       Allergies   Allergen Reactions    Ace Inhibitors Cough    Compazine [Prochlorperazine] Nausea Only     rash      Family History   Problem Relation Age of Onset    Cancer Mother     Heart Disease Mother         pacemaker    Diabetes Mother     Breast Cancer Mother     Hypertension Sister     Hypertension Brother     Diabetes Brother     Hypertension Sister     Hypertension Sister     Hypertension Sister     Hypertension Sister     Diabetes Sister       Social History     Socioeconomic History    Marital status:      Spouse name: Not on file    Number of children: Not on file    Years of education: Not on file    Highest education level: Not on file   Occupational History    Not on file   Social Needs    Financial resource strain: Not on file    Food insecurity:     Worry: Not on file     Inability: Not on file    Transportation needs:     Medical: Not on file     Non-medical: Not on file   Tobacco Use    Smoking status: Never Smoker    Smokeless tobacco: Never Used   Substance and Sexual Activity    Alcohol use: No    Drug use: No    Sexual activity: Yes     Partners: Male   Lifestyle  Physical activity:     Days per week: Not on file     Minutes per session: Not on file    Stress: Not on file   Relationships    Social connections:     Talks on phone: Not on file     Gets together: Not on file     Attends Restorationism service: Not on file     Active member of club or organization: Not on file     Attends meetings of clubs or organizations: Not on file     Relationship status: Not on file    Intimate partner violence:     Fear of current or ex partner: Not on file     Emotionally abused: Not on file     Physically abused: Not on file     Forced sexual activity: Not on file   Other Topics Concern    Not on file   Social History Narrative    , 2 children, 28 and 32. Works at [employer], for 35 years. 5 grandchildren      Current Outpatient Medications   Medication Sig    lenalidomide (REVLIMID) 5 mg cap Take 5 mg by mouth daily.  glimepiride (AMARYL) 1 mg tablet Take 1 Tab by mouth Daily (before breakfast). For diabetes    amLODIPine (NORVASC) 5 mg tablet Take 1 Tab by mouth daily. For blood pressure    carvedilol (COREG) 25 mg tablet Take 1 Tab by mouth two (2) times a day.  cyanocobalamin (VITAMIN B-12) 1,000 mcg sublingual tablet Take 1,000 mcg by mouth daily.  atorvastatin (LIPITOR) 40 mg tablet TAKE 1 TAB BY MOUTH DAILY. FOR CHOLESTEROL    Biotin 2,500 mcg cap Take  by mouth daily.  gabapentin (NEURONTIN) 300 mg capsule Take 1 Cap by mouth three (3) times daily.  loperamide (IMMODIUM) 2 mg tablet Take 2 mg by mouth as needed for Diarrhea. No current facility-administered medications for this visit. I have reviewed the nurses notes, vitals, problem list, allergy list, medical history, family medical, social history and medications.         Objective:     Physical Exam:     Vitals:    06/18/19 0917   BP: 106/71   Pulse: 70   Resp: 16   SpO2: 99%   Weight: 199 lb (90.3 kg)   Height: 5' 4\" (1.626 m)    Body mass index is 34.16 kg/m². General: Well developed, in no acute distress. HEENT: No carotid bruits, no JVD, trach is midline. Heart:  Normal S1/S2 negative S3 or S4. Regular, no murmur, gallop or rub.   Respiratory: Clear bilaterally, no wheezing or rales  Abdomen:   Soft, non-tender, bowel sounds are active.   Extremities:  No edema, normal cap refill, no cyanosis. Neuro: A&Ox3, speech clear, gait stable. Skin: Skin color is normal. No rashes or lesions. No diaphoresis.   Vascular: 2+ pulses symmetric in all extremities        Data Review:       Cardiographics:    EKG: Electronic ventricular pacemaker    Cardiology Labs:    Results for orders placed or performed during the hospital encounter of 08/06/10   EKG, 12 LEAD, INITIAL   Result Value Ref Range    Ventricular Rate 60 BPM    Atrial Rate 60 BPM    P-R Interval 92 ms    QRS Duration 146 ms    Q-T Interval 530 ms    QTC Calculation (Bezet) 530 ms    Calculated R Axis -103 degrees    Calculated T Axis 58 degrees    Diagnosis       AV sequential or dual chamber electronic pacemaker  No previous ECGs available  Confirmed by [facility] ([id number]) on 8/7/2010 9:11:48 PM   Results for orders placed or performed in visit on 03/31/10   AMB POC EKG ROUTINE W/ 12 LEADS, INTER & REP    Narrative    LBBB, left ventricular hypertrophy       Lab Results   Component Value Date/Time    Cholesterol, total 150 03/21/2019 10:17 AM    HDL Cholesterol 60 03/21/2019 10:17 AM    LDL, calculated 75 03/21/2019 10:17 AM    Triglyceride 75 03/21/2019 10:17 AM    CHOL/HDL Ratio 4.3 10/08/2009 10:45 AM       Lab Results   Component Value Date/Time    Sodium 142 05/22/2019 08:10 AM    Potassium 3.4 (L) 05/22/2019 08:10 AM    Chloride 106 05/22/2019 08:10 AM    CO2 25 05/22/2019 08:10 AM    Anion gap 11 05/22/2019 08:10 AM    Glucose 158 (H) 05/22/2019 08:10 AM    BUN 10 05/22/2019 08:10 AM    Creatinine 0.96 05/22/2019 08:10 AM    BUN/Creatinine ratio 10 (L) 05/22/2019 08:10 AM    GFR est AA >60 05/22/2019 08:10 AM    GFR est non-AA 59 (L) 05/22/2019 08:10 AM    Calcium 8.3 (L) 05/22/2019 08:10 AM    Bilirubin, total 1.0 05/22/2019 08:10 AM    AST (SGOT) 14 (L) 05/22/2019 08:10 AM    Alk. phosphatase 101 05/22/2019 08:10 AM    Protein, total 6.4 05/22/2019 08:10 AM    Protein, total 6.0 05/22/2019 08:10 AM    Albumin 3.3 (L) 05/22/2019 08:10 AM    Globulin 3.1 05/22/2019 08:10 AM    A-G Ratio 1.1 05/22/2019 08:10 AM    ALT (SGPT) 21 05/22/2019 08:10 AM          Assessment:       ICD-10-CM ICD-9-CM    1. Chronic systolic heart failure (HCC) I50.22 428.22    2. Essential hypertension I10 401.9 AMB POC EKG ROUTINE W/ 12 LEADS, INTER & REP   3. Type 2 diabetes mellitus with nephropathy (HCC) E11.21 250.40      583.81    4. Multiple myeloma, remission status unspecified ([facility]) C90.00 203.00    5. Cardiomyopathy, unspecified type ([facility] I42.9 425.4    6. AICD (automatic cardioverter/defibrillator) present Z95.810 V45.02    7. Status post bone transplant Z94.6 V42.4    8. Obesity (BMI 30-39. 9) E66.9 278.00          Discussion: Patient presents at this time stable from a cardiac perspective. Cardiomyopathy due to ?virus resolved as of > 10 yrs ago. Pleased with present cardiac status. Plan: 1. Continue same meds. Lipid profile and labs followed by PCP    2. Encouraged to exercise to tolerance, lose weight and follow low fat, low carb,low cholesterol, low sodium predominantly Plant-based (consider Mediterranean) diet. Call with questions or concerns. Will follow up any test results by phone and/or f/u here in office if needed. Dennis Lora 3.Follow up: 6 months. Echo at this time. I have discussed the diagnosis with the patient and the intended plan as seen in the above orders. The patient has received an after-visit summary and questions were answered concerning future plans. I have discussed any concerning medication side effects and warnings with the patient as well.     Amna Taylor, MD  6/18/2019

## 2019-06-18 NOTE — PROGRESS NOTES
1000  Pt arrived ambulatory and in no acute distress, labs drawn. No data found. Recent Results (from the past 12 hour(s))   CBC WITH AUTOMATED DIFF    Collection Time: 06/18/19 10:15 AM   Result Value Ref Range    WBC 4.4 3.6 - 11.0 K/uL    RBC 3.87 3.80 - 5.20 M/uL    HGB 12.9 11.5 - 16.0 g/dL    HCT 38.7 35.0 - 47.0 %    .0 (H) 80.0 - 99.0 FL    MCH 33.3 26.0 - 34.0 PG    MCHC 33.3 30.0 - 36.5 g/dL    RDW 14.5 11.5 - 14.5 %    PLATELET 291 708 - 895 K/uL    MPV 9.4 8.9 - 12.9 FL    NRBC 0.0 0  WBC    ABSOLUTE NRBC 0.00 0.00 - 0.01 K/uL    NEUTROPHILS 45 32 - 75 %    LYMPHOCYTES 41 12 - 49 %    MONOCYTES 10 5 - 13 %    EOSINOPHILS 3 0 - 7 %    BASOPHILS 1 0 - 1 %    IMMATURE GRANULOCYTES 0 0.0 - 0.5 %    ABS. NEUTROPHILS 2.0 1.8 - 8.0 K/UL    ABS. LYMPHOCYTES 1.8 0.8 - 3.5 K/UL    ABS. MONOCYTES 0.4 0.0 - 1.0 K/UL    ABS. EOSINOPHILS 0.2 0.0 - 0.4 K/UL    ABS. BASOPHILS 0.0 0.0 - 0.1 K/UL    ABS. IMM. GRANS. 0.0 0.00 - 0.04 K/UL    DF AUTOMATED     METABOLIC PANEL, COMPREHENSIVE    Collection Time: 06/18/19 10:15 AM   Result Value Ref Range    Sodium 141 136 - 145 mmol/L    Potassium 3.5 3.5 - 5.1 mmol/L    Chloride 105 97 - 108 mmol/L    CO2 27 21 - 32 mmol/L    Anion gap 9 5 - 15 mmol/L    Glucose 115 (H) 65 - 100 mg/dL    BUN 11 6 - 20 MG/DL    Creatinine 0.98 0.55 - 1.02 MG/DL    BUN/Creatinine ratio 11 (L) 12 - 20      GFR est AA >60 >60 ml/min/1.73m2    GFR est non-AA 58 (L) >60 ml/min/1.73m2    Calcium 8.8 8.5 - 10.1 MG/DL    Bilirubin, total 1.1 (H) 0.2 - 1.0 MG/DL    ALT (SGPT) 20 12 - 78 U/L    AST (SGOT) 12 (L) 15 - 37 U/L    Alk. phosphatase 98 45 - 117 U/L    Protein, total 6.8 6.4 - 8.2 g/dL    Albumin 3.5 3.5 - 5.0 g/dL    Globulin 3.3 2.0 - 4.0 g/dL    A-G Ratio 1.1 1.1 - 2.2         1020 Departed [facility] ambulatory and in no acute distress. Patient aware of next appointment.     Future Appointments   Date Time Provider Rasheed Teague   7/24/2019  9:30 AM [facility] INFUSION NURSE 1 [address]   8/2/2019 10:00 AM Josiane Short MD [facility] 6199   8/7/2019  8:00 AM REMOTE_RCAM RCAMB ZARA SCHED   8/28/2019  9:30 AM [facility]   9/23/2019  8:30 AM Alana Spain MD [address]   9/25/2019  9:30 AM [facility] INFUSION NURSE 1 [address] [employer]   12/18/2019  9:15 AM aJckson Dunn MD [facility] ZARA SCHED   3/5/2020  1:30 PM PACEMAKER, RCAM [address] [facility]   3/5/2020  1:40 PM Martín Silva NP [address] [facility]

## 2019-06-19 LAB
ALBUMIN SERPL ELPH-MCNC: 3.8 G/DL (ref 2.9–4.4)
ALBUMIN/GLOB SERPL: 1.5 {RATIO} (ref 0.7–1.7)
ALPHA1 GLOB SERPL ELPH-MCNC: 0.2 G/DL (ref 0–0.4)
ALPHA2 GLOB SERPL ELPH-MCNC: 0.7 G/DL (ref 0.4–1)
B-GLOBULIN SERPL ELPH-MCNC: 0.9 G/DL (ref 0.7–1.3)
GAMMA GLOB SERPL ELPH-MCNC: 0.7 G/DL (ref 0.4–1.8)
GLOBULIN SER CALC-MCNC: 2.5 G/DL (ref 2.2–3.9)
IGA SERPL-MCNC: 66 MG/DL (ref 87–352)
IGG SERPL-MCNC: 908 MG/DL (ref 700–1600)
IGM SERPL-MCNC: 11 MG/DL (ref 26–217)
M PROTEIN SERPL ELPH-MCNC: NORMAL G/DL
PROT PATTERN SERPL IFE-IMP: ABNORMAL
PROT SERPL-MCNC: 6.3 G/DL (ref 6–8.5)

## 2019-06-20 LAB
COLLECT DURATION TIME UR: ABNORMAL HR
KAPPA LC FREE SER-MCNC: 14.4 MG/L (ref 3.3–19.4)
KAPPA LC FREE/LAMBDA FREE SER: 1.43 {RATIO} (ref 0.26–1.65)
KAPPA LC UR-MCNC: 131 MG/L (ref 1.35–24.19)
KAPPA LC/LAMBDA UR: 14.19 {RATIO} (ref 2.04–10.37)
LAMBDA LC FREE SERPL-MCNC: 10.1 MG/L (ref 5.7–26.3)
LAMBDA LC UR-MCNC: 9.23 MG/L (ref 0.24–6.66)
SPECIMEN VOL ?TM UR: ABNORMAL ML

## 2019-06-26 ENCOUNTER — APPOINTMENT (OUTPATIENT)
Dept: INFUSION THERAPY | Age: 59
End: 2019-06-26
Payer: COMMERCIAL

## 2019-07-10 DIAGNOSIS — C90.01 MULTIPLE MYELOMA IN REMISSION (HCC): ICD-10-CM

## 2019-07-10 RX ORDER — LENALIDOMIDE 5 MG/1
5 CAPSULE ORAL DAILY
Qty: 30 CAP | Refills: 3 | Status: SHIPPED | OUTPATIENT
Start: 2019-07-10 | End: 2019-08-19 | Stop reason: SDUPTHER

## 2019-07-23 ENCOUNTER — OFFICE VISIT (OUTPATIENT)
Dept: FAMILY MEDICINE CLINIC | Age: 59
End: 2019-07-23

## 2019-07-23 VITALS
TEMPERATURE: 97.3 F | BODY MASS INDEX: 34.31 KG/M2 | HEIGHT: 64 IN | HEART RATE: 67 BPM | RESPIRATION RATE: 14 BRPM | SYSTOLIC BLOOD PRESSURE: 137 MMHG | OXYGEN SATURATION: 98 % | DIASTOLIC BLOOD PRESSURE: 69 MMHG | WEIGHT: 201 LBS

## 2019-07-23 DIAGNOSIS — H83.09 LABYRINTHITIS, UNSPECIFIED LATERALITY: Primary | ICD-10-CM

## 2019-07-23 RX ORDER — MECLIZINE HYDROCHLORIDE 25 MG/1
25 TABLET ORAL
Qty: 30 TAB | Refills: 2 | Status: SHIPPED | OUTPATIENT
Start: 2019-07-23 | End: 2019-08-02

## 2019-07-23 NOTE — PROGRESS NOTES
HISTORY OF PRESENT ILLNESS  Alanis Tyson is a 24[address] [facility] y.o. female. HPI 3 day hx vertigo, nausea, no vomiting. NO headache. Still having some difficulty walking. No tinnitus, hearing loss. Had a similar illness several times in the past.     ROS    Physical Exam   Constitutional: She appears well-developed and well-nourished. HENT:   Right Ear: External ear normal.   Left Ear: External ear normal.   Mouth/Throat: Oropharynx is clear and moist.   Neck: No thyromegaly present. Cardiovascular: Normal rate, regular rhythm, normal heart sounds and intact distal pulses. Pulmonary/Chest: Breath sounds normal. She has no wheezes. Abdominal: Soft. Bowel sounds are normal. She exhibits no distension and no mass. There is no tenderness. Musculoskeletal: She exhibits no edema. Lymphadenopathy:     She has no cervical adenopathy. Neurological: No cranial nerve deficit. Cer- fnf intact  Gross motor intact  Gait- normal  Heel to toe - difficulty doing this     Nursing note and vitals reviewed. ASSESSMENT and PLAN  Orders Placed This Encounter    meclizine (ANTIVERT) 25 mg tablet     Diagnoses and all orders for this visit:    1. Labyrinthitis, unspecified laterality    Other orders  -     meclizine (ANTIVERT) 25 mg tablet; Take 1 Tab by mouth three (3) times daily as needed for Dizziness for up to 10 days.

## 2019-07-23 NOTE — PROGRESS NOTES
Chief Complaint   Patient presents with    Dizziness     x 2 days     Nausea    Diarrhea     1. Have you been to the ER, urgent care clinic since your last visit? Hospitalized since your last visit? No    2. Have you seen or consulted any other health care providers outside of the [address] [place] [address] since your last visit? Include any pap smears or colon screening.  No     Health Maintenance Due   Topic Date Due    COLONOSCOPY  09/20/1976    Shingrix Vaccine Age 50> (1 of 2) 09/20/2008    PAP AKA CERVICAL CYTOLOGY  04/02/2015    EYE EXAM RETINAL OR DILATED  04/15/2018    Pneumococcal 0-64 years (2 of 3 - PPSV23) 04/30/2019    BREAST CANCER SCRN MAMMOGRAM  06/12/2019    GLAUCOMA SCREENING Q2Y  09/19/2019

## 2019-07-24 ENCOUNTER — APPOINTMENT (OUTPATIENT)
Dept: INFUSION THERAPY | Age: 59
End: 2019-07-24
Payer: COMMERCIAL

## 2019-07-24 ENCOUNTER — HOSPITAL ENCOUNTER (OUTPATIENT)
Dept: INFUSION THERAPY | Age: 59
Discharge: HOME | End: 2019-07-24
Payer: COMMERCIAL

## 2019-07-24 VITALS
OXYGEN SATURATION: 100 % | HEART RATE: 78 BPM | DIASTOLIC BLOOD PRESSURE: 63 MMHG | SYSTOLIC BLOOD PRESSURE: 126 MMHG | RESPIRATION RATE: 18 BRPM | TEMPERATURE: 98 F

## 2019-07-24 LAB
ALBUMIN SERPL-MCNC: 3.4 G/DL (ref 3.5–5)
ALBUMIN/GLOB SERPL: 1 {RATIO} (ref 1.1–2.2)
ALP SERPL-CCNC: 106 U/L (ref 45–117)
ALT SERPL-CCNC: 22 U/L (ref 12–78)
ANION GAP SERPL CALC-SCNC: 10 MMOL/L (ref 5–15)
AST SERPL-CCNC: 13 U/L (ref 15–37)
BASOPHILS # BLD: 0 K/UL (ref 0–0.1)
BASOPHILS NFR BLD: 1 % (ref 0–1)
BILIRUB SERPL-MCNC: 0.8 MG/DL (ref 0.2–1)
BUN SERPL-MCNC: 11 MG/DL (ref 6–20)
BUN/CREAT SERPL: 10 (ref 12–20)
CALCIUM SERPL-MCNC: 8.2 MG/DL (ref 8.5–10.1)
CHLORIDE SERPL-SCNC: 107 MMOL/L (ref 97–108)
CO2 SERPL-SCNC: 25 MMOL/L (ref 21–32)
CREAT SERPL-MCNC: 1.08 MG/DL (ref 0.55–1.02)
DIFFERENTIAL METHOD BLD: ABNORMAL
EOSINOPHIL # BLD: 0.2 K/UL (ref 0–0.4)
EOSINOPHIL NFR BLD: 3 % (ref 0–7)
ERYTHROCYTE [DISTWIDTH] IN BLOOD BY AUTOMATED COUNT: 14.6 % (ref 11.5–14.5)
GLOBULIN SER CALC-MCNC: 3.3 G/DL (ref 2–4)
GLUCOSE SERPL-MCNC: 223 MG/DL (ref 65–100)
HCT VFR BLD AUTO: 41 % (ref 35–47)
HGB BLD-MCNC: 13.7 G/DL (ref 11.5–16)
IMM GRANULOCYTES # BLD AUTO: 0 K/UL (ref 0–0.04)
IMM GRANULOCYTES NFR BLD AUTO: 0 % (ref 0–0.5)
LYMPHOCYTES # BLD: 1.9 K/UL (ref 0.8–3.5)
LYMPHOCYTES NFR BLD: 39 % (ref 12–49)
MCH RBC QN AUTO: 33.3 PG (ref 26–34)
MCHC RBC AUTO-ENTMCNC: 33.4 G/DL (ref 30–36.5)
MCV RBC AUTO: 99.8 FL (ref 80–99)
MONOCYTES # BLD: 0.4 K/UL (ref 0–1)
MONOCYTES NFR BLD: 9 % (ref 5–13)
NEUTS SEG # BLD: 2.4 K/UL (ref 1.8–8)
NEUTS SEG NFR BLD: 48 % (ref 32–75)
NRBC # BLD: 0 K/UL (ref 0–0.01)
NRBC BLD-RTO: 0 PER 100 WBC
PLATELET # BLD AUTO: 226 K/UL (ref 150–400)
PMV BLD AUTO: 9.5 FL (ref 8.9–12.9)
POTASSIUM SERPL-SCNC: 3.3 MMOL/L (ref 3.5–5.1)
PROT SERPL-MCNC: 6.7 G/DL (ref 6.4–8.2)
RBC # BLD AUTO: 4.11 M/UL (ref 3.8–5.2)
SODIUM SERPL-SCNC: 142 MMOL/L (ref 136–145)
WBC # BLD AUTO: 5 K/UL (ref 3.6–11)

## 2019-07-24 PROCEDURE — 80053 COMPREHEN METABOLIC PANEL: CPT

## 2019-07-24 PROCEDURE — 83883 ASSAY NEPHELOMETRY NOT SPEC: CPT

## 2019-07-24 PROCEDURE — 85025 COMPLETE CBC W/AUTO DIFF WBC: CPT

## 2019-07-24 PROCEDURE — 84165 PROTEIN E-PHORESIS SERUM: CPT

## 2019-07-24 PROCEDURE — 82784 ASSAY IGA/IGD/IGG/IGM EACH: CPT

## 2019-07-24 PROCEDURE — 82232 ASSAY OF BETA-2 PROTEIN: CPT

## 2019-07-24 PROCEDURE — 36415 COLL VENOUS BLD VENIPUNCTURE: CPT

## 2019-07-24 NOTE — PROGRESS NOTES
[facility] Lab Visit:    5937:  Pt arrived ambulatory and in no distress, urine sample collected and labs drawn per order from right hand using butterfly. Departed [facility] ambulatory and in no distress. Pt aware of return visit. Visit Vitals  /63 (BP 1 Location: Left arm, BP Patient Position: Sitting)   Pulse 78   Temp 98 °F (36.7 °C)   Resp 18   SpO2 100%   Breastfeeding? No       Recent Results (from the past 12 hour(s))   CBC WITH AUTOMATED DIFF    Collection Time: 07/24/19  9:45 AM   Result Value Ref Range    WBC 5.0 3.6 - 11.0 K/uL    RBC 4.11 3.80 - 5.20 M/uL    HGB 13.7 11.5 - 16.0 g/dL    HCT 41.0 35.0 - 47.0 %    MCV 99.8 (H) 80.0 - 99.0 FL    MCH 33.3 26.0 - 34.0 PG    MCHC 33.4 30.0 - 36.5 g/dL    RDW 14.6 (H) 11.5 - 14.5 %    PLATELET 240 188 - 260 K/uL    MPV 9.5 8.9 - 12.9 FL    NRBC 0.0 0  WBC    ABSOLUTE NRBC 0.00 0.00 - 0.01 K/uL    NEUTROPHILS 48 32 - 75 %    LYMPHOCYTES 39 12 - 49 %    MONOCYTES 9 5 - 13 %    EOSINOPHILS 3 0 - 7 %    BASOPHILS 1 0 - 1 %    IMMATURE GRANULOCYTES 0 0.0 - 0.5 %    ABS. NEUTROPHILS 2.4 1.8 - 8.0 K/UL    ABS. LYMPHOCYTES 1.9 0.8 - 3.5 K/UL    ABS. MONOCYTES 0.4 0.0 - 1.0 K/UL    ABS. EOSINOPHILS 0.2 0.0 - 0.4 K/UL    ABS. BASOPHILS 0.0 0.0 - 0.1 K/UL    ABS. IMM. GRANS. 0.0 0.00 - 0.04 K/UL    DF AUTOMATED     .

## 2019-07-25 LAB
B2 MICROGLOB SERPL-MCNC: 1.6 MG/L (ref 0.6–2.4)
COLLECT DURATION TIME UR: ABNORMAL HR
IGA SERPL-MCNC: 73 MG/DL (ref 87–352)
IGG SERPL-MCNC: 906 MG/DL (ref 700–1600)
IGM SERPL-MCNC: 9 MG/DL (ref 26–217)
KAPPA LC FREE SER-MCNC: 14.2 MG/L (ref 3.3–19.4)
KAPPA LC FREE/LAMBDA FREE SER: 1.51 {RATIO} (ref 0.26–1.65)
KAPPA LC UR-MCNC: 185 MG/L (ref 1.35–24.19)
KAPPA LC/LAMBDA UR: 17.62 {RATIO} (ref 2.04–10.37)
LAMBDA LC FREE SERPL-MCNC: 9.4 MG/L (ref 5.7–26.3)
LAMBDA LC UR-MCNC: 10.5 MG/L (ref 0.24–6.66)
PROT PATTERN SERPL IFE-IMP: ABNORMAL
SPECIMEN VOL ?TM UR: ABNORMAL ML

## 2019-07-26 LAB
ALBUMIN SERPL ELPH-MCNC: 3.6 G/DL (ref 2.9–4.4)
ALBUMIN/GLOB SERPL: 1.3 {RATIO} (ref 0.7–1.7)
ALPHA1 GLOB SERPL ELPH-MCNC: 0.2 G/DL (ref 0–0.4)
ALPHA2 GLOB SERPL ELPH-MCNC: 0.8 G/DL (ref 0.4–1)
B-GLOBULIN SERPL ELPH-MCNC: 0.9 G/DL (ref 0.7–1.3)
GAMMA GLOB SERPL ELPH-MCNC: 0.9 G/DL (ref 0.4–1.8)
GLOBULIN SER CALC-MCNC: 2.8 G/DL (ref 2.2–3.9)
M PROTEIN SERPL ELPH-MCNC: 0.2 G/DL
PROT SERPL-MCNC: 6.4 G/DL (ref 6–8.5)

## 2019-08-02 ENCOUNTER — OFFICE VISIT (OUTPATIENT)
Dept: ONCOLOGY | Age: 59
End: 2019-08-02

## 2019-08-02 VITALS
TEMPERATURE: 97.6 F | OXYGEN SATURATION: 96 % | SYSTOLIC BLOOD PRESSURE: 120 MMHG | RESPIRATION RATE: 16 BRPM | HEART RATE: 67 BPM | BODY MASS INDEX: 34.79 KG/M2 | HEIGHT: 64 IN | DIASTOLIC BLOOD PRESSURE: 80 MMHG | WEIGHT: 203.8 LBS

## 2019-08-02 DIAGNOSIS — T45.1X5A CHEMOTHERAPY INDUCED NEUTROPENIA (HCC): Primary | ICD-10-CM

## 2019-08-02 DIAGNOSIS — D70.1 CHEMOTHERAPY INDUCED NEUTROPENIA (HCC): Primary | ICD-10-CM

## 2019-08-02 DIAGNOSIS — C90.00 MULTIPLE MYELOMA, REMISSION STATUS UNSPECIFIED (HCC): ICD-10-CM

## 2019-08-02 NOTE — PROGRESS NOTES
Vandana Schaeffer is a 59 y.o. female    Chief Complaint   Patient presents with    Follow-up     3 mo f/u     Multiple Myeloma     Pt states the gabapentin hasn't been helping her at all so she has stopped taking it. Visit Vitals  /80 (BP 1 Location: Left arm, BP Patient Position: Sitting)   Pulse 67   Temp 97.6 °F (36.4 °C) (Oral)   Resp 16   Ht 5' 4\" (1.626 m)   Wt 203 lb 12.8 oz (92.4 kg)   SpO2 96%   BMI 34.98 kg/m²       1. Have you been to the ER, urgent care clinic since your last visit? Hospitalized since your last visit? NO    2. Have you seen or consulted any other health care providers outside of the [facility] since your last visit? Include any pap smears or colon screening.   NO

## 2019-08-02 NOTE — Clinical Note
Could you please reach out to [facility] BMT co ordinator and inform them there is a new M spike of 0.2 and increase in urine light chains- she is on 5 mg of revlimid and see if they have recommendations

## 2019-08-02 NOTE — PROGRESS NOTES
Hematology Follow Up    REASON FOR VISIT: Multiple Myeloma     TREATMENT:   3/24/17- 9/15/17: [facility] X 8   10/20/17: Autologous transplant at [facility]  2/22/18 -  Revlimind     HISTORY OF PRESENT ILLNESS: Ms. Omi Bernstein is a 59 y.o. female with DM and  Multiple Myeloma who presents to follow up after the ASCT and is on maintenance Revlimid 10mg daily. She was reduced to 5 mg due to cytopenias. Comes for follow up. She remains on Revlimid 5 mg daily. She still has neuropathy. No longer has a rash. No sores in her mouth. She has constant numbness of her toes. No fevers, chills, sores. No Leg swelling. Has no change in weight or appetite. No new pain    Oncologic history  Patient had left facial numbness which started in the summer of 2016. This started abruptly and was not associated with rashes, pain, jaw weakness. She has had some intermittent hyperemia of the L eye. No tearing. She has been evaluated by Dr. Gracy Hauser with Neurology and an extensive work up was only notable for presence of a 0.3 g/dl M spike. Other tests were unremarkable: Vitamin B12 411, thyroid function test normal, ACE 25, BHARATI normal CBC normal, CMP normal, CRP 1.17, CT head and cervical spine unremarkable. Further evaluation revealed findings consistent with Multiple Myeloma    CBC 2/24 Unremarkable. Kappa 17 mg/dl, Lambda 2416 (ratio 0.01), SPEP 0.2 g/dl M spike, HANSEL showed monoclonal free lambda light chains, UPEP negative, Beta 2 Microglobulin 1.8 mg/L, Skeletal survey negative for lytic lesions, Bone marrow biopsy on 2/24/17 showed a Normocellular marrow with mild monoclonal plasmacytosis (15-20%). Trilineage hematopoiesis with maturation. She had a very good partial response and 8 cycles of VRD and then proceeded on to receive an autologous stem cell transplant on 10/20/17 at [facility]. She had a CR posttransplant.  Started maintenance Revlimid in Jan 2018      Past Medical History:   Diagnosis Date    Cardiomyopathy     Diabetes mellitus type 2, controlled ([facility] 12/10/2015    Heart failure (HCC)     Hyperlipidemia     Hypertension     controlled    Multiple myeloma (HCC)     Orthostatic hypotension     Secondary cardiomyopathy, unspecified     Sinoatrial node dysfunction ([facility]     Vitamin B12 deficiency 3/31/2010       Past Surgical History:   Procedure Laterality Date    HX HYSTERECTOMY         Allergies   Allergen Reactions    Ace Inhibitors Cough    Compazine [Prochlorperazine] Nausea Only     rash       Current Outpatient Medications   Medication Sig Dispense Refill    meclizine (ANTIVERT) 25 mg tablet Take 1 Tab by mouth three (3) times daily as needed for Dizziness for up to 10 days. 30 Tab 2    lenalidomide (REVLIMID) 5 mg cap Take 5 mg by mouth daily. 30 Cap 3    glimepiride (AMARYL) 1 mg tablet Take 1 Tab by mouth Daily (before breakfast). For diabetes 30 Tab 12    amLODIPine (NORVASC) 5 mg tablet Take 1 Tab by mouth daily. For blood pressure 30 Tab 12    carvedilol (COREG) 25 mg tablet Take 1 Tab by mouth two (2) times a day. 60 Tab 12    cyanocobalamin (VITAMIN B-12) 1,000 mcg sublingual tablet Take 1,000 mcg by mouth daily.  atorvastatin (LIPITOR) 40 mg tablet TAKE 1 TAB BY MOUTH DAILY. FOR CHOLESTEROL 30 Tab 9    loperamide (IMMODIUM) 2 mg tablet Take 2 mg by mouth as needed for Diarrhea.  Biotin 2,500 mcg cap Take  by mouth daily.  gabapentin (NEURONTIN) 300 mg capsule Take 1 Cap by mouth three (3) times daily.  (Patient not taking: Reported on 8/2/2019) 90 Cap 2       Social History     Socioeconomic History    Marital status:      Spouse name: Not on file    Number of children: Not on file    Years of education: Not on file    Highest education level: Not on file   Tobacco Use    Smoking status: Never Smoker    Smokeless tobacco: Never Used   Substance and Sexual Activity    Alcohol use: No    Drug use: No    Sexual activity: Yes     Partners: Male   Social History Narrative    , 2 children, 28 and 31. Works at [employer], for 35 years. 5 grandchildren       Family History   Problem Relation Age of Onset   Gregg Carry Cancer Mother     Heart Disease Mother         pacemaker   Gregg Carry Diabetes Mother    Gregg Carry Breast Cancer Mother     Hypertension Sister     Hypertension Brother     Diabetes Brother     Hypertension Sister     Hypertension Sister     Hypertension Sister     Hypertension Sister     Diabetes Sister        ROS  As reviewed in the HPI all others reviewed and negative. ECOG PS is 0    Physical Examination:   Visit Vitals  /80 (BP 1 Location: Left arm, BP Patient Position: Sitting)   Pulse 67   Temp 97.6 °F (36.4 °C) (Oral)   Resp 16   Ht 5' 4\" (1.626 m)   Wt 203 lb 12.8 oz (92.4 kg)   SpO2 96%   BMI 34.98 kg/m²     General appearance - alert, well appearing, and in no distress  Mental status - oriented to person, place, and time  Mouth - mucous membranes moist, pharynx normal without lesions  Lymphatics - no palpable lymphadenopathy, no hepatosplenomegaly  Chest - clear to auscultation, no wheezes, rales or rhonchi, symmetric air entry  Heart - normal rate, regular rhythm, normal S1, S2, no murmurs, rubs, clicks or gallops  Abdomen - soft, nontender, nondistended, no masses or organomegaly, bowel sounds present  Ext -  No edema    LABS  Lab Results   Component Value Date/Time    WBC 5.0 07/24/2019 09:45 AM    HGB 13.7 07/24/2019 09:45 AM    HCT 41.0 07/24/2019 09:45 AM    PLATELET 714 45/51/4749 09:45 AM    MCV 99.8 (H) 07/24/2019 09:45 AM    ABS.  NEUTROPHILS 2.4 07/24/2019 09:45 AM     Lab Results   Component Value Date/Time    Sodium 142 07/24/2019 09:45 AM    Potassium 3.3 (L) 07/24/2019 09:45 AM    Chloride 107 07/24/2019 09:45 AM    CO2 25 07/24/2019 09:45 AM    Glucose 223 (H) 07/24/2019 09:45 AM    BUN 11 07/24/2019 09:45 AM    Creatinine 1.08 (H) 07/24/2019 09:45 AM    GFR est AA >60 07/24/2019 09:45 AM    GFR est non-AA 52 (L) 07/24/2019 09:45 AM    Calcium 8.2 (L) 07/24/2019 09:45 AM     Lab Results   Component Value Date/Time    AST (SGOT) 13 (L) 07/24/2019 09:45 AM    Alk. phosphatase 106 07/24/2019 09:45 AM    Protein, total 6.7 07/24/2019 09:45 AM    Protein, total 6.4 07/24/2019 09:45 AM    Albumin 3.4 (L) 07/24/2019 09:45 AM    Globulin 3.3 07/24/2019 09:45 AM    A-G Ratio 1.0 (L) 07/24/2019 09:45 AM     Lab Results   Component Value Date/Time    Vitamin B12 411 12/07/2016 02:58 PM     02/16/2017 09:18 AM    Beta-2 Microglobulin, serum 1.6 07/24/2019 09:45 AM    C-Reactive Protein, Cardiac 1.17 12/07/2016 02:58 PM    TSH 1.100 12/07/2016 02:58 PM    M-Mika 0.2 (H) 07/24/2019 09:45 AM    M-Mika Not Observed 07/10/2018 07:46 AM    Hep C Virus Ab <0.1 05/17/2016 09:56 AM     Lab Results   Component Value Date/Time    INR 1.0 07/27/2010 03:57 PM    aPTT 31.5 07/27/2010 03:57 PM       URINE  Component      Latest Ref Rng & Units 7/24/2019           9:45 AM   Free Kappa Lt Chains, urine      1.35 - 24.19 mg/L 185.00 (H)   Free Lambda Lt Chains, urine      0.24 - 6.66 mg/L 10.50 (H)   Kappa/Lambda Ratio, urine      2.04 - 10.37   17.62 (H)     Bone marrow biopsy reviewed    FISH molecular analysis:    Positive for IgH gene rearrangement (IgH complex FISH study pending); Normal results with 1, 5, 9, 13, 15, and 17 (TP53) probe sets. External records reviewed from [facility] transplant transplant bone marrow biopsy done on 12/19/17 showed no evidence of plasma cells. Variable cellularity from 0-50%, flow LUIS, FISH negative    ASSESSMENT  Ms. Remington Vasquez is a 59 y.o. female with standard risk multiple myeloma status post 6 cycles of twice daily and autologous stem cell transplant on 10/20/17. She is in a complete remission. We will continue with close surveillance in conjunction with her care team at [facility]  Multiple myeloma  On revlimid maintenance of 10mg daily following transplant at [facility]. Dropped to 5 mg due to recurrent grade 3 neutropenia (kleber 0.4).    She has done well with this, rash has not recurred. Labs from 7/24/19 reviewed    It is noted that this time she has a small M spike of 0.2 and her UPEP has shown slowly increasing Light chains. Some fluctuations may occur though a relapse remains a possibility if this is persistent    We will repeat labs in 1 month and if has a persistent increase in her paraproteins will send back to [facility]  We will update [facility] as well      Repeat cbc, cmp, gammopathy eval and UPEP in 1 month    Follow up at [facility] in Nov 2019    Neuropathy  Secondary to Velcade. Overall stable. DM  Per PCP. Neutropenia, chemotherapy induced    Resolved    HM  Discussed screening Colonoscopy. She will speak with Dr. Susana Mart about this      Return to clinic in 1 month with labs prior as above.           Signed by: Naomi Monsalve MD                     August 2, 2019

## 2019-08-07 ENCOUNTER — OFFICE VISIT (OUTPATIENT)
Dept: CARDIOLOGY CLINIC | Age: 59
End: 2019-08-07

## 2019-08-07 DIAGNOSIS — Z95.810 AICD (AUTOMATIC CARDIOVERTER/DEFIBRILLATOR) PRESENT: Primary | ICD-10-CM

## 2019-08-07 DIAGNOSIS — I42.9 CARDIOMYOPATHY, UNSPECIFIED TYPE (HCC): ICD-10-CM

## 2019-08-19 DIAGNOSIS — C90.01 MULTIPLE MYELOMA IN REMISSION (HCC): ICD-10-CM

## 2019-08-19 RX ORDER — LENALIDOMIDE 5 MG/1
10 CAPSULE ORAL DAILY
Qty: 30 CAP | Refills: 6 | Status: SHIPPED | OUTPATIENT
Start: 2019-08-19 | End: 2019-08-26 | Stop reason: DRUGHIGH

## 2019-08-19 NOTE — PROGRESS NOTES
Received email from [address] [place] [address] BMT regarding m-spike of 0.2 and increase in FLC. Recommendation from [facility] BMT MD is to increase Revlimid to 10mg daily. Will order.

## 2019-08-22 NOTE — PROGRESS NOTES
Called pt verified x 2 and informed per Dr Danielle Richardson  we heard back from [facility]. Dr. Harding Prom recommendation is to increase Revlimid to 10mg daily. New prescription written, please process to speciality pharmacy. Have her call us when she receives increase dose.   I expressed understanding and had no other questions

## 2019-08-26 DIAGNOSIS — C90.01 MULTIPLE MYELOMA IN REMISSION (HCC): Primary | ICD-10-CM

## 2019-08-26 RX ORDER — LENALIDOMIDE 10 MG/1
10 CAPSULE ORAL DAILY
Qty: 30 CAP | Refills: 6 | Status: SHIPPED | OUTPATIENT
Start: 2019-08-26 | End: 2019-10-21 | Stop reason: SDUPTHER

## 2019-08-28 ENCOUNTER — HOSPITAL ENCOUNTER (OUTPATIENT)
Dept: INFUSION THERAPY | Age: 59
Discharge: HOME | End: 2019-08-28
Payer: COMMERCIAL

## 2019-08-28 VITALS
DIASTOLIC BLOOD PRESSURE: 68 MMHG | SYSTOLIC BLOOD PRESSURE: 137 MMHG | TEMPERATURE: 97.9 F | OXYGEN SATURATION: 100 % | HEART RATE: 68 BPM | RESPIRATION RATE: 18 BRPM

## 2019-08-28 LAB
ALBUMIN SERPL-MCNC: 3.3 G/DL (ref 3.5–5)
ALBUMIN/GLOB SERPL: 1 {RATIO} (ref 1.1–2.2)
ALP SERPL-CCNC: 97 U/L (ref 45–117)
ALT SERPL-CCNC: 22 U/L (ref 12–78)
ANION GAP SERPL CALC-SCNC: 11 MMOL/L (ref 5–15)
AST SERPL-CCNC: 15 U/L (ref 15–37)
BASOPHILS # BLD: 0 K/UL (ref 0–0.1)
BASOPHILS NFR BLD: 1 % (ref 0–1)
BILIRUB SERPL-MCNC: 1.1 MG/DL (ref 0.2–1)
BUN SERPL-MCNC: 11 MG/DL (ref 6–20)
BUN/CREAT SERPL: 10 (ref 12–20)
CALCIUM SERPL-MCNC: 8.6 MG/DL (ref 8.5–10.1)
CHLORIDE SERPL-SCNC: 106 MMOL/L (ref 97–108)
CO2 SERPL-SCNC: 24 MMOL/L (ref 21–32)
CREAT SERPL-MCNC: 1.08 MG/DL (ref 0.55–1.02)
DIFFERENTIAL METHOD BLD: ABNORMAL
EOSINOPHIL # BLD: 0.2 K/UL (ref 0–0.4)
EOSINOPHIL NFR BLD: 4 % (ref 0–7)
ERYTHROCYTE [DISTWIDTH] IN BLOOD BY AUTOMATED COUNT: 15 % (ref 11.5–14.5)
GLOBULIN SER CALC-MCNC: 3.3 G/DL (ref 2–4)
GLUCOSE SERPL-MCNC: 202 MG/DL (ref 65–100)
HCT VFR BLD AUTO: 37.6 % (ref 35–47)
HGB BLD-MCNC: 12.4 G/DL (ref 11.5–16)
IMM GRANULOCYTES # BLD AUTO: 0 K/UL (ref 0–0.04)
IMM GRANULOCYTES NFR BLD AUTO: 0 % (ref 0–0.5)
LYMPHOCYTES # BLD: 1.6 K/UL (ref 0.8–3.5)
LYMPHOCYTES NFR BLD: 39 % (ref 12–49)
MCH RBC QN AUTO: 33.1 PG (ref 26–34)
MCHC RBC AUTO-ENTMCNC: 33 G/DL (ref 30–36.5)
MCV RBC AUTO: 100.3 FL (ref 80–99)
MONOCYTES # BLD: 0.5 K/UL (ref 0–1)
MONOCYTES NFR BLD: 12 % (ref 5–13)
NEUTS SEG # BLD: 1.9 K/UL (ref 1.8–8)
NEUTS SEG NFR BLD: 44 % (ref 32–75)
NRBC # BLD: 0 K/UL (ref 0–0.01)
NRBC BLD-RTO: 0 PER 100 WBC
PLATELET # BLD AUTO: 213 K/UL (ref 150–400)
PMV BLD AUTO: 8.7 FL (ref 8.9–12.9)
POTASSIUM SERPL-SCNC: 3.6 MMOL/L (ref 3.5–5.1)
PROT SERPL-MCNC: 6.6 G/DL (ref 6.4–8.2)
RBC # BLD AUTO: 3.75 M/UL (ref 3.8–5.2)
SODIUM SERPL-SCNC: 141 MMOL/L (ref 136–145)
WBC # BLD AUTO: 4.2 K/UL (ref 3.6–11)

## 2019-08-28 PROCEDURE — 80053 COMPREHEN METABOLIC PANEL: CPT

## 2019-08-28 PROCEDURE — 36415 COLL VENOUS BLD VENIPUNCTURE: CPT

## 2019-08-28 PROCEDURE — 82232 ASSAY OF BETA-2 PROTEIN: CPT

## 2019-08-28 PROCEDURE — 83883 ASSAY NEPHELOMETRY NOT SPEC: CPT

## 2019-08-28 PROCEDURE — 85025 COMPLETE CBC W/AUTO DIFF WBC: CPT

## 2019-08-28 PROCEDURE — 84165 PROTEIN E-PHORESIS SERUM: CPT

## 2019-08-28 PROCEDURE — 82784 ASSAY IGA/IGD/IGG/IGM EACH: CPT

## 2019-08-28 NOTE — PROGRESS NOTES
[facility] Lab Visit:    0820:  Pt arrived ambulatory and in no distress for labs. Labs drawn peripherally without difficulty. Patient Vitals for the past 12 hrs:   Temp Pulse Resp BP SpO2   08/28/19 0824 97.9 °F (36.6 °C) 68 18 137/68 100 %     LABS PENDING IN [facility]  0830:  Departed [facility] ambulatory and in no distress.

## 2019-08-29 LAB
ALBUMIN SERPL ELPH-MCNC: 3.5 G/DL (ref 2.9–4.4)
ALBUMIN/GLOB SERPL: 1.3 {RATIO} (ref 0.7–1.7)
ALPHA1 GLOB SERPL ELPH-MCNC: 0.2 G/DL (ref 0–0.4)
ALPHA2 GLOB SERPL ELPH-MCNC: 0.7 G/DL (ref 0.4–1)
B-GLOBULIN SERPL ELPH-MCNC: 0.9 G/DL (ref 0.7–1.3)
B2 MICROGLOB SERPL-MCNC: 1.5 MG/L (ref 0.6–2.4)
COLLECT DURATION TIME UR: ABNORMAL HR
GAMMA GLOB SERPL ELPH-MCNC: 0.8 G/DL (ref 0.4–1.8)
GLOBULIN SER CALC-MCNC: 2.6 G/DL (ref 2.2–3.9)
KAPPA LC FREE SER-MCNC: 13.8 MG/L (ref 3.3–19.4)
KAPPA LC FREE/LAMBDA FREE SER: 0.87 {RATIO} (ref 0.26–1.65)
KAPPA LC UR-MCNC: 142 MG/L (ref 1.35–24.19)
KAPPA LC/LAMBDA UR: 16.8 {RATIO} (ref 2.04–10.37)
LAMBDA LC FREE SERPL-MCNC: 15.8 MG/L (ref 5.7–26.3)
LAMBDA LC UR-MCNC: 8.45 MG/L (ref 0.24–6.66)
M PROTEIN SERPL ELPH-MCNC: NORMAL G/DL
PROT SERPL-MCNC: 6.1 G/DL (ref 6–8.5)
SPECIMEN VOL ?TM UR: ABNORMAL ML

## 2019-08-30 LAB
IGA SERPL-MCNC: 74 MG/DL (ref 87–352)
IGG SERPL-MCNC: 916 MG/DL (ref 700–1600)
IGM SERPL-MCNC: 8 MG/DL (ref 26–217)
PROT PATTERN SERPL IFE-IMP: ABNORMAL

## 2019-09-19 ENCOUNTER — OFFICE VISIT (OUTPATIENT)
Dept: ONCOLOGY | Age: 59
End: 2019-09-19

## 2019-09-19 VITALS
TEMPERATURE: 99.6 F | BODY MASS INDEX: 35.01 KG/M2 | WEIGHT: 205.1 LBS | HEART RATE: 61 BPM | HEIGHT: 64 IN | OXYGEN SATURATION: 96 % | RESPIRATION RATE: 16 BRPM | DIASTOLIC BLOOD PRESSURE: 83 MMHG | SYSTOLIC BLOOD PRESSURE: 127 MMHG

## 2019-09-19 DIAGNOSIS — C90.00 MULTIPLE MYELOMA, REMISSION STATUS UNSPECIFIED (HCC): Primary | ICD-10-CM

## 2019-09-19 NOTE — PROGRESS NOTES
Hematology Follow Up    REASON FOR VISIT: Multiple Myeloma     TREATMENT:   3/24/17- 9/15/17: Nanetta Pretty X 8   10/20/17: Autologous transplant at [facility]  2/22/18 -  Revlimind     HISTORY OF PRESENT ILLNESS: Ms. Abby Weathers is a 59 y.o. female with DM and  Multiple Myeloma who presents to follow up after the ASCT and is on maintenance Revlimid 10mg daily. She was reduced to 5 mg due to cytopenias. Comes for follow up. She is back on Revlimid 10 mg daily. Tolerating this well. She still has neuropathy. No longer has a rash. No sores in her mouth. She has constant numbness of her toes. No fevers, chills, sores. No Leg swelling. Has no change in weight or appetite. No new pain. She has fatigue. Oncologic history  Patient had left facial numbness which started in the summer of 2016. This started abruptly and was not associated with rashes, pain, jaw weakness. She has had some intermittent hyperemia of the L eye. No tearing. She has been evaluated by Dr. Brianne Platt with Neurology and an extensive work up was only notable for presence of a 0.3 g/dl M spike. Other tests were unremarkable: Vitamin B12 411, thyroid function test normal, ACE 25, BHARATI normal CBC normal, CMP normal, CRP 1.17, CT head and cervical spine unremarkable. Further evaluation revealed findings consistent with Multiple Myeloma    CBC 2/24 Unremarkable. Kappa 17 mg/dl, Lambda 2416 (ratio 0.01), SPEP 0.2 g/dl M spike, HANSEL showed monoclonal free lambda light chains, UPEP negative, Beta 2 Microglobulin 1.8 mg/L, Skeletal survey negative for lytic lesions, Bone marrow biopsy on 2/24/17 showed a Normocellular marrow with mild monoclonal plasmacytosis (15-20%). Trilineage hematopoiesis with maturation. She had a very good partial response and 8 cycles of VRD and then proceeded on to receive an autologous stem cell transplant on 10/20/17 at [facility]. She had a CR posttransplant.  Started maintenance Revlimid in Jan 2018      Past Medical History:   Diagnosis Date    Cardiomyopathy     [facility] 12/10/2015    Heart failure (HCC)     Hyperlipidemia     Hypertension     controlled    [facility] myeloma ([facility]     Orthostatic hypotension     Secondary cardiomyopathy, unspecified     Sinoatrial node dysfunction ([facility]     Vitamin B12 deficiency 3/31/2010       Past Surgical History:   Procedure Laterality Date    HX HYSTERECTOMY         Allergies   Allergen Reactions    Ace Inhibitors Cough    Compazine [Prochlorperazine] Nausea Only     rash       Current Outpatient Medications   Medication Sig Dispense Refill    lenalidomide (REVLIMID) 10 mg cap Take 1 Cap by mouth daily. 30 Cap 6    glimepiride (AMARYL) 1 mg tablet Take 1 Tab by mouth Daily (before breakfast). For diabetes 30 Tab 12    amLODIPine (NORVASC) 5 mg tablet Take 1 Tab by mouth daily. For blood pressure 30 Tab 12    carvedilol (COREG) 25 mg tablet Take 1 Tab by mouth two (2) times a day. 60 Tab 12    cyanocobalamin (VITAMIN B-12) 1,000 mcg sublingual tablet Take 1,000 mcg by mouth daily.  atorvastatin (LIPITOR) 40 mg tablet TAKE 1 TAB BY MOUTH DAILY. FOR CHOLESTEROL 30 Tab 9    Biotin 2,500 mcg cap Take  by mouth daily.  gabapentin (NEURONTIN) 300 mg capsule Take 1 Cap by mouth three (3) times daily. (Patient not taking: Reported on 8/2/2019) 90 Cap 2    loperamide (IMMODIUM) 2 mg tablet Take 2 mg by mouth as needed for Diarrhea. Social History     Socioeconomic History    Marital status:      Spouse name: Not on file    Number of children: Not on file    Years of education: Not on file    Highest education level: Not on file   Tobacco Use    Smoking status: Never Smoker    Smokeless tobacco: Never Used   Substance and Sexual Activity    Alcohol use: No    Drug use: No    Sexual activity: Yes     Partners: Male   Social History Narrative    , 2 children, 28 and 31. Works at [employer], for 35 years.  5 grandchildren       Family History   Problem Relation Age of Onset   [facility] Cancer Mother     Heart Disease Mother         pacemaker   [address] [facility] Diabetes Mother    [facility] Breast Cancer Mother     Hypertension Sister     Hypertension Brother     Diabetes Brother     Hypertension Sister     Hypertension Sister     Hypertension Sister     Hypertension Sister     Diabetes Sister        ROS  As reviewed in the HPI all others reviewed and negative. ECOG PS is 0    Physical Examination:   Visit Vitals  Ht 5' 4\" (1.626 m)   BMI 34.98 kg/m²     /83    General appearance - alert, well appearing, and in no distress  Mental status - oriented to person, place, and time  Mouth - mucous membranes moist, pharynx normal without lesions  Lymphatics - no palpable lymphadenopathy, no hepatosplenomegaly  Chest - clear to auscultation, no wheezes, rales or rhonchi, symmetric air entry  Heart - normal rate, regular rhythm, normal S1, S2, no murmurs, rubs, clicks or gallops  Abdomen - soft, nontender, nondistended, no masses or organomegaly, bowel sounds present  Ext -  No edema    LABS  Lab Results   Component Value Date/Time    WBC 4.2 08/28/2019 08:22 AM    HGB 12.4 08/28/2019 08:22 AM    HCT 37.6 08/28/2019 08:22 AM    PLATELET 216 05/85/9410 08:22 AM    .3 (H) 08/28/2019 08:22 AM    ABS. NEUTROPHILS 1.9 08/28/2019 08:22 AM     Lab Results   Component Value Date/Time    Sodium 141 08/28/2019 08:22 AM    Potassium 3.6 08/28/2019 08:22 AM    Chloride 106 08/28/2019 08:22 AM    CO2 24 08/28/2019 08:22 AM    Glucose 202 (H) 08/28/2019 08:22 AM    BUN 11 08/28/2019 08:22 AM    Creatinine 1.08 (H) 08/28/2019 08:22 AM    GFR est AA >60 08/28/2019 08:22 AM    GFR est non-AA 52 (L) 08/28/2019 08:22 AM    Calcium 8.6 08/28/2019 08:22 AM     Lab Results   Component Value Date/Time    AST (SGOT) 15 08/28/2019 08:22 AM    Alk.  phosphatase 97 08/28/2019 08:22 AM    Protein, total 6.6 08/28/2019 08:22 AM    Protein, total 6.1 08/28/2019 08:22 AM Albumin 3.3 (L) 08/28/2019 08:22 AM    Globulin 3.3 08/28/2019 08:22 AM    A-G Ratio 1.0 (L) 08/28/2019 08:22 AM     Lab Results   Component Value Date/Time    Vitamin B12 411 12/07/2016 02:58 PM     02/16/2017 09:18 AM    Beta-2 Microglobulin, serum 1.5 08/28/2019 08:22 AM    C-Reactive Protein, Cardiac 1.17 12/07/2016 02:58 PM    TSH 1.100 12/07/2016 02:58 PM    M-Mika Not Observed 08/28/2019 08:22 AM    M-Mika Not Observed 07/10/2018 07:46 AM    Hep C Virus Ab <0.1 05/17/2016 09:56 AM     Lab Results   Component Value Date/Time    INR 1.0 07/27/2010 03:57 PM    aPTT 31.5 07/27/2010 03:57 PM       URINE  Component      Latest Ref Rng & Units 7/24/2019           9:45 AM   Free Kappa Lt Chains, urine      1.35 - 24.19 mg/L 185.00 (H)   Free Lambda Lt Chains, urine      0.24 - 6.66 mg/L 10.50 (H)   Kappa/Lambda Ratio, urine      2.04 - 10.37   17.62 (H)     Bone marrow biopsy reviewed    FISH molecular analysis:    Positive for IgH gene rearrangement (IgH complex FISH study pending); Normal results with 1, 5, 9, 13, 15, and 17 (TP53) probe sets. External records reviewed from [facility] transplant transplant bone marrow biopsy done on 12/19/17 showed no evidence of plasma cells. Variable cellularity from 0-50%, flow LUIS, FISH negative    ASSESSMENT  Ms. Jodie Queen is a 59 y.o. female with standard risk multiple myeloma status post 6 cycles of twice daily and autologous stem cell transplant on 10/20/17. She is in a complete remission. We will continue with close surveillance in conjunction with her care team at [facility]  Multiple myeloma  On revlimid maintenance of 10mg daily following transplant at [facility]. Dropped to 5 mg due to recurrent grade 3 neutropenia (kleber 0.4). She has done well with this, rash has not recurred. As there was a temporary increase in M spike and hence Revlimid was increased again to 10 mg. She is doing well on this.      Labs from 8/28/19 reviewed    Though there are still some light chains on UPEP, they is no M spike and serum FLC is normal. Counts look great! Some fluctuations may occur though a relapse remains a possibility if this is persistent    Hence I dont think we are dealing with a relapse. Repeat cbc, cm, gammopathy eval in 2 months    Follow up at [address] [place] [address] in Oct 2019    Neuropathy  Secondary to Velcade. Overall stable. DM  Per PCP.      Neutropenia, chemotherapy induced    Resolved      Return to clinic in 2 months with labs prior as above- after seen at [place] [address] by: Donnie Agrawal MD                     September 19, 2019

## 2019-09-23 ENCOUNTER — OFFICE VISIT (OUTPATIENT)
Dept: FAMILY MEDICINE CLINIC | Age: 59
End: 2019-09-23

## 2019-09-23 VITALS
SYSTOLIC BLOOD PRESSURE: 140 MMHG | DIASTOLIC BLOOD PRESSURE: 88 MMHG | TEMPERATURE: 96.8 F | RESPIRATION RATE: 18 BRPM | OXYGEN SATURATION: 95 % | HEART RATE: 66 BPM | BODY MASS INDEX: 34.89 KG/M2 | HEIGHT: 64 IN | WEIGHT: 204.4 LBS

## 2019-09-23 DIAGNOSIS — E11.9 CONTROLLED TYPE 2 DIABETES MELLITUS WITHOUT COMPLICATION, WITHOUT LONG-TERM CURRENT USE OF INSULIN (HCC): Primary | ICD-10-CM

## 2019-09-23 DIAGNOSIS — E78.00 HYPERCHOLESTEROLEMIA: ICD-10-CM

## 2019-09-23 DIAGNOSIS — R19.7 DIARRHEA, UNSPECIFIED TYPE: ICD-10-CM

## 2019-09-23 DIAGNOSIS — E66.01 SEVERE OBESITY (HCC): ICD-10-CM

## 2019-09-23 DIAGNOSIS — K52.9 CHRONIC DIARRHEA: ICD-10-CM

## 2019-09-23 LAB — HBA1C MFR BLD HPLC: 6.6 %

## 2019-09-23 RX ORDER — GLUCOSAMINE SULFATE 1500 MG
POWDER IN PACKET (EA) ORAL DAILY
COMMUNITY
End: 2020-01-14

## 2019-09-23 NOTE — PROGRESS NOTES
HISTORY OF PRESENT ILLNESS  Saroj Ma is a 62 y.o. female. HPI in for followup. Has been having some diarrhea after eating for the last 2 months. NO weight loss. No blood in stools. Occasional mild lower abdominal pains. Imodium- minimal relief. Needs blood pressure and diabetes checked also. ROS    Physical Exam   Constitutional: She is oriented to person, place, and time. She appears well-developed and well-nourished. Neck: No thyromegaly present. Cardiovascular: Normal rate, regular rhythm and normal heart sounds. No murmur heard. Pulmonary/Chest: Effort normal and breath sounds normal. She has no wheezes. Abdominal: Soft. Bowel sounds are normal. She exhibits no distension. There is no tenderness. There is no rebound and no guarding. Musculoskeletal: Normal range of motion. She exhibits no edema. Lymphadenopathy:     She has no cervical adenopathy. Neurological: She is alert and oriented to person, place, and time. Nursing note and vitals reviewed. ASSESSMENT and PLAN  Orders Placed This Encounter    CBC WITH AUTOMATED DIFF    LIPID PANEL    METABOLIC PANEL, COMPREHENSIVE    MICROALBUMIN, UR, RAND W/ MICROALB/CREAT RATIO    CELIAC ANTIBODY PROFILE    [facility]    AMB POC HEMOGLOBIN A1C     Diagnoses and all orders for this visit:    1. Controlled type 2 diabetes mellitus without complication, without long-term current use of insulin (HCC)  -     CBC WITH AUTOMATED DIFF  -     METABOLIC PANEL, COMPREHENSIVE  -     MICROALBUMIN, UR, RAND W/ MICROALB/CREAT RATIO  -     AMB POC HEMOGLOBIN A1C    2. Hypercholesterolemia  -     LIPID PANEL    3. Severe obesity ([address]    4. Diarrhea, unspecified type  -     CELIAC ANTIBODY PROFILE    5. Chronic diarrhea  -     REFERRAL TO GASTROENTEROLOGY      Follow-up and Dispositions    · Return in about 6 months (around 3/23/2020).

## 2019-09-23 NOTE — PROGRESS NOTES
Chief Complaint   Patient presents with    Cholesterol Problem    Diabetes    Hypertension     Pt here for 6 month follow up     1. Have you been to the ER, urgent care clinic since your last visit? Hospitalized since your last visit? No     2. Have you seen or consulted any other health care providers outside of the [address] [place] [address] since your last visit? Include any pap smears or colon screening.   No

## 2019-09-24 LAB
ALBUMIN SERPL-MCNC: 4 G/DL (ref 3.6–4.8)
ALBUMIN/CREAT UR: 19.2 MG/G CREAT (ref 0–30)
ALBUMIN/GLOB SERPL: 1.8 {RATIO} (ref 1.2–2.2)
ALP SERPL-CCNC: 90 IU/L (ref 39–117)
ALT SERPL-CCNC: 16 IU/L (ref 0–32)
AST SERPL-CCNC: 11 IU/L (ref 0–40)
BASOPHILS # BLD AUTO: 0 X10E3/UL (ref 0–0.2)
BASOPHILS NFR BLD AUTO: 1 %
BILIRUB SERPL-MCNC: 1.3 MG/DL (ref 0–1.2)
BUN SERPL-MCNC: 9 MG/DL (ref 8–27)
BUN/CREAT SERPL: 10 (ref 12–28)
CALCIUM SERPL-MCNC: 8.9 MG/DL (ref 8.7–10.3)
CHLORIDE SERPL-SCNC: 106 MMOL/L (ref 96–106)
CHOLEST SERPL-MCNC: 151 MG/DL (ref 100–199)
CO2 SERPL-SCNC: 22 MMOL/L (ref 20–29)
CREAT SERPL-MCNC: 0.88 MG/DL (ref 0.57–1)
CREAT UR-MCNC: 200.3 MG/DL
EOSINOPHIL # BLD AUTO: 0.2 X10E3/UL (ref 0–0.4)
EOSINOPHIL NFR BLD AUTO: 5 %
ERYTHROCYTE [DISTWIDTH] IN BLOOD BY AUTOMATED COUNT: 14.8 % (ref 12.3–15.4)
GLIADIN PEPTIDE IGA SER-ACNC: 2 UNITS (ref 0–19)
GLIADIN PEPTIDE IGG SER-ACNC: 1 UNITS (ref 0–19)
GLOBULIN SER CALC-MCNC: 2.2 G/DL (ref 1.5–4.5)
GLUCOSE SERPL-MCNC: 132 MG/DL (ref 65–99)
HCT VFR BLD AUTO: 39.1 % (ref 34–46.6)
HDLC SERPL-MCNC: 64 MG/DL
HGB BLD-MCNC: 13.5 G/DL (ref 11.1–15.9)
IGA SERPL-MCNC: 92 MG/DL (ref 87–352)
IMM GRANULOCYTES # BLD AUTO: 0 X10E3/UL (ref 0–0.1)
IMM GRANULOCYTES NFR BLD AUTO: 0 %
INTERPRETATION, 910389: NORMAL
LDLC SERPL CALC-MCNC: 73 MG/DL (ref 0–99)
LYMPHOCYTES # BLD AUTO: 1.6 X10E3/UL (ref 0.7–3.1)
LYMPHOCYTES NFR BLD AUTO: 42 %
Lab: NORMAL
Lab: NORMAL
MCH RBC QN AUTO: 33.7 PG (ref 26.6–33)
MCHC RBC AUTO-ENTMCNC: 34.5 G/DL (ref 31.5–35.7)
MCV RBC AUTO: 98 FL (ref 79–97)
MICROALBUMIN UR-MCNC: 38.5 UG/ML
MONOCYTES # BLD AUTO: 0.4 X10E3/UL (ref 0.1–0.9)
MONOCYTES NFR BLD AUTO: 10 %
NEUTROPHILS # BLD AUTO: 1.6 X10E3/UL (ref 1.4–7)
NEUTROPHILS NFR BLD AUTO: 42 %
PLATELET # BLD AUTO: 212 X10E3/UL (ref 150–450)
POTASSIUM SERPL-SCNC: 3.9 MMOL/L (ref 3.5–5.2)
PROT SERPL-MCNC: 6.2 G/DL (ref 6–8.5)
RBC # BLD AUTO: 4.01 X10E6/UL (ref 3.77–5.28)
SODIUM SERPL-SCNC: 142 MMOL/L (ref 134–144)
TRIGL SERPL-MCNC: 69 MG/DL (ref 0–149)
TTG IGA SER-ACNC: <2 U/ML (ref 0–3)
TTG IGG SER-ACNC: <2 U/ML (ref 0–5)
VLDLC SERPL CALC-MCNC: 14 MG/DL (ref 5–40)
WBC # BLD AUTO: 3.8 X10E3/UL (ref 3.4–10.8)

## 2019-09-25 ENCOUNTER — HOSPITAL ENCOUNTER (OUTPATIENT)
Dept: INFUSION THERAPY | Age: 59
End: 2019-09-25

## 2019-10-10 RX ORDER — ATORVASTATIN CALCIUM 40 MG/1
40 TABLET, FILM COATED ORAL DAILY
Qty: 30 TAB | Refills: 9 | Status: SHIPPED | OUTPATIENT
Start: 2019-10-10 | End: 2020-07-09 | Stop reason: SDUPTHER

## 2019-10-17 NOTE — TELEPHONE ENCOUNTER
----- Message from Beny Holliday NP sent at 7/17/2017  9:15 AM EDT -----  Regarding: FW:  Please call her and let her know Dr. Shira Chaidez doesn't want her to take Revlimid this cycle.       ----- Message -----     From: Rachel Cash MD     Sent: 7/15/2017   4:02 PM       To: Beny Holliday NP  Subject: RE:                                              No Revlimid  ----- Message -----     From: Beny Holliday NP     Sent: 7/14/2017  11:01 AM       To: Rachel Cash MD    She doesn't want to go to transplant now. Proceeding with cycle 6. Given Velcade and Dex today. Do you want to resume Revlimid? Verified patient identity with two identifiers. Spoke with patient by phone. Patient states he has not heard from Dr. Justice Winn regarding message he sent on Monday, patient questions why he has not heard from him several times. Apologized and will speak with [facility] and get back to patient.

## 2019-10-21 DIAGNOSIS — C90.01 MULTIPLE MYELOMA IN REMISSION (HCC): ICD-10-CM

## 2019-10-21 RX ORDER — LENALIDOMIDE 10 MG/1
10 CAPSULE ORAL DAILY
Qty: 30 CAP | Refills: 6 | Status: SHIPPED | OUTPATIENT
Start: 2019-10-21 | End: 2019-12-16 | Stop reason: SDUPTHER

## 2019-10-21 NOTE — TELEPHONE ENCOUNTER
[facility] called and stated that patient needed a refill. CB# [phone].    Fax [phone]

## 2019-11-06 ENCOUNTER — OFFICE VISIT (OUTPATIENT)
Dept: CARDIOLOGY CLINIC | Age: 59
End: 2019-11-06

## 2019-11-06 DIAGNOSIS — Z95.810 AICD (AUTOMATIC CARDIOVERTER/DEFIBRILLATOR) PRESENT: Primary | ICD-10-CM

## 2019-11-06 DIAGNOSIS — I42.9 CARDIOMYOPATHY, UNSPECIFIED TYPE (HCC): ICD-10-CM

## 2019-11-22 DIAGNOSIS — C90.00 MULTIPLE MYELOMA, REMISSION STATUS UNSPECIFIED (HCC): ICD-10-CM

## 2019-11-25 ENCOUNTER — DOCUMENTATION ONLY (OUTPATIENT)
Dept: ONCOLOGY | Age: 59
End: 2019-11-25

## 2019-11-25 NOTE — PROGRESS NOTES
Called and spoke with patient regarding lab work and upcoming appointment; patient confirmed x2 identifiers     Patient stated she had bone labs done at [address] [place] [address] when having bone marrow transplant; patient also stated they recommended changing oral chemo dosage and was informed at she didn't need to schedule follow-up with MD Short until Jan and requested appointment be cancelled and to keep January appointment     Will request records from [address] [place] [address] and provide updates to ELIZABETH Khanna and MD Kristan Carter

## 2019-12-16 DIAGNOSIS — C90.01 MULTIPLE MYELOMA IN REMISSION (HCC): ICD-10-CM

## 2019-12-17 RX ORDER — LENALIDOMIDE 10 MG/1
10 CAPSULE ORAL DAILY
Qty: 30 CAP | Refills: 6 | Status: SHIPPED | OUTPATIENT
Start: 2019-12-17 | End: 2020-01-06 | Stop reason: SDUPTHER

## 2019-12-18 ENCOUNTER — OFFICE VISIT (OUTPATIENT)
Dept: CARDIOLOGY CLINIC | Age: 59
End: 2019-12-18

## 2019-12-18 VITALS
HEART RATE: 68 BPM | SYSTOLIC BLOOD PRESSURE: 118 MMHG | OXYGEN SATURATION: 97 % | HEIGHT: 64 IN | DIASTOLIC BLOOD PRESSURE: 70 MMHG | WEIGHT: 198.4 LBS | RESPIRATION RATE: 18 BRPM | BODY MASS INDEX: 33.87 KG/M2

## 2019-12-18 DIAGNOSIS — C90.00 MULTIPLE MYELOMA NOT HAVING ACHIEVED REMISSION (HCC): ICD-10-CM

## 2019-12-18 DIAGNOSIS — I10 ESSENTIAL HYPERTENSION: ICD-10-CM

## 2019-12-18 DIAGNOSIS — E66.9 OBESITY (BMI 30-39.9): ICD-10-CM

## 2019-12-18 DIAGNOSIS — I50.22 CHRONIC SYSTOLIC HEART FAILURE (HCC): Primary | ICD-10-CM

## 2019-12-18 DIAGNOSIS — Z95.810 BIVENTRICULAR ICD (IMPLANTABLE CARDIOVERTER-DEFIBRILLATOR) IN PLACE: ICD-10-CM

## 2019-12-18 DIAGNOSIS — I42.9 CARDIOMYOPATHY, UNSPECIFIED TYPE (HCC): ICD-10-CM

## 2019-12-18 RX ORDER — MECLIZINE HYDROCHLORIDE 25 MG/1
TABLET ORAL
COMMUNITY
End: 2020-11-03

## 2019-12-18 RX ORDER — ASPIRIN 81 MG/1
TABLET ORAL
Refills: 4 | COMMUNITY
Start: 2019-11-04

## 2019-12-18 NOTE — PROGRESS NOTES
Chief Complaint   Patient presents with    CHF     6 month follow up, C/O dizziness     1. Have you been to the ER, urgent care clinic since your last visit? Hospitalized since your last visit? No    2. Have you seen or consulted any other health care providers outside of the [address] [place] [address] since your last visit? Include any pap smears or colon screening.  No

## 2019-12-18 NOTE — PROGRESS NOTES
[facility]   [address] [facility], [address] [place] [address]                                          NEW PATIENT HPI/FOLLOW-UP      NAME:  Darwin Lucio   :   1960   MRN:   [id number]   PCP:  Allyn Ivy MD           Subjective: The patient is a 62y.o. year old female  who returns for a routine follow-up. Since the last visit, patient reports very infrequent dizzy spells. Denies change in exercise tolerance, chest pain, edema, medication intolerance, palpitations, shortness of breath, PND/orthopnea wheezing, sputum, syncope. Doing satisfactorily. Review of Systems  General: Pt denies excessive weight gain or loss. Pt is able to conduct ADL's. Respiratory: Denies shortness of breath, BARRERA, wheezing or stridor.   Cardiovascular: Denies precordial pain, palpitations, edema or PND  Gastrointestinal: Denies poor appetite, indigestion, abdominal pain or blood in stool  Peripheral vascular: Denies claudication, leg cramps  Neuropsychiatric: Denies paresthesias,tingling,numbness,anxiety,depression,fatigue  Musculoskeletal: Denies pain,tenderness, soreness,swelling      Past Medical History:   Diagnosis Date    Cardiomyopathy     Diabetes mellitus type 2, controlled ([address] 12/10/2015    Heart failure ([address]     Hyperlipidemia     Hypertension     controlled    Multiple myeloma ([address]     Orthostatic hypotension     Secondary cardiomyopathy, unspecified     Sinoatrial node dysfunction ([address]     Vitamin B12 deficiency 3/31/2010     Patient Active Problem List    Diagnosis Date Noted    Severe obesity ([address] 2019    Chemotherapy induced neutropenia ([address] 2018    Controlled type 2 diabetes mellitus without complication ([address]     Status post bone transplant 01/15/2018    Type 2 diabetes mellitus with nephropathy ([address] 2018    Type 2 diabetes mellitus with diabetic neuropathy ([address] 2018    Multiple myeloma ([address]     Neuropathy 2017  Rash 04/21/2017    Controlled type 2 diabetes mellitus without complication, without long-term current use of insulin ([facility] 03/31/2017    Multiple myeloma not having achieved remission ([facility] 03/06/2017    Diabetes mellitus type 2, controlled ([facility] 12/10/2015    Essential hypertension 11/17/2015    Hyperlipidemia     AICD (automatic cardioverter/defibrillator) present 09/19/2013    Chronic systolic heart failure ([facility] 04/09/2012    Obesity, unspecified 04/09/2012    Other left bundle branch block 04/09/2012    Cardiomyopathy (HCC)--resolved 03/28/2011    Vitamin B12 deficiency 03/31/2010      Past Surgical History:   Procedure Laterality Date    HX HYSTERECTOMY       Allergies   Allergen Reactions    Ace Inhibitors Cough    Compazine [Prochlorperazine] Nausea Only     rash      Family History   Problem Relation Age of Onset    Cancer Mother     Heart Disease Mother         pacemaker   [facility] Diabetes Mother     Breast Cancer Mother     Hypertension Sister     Hypertension Brother     Diabetes Brother     Hypertension Sister     Hypertension Sister     Hypertension Sister     Hypertension Sister     Diabetes Sister       Social History     Socioeconomic History    Marital status:      Spouse name: Not on file    Number of children: Not on file    Years of education: Not on file    Highest education level: Not on file   Occupational History    Not on file   Social Needs    Financial resource strain: Not on file    Food insecurity:     Worry: Not on file     Inability: Not on file    Transportation needs:     Medical: Not on file     Non-medical: Not on file   Tobacco Use    Smoking status: Never Smoker    Smokeless tobacco: Never Used   Substance and Sexual Activity    Alcohol use: No    Drug use: No    Sexual activity: Yes     Partners: Male   Lifestyle    Physical activity:     Days per week: Not on file     Minutes per session: Not on file    Stress: Not on file Relationships    Social connections:     Talks on phone: Not on file     Gets together: Not on file     Attends Orthodoxy service: Not on file     Active member of club or organization: Not on file     Attends meetings of clubs or organizations: Not on file     Relationship status: Not on file    Intimate partner violence:     Fear of current or ex partner: Not on file     Emotionally abused: Not on file     Physically abused: Not on file     Forced sexual activity: Not on file   Other Topics Concern    Not on file   Social History Narrative    , 2 children, 28 and 32. Works at [employer], for 35 years. 5 grandchildren      Current Outpatient Medications   Medication Sig    aspirin delayed-release 81 mg tablet TAKE 1 TABLET BY MOUTH EVERY DAY    meclizine (ANTIVERT) 25 mg tablet Take  by mouth three (3) times daily as needed.  lenalidomide (REVLIMID) 10 mg cap Take 1 Cap by mouth daily.  atorvastatin (LIPITOR) 40 mg tablet TAKE 1 TAB BY MOUTH DAILY. FOR CHOLESTEROL    cholecalciferol (VITAMIN D3) 1,000 unit cap Take  by mouth daily.  glimepiride (AMARYL) 1 mg tablet Take 1 Tab by mouth Daily (before breakfast). For diabetes    amLODIPine (NORVASC) 5 mg tablet Take 1 Tab by mouth daily. For blood pressure    carvedilol (COREG) 25 mg tablet Take 1 Tab by mouth two (2) times a day. (Patient taking differently: Take 25 mg by mouth daily.)    cyanocobalamin (VITAMIN B-12) 1,000 mcg sublingual tablet Take 1,000 mcg by mouth daily.  loperamide (IMMODIUM) 2 mg tablet Take 2 mg by mouth as needed for Diarrhea. No current facility-administered medications for this visit. I have reviewed the nurses notes, vitals, problem list, allergy list, medical history, family medical, social history and medications.         Objective:     Physical Exam:     Vitals:    12/18/19 0924 12/18/19 0928 12/18/19 0930 12/18/19 0931   BP: 110/70 120/70 118/70 118/70   Pulse:       Resp: SpO2:       Weight:       Height:        Body mass index is 34.06 kg/m². General: Well developed, in no acute distress. HEENT: No carotid bruits, no JVD, trach is midline. Heart:  Normal S1/S2 negative S3 or S4. Regular, no murmur, gallop or rub.   Respiratory: Clear bilaterally, no wheezing or rales  Abdomen:   Soft, non-tender, bowel sounds are active.   Extremities:  No edema, normal cap refill, no cyanosis. Neuro: A&Ox3, speech clear, gait stable. Skin: Skin color is normal. No rashes or lesions. No diaphoresis. Vascular: 2+ pulses symmetric in all extremities        Data Review:       Cardiographics:    EKG: Atrial-sensed ventricular pacemaker.     Cardiology Labs:    Results for orders placed or performed during the hospital encounter of 08/06/10   EKG, 12 LEAD, INITIAL   Result Value Ref Range    Ventricular Rate 60 BPM    Atrial Rate 60 BPM    P-R Interval 92 ms    QRS Duration 146 ms    Q-T Interval 530 ms    QTC Calculation (Bezet) 530 ms    Calculated R Axis -103 degrees    Calculated T Axis 58 degrees    Diagnosis       AV sequential or dual chamber electronic pacemaker  No previous ECGs available  Confirmed by Cj Vargas ([id number]) on 8/7/2010 9:11:48 PM   Results for orders placed or performed in visit on 03/31/10   AMB POC EKG ROUTINE W/ 12 LEADS, INTER & REP    Narrative    LBBB, left ventricular hypertrophy       Lab Results   Component Value Date/Time    Cholesterol, total 151 09/23/2019 09:27 AM    HDL Cholesterol 64 09/23/2019 09:27 AM    LDL, calculated 73 09/23/2019 09:27 AM    Triglyceride 69 09/23/2019 09:27 AM    CHOL/HDL Ratio 4.3 10/08/2009 10:45 AM       Lab Results   Component Value Date/Time    Sodium 142 09/23/2019 09:27 AM    Potassium 3.9 09/23/2019 09:27 AM    Chloride 106 09/23/2019 09:27 AM    CO2 22 09/23/2019 09:27 AM    Anion gap 11 08/28/2019 08:22 AM    Glucose 132 (H) 09/23/2019 09:27 AM    BUN 9 09/23/2019 09:27 AM    Creatinine 0.88 09/23/2019 09:27 AM BUN/Creatinine ratio 10 (L) 09/23/2019 09:27 AM    GFR est AA 82 09/23/2019 09:27 AM    GFR est non-AA 71 09/23/2019 09:27 AM    Calcium 8.9 09/23/2019 09:27 AM    Bilirubin, total 1.3 (H) 09/23/2019 09:27 AM    AST (SGOT) 11 09/23/2019 09:27 AM    Alk. phosphatase 90 09/23/2019 09:27 AM    Protein, total 6.2 09/23/2019 09:27 AM    Albumin 4.0 09/23/2019 09:27 AM    Globulin 3.3 08/28/2019 08:22 AM    A-G Ratio 1.8 09/23/2019 09:27 AM    ALT (SGPT) 16 09/23/2019 09:27 AM          Assessment:       ICD-10-CM ICD-9-CM    1. Chronic systolic heart failure (HCC) I50.22 428.22 AMB POC EKG ROUTINE W/ 12 LEADS, INTER & REP   2. Cardiomyopathy, unspecified type (HCC)-resolved I42.9 425.4    3. Obesity (BMI 30-39. 9) E66.9 278.00    4. Essential hypertension I10 401.9    5. Multiple myeloma not having achieved remission (HCC) C90.00 203.00    6. Biventricular ICD (implantable cardioverter-defibrillator) in place Z95.810 V45.02          Discussion: Patient presents at this time stable from a cardiac perspective. Dizziness infrequent with no precipitating factors. Appears to be non-cardiac. Last telephonic transmission of PM 11/19. Has not seen EP since 2/19. Will schedule follow up as she desires to know end-life of PM. Pleased with present cardiac status. Plan: 1. Continue same meds. Lipid profile and labs followed by PCP. 2.Encouraged to exercise to tolerance, lose weight and follow low fat, low cholesterol, low sodium predominantly Plant-based (consider Mediterranean) diet. Call with questions or concerns. Will follow up any test results by phone and/or f/u here in office if needed. Alberto Preciado 3.Follow up: 1 year    I have discussed the diagnosis with the patient and the intended plan as seen in the above orders. The patient has received an after-visit summary and questions were answered concerning future plans. I have discussed any concerning medication side effects and warnings with the patient as well.     Real Skiff Kenrick Kramer MD,[facility]  12/18/2019

## 2020-01-01 NOTE — TELEPHONE ENCOUNTER
PA obtained by case management assistant, approval # [id number] 4/5/19-4/4/20    Call to [address] [facility], spoke to James. Advised of above PA approval #. They have it on file, will update pharmacy and will contact patient to set up shipment ASAP. Advised we would like to expedite. Thanked for assistance. 2020 18:33

## 2020-01-02 ENCOUNTER — OFFICE VISIT (OUTPATIENT)
Dept: ONCOLOGY | Age: 60
End: 2020-01-02

## 2020-01-02 VITALS
HEART RATE: 81 BPM | WEIGHT: 193.2 LBS | HEIGHT: 64 IN | SYSTOLIC BLOOD PRESSURE: 120 MMHG | DIASTOLIC BLOOD PRESSURE: 87 MMHG | BODY MASS INDEX: 32.98 KG/M2 | OXYGEN SATURATION: 94 % | TEMPERATURE: 97.6 F

## 2020-01-02 DIAGNOSIS — C90.01 MULTIPLE MYELOMA IN REMISSION (HCC): Primary | ICD-10-CM

## 2020-01-02 NOTE — PROGRESS NOTES
Hematology Follow Up    REASON FOR VISIT: Multiple Myeloma     TREATMENT:   3/24/17- 9/15/17: Cameron Marteler X 8   10/20/17: Autologous transplant at [address] [place] [address]  2/22/18 -  Revlimind     HISTORY OF PRESENT ILLNESS: Ms. Josey Maldonado is a 62 y.o. female with DM and  Multiple Myeloma who presents to follow up after the ASCT and is on maintenance Revlimid 10mg daily. She was reduced to 5 mg due to cytopenias. Comes for follow up. She is on Revlimid 10 mg daily. She has a cold right now but otherwise has been feeling very well. She still has neuropathy. No longer has a rash. No sores in her mouth. She has started ASA and denies bleeding. No fevers, chills, sores. No Leg swelling. Has no change in weight or appetite. No new pain. Oncologic history  Patient had left facial numbness which started in the summer of 2016. This started abruptly and was not associated with rashes, pain, jaw weakness. She has had some intermittent hyperemia of the L eye. No tearing. She has been evaluated by Dr. Karon Hui with Neurology and an extensive work up was only notable for presence of a 0.3 g/dl M spike. Other tests were unremarkable: Vitamin B12 411, thyroid function test normal, ACE 25, BHARATI normal CBC normal, CMP normal, CRP 1.17, CT head and cervical spine unremarkable. Further evaluation revealed findings consistent with Multiple Myeloma    CBC 2/24 Unremarkable. Kappa 17 mg/dl, Lambda 2416 (ratio 0.01), SPEP 0.2 g/dl M spike, HANSEL showed monoclonal free lambda light chains, UPEP negative, Beta 2 Microglobulin 1.8 mg/L, Skeletal survey negative for lytic lesions, Bone marrow biopsy on 2/24/17 showed a Normocellular marrow with mild monoclonal plasmacytosis (15-20%). Trilineage hematopoiesis with maturation. She had a very good partial response and 8 cycles of VRD and then proceeded on to receive an autologous stem cell transplant on 10/20/17 at [address] [place] [address]. She had a CR posttransplant.  Started maintenance Revlimid in Jan 2018      Past Medical History: Diagnosis Date    Cardiomyopathy     Diabetes mellitus type 2, controlled ([facility] 12/10/2015    Heart failure (HCC)     Hyperlipidemia     Hypertension     controlled    Multiple myeloma (HCC)     Orthostatic hypotension     Secondary cardiomyopathy, unspecified     Sinoatrial node dysfunction ([facility]     Vitamin B12 deficiency 3/31/2010       Past Surgical History:   Procedure Laterality Date    HX HYSTERECTOMY         Allergies   Allergen Reactions    Ace Inhibitors Cough    Compazine [Prochlorperazine] Nausea Only     rash       Current Outpatient Medications   Medication Sig Dispense Refill    aspirin delayed-release 81 mg tablet TAKE 1 TABLET BY MOUTH EVERY DAY  4    meclizine (ANTIVERT) 25 mg tablet Take  by mouth three (3) times daily as needed.  lenalidomide (REVLIMID) 10 mg cap Take 1 Cap by mouth daily. 30 Cap 6    atorvastatin (LIPITOR) 40 mg tablet TAKE 1 TAB BY MOUTH DAILY. FOR CHOLESTEROL 30 Tab 9    cholecalciferol (VITAMIN D3) 1,000 unit cap Take  by mouth daily.  glimepiride (AMARYL) 1 mg tablet Take 1 Tab by mouth Daily (before breakfast). For diabetes 30 Tab 12    amLODIPine (NORVASC) 5 mg tablet Take 1 Tab by mouth daily. For blood pressure 30 Tab 12    carvedilol (COREG) 25 mg tablet Take 1 Tab by mouth two (2) times a day. (Patient taking differently: Take 25 mg by mouth daily.) 60 Tab 12    cyanocobalamin (VITAMIN B-12) 1,000 mcg sublingual tablet Take 1,000 mcg by mouth daily.  loperamide (IMMODIUM) 2 mg tablet Take 2 mg by mouth as needed for Diarrhea.          Social History     Socioeconomic History    Marital status:      Spouse name: Not on file    Number of children: Not on file    Years of education: Not on file    Highest education level: Not on file   Tobacco Use    Smoking status: Never Smoker    Smokeless tobacco: Never Used   Substance and Sexual Activity    Alcohol use: No    Drug use: No    Sexual activity: Yes     Partners: Male Social History Narrative    , 2 children, 29 and 32. Works at [employer], for 35 years. 5 grandchildren       Family History   Problem Relation Age of Onset   [facility] Cancer Mother     Heart Disease Mother         pacemaker   [facility] Diabetes Mother    [facility] Breast Cancer Mother     Hypertension Sister     Hypertension Brother     Diabetes Brother     Hypertension Sister     Hypertension Sister     Hypertension Sister     Hypertension Sister     Diabetes Sister        ROS  As reviewed in the HPI all others reviewed and negative. ECOG PS is 0    Physical Examination:   Visit Vitals  /87 (BP 1 Location: Right arm)   Pulse 81   Temp 97.6 °F (36.4 °C)   Ht 5' 4\" (1.626 m)   Wt 193 lb 3.2 oz (87.6 kg)   SpO2 94%   BMI 33.16 kg/m²     /87    General appearance - alert, well appearing, and in no distress  Mental status - oriented to person, place, and time  Mouth - mucous membranes moist, pharynx normal without lesions  Lymphatics - no palpable lymphadenopathy, no hepatosplenomegaly  Chest - clear to auscultation, no wheezes, rales or rhonchi, symmetric air entry  Heart - normal rate, regular rhythm, normal S1, S2, no murmurs, rubs, clicks or gallops  Abdomen - soft, nontender, nondistended, no masses or organomegaly, bowel sounds present  Ext -  No edema    LABS  Lab Results   Component Value Date/Time    WBC 3.8 09/23/2019 09:27 AM    HGB 13.5 09/23/2019 09:27 AM    HCT 39.1 09/23/2019 09:27 AM    PLATELET 756 20/83/6183 09:27 AM    MCV 98 (H) 09/23/2019 09:27 AM    ABS.  NEUTROPHILS 1.6 09/23/2019 09:27 AM     Lab Results   Component Value Date/Time    Sodium 142 09/23/2019 09:27 AM    Potassium 3.9 09/23/2019 09:27 AM    Chloride 106 09/23/2019 09:27 AM    CO2 22 09/23/2019 09:27 AM    Glucose 132 (H) 09/23/2019 09:27 AM    BUN 9 09/23/2019 09:27 AM    Creatinine 0.88 09/23/2019 09:27 AM    GFR est AA 82 09/23/2019 09:27 AM    GFR est non-AA 71 09/23/2019 09:27 AM    Calcium 8.9 09/23/2019 09:27 AM     Lab Results   Component Value Date/Time    AST (SGOT) 11 09/23/2019 09:27 AM    Alk. phosphatase 90 09/23/2019 09:27 AM    Protein, total 6.2 09/23/2019 09:27 AM    Albumin 4.0 09/23/2019 09:27 AM    Globulin 3.3 08/28/2019 08:22 AM    A-G Ratio 1.8 09/23/2019 09:27 AM     Lab Results   Component Value Date/Time    Vitamin B12 411 12/07/2016 02:58 PM     02/16/2017 09:18 AM    Beta-2 Microglobulin, serum 1.5 08/28/2019 08:22 AM    C-Reactive Protein, Cardiac 1.17 12/07/2016 02:58 PM    TSH 1.100 12/07/2016 02:58 PM    M-Mika Not Observed 08/28/2019 08:22 AM    M-Mika Not Observed 07/10/2018 07:46 AM    Hep C Virus Ab <0.1 05/17/2016 09:56 AM     Lab Results   Component Value Date/Time    INR 1.0 07/27/2010 03:57 PM    aPTT 31.5 07/27/2010 03:57 PM       URINE  Component      Latest Ref Rng & Units 7/24/2019           9:45 AM   Free Kappa Lt Chains, urine      1.35 - 24.19 mg/L 185.00 (H)   Free Lambda Lt Chains, urine      0.24 - 6.66 mg/L 10.50 (H)   Kappa/Lambda Ratio, urine      2.04 - 10.37   17.62 (H)     Bone marrow biopsy reviewed    FISH molecular analysis:    Positive for IgH gene rearrangement (IgH complex FISH study pending); Normal results with 1, 5, 9, 13, 15, and 17 (TP53) probe sets. External records reviewed from [address] [place] [address] transplant transplant bone marrow biopsy done on 12/19/17 showed no evidence of plasma cells. Variable cellularity from 0-50%, flow LUIS, FISH negative    ASSESSMENT  Ms. Marimar Brennan is a 62 y.o. female with standard risk multiple myeloma status post 6 cycles of twice daily and autologous stem cell transplant on 10/20/17. She is in a complete remission. We will continue with close surveillance in conjunction with her care team at [facility]  Multiple myeloma  On revlimid maintenance of 10mg daily following transplant at [address] [place] [address]. Dropped to 5 mg due to recurrent grade 3 neutropenia (kleber 0.4). She has done well with this, rash has not recurred.  As there was a temporary increase in M spike and hence Revlimid was increased again to 10 mg. She is doing well on this. She was recently seen by BMT early November with repeat labs and BMBx which indicates a complete remission. Recommendation was to reduce Revlimid to 10mg on days 1-21 followed by 7 days off in a 28 day cycle. Also BMT recommended started ASA which she has done so. Repeat cbc, cm, gammopathy eval in 3 months    Follow with BMT as scheduled     Neuropathy  Secondary to Velcade. Overall stable. DM  Per PCP.      Neutropenia, chemotherapy induced  Resolved    RTC in 3 months with labs   Seen in conjunction with Eneida Schulte NP    Signed by: Aurea Harvey MD                     January 2, 2020

## 2020-01-02 NOTE — PROGRESS NOTES
Sintia Morrow is a 62 y.o. female   Chief Complaint   Patient presents with    New Patient     1. Have you been to the ER, urgent care clinic since your last visit? Hospitalized since your last visit? Yes [facility]  2. Have you seen or consulted any other health care providers outside of the [address] [place] [place] [address] since your last visit? Include any pap smears or colon screening.  [facility]

## 2020-01-06 DIAGNOSIS — C90.01 MULTIPLE MYELOMA IN REMISSION (HCC): ICD-10-CM

## 2020-01-06 RX ORDER — LENALIDOMIDE 10 MG/1
10 CAPSULE ORAL
Qty: 21 CAP | Refills: 6 | Status: SHIPPED | OUTPATIENT
Start: 2020-01-06 | End: 2020-03-02 | Stop reason: SDUPTHER

## 2020-01-14 ENCOUNTER — OFFICE VISIT (OUTPATIENT)
Dept: FAMILY MEDICINE CLINIC | Age: 60
End: 2020-01-14

## 2020-01-14 VITALS
WEIGHT: 196.6 LBS | RESPIRATION RATE: 16 BRPM | TEMPERATURE: 98.4 F | HEIGHT: 64 IN | BODY MASS INDEX: 33.57 KG/M2 | DIASTOLIC BLOOD PRESSURE: 90 MMHG | HEART RATE: 74 BPM | SYSTOLIC BLOOD PRESSURE: 148 MMHG | OXYGEN SATURATION: 98 %

## 2020-01-14 DIAGNOSIS — J20.8 ACUTE VIRAL BRONCHITIS: Primary | ICD-10-CM

## 2020-01-14 RX ORDER — AMOXICILLIN 500 MG/1
CAPSULE ORAL
COMMUNITY
Start: 2019-12-30 | End: 2020-01-14

## 2020-01-14 RX ORDER — PROMETHAZINE HYDROCHLORIDE AND DEXTROMETHORPHAN HYDROBROMIDE 6.25; 15 MG/5ML; MG/5ML
5 SYRUP ORAL
Qty: 240 ML | Refills: 0 | Status: SHIPPED | OUTPATIENT
Start: 2020-01-14 | End: 2020-01-21 | Stop reason: CLARIF

## 2020-01-14 NOTE — PROGRESS NOTES
Chief Complaint   Patient presents with    Cough    Dizziness     Patient here for productive cough. Seen 12/30/19 at [facility] for cough. Patient has vertigo and only notices dizziness after sleeping and getting up in morning only. Has not had b/p med today.

## 2020-01-14 NOTE — PATIENT INSTRUCTIONS
Bronchitis: Care Instructions Your Care Instructions Bronchitis is inflammation of the bronchial tubes, which carry air to the lungs. The tubes swell and produce mucus, or phlegm. The mucus and inflamed bronchial tubes make you cough. You may have trouble breathing. Most cases of bronchitis are caused by viruses like those that cause colds. Antibiotics usually do not help and they may be harmful. Bronchitis usually develops rapidly and lasts about 2 to 3 weeks in otherwise healthy people. Follow-up care is a key part of your treatment and safety. Be sure to make and go to all appointments, and call your doctor if you are having problems. It's also a good idea to know your test results and keep a list of the medicines you take. How can you care for yourself at home? · Take all medicines exactly as prescribed. Call your doctor if you think you are having a problem with your medicine. · Get some extra rest. 
· Take an over-the-counter pain medicine, such as acetaminophen (Tylenol), ibuprofen (Advil, Motrin), or naproxen (Aleve) to reduce fever and relieve body aches. Read and follow all instructions on the label. · Do not take two or more pain medicines at the same time unless the doctor told you to. Many pain medicines have acetaminophen, which is Tylenol. Too much acetaminophen (Tylenol) can be harmful. · Take an over-the-counter cough medicine that contains dextromethorphan to help quiet a dry, hacking cough so that you can sleep. Avoid cough medicines that have more than one active ingredient. Read and follow all instructions on the label. · Breathe moist air from a humidifier, hot shower, or sink filled with hot water. The heat and moisture will thin mucus so you can cough it out. · Do not smoke. Smoking can make bronchitis worse. If you need help quitting, talk to your doctor about stop-smoking programs and medicines. These can increase your chances of quitting for good. When should you call for help? Call 911 anytime you think you may need emergency care. For example, call if: 
  · You have severe trouble breathing.  
 Call your doctor now or seek immediate medical care if: 
  · You have new or worse trouble breathing.  
  · You cough up dark brown or bloody mucus (sputum).  
  · You have a new or higher fever.  
  · You have a new rash.  
 Watch closely for changes in your health, and be sure to contact your doctor if: 
  · You cough more deeply or more often, especially if you notice more mucus or a change in the color of your mucus.  
  · You are not getting better as expected. Where can you learn more? Go to http://elsie-baljeet.info/. Enter H333 in the search box to learn more about \"Bronchitis: Care Instructions. \" Current as of: June 9, 2019 Content Version: 12.2 © 0476-9226 [employer]. Care instructions adapted under license by [employer] (which disclaims liability or warranty for this information). If you have questions about a medical condition or this instruction, always ask your healthcare professional. [address] any warranty or liability for your use of this information.

## 2020-01-14 NOTE — PROGRESS NOTES
HISTORY OF PRESENT ILLNESS  Lesa Crowder is a 62 y.o. female. HPI    This is a patient of Dr. Jayson Whittington with PMHx significant for T2DM, hypertension, AICD here today for an acute visit with chief complaint of cough/congestion. [facility] went to [facility] after having a fever the day before and developing a cough. She reports today that she was diagnosed with a \"viral infection\" and given an antibiotic, but it gave her diarrhea so she stopped it after a few days. She was also taking mucinex, which she has been continuing. She reports today that her cough is improving, but still present and somewhat productive. No further fevers. No other symptoms, including sore throat, ear pain. She does have chronic vertigo, and this worsens in the morning when she gets up from the bed. Visit Vitals  /90 (BP 1 Location: Left arm, BP Patient Position: Sitting)   Pulse 74   Temp 98.4 °F (36.9 °C) (Oral)   Resp 16   Ht 5' 4\" (1.626 m)   Wt 196 lb 9.6 oz (89.2 kg)   SpO2 98%    L/min   BMI 33.75 kg/m²     Current Outpatient Medications on File Prior to Visit   Medication Sig Dispense Refill    multivitamin, tx-iron-ca-min (THERA-M W/ IRON) 9 mg iron-400 mcg tab tablet Take 1 Tab by mouth daily.  lenalidomide (REVLIMID) 10 mg cap Take 1 Cap by mouth every twenty-one (21) days. Take 1 cap by mouth on days 1-21 followed by 7 days off in a 28 day cycle 21 Cap 6    aspirin delayed-release 81 mg tablet TAKE 1 TABLET BY MOUTH EVERY DAY  4    meclizine (ANTIVERT) 25 mg tablet Take  by mouth three (3) times daily as needed.  atorvastatin (LIPITOR) 40 mg tablet TAKE 1 TAB BY MOUTH DAILY. FOR CHOLESTEROL 30 Tab 9    glimepiride (AMARYL) 1 mg tablet Take 1 Tab by mouth Daily (before breakfast). For diabetes 30 Tab 12    amLODIPine (NORVASC) 5 mg tablet Take 1 Tab by mouth daily. For blood pressure 30 Tab 12    carvedilol (COREG) 25 mg tablet Take 1 Tab by mouth two (2) times a day.  (Patient taking differently: Take 25 mg by mouth daily.) 60 Tab 12    loperamide (IMMODIUM) 2 mg tablet Take 2 mg by mouth as needed for Diarrhea.  [DISCONTINUED] amoxicillin (AMOXIL) 500 mg capsule       [DISCONTINUED] cholecalciferol (VITAMIN D3) 1,000 unit cap Take  by mouth daily.  [DISCONTINUED] cyanocobalamin (VITAMIN B-12) 1,000 mcg sublingual tablet Take 1,000 mcg by mouth daily. No current facility-administered medications on file prior to visit. Review of Systems   Constitutional: Negative for chills, fever and malaise/fatigue. HENT: Negative for ear discharge and ear pain. Eyes: Negative for blurred vision, double vision, pain and discharge. Respiratory: Positive for cough and sputum production. Negative for shortness of breath and wheezing. Cardiovascular: Negative for chest pain and palpitations. Neurological: Negative for weakness and headaches. Physical Exam  Vitals signs and nursing note reviewed. Constitutional:       General: She is not in acute distress. Appearance: She is well-developed. She is not diaphoretic. HENT:      Head: Normocephalic and atraumatic. Right Ear: A middle ear effusion is present. Left Ear: A middle ear effusion is present. Mouth/Throat:      Mouth: Mucous membranes are moist.      Pharynx: Oropharynx is clear. Cardiovascular:      Rate and Rhythm: Normal rate and regular rhythm. Heart sounds: Normal heart sounds. Pulmonary:      Effort: Pulmonary effort is normal. No respiratory distress. Breath sounds: Normal breath sounds. No wheezing. Comments: occasional non-productive cough noted, no respiratory distress or adventitious sounds  Skin:     General: Skin is warm and dry. Neurological:      Mental Status: She is alert and oriented to person, place, and time.    Psychiatric:         Behavior: Behavior normal.         Judgment: Judgment normal.         ASSESSMENT and PLAN    ICD-10-CM ICD-9-CM 1. Acute viral bronchitis J20.8 466.0 promethazine-dextromethorphan (PROMETHAZINE-DM) 6.25-15 mg/5 mL syrup     1. Acute viral bronchitis - Symptoms are improving and there is no indication for an antibiotic at this time. Discussed tieline during which to expect improvement in symptoms. Rx for cough syrup provided today. I suspect that her vertigo will improve as her congestion improves; continue meclizine PRN. Follow up if symptoms worsen or fail to improve. Follow-up and Dispositions    · Return if symptoms worsen or fail to improve.

## 2020-01-16 ENCOUNTER — TELEPHONE (OUTPATIENT)
Dept: FAMILY MEDICINE CLINIC | Age: 60
End: 2020-01-16

## 2020-01-16 RX ORDER — BENZONATATE 200 MG/1
200 CAPSULE ORAL
Qty: 21 CAP | Refills: 0 | Status: SHIPPED | OUTPATIENT
Start: 2020-01-16 | End: 2020-01-23

## 2020-01-16 NOTE — TELEPHONE ENCOUNTER
Called to check on patient. She reports that she is improving, but that the cough medication I sent is on backorder. Wondering if I can send something else. Will send kerwin puckett.   Aida Salmeron NP

## 2020-01-22 ENCOUNTER — HOSPITAL ENCOUNTER (OUTPATIENT)
Age: 60
Setting detail: OUTPATIENT SURGERY
Discharge: HOME | End: 2020-01-22
Attending: SPECIALIST | Admitting: SPECIALIST
Payer: COMMERCIAL

## 2020-01-22 ENCOUNTER — ANESTHESIA EVENT (OUTPATIENT)
Dept: ENDOSCOPY | Age: 60
End: 2020-01-22
Payer: COMMERCIAL

## 2020-01-22 ENCOUNTER — ANESTHESIA (OUTPATIENT)
Dept: ENDOSCOPY | Age: 60
End: 2020-01-22
Payer: COMMERCIAL

## 2020-01-22 VITALS
WEIGHT: 189 LBS | DIASTOLIC BLOOD PRESSURE: 75 MMHG | HEART RATE: 72 BPM | OXYGEN SATURATION: 98 % | BODY MASS INDEX: 32.27 KG/M2 | SYSTOLIC BLOOD PRESSURE: 119 MMHG | HEIGHT: 64 IN | RESPIRATION RATE: 24 BRPM | TEMPERATURE: 97.6 F

## 2020-01-22 LAB
COLONOSCOPY, EXTERNAL: NORMAL
GLUCOSE BLD STRIP.AUTO-MCNC: 116 MG/DL (ref 65–100)
SERVICE CMNT-IMP: ABNORMAL

## 2020-01-22 PROCEDURE — 74011250636 HC RX REV CODE- 250/636: Performed by: NURSE ANESTHETIST, CERTIFIED REGISTERED

## 2020-01-22 PROCEDURE — 88305 TISSUE EXAM BY PATHOLOGIST: CPT

## 2020-01-22 PROCEDURE — 76040000019: Performed by: SPECIALIST

## 2020-01-22 PROCEDURE — 76060000031 HC ANESTHESIA FIRST 0.5 HR: Performed by: SPECIALIST

## 2020-01-22 PROCEDURE — 74011250637 HC RX REV CODE- 250/637: Performed by: SPECIALIST

## 2020-01-22 PROCEDURE — 74011250636 HC RX REV CODE- 250/636: Performed by: SPECIALIST

## 2020-01-22 PROCEDURE — 77030021593 HC FCPS BIOP ENDOSC BSC -A: Performed by: SPECIALIST

## 2020-01-22 PROCEDURE — 82962 GLUCOSE BLOOD TEST: CPT

## 2020-01-22 RX ORDER — SODIUM CHLORIDE 0.9 % (FLUSH) 0.9 %
5-40 SYRINGE (ML) INJECTION EVERY 8 HOURS
Status: DISCONTINUED | OUTPATIENT
Start: 2020-01-22 | End: 2020-01-22 | Stop reason: HOSPADM

## 2020-01-22 RX ORDER — DEXTROMETHORPHAN/PSEUDOEPHED 2.5-7.5/.8
1.2 DROPS ORAL
Status: DISCONTINUED | OUTPATIENT
Start: 2020-01-22 | End: 2020-01-22 | Stop reason: HOSPADM

## 2020-01-22 RX ORDER — SODIUM CHLORIDE 9 MG/ML
50 INJECTION, SOLUTION INTRAVENOUS CONTINUOUS
Status: DISCONTINUED | OUTPATIENT
Start: 2020-01-22 | End: 2020-01-22 | Stop reason: HOSPADM

## 2020-01-22 RX ORDER — SODIUM CHLORIDE 0.9 % (FLUSH) 0.9 %
5-40 SYRINGE (ML) INJECTION AS NEEDED
Status: DISCONTINUED | OUTPATIENT
Start: 2020-01-22 | End: 2020-01-22 | Stop reason: HOSPADM

## 2020-01-22 RX ORDER — PROPOFOL 10 MG/ML
INJECTION, EMULSION INTRAVENOUS AS NEEDED
Status: DISCONTINUED | OUTPATIENT
Start: 2020-01-22 | End: 2020-01-22 | Stop reason: HOSPADM

## 2020-01-22 RX ADMIN — PROPOFOL 30 MG: 10 INJECTION, EMULSION INTRAVENOUS at 11:57

## 2020-01-22 RX ADMIN — PROPOFOL 30 MG: 10 INJECTION, EMULSION INTRAVENOUS at 11:52

## 2020-01-22 RX ADMIN — Medication 80 MG: at 11:52

## 2020-01-22 RX ADMIN — PROPOFOL 30 MG: 10 INJECTION, EMULSION INTRAVENOUS at 11:55

## 2020-01-22 RX ADMIN — PROPOFOL 20 MG: 10 INJECTION, EMULSION INTRAVENOUS at 11:59

## 2020-01-22 RX ADMIN — PROPOFOL 70 MG: 10 INJECTION, EMULSION INTRAVENOUS at 11:49

## 2020-01-22 RX ADMIN — SODIUM CHLORIDE 50 ML/HR: 900 INJECTION, SOLUTION INTRAVENOUS at 11:27

## 2020-01-22 NOTE — PERIOP NOTES
Endoscope was pre-cleaned at bedside immediately following procedure by Kelli Gomez    Anesthesia reports 180mg Propofol,  and 200mL NS given during procedure. Received report from anesthesia staff on vital signs and status of patient.     Glasses returned to patient

## 2020-01-22 NOTE — DISCHARGE INSTRUCTIONS
Neil Degree  [id number]  1960    COLON DISCHARGE INSTRUCTIONS  Discomfort:  Redness at IV site- apply warm compress to area; if redness or soreness persist- contact your physician  There may be a slight amount of blood passed from the rectum  Gaseous discomfort- walking, belching will help relieve any discomfort  You may not operate a vehicle for 12 hours  You may not engage in an occupation involving machinery or appliances for rest of today  You may not drink alcoholic beverages for at least 12 hours  Avoid making any critical decisions for at least 24 hour  DIET:   Regular diet. - however -  remember your colon is empty and a heavy meal will produce gas. Avoid these foods:  vegetables, fried / greasy foods, carbonated drinks for today. MEDICATIONS:        Regarding Aspirin or Nonsteroidal medications, please see below. ACTIVITY:  You may resume your normal daily activities it is recommended that you spend the remainder of the day resting -  avoid any strenuous activity. CALL M.D. ANY SIGN OF:  Increasing pain, nausea, vomiting  Abdominal distension (swelling)  New increased bleeding (oral or rectal)  Fever (chills)  Pain in chest area  Bloody discharge from nose or mouth  Shortness of breath     ONLY  Tylenol as needed for pain.       Follow-up Instructions:   Call Dr. Alex Reeves for questions about procedure / biopsy in 4-5 days at telephone #  [phone]              Repeat Colonoscopy in 10 years

## 2020-01-22 NOTE — ANESTHESIA POSTPROCEDURE EVALUATION
Procedure(s):  COLONOSCOPY  COLON BIOPSY. total IV anesthesia    Anesthesia Post Evaluation        Patient location during evaluation: PACU  Note status: Adequate. Level of consciousness: responsive to verbal stimuli and sleepy but conscious  Pain management: satisfactory to patient  Airway patency: patent  Anesthetic complications: no  Cardiovascular status: acceptable  Respiratory status: acceptable  Hydration status: acceptable  Comments: +Post-Anesthesia Evaluation and Assessment    Patient: Kenia Palacios MRN: [id number]  SSN: [id number]   YOB: 1960  Age: 62 y.o. Sex: female      Cardiovascular Function/Vital Signs    /64   Pulse 69   Temp 36.8 °C (98.2 °F)   Resp 18   Ht 5' 4\" (1.626 m)   Wt 85.7 kg (189 lb)   SpO2 98%   BMI 32.44 kg/m²     Patient is status post Procedure(s):  COLONOSCOPY  COLON BIOPSY. Nausea/Vomiting: Controlled. Postoperative hydration reviewed and adequate. Pain:  Pain Scale 1: Numeric (0 - 10) (01/22/20 1119)  Pain Intensity 1: 0 (01/22/20 1119)   Managed. Neurological Status: At baseline. Mental Status and Level of Consciousness: Arousable. Pulmonary Status:   O2 Device: Nasal cannula (01/22/20 1204)   Adequate oxygenation and airway patent. Complications related to anesthesia: None    Post-anesthesia assessment completed. No concerns.     Signed By: Sandy Manley MD    1/22/2020  Post anesthesia nausea and vomiting:  controlled      Vitals Value Taken Time   /64 1/22/2020 12:04 PM   Temp     Pulse 69 1/22/2020 12:04 PM   Resp 18 1/22/2020 12:04 PM   SpO2 98 % 1/22/2020 12:04 PM

## 2020-01-22 NOTE — H&P
Gastroenterology Outpatient History and Physical    Patient: Tony Kraft    Physician: Kinsey Ng MD    Vital Signs: Blood pressure 130/77, pulse 70, temperature 98.2 °F (36.8 °C), resp. rate 17, height 5' 4\" (1.626 m), weight 85.7 kg (189 lb), SpO2 100 %. Allergies: Allergies   Allergen Reactions    Ace Inhibitors Cough    Compazine [Prochlorperazine] Nausea Only     rash       Chief Complaint: Screening    History of Present Illness: 63 yo F for CRC screening. Justification for Procedure: above    History:  Past Medical History:   Diagnosis Date    Cardiomyopathy     Diabetes mellitus type 2, controlled ([facility] 12/10/2015    Heart failure (HCC)     Hyperlipidemia     Hypertension     controlled    Multiple myeloma (HCC)     Orthostatic hypotension     Secondary cardiomyopathy, unspecified     [facility]     Vitamin B12 deficiency 3/31/2010      Past Surgical History:   Procedure Laterality Date    HX HYSTERECTOMY      HX PACEMAKER      aicd      Social History     Socioeconomic History    Marital status:      Spouse name: Not on file    Number of children: Not on file    Years of education: Not on file    Highest education level: Not on file   Tobacco Use    Smoking status: Never Smoker    Smokeless tobacco: Never Used   Substance and Sexual Activity    Alcohol use: No    Drug use: No    Sexual activity: Yes     Partners: Male   Social History Narrative    , 2 children, 28 and 31. Works at [employer], for 35 years.  5 grandchildren      Family History   Problem Relation Age of Onset   [place] Cancer Mother     Heart Disease Mother         pacemaker   [place] Diabetes Mother     Breast Cancer Mother     Hypertension Sister     Hypertension Brother     Diabetes Brother     Hypertension Sister     Hypertension Sister     Hypertension Sister     Hypertension Sister     Diabetes Sister        Medications:   Prior to Admission medications    Medication Sig Start Date End Date Taking? Authorizing Provider   benzonatate (TESSALON) 200 mg capsule Take 1 Cap by mouth three (3) times daily as needed for Cough for up to 7 days. 1/16/20 1/23/20 Yes Geri Hong NP   multivitamin, tx-iron-ca-min (THERA-M W/ IRON) 9 mg iron-400 mcg tab tablet Take 1 Tab by mouth daily. Yes Provider, Historical   lenalidomide (REVLIMID) 10 mg cap Take 1 Cap by mouth every twenty-one (21) days. Take 1 cap by mouth on days 1-21 followed by 7 days off in a 28 day cycle 1/6/20  Yes Marline Zamorano NP   aspirin delayed-release 81 mg tablet TAKE 1 TABLET BY MOUTH EVERY DAY 11/4/19  Yes Provider, Historical   atorvastatin (LIPITOR) 40 mg tablet TAKE 1 TAB BY MOUTH DAILY. FOR CHOLESTEROL 10/10/19  Yes Celine Curry MD   glimepiride (AMARYL) 1 mg tablet Take 1 Tab by mouth Daily (before breakfast). For diabetes 3/21/19  Yes Celine Curry MD   amLODIPine (NORVASC) 5 mg tablet Take 1 Tab by mouth daily. For blood pressure 3/21/19  Yes Celine Curry MD   carvedilol (COREG) 25 mg tablet Take 1 Tab by mouth two (2) times a day. Patient taking differently: Take 25 mg by mouth daily. 3/21/19  Yes Celine Curry MD   loperamide (IMMODIUM) 2 mg tablet Take 2 mg by mouth as needed for Diarrhea. Yes Provider, Historical   meclizine (ANTIVERT) 25 mg tablet Take  by mouth three (3) times daily as needed. Provider, Historical       Physical Exam:   General: alert, no distress   HEENT: Head: Normocephalic, no lesions, without obvious abnormality.    Heart: regular rate and rhythm, S1, S2 normal, no murmur, click, rub or gallop   Lungs: chest clear, no wheezing, rales, normal symmetric air entry   Abdominal: soft, NT/ND +BS   Neurological: Grossly normal   Extremities: extremities normal, atraumatic, no cyanosis or edema     Findings/Diagnosis: Screening Colonoscopy    Plan of Care/Planned Procedure: Colonoscopy

## 2020-01-22 NOTE — ANESTHESIA PREPROCEDURE EVALUATION
Relevant Problems   No relevant active problems       Anesthetic History   No history of anesthetic complications            Review of Systems / Medical History  Patient summary reviewed, nursing notes reviewed and pertinent labs reviewed    Pulmonary  Within defined limits                 Neuro/Psych             Comments: Neuropathy Cardiovascular    Hypertension      CHF  Dysrhythmias   Pacemaker and hyperlipidemia    Exercise tolerance: <4 METS  Comments: Cardiomyopathy   Hx Orthostatic hypotension   Sinoatrial node dysfunction   AICD     GI/Hepatic/Renal                Endo/Other    Diabetes: well controlled    Morbid obesity     Other Findings   Comments: Vitamin B12 deficiency   Hx Multiple myeloma            Physical Exam    Airway  Mallampati: II    Neck ROM: normal range of motion   Mouth opening: Normal     Cardiovascular  Regular rate and rhythm,  S1 and S2 normal,  no murmur, click, rub, or gallop             Dental  No notable dental hx       Pulmonary  Breath sounds clear to auscultation               Abdominal  GI exam deferred       Other Findings            Anesthetic Plan    ASA: 4  Anesthesia type: total IV anesthesia          Induction: Intravenous  Anesthetic plan and risks discussed with: Patient

## 2020-01-22 NOTE — ROUTINE PROCESS
Otis Athol 1960 
[id number] Situation: 
Verbal report received from: Tyra Ponce RN and Jeremy Escamilla Procedure: Procedure(s): 
COLONOSCOPY 
COLON BIOPSY Background: 
 
Preoperative diagnosis: SCREENING COLONOSCOPY(SCREENING FOR COLONIC NEOPLASIA) DIARRHEA,UNSPECIFIED-RESOLVED Postoperative diagnosis: Internal Hemorroids :  Dr. Lubna Gomes Assistant(s): Endoscopy Technician-1: Sarah Knight Endoscopy RN-1: Bridget Weeks RN Endoscopy RN-2: Diamond Sullivan Specimens:  
ID Type Source Tests Collected by Time Destination 1 : Random Colon BX Preservative Random colon  Luther Ma MD 1/22/2020 1155 Pathology H. Pylori  no Assessment: 
Intra-procedure medications Anesthesia gave intra-procedure sedation and medications, see anesthesia flow sheet yes [facility] Vital signs stable Abdominal assessment: round and soft Recommendation: 
Discharge patient per MD order. Family or [facility]  Permission to share finding with family or friend yes

## 2020-01-29 ENCOUNTER — OFFICE VISIT (OUTPATIENT)
Dept: FAMILY MEDICINE CLINIC | Age: 60
End: 2020-01-29

## 2020-01-29 VITALS
HEIGHT: 64 IN | DIASTOLIC BLOOD PRESSURE: 67 MMHG | WEIGHT: 196.2 LBS | SYSTOLIC BLOOD PRESSURE: 117 MMHG | OXYGEN SATURATION: 97 % | RESPIRATION RATE: 16 BRPM | TEMPERATURE: 96.6 F | BODY MASS INDEX: 33.49 KG/M2 | HEART RATE: 70 BPM

## 2020-01-29 DIAGNOSIS — E11.9 DIABETES MELLITUS WITHOUT COMPLICATION (HCC): ICD-10-CM

## 2020-01-29 DIAGNOSIS — J40 BRONCHITIS: Primary | ICD-10-CM

## 2020-01-29 LAB — GLUCOSE POC: 131 MG/DL

## 2020-01-29 RX ORDER — AZITHROMYCIN 250 MG/1
TABLET, FILM COATED ORAL
Qty: 6 TAB | Refills: 0 | Status: SHIPPED | OUTPATIENT
Start: 2020-01-29 | End: 2020-03-05 | Stop reason: ALTCHOICE

## 2020-01-29 RX ORDER — PROMETHAZINE HYDROCHLORIDE AND DEXTROMETHORPHAN HYDROBROMIDE 6.25; 15 MG/5ML; MG/5ML
5 SYRUP ORAL
Qty: 240 ML | Refills: 2 | Status: SHIPPED | OUTPATIENT
Start: 2020-01-29 | End: 2020-11-03

## 2020-01-29 NOTE — PROGRESS NOTES
Chief Complaint   Patient presents with    Nasal Congestion    Cough     1. Have you been to the ER, urgent care clinic since your last visit? Hospitalized since your last visit? Yes When: [facility] 2019    2. Have you seen or consulted any other health care providers outside of the [address] [place] [address] since your last visit? Include any pap smears or colon screening.  Yes When: seen at [facility] by Aleksey Yo NP

## 2020-01-29 NOTE — PROGRESS NOTES
HISTORY OF PRESENT ILLNESS  Cecilio Dc is a 62 y.o. female. HPI ONe month hx head congestion, cough. Gets choked when eats. Had fever initially. Cough has been productive of green sputum. Mild dyspnea at times, but no wheezing. Has been very thirsty    ROS    Physical Exam  Vitals signs and nursing note reviewed. Constitutional:       Appearance: She is well-developed. HENT:      Right Ear: External ear normal.      Left Ear: External ear normal.   Neck:      Thyroid: No thyromegaly. Cardiovascular:      Rate and Rhythm: Normal rate and regular rhythm. Heart sounds: Normal heart sounds. Pulmonary:      Breath sounds: Normal breath sounds. No wheezing. Comments: Bilateral rhonchi  Abdominal:      General: Bowel sounds are normal. There is no distension. Palpations: Abdomen is soft. There is no mass. Tenderness: There is no abdominal tenderness. Lymphadenopathy:      Cervical: No cervical adenopathy. ASSESSMENT and PLAN  Orders Placed This Encounter    AMB POC GLUCOSE BLOOD, BY GLUCOSE MONITORING DEVICE    azithromycin (ZITHROMAX) 250 mg tablet    promethazine-dextromethorphan (PROMETHAZINE-DM) 6.25-15 mg/5 mL syrup     Diagnoses and all orders for this visit:    1. Bronchitis    2. Diabetes mellitus without complication (HCC)  -     AMB POC GLUCOSE BLOOD, BY GLUCOSE MONITORING DEVICE    Other orders  -     azithromycin (ZITHROMAX) 250 mg tablet; 2 tabs day 1 , then 1 tab daily next 4 days  -     promethazine-dextromethorphan (PROMETHAZINE-DM) 6.25-15 mg/5 mL syrup; Take 5 mL by mouth four (4) times daily as needed for Cough.

## 2020-02-28 ENCOUNTER — TELEPHONE (OUTPATIENT)
Dept: ONCOLOGY | Age: 60
End: 2020-02-28

## 2020-02-28 DIAGNOSIS — C90.01 MULTIPLE MYELOMA IN REMISSION (HCC): ICD-10-CM

## 2020-02-29 NOTE — TELEPHONE ENCOUNTER
Heme/Onc On Call Note  Received page to call patient's specialty pharmacy about an unspecified prescription. Presumably revlimid. Called the number back, when through extensive phone tree, only to have them hang up on me. Called again and went through same process, reached a representative who spent about 5 minutes investigating, then informed me that they thought they were leaving a message with our office to fax a new prescription, they didn't realize they were talking to the answering service. I informed her that the office is not open at 7:30pm on Friday evening and asked her to call again Monday morning to make this request, and she agreed to do so.

## 2020-03-02 RX ORDER — LENALIDOMIDE 10 MG/1
10 CAPSULE ORAL
Qty: 21 CAP | Refills: 0 | Status: SHIPPED | OUTPATIENT
Start: 2020-03-02 | End: 2020-03-04 | Stop reason: SDUPTHER

## 2020-03-02 NOTE — TELEPHONE ENCOUNTER
Tennille-please fax Revlimid Rx to speciality pharmacy for refill. If you are able to speak with rep can you see why office was contacted at 730pm on Friday? Can they have correct office hours in her chart so this does not happen again?

## 2020-03-04 DIAGNOSIS — C90.01 MULTIPLE MYELOMA IN REMISSION (HCC): ICD-10-CM

## 2020-03-04 RX ORDER — LENALIDOMIDE 10 MG/1
10 CAPSULE ORAL
Qty: 21 CAP | Refills: 0 | Status: CANCELLED | OUTPATIENT
Start: 2020-03-04

## 2020-03-04 RX ORDER — LENALIDOMIDE 10 MG/1
10 CAPSULE ORAL
Qty: 21 CAP | Refills: 0 | OUTPATIENT
Start: 2020-03-04 | End: 2020-03-05 | Stop reason: SDUPTHER

## 2020-03-04 NOTE — TELEPHONE ENCOUNTER
Specialty pharmacy left a voicemail and stated patient needs a refill.  CB# [phone]. [phone] (fax)

## 2020-03-05 ENCOUNTER — OFFICE VISIT (OUTPATIENT)
Dept: CARDIOLOGY CLINIC | Age: 60
End: 2020-03-05

## 2020-03-05 ENCOUNTER — CLINICAL SUPPORT (OUTPATIENT)
Dept: CARDIOLOGY CLINIC | Age: 60
End: 2020-03-05

## 2020-03-05 VITALS
HEART RATE: 70 BPM | DIASTOLIC BLOOD PRESSURE: 64 MMHG | OXYGEN SATURATION: 98 % | SYSTOLIC BLOOD PRESSURE: 122 MMHG | RESPIRATION RATE: 18 BRPM | HEIGHT: 64 IN | BODY MASS INDEX: 34.31 KG/M2 | WEIGHT: 201 LBS

## 2020-03-05 DIAGNOSIS — I42.9 CARDIOMYOPATHY, UNSPECIFIED TYPE (HCC): Primary | ICD-10-CM

## 2020-03-05 DIAGNOSIS — Z95.810 BIVENTRICULAR ICD (IMPLANTABLE CARDIOVERTER-DEFIBRILLATOR) IN PLACE: ICD-10-CM

## 2020-03-05 DIAGNOSIS — C90.00 MULTIPLE MYELOMA, REMISSION STATUS UNSPECIFIED (HCC): ICD-10-CM

## 2020-03-05 DIAGNOSIS — I42.9 CARDIOMYOPATHY, UNSPECIFIED TYPE (HCC): ICD-10-CM

## 2020-03-05 DIAGNOSIS — I10 ESSENTIAL HYPERTENSION: ICD-10-CM

## 2020-03-05 DIAGNOSIS — E66.9 OBESITY (BMI 30-39.9): ICD-10-CM

## 2020-03-05 DIAGNOSIS — Z95.810 BIVENTRICULAR ICD (IMPLANTABLE CARDIOVERTER-DEFIBRILLATOR) IN PLACE: Primary | ICD-10-CM

## 2020-03-05 RX ORDER — LENALIDOMIDE 10 MG/1
10 CAPSULE ORAL
Qty: 21 CAP | Refills: 0 | Status: SHIPPED | OUTPATIENT
Start: 2020-03-05 | End: 2020-03-30 | Stop reason: SDUPTHER

## 2020-03-05 NOTE — PROGRESS NOTES
Subjective:      Stefan Neal is a 62 y.o. female is here for EP follow up. The patient denies chest pain/ shortness of breath, orthopnea, PND, LE edema, palpitations, syncope, presyncope or fatigue.        Patient Active Problem List    Diagnosis Date Noted    Severe obesity ([address] 09/23/2019    Chemotherapy induced neutropenia ([address] 07/25/2018    Controlled type 2 diabetes mellitus without complication ([address] 03/02/5367    Status post bone transplant 01/15/2018    Type 2 diabetes mellitus with nephropathy ([address] 01/11/2018    Type 2 diabetes mellitus with diabetic neuropathy ([address] 01/11/2018    Multiple myeloma ([address]     Neuropathy 04/21/2017    Rash 04/21/2017    Controlled type 2 diabetes mellitus without complication, without long-term current use of insulin ([address] 03/31/2017    Multiple myeloma not having achieved remission ([address] 03/06/2017    Diabetes mellitus type 2, controlled ([address] 12/10/2015    Essential hypertension 11/17/2015    Hyperlipidemia     AICD (automatic cardioverter/defibrillator) present 09/19/2013    Chronic systolic heart failure ([address] 04/09/2012    Obesity, unspecified 04/09/2012    Other left bundle branch block 04/09/2012    Cardiomyopathy (HCC)--resolved 03/28/2011    Vitamin B12 deficiency 03/31/2010      Cecille Stubbs MD  Past Medical History:   Diagnosis Date    Cardiomyopathy     Diabetes mellitus type 2, controlled ([address] 12/10/2015    Heart failure ([address]     Hyperlipidemia     Hypertension     controlled    Multiple myeloma ([address]     Orthostatic hypotension     Secondary cardiomyopathy, unspecified     Sinoatrial node dysfunction ([address]     Vitamin B12 deficiency 3/31/2010      Past Surgical History:   Procedure Laterality Date    COLONOSCOPY N/A 1/22/2020    COLON[facility] at [facility] ENDOSCOPY    HX HYSTERECTOMY      HX PACEMAKER      aicd     Allergies   Allergen Reactions    Ace Inhibitors Cough    Compazine [Prochlorperazine] Nausea Only     rash      Family History   Problem Relation Age of Onset    Cancer Mother     Heart Disease Mother         pacemaker   [address] [facility] Diabetes Mother    [facility] Breast Cancer Mother     Hypertension Sister     Hypertension Brother     Diabetes Brother     Hypertension Sister     Hypertension Sister     Hypertension Sister     Hypertension Sister     Diabetes Sister     negative for cardiac disease  Social History     Socioeconomic History    Marital status:      Spouse name: Not on file    Number of children: Not on file    Years of education: Not on file    Highest education level: Not on file   Tobacco Use    Smoking status: Never Smoker    Smokeless tobacco: Never Used   Substance and Sexual Activity    Alcohol use: No    Drug use: No    Sexual activity: Yes     Partners: Male   Social History Narrative    , 2 children, 29 and 32. Works at [employer], for 35 years. 5 grandchildren     Current Outpatient Medications   Medication Sig    lenalidomide (REVLIMID) 10 mg cap Take 1 Cap by mouth every twenty-one (21) days. Take 1 cap by mouth on days 1-21 followed by 7 days off in a 28 day cycle    promethazine-dextromethorphan (PROMETHAZINE-DM) 6.25-15 mg/5 mL syrup Take 5 mL by mouth four (4) times daily as needed for Cough.  multivitamin, tx-iron-ca-min (THERA-M W/ IRON) 9 mg iron-400 mcg tab tablet Take 1 Tab by mouth daily.  aspirin delayed-release 81 mg tablet TAKE 1 TABLET BY MOUTH EVERY DAY    meclizine (ANTIVERT) 25 mg tablet Take  by mouth three (3) times daily as needed.  atorvastatin (LIPITOR) 40 mg tablet TAKE 1 TAB BY MOUTH DAILY. FOR CHOLESTEROL    glimepiride (AMARYL) 1 mg tablet Take 1 Tab by mouth Daily (before breakfast). For diabetes    amLODIPine (NORVASC) 5 mg tablet Take 1 Tab by mouth daily. For blood pressure    carvedilol (COREG) 25 mg tablet Take 1 Tab by mouth two (2) times a day.  (Patient taking differently: Take 25 mg by mouth daily.)    loperamide (IMMODIUM) 2 mg tablet Take 2 mg by mouth as needed for Diarrhea. No current facility-administered medications for this visit. Vitals:    03/05/20 0919   BP: 122/64   Pulse: 70   Resp: 18   SpO2: 98%   Weight: 201 lb (91.2 kg)   Height: 5' 4\" (1.626 m)       I have reviewed the nurses notes, vitals, problem list, allergy list, medical history, family, social history and medications. Review of Symptoms:    General: Pt denies excessive weight gain or loss. Pt is able to conduct ADL's  HEENT: Denies blurred vision, headaches, epistaxis and difficulty swallowing. Respiratory: Denies shortness of breath, BARRERA, wheezing or stridor. Cardiovascular: Denies precordial pain, palpitations, edema or PND  Gastrointestinal: Denies poor appetite, indigestion, abdominal pain or blood in stool  Urinary: Denies dysuria, pyuria  Musculoskeletal: Denies pain or swelling from muscles or joints  Neurologic: Denies tremor, paresthesias, or sensory motor disturbance  Skin: Denies rash, itching or texture change. Psych: Denies depression      Physical Exam:      General: Well developed, in no acute distress. HEENT: Eyes - PERRL, no jvd  Heart:  Normal S1/S2 negative S3 or S4. Regular, no murmur, gallop or rub. Respiratory: Clear bilaterally x 4, no wheezing or rales  Extremities:  No edema, normal cap refill, no cyanosis. Musculoskeletal: No clubbing  Neuro: A&Ox3, speech clear, gait stable. Skin: Skin color is normal. No rashes or lesions.  Non diaphoretic  Vascular: 2+ pulses symmetric in all extremities    Cardiographics    Ekg: V paced    Results for orders placed or performed during the hospital encounter of 08/06/10   EKG, 12 LEAD, INITIAL   Result Value Ref Range    Ventricular Rate 60 BPM    Atrial Rate 60 BPM    P-R Interval 92 ms    QRS Duration 146 ms    Q-T Interval 530 ms    QTC Calculation (Bezet) 530 ms    Calculated R Axis -103 degrees    Calculated T Axis 58 degrees    Diagnosis       AV sequential or dual chamber electronic pacemaker  No previous ECGs available  Confirmed by Dee Jackson ([id number]) on 8/7/2010 9:11:48 PM   Results for orders placed or performed in visit on 03/31/10   AMB POC EKG ROUTINE W/ 12 LEADS, INTER & REP    Narrative    LBBB, left ventricular hypertrophy         Lab Results   Component Value Date/Time    WBC 3.8 09/23/2019 09:27 AM    HGB 13.5 09/23/2019 09:27 AM    HCT 39.1 09/23/2019 09:27 AM    PLATELET 708 62/[birth year]1963 09:27 AM    MCV 98 (H) 09/23/2019 09:27 AM      Lab Results   Component Value Date/Time    Sodium 142 09/23/2019 09:27 AM    Potassium 3.9 09/23/2019 09:27 AM    Chloride 106 09/23/2019 09:27 AM    CO2 22 09/23/2019 09:27 AM    Anion gap 11 08/28/2019 08:22 AM    Glucose 132 (H) 09/23/2019 09:27 AM    BUN 9 09/23/2019 09:27 AM    Creatinine 0.88 09/23/2019 09:27 AM    BUN/Creatinine ratio 10 (L) 09/23/2019 09:27 AM    GFR est AA 82 09/23/2019 09:27 AM    GFR est non-AA 71 09/23/2019 09:27 AM    Calcium 8.9 09/23/2019 09:27 AM    Bilirubin, total 1.3 (H) 09/23/2019 09:27 AM    AST (SGOT) 11 09/23/2019 09:27 AM    Alk. phosphatase 90 09/23/2019 09:27 AM    Protein, total 6.2 09/23/2019 09:27 AM    Albumin 4.0 09/23/2019 09:27 AM    Globulin 3.3 08/28/2019 08:22 AM    A-G Ratio 1.8 09/23/2019 09:27 AM    ALT (SGPT) 16 09/23/2019 09:27 AM      Lab Results   Component Value Date/Time    TSH 1.100 12/07/2016 02:58 PM        Assessment:           ICD-10-CM ICD-9-CM    1. Cardiomyopathy, unspecified type (HCC)-resolved I42.9 425.4    2. Multiple myeloma, remission status unspecified (HCC) C90.00 203.00    3. Essential hypertension I10 401.9    4. Obesity (BMI 30-39. 9) E66.9 278.00    5.  Biventricular ICD (implantable cardioverter-defibrillator) in place Z95.810 V45.02 AMB POC EKG ROUTINE W/ 12 LEADS, INTER & REP     Orders Placed This Encounter    AMB POC EKG ROUTINE W/ 12 LEADS, INTER & REP     Order Specific Question: Reason for Exam:     Answer:   routine        Plan:     Ms. Shu Sevilla is here for annual follow up s/p  BIV ICD. She is doing well and denies cardiac complaints. EKG today shows ventricular pacing and her device interrogation demonstrates 100% BIV pacing without events. Echo 3/16 with EF 55 % to 60 %. Battery remaining 1.5 years. Will continue current medical therapy and follow up in one year.       Continue medical management for HTN, DM. Thank you for allowing me to participate in Kenia Palacios 's care.       Julián Le NP

## 2020-03-05 NOTE — PROGRESS NOTES
Verified patient with 2 identifiers   Reviewed record in preparation for visit and obtained necessary documentation. Chief Complaint   Patient presents with    Pacemaker Check     annual follow up - denies cardiac sx      1. Have you been to the ER, urgent care clinic since your last visit? Hospitalized since your last visit? No     2. Have you seen or consulted any other health care providers outside of the [address] [place] [address] since your last visit? Include any pap smears or colon screening.  No

## 2020-03-06 ENCOUNTER — TELEPHONE (OUTPATIENT)
Dept: INFUSION THERAPY | Age: 60
End: 2020-03-06

## 2020-03-06 NOTE — TELEPHONE ENCOUNTER
Patient called and stated she has ran out of her medication and she would like a callback.     [phone]

## 2020-03-06 NOTE — TELEPHONE ENCOUNTER
Returned patient's call and confirmed x2 identifiers   Informed patient prescription was sent to pharmacy   Patient stated they did not have Rx   Informed patient Rx would be re-sent     Patient verbalized understanding   No questions or concerns at this time

## 2020-03-23 RX ORDER — GLIMEPIRIDE 1 MG/1
1 TABLET ORAL
Qty: 90 TAB | Refills: 4 | Status: SHIPPED | OUTPATIENT
Start: 2020-03-23 | End: 2021-04-26 | Stop reason: SDUPTHER

## 2020-03-30 DIAGNOSIS — C90.01 MULTIPLE MYELOMA IN REMISSION (HCC): ICD-10-CM

## 2020-03-30 RX ORDER — LENALIDOMIDE 10 MG/1
10 CAPSULE ORAL
Qty: 21 CAP | Refills: 0 | Status: SHIPPED | OUTPATIENT
Start: 2020-03-30 | End: 2020-04-21 | Stop reason: SDUPTHER

## 2020-03-31 ENCOUNTER — TELEPHONE (OUTPATIENT)
Dept: ONCOLOGY | Age: 60
End: 2020-03-31

## 2020-03-31 NOTE — TELEPHONE ENCOUNTER
[facility] and spoke with representative   Provided updated [employer] number   No further information required

## 2020-03-31 NOTE — TELEPHONE ENCOUNTER
Dino with [facility] called in and stated Slade Tellez was missing on rx for revlimid.  Reuben Barragan provided phone number for [facility] - [phone]     [phone] x [phone]

## 2020-04-02 DIAGNOSIS — C90.01 MULTIPLE MYELOMA IN REMISSION (HCC): ICD-10-CM

## 2020-04-07 LAB
ALBUMIN SERPL ELPH-MCNC: 3.6 G/DL (ref 2.9–4.4)
ALBUMIN SERPL-MCNC: 4.4 G/DL (ref 3.8–4.8)
ALBUMIN/GLOB SERPL: 1.3 {RATIO} (ref 0.7–1.7)
ALBUMIN/GLOB SERPL: 2 {RATIO} (ref 1.2–2.2)
ALP SERPL-CCNC: 83 IU/L (ref 39–117)
ALPHA1 GLOB SERPL ELPH-MCNC: 0.2 G/DL (ref 0–0.4)
ALPHA2 GLOB SERPL ELPH-MCNC: 0.8 G/DL (ref 0.4–1)
ALT SERPL-CCNC: 23 IU/L (ref 0–32)
AST SERPL-CCNC: 15 IU/L (ref 0–40)
B-GLOBULIN SERPL ELPH-MCNC: 1 G/DL (ref 0.7–1.3)
BASOPHILS # BLD AUTO: 0 X10E3/UL (ref 0–0.2)
BASOPHILS NFR BLD AUTO: 1 %
BILIRUB SERPL-MCNC: 0.8 MG/DL (ref 0–1.2)
BUN SERPL-MCNC: 12 MG/DL (ref 8–27)
BUN/CREAT SERPL: 13 (ref 12–28)
CALCIUM SERPL-MCNC: 9 MG/DL (ref 8.7–10.3)
CHLORIDE SERPL-SCNC: 108 MMOL/L (ref 96–106)
CO2 SERPL-SCNC: 20 MMOL/L (ref 20–29)
CREAT SERPL-MCNC: 0.95 MG/DL (ref 0.57–1)
EOSINOPHIL # BLD AUTO: 0.1 X10E3/UL (ref 0–0.4)
EOSINOPHIL NFR BLD AUTO: 3 %
ERYTHROCYTE [DISTWIDTH] IN BLOOD BY AUTOMATED COUNT: 14.6 % (ref 11.7–15.4)
GAMMA GLOB SERPL ELPH-MCNC: 1 G/DL (ref 0.4–1.8)
GLOBULIN SER CALC-MCNC: 2.2 G/DL (ref 1.5–4.5)
GLOBULIN SER-MCNC: 3 G/DL (ref 2.2–3.9)
GLUCOSE SERPL-MCNC: 164 MG/DL (ref 65–99)
HCT VFR BLD AUTO: 38 % (ref 34–46.6)
HGB BLD-MCNC: 13.5 G/DL (ref 11.1–15.9)
IGA SERPL-MCNC: 120 MG/DL (ref 87–352)
IGG SERPL-MCNC: 1136 MG/DL (ref 586–1602)
IGM SERPL-MCNC: 18 MG/DL (ref 26–217)
IMM GRANULOCYTES # BLD AUTO: 0 X10E3/UL (ref 0–0.1)
IMM GRANULOCYTES NFR BLD AUTO: 0 %
INTERPRETATION SERPL IEP-IMP: ABNORMAL
KAPPA LC FREE SER-MCNC: 15.6 MG/L (ref 3.3–19.4)
KAPPA LC FREE/LAMBDA FREE SER: 1.63 {RATIO} (ref 0.26–1.65)
LAMBDA LC FREE SERPL-MCNC: 9.6 MG/L (ref 5.7–26.3)
LYMPHOCYTES # BLD AUTO: 1.6 X10E3/UL (ref 0.7–3.1)
LYMPHOCYTES NFR BLD AUTO: 40 %
M PROTEIN SERPL ELPH-MCNC: ABNORMAL G/DL
MCH RBC QN AUTO: 36 PG (ref 26.6–33)
MCHC RBC AUTO-ENTMCNC: 35.5 G/DL (ref 31.5–35.7)
MCV RBC AUTO: 101 FL (ref 79–97)
MONOCYTES # BLD AUTO: 0.4 X10E3/UL (ref 0.1–0.9)
MONOCYTES NFR BLD AUTO: 9 %
NEUTROPHILS # BLD AUTO: 1.8 X10E3/UL (ref 1.4–7)
NEUTROPHILS NFR BLD AUTO: 47 %
PLATELET # BLD AUTO: 211 X10E3/UL (ref 150–450)
PLEASE NOTE:, 149534: ABNORMAL
POTASSIUM SERPL-SCNC: 4 MMOL/L (ref 3.5–5.2)
PROT SERPL-MCNC: 6.6 G/DL (ref 6–8.5)
RBC # BLD AUTO: 3.75 X10E6/UL (ref 3.77–5.28)
SODIUM SERPL-SCNC: 142 MMOL/L (ref 134–144)
WBC # BLD AUTO: 3.9 X10E3/UL (ref 3.4–10.8)

## 2020-04-17 RX ORDER — CARVEDILOL 25 MG/1
25 TABLET ORAL 2 TIMES DAILY
Qty: 60 TAB | Refills: 5 | Status: SHIPPED | OUTPATIENT
Start: 2020-04-17 | End: 2020-08-10

## 2020-04-21 ENCOUNTER — VIRTUAL VISIT (OUTPATIENT)
Dept: ONCOLOGY | Age: 60
End: 2020-04-21

## 2020-04-21 DIAGNOSIS — E66.01 SEVERE OBESITY (HCC): ICD-10-CM

## 2020-04-21 DIAGNOSIS — C90.01 MULTIPLE MYELOMA IN REMISSION (HCC): ICD-10-CM

## 2020-04-21 DIAGNOSIS — C90.01 MULTIPLE MYELOMA IN REMISSION (HCC): Primary | ICD-10-CM

## 2020-04-21 RX ORDER — LENALIDOMIDE 10 MG/1
10 CAPSULE ORAL
Qty: 21 CAP | Refills: 0 | Status: SHIPPED | OUTPATIENT
Start: 2020-04-21 | End: 2020-05-19 | Stop reason: SDUPTHER

## 2020-04-21 NOTE — PROGRESS NOTES
Madisyn Cotto is a 62 y.o. female  Chief Complaint   Patient presents with    Follow-up    Multiple Myeloma     1. Have you been to the ER, urgent care clinic since your last visit? Hospitalized since your last visit? No  2. Have you seen or consulted any other health care providers outside of the [address] [place] [address] since your last visit? Include any pap smears or colon screening.  No

## 2020-04-21 NOTE — PROGRESS NOTES
Hematology Follow Up    REASON FOR VISIT: Multiple Myeloma    TYPE OF VISIT  Nikolas Carrera is a 62 y.o. female who is seen by synchronous (real-time) audio-video technology on 4/21/2020 for follow up of Multiple Myeloma on Revlimid     TREATMENT:   3/24/17- 9/15/17: Byron Deeds X 8   10/20/17: Autologous transplant at [facility]  2/22/18 -  Revlimind     HISTORY OF PRESENT ILLNESS: Ms. Katiana Trevizo is a 62 y.o. female with DM and  Multiple Myeloma who presents to follow up after the ASCT and is on maintenance Revlimid 10mg daily. She was reduced to 5 mg due to cytopenias. Comes for follow up. She is on Revlimid 10 mg daily. She is tolerating well. No rash, no diarrhea, SOB, no cough. She has no leg swelling. No fevers, chills, sores. Has no change in weight or appetite. No new pain. Oncologic history  Patient had left facial numbness which started in the summer of 2016. This started abruptly and was not associated with rashes, pain, jaw weakness. She has had some intermittent hyperemia of the L eye. No tearing. She has been evaluated by Dr. Jamia Leyva with Neurology and an extensive work up was only notable for presence of a 0.3 g/dl M spike. Other tests were unremarkable: Vitamin B12 411, thyroid function test normal, ACE 25, BHARATI normal CBC normal, CMP normal, CRP 1.17, CT head and cervical spine unremarkable. Further evaluation revealed findings consistent with Multiple Myeloma    CBC 2/24 Unremarkable. Kappa 17 mg/dl, Lambda 2416 (ratio 0.01), SPEP 0.2 g/dl M spike, HANSEL showed monoclonal free lambda light chains, UPEP negative, Beta 2 Microglobulin 1.8 mg/L, Skeletal survey negative for lytic lesions, Bone marrow biopsy on 2/24/17 showed a Normocellular marrow with mild monoclonal plasmacytosis (15-20%). Trilineage hematopoiesis with maturation. She had a very good partial response and 8 cycles of VRD and then proceeded on to receive an autologous stem cell transplant on 10/20/17 at [facility]. She had a CR posttransplant. Started maintenance Revlimid in Jan 2018      Past Medical History:   Diagnosis Date    Cardiomyopathy     Diabetes mellitus type 2, controlled ([facility] 12/10/2015    Heart failure ([facility]     Hyperlipidemia     Hypertension     controlled    Multiple myeloma ([facility]     Orthostatic hypotension     Secondary cardiomyopathy, unspecified     Sinoatrial node dysfunction ([facility]     Vitamin B12 deficiency 3/31/2010       Past Surgical History:   Procedure Laterality Date    COLONOSCOPY N/A 1/22/2020    COLONOSCOPY performed [facility] Jeff Bridges [facility] ENDOSCOPY    HX HYSTERECTOMY      HX PACEMAKER      aicd       Allergies   Allergen Reactions    Ace Inhibitors Cough    Compazine [Prochlorperazine] Nausea Only     rash       Current Outpatient Medications   Medication Sig Dispense Refill    lenalidomide (Revlimid) 10 mg cap Take 1 Cap by mouth every twenty-one (21) days. Take 1 cap by mouth on days 1-21 followed by 7 days off in a 28 day cycle 21 Cap 0    carvediloL (COREG) 25 mg tablet Take 1 Tab by mouth two (2) times a day. 60 Tab 5    glimepiride (AMARYL) 1 mg tablet TAKE 1 TAB BY MOUTH DAILY (BEFORE BREAKFAST). FOR DIABETES 90 Tab 4    promethazine-dextromethorphan (PROMETHAZINE-DM) 6.25-15 mg/5 mL syrup Take 5 mL by mouth four (4) times daily as needed for Cough. 240 mL 2    multivitamin, tx-iron-ca-min (THERA-M W/ IRON) 9 mg iron-400 mcg tab tablet Take 1 Tab by mouth daily.  aspirin delayed-release 81 mg tablet TAKE 1 TABLET BY MOUTH EVERY DAY  4    meclizine (ANTIVERT) 25 mg tablet Take  by mouth three (3) times daily as needed.  atorvastatin (LIPITOR) 40 mg tablet TAKE 1 TAB BY MOUTH DAILY. FOR CHOLESTEROL 30 Tab 9    amLODIPine (NORVASC) 5 mg tablet Take 1 Tab by mouth daily. For blood pressure 30 Tab 12    loperamide (IMMODIUM) 2 mg tablet Take 2 mg by mouth as needed for Diarrhea.          Social History     Socioeconomic History    Marital status:      Spouse name: Not on file    Number of children: Not on file    Years of education: Not on file    Highest education level: Not on file   Tobacco Use    Smoking status: Never Smoker    Smokeless tobacco: Never Used   Substance and Sexual Activity    Alcohol use: No    Drug use: No    Sexual activity: Yes     Partners: Male   Social History Narrative    , 2 children, 28 and 31. Works at [employer], for 35 years. 5 grandchildren       Family History   Problem Relation Age of Onset   Karmon Cole Cancer Mother     Heart Disease Mother         pacemaker   Kamron Cole Diabetes Mother    Kamron Cole Breast Cancer Mother     Hypertension Sister     Hypertension Brother     Diabetes Brother     Hypertension Sister     Hypertension Sister     Hypertension Sister     Hypertension Sister     Diabetes Sister        ROS  As reviewed in the HPI all others reviewed and negative. ECOG PS is 0    Physical Examination:     Due to this being a TeleHealth evaluation, many elements of the physical examination are unable to be assessed. Constitutional: Appears well-developed and well-nourished in no apparent distress   Mental status: Alert and awake, Oriented to person/place/time, Able to follow commands  Eyes: EOM normal, Sclera normal, No visible ocular discharge  HENT: Normocephalic, atraumatic; Mouth/Throat: Moist mucous membranes, External Ears normal  Neck: No visualized mass  Neurological: No facial asymmetry (Cranial nerve 7 motor function), No gaze palsy  Skin: No significant exanthematous lesions or discoloration noted on facial skin  Psychiatric: Normal affect, normal judgment/insight. No hallucinations       LABS  Lab Results   Component Value Date/Time    WBC 3.9 04/06/2020 07:47 AM    HGB 13.5 04/06/2020 07:47 AM    HCT 38.0 04/06/2020 07:47 AM    PLATELET 520 64/82/7108 07:47 AM     (H) 04/06/2020 07:47 AM    ABS.  NEUTROPHILS 1.8 04/06/2020 07:47 AM     Lab Results   Component Value Date/Time    Sodium 142 04/06/2020 07:47 AM    Potassium 4.0 04/06/2020 07:47 AM    Chloride 108 (H) 04/06/2020 07:47 AM    CO2 20 04/06/2020 07:47 AM    Glucose 164 (H) 04/06/2020 07:47 AM    BUN 12 04/06/2020 07:47 AM    Creatinine 0.95 04/06/2020 07:47 AM    GFR est AA 75 04/06/2020 07:47 AM    GFR est non-AA 65 04/06/2020 07:47 AM    Calcium 9.0 04/06/2020 07:47 AM     Lab Results   Component Value Date/Time    AST (SGOT) 15 04/06/2020 07:47 AM    Alk. phosphatase 83 04/06/2020 07:47 AM    Protein, total 6.6 04/06/2020 07:47 AM    Albumin 4.4 04/06/2020 07:47 AM    Globulin 3.3 08/28/2019 08:22 AM    A-G Ratio 2.0 04/06/2020 07:47 AM     Lab Results   Component Value Date/Time    Vitamin B12 411 12/07/2016 02:58 PM     02/16/2017 09:18 AM    Beta-2 Microglobulin, serum 1.5 08/28/2019 08:22 AM    C-Reactive Protein, Cardiac 1.17 12/07/2016 02:58 PM    TSH 1.100 12/07/2016 02:58 PM    M-Mika Not Observed 04/06/2020 07:47 AM    M-Mika Not Observed 08/28/2019 08:22 AM    Hep C Virus Ab <0.1 05/17/2016 09:56 AM     Lab Results   Component Value Date/Time    INR 1.0 07/27/2010 03:57 PM    aPTT 31.5 07/27/2010 03:57 PM     Component      Latest Ref Rng & Units 4/6/2020           7:47 AM   Free Kappa Lt Chains, serum      3.3 - 19.4 mg/L 15.6   Free Lambda Lt Chains, serum      5.7 - 26.3 mg/L 9.6   Kappa/Lambda ratio, serum      0.26 - 1.65 1.63       Bone marrow biopsy reviewed    FISH molecular analysis:    Positive for IgH gene rearrangement (IgH complex FISH study pending); Normal results with 1, 5, 9, 13, 15, and 17 (TP53) probe sets. External records reviewed from [facility] transplant bone marrow biopsy done on 12/19/17 showed no evidence of plasma cells. Variable cellularity from 0-50%, flow LUIS, FISH negative    ASSESSMENT  Ms. Elizabet Childs is a 62 y.o. female with standard risk multiple myeloma status post 6 cycles of twice daily and autologous stem cell transplant on 10/20/17. She is in a complete remission.  We will continue with close surveillance in conjunction with her care team at [facility]  Multiple myeloma  On revlimid maintenance of 10mg daily following transplant at [facility]. Dropped to 5 mg due to recurrent grade 3 neutropenia (kleber 0.4). She has done well with this, rash has not recurred. As there was a temporary increase in M spike and hence Revlimid was increased again to 10 mg. She is doing well on this. She was recently seen by BMT early November with repeat labs and BMBx which indicates a complete remission. Recommendation was to reduce Revlimid to 10mg on days 1-21 followed by 7 days off in a 28 day cycle. Also BMT recommended started ASA which she has done so. System by system review today is largely negative  Counts 4/6/2020 reviewed and summarized   CBC stable and has no evidence of relapse    Repeat cbc, cmp, gammopathy eval in 3 months    Follow with BMT as scheduled     Neuropathy  Secondary to Velcade. Overall stable. DM  Per PCP. Neutropenia, chemotherapy induced  Resolved    RTC in 3 months with labs       Signed by: Theo Stewart MD                     April 21, 2020    I was in the office while conducting this encounter. The patient was at her home. Consent:  She and/or her healthcare decision maker is aware that this patient-initiated Telehealth encounter is a billable service, with coverage as determined by her insurance carrier. She is aware that she may receive a bill and has provided verbal consent to proceed: Yes    Pursuant to the emergency declaration under the [address] and the [facility], [address] waiver authority and the [employer] Act, this Virtual  Visit was conducted, with patient's (and/or legal guardian's) consent, to reduce the patient's risk of exposure to COVID-19 and provide necessary medical care.      Services were provided through a video synchronous discussion virtually to substitute for in-person clinic visit.

## 2020-05-15 RX ORDER — AMLODIPINE BESYLATE 5 MG/1
5 TABLET ORAL DAILY
Qty: 90 TAB | Refills: 4 | Status: SHIPPED | OUTPATIENT
Start: 2020-05-15 | End: 2021-07-15 | Stop reason: SDUPTHER

## 2020-05-19 DIAGNOSIS — C90.01 MULTIPLE MYELOMA IN REMISSION (HCC): ICD-10-CM

## 2020-05-19 RX ORDER — LENALIDOMIDE 10 MG/1
10 CAPSULE ORAL
Qty: 21 CAP | Refills: 0 | Status: SHIPPED | OUTPATIENT
Start: 2020-05-19 | End: 2020-07-15 | Stop reason: SDUPTHER

## 2020-05-21 ENCOUNTER — TELEPHONE (OUTPATIENT)
Dept: ONCOLOGY | Age: 60
End: 2020-05-21

## 2020-05-21 NOTE — TELEPHONE ENCOUNTER
Pascual Tate with pharmacy left a voicemail and stated patients rx needs a [employer] auth.  # [phone] ext [phone]

## 2020-06-04 ENCOUNTER — OFFICE VISIT (OUTPATIENT)
Dept: CARDIOLOGY CLINIC | Age: 60
End: 2020-06-04

## 2020-06-04 DIAGNOSIS — I42.9 CARDIOMYOPATHY, UNSPECIFIED TYPE (HCC): ICD-10-CM

## 2020-06-04 DIAGNOSIS — Z95.810 BIVENTRICULAR ICD (IMPLANTABLE CARDIOVERTER-DEFIBRILLATOR) IN PLACE: Primary | ICD-10-CM

## 2020-07-09 RX ORDER — ATORVASTATIN CALCIUM 40 MG/1
40 TABLET, FILM COATED ORAL DAILY
Qty: 30 TAB | Refills: 9 | Status: SHIPPED | OUTPATIENT
Start: 2020-07-09 | End: 2021-04-22

## 2020-07-09 NOTE — TELEPHONE ENCOUNTER
Last visit:1/29/20  Next visit:not scheduled  Previous refill 10/10/19    Requested Prescriptions     Pending Prescriptions Disp Refills    atorvastatin (LIPITOR) 40 mg tablet 30 Tab 9     Sig: Take 1 Tab by mouth daily.  For cholesterol

## 2020-07-15 DIAGNOSIS — C90.01 MULTIPLE MYELOMA IN REMISSION (HCC): ICD-10-CM

## 2020-07-15 RX ORDER — LENALIDOMIDE 10 MG/1
10 CAPSULE ORAL
Qty: 21 CAP | Refills: 0 | Status: SHIPPED | OUTPATIENT
Start: 2020-07-15 | End: 2020-07-15 | Stop reason: SDUPTHER

## 2020-07-15 RX ORDER — LENALIDOMIDE 10 MG/1
10 CAPSULE ORAL
Qty: 21 CAP | Refills: 0 | Status: SHIPPED | OUTPATIENT
Start: 2020-07-15 | End: 2020-11-24 | Stop reason: SDUPTHER

## 2020-07-20 LAB
ALBUMIN SERPL ELPH-MCNC: 3.4 G/DL (ref 2.9–4.4)
ALBUMIN SERPL-MCNC: 3.7 G/DL (ref 3.8–4.8)
ALBUMIN/GLOB SERPL: 1.4 {RATIO} (ref 0.7–1.7)
ALBUMIN/GLOB SERPL: 1.6 {RATIO} (ref 1.2–2.2)
ALP SERPL-CCNC: 72 IU/L (ref 39–117)
ALPHA1 GLOB SERPL ELPH-MCNC: 0.1 G/DL (ref 0–0.4)
ALPHA2 GLOB SERPL ELPH-MCNC: 0.9 G/DL (ref 0.4–1)
ALT SERPL-CCNC: 30 IU/L (ref 0–32)
AST SERPL-CCNC: 18 IU/L (ref 0–40)
B-GLOBULIN SERPL ELPH-MCNC: 0.6 G/DL (ref 0.7–1.3)
BASOPHILS # BLD AUTO: 0 X10E3/UL (ref 0–0.2)
BASOPHILS NFR BLD AUTO: 1 %
BILIRUB SERPL-MCNC: 0.7 MG/DL (ref 0–1.2)
BUN SERPL-MCNC: 12 MG/DL (ref 8–27)
BUN/CREAT SERPL: 14 (ref 12–28)
CALCIUM SERPL-MCNC: 9 MG/DL (ref 8.7–10.3)
CHLORIDE SERPL-SCNC: 101 MMOL/L (ref 96–106)
CO2 SERPL-SCNC: 21 MMOL/L (ref 20–29)
CREAT SERPL-MCNC: 0.88 MG/DL (ref 0.57–1)
EOSINOPHIL # BLD AUTO: 0.1 X10E3/UL (ref 0–0.4)
EOSINOPHIL NFR BLD AUTO: 2 %
ERYTHROCYTE [DISTWIDTH] IN BLOOD BY AUTOMATED COUNT: 14.5 % (ref 11.7–15.4)
GAMMA GLOB SERPL ELPH-MCNC: 0.8 G/DL (ref 0.4–1.8)
GLOBULIN SER CALC-MCNC: 2.3 G/DL (ref 1.5–4.5)
GLOBULIN SER-MCNC: 2.6 G/DL (ref 2.2–3.9)
GLUCOSE SERPL-MCNC: 129 MG/DL (ref 65–99)
HCT VFR BLD AUTO: 32.8 % (ref 34–46.6)
HGB BLD-MCNC: 11.2 G/DL (ref 11.1–15.9)
IGA SERPL-MCNC: 108 MG/DL (ref 87–352)
IGG SERPL-MCNC: 1053 MG/DL (ref 586–1602)
IGM SERPL-MCNC: 29 MG/DL (ref 26–217)
IMM GRANULOCYTES # BLD AUTO: 0 X10E3/UL (ref 0–0.1)
IMM GRANULOCYTES NFR BLD AUTO: 0 %
INTERPRETATION SERPL IEP-IMP: ABNORMAL
KAPPA LC FREE SER-MCNC: 29.2 MG/L (ref 3.3–19.4)
KAPPA LC FREE/LAMBDA FREE SER: 1.51 {RATIO} (ref 0.26–1.65)
LAMBDA LC FREE SERPL-MCNC: 19.4 MG/L (ref 5.7–26.3)
LYMPHOCYTES # BLD AUTO: 1.7 X10E3/UL (ref 0.7–3.1)
LYMPHOCYTES NFR BLD AUTO: 40 %
M PROTEIN SERPL ELPH-MCNC: ABNORMAL G/DL
MCH RBC QN AUTO: 33.5 PG (ref 26.6–33)
MCHC RBC AUTO-ENTMCNC: 34.1 G/DL (ref 31.5–35.7)
MCV RBC AUTO: 98 FL (ref 79–97)
MONOCYTES # BLD AUTO: 0.4 X10E3/UL (ref 0.1–0.9)
MONOCYTES NFR BLD AUTO: 9 %
NEUTROPHILS # BLD AUTO: 2 X10E3/UL (ref 1.4–7)
NEUTROPHILS NFR BLD AUTO: 48 %
PLATELET # BLD AUTO: 213 X10E3/UL (ref 150–450)
PLEASE NOTE:, 149534: ABNORMAL
POTASSIUM SERPL-SCNC: 3 MMOL/L (ref 3.5–5.2)
PROT SERPL-MCNC: 6 G/DL (ref 6–8.5)
RBC # BLD AUTO: 3.34 X10E6/UL (ref 3.77–5.28)
SODIUM SERPL-SCNC: 140 MMOL/L (ref 134–144)
WBC # BLD AUTO: 4.2 X10E3/UL (ref 3.4–10.8)

## 2020-07-21 ENCOUNTER — VIRTUAL VISIT (OUTPATIENT)
Dept: ONCOLOGY | Age: 60
End: 2020-07-21

## 2020-07-21 DIAGNOSIS — E66.01 SEVERE OBESITY (HCC): ICD-10-CM

## 2020-07-21 DIAGNOSIS — C90.01 MULTIPLE MYELOMA IN REMISSION (HCC): ICD-10-CM

## 2020-07-21 DIAGNOSIS — C90.01 MULTIPLE MYELOMA IN REMISSION (HCC): Primary | ICD-10-CM

## 2020-07-21 DIAGNOSIS — E11.21 TYPE 2 DIABETES MELLITUS WITH NEPHROPATHY (HCC): ICD-10-CM

## 2020-07-21 RX ORDER — POTASSIUM CHLORIDE 20 MEQ/1
40 TABLET, EXTENDED RELEASE ORAL DAILY
Qty: 14 TAB | Refills: 0 | Status: SHIPPED | OUTPATIENT
Start: 2020-07-21 | End: 2020-11-03

## 2020-07-21 NOTE — PROGRESS NOTES
Hematology Follow Up    REASON FOR VISIT: Multiple Myeloma    TYPE OF VISIT  Brandt Gitelman is a 62 y.o. female who is seen by synchronous (real-time) audio-video technology on 7/21/2020 for follow up of Multiple Myeloma on Revlimid     TREATMENT:   3/24/17- 9/15/17: Jay Fam X 8   10/20/17: Autologous transplant at [address] [place] [address]  2/22/18 -  Revlimind     HISTORY OF PRESENT ILLNESS: Ms. Mindi Mcadams is a 62 y.o. female with DM and  Multiple Myeloma who presents to follow up after the ASCT and is on maintenance Revlimid 10mg daily. She was reduced to 5 mg due to cytopenias. Seen for follow up. She is on Revlimid 10 mg daily. She is tolerating well. No rash, no diarrhea, SOB, no cough. She has no leg swelling. No fevers, chills, sores. Has no change in weight or appetite. No new pain. She had a normal colonoscopy in 2020 and has no bleeding or epigastric pain    Oncologic history  Patient had left facial numbness which started in the summer of 2016. This started abruptly and was not associated with rashes, pain, jaw weakness. She has had some intermittent hyperemia of the L eye. No tearing. She has been evaluated by Dr. Claudean Meuse with Neurology and an extensive work up was only notable for presence of a 0.3 g/dl M spike. Other tests were unremarkable: Vitamin B12 411, thyroid function test normal, ACE 25, BHARATI normal CBC normal, CMP normal, CRP 1.17, CT head and cervical spine unremarkable. Further evaluation revealed findings consistent with Multiple Myeloma    CBC 2/24 Unremarkable. Kappa 17 mg/dl, Lambda 2416 (ratio 0.01), SPEP 0.2 g/dl M spike, HANSEL showed monoclonal free lambda light chains, UPEP negative, Beta 2 Microglobulin 1.8 mg/L, Skeletal survey negative for lytic lesions, Bone marrow biopsy on 2/24/17 showed a Normocellular marrow with mild monoclonal plasmacytosis (15-20%). Trilineage hematopoiesis with maturation.   She had a very good partial response and 8 cycles of VRD and then proceeded on to receive an autologous stem cell transplant on 10/20/17 at [facility]. She had a CR posttransplant. Started maintenance Revlimid in Jan 2018      Past Medical History:   Diagnosis Date    Cardiomyopathy     Diabetes mellitus type 2, controlled ([facility] [address] 12/10/2015    Heart failure ([facility] [address] [facility]     Hyperlipidemia     Hypertension     controlled    Multiple myeloma ([facility]     Orthostatic hypotension     Secondary cardiomyopathy, unspecified     Sinoatrial node dysfunction ([facility] [address]     Vitamin B12 deficiency 3/31/2010       Past Surgical History:   Procedure Laterality Date    COLONOSCOPY N/A 1/22/2020    COLONOSCOPY performed by Leonila Clay MD at [facility] ENDOSCOPY    HX HYSTERECTOMY      HX PACEMAKER      aicd       Allergies   Allergen Reactions    Ace Inhibitors Cough    Compazine [Prochlorperazine] Nausea Only     rash       Current Outpatient Medications   Medication Sig Dispense Refill    lenalidomide (Revlimid) 10 mg cap Take 1 Cap by mouth every twenty-one (21) days. Take 1 cap by mouth on days 1-21 followed by 7 days off in a 28 day cycle 21 Cap 0    atorvastatin (LIPITOR) 40 mg tablet Take 1 Tab by mouth daily. For cholesterol 30 Tab 9    amLODIPine (NORVASC) 5 mg tablet TAKE 1 TAB BY MOUTH DAILY. FOR BLOOD PRESSURE 90 Tab 4    carvediloL (COREG) 25 mg tablet Take 1 Tab by mouth two (2) times a day. 60 Tab 5    glimepiride (AMARYL) 1 mg tablet TAKE 1 TAB BY MOUTH DAILY (BEFORE BREAKFAST). FOR DIABETES 90 Tab 4    promethazine-dextromethorphan (PROMETHAZINE-DM) 6.25-15 mg/5 mL syrup Take 5 mL by mouth four (4) times daily as needed for Cough. 240 mL 2    multivitamin, tx-iron-ca-min (THERA-M W/ IRON) 9 mg iron-400 mcg tab tablet Take 1 Tab by mouth daily.  aspirin delayed-release 81 mg tablet TAKE 1 TABLET BY MOUTH EVERY DAY  4    meclizine (ANTIVERT) 25 mg tablet Take  by mouth three (3) times daily as needed.  loperamide (IMMODIUM) 2 mg tablet Take 2 mg by mouth as needed for Diarrhea.          Social History Socioeconomic History    Marital status:      Spouse name: Not on file    Number of children: Not on file    Years of education: Not on file    Highest education level: Not on file   Tobacco Use    Smoking status: Never Smoker    Smokeless tobacco: Never Used   Substance and Sexual Activity    Alcohol use: No    Drug use: No    Sexual activity: Yes     Partners: Male   Social History Narrative    , 2 children, 28 and 31. Works at [employer], for 35 years. 5 grandchildren       Family History   Problem Relation Age of Onset   [facility] Cancer Mother     Heart Disease Mother         pacemaker   24 [facility] Diabetes Mother    [address] [facility] Breast Cancer Mother     Hypertension Sister     Hypertension Brother     Diabetes Brother     Hypertension Sister     Hypertension Sister     Hypertension Sister     Hypertension Sister     Diabetes Sister        ROS  As reviewed in the HPI all others reviewed and negative. ECOG PS is 0    Physical Examination:     Due to this being a TeleHealth evaluation, many elements of the physical examination are unable to be assessed. Constitutional: Appears well-developed and well-nourished in no apparent distress   Mental status: Alert and awake, Oriented to person/place/time, Able to follow commands  Eyes: EOM normal, Sclera normal, No visible ocular discharge  HENT: Normocephalic, atraumatic; Mouth/Throat: Moist mucous membranes, External Ears normal  Neck: No visualized mass  Neurological: No facial asymmetry (Cranial nerve 7 motor function), No gaze palsy  Skin: No significant exanthematous lesions or discoloration noted on facial skin  Psychiatric: Normal affect, normal judgment/insight. No hallucinations       LABS  Lab Results   Component Value Date/Time    WBC 4.2 07/17/2020 10:23 AM    HGB 11.2 07/17/2020 10:23 AM    HCT 32.8 (L) 07/17/2020 10:23 AM    PLATELET 699 31/24/8200 10:23 AM    MCV 98 (H) 07/17/2020 10:23 AM    ABS.  NEUTROPHILS 2.0 07/17/2020 10:23 AM     Lab Results   Component Value Date/Time    Sodium 140 07/17/2020 10:23 AM    Potassium 3.0 (L) 07/17/2020 10:23 AM    Chloride 101 07/17/2020 10:23 AM    CO2 21 07/17/2020 10:23 AM    Glucose 129 (H) 07/17/2020 10:23 AM    BUN 12 07/17/2020 10:23 AM    Creatinine 0.88 07/17/2020 10:23 AM    GFR est AA 82 07/17/2020 10:23 AM    GFR est non-AA 71 07/17/2020 10:23 AM    Calcium 9.0 07/17/2020 10:23 AM     Lab Results   Component Value Date/Time    Alk. phosphatase 72 07/17/2020 10:23 AM    Protein, total 6.0 07/17/2020 10:23 AM    Albumin 3.7 (L) 07/17/2020 10:23 AM    Globulin 3.3 08/28/2019 08:22 AM    A-G Ratio 1.6 07/17/2020 10:23 AM     Lab Results   Component Value Date/Time    Vitamin B12 411 12/07/2016 02:58 PM     02/16/2017 09:18 AM    Beta-2 Microglobulin, serum 1.5 08/28/2019 08:22 AM    C-Reactive Protein, Cardiac 1.17 12/07/2016 02:58 PM    TSH 1.100 12/07/2016 02:58 PM    M-Mika Not Observed 07/17/2020 10:23 AM    M-Mika Not Observed 08/28/2019 08:22 AM    Hep C Virus Ab <0.1 05/17/2016 09:56 AM     Lab Results   Component Value Date/Time    INR 1.0 07/27/2010 03:57 PM    aPTT 31.5 07/27/2010 03:57 PM     7/17/2020  FLC normal    Bone marrow biopsy reviewed    FISH molecular analysis:    Positive for IgH gene rearrangement (IgH complex FISH study pending); Normal results with 1, 5, 9, 13, 15, and 17 (TP53) probe sets. External records reviewed from [facility] transplant transplant bone marrow biopsy done on 12/19/17 showed no evidence of plasma cells. Variable cellularity from 0-50%, flow LUIS, FISH negative    ASSESSMENT  Ms. Dwight Mabry is a 62 y.o. female with standard risk multiple myeloma status post 6 cycles of twice daily and autologous stem cell transplant on 10/20/17. She is in a complete remission.  We will continue with close surveillance in conjunction with her care team at [facility]  Multiple myeloma  On revlimid maintenance of 10mg daily following transplant at [facility].  Dropped to 5 mg due to recurrent grade 3 neutropenia (kleber 0.4). She has done well with this, rash has not recurred. As there was a temporary increase in M spike and hence Revlimid was increased again to 10 mg. She is doing well on this. She was recently seen by BMT early November with repeat labs and BMBx which indicates a complete remission. Recommendation was to reduce Revlimid to 10mg on days 1-21 followed by 7 days off in a 28 day cycle. Also BMT recommended started ASA which she has done so. 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 Overall stable. DM  Per PCP. Anemia  New on 7/17  No bleeding  Secondary to Revlimid  Will follow and get iron studies and B12 next visit    RTC in 3 months with labs       Signed by: Mimi Duong MD                     July 21, 2020    I was in the office while conducting this encounter. The patient was at her home. Consent:  She and/or her healthcare decision maker is aware that this patient-initiated Telehealth encounter is a billable service, with coverage as determined by her insurance carrier. She is aware that she may receive a bill and has provided verbal consent to proceed: Yes    Pursuant to the emergency declaration under the [address] and the [facility] waiver authority and the [employer] Act, this Virtual  Visit was conducted, with patient's (and/or legal guardian's) consent, to reduce the patient's risk of exposure to COVID-19 and provide necessary medical care. Services were provided through a video synchronous discussion virtually to substitute for in-person clinic visit.

## 2020-07-21 NOTE — PROGRESS NOTES
Latasha Andersen is a 62 y.o. female  Chief Complaint   Patient presents with    Follow-up    Multiple Myeloma     1. Have you been to the ER, urgent care clinic since your last visit? Hospitalized since your last visit? No    2. Have you seen or consulted any other health care providers outside of the [address] [place] [address] since your last visit? Include any pap smears or colon screening.  No

## 2020-08-10 RX ORDER — CARVEDILOL 25 MG/1
TABLET ORAL
Qty: 180 TAB | Refills: 1 | Status: SHIPPED | OUTPATIENT
Start: 2020-08-10 | End: 2021-02-04

## 2020-09-03 ENCOUNTER — OFFICE VISIT (OUTPATIENT)
Dept: CARDIOLOGY CLINIC | Age: 60
End: 2020-09-03
Payer: COMMERCIAL

## 2020-09-03 DIAGNOSIS — Z95.810 BIVENTRICULAR ICD (IMPLANTABLE CARDIOVERTER-DEFIBRILLATOR) IN PLACE: Primary | ICD-10-CM

## 2020-09-03 DIAGNOSIS — I42.9 CARDIOMYOPATHY, UNSPECIFIED TYPE (HCC): ICD-10-CM

## 2020-09-03 PROCEDURE — 93295 DEV INTERROG REMOTE 1/2/MLT: CPT | Performed by: INTERNAL MEDICINE

## 2020-09-03 PROCEDURE — 93296 REM INTERROG EVL PM/IDS: CPT | Performed by: INTERNAL MEDICINE

## 2020-09-18 LAB
ALBUMIN SERPL ELPH-MCNC: 3.9 G/DL (ref 2.9–4.4)
ALBUMIN SERPL-MCNC: 4 G/DL (ref 3.8–4.8)
ALBUMIN/GLOB SERPL: 1.7 {RATIO} (ref 0.7–1.7)
ALBUMIN/GLOB SERPL: 1.7 {RATIO} (ref 1.2–2.2)
ALP SERPL-CCNC: 71 IU/L (ref 39–117)
ALPHA1 GLOB SERPL ELPH-MCNC: 0.1 G/DL (ref 0–0.4)
ALPHA2 GLOB SERPL ELPH-MCNC: 0.8 G/DL (ref 0.4–1)
ALT SERPL-CCNC: 29 IU/L (ref 0–32)
AST SERPL-CCNC: 21 IU/L (ref 0–40)
B-GLOBULIN SERPL ELPH-MCNC: 0.8 G/DL (ref 0.7–1.3)
BASOPHILS # BLD AUTO: 0 X10E3/UL (ref 0–0.2)
BASOPHILS NFR BLD AUTO: 1 %
BILIRUB SERPL-MCNC: 1.1 MG/DL (ref 0–1.2)
BUN SERPL-MCNC: 11 MG/DL (ref 8–27)
BUN/CREAT SERPL: 12 (ref 12–28)
CALCIUM SERPL-MCNC: 8.9 MG/DL (ref 8.7–10.3)
CHLORIDE SERPL-SCNC: 103 MMOL/L (ref 96–106)
CO2 SERPL-SCNC: 22 MMOL/L (ref 20–29)
CREAT SERPL-MCNC: 0.89 MG/DL (ref 0.57–1)
EOSINOPHIL # BLD AUTO: 0.2 X10E3/UL (ref 0–0.4)
EOSINOPHIL NFR BLD AUTO: 5 %
ERYTHROCYTE [DISTWIDTH] IN BLOOD BY AUTOMATED COUNT: 14.7 % (ref 11.7–15.4)
FERRITIN SERPL-MCNC: 193 NG/ML (ref 15–150)
GAMMA GLOB SERPL ELPH-MCNC: 0.8 G/DL (ref 0.4–1.8)
GLOBULIN SER CALC-MCNC: 2.3 G/DL (ref 1.5–4.5)
GLOBULIN SER-MCNC: 2.4 G/DL (ref 2.2–3.9)
GLUCOSE SERPL-MCNC: 135 MG/DL (ref 65–99)
HCT VFR BLD AUTO: 35.9 % (ref 34–46.6)
HGB BLD-MCNC: 12.5 G/DL (ref 11.1–15.9)
IGA SERPL-MCNC: 106 MG/DL (ref 87–352)
IGG SERPL-MCNC: 957 MG/DL (ref 586–1602)
IGM SERPL-MCNC: 11 MG/DL (ref 26–217)
IMM GRANULOCYTES # BLD AUTO: 0 X10E3/UL (ref 0–0.1)
IMM GRANULOCYTES NFR BLD AUTO: 0 %
INTERPRETATION SERPL IEP-IMP: ABNORMAL
IRON SATN MFR SERPL: 44 % (ref 15–55)
IRON SERPL-MCNC: 123 UG/DL (ref 27–139)
KAPPA LC FREE SER-MCNC: 17.7 MG/L (ref 3.3–19.4)
KAPPA LC FREE/LAMBDA FREE SER: 1.28 {RATIO} (ref 0.26–1.65)
LAMBDA LC FREE SERPL-MCNC: 13.8 MG/L (ref 5.7–26.3)
LYMPHOCYTES # BLD AUTO: 1.4 X10E3/UL (ref 0.7–3.1)
LYMPHOCYTES NFR BLD AUTO: 42 %
M PROTEIN SERPL ELPH-MCNC: ABNORMAL G/DL
MCH RBC QN AUTO: 34.9 PG (ref 26.6–33)
MCHC RBC AUTO-ENTMCNC: 34.8 G/DL (ref 31.5–35.7)
MCV RBC AUTO: 100 FL (ref 79–97)
MONOCYTES # BLD AUTO: 0.4 X10E3/UL (ref 0.1–0.9)
MONOCYTES NFR BLD AUTO: 11 %
NEUTROPHILS # BLD AUTO: 1.3 X10E3/UL (ref 1.4–7)
NEUTROPHILS NFR BLD AUTO: 41 %
PLATELET # BLD AUTO: 204 X10E3/UL (ref 150–450)
PLEASE NOTE:, 149534: ABNORMAL
POTASSIUM SERPL-SCNC: 3.8 MMOL/L (ref 3.5–5.2)
PROT SERPL-MCNC: 6.3 G/DL (ref 6–8.5)
RBC # BLD AUTO: 3.58 X10E6/UL (ref 3.77–5.28)
SODIUM SERPL-SCNC: 139 MMOL/L (ref 134–144)
TIBC SERPL-MCNC: 277 UG/DL (ref 250–450)
UIBC SERPL-MCNC: 154 UG/DL (ref 118–369)
WBC # BLD AUTO: 3.2 X10E3/UL (ref 3.4–10.8)

## 2020-10-01 ENCOUNTER — TELEPHONE (OUTPATIENT)
Dept: ONCOLOGY | Age: 60
End: 2020-10-01

## 2020-10-01 ENCOUNTER — TRANSCRIBE ORDER (OUTPATIENT)
Dept: SCHEDULING | Age: 60
End: 2020-10-01

## 2020-10-01 DIAGNOSIS — Z12.31 VISIT FOR SCREENING MAMMOGRAM: Primary | ICD-10-CM

## 2020-10-01 NOTE — TELEPHONE ENCOUNTER
Called patient and confirmed x2 identifiers   Patient consented to virtual visit and confirmed appointment time     No new questions or concerns at this time

## 2020-10-14 ENCOUNTER — VIRTUAL VISIT (OUTPATIENT)
Dept: ONCOLOGY | Age: 60
End: 2020-10-14
Payer: COMMERCIAL

## 2020-10-14 DIAGNOSIS — C90.01 MULTIPLE MYELOMA IN REMISSION (HCC): Primary | ICD-10-CM

## 2020-10-14 PROCEDURE — 99213 OFFICE O/P EST LOW 20 MIN: CPT | Performed by: INTERNAL MEDICINE

## 2020-10-14 NOTE — PROGRESS NOTES
Hematology Follow Up    REASON FOR VISIT: Multiple Myeloma    TYPE OF VISIT  Isacc Mcdonald is a 56 y.o. female who is seen by synchronous (real-time) audio-video technology on 10/14/2020 for follow up of Multiple Myeloma on Revlimid     TREATMENT:   3/24/17- 9/15/17: Jaqui Hoskins X 8   10/20/17: Autologous transplant at [facility]  2/22/18 -  Revlimind     HISTORY OF PRESENT ILLNESS: Ms. Rosaline Plunkett is a 56 y.o. female with DM and  Multiple Myeloma who presents to follow up after the ASCT and is on maintenance Revlimid 10mg daily. She was reduced to 5 mg due to cytopenias. Seen for follow up. She is on Revlimid 10 mg daily. She is tolerating well. No rash, has had intermittent diarrhea - colonoscopy was normal 2020. Stools are loose and precipitated by high fructose food. SOB, no cough. She has no leg swelling. No fevers, chills, sores. No new pain. Oncologic history  Patient had left facial numbness which started in the summer of 2016. This started abruptly and was not associated with rashes, pain, jaw weakness. She has had some intermittent hyperemia of the L eye. No tearing. She has been evaluated by Dr. Mauricio Banks with Neurology and an extensive work up was only notable for presence of a 0.3 g/dl M spike. Other tests were unremarkable: Vitamin B12 411, thyroid function test normal, ACE 25, BHARATI normal CBC normal, CMP normal, CRP 1.17, CT head and cervical spine unremarkable. Further evaluation revealed findings consistent with Multiple Myeloma    CBC 2/24 Unremarkable. Kappa 17 mg/dl, Lambda 2416 (ratio 0.01), SPEP 0.2 g/dl M spike, HANSEL showed monoclonal free lambda light chains, UPEP negative, Beta 2 Microglobulin 1.8 mg/L, Skeletal survey negative for lytic lesions, Bone marrow biopsy on 2/24/17 showed a Normocellular marrow with mild monoclonal plasmacytosis (15-20%). Trilineage hematopoiesis with maturation.   She had a very good partial response and 8 cycles of VRD and then proceeded on to receive an autologous stem cell transplant on 10/20/17 at [facility]. She had a CR posttransplant. Started maintenance Revlimid in Jan 2018      Past Medical History:   Diagnosis Date    Cardiomyopathy     Diabetes mellitus type 2, controlled ([address] 12/10/2015    [facility] failure [facility]     Hyperlipidemia     Hypertension     controlled    Multiple myeloma ([facility]     Orthostatic hypotension     Secondary cardiomyopathy, unspecified     Sinoatrial node dysfunction ([facility]     Vitamin B12 deficiency 3/31/2010       Past Surgical History:   Procedure Laterality Date    COLONOSCOPY N/A 1/22/2020    COLONOSCOPY performed by Trace Crump [facility] at [facility] ENDOSCOPY    HX HYSTERECTOMY      HX PACEMAKER      aicd       Allergies   Allergen Reactions    Ace Inhibitors Cough    Compazine [Prochlorperazine] Nausea Only     rash       Current Outpatient Medications   Medication Sig Dispense Refill    carvediloL (COREG) 25 mg tablet TAKE 1 TABLET BY MOUTH TWICE A  Tab 1    potassium chloride (K-DUR, KLOR-CON) 20 mEq tablet Take 2 Tabs by mouth daily. 14 Tab 0    lenalidomide (Revlimid) 10 mg cap Take 1 Cap by mouth every twenty-one (21) days. Take 1 cap by mouth on days 1-21 followed by 7 days off in a 28 day cycle 21 Cap 0    atorvastatin (LIPITOR) 40 mg tablet Take 1 Tab by mouth daily. For cholesterol 30 Tab 9    amLODIPine (NORVASC) 5 mg tablet TAKE 1 TAB BY MOUTH DAILY. FOR BLOOD PRESSURE 90 Tab 4    glimepiride (AMARYL) 1 mg tablet TAKE 1 TAB BY MOUTH DAILY (BEFORE BREAKFAST). FOR DIABETES 90 Tab 4    promethazine-dextromethorphan (PROMETHAZINE-DM) 6.25-15 mg/5 mL syrup Take 5 mL by mouth four (4) times daily as needed for Cough. 240 mL 2    multivitamin, tx-iron-ca-min (THERA-M W/ IRON) 9 mg iron-400 mcg tab tablet Take 1 Tab by mouth daily.  aspirin delayed-release 81 mg tablet TAKE 1 TABLET BY MOUTH EVERY DAY  4    meclizine (ANTIVERT) 25 mg tablet Take  by mouth three (3) times daily as needed.       loperamide (IMMODIUM) 2 mg tablet Take 2 mg by mouth as needed for Diarrhea. Social History     Socioeconomic History    Marital status:      Spouse name: Not on file    Number of children: Not on file    Years of education: Not on file    Highest education level: Not on file   Tobacco Use    Smoking status: Never Smoker    Smokeless tobacco: Never Used   Substance and Sexual Activity    Alcohol use: No    Drug use: No    Sexual activity: Yes     Partners: Male   Social History Narrative    , 2 children, 28 and 31. Works at [employer], for 35 years. 5 grandchildren       Family History   Problem Relation Age of Onset   [employer] Cancer Mother     Heart Disease Mother         pacemaker   [employer] Diabetes Mother    [employer] Breast Cancer Mother     Hypertension Sister     Hypertension Brother     Diabetes Brother     Hypertension Sister     Hypertension Sister     Hypertension Sister     Hypertension Sister     Diabetes Sister        ROS  As reviewed in the HPI all others reviewed and negative. ECOG PS is 0    Physical Examination:     Due to this being a TeleHealth evaluation, many elements of the physical examination are unable to be assessed. Constitutional: Appears well-developed and well-nourished in no apparent distress   Mental status: Alert and awake, Oriented to person/place/time, Able to follow commands  Eyes: EOM normal, Sclera normal, No visible ocular discharge  HENT: Normocephalic, atraumatic; Mouth/Throat: Moist mucous membranes, External Ears normal  Neck: No visualized mass  Neurological: No facial asymmetry (Cranial nerve 7 motor function), No gaze palsy  Skin: No significant exanthematous lesions or discoloration noted on facial skin  Psychiatric: Normal affect, normal judgment/insight.  No hallucinations       LABS  Lab Results   Component Value Date/Time    WBC 3.2 (L) 09/16/2020 10:41 AM    HGB 12.5 09/16/2020 10:41 AM    HCT 35.9 09/16/2020 10:41 AM    PLATELET 221 79/87/8658 10:41 AM     (H) 09/16/2020 10:41 AM    ABS. NEUTROPHILS 1.3 (L) 09/16/2020 10:41 AM     Lab Results   Component Value Date/Time    Sodium 139 09/16/2020 10:41 AM    Potassium 3.8 09/16/2020 10:41 AM    Chloride 103 09/16/2020 10:41 AM    CO2 22 09/16/2020 10:41 AM    Glucose 135 (H) 09/16/2020 10:41 AM    BUN 11 09/16/2020 10:41 AM    Creatinine 0.89 09/16/2020 10:41 AM    GFR est AA 81 09/16/2020 10:41 AM    GFR est non-AA 70 09/16/2020 10:41 AM    Calcium 8.9 09/16/2020 10:41 AM     Lab Results   Component Value Date/Time    Alk. phosphatase 71 09/16/2020 10:41 AM    Protein, total 6.3 09/16/2020 10:41 AM    Albumin 4.0 09/16/2020 10:41 AM    Globulin 3.3 08/28/2019 08:22 AM    A-G Ratio 1.7 09/16/2020 10:41 AM     Lab Results   Component Value Date/Time    Iron % saturation 44 09/16/2020 10:41 AM    TIBC 277 09/16/2020 10:41 AM    Ferritin 193 (H) 09/16/2020 10:41 AM    Vitamin B12 411 12/07/2016 02:58 PM     02/16/2017 09:18 AM    Beta-2 Microglobulin, serum 1.5 08/28/2019 08:22 AM    C-Reactive Protein, Cardiac 1.17 12/07/2016 02:58 PM    TSH 1.100 12/07/2016 02:58 PM    M-Mika Not Observed 09/16/2020 10:41 AM    M-Mika Not Observed 08/28/2019 08:22 AM    Hep C Virus Ab <0.1 05/17/2016 09:56 AM     Lab Results   Component Value Date/Time    INR 1.0 07/27/2010 03:57 PM    aPTT 31.5 07/27/2010 03:57 PM     7/17/2020  FLC normal    Bone marrow biopsy reviewed    FISH molecular analysis:    Positive for IgH gene rearrangement (IgH complex FISH study pending); Normal results with 1, 5, 9, 13, 15, and 17 (TP53) probe sets. External records reviewed from [facility] transplant transplant bone marrow biopsy done on 12/19/17 showed no evidence of plasma cells. Variable cellularity from 0-50%, flow LUIS, FISH negative    ASSESSMENT  Ms. Low Jaramillo is a 56 y.o. female with standard risk multiple myeloma status post 6 cycles of twice daily and autologous stem cell transplant on 10/20/17.   She is in a complete remission. We will continue with close surveillance in conjunction with her care team at [address]  Multiple myeloma  S/P BMT in CR  On revlimid maintenance of 10mg daily following transplant at [facility]. Dropped to 5 mg due to recurrent grade 3 neutropenia (kleber 0.4). She has done well with this, rash has not recurred. As there was a temporary increase in M spike and hence Revlimid was increased again to 10 mg. She is doing well on this. 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 Overall stable. DM  Per PCP. Anemia  Resolved    Diarrhea  Grade 1 from revlimid  Counseled on diet    RTC in 3 months with labs       Signed by: Elona Bence, MD                     October 14, 2020    I was in the office while conducting this encounter. The patient was at her home. Consent:  She and/or her healthcare decision maker is aware that this patient-initiated Telehealth encounter is a billable service, with coverage as determined by her insurance carrier. She is aware that she may receive a bill and has provided verbal consent to proceed: Yes    Pursuant to the emergency declaration under the [address] and the [facility] [place], [facility] by [facility], [address] waiver authority and the [employer] Act, this Virtual  Visit was conducted, with patient's (and/or legal guardian's) consent, to reduce the patient's risk of exposure to COVID-19 and provide necessary medical care. Services were provided through a video synchronous discussion virtually to substitute for in-person clinic visit.

## 2020-10-21 DIAGNOSIS — C90.01 MULTIPLE MYELOMA IN REMISSION (HCC): ICD-10-CM

## 2020-11-02 ENCOUNTER — DOCUMENTATION ONLY (OUTPATIENT)
Dept: FAMILY MEDICINE CLINIC | Age: 60
End: 2020-11-02

## 2020-11-03 ENCOUNTER — OFFICE VISIT (OUTPATIENT)
Dept: CARDIOLOGY CLINIC | Age: 60
End: 2020-11-03
Payer: COMMERCIAL

## 2020-11-03 VITALS
DIASTOLIC BLOOD PRESSURE: 71 MMHG | RESPIRATION RATE: 16 BRPM | HEART RATE: 67 BPM | OXYGEN SATURATION: 100 % | WEIGHT: 202 LBS | BODY MASS INDEX: 34.49 KG/M2 | SYSTOLIC BLOOD PRESSURE: 126 MMHG | HEIGHT: 64 IN | TEMPERATURE: 94.8 F

## 2020-11-03 DIAGNOSIS — I10 ESSENTIAL HYPERTENSION: ICD-10-CM

## 2020-11-03 DIAGNOSIS — Z95.810 BIVENTRICULAR ICD (IMPLANTABLE CARDIOVERTER-DEFIBRILLATOR) IN PLACE: ICD-10-CM

## 2020-11-03 DIAGNOSIS — E11.21 TYPE 2 DIABETES MELLITUS WITH NEPHROPATHY (HCC): ICD-10-CM

## 2020-11-03 DIAGNOSIS — I42.0 DILATED CARDIOMYOPATHY (HCC): Primary | ICD-10-CM

## 2020-11-03 PROCEDURE — 99213 OFFICE O/P EST LOW 20 MIN: CPT | Performed by: SPECIALIST

## 2020-11-03 RX ORDER — LATANOPROST 50 UG/ML
SOLUTION/ DROPS OPHTHALMIC
COMMUNITY
Start: 2020-10-19

## 2020-11-03 NOTE — PROGRESS NOTES
HISTORY OF PRESENT ILLNESS  Asiya Summers is a 56 y.o. female     SUMMARY:   Problem List  Date Reviewed: 11/3/2020          Codes Class Noted    Severe obesity ([facility] ICD-10-CM: E66.01  ICD-9-CM: 278.01  9/23/2019        Chemotherapy induced neutropenia ([facility] ICD-10-CM: D70.1, T45.1X5A  ICD-9-CM: 288.03, E933.1  7/25/2018        Controlled type 2 diabetes mellitus without complication ([facility] EIR-07-LA: E11.9  ICD-9-CM: 250.00  2/27/2018        Status post bone transplant ICD-10-CM: Z94.6  ICD-9-CM: V42.4  1/15/2018        Type 2 diabetes mellitus with nephropathy ([facility] ICD-10-CM: E11.21  ICD-9-CM: 250.40, 583.81  1/11/2018        Type 2 diabetes mellitus with diabetic neuropathy ([facility] ICD-10-CM: E11.40  ICD-9-CM: 250.60, 357.2  1/11/2018        Multiple myeloma ([facility] ICD-10-CM: C90.00  ICD-9-CM: 203.00  Unknown        Neuropathy ICD-10-CM: G62.9  ICD-9-CM: 355.9  4/21/2017        Rash ICD-10-CM: R21  ICD-9-CM: 782.1  4/21/2017        Controlled type 2 diabetes mellitus without complication, without long-term current use of insulin ([facility] ICD-10-CM: E11.9  ICD-9-CM: 250.00  3/31/2017        Multiple myeloma not having achieved remission ([facility] ICD-10-CM: C90.00  ICD-9-CM: 203.00  3/6/2017        Diabetes mellitus type 2, controlled ([facility] ICD-10-CM: E11.9  ICD-9-CM: 250.00  12/10/2015        Essential hypertension ICD-10-CM: I10  ICD-9-CM: 401.9  11/17/2015        Hyperlipidemia ICD-10-CM: E78.5  ICD-9-CM: 272.4  Unknown        AICD (automatic cardioverter/defibrillator) present ICD-10-CM: Z95.810  ICD-9-CM: V45.02  9/19/2013        Chronic systolic heart failure ([facility] ICD-10-CM: I50.22  ICD-9-CM: 428.22  4/9/2012        Obesity, unspecified ICD-10-CM: E66.9  ICD-9-CM: 278.00  4/9/2012        Other left bundle branch block ICD-10-CM: I44.7  ICD-9-CM: 426.3  4/9/2012        Cardiomyopathy (HCC)--resolved ICD-10-CM: I42.9  ICD-9-CM: 425.4  3/28/2011        Vitamin B12 deficiency ICD-10-CM: E53.8  ICD-9-CM: 266.2 3/31/2010              Current Outpatient Medications on File Prior to Visit   Medication Sig    latanoprost (XALATAN) 0.005 % ophthalmic solution INSTILL 1 DROP IN EACH EYE EVERY DAY    carvediloL (COREG) 25 mg tablet TAKE 1 TABLET BY MOUTH TWICE A DAY    lenalidomide (Revlimid) 10 mg cap Take 1 Cap by mouth every twenty-one (21) days. Take 1 cap by mouth on days 1-21 followed by 7 days off in a 28 day cycle    atorvastatin (LIPITOR) 40 mg tablet Take 1 Tab by mouth daily. For cholesterol    amLODIPine (NORVASC) 5 mg tablet TAKE 1 TAB BY MOUTH DAILY. FOR BLOOD PRESSURE    glimepiride (AMARYL) 1 mg tablet TAKE 1 TAB BY MOUTH DAILY (BEFORE BREAKFAST). FOR DIABETES    multivitamin, tx-iron-ca-min (THERA-M W/ IRON) 9 mg iron-400 mcg tab tablet Take 1 Tab by mouth daily.  aspirin delayed-release 81 mg tablet TAKE 1 TABLET BY MOUTH EVERY DAY    loperamide (IMMODIUM) 2 mg tablet Take 2 mg by mouth as needed for Diarrhea. No current facility-administered medications on file prior to visit. CARDIOLOGY STUDIES TO DATE:  4/10 cardiac cath no sig cad, lvef 25-30%    BSC Bi-V ICD, DOI 8/6/10, NOT MRI Conditional , on remote    3/16 normal echo    Chief Complaint   Patient presents with    Follow-up    CHF     HPI :  She was previously followed by Dr. Fei Silveira for a nonischemic cardiomyopathy and AICD implant. Her ejection fraction recovered as of her echo in 2016 and she is keeping regular follow-up on her AICD checks. She just retired from the [employer] and hopes to start exercising regularly, though she has been having some problems with right knee pain. Blood pressure is well controlled and she is having no problems with her medications.   CARDIAC ROS:   negative for chest pain, dyspnea, palpitations, syncope, orthopnea, paroxysmal nocturnal dyspnea, exertional chest pressure/discomfort, claudication, lower extremity edema    Family History   Problem Relation Age of Onset    Cancer Mother     Heart Disease Mother         pacemaker    Diabetes Mother     Breast Cancer Mother     Hypertension Sister     Hypertension Brother     Diabetes Brother     Hypertension Sister     Hypertension Sister     Hypertension Sister     Hypertension Sister     Diabetes Sister        Past Medical History:   Diagnosis Date    Cardiomyopathy     Diabetes mellitus type 2, controlled ([facility] 12/10/2015    Heart failure (HCC)     Hyperlipidemia     Hypertension     controlled    Multiple myeloma ([facility]     Orthostatic hypotension     Secondary cardiomyopathy, unspecified     [facility]     Vitamin B12 deficiency 3/31/2010       GENERAL ROS:  A comprehensive review of systems was negative except for that written in the HPI.     Visit Vitals  /71 (BP 1 Location: Left arm, BP Patient Position: Sitting)   Pulse 67   Temp (!) 94.8 °F (34.9 °C) (Temporal)   Resp 16   Ht 5' 4\" (1.626 m)   Wt 202 lb (91.6 kg)   SpO2 100%   BMI 34.67 kg/m²       Wt Readings from Last 3 Encounters:   11/03/20 202 lb (91.6 kg)   03/05/20 201 lb (91.2 kg)   01/29/20 196 lb 3.2 oz (89 kg)            BP Readings from Last 3 Encounters:   11/03/20 126/71   03/05/20 122/64   01/29/20 117/67       PHYSICAL EXAM  General appearance: alert, cooperative, no distress, appears stated age  Neurologic: Alert and oriented X 3  Neck: supple, symmetrical, trachea midline, no adenopathy, no carotid bruit and no JVD  Lungs: clear to auscultation bilaterally  Heart: regular rate and rhythm, S1, S2 normal, no murmur, click, rub or gallop  Extremities: extremities normal, atraumatic, no cyanosis or edema    Lab Results   Component Value Date/Time    Cholesterol, total 151 09/23/2019 09:27 AM    Cholesterol, total 150 03/21/2019 10:17 AM    Cholesterol, total 149 09/21/2018 10:07 AM    Cholesterol, total 182 05/17/2017 09:25 AM    Cholesterol, total 183 11/17/2016 10:43 AM    HDL Cholesterol 64 09/23/2019 09:27 AM HDL Cholesterol 60 03/21/2019 10:17 AM    HDL Cholesterol 62 09/21/2018 10:07 AM    HDL Cholesterol 75 05/17/2017 09:25 AM    HDL Cholesterol 66 11/17/2016 10:43 AM    LDL, calculated 73 09/23/2019 09:27 AM    LDL, calculated 75 03/21/2019 10:17 AM    LDL, calculated 75 09/21/2018 10:07 AM    LDL, calculated 79 05/17/2017 09:25 AM    LDL, calculated 97 11/17/2016 10:43 AM    Triglyceride 69 09/23/2019 09:27 AM    Triglyceride 75 03/21/2019 10:17 AM    Triglyceride 61 09/21/2018 10:07 AM    Triglyceride 139 05/17/2017 09:25 AM    Triglyceride 101 11/17/2016 10:43 AM    CHOL/HDL Ratio 4.3 10/08/2009 10:45 AM     ASSESSMENT :      She is stable and asymptomatic, well compensated on a good medical regimen and needs no cardiac testing at this time. current treatment plan is effective, no change in therapy  lab results and schedule of future lab studies reviewed with patient  reviewed diet, exercise and weight control    Encounter Diagnoses   Name Primary?  Dilated cardiomyopathy ([facility] Yes    Essential hypertension     Type 2 diabetes mellitus with nephropathy ([facility]     Biventricular ICD (implantable cardioverter-defibrillator) in place      Orders Placed This Encounter    latanoprost (XALATAN) 0.005 % ophthalmic solution       Follow-up and Dispositions    · Return in about 6 months (around 5/3/2021). Nina White MD  11/3/2020  Please note that this dictation was completed with [employer], the computer voice recognition software. Quite often unanticipated grammatical, syntax, homophones, and other interpretive errors are inadvertently transcribed by the computer software. Please disregard these errors. Please excuse any errors that have escaped final proofreading. Thank you.

## 2020-11-03 NOTE — PROGRESS NOTES
Identified pt with two pt identifiers(name and ). Reviewed record in preparation for visit and have obtained necessary documentation. Chief Complaint   Patient presents with    Follow-up    CHF           Visit Vitals  /71 (BP 1 Location: Left arm, BP Patient Position: Sitting)   Pulse 67   Temp (!) 94.8 °F (34.9 °C) (Temporal)   Resp 16   Ht 5' 4\" (1.626 m)   Wt 202 lb (91.6 kg)   SpO2 100%   BMI 34.67 kg/m²     Pain Scale: 0 - No pain/10    Coordination of Care Questionnaire:  :   1. Have you been to the ER, urgent care clinic since your last visit? Hospitalized since your last visit? No    2. Have you seen or consulted any other health care providers outside of the [address] [place] [address] since your last visit? Include any pap smears or colon screening.  No

## 2020-11-11 ENCOUNTER — HOSPITAL ENCOUNTER (OUTPATIENT)
Dept: MAMMOGRAPHY | Age: 60
Discharge: HOME | End: 2020-11-11
Attending: FAMILY MEDICINE
Payer: COMMERCIAL

## 2020-11-11 DIAGNOSIS — Z12.31 VISIT FOR SCREENING MAMMOGRAM: ICD-10-CM

## 2020-11-11 PROCEDURE — 77067 SCR MAMMO BI INCL CAD: CPT

## 2020-11-24 DIAGNOSIS — C90.01 MULTIPLE MYELOMA IN REMISSION (HCC): ICD-10-CM

## 2020-11-24 RX ORDER — LENALIDOMIDE 10 MG/1
10 CAPSULE ORAL
Qty: 21 CAP | Refills: 0 | Status: SHIPPED | OUTPATIENT
Start: 2020-11-24 | End: 2020-12-21 | Stop reason: SDUPTHER

## 2020-12-10 ENCOUNTER — OFFICE VISIT (OUTPATIENT)
Dept: CARDIOLOGY CLINIC | Age: 60
End: 2020-12-10
Payer: COMMERCIAL

## 2020-12-10 DIAGNOSIS — I42.0 DILATED CARDIOMYOPATHY (HCC): ICD-10-CM

## 2020-12-10 DIAGNOSIS — Z95.810 BIVENTRICULAR ICD (IMPLANTABLE CARDIOVERTER-DEFIBRILLATOR) IN PLACE: Primary | ICD-10-CM

## 2020-12-10 PROCEDURE — 93296 REM INTERROG EVL PM/IDS: CPT | Performed by: INTERNAL MEDICINE

## 2020-12-10 PROCEDURE — 93295 DEV INTERROG REMOTE 1/2/MLT: CPT | Performed by: INTERNAL MEDICINE

## 2020-12-21 DIAGNOSIS — C90.01 MULTIPLE MYELOMA IN REMISSION (HCC): ICD-10-CM

## 2020-12-21 RX ORDER — LENALIDOMIDE 10 MG/1
10 CAPSULE ORAL
Qty: 21 CAP | Refills: 0 | Status: SHIPPED | OUTPATIENT
Start: 2020-12-21 | End: 2021-01-21 | Stop reason: SDUPTHER

## 2021-01-04 ENCOUNTER — TELEPHONE (OUTPATIENT)
Dept: ONCOLOGY | Age: 61
End: 2021-01-04

## 2021-01-04 NOTE — TELEPHONE ENCOUNTER
Called patient and confirmed x2 identifiers   Patient confirmed obtaining labs today 1/4/2021     No new questions or concerns at this time

## 2021-01-06 LAB
ALBUMIN SERPL ELPH-MCNC: 3.6 G/DL (ref 2.9–4.4)
ALBUMIN SERPL-MCNC: 4.3 G/DL (ref 3.8–4.8)
ALBUMIN/GLOB SERPL: 1.3 {RATIO} (ref 0.7–1.7)
ALBUMIN/GLOB SERPL: 2 {RATIO} (ref 1.2–2.2)
ALP SERPL-CCNC: 88 IU/L (ref 39–117)
ALPHA1 GLOB SERPL ELPH-MCNC: 0.2 G/DL (ref 0–0.4)
ALPHA2 GLOB SERPL ELPH-MCNC: 0.8 G/DL (ref 0.4–1)
ALT SERPL-CCNC: 23 IU/L (ref 0–32)
AST SERPL-CCNC: 18 IU/L (ref 0–40)
B-GLOBULIN SERPL ELPH-MCNC: 1 G/DL (ref 0.7–1.3)
BASOPHILS # BLD AUTO: 0 X10E3/UL (ref 0–0.2)
BASOPHILS NFR BLD AUTO: 1 %
BILIRUB SERPL-MCNC: 1.3 MG/DL (ref 0–1.2)
BUN SERPL-MCNC: 11 MG/DL (ref 8–27)
BUN/CREAT SERPL: 12 (ref 12–28)
CALCIUM SERPL-MCNC: 9.4 MG/DL (ref 8.7–10.3)
CHLORIDE SERPL-SCNC: 104 MMOL/L (ref 96–106)
CO2 SERPL-SCNC: 22 MMOL/L (ref 20–29)
CREAT SERPL-MCNC: 0.93 MG/DL (ref 0.57–1)
EOSINOPHIL # BLD AUTO: 0.2 X10E3/UL (ref 0–0.4)
EOSINOPHIL NFR BLD AUTO: 3 %
ERYTHROCYTE [DISTWIDTH] IN BLOOD BY AUTOMATED COUNT: 14.9 % (ref 11.7–15.4)
FERRITIN SERPL-MCNC: 163 NG/ML (ref 15–150)
GAMMA GLOB SERPL ELPH-MCNC: 1 G/DL (ref 0.4–1.8)
GLOBULIN SER CALC-MCNC: 2.2 G/DL (ref 1.5–4.5)
GLOBULIN SER-MCNC: 2.9 G/DL (ref 2.2–3.9)
GLUCOSE SERPL-MCNC: 161 MG/DL (ref 65–99)
HCT VFR BLD AUTO: 38.7 % (ref 34–46.6)
HGB BLD-MCNC: 13.4 G/DL (ref 11.1–15.9)
IGA SERPL-MCNC: 118 MG/DL (ref 87–352)
IGG SERPL-MCNC: 1005 MG/DL (ref 586–1602)
IGM SERPL-MCNC: 11 MG/DL (ref 26–217)
IMM GRANULOCYTES # BLD AUTO: 0 X10E3/UL (ref 0–0.1)
IMM GRANULOCYTES NFR BLD AUTO: 0 %
INTERPRETATION SERPL IEP-IMP: ABNORMAL
IRON SATN MFR SERPL: 38 % (ref 15–55)
IRON SERPL-MCNC: 104 UG/DL (ref 27–139)
KAPPA LC FREE SER-MCNC: 18.9 MG/L (ref 3.3–19.4)
KAPPA LC FREE/LAMBDA FREE SER: 1.48 {RATIO} (ref 0.26–1.65)
LAMBDA LC FREE SERPL-MCNC: 12.8 MG/L (ref 5.7–26.3)
LYMPHOCYTES # BLD AUTO: 1.5 X10E3/UL (ref 0.7–3.1)
LYMPHOCYTES NFR BLD AUTO: 34 %
M PROTEIN SERPL ELPH-MCNC: ABNORMAL G/DL
MCH RBC QN AUTO: 35.3 PG (ref 26.6–33)
MCHC RBC AUTO-ENTMCNC: 34.6 G/DL (ref 31.5–35.7)
MCV RBC AUTO: 102 FL (ref 79–97)
MONOCYTES # BLD AUTO: 0.5 X10E3/UL (ref 0.1–0.9)
MONOCYTES NFR BLD AUTO: 11 %
NEUTROPHILS # BLD AUTO: 2.3 X10E3/UL (ref 1.4–7)
NEUTROPHILS NFR BLD AUTO: 51 %
PLATELET # BLD AUTO: 243 X10E3/UL (ref 150–450)
PLEASE NOTE:, 149534: ABNORMAL
POTASSIUM SERPL-SCNC: 3.7 MMOL/L (ref 3.5–5.2)
PROT SERPL-MCNC: 6.5 G/DL (ref 6–8.5)
RBC # BLD AUTO: 3.8 X10E6/UL (ref 3.77–5.28)
SODIUM SERPL-SCNC: 139 MMOL/L (ref 134–144)
TIBC SERPL-MCNC: 277 UG/DL (ref 250–450)
UIBC SERPL-MCNC: 173 UG/DL (ref 118–369)
WBC # BLD AUTO: 4.4 X10E3/UL (ref 3.4–10.8)

## 2021-01-12 ENCOUNTER — VIRTUAL VISIT (OUTPATIENT)
Dept: ONCOLOGY | Age: 61
End: 2021-01-12
Payer: COMMERCIAL

## 2021-01-12 DIAGNOSIS — C90.00 MULTIPLE MYELOMA, REMISSION STATUS UNSPECIFIED (HCC): Primary | ICD-10-CM

## 2021-01-12 PROCEDURE — 99213 OFFICE O/P EST LOW 20 MIN: CPT | Performed by: INTERNAL MEDICINE

## 2021-01-12 NOTE — PROGRESS NOTES
Loyda Flores is a 56 y.o. female  Chief Complaint   Patient presents with    Follow-up    Multiple Myeloma     1. Have you been to the ER, urgent care clinic since your last visit? Hospitalized since your last visit? No    2. Have you seen or consulted any other health care providers outside of the [address] [place] [address] since your last visit? Include any pap smears or colon screening.  No

## 2021-01-12 NOTE — PROGRESS NOTES
Hematology Follow Up    REASON FOR VISIT: Multiple Myeloma    TYPE OF VISIT  Shanta Watson is a 56 y.o. female who is seen by synchronous (real-time) audio-video technology on 1/12/2021 for follow up of Multiple Myeloma on Revlimid     TREATMENT:   3/24/17- 9/15/17: Alisha Sanders X 8   10/20/17: Autologous transplant at [facility]  2/22/18 -  Revlimid     HISTORY OF PRESENT ILLNESS: Ms. Mp Willingham is a 56 y.o. female with DM and  Multiple Myeloma who presents to follow up after the ASCT and is on maintenance Revlimid 10mg daily. She was reduced to 5 mg due to cytopenias. Seen for follow up. She is on Revlimid 10 mg daily. Colonoscopy was normal 2020. Stools are loose and takes lactates. Has no rash, pain, has no fevers or cough    Oncologic history  Patient had left facial numbness which started in the summer of 2016. This started abruptly and was not associated with rashes, pain, jaw weakness. She has had some intermittent hyperemia of the L eye. No tearing. She has been evaluated by Dr. John Schrader with Neurology and an extensive work up was only notable for presence of a 0.3 g/dl M spike. Other tests were unremarkable: Vitamin B12 411, thyroid function test normal, ACE 25, BHARATI normal CBC normal, CMP normal, CRP 1.17, CT head and cervical spine unremarkable. Further evaluation revealed findings consistent with Multiple Myeloma    CBC 2/24 Unremarkable. Kappa 17 mg/dl, Lambda 2416 (ratio 0.01), SPEP 0.2 g/dl M spike, HANSEL showed monoclonal free lambda light chains, UPEP negative, Beta 2 Microglobulin 1.8 mg/L, Skeletal survey negative for lytic lesions, Bone marrow biopsy on 2/24/17 showed a Normocellular marrow with mild monoclonal plasmacytosis (15-20%). Trilineage hematopoiesis with maturation. She had a very good partial response and 8 cycles of VRD and then proceeded on to receive an autologous stem cell transplant on 10/20/17 at [facility]. She had a CR posttransplant.  Started maintenance Revlimid in Jan 2018      Past Medical History:   Diagnosis Date    Cardiomyopathy     Diabetes mellitus type 2, controlled ([facility] 12/10/2015    [facility]     Hyperlipidemia     Hypertension     controlled    [facility]oma [facility]     Orthostatic hypotension     Secondary cardiomyopathy, unspecified     Sinoatrial node dysfunction ([facility]     Vitamin B12 deficiency 3/31/2010       Past Surgical History:   Procedure Laterality Date    COLONOSCOPY N/A 1/22/2020    COLONOS[facility] performed [facility]ural[facility] H[facility] ENDOSCOPY    HX HYSTERECTOMY      HX PACEMAKER      aicd       Allergies   Allergen Reactions    Ace Inhibitors Cough    Compazine [Prochlorperazine] Nausea Only     rash       Current Outpatient Medications   Medication Sig Dispense Refill    lenalidomide (Revlimid) 10 mg cap Take 1 Cap by mouth every twenty-one (21) days. Take 1 cap by mouth on days 1-21 followed by 7 days off in a 28 day cycle [facility] # [id number] 21 Cap 0    latanoprost (XALATAN) 0.005 % ophthalmic solution INSTILL 1 DROP IN EACH EYE EVERY DAY      carvediloL (COREG) 25 mg tablet TAKE 1 TABLET BY MOUTH TWICE A  Tab 1    atorvastatin (LIPITOR) 40 mg tablet Take 1 Tab by mouth daily. For cholesterol 30 Tab 9    amLODIPine (NORVASC) 5 mg tablet TAKE 1 TAB BY MOUTH DAILY. FOR BLOOD PRESSURE 90 Tab 4    glimepiride (AMARYL) 1 mg tablet TAKE 1 TAB BY MOUTH DAILY (BEFORE BREAKFAST). FOR DIABETES 90 Tab 4    multivitamin, tx-iron-ca-min (THERA-M W/ IRON) 9 mg iron-400 mcg tab tablet Take 1 Tab by mouth daily.  aspirin delayed-release 81 mg tablet TAKE 1 TABLET BY MOUTH EVERY DAY  4    loperamide (IMMODIUM) 2 mg tablet Take 2 mg by mouth as needed for Diarrhea.          Social History     Socioeconomic History    Marital status:      Spouse name: Not on file    Number of children: Not on file    Years of education: Not on file    Highest education level: Not on file   Tobacco Use    Smoking status: Never Smoker    Smokeless tobacco: Never Used   Substance and Sexual Activity    Alcohol use: No    Drug use: No    Sexual activity: Yes     Partners: Male   Social History Narrative    , 2 children, 28 and 31. Works at [employer], for 35 years. 5 grandchildren       Family History   Problem Relation Age of Onset   Mira Her Cancer Mother     Heart Disease Mother         pacemaker   Mira Her Diabetes Mother    Mira Her Breast Cancer Mother     Hypertension Sister     Hypertension Brother     Diabetes Brother     Hypertension Sister     Hypertension Sister     Hypertension Sister     Hypertension Sister     Diabetes Sister        ROS  As reviewed in the HPI all others reviewed and negative. ECOG PS is 0    Physical Examination:     Due to this being a TeleHealth evaluation, many elements of the physical examination are unable to be assessed. Constitutional: Appears well-developed and well-nourished in no apparent distress   Mental status: Alert and awake, Oriented to person/place/time, Able to follow commands  Eyes: EOM normal, Sclera normal, No visible ocular discharge  HENT: Normocephalic, atraumatic; Mouth/Throat: Moist mucous membranes, External Ears normal  Neck: No visualized mass  Neurological: No facial asymmetry (Cranial nerve 7 motor function), No gaze palsy  Skin: No significant exanthematous lesions or discoloration noted on facial skin  Psychiatric: Normal affect, normal judgment/insight. No hallucinations       LABS  Lab Results   Component Value Date/Time    WBC 4.4 01/04/2021 10:32 AM    HGB 13.4 01/04/2021 10:32 AM    HCT 38.7 01/04/2021 10:32 AM    PLATELET 976 61/00/1083 10:32 AM     (H) 01/04/2021 10:32 AM    ABS.  NEUTROPHILS 2.3 01/04/2021 10:32 AM     Lab Results   Component Value Date/Time    Sodium 139 01/04/2021 10:32 AM    Potassium 3.7 01/04/2021 10:32 AM    Chloride 104 01/04/2021 10:32 AM    CO2 22 01/04/2021 10:32 AM    Glucose 161 (H) 01/04/2021 10:32 AM    BUN 11 01/04/2021 10:32 AM    Creatinine 0.93 01/04/2021 10:32 AM    GFR est AA 76 01/04/2021 10:32 AM    GFR est non-AA 66 01/04/2021 10:32 AM    Calcium 9.4 01/04/2021 10:32 AM     Lab Results   Component Value Date/Time    Alk. phosphatase 88 01/04/2021 10:32 AM    Protein, total 6.5 01/04/2021 10:32 AM    Albumin 4.3 01/04/2021 10:32 AM    Globulin 3.3 08/28/2019 08:22 AM    A-G Ratio 2.0 01/04/2021 10:32 AM     Lab Results   Component Value Date/Time    Iron % saturation 38 01/04/2021 10:32 AM    TIBC 277 01/04/2021 10:32 AM    Ferritin 163 (H) 01/04/2021 10:32 AM    Vitamin B12 411 12/07/2016 02:58 PM     02/16/2017 09:18 AM    Beta-2 Microglobulin, serum 1.5 08/28/2019 08:22 AM    C-Reactive Protein, Cardiac 1.17 12/07/2016 02:58 PM    TSH 1.100 12/07/2016 02:58 PM    M-Mika Not Observed 01/04/2021 10:32 AM    M-Mika Not Observed 08/28/2019 08:22 AM    Hep C Virus Ab <0.1 05/17/2016 09:56 AM     Lab Results   Component Value Date/Time    INR 1.0 07/27/2010 03:57 PM    aPTT 31.5 07/27/2010 03:57 PM     7/17/2020  FLC normal    Bone marrow biopsy reviewed    FISH molecular analysis:    Positive for IgH gene rearrangement (IgH complex FISH study pending); Normal results with 1, 5, 9, 13, 15, and 17 (TP53) probe sets. External records reviewed from [facility] transplant transplant bone marrow biopsy done on 12/19/17 showed no evidence of plasma cells. Variable cellularity from 0-50%, flow LUIS, FISH negative    ASSESSMENT  Ms. Elzbieta Ocasio is a 56 y.o. female with standard risk multiple myeloma status post 6 cycles of twice daily and autologous stem cell transplant on 10/20/17. She is in a complete remission. We will continue with close surveillance in conjunction with her care team at [address] [place] [address]  Multiple myeloma  S/P BMT in CR  On revlimid maintenance of 10mg daily following transplant at [facility]. Dropped to 5 mg due to recurrent grade 3 neutropenia (kleber 0.4). She has done well with this, rash has not recurred.  As there was a temporary increase in M spike and hence Revlimid was increased again to 10 mg. She is doing well on this with grade 1 diarrhea     Counts reviewed 1/2021  System by system review today is largely negative      Repeat cbc, cmp, gammopathy eval in 3 months    Follow with BMT as scheduled     Neuropathy   Overall stable. DM  Per PCP. Anemia  Resolved    Diarrhea  Grade 1 from revlimid  Counseled on diet  Improved on lactace    RTC in 6 months      Signed by: Olivier Mak MD                     January 12, 2021    I was in the office while conducting this encounter. The patient was at her home. Consent:  She and/or her healthcare decision maker is aware that this patient-initiated Telehealth encounter is a billable service, with coverage as determined by her insurance carrier. She is aware that she may receive a bill and has provided verbal consent to proceed: Yes    Pursuant to the emergency declaration under the [address] and the [facility] waiver authority and the [employer] Act, this Virtual  Visit was conducted, with patient's (and/or legal guardian's) consent, to reduce the patient's risk of exposure to COVID-19 and provide necessary medical care. Services were provided through a video synchronous discussion virtually to substitute for in-person clinic visit.

## 2021-01-14 ENCOUNTER — OFFICE VISIT (OUTPATIENT)
Dept: FAMILY MEDICINE CLINIC | Age: 61
End: 2021-01-14
Payer: COMMERCIAL

## 2021-01-14 VITALS
RESPIRATION RATE: 16 BRPM | OXYGEN SATURATION: 98 % | TEMPERATURE: 96.4 F | HEART RATE: 70 BPM | SYSTOLIC BLOOD PRESSURE: 127 MMHG | DIASTOLIC BLOOD PRESSURE: 67 MMHG | BODY MASS INDEX: 34.62 KG/M2 | HEIGHT: 64 IN | WEIGHT: 202.8 LBS

## 2021-01-14 DIAGNOSIS — C90.01 MULTIPLE MYELOMA IN REMISSION (HCC): ICD-10-CM

## 2021-01-14 DIAGNOSIS — E78.00 HYPERCHOLESTEROLEMIA: Primary | ICD-10-CM

## 2021-01-14 DIAGNOSIS — E11.9 DIABETES MELLITUS WITHOUT COMPLICATION (HCC): ICD-10-CM

## 2021-01-14 LAB — HBA1C MFR BLD HPLC: 6.6 %

## 2021-01-14 PROCEDURE — 99213 OFFICE O/P EST LOW 20 MIN: CPT | Performed by: FAMILY MEDICINE

## 2021-01-14 PROCEDURE — 83036 HEMOGLOBIN GLYCOSYLATED A1C: CPT | Performed by: FAMILY MEDICINE

## 2021-01-14 NOTE — PROGRESS NOTES
Chief Complaint   Patient presents with    Complete Physical     1. Have you been to the ER, urgent care clinic since your last visit? Hospitalized since your last visit? No    2. Have you seen or consulted any other health care providers outside of the [address] [place] [address] since your last visit? Include any pap smears or colon screening.  Yes Oncology on Tuesday by VV

## 2021-01-14 NOTE — PROGRESS NOTES
HISTORY OF PRESENT ILLNESS  Kamron Abarca is a 56 y.o. female. HPI Pt. Comes in for blood pressure, cholesterol, and diabetes check. Oncologist told her that her sugar was high. No complaints of chest pain, shortness of breath, TIAs, claudication or edema. ROS    Physical Exam  Vitals signs and nursing note reviewed. Constitutional:       Appearance: She is well-developed. HENT:      Right Ear: External ear normal.      Left Ear: External ear normal.   Neck:      Thyroid: No thyromegaly. Cardiovascular:      Rate and Rhythm: Normal rate and regular rhythm. Heart sounds: Normal heart sounds. Pulmonary:      Breath sounds: Normal breath sounds. No wheezing. Abdominal:      General: Bowel sounds are normal. There is no distension. Palpations: Abdomen is soft. There is no mass. Tenderness: There is no abdominal tenderness. Lymphadenopathy:      Cervical: No cervical adenopathy. ASSESSMENT and PLAN  Orders Placed This Encounter    LIPID PANEL    AMB POC HEMOGLOBIN A1C     Diagnoses and all orders for this visit:    1. Hypercholesterolemia  -     LIPID PANEL; Future    2. Diabetes mellitus without complication ([address]  -     AMB POC HEMOGLOBIN A1C          Continue current meds. Diabetes control good.

## 2021-01-19 LAB
CHOLEST SERPL-MCNC: 167 MG/DL (ref 100–199)
HDLC SERPL-MCNC: 64 MG/DL
INTERPRETATION, 910389: NORMAL
LDLC SERPL CALC-MCNC: 78 MG/DL (ref 0–99)
TRIGL SERPL-MCNC: 147 MG/DL (ref 0–149)
VLDLC SERPL CALC-MCNC: 25 MG/DL (ref 5–40)

## 2021-01-21 DIAGNOSIS — C90.01 MULTIPLE MYELOMA IN REMISSION (HCC): ICD-10-CM

## 2021-01-21 RX ORDER — LENALIDOMIDE 10 MG/1
10 CAPSULE ORAL
Qty: 21 CAP | Refills: 0 | Status: SHIPPED | OUTPATIENT
Start: 2021-01-21 | End: 2021-02-19 | Stop reason: SDUPTHER

## 2021-02-04 RX ORDER — CARVEDILOL 25 MG/1
TABLET ORAL
Qty: 180 TAB | Refills: 1 | Status: SHIPPED | OUTPATIENT
Start: 2021-02-04 | End: 2022-04-28

## 2021-02-16 ENCOUNTER — TELEPHONE (OUTPATIENT)
Dept: ONCOLOGY | Age: 61
End: 2021-02-16

## 2021-02-16 NOTE — TELEPHONE ENCOUNTER
[employer] left a voicemail stating that the Revlimid needing Authorization.      # [phone]    f) [phone]

## 2021-02-17 ENCOUNTER — TELEPHONE (OUTPATIENT)
Dept: ONCOLOGY | Age: 61
End: 2021-02-17

## 2021-02-17 DIAGNOSIS — C90.01 MULTIPLE MYELOMA IN REMISSION (HCC): ICD-10-CM

## 2021-02-17 NOTE — TELEPHONE ENCOUNTER
Kayley Palomares called and stated he needed an Authorization # from Shobha Figueredo for mutual pt prescription, Revlimid 10mg cap.     CB# [phone]

## 2021-02-19 RX ORDER — LENALIDOMIDE 10 MG/1
10 CAPSULE ORAL
Qty: 21 CAP | Refills: 0 | Status: SHIPPED | OUTPATIENT
Start: 2021-02-19 | End: 2021-05-06 | Stop reason: SDUPTHER

## 2021-03-11 ENCOUNTER — OFFICE VISIT (OUTPATIENT)
Dept: CARDIOLOGY CLINIC | Age: 61
End: 2021-03-11
Payer: COMMERCIAL

## 2021-03-11 ENCOUNTER — CLINICAL SUPPORT (OUTPATIENT)
Dept: CARDIOLOGY CLINIC | Age: 61
End: 2021-03-11

## 2021-03-11 VITALS
DIASTOLIC BLOOD PRESSURE: 78 MMHG | OXYGEN SATURATION: 98 % | BODY MASS INDEX: 35 KG/M2 | SYSTOLIC BLOOD PRESSURE: 132 MMHG | RESPIRATION RATE: 18 BRPM | HEIGHT: 64 IN | WEIGHT: 205 LBS | HEART RATE: 71 BPM

## 2021-03-11 DIAGNOSIS — Z95.810 BIVENTRICULAR ICD (IMPLANTABLE CARDIOVERTER-DEFIBRILLATOR) IN PLACE: Primary | ICD-10-CM

## 2021-03-11 DIAGNOSIS — Z95.810 BIVENTRICULAR ICD (IMPLANTABLE CARDIOVERTER-DEFIBRILLATOR) IN PLACE: ICD-10-CM

## 2021-03-11 DIAGNOSIS — I42.0 DILATED CARDIOMYOPATHY (HCC): ICD-10-CM

## 2021-03-11 DIAGNOSIS — I10 ESSENTIAL HYPERTENSION: ICD-10-CM

## 2021-03-11 DIAGNOSIS — I50.22 CHRONIC SYSTOLIC HEART FAILURE (HCC): Primary | ICD-10-CM

## 2021-03-11 PROCEDURE — 93284 PRGRMG EVAL IMPLANTABLE DFB: CPT | Performed by: INTERNAL MEDICINE

## 2021-03-11 PROCEDURE — 99214 OFFICE O/P EST MOD 30 MIN: CPT | Performed by: NURSE PRACTITIONER

## 2021-03-11 PROCEDURE — 93000 ELECTROCARDIOGRAM COMPLETE: CPT | Performed by: NURSE PRACTITIONER

## 2021-03-11 RX ORDER — UREA 10 %
100 LOTION (ML) TOPICAL DAILY
COMMUNITY

## 2021-03-11 NOTE — PROGRESS NOTES
1. Have you been to the ER, urgent care clinic since your last visit? Hospitalized since your last visit? No    2. Have you seen or consulted any other health care providers outside of the [address] [place] [address] since your last visit? Include any pap smears or colon screening.  No           Chief Complaint   Patient presents with    CHF     pt denies cardiac symptoms

## 2021-03-11 NOTE — PROGRESS NOTES
ELECTROPHYSIOLOGY        Subjective:      Xi Morrow is a 56 y.o. female is here for EP follow up. She denies chest pain, pressure, tightness, dyspnea, lower extremity edema. Has felt well over the last year.        Patient Active Problem List    Diagnosis Date Noted    Severe obesity ([address] 09/23/2019    Chemotherapy induced neutropenia ([address] 07/25/2018    Controlled type 2 diabetes mellitus without complication ([address] 66/68/5202    Status post bone transplant 01/15/2018    Type 2 diabetes mellitus with nephropathy ([address] 01/11/2018    Type 2 diabetes mellitus with diabetic neuropathy ([address] 01/11/2018    Multiple myeloma ([address]     Neuropathy 04/21/2017    Rash 04/21/2017    Controlled type 2 diabetes mellitus without complication, without long-term current use of insulin ([address] 03/31/2017    Multiple myeloma not having achieved remission ([address] 03/06/2017    Diabetes mellitus type 2, controlled ([address] 12/10/2015    Essential hypertension 11/17/2015    Hyperlipidemia     AICD (automatic cardioverter/defibrillator) present 09/19/2013    Chronic systolic heart failure ([address] 04/09/2012    Obesity, unspecified 04/09/2012    Other left bundle branch block 04/09/2012    Cardiomyopathy (HCC)--resolved 03/28/2011    Vitamin B12 deficiency 03/31/2010      Carrie Hernandes MD  Past Medical History:   Diagnosis Date    Cardiomyopathy     Diabetes mellitus type 2, controlled ([address] 12/10/2015    Heart failure ([address]     Hyperlipidemia     Hypertension     controlled    Multiple myeloma ([address]     Orthostatic hypotension     Secondary cardiomyopathy, unspecified     Sinoatrial node dysfunction ([address]     Vitamin B12 deficiency 3/31/2010      Past Surgical History:   Procedure Laterality Date    COLONOSCOPY N/A 1/22/2020    COL[facility] by [facility]q [facility] at [facility] ENDOSCOPY    HX HYSTERECTOMY      HX PACEMAKER      aicd     Allergies   Allergen Reactions    Ace Inhibitors Cough    Compazine [Prochlorperazine] Nausea Only     rash      Family History   Problem Relation Age of Onset    Cancer Mother     Heart Disease Mother         pacemaker   Zannie Cowden Diabetes Mother    Zannie Cowden Breast Cancer Mother     Hypertension Sister     Hypertension Brother     Diabetes Brother     Hypertension Sister     Hypertension Sister     Hypertension Sister     Hypertension Sister     Diabetes Sister     negative for cardiac disease  Social History     Socioeconomic History    Marital status:      Spouse name: Not on file    Number of children: Not on file    Years of education: Not on file    Highest education level: Not on file   Tobacco Use    Smoking status: Never Smoker    Smokeless tobacco: Never Used   Substance and Sexual Activity    Alcohol use: No    Drug use: No    Sexual activity: Yes     Partners: Male   Social History Narrative    , 2 children, 29 and 32. Works at [employer], for 35 years. 5 grandchildren     Current Outpatient Medications   Medication Sig    cyanocobalamin (Vitamin B-12) 100 mcg tablet Take 100 mcg by mouth daily.  lenalidomide (Revlimid) 10 mg cap Take 1 Cap by mouth every twenty-one (21) days. Take 1 cap by mouth on days 1-21 followed by 7 days off in a 28 day cycle [facility] # [id number]    carvediloL (COREG) 25 mg tablet TAKE 1 TABLET BY MOUTH TWICE A DAY    latanoprost (XALATAN) 0.005 % ophthalmic solution INSTILL 1 DROP IN EACH EYE EVERY DAY    atorvastatin (LIPITOR) 40 mg tablet Take 1 Tab by mouth daily. For cholesterol    amLODIPine (NORVASC) 5 mg tablet TAKE 1 TAB BY MOUTH DAILY. FOR BLOOD PRESSURE    glimepiride (AMARYL) 1 mg tablet TAKE 1 TAB BY MOUTH DAILY (BEFORE BREAKFAST). FOR DIABETES    multivitamin, tx-iron-ca-min (THERA-M W/ IRON) 9 mg iron-400 mcg tab tablet Take 1 Tab by mouth daily.     aspirin delayed-release 81 mg tablet TAKE 1 TABLET BY MOUTH EVERY DAY    loperamide (IMMODIUM) 2 mg tablet Take 2 mg by mouth as needed for Diarrhea. No current facility-administered medications for this visit. Vitals:    21 0910   BP: 132/78   Pulse: 71   Resp: 18   SpO2: 98%   Weight: 205 lb (93 kg)   Height: 5' 4\" (1.626 m)       I have reviewed the nurses notes, vitals, problem list, allergy list, medical history, family, social history and medications. Review of Symptoms:    General: Pt denies excessive weight gain or loss. Pt is able to conduct ADL's  HEENT: Denies blurred vision, headaches, epistaxis and difficulty swallowing. Respiratory: Denies shortness of breath, BARRERA, wheezing or stridor. Cardiovascular: Denies precordial pain, palpitations, edema or PND  Gastrointestinal: Denies poor appetite, indigestion, abdominal pain or blood in stool  Urinary: Denies dysuria, pyuria  Musculoskeletal: Denies pain or swelling from muscles or joints  Neurologic: Denies tremor, paresthesias, or sensory motor disturbance  Skin: Denies rash, itching or texture change. Psych: Denies depression      Physical Exam:      General: Well developed, in no acute distress. HEENT: Eyes - PERRL  Heart:  Normal S1/S2 negative S3 or S4. Regular, no murmur  Respiratory: Clear bilaterally x 4, no wheezing or rales  Extremities:  No edema, no cyanosis. Musculoskeletal: No clubbing  Neuro: A&Ox3, speech clear  Skin: No visible rashes or lesions. Non diaphoretic.  No visible ulcers  Vascular: 2+ pulses symmetric in all extremities  Psych - judgement intact and orientation is wnl       Cardiographics    Ek21  V pacing    Results for orders placed or performed during the hospital encounter of 08/06/10   EKG, 12 LEAD, INITIAL   Result Value Ref Range    Ventricular Rate 60 BPM    Atrial Rate 60 BPM    P-R Interval 92 ms    QRS Duration 146 ms    Q-T Interval 530 ms    QTC Calculation (Bezet) 530 ms    Calculated R Axis -103 degrees    Calculated T Axis 58 degrees    Diagnosis       AV sequential or dual chamber electronic pacemaker  No previous ECGs available  Confirmed by Francheska Loera ([id number]) on 8/7/2010 9:11:48 PM   Results for orders placed or performed in visit on 03/31/10   AMB POC EKG ROUTINE W/ 12 LEADS, INTER & REP    Narrative    LBBB, left ventricular hypertrophy         Lab Results   Component Value Date/Time    WBC 4.4 01/04/2021 10:32 AM    HGB 13.4 01/04/2021 10:32 AM    HCT 38.7 01/04/2021 10:32 AM    PLATELET 420 68/91/0671 10:32 AM     (H) 01/04/2021 10:32 AM      Lab Results   Component Value Date/Time    Sodium 139 01/04/2021 10:32 AM    Potassium 3.7 01/04/2021 10:32 AM    Chloride 104 01/04/2021 10:32 AM    CO2 22 01/04/2021 10:32 AM    Anion gap 11 08/28/2019 08:22 AM    Glucose 161 (H) 01/04/2021 10:32 AM    BUN 11 01/04/2021 10:32 AM    Creatinine 0.93 01/04/2021 10:32 AM    BUN/Creatinine ratio 12 01/04/2021 10:32 AM    GFR est AA 76 01/04/2021 10:32 AM    GFR est non-AA 66 01/04/2021 10:32 AM    Calcium 9.4 01/04/2021 10:32 AM    Bilirubin, total 1.3 (H) 01/04/2021 10:32 AM    Alk. phosphatase 88 01/04/2021 10:32 AM    Protein, total 6.5 01/04/2021 10:32 AM    Albumin 4.3 01/04/2021 10:32 AM    Globulin 3.3 08/28/2019 08:22 AM    A-G Ratio 2.0 01/04/2021 10:32 AM    ALT (SGPT) 23 01/04/2021 10:32 AM      Lab Results   Component Value Date/Time    TSH 1.100 12/07/2016 02:58 PM           Assessment:           ICD-10-CM ICD-9-CM    1. Chronic systolic heart failure (HCC)  I50.22 428.22 AMB POC EKG ROUTINE W/ 12 LEADS, INTER & REP   2. Dilated cardiomyopathy (HCC)  I42.0 425.4    3. Biventricular ICD (implantable cardioverter-defibrillator) in place  Z95.810 V45.02    4. Essential hypertension  I10 401.9    5. BMI 35.0-35.9,adult  Z68.35 V85.35      Orders Placed This Encounter    AMB POC EKG ROUTINE W/ 12 LEADS, INTER & REP     Order Specific Question:   Reason for Exam:     Answer:   routine    cyanocobalamin (Vitamin B-12) 100 mcg tablet     Sig: Take 100 mcg by mouth daily.         Plan:     MsTyrese Marline Aguilera is here for annual follow up s/p  BIV ICD. She is doing well and denies cardiac complaints. EKG today shows ventricular pacing and her device interrogation demonstrates 100% BIV pacing without events. Echo 3/16 with EF 55 % to 60 %. She is normotensive today. Battery remaining is 8 mo. Will watch Battery lifeclosely in the next few months. During this visit,  the patient and I had a comprehensive discussion of device management using principles of shared decision making. we reviewed device therapy, including the potential risks and benefits of device management. These risks include death, myocardial infarction, stroke, cardiac perforation, vascular injury, injury, pacing induced cardiomyopathy, inappropriate shocks (defibrillator) and other less severe complications. The patient demonstrated a clear understanding of these issues during out discussion. Our plans, determined together after thorough consideration, are outlined else where in this note. Addressed all patient questions and concerns at this visit. Continue medical management for cardiomyopathy. Discussed side effects, efficacy and good medication compliance with pt re: coreg (allergy to ACE I). No refills requested. Thank you for allowing me to participate in Bouchra Avila 's care.       Azeb Dumont NP

## 2021-04-14 ENCOUNTER — TELEPHONE (OUTPATIENT)
Dept: ONCOLOGY | Age: 61
End: 2021-04-14

## 2021-04-14 NOTE — TELEPHONE ENCOUNTER
Geri Hanson from [employer] called and stated when you put in the auth # for [employer] it does no match pt name or .     Cb# [phone]

## 2021-04-21 DIAGNOSIS — Z45.02 ENCOUNTER FOR SERVICING OF IMPLANTABLE CARDIOVERTER-DEFIBRILLATOR (ICD) AT END OF BATTERY LIFE: ICD-10-CM

## 2021-04-21 DIAGNOSIS — Z95.810 BIVENTRICULAR ICD (IMPLANTABLE CARDIOVERTER-DEFIBRILLATOR) IN PLACE: Primary | ICD-10-CM

## 2021-04-21 DIAGNOSIS — I50.22 CHRONIC SYSTOLIC HEART FAILURE (HCC): ICD-10-CM

## 2021-04-21 DIAGNOSIS — I10 ESSENTIAL HYPERTENSION: ICD-10-CM

## 2021-04-22 RX ORDER — ATORVASTATIN CALCIUM 40 MG/1
TABLET, FILM COATED ORAL
Qty: 90 TAB | Refills: 3 | Status: SHIPPED | OUTPATIENT
Start: 2021-04-22 | End: 2022-04-28

## 2021-04-26 ENCOUNTER — TELEPHONE (OUTPATIENT)
Dept: CARDIOLOGY CLINIC | Age: 61
End: 2021-04-26

## 2021-04-26 RX ORDER — GLIMEPIRIDE 1 MG/1
1 TABLET ORAL
Qty: 90 TAB | Refills: 3 | Status: SHIPPED | OUTPATIENT
Start: 2021-04-26 | End: 2022-04-28

## 2021-04-26 NOTE — TELEPHONE ENCOUNTER
Last visit:1/14/21  Next visit:7/15/21  Previous refill 3/23/20(90+4R)    Requested Prescriptions     Pending Prescriptions Disp Refills    glimepiride (AMARYL) 1 mg tablet 90 Tab 3     Sig: Take 1 Tab by mouth Daily (before breakfast).  For diabetes

## 2021-05-04 ENCOUNTER — OFFICE VISIT (OUTPATIENT)
Dept: CARDIOLOGY CLINIC | Age: 61
End: 2021-05-04
Payer: COMMERCIAL

## 2021-05-04 ENCOUNTER — HOSPITAL ENCOUNTER (OUTPATIENT)
Dept: GENERAL RADIOLOGY | Age: 61
Discharge: HOME | End: 2021-05-04
Payer: COMMERCIAL

## 2021-05-04 VITALS
DIASTOLIC BLOOD PRESSURE: 80 MMHG | BODY MASS INDEX: 34.83 KG/M2 | HEART RATE: 62 BPM | RESPIRATION RATE: 18 BRPM | OXYGEN SATURATION: 96 % | SYSTOLIC BLOOD PRESSURE: 129 MMHG | WEIGHT: 204 LBS | HEIGHT: 64 IN

## 2021-05-04 DIAGNOSIS — I10 ESSENTIAL HYPERTENSION: ICD-10-CM

## 2021-05-04 DIAGNOSIS — I42.0 DILATED CARDIOMYOPATHY (HCC): Primary | ICD-10-CM

## 2021-05-04 DIAGNOSIS — Z95.810 BIVENTRICULAR ICD (IMPLANTABLE CARDIOVERTER-DEFIBRILLATOR) IN PLACE: ICD-10-CM

## 2021-05-04 DIAGNOSIS — I50.22 CHRONIC SYSTOLIC HEART FAILURE (HCC): ICD-10-CM

## 2021-05-04 DIAGNOSIS — Z45.02 ICD (IMPLANTABLE CARDIOVERTER-DEFIBRILLATOR) BATTERY DEPLETION: ICD-10-CM

## 2021-05-04 DIAGNOSIS — E78.2 MIXED HYPERLIPIDEMIA: ICD-10-CM

## 2021-05-04 DIAGNOSIS — Z45.02 ENCOUNTER FOR SERVICING OF IMPLANTABLE CARDIOVERTER-DEFIBRILLATOR (ICD) AT END OF BATTERY LIFE: ICD-10-CM

## 2021-05-04 PROCEDURE — 99213 OFFICE O/P EST LOW 20 MIN: CPT | Performed by: SPECIALIST

## 2021-05-04 PROCEDURE — 71046 X-RAY EXAM CHEST 2 VIEWS: CPT

## 2021-05-04 NOTE — PROGRESS NOTES
Chief Complaint   Patient presents with    Heart Problem     6m f/u, Pt has no complaints today. 1. Have you been to the ER, urgent care clinic since your last visit? Hospitalized since your last visit? No    2. Have you seen or consulted any other health care providers outside of the [address] [place] [address] since your last visit? Include any pap smears or colon screening. No    Pt had her first Covid vaccine, Pt will bring card next visit.

## 2021-05-04 NOTE — PROGRESS NOTES
HISTORY OF PRESENT ILLNESS  Kennedy Lorenzo is a 56 y.o. female     SUMMARY:   Problem List  Date Reviewed: 5/4/2021          Codes Class Noted    Severe obesity ([facility] ICD-10-CM: E66.01  ICD-9-CM: 278.01  9/23/2019        Chemotherapy induced neutropenia ([facility] ICD-10-CM: D70.1, T45.1X5A  ICD-9-CM: 288.03, E933.1  7/25/2018        Controlled type 2 diabetes mellitus without complication ([facility] [id number]: E11.9  ICD-9-CM: 250.00  2/27/2018        Status post bone transplant ICD-10-CM: Z94.6  ICD-9-CM: V42.4  1/15/2018        Type 2 diabetes mellitus with nephropathy ([facility] ICD-10-CM: E11.21  ICD-9-CM: 250.40, 583.81  1/11/2018        Type 2 diabetes mellitus with diabetic neuropathy ([facility] ICD-10-CM: E11.40  ICD-9-CM: 250.60, 357.2  1/11/2018        Multiple myeloma ([facility] ICD-10-CM: C90.00  ICD-9-CM: 203.00  Unknown        Neuropathy ICD-10-CM: G62.9  ICD-9-CM: 355.9  4/21/2017        Rash ICD-10-CM: R21  ICD-9-CM: 782.1  4/21/2017        Controlled type 2 diabetes mellitus without complication, without long-term current use of insulin ([facility] ICD-10-CM: E11.9  ICD-9-CM: 250.00  3/31/2017        Multiple myeloma not having achieved remission ([facility] ICD-10-CM: C90.00  ICD-9-CM: 203.00  3/6/2017        Diabetes mellitus type 2, controlled ([facility] ICD-10-CM: E11.9  ICD-9-CM: 250.00  12/10/2015        Essential hypertension ICD-10-CM: I10  ICD-9-CM: 401.9  11/17/2015        Hyperlipidemia ICD-10-CM: E78.5  ICD-9-CM: 272.4  Unknown        AICD (automatic cardioverter/defibrillator) present ICD-10-CM: Z95.810  ICD-9-CM: V45.02  9/19/2013        Chronic systolic heart failure ([facility] ICD-10-CM: I50.22  ICD-9-CM: 428.22  4/9/2012        Obesity, unspecified ICD-10-CM: E66.9  ICD-9-CM: 278.00  4/9/2012        Other left bundle branch block ICD-10-CM: I44.7  ICD-9-CM: 426.3  4/9/2012        Cardiomyopathy (HCC)--resolved ICD-10-CM: I42.9  ICD-9-CM: 425.4  3/28/2011        Vitamin B12 deficiency ICD-10-CM: E53.8  ICD-9-CM: 266.2 3/31/2010              Current Outpatient Medications on File Prior to Visit   Medication Sig    glimepiride (AMARYL) 1 mg tablet Take 1 Tab by mouth Daily (before breakfast). For diabetes    atorvastatin (LIPITOR) 40 mg tablet TAKE 1 TABLET BY MOUTH EVERY DAY FOR CHOLESTEROL    cyanocobalamin (Vitamin B-12) 100 mcg tablet Take 100 mcg by mouth daily.  lenalidomide (Revlimid) 10 mg cap Take 1 Cap by mouth every twenty-one (21) days. Take 1 cap by mouth on days 1-21 followed by 7 days off in a 28 day cycle [facility] # [id number]    carvediloL (COREG) 25 mg tablet TAKE 1 TABLET BY MOUTH TWICE A DAY    latanoprost (XALATAN) 0.005 % ophthalmic solution INSTILL 1 DROP IN EACH EYE EVERY DAY    amLODIPine (NORVASC) 5 mg tablet TAKE 1 TAB BY MOUTH DAILY. FOR BLOOD PRESSURE    multivitamin, tx-iron-ca-min (THERA-M W/ IRON) 9 mg iron-400 mcg tab tablet Take 1 Tab by mouth daily.  aspirin delayed-release 81 mg tablet TAKE 1 TABLET BY MOUTH EVERY DAY    loperamide (IMMODIUM) 2 mg tablet Take 2 mg by mouth as needed for Diarrhea. No current facility-administered medications on file prior to visit. CARDIOLOGY STUDIES TO DATE:  4/10 cardiac cath no sig cad, lvef 25-30%    BSC Bi-V ICD, DOI 8/6/10, NOT MRI Conditional , on remote    3/16 normal echo    Chief Complaint   Patient presents with    Heart Problem     6m f/u, Pt has no complaints today. HPI :  She is doing great. She is somewhat active but not exercising but hopes to start again in the near future. Her blood pressure is well controlled and she is having no problems with her medications.   She needs to have her AICD generator change the end of this month so she is going for an x-ray and blood work today  CARDIAC ROS:   negative for chest pain, dyspnea, palpitations, syncope, orthopnea, paroxysmal nocturnal dyspnea, exertional chest pressure/discomfort, claudication, lower extremity edema    Family History   Problem Relation Age of Onset    Cancer Mother     Heart Disease Mother         pacemaker    Diabetes Mother     Breast Cancer Mother     Hypertension Sister     Hypertension Brother     Diabetes Brother     Hypertension Sister     Hypertension Sister     Hypertension Sister     Hypertension Sister     Diabetes Sister        Past Medical History:   Diagnosis Date    Cardiomyopathy     Diabetes mellitus type 2, controlled ([facility] 12/10/2015    Heart failure (HCC)     Hyperlipidemia     Hypertension     controlled    Multiple myeloma ([facility]     Orthostatic hypotension     Secondary cardiomyopathy, unspecified     S[facility]atrial node dysfunction ([facility]     Vitamin B12 deficiency 3/31/2010       GENERAL ROS:  A comprehensive review of systems was negative except for that written in the HPI.     Visit Vitals  BP (!) 109/59 (BP 1 Location: Left arm, BP Patient Position: Sitting, BP Cuff Size: Large adult)   Pulse 62   Resp 18   Ht 5' 4\" (1.626 m)   Wt 204 lb (92.5 kg)   SpO2 96%   BMI 35.02 kg/m²       Wt Readings from Last 3 Encounters:   05/04/21 204 lb (92.5 kg)   03/11/21 205 lb (93 kg)   01/14/21 202 lb 12.8 oz (92 kg)            BP Readings from Last 3 Encounters:   05/04/21 (!) 109/59   03/11/21 132/78   01/14/21 127/67       PHYSICAL EXAM  General appearance: alert, cooperative, no distress, appears stated age  Neurologic: Alert and oriented X 3  Neck: supple, symmetrical, trachea midline, no adenopathy, no carotid bruit and no JVD  Lungs: clear to auscultation bilaterally  Heart: regular rate and rhythm, S1, S2 normal, no murmur, click, rub or gallop  Extremities: extremities normal, atraumatic, no cyanosis or edema    Lab Results   Component Value Date/Time    Cholesterol, total 167 01/18/2021 11:38 AM    Cholesterol, total 151 09/23/2019 09:27 AM    Cholesterol, total 150 03/21/2019 10:17 AM    Cholesterol, total 149 09/21/2018 10:07 AM    Cholesterol, total 182 05/17/2017 09:25 AM    HDL Cholesterol 64 01/18/2021 11:38 AM    HDL Cholesterol 64 09/23/2019 09:27 AM    HDL Cholesterol 60 03/21/2019 10:17 AM    HDL Cholesterol 62 09/21/2018 10:07 AM    HDL Cholesterol 75 05/17/2017 09:25 AM    LDL, calculated 78 01/18/2021 11:38 AM    LDL, calculated 73 09/23/2019 09:27 AM    LDL, calculated 75 03/21/2019 10:17 AM    LDL, calculated 75 09/21/2018 10:07 AM    LDL, calculated 79 05/17/2017 09:25 AM    LDL, calculated 97 11/17/2016 10:43 AM    Triglyceride 147 01/18/2021 11:38 AM    Triglyceride 69 09/23/2019 09:27 AM    Triglyceride 75 03/21/2019 10:17 AM    Triglyceride 61 09/21/2018 10:07 AM    Triglyceride 139 05/17/2017 09:25 AM    CHOL/HDL Ratio 4.3 10/08/2009 10:45 AM     ASSESSMENT :      She is stable and asymptomatic, well compensated on a good medical regimen and needs no cardiac testing at this time. current treatment plan is effective, no change in therapy  lab results and schedule of future lab studies reviewed with patient  reviewed diet, exercise and weight control    Encounter Diagnoses   Name Primary?  Dilated cardiomyopathy ([facility] Yes    Essential hypertension     ICD (implantable cardioverter-defibrillator) battery depletion     Mixed hyperlipidemia      No orders of the defined types were placed in this encounter. Follow-up and Dispositions    · Return in about 6 months (around 11/4/2021). Serafin Baird MD  5/4/2021  Please note that this dictation was completed with [email or web address], the computer voice recognition software. Quite often unanticipated grammatical, syntax, homophones, and other interpretive errors are inadvertently transcribed by the computer software. Please disregard these errors. Please excuse any errors that have escaped final proofreading. Thank you.

## 2021-05-06 DIAGNOSIS — C90.01 MULTIPLE MYELOMA IN REMISSION (HCC): ICD-10-CM

## 2021-05-06 RX ORDER — LENALIDOMIDE 10 MG/1
10 CAPSULE ORAL
Qty: 21 CAP | Refills: 0 | Status: SHIPPED | OUTPATIENT
Start: 2021-05-06 | End: 2021-07-01 | Stop reason: SDUPTHER

## 2021-05-20 ENCOUNTER — HOSPITAL ENCOUNTER (OUTPATIENT)
Dept: PREADMISSION TESTING | Age: 61
Discharge: HOME | End: 2021-05-20
Payer: COMMERCIAL

## 2021-05-20 LAB — SARS-COV-2, COV2: NORMAL

## 2021-05-20 PROCEDURE — U0003 INFECTIOUS AGENT DETECTION BY NUCLEIC ACID (DNA OR RNA); SEVERE ACUTE RESPIRATORY SYNDROME CORONAVIRUS 2 (SARS-COV-2) (CORONAVIRUS DISEASE [COVID-19]), AMPLIFIED PROBE TECHNIQUE, MAKING USE OF HIGH THROUGHPUT TECHNOLOGIES AS DESCRIBED BY CMS-2020-01-R: HCPCS

## 2021-05-21 LAB — SARS-COV-2, COV2NT: NOT DETECTED

## 2021-05-24 ENCOUNTER — APPOINTMENT (OUTPATIENT)
Dept: GENERAL RADIOLOGY | Age: 61
End: 2021-05-24
Attending: INTERNAL MEDICINE
Payer: COMMERCIAL

## 2021-05-24 ENCOUNTER — HOSPITAL ENCOUNTER (OUTPATIENT)
Age: 61
Setting detail: OBSERVATION
Discharge: HOME | End: 2021-05-25
Attending: INTERNAL MEDICINE | Admitting: INTERNAL MEDICINE
Payer: COMMERCIAL

## 2021-05-24 ENCOUNTER — ANESTHESIA (OUTPATIENT)
Dept: CARDIAC CATH/INVASIVE PROCEDURES | Age: 61
End: 2021-05-24
Payer: COMMERCIAL

## 2021-05-24 ENCOUNTER — ANESTHESIA EVENT (OUTPATIENT)
Dept: CARDIAC CATH/INVASIVE PROCEDURES | Age: 61
End: 2021-05-24
Payer: COMMERCIAL

## 2021-05-24 ENCOUNTER — APPOINTMENT (OUTPATIENT)
Dept: NON INVASIVE DIAGNOSTICS | Age: 61
End: 2021-05-24
Attending: INTERNAL MEDICINE
Payer: COMMERCIAL

## 2021-05-24 DIAGNOSIS — I50.22 CHRONIC SYSTOLIC HEART FAILURE (HCC): Chronic | ICD-10-CM

## 2021-05-24 DIAGNOSIS — I42.0 DILATED CARDIOMYOPATHY (HCC): Chronic | ICD-10-CM

## 2021-05-24 DIAGNOSIS — Z45.02 ICD (IMPLANTABLE CARDIOVERTER-DEFIBRILLATOR) BATTERY DEPLETION: ICD-10-CM

## 2021-05-24 DIAGNOSIS — E11.9 CONTROLLED TYPE 2 DIABETES MELLITUS WITHOUT COMPLICATION, UNSPECIFIED WHETHER LONG TERM INSULIN USE (HCC): Chronic | ICD-10-CM

## 2021-05-24 LAB
ECHO AV PEAK GRADIENT: 3.75 MMHG
ECHO AV PEAK VELOCITY: 96.8 CM/S
ECHO EST RA PRESSURE: 3 MMHG
ECHO LA MAJOR AXIS: 2.98 CM
ECHO LA MINOR AXIS: 1.51 CM
ECHO LV INTERNAL DIMENSION DIASTOLIC: 3.56 CM (ref 3.9–5.3)
ECHO LV INTERNAL DIMENSION SYSTOLIC: 2.61 CM
ECHO LV IVSD: 1.51 CM (ref 0.6–0.9)
ECHO LV IVSS: 1.93 CM
ECHO LV MASS 2D: 194.7 G (ref 67–162)
ECHO LV MASS INDEX 2D: 98.8 G/M2 (ref 43–95)
ECHO LV POSTERIOR WALL DIASTOLIC: 1.46 CM (ref 0.6–0.9)
ECHO LV POSTERIOR WALL SYSTOLIC: 1.85 CM
ECHO LVOT PEAK GRADIENT: 0.78 MMHG
ECHO LVOT PEAK VELOCITY: 44.27 CM/S
ECHO MV A VELOCITY: 48.68 CM/S
ECHO MV E DECELERATION TIME (DT): 185.96 MS
ECHO MV E VELOCITY: 41.05 CM/S
ECHO MV E/A RATIO: 0.84
ECHO RIGHT VENTRICULAR SYSTOLIC PRESSURE (RVSP): 24.68 MMHG
ECHO TV REGURGITANT MAX VELOCITY: 232.8 CM/S
ECHO TV REGURGITANT PEAK GRADIENT: 21.68 MMHG
GLUCOSE BLD STRIP.AUTO-MCNC: 134 MG/DL (ref 65–117)
SERVICE CMNT-IMP: ABNORMAL

## 2021-05-24 PROCEDURE — C1777 LEAD, AICD, ENDO SINGLE COIL: HCPCS | Performed by: INTERNAL MEDICINE

## 2021-05-24 PROCEDURE — 74011000272 HC RX REV CODE- 272: Performed by: INTERNAL MEDICINE

## 2021-05-24 PROCEDURE — 74011000250 HC RX REV CODE- 250: Performed by: NURSE ANESTHETIST, CERTIFIED REGISTERED

## 2021-05-24 PROCEDURE — 77030033819 HC SELCT VEIN WORLEY MRTM -C: Performed by: INTERNAL MEDICINE

## 2021-05-24 PROCEDURE — C1893 INTRO/SHEATH, FIXED,NON-PEEL: HCPCS | Performed by: INTERNAL MEDICINE

## 2021-05-24 PROCEDURE — 71045 X-RAY EXAM CHEST 1 VIEW: CPT

## 2021-05-24 PROCEDURE — 74011000636 HC RX REV CODE- 636: Performed by: INTERNAL MEDICINE

## 2021-05-24 PROCEDURE — 74011250636 HC RX REV CODE- 250/636: Performed by: NURSE PRACTITIONER

## 2021-05-24 PROCEDURE — 77030019697 HC SYR ANGI INFL MRTM -B: Performed by: INTERNAL MEDICINE

## 2021-05-24 PROCEDURE — 82962 GLUCOSE BLOOD TEST: CPT

## 2021-05-24 PROCEDURE — C1887 CATHETER, GUIDING: HCPCS | Performed by: INTERNAL MEDICINE

## 2021-05-24 PROCEDURE — 99218 HC RM OBSERVATION: CPT

## 2021-05-24 PROCEDURE — 33249 INSJ/RPLCMT DEFIB W/LEAD(S): CPT | Performed by: INTERNAL MEDICINE

## 2021-05-24 PROCEDURE — 74011250636 HC RX REV CODE- 250/636: Performed by: NURSE ANESTHETIST, CERTIFIED REGISTERED

## 2021-05-24 PROCEDURE — 77030028698 HC BLD TISS PLSM MEDT -D: Performed by: INTERNAL MEDICINE

## 2021-05-24 PROCEDURE — C1751 CATH, INF, PER/CENT/MIDLINE: HCPCS | Performed by: INTERNAL MEDICINE

## 2021-05-24 PROCEDURE — 93306 TTE W/DOPPLER COMPLETE: CPT

## 2021-05-24 PROCEDURE — C1882 AICD, OTHER THAN SING/DUAL: HCPCS | Performed by: INTERNAL MEDICINE

## 2021-05-24 PROCEDURE — 33225 L VENTRIC PACING LEAD ADD-ON: CPT | Performed by: INTERNAL MEDICINE

## 2021-05-24 PROCEDURE — 76060000037 HC ANESTHESIA 3 TO 3.5 HR: Performed by: INTERNAL MEDICINE

## 2021-05-24 PROCEDURE — 77030022704 HC SUT VLOC COVD -B: Performed by: INTERNAL MEDICINE

## 2021-05-24 PROCEDURE — 74011000258 HC RX REV CODE- 258: Performed by: NURSE ANESTHETIST, CERTIFIED REGISTERED

## 2021-05-24 PROCEDURE — C1900 LEAD, CORONARY VENOUS: HCPCS | Performed by: INTERNAL MEDICINE

## 2021-05-24 PROCEDURE — 74011250636 HC RX REV CODE- 250/636: Performed by: INTERNAL MEDICINE

## 2021-05-24 PROCEDURE — 77030002996 HC SUT SLK J&J -A: Performed by: INTERNAL MEDICINE

## 2021-05-24 PROCEDURE — 77030039825 HC MSK NSL PAP SUPERNO2VA VYRM -B: Performed by: ANESTHESIOLOGY

## 2021-05-24 PROCEDURE — 77030039266 HC ADH SKN EXOFIN S2SG -A: Performed by: INTERNAL MEDICINE

## 2021-05-24 PROCEDURE — C1769 GUIDE WIRE: HCPCS | Performed by: INTERNAL MEDICINE

## 2021-05-24 PROCEDURE — C1894 INTRO/SHEATH, NON-LASER: HCPCS | Performed by: INTERNAL MEDICINE

## 2021-05-24 PROCEDURE — C1781 MESH (IMPLANTABLE): HCPCS | Performed by: INTERNAL MEDICINE

## 2021-05-24 PROCEDURE — C1725 CATH, TRANSLUMIN NON-LASER: HCPCS | Performed by: INTERNAL MEDICINE

## 2021-05-24 PROCEDURE — 74011000250 HC RX REV CODE- 250: Performed by: INTERNAL MEDICINE

## 2021-05-24 PROCEDURE — 74011250637 HC RX REV CODE- 250/637: Performed by: INTERNAL MEDICINE

## 2021-05-24 PROCEDURE — 77030018729 HC ELECTRD DEFIB PAD CARD -B: Performed by: INTERNAL MEDICINE

## 2021-05-24 DEVICE — LEAD DEFIB 64CM MODEL 0273 -- ENDOTAK RELIANCE S: Type: IMPLANTABLE DEVICE | Status: FUNCTIONAL

## 2021-05-24 DEVICE — PACE/SENSE LEAD
Type: IMPLANTABLE DEVICE | Status: FUNCTIONAL
Brand: ACUITY™ X4 STRAIGHT

## 2021-05-24 DEVICE — ENVELOPE CMRM6133 ABSORB LRG MR
Type: IMPLANTABLE DEVICE | Status: FUNCTIONAL
Brand: TYRX™

## 2021-05-24 DEVICE — CARDIAC RESYNCHRONIZATION THERAPY DEFIBRILLATOR
Type: IMPLANTABLE DEVICE | Status: FUNCTIONAL
Brand: VIGILANT™ X4 CRT-D

## 2021-05-24 RX ORDER — SODIUM CHLORIDE 0.9 % (FLUSH) 0.9 %
5-40 SYRINGE (ML) INJECTION AS NEEDED
Status: DISCONTINUED | OUTPATIENT
Start: 2021-05-24 | End: 2021-05-25 | Stop reason: HOSPADM

## 2021-05-24 RX ORDER — HYDROCODONE BITARTRATE AND ACETAMINOPHEN 5; 325 MG/1; MG/1
1 TABLET ORAL
Status: DISCONTINUED | OUTPATIENT
Start: 2021-05-24 | End: 2021-05-25 | Stop reason: HOSPADM

## 2021-05-24 RX ORDER — SODIUM CHLORIDE 0.9 % (FLUSH) 0.9 %
5-40 SYRINGE (ML) INJECTION EVERY 8 HOURS
Status: DISCONTINUED | OUTPATIENT
Start: 2021-05-24 | End: 2021-05-25 | Stop reason: HOSPADM

## 2021-05-24 RX ORDER — ACETAMINOPHEN 325 MG/1
650 TABLET ORAL
Status: DISCONTINUED | OUTPATIENT
Start: 2021-05-24 | End: 2021-05-25 | Stop reason: HOSPADM

## 2021-05-24 RX ORDER — GLYCOPYRROLATE 0.2 MG/ML
INJECTION INTRAMUSCULAR; INTRAVENOUS AS NEEDED
Status: DISCONTINUED | OUTPATIENT
Start: 2021-05-24 | End: 2021-05-24 | Stop reason: HOSPADM

## 2021-05-24 RX ORDER — HEPARIN SODIUM 200 [USP'U]/100ML
INJECTION, SOLUTION INTRAVENOUS
Status: COMPLETED | OUTPATIENT
Start: 2021-05-24 | End: 2021-05-24

## 2021-05-24 RX ORDER — SODIUM CHLORIDE 9 MG/ML
INJECTION, SOLUTION INTRAVENOUS
Status: DISCONTINUED | OUTPATIENT
Start: 2021-05-24 | End: 2021-05-24 | Stop reason: HOSPADM

## 2021-05-24 RX ORDER — CEFAZOLIN SODIUM 1 G/3ML
INJECTION, POWDER, FOR SOLUTION INTRAMUSCULAR; INTRAVENOUS AS NEEDED
Status: DISCONTINUED | OUTPATIENT
Start: 2021-05-24 | End: 2021-05-24 | Stop reason: HOSPADM

## 2021-05-24 RX ORDER — MIDAZOLAM HYDROCHLORIDE 1 MG/ML
INJECTION, SOLUTION INTRAMUSCULAR; INTRAVENOUS AS NEEDED
Status: DISCONTINUED | OUTPATIENT
Start: 2021-05-24 | End: 2021-05-24 | Stop reason: HOSPADM

## 2021-05-24 RX ORDER — FENTANYL CITRATE 50 UG/ML
INJECTION, SOLUTION INTRAMUSCULAR; INTRAVENOUS AS NEEDED
Status: DISCONTINUED | OUTPATIENT
Start: 2021-05-24 | End: 2021-05-24 | Stop reason: HOSPADM

## 2021-05-24 RX ORDER — ATORVASTATIN CALCIUM 40 MG/1
40 TABLET, FILM COATED ORAL
Status: DISCONTINUED | OUTPATIENT
Start: 2021-05-24 | End: 2021-05-25 | Stop reason: HOSPADM

## 2021-05-24 RX ORDER — NALOXONE HYDROCHLORIDE 0.4 MG/ML
0.4 INJECTION, SOLUTION INTRAMUSCULAR; INTRAVENOUS; SUBCUTANEOUS AS NEEDED
Status: DISCONTINUED | OUTPATIENT
Start: 2021-05-24 | End: 2021-05-25 | Stop reason: HOSPADM

## 2021-05-24 RX ORDER — LATANOPROST 50 UG/ML
1 SOLUTION/ DROPS OPHTHALMIC DAILY
Status: DISCONTINUED | OUTPATIENT
Start: 2021-05-24 | End: 2021-05-25 | Stop reason: HOSPADM

## 2021-05-24 RX ORDER — AMLODIPINE BESYLATE 5 MG/1
5 TABLET ORAL DAILY
Status: DISCONTINUED | OUTPATIENT
Start: 2021-05-24 | End: 2021-05-25 | Stop reason: HOSPADM

## 2021-05-24 RX ORDER — PHENYLEPHRINE HCL IN 0.9% NACL 0.4MG/10ML
SYRINGE (ML) INTRAVENOUS AS NEEDED
Status: DISCONTINUED | OUTPATIENT
Start: 2021-05-24 | End: 2021-05-24 | Stop reason: HOSPADM

## 2021-05-24 RX ORDER — ONDANSETRON 2 MG/ML
INJECTION INTRAMUSCULAR; INTRAVENOUS AS NEEDED
Status: DISCONTINUED | OUTPATIENT
Start: 2021-05-24 | End: 2021-05-24 | Stop reason: HOSPADM

## 2021-05-24 RX ORDER — LIDOCAINE HYDROCHLORIDE 10 MG/ML
INJECTION INFILTRATION; PERINEURAL AS NEEDED
Status: DISCONTINUED | OUTPATIENT
Start: 2021-05-24 | End: 2021-05-24 | Stop reason: HOSPADM

## 2021-05-24 RX ORDER — FENTANYL CITRATE 50 UG/ML
25 INJECTION, SOLUTION INTRAMUSCULAR; INTRAVENOUS
Status: DISCONTINUED | OUTPATIENT
Start: 2021-05-24 | End: 2021-05-25 | Stop reason: HOSPADM

## 2021-05-24 RX ORDER — CARVEDILOL 12.5 MG/1
25 TABLET ORAL 2 TIMES DAILY WITH MEALS
Status: DISCONTINUED | OUTPATIENT
Start: 2021-05-24 | End: 2021-05-25 | Stop reason: HOSPADM

## 2021-05-24 RX ORDER — EPHEDRINE SULFATE/0.9% NACL/PF 50 MG/5 ML
SYRINGE (ML) INTRAVENOUS AS NEEDED
Status: DISCONTINUED | OUTPATIENT
Start: 2021-05-24 | End: 2021-05-24 | Stop reason: HOSPADM

## 2021-05-24 RX ORDER — GLIPIZIDE 5 MG/1
2.5 TABLET ORAL
Status: DISCONTINUED | OUTPATIENT
Start: 2021-05-25 | End: 2021-05-25 | Stop reason: HOSPADM

## 2021-05-24 RX ORDER — PROPOFOL 10 MG/ML
INJECTION, EMULSION INTRAVENOUS
Status: DISCONTINUED | OUTPATIENT
Start: 2021-05-24 | End: 2021-05-24 | Stop reason: HOSPADM

## 2021-05-24 RX ADMIN — DEXMEDETOMIDINE HYDROCHLORIDE 10 MCG: 100 INJECTION, SOLUTION, CONCENTRATE INTRAVENOUS at 07:58

## 2021-05-24 RX ADMIN — ACETAMINOPHEN 650 MG: 325 TABLET ORAL at 11:41

## 2021-05-24 RX ADMIN — SODIUM CHLORIDE: 9 INJECTION, SOLUTION INTRAVENOUS at 07:48

## 2021-05-24 RX ADMIN — FENTANYL CITRATE 25 MCG: 50 INJECTION, SOLUTION INTRAMUSCULAR; INTRAVENOUS at 08:28

## 2021-05-24 RX ADMIN — CEFAZOLIN 2 G: 1 INJECTION, POWDER, FOR SOLUTION INTRAMUSCULAR; INTRAVENOUS; PARENTERAL at 07:58

## 2021-05-24 RX ADMIN — Medication 5 MG: at 08:33

## 2021-05-24 RX ADMIN — FENTANYL CITRATE 25 MCG: 50 INJECTION, SOLUTION INTRAMUSCULAR; INTRAVENOUS at 09:01

## 2021-05-24 RX ADMIN — FENTANYL CITRATE 25 MCG: 50 INJECTION, SOLUTION INTRAMUSCULAR; INTRAVENOUS at 18:49

## 2021-05-24 RX ADMIN — PROPOFOL 75 MCG/KG/MIN: 10 INJECTION, EMULSION INTRAVENOUS at 07:53

## 2021-05-24 RX ADMIN — ACETAMINOPHEN 650 MG: 325 TABLET ORAL at 18:28

## 2021-05-24 RX ADMIN — FENTANYL CITRATE 25 MCG: 50 INJECTION, SOLUTION INTRAMUSCULAR; INTRAVENOUS at 08:23

## 2021-05-24 RX ADMIN — Medication 80 MCG: at 09:11

## 2021-05-24 RX ADMIN — ATORVASTATIN CALCIUM 40 MG: 40 TABLET, FILM COATED ORAL at 20:22

## 2021-05-24 RX ADMIN — Medication 80 MCG: at 08:27

## 2021-05-24 RX ADMIN — Medication 80 MCG: at 09:00

## 2021-05-24 RX ADMIN — Medication 80 MCG: at 08:53

## 2021-05-24 RX ADMIN — PHENYLEPHRINE HYDROCHLORIDE 40 MCG/MIN: 10 INJECTION INTRAVENOUS at 09:10

## 2021-05-24 RX ADMIN — MIDAZOLAM HYDROCHLORIDE 2 MG: 1 INJECTION, SOLUTION INTRAMUSCULAR; INTRAVENOUS at 07:46

## 2021-05-24 RX ADMIN — GLYCOPYRROLATE 0.2 MG: 0.2 INJECTION, SOLUTION INTRAMUSCULAR; INTRAVENOUS at 07:49

## 2021-05-24 RX ADMIN — Medication 5 MG: at 08:36

## 2021-05-24 RX ADMIN — ONDANSETRON HYDROCHLORIDE 4 MG: 2 INJECTION, SOLUTION INTRAMUSCULAR; INTRAVENOUS at 10:47

## 2021-05-24 RX ADMIN — Medication 40 MCG: at 08:16

## 2021-05-24 RX ADMIN — Medication 80 MCG: at 08:31

## 2021-05-24 RX ADMIN — Medication 10 ML: at 22:00

## 2021-05-24 RX ADMIN — Medication 80 MCG: at 09:15

## 2021-05-24 RX ADMIN — DEXMEDETOMIDINE HYDROCHLORIDE 10 MCG: 100 INJECTION, SOLUTION, CONCENTRATE INTRAVENOUS at 07:53

## 2021-05-24 RX ADMIN — FENTANYL CITRATE 25 MCG: 50 INJECTION, SOLUTION INTRAMUSCULAR; INTRAVENOUS at 09:05

## 2021-05-24 RX ADMIN — Medication 40 MCG: at 08:18

## 2021-05-24 RX ADMIN — LATANOPROST 1 DROP: 50 SOLUTION OPHTHALMIC at 22:00

## 2021-05-24 RX ADMIN — Medication 80 MCG: at 08:59

## 2021-05-24 NOTE — PROGRESS NOTES
12:47 - rec'd patient from recovery. L pacer site CDI with ice pack in place. Denies pain at this time. 14:07 -MD notified of BPs a little softer in the 71B systolic. Order rec'd to hold Coreg this evening. 15:09 - Patient up to BR, ambulated in hallway. No complaints other than some \"soreness\" at her pacer site. Up to recliner. 18:30 - States L pacer incisional site pain is 8/10, administered prn Tylenol. She states the 650mg is not going to be enough to help her. Edwige [place].      18:49 - Patient medicated with prn Fentanyl and assisted back to bed. IVCU End of Shift Note  Tele:  Significant tele event? none  Time/details of tele event: N/A    I&O/ Daily Weight:  Strict I&O ordered? NO  Fluid restriction ordered: NO  PO intake since midnight: N/A    Daily weight ordered: NO  Today's weight: N/A    Yesterday's weight: N/A   Significant weight gain reported to MD? N/A    Procedure:  NPO order for upcoming test/procedure present? N/A  Procedure/test: PPI/AICD  Date of procedure: 5/24/21    Discharge:  Times ambulated in hallways past shift: 1        Times OOB to chair past shift: 1  Plan/Discharge barriers identified?: none  Has this patient received a stent this admission? NO  If yes, verify patient has aspirin, antiplatelet, statin, BB, ACE/ARB (for EF < 40%) ordered. N/A  If not ordered, which medication is missing? N/A N/A     Other concerns:  Any nurse/patient concerns to be addressed by MD? N/A    Bedside and Verbal shift change report given to Janet Valentin RN (oncoming nurse) by Jamel Carrillo RN (offgoing nurse). Report included the following information SBAR, Kardex, Procedure Summary, MAR and Cardiac Rhythm AV Paced.      Jamel Carrillo RN

## 2021-05-24 NOTE — ANESTHESIA PREPROCEDURE EVALUATION
Relevant Problems   CARDIOVASCULAR   (+) Essential hypertension   (+) Other left bundle branch block      ENDOCRINE   (+) Controlled type 2 diabetes mellitus without complication (HCC)   (+) Controlled type 2 diabetes mellitus without complication, without long-term current use of insulin (HCC)   (+) Diabetes mellitus type 2, controlled (HCC)   (+) Obesity, unspecified   (+) Severe obesity (HCC)   (+) Type 2 diabetes mellitus with diabetic neuropathy (HCC)   (+) Type 2 diabetes mellitus with nephropathy (HCC)      PERSONAL HX & FAMILY HX OF CANCER   (+) Multiple myeloma (HCC)   (+) Multiple myeloma not having achieved remission (HCC)       Anesthetic History   No history of anesthetic complications            Review of Systems / Medical History  Patient summary reviewed, nursing notes reviewed and pertinent labs reviewed    Pulmonary  Within defined limits                 Neuro/Psych             Comments: Neuropathy Cardiovascular    Hypertension      CHF  Dysrhythmias   Pacemaker and hyperlipidemia    Exercise tolerance: <4 METS  Comments: Cardiomyopathy   H/O Orthostatic hypotension   Sinoatrial node dysfunction   AICD    ECG (3/12/21):  V paced    TTE (3/29/16):  Left ventricle: Systolic function was normal. Ejection fraction was  estimated in the range of 55 % to 60 %. There were no regional wall motion  abnormalities.    GI/Hepatic/Renal                Endo/Other    Diabetes: well controlled, type 2    Morbid obesity    Comments: H/O Multiple Myeloma s/p Chemotherapy Other Findings   Comments: Vitamin B12 deficiency            Physical Exam    Airway  Mallampati: II    Neck ROM: normal range of motion   Mouth opening: Normal     Cardiovascular  Regular rate and rhythm,  S1 and S2 normal,  no murmur, click, rub, or gallop             Dental      Comments: Multiple fillings   Pulmonary  Breath sounds clear to auscultation               Abdominal  GI exam deferred       Other Findings            Anesthetic Plan    ASA: 3  Anesthesia type: MAC and total IV anesthesia          Induction: Intravenous  Anesthetic plan and risks discussed with: Patient

## 2021-05-24 NOTE — PROGRESS NOTES
TRANSFER - IN REPORT:    Verbal report received from Ambika Freitas RN on Debbie Covarrubias  being received from EP for routine progression of care. Report consisted of patients Situation, Background, Assessment and Recommendations(SBAR). Information from the following report(s) Procedure Summary and MAR was reviewed with the receiving clinician. Opportunity for questions and clarification was provided. Assessment completed upon patients arrival to [address] [place] [address] and care assumed. Cardiac Cath Lab Recovery Arrival Note:    Debbie Covarrubias arrived to [facility] recovery area. Patient procedure= ICD. Patient on cardiac monitor, non-invasive blood pressure, SPO2 monitor. On O2 @ 2 lpm via NC. Patient status doing well without problems. Patient is A&Ox 3. Patient reports no c/o. PROCEDURE SITE CHECK:    Procedure site:without any bleeding and no hematoma, no pain/discomfort reported at procedure site. No change in patient status. Continue to monitor patient and status.

## 2021-05-24 NOTE — ANESTHESIA POSTPROCEDURE EVALUATION
Procedure(s):  INSERT ICD BIV MULTI.    MAC, total IV anesthesia    Anesthesia Post Evaluation        Patient location during evaluation: PACU  Note status: Adequate. Level of consciousness: responsive to verbal stimuli and sleepy but conscious  Pain management: satisfactory to patient  Airway patency: patent  Anesthetic complications: no  Cardiovascular status: acceptable  Respiratory status: acceptable  Hydration status: acceptable  Comments: +Post-Anesthesia Evaluation and Assessment    Patient: Mich Cuevas MRN: [id number]  SSN: [id number]   YOB: 1960  Age: 56 y.o. Sex: female      Cardiovascular Function/Vital Signs    /63 (BP 1 Location: Right arm, BP Patient Position: At rest)   Pulse 62   Temp 36.1 °C (97 °F)   Resp 24   Ht 5' 4\" (1.626 m)   Wt 92.1 kg (203 lb)   SpO2 92%   BMI 34.84 kg/m²     Patient is status post Procedure(s):  INSERT ICD BIV MULTI. Nausea/Vomiting: Controlled. Postoperative hydration reviewed and adequate. Pain:  Pain Scale 1: Numeric (0 - 10) (05/24/21 1130)  Pain Intensity 1: 6 (05/24/21 1130)   Managed. Neurological Status: At baseline. Mental Status and Level of Consciousness: Arousable. Pulmonary Status:   O2 Device: None (Room air) (05/24/21 1130)   Adequate oxygenation and airway patent. Complications related to anesthesia: None    Post-anesthesia assessment completed. No concerns. Signed By: Dean Batista MD    5/24/2021  Post anesthesia nausea and vomiting:  controlled      INITIAL Post-op Vital signs:   Vitals Value Taken Time   /63 05/24/21 1130   Temp 36.1 °C (97 °F) 05/24/21 1108   Pulse 62 05/24/21 1144   Resp 23 05/24/21 1144   SpO2 91 % 05/24/21 1144   Vitals shown include unvalidated device data.

## 2021-05-24 NOTE — PROGRESS NOTES
Transition of Care Plan:    RUR: Observation status  Disposition: Home  Follow up appointments: PCP, Cardiology  DME needed: No needs identified  Transportation at Discharge:   [place] or means to access home:   yes     IM Medicare letter: N/A  Caregiver Contact: Manuela Sr Zofia. , spouse, [phone] (cell); [phone] (home)  Discharge Caregiver contacted prior to discharge? Yes    Reason for Admission:  ICD battery depletion                  RUR Score:    observation                 Plan for utilizing home health:   No needs identified       PCP: First and Last name:  Magaly Vyas MD   Name of Practice:  [facility]   Are you a current patient: Yes/No: yes   Approximate date of last visit: about 6 months ago   Can you participate in a virtual visit with your PCP: yes                    Current Advanced Directive/Advance Care Plan: Prior   Scott Allan (ACP) Conversation      Date of Conversation: 5/24/21  Conducted with: Patient with Decision Making Capacity    Healthcare Decision Maker:   Manuelasona Armijo Sr. , spouse, [phone] (cell); [phone] (home)    Today we documented Decision Maker(s) consistent with Legal Next of Kin hierarchy. No ACP. Informed pt that according to Alvina, her  would be the primary decision maker. Pt agreed. Content/Action Overview:   Has NO ACP documents/care preferences - information provided, considering goals and options  Reviewed DNR/DNI and patient elects Full Code (Attempt Resuscitation)    Length of Voluntary ACP Conversation in minutes:  <16 minutes (Non-Billable)                  Transition of Care Plan:      Home    Mrs. Jim Ospina is a 56year old female admitted for ICD battery depletion. She is insured by [employer]. Her pharmacy is the [facility] in [place] and [address] [place] [address]. She lives with her  in a 1-story house that requires 4 steps to enter. She is independent with ADLs and IADLs.  Pt drives. Pt denies any DME or hx of HH, SNF, or Inpt Rehab. At time of discharge, her  will transport home. Oral and Written notification given to patient and/or caregiver informing them that they are currently an Outpatient receiving care in our facility. Outpatient services include Observation Services. Unit CM will continue to follow. Care Management Interventions  PCP Verified by CM: Yes (Dr. Jovani Mancilla. last appt about 6 months ago. )  Mode of Transport at Discharge:  Other (see comment) ()  Transition of Care Consult (CM Consult): Discharge Planning  Discharge Durable Medical Equipment: No  Physical Therapy Consult: No  Occupational Therapy Consult: No  Speech Therapy Consult: No  Current Support Network: Lives with Spouse  Confirm Follow Up Transport: Family (If pt cannot drive,  to transport.)  Discharge Location  Discharge Placement: Home    Antonina Lesches, RN, BSN, [address] [place] [address] Care Manager  [phone]

## 2021-05-24 NOTE — Clinical Note
TRANSFER - OUT REPORT:     Verbal report given to: IVCU/Recovery. Report consisted of patient's Situation, Background, Assessment and   Recommendations(SBAR). Opportunity for questions and clarification was provided. Patient transported with a Registered Nurse and [address] [place] [address] / [facility]. Patient transported to: recovery.    Patient going to 2160 after recovery

## 2021-05-24 NOTE — Clinical Note
TRANSFER - IN REPORT:     Verbal report received from: Recovery. Report consisted of patient's Situation, Background, Assessment and   Recommendations(SBAR). Opportunity for questions and clarification was provided. Assessment completed upon patient's arrival to unit and care assumed. Patient transported with a Registered Nurse and [address] [place] [address] / [facility] "[employer].

## 2021-05-24 NOTE — PROGRESS NOTES
Cardiac Cath Lab Recovery Arrival Note:      Max Peralta arrived to Cardiac Cath Lab, Recovery Area. Staff introduced to patient. Patient identifiers verified with NAME and DATE OF BIRTH. Procedure verified with patient. Consent forms reviewed and signed by patient or authorized representative and verified. Allergies verified. Patient and family oriented to department. Patient and family informed of procedure and plan of care. Questions answered with review. Patient prepped for procedure, per orders from physician, prior to arrival.    Patient on cardiac monitor, non-invasive blood pressure, SPO2 monitor. On room air. Patient is A&Ox 3. Patient reports no c/o. Patient in stretcher, in low position, with side rails up, call bell within reach, patient instructed to call if assistance as needed. Patient prep in: [address], [place] [address]. Patient family has pager # none  Family in: [address] [facility] waiting area.    Prep by: Amy Rojas RN and Luis Antonio Kaur RN

## 2021-05-24 NOTE — H&P
Subjective:                  [address] [facility], [address]  [phone]    Date of  Admission: 5/24/2021  6:24 AM     Admission type:Elective    Gabrielle Sheehan is a 56 y.o. female admitted for ICD (implantable cardioverter-defibrillator) battery depletion [Z45.02]. biv icd implanted in 2010. lvef has normalized with 100% biv pacing. icd is at [facility] [employer] [facility] and she is for generator change.       Patient Active Problem List    Diagnosis Date Noted    ICD (implantable cardioverter-defibrillator) battery depletion 05/24/2021    Severe obesity ([address] 09/23/2019    Chemotherapy induced neutropenia ([address] 07/25/2018    Controlled type 2 diabetes mellitus without complication ([address] 42/48/7013    Status post bone transplant 01/15/2018    Type 2 diabetes mellitus with nephropathy ([address] 01/11/2018    Type 2 diabetes mellitus with diabetic neuropathy ([address] 01/11/2018    Multiple myeloma ([address]     Neuropathy 04/21/2017    Rash 04/21/2017    Controlled type 2 diabetes mellitus without complication, without long-term current use of insulin ([address] 03/31/2017    Multiple myeloma not having achieved remission ([address] 03/06/2017    Diabetes mellitus type 2, controlled ([address] 12/10/2015    Essential hypertension 11/17/2015    Hyperlipidemia     AICD (automatic cardioverter/defibrillator) present 09/19/2013    Chronic systolic heart failure ([address] 04/09/2012    Obesity, unspecified 04/09/2012    Other left bundle branch block 04/09/2012    Cardiomyopathy (HCC)--resolved 03/28/2011    Vitamin B12 deficiency 03/31/2010      Jasen Tejeda MD  Past Medical History:   Diagnosis Date    Cardiomyopathy     Diabetes mellitus type 2, controlled ([address] 12/10/2015    Heart failure ([address]     Hyperlipidemia     Hypertension     controlled    Multiple myeloma ([address]     Orthostatic hypotension     Secondary cardiomyopathy, unspecified     Sinoatrial node dysfunction ([address]     Vitamin B12 deficiency 3/31/2010 Past Surgical History:   Procedure Laterality Date    COLONOSCOPY N/A 1/22/2020    COLONOSCOPY performed by Oziel Kothari MD at [facility] ENDOSCOPY    HX HYSTERECTOMY      HX PACEMAKER      aicd     Allergies   Allergen Reactions    Ace Inhibitors Cough    Compazine [Prochlorperazine] Nausea Only     rash     Social History     Tobacco Use    Smoking status: Never Smoker    Smokeless tobacco: Never Used   Substance Use Topics    Alcohol use: No    Drug use: No     Family History   Problem Relation Age of Onset    Cancer Mother     Heart Disease Mother         pacemaker   [facility] Diabetes Mother     Breast Cancer Mother     Hypertension Sister     Hypertension Brother     Diabetes Brother     Hypertension Sister     Hypertension Sister     Hypertension Sister     Hypertension Sister     Diabetes Sister       Current Facility-Administered Medications   Medication Dose Route Frequency    heparinized saline 2 units/mL infusion    CONTINUOUS    lidocaine (XYLOCAINE) 10 mg/mL (1 %) injection    PRN    ceFAZolin 1 g in sodium chloride irrigation 0.9 % 500 mL Irrigation    PRN     Facility-Administered Medications Ordered in Other Encounters   Medication Dose Route Frequency    midazolam (VERSED) injection   IntraVENous PRN    glycopyrrolate (ROBINUL) injection   IntraVENous PRN    ceFAZolin (ANCEF) injection   IntraVENous PRN    dexmedeTOMidine (PRECEDEX) 400 mcg in 0.9% sodium chloride 104 mL infusion   IntraVENous CONTINUOUS    propofoL (DIPRIVAN) 10 mg/mL injection   IntraVENous CONTINUOUS    PHENYLephrine (NEOSYNEPHRINE) in NS syringe   IntraVENous PRN    fentaNYL citrate (PF) injection   IntraVENous PRN    ePHEDrine in NS (PF) (MISTOLE) 10 mg/mL in NS syringe   IntraVENous PRN         Review of Symptoms:  Constitutional: negative  Eyes: negative  Ears, nose, mouth, throat, and face: negative  Respiratory: negative  Cardiovascular: negative  Gastrointestinal: negative  Genitourinary: negative  Musculoskeletal: negative  Neurological: negative  Behvioral/Psych: negative  Endocrine: negative     Subjective:      Visit Vitals  BP (!) 140/60 (BP 1 Location: Right arm, BP Patient Position: At rest)   Pulse 90   Temp 98.3 °F (36.8 °C)   Resp 18   Ht 5' 4\" (1.626 m)   Wt 203 lb (92.1 kg)   SpO2 98%   BMI 34.84 kg/m²       Physical Exam:  General:  Alert, cooperative, well noursished, well developed, appears stated age   Eyes:  Sclera anicteric. Pupils equally round and reactive to light. Mouth/Throat: Mucous membranes normal, oral pharynx clear   Neck: Supple   Lungs:   Clear to auscultation bilaterally, good effort   CV:  Regular rate and rhythm,no murmur, click, rub or gallop   Abdomen:   Soft, non-tender. bowel sounds normal. non-distended   Extremities: No cyanosis or edema   Skin: Skin color, texture, turgor normal. no acute rash or lesions   Lymph nodes: Cervical and supraclavicular normal   Musculoskeletal: No swelling or deformity       Psych: Alert and oriented, normal mood affect given the setting         Cardiographics    Telemetry: biv paced    ECG: biv paced      Labs:  No results for input(s): WBC, HGB, HCT, PLT, HGBEXT, HCTEXT, PLTEXT in the last 72 hours. No results for input(s): NA, K, CL, CO2, GLU, BUN, CREA, CA, MG, PHOS, ALB, TBIL, TBILI, ALT, INR, INREXT in the last 72 hours. No lab exists for component: SGOT,  BNP    No results for input(s): TROIQ, CPK, CKMB in the last 72 hours. No intake or output data in the 24 hours ending 05/24/21 0855      Assessment:     Assessment:       Active Problems:    Cardiomyopathy (HCC)--resolved (3/28/2011)      Chronic systolic heart failure ([facility] (4/9/2012)      Controlled type 2 diabetes mellitus without complication ([facility] (7/76/3661)      ICD (implantable cardioverter-defibrillator) battery depletion (5/24/2021)         Plan:     Manju Hernandez is with biv icd at [facility] for non ischemic cardiomyopathy (resolved with 100% biv pacing).  She is a candidate for a generator change. I discussed the risks/benefits/alternatives of the procedure with the patient. Risks include (but are not limited to) bleeding, heart block, infection, cva/mi/tamponade/death. The patient understands and agrees to proceed. Thank you for this interesting consultation.       Elias Bonilla MD, [facility]    5/24/2021

## 2021-05-24 NOTE — PROGRESS NOTES
TRANSFER - OUT REPORT:    Verbal report given to [facility] ED ROXANE RN(name) on [facility]  being transferred to IVCU(unit) for routine progression of care       Report consisted of patients Situation, Background, Assessment and   Recommendations(SBAR). Information from the following report(s) SBAR was reviewed with the receiving nurse. Lines:   Venous Access Device (Active)        Opportunity for questions and clarification was provided.       Patient transported with:   Registered Nurse

## 2021-05-24 NOTE — Clinical Note
Device removed from pocket, leads removed from new device, and connected to old device to check numbers

## 2021-05-25 VITALS
DIASTOLIC BLOOD PRESSURE: 81 MMHG | HEIGHT: 64 IN | WEIGHT: 203 LBS | HEART RATE: 65 BPM | RESPIRATION RATE: 19 BRPM | OXYGEN SATURATION: 98 % | SYSTOLIC BLOOD PRESSURE: 149 MMHG | BODY MASS INDEX: 34.66 KG/M2 | TEMPERATURE: 98.2 F

## 2021-05-25 LAB
ATRIAL RATE: 69 BPM
CALCULATED P AXIS, ECG09: 57 DEGREES
CALCULATED R AXIS, ECG10: -26 DEGREES
CALCULATED T AXIS, ECG11: 44 DEGREES
DIAGNOSIS, 93000: NORMAL
P-R INTERVAL, ECG05: 120 MS
Q-T INTERVAL, ECG07: 484 MS
QRS DURATION, ECG06: 150 MS
QTC CALCULATION (BEZET), ECG08: 518 MS
VENTRICULAR RATE, ECG03: 69 BPM

## 2021-05-25 PROCEDURE — 74011250637 HC RX REV CODE- 250/637: Performed by: NURSE PRACTITIONER

## 2021-05-25 PROCEDURE — APPSS30 APP SPLIT SHARED TIME 16-30 MINUTES: Performed by: NURSE PRACTITIONER

## 2021-05-25 PROCEDURE — 93005 ELECTROCARDIOGRAM TRACING: CPT

## 2021-05-25 PROCEDURE — 74011250637 HC RX REV CODE- 250/637: Performed by: INTERNAL MEDICINE

## 2021-05-25 PROCEDURE — 99218 HC RM OBSERVATION: CPT

## 2021-05-25 PROCEDURE — 99024 POSTOP FOLLOW-UP VISIT: CPT | Performed by: INTERNAL MEDICINE

## 2021-05-25 RX ADMIN — AMLODIPINE BESYLATE 5 MG: 5 TABLET ORAL at 08:14

## 2021-05-25 RX ADMIN — HYDROCODONE BITARTRATE AND ACETAMINOPHEN 1 TABLET: 5; 325 TABLET ORAL at 08:14

## 2021-05-25 RX ADMIN — HYDROCODONE BITARTRATE AND ACETAMINOPHEN 1 TABLET: 5; 325 TABLET ORAL at 02:29

## 2021-05-25 RX ADMIN — CARVEDILOL 25 MG: 12.5 TABLET, FILM COATED ORAL at 08:14

## 2021-05-25 RX ADMIN — Medication 10 ML: at 06:00

## 2021-05-25 RX ADMIN — GLIPIZIDE 2.5 MG: 5 TABLET ORAL at 08:14

## 2021-05-25 NOTE — PROGRESS NOTES
In a CM perspective, pt is ready for discharge. RN made aware. Transition of Care Plan:     RUR: Observation status  Disposition: Home  Follow up appointments: PCP, Cardiology  DME needed: No needs identified  Transportation at Discharge:   [place] or means to access home:   yes     [place] Medicare letter: N/A  Caregiver Contact: Lori Garza . , spouse, [phone] (cell); [phone] (home)  Discharge Caregiver contacted prior to discharge? Yes    CM aware of discharge. CM at bedside to discuss discharge planning. Pt verbalized understanding. Pt has no questions or concerns for CM. Patient is going to call her . In a CM standpoint, pt is ready for discharge. RN made aware. Care Management Interventions  PCP Verified by CM: Yes  Mode of Transport at Discharge:  Other (see comment) ()  Transition of Care Consult (CM Consult): Discharge Planning  Discharge Durable Medical Equipment: No  Physical Therapy Consult: No  Occupational Therapy Consult: No  Speech Therapy Consult: No  Current Support Network: Lives with Spouse  Confirm Follow Up Transport: Family (if pt cannot drive,  to transport.)  The Plan for Transition of Care is Related to the Following Treatment Goals : home  The Patient and/or Patient Representative was Provided with a Choice of Provider and Agrees with the Discharge Plan?: Yes  Name of the Patient Representative Who was Provided with a Choice of Provider and Agrees with the Discharge Plan: patient  Freedom of Choice List was Provided with Basic Dialogue that Supports the Patient's Individualized Plan of Care/Goals, Treatment Preferences and Shares the Quality Data Associated with the Providers?: Yes  Discharge Location  Discharge Placement: Home    Fabien Baxter RN, BSN, [address] [place] [address] Manager  [phone]

## 2021-05-25 NOTE — PROGRESS NOTES
Discharge instructions discussed with patient by Eliz Mustafa RN. All questions answered. Patient verbalized understanding. PM site CDI, no hematoma. Care instructions and restrictions reviewed. Patient instructed to make follow up appts per discharge instructions. Patient signed discharge instructions after reviewing them and duplicate copy placed in chart. Belongings gathered and accounted for. Telemetry monitor and IV removed. Tolerating ambulation in room and hallway. Denies CP, SOB, or dizziness. Escorted out via w/c, discharged home with family in a private vehicle.

## 2021-05-25 NOTE — PROGRESS NOTES
Cardiac Electrophysiology Progress Note            [address], [address] [facility]  [phone]    5/25/2021 8:41 AM    Admit Date: 5/24/2021    Admit Diagnosis: ICD (implantable cardioverter-defibrillator) battery depletion [Z45.02]  Implantable cardioverter-defibrillator (ICD) at end of battery life [Z45.02]    Subjective:     Kennedy Lorenzo   denies chest pain, chest pressure/discomfort, dyspnea, palpitations. Some site tenderness.      Visit Vitals  BP (!) 149/81 (BP 1 Location: Right arm, BP Patient Position: At rest)   Pulse 65   Temp 98.2 °F (36.8 °C)   Resp 19   Ht 5' 4\" (1.626 m)   Wt 203 lb (92.1 kg)   SpO2 98%   BMI 34.84 kg/m²     Current Facility-Administered Medications   Medication Dose Route Frequency    sodium chloride (NS) flush 5-40 mL  5-40 mL IntraVENous Q8H    sodium chloride (NS) flush 5-40 mL  5-40 mL IntraVENous PRN    naloxone (NARCAN) injection 0.4 mg  0.4 mg IntraVENous PRN    amLODIPine (NORVASC) tablet 5 mg  5 mg Oral DAILY    atorvastatin (LIPITOR) tablet 40 mg  40 mg Oral QHS    carvediloL (COREG) tablet 25 mg  25 mg Oral BID WITH MEALS    glipiZIDE (GLUCOTROL) tablet 2.5 mg  2.5 mg Oral ACB    latanoprost (XALATAN) 0.005 % ophthalmic solution 1 Drop  1 Drop Both Eyes DAILY    acetaminophen (TYLENOL) tablet 650 mg  650 mg Oral Q4H PRN    HYDROcodone-acetaminophen (NORCO) 5-325 mg per tablet 1 Tablet  1 Tablet Oral Q6H PRN    fentaNYL citrate (PF) injection 25 mcg  25 mcg IntraVENous Q6H PRN         Objective:      Visit Vitals  BP (!) 149/81 (BP 1 Location: Right arm, BP Patient Position: At rest)   Pulse 65   Temp 98.2 °F (36.8 °C)   Resp 19   Ht 5' 4\" (1.626 m)   Wt 203 lb (92.1 kg)   SpO2 98%   BMI 34.84 kg/m²       Physical Exam:  Abdomen: soft, non-tender  Extremities: extremities normal  Heart: regular rate and rhythm  Lungs: clear to auscultation bilaterally  Pulses: 2+ and symmetric    Data Review:   Labs:    No results for input(s): WBC, HGB, HCT, PLT, HGBEXT, HCTEXT, PLTEXT in the last 72 hours. No results for input(s): NA, K, CL, CO2, GLU, BUN, CREA, CA, MG, PHOS, ALB, TBIL, TBILI, ALT, INR, INREXT in the last 72 hours. No lab exists for component: SGOT,  BNP    No results for input(s): TROIQ, CPK, CKMB in the last 72 hours. Intake/Output Summary (Last 24 hours) at 5/25/2021 0841  Last data filed at 5/25/2021 0800  Gross per 24 hour   Intake 940 ml   Output 40 ml   Net 900 ml        Telemetry: AV paced    Assessment:     Active Problems:    Cardiomyopathy (HCC)--resolved (3/28/2011)      Chronic systolic heart failure ([facility] (4/9/2012)      Controlled type 2 diabetes mellitus without complication ([facility] (1/77/4508)      ICD (implantable cardioverter-defibrillator) battery depletion (5/24/2021)      Implantable cardioverter-defibrillator (ICD) at end of battery life (5/24/2021)        Plan:     Charly Park is is s/p Lead testing c/w fracture of RV ICD lead and CS lead. New RV and CS leads implanted and gen change of biv icd performed, 5/25/21. Device interrogation WNL. Some site soreness - approximated well. She is ok for d/c home today with follow up in device clinic on 6/9. SEAN Cao    5/25/2021  8:41 AM    Patient seen and examined by me with nurse practitioner. I personally performed all components of the history, physical, and medical decision making and agree with the assessment and plan with minor modifications as noted. Pt sp new RV and LV leads. Echo no effusion. Doing well. Device check this am wnl. [id number] [address] for [employer].  F/u in 2 weeks as Arcelia Kanner, MD, Brent Rabago

## 2021-05-26 ENCOUNTER — PATIENT OUTREACH (OUTPATIENT)
Dept: CASE MANAGEMENT | Age: 61
End: 2021-05-26

## 2021-05-26 NOTE — PROGRESS NOTES
Care Transitions Initial Call    Call within 2 business days of discharge: Yes     Patient: Shivam Jackson Patient : 1960 MRN: [id number]    Last Discharge [address]       Complaint Diagnosis Description Type Department Provider    21  ICD (implantable cardioverter-defibrillator) battery depletion . .. Admission (Discharged) Alexandrea Arce MD          Was this an external facility discharge? No     Challenges to be reviewed by the provider   Additional needs identified to be addressed with provider:   Yes    21- scheduled Bi-V ICD generator change with new RV and CS Lead placement due to Fracture. Follow up with Dr. Pablo Milan clinic on 21. Seeing PCP on  at 11:20 am.  A1C on 21- 6.6- does not check BG at home. Does not have AMD in place- CTN will place information in mail for her to review. Method of communication with provider : chart routing    Discussed COVID-19 related testing which was available at this time. Test results were negative. Patient informed of results, if available? Pre-procedure     Advance Care Planning:   Does patient have an Advance Directive: not on file; education provided   Spoke with Ms. Angela Baker. She does not have AMD in place. Reviewed benefits of having document in place and hierarchy of decision making in [place]. She agreed to receive materials in the mail. CTN available for questions. Inpatient Readmission Risk score: No data recorded-observation stay  Was this a readmission?  no       Patients top risk factors for readmission: level of motivation and medical condition-HFrEF, Dilated CM, DM2 w/ neuropathy and Multiple Myeloma   Interventions to address risk factors: Education of patient/family/caregiver/guardian to support self-management-post EP procedure recovery, DM2 and HFrEF/Dilated CM daily monitoring and Assessment and support for treatment adherence and medication management-low NA diet, monitoring and checking BP and HR, daily weights and monitoring for s/sx HF, home glucose monitoring    Care Transition Nurse (CTN) contacted the patient by telephone to perform post hospital discharge assessment. Verified name and  with patient as identifiers. Provided introduction to self, and explanation of the CTN role. CTN reviewed discharge instructions, medical action plan and red flags with patient who verbalized understanding. Were discharge instructions available to patient? yes. Reviewed appropriate site of care based on symptoms and resources available to patient including: PCP, Specialist and [facility]. Patient given an opportunity to ask questions and does not have any further questions or concerns at this time. The patient agrees to contact the PCP office for questions related to their healthcare. Medication reconciliation was performed with patient, who verbalizes understanding of administration of home medications. Advised obtaining a 90-day supply of all daily and as-needed medications. Referral to Pharm D needed: no     Home Health/Outpatient orders at discharge: none    Durable Medical Equipment ordered at discharge: None    Covid Risk Education    Educated patient about risk for severe COVID-19 due to risk factors according to CDC guidelines. CTN reviewed discharge instructions, medical action plan and red flag symptoms patient who verbalized understanding. Discussed COVID vaccination status yes. Education provided on COVID-19 vaccination as appropriate. Discussed exposure protocols and quarantine with CDC Guidelines. Patient was given an opportunity to verbalize any questions and concerns and agrees to contact CTN or health care provider for questions related to their healthcare. Was patient discharged with a pulse oximeter? NA    Discussed follow-up appointments.  If no appointment was previously scheduled, appointment scheduling offered: not needed Is follow up appointment scheduled within 7 days of discharge? no   [address] follow up appointment(s):   Future Appointments   Date Time Provider Rasheed Zahida   6/7/2021 11:20 AM Layla Terry MD [facility] BS AMB   6/9/2021 10:30 AM PACEMAKER, [facility] AMB   7/15/2021  9:20 AM Layla Terry MD [facility] BS AMB   7/22/2021  9:30 AM Halina Peng  [address] BS AMB   11/9/2021 10:00 AM Roderick Arthur MD [facility] AMB     Non-[facility] follow up appointment(s): NA    Plan for follow-up call in 7-10 days based on severity of symptoms and risk factors. Plan for next call: self management-monitoring BP and HR, checking weights and s/sx HF and follow up appointment-with pacemaker clinic and PCP, check in about AMD  CTN provided contact information for future needs. Goals Addressed                 This Visit's Progress       General     Reduce risk for hospitalization        5/26/21- spoke with Ms. Ursula Jones. She is feeling well. Familiar with discharge instructions including bathing and restrictions for left arm/chest area. She is using ice and acetaminophen for pain control. Has help in the home. She does not check BG at home. Has post procedure appointment in place for follow up with device clinic and PCP.   LL[employer]

## 2021-05-26 NOTE — ACP (ADVANCE CARE PLANNING)
Spoke with Ms. Yelitza Kaur. She does not have AMD in place. Reviewed benefits of having document in place and hierarchy of decision making in [place]. She agreed to receive materials in the mail. CTN available for questions.

## 2021-05-27 ENCOUNTER — ANCILLARY PROCEDURE (OUTPATIENT)
Dept: CARDIOLOGY CLINIC | Age: 61
End: 2021-05-27

## 2021-05-27 ENCOUNTER — OFFICE VISIT (OUTPATIENT)
Dept: CARDIOLOGY CLINIC | Age: 61
End: 2021-05-27

## 2021-05-27 ENCOUNTER — PATIENT OUTREACH (OUTPATIENT)
Dept: CASE MANAGEMENT | Age: 61
End: 2021-05-27

## 2021-05-27 DIAGNOSIS — E66.01 SEVERE OBESITY (HCC): ICD-10-CM

## 2021-05-27 DIAGNOSIS — R60.0 ARM EDEMA: ICD-10-CM

## 2021-05-27 DIAGNOSIS — I74.2: ICD-10-CM

## 2021-05-27 DIAGNOSIS — I74.2 ULNAR ARTERY THROMBUS, LEFT (HCC): ICD-10-CM

## 2021-05-27 DIAGNOSIS — Z45.02 ICD (IMPLANTABLE CARDIOVERTER-DEFIBRILLATOR) BATTERY DEPLETION: ICD-10-CM

## 2021-05-27 DIAGNOSIS — Z45.02 ICD (IMPLANTABLE CARDIOVERTER-DEFIBRILLATOR) BATTERY DEPLETION: Primary | ICD-10-CM

## 2021-05-27 DIAGNOSIS — I74.2 BRACHIAL ARTERY THROMBOSIS (HCC): ICD-10-CM

## 2021-05-27 PROCEDURE — 93971 EXTREMITY STUDY: CPT | Performed by: INTERNAL MEDICINE

## 2021-05-27 RX ORDER — HYDROCODONE BITARTRATE AND ACETAMINOPHEN 5; 325 MG/1; MG/1
1 TABLET ORAL
Qty: 15 TABLET | Refills: 0 | Status: SHIPPED | OUTPATIENT
Start: 2021-05-27 | End: 2021-06-01

## 2021-05-27 RX ORDER — NALOXONE HYDROCHLORIDE 4 MG/.1ML
SPRAY NASAL
Qty: 1 EACH | Refills: 0 | Status: SHIPPED | OUTPATIENT
Start: 2021-05-27 | End: 2021-11-09 | Stop reason: ALTCHOICE

## 2021-05-27 RX ORDER — RIVAROXABAN 15 MG-20MG
KIT ORAL
Qty: 1 DOSE PACK | Refills: 0 | Status: SHIPPED | OUTPATIENT
Start: 2021-05-27 | End: 2021-06-21

## 2021-05-27 NOTE — PROGRESS NOTES
Pt presents post gen change and new RV/LV lead 5/24/21. Her left arm was edematous, tender. Left arm duplex today with multiple thrombus in left arm. Started on xarelto DVT dose donte, given rx for norco and narcan. Reviewed instructions. Advised her to watch her left ring finger closely as he rings may need to be cut off. Pt verbalizes understanding and is in agreement with the plan.

## 2021-05-27 NOTE — PROGRESS NOTES
Care Transitions Nurse Note:  Goals        General     Reduce risk for hospitalization      5/27/21- received phone call from Ms. Jordan Richards- she has increased swelling in left arm and hand- overnight. Confirms no other symptoms- still sore. No swelling in chest or neck area. She confirms she did not lie on this side to sleep, has followed moving and lift restrictions. CTN spoke with [facility] office- they will see her at 10:45 today- Ms. Jordan Richards will be able to go into the office today for assessment. Asked her to keep elevated- continue to use ice. [facility]     5/26/21- spoke with Ms. Jordan Richards. She is feeling well. Familiar with discharge instructions including bathing and restrictions for left arm/chest area. She is using ice and acetaminophen for pain control. Has help in the home. She does not check BG at home. Has post procedure appointment in place for follow up with device clinic and PCP.   LLC

## 2021-06-02 ENCOUNTER — OFFICE VISIT (OUTPATIENT)
Dept: FAMILY MEDICINE CLINIC | Age: 61
End: 2021-06-02
Payer: COMMERCIAL

## 2021-06-02 VITALS
OXYGEN SATURATION: 96 % | SYSTOLIC BLOOD PRESSURE: 138 MMHG | HEART RATE: 73 BPM | TEMPERATURE: 98.1 F | HEIGHT: 64 IN | WEIGHT: 205.4 LBS | DIASTOLIC BLOOD PRESSURE: 71 MMHG | BODY MASS INDEX: 35.07 KG/M2 | RESPIRATION RATE: 16 BRPM

## 2021-06-02 DIAGNOSIS — I82.A12 DVT OF AXILLARY VEIN, ACUTE LEFT (HCC): Primary | ICD-10-CM

## 2021-06-02 DIAGNOSIS — I42.9 CARDIOMYOPATHY, UNSPECIFIED TYPE (HCC): ICD-10-CM

## 2021-06-02 DIAGNOSIS — M79.602 PAIN IN INFERIOR LEFT UPPER EXTREMITY: ICD-10-CM

## 2021-06-02 PROCEDURE — 99213 OFFICE O/P EST LOW 20 MIN: CPT | Performed by: FAMILY MEDICINE

## 2021-06-02 RX ORDER — HYDROCODONE BITARTRATE AND ACETAMINOPHEN 5; 325 MG/1; MG/1
2 TABLET ORAL
Qty: 40 TABLET | Refills: 0 | Status: SHIPPED | OUTPATIENT
Start: 2021-06-02 | End: 2021-06-07

## 2021-06-02 RX ORDER — ACETAMINOPHEN 500 MG
500 TABLET ORAL
COMMUNITY

## 2021-06-02 NOTE — PROGRESS NOTES
HISTORY OF PRESENT ILLNESS  Deacon  is a 56 y.o. female. HPI Had the batteries in her defibrillator changed last week, developed a DVT of l arm afterwards. On xarelot and norco for pain. norco- not helping. Taking 5/325 one every 6 hours . Has been contacted by NN. Otherwise doing well. ROS    Physical Exam  Vitals and nursing note reviewed. Constitutional:       Appearance: She is well-developed. HENT:      Right Ear: External ear normal.      Left Ear: External ear normal.   Neck:      Thyroid: No thyromegaly. Cardiovascular:      Rate and Rhythm: Normal rate and regular rhythm. Heart sounds: Normal heart sounds. Pulmonary:      Breath sounds: Normal breath sounds. No wheezing. Abdominal:      General: Bowel sounds are normal. There is no distension. Palpations: Abdomen is soft. There is no mass. Tenderness: There is no abdominal tenderness. Musculoskeletal:      Comments: L arm- moderate swelling and tenderness forearm and upper arm   Lymphadenopathy:      Cervical: No cervical adenopathy. ASSESSMENT and PLAN  Orders Placed This Encounter    HYDROcodone-acetaminophen (NORCO) 5-325 mg per tablet     Diagnoses and all orders for this visit:    1. DVT of axillary vein, acute left (HCC)    2. Pain in inferior left upper extremity  -     HYDROcodone-acetaminophen (NORCO) 5-325 mg per tablet; Take 2 Tablets by mouth every six (6) hours as needed for Pain for up to 5 days. Max Daily Amount: 8 Tablets.     3. Cardiomyopathy, unspecified type ([address]          meds reconciled

## 2021-06-02 NOTE — PROGRESS NOTES
Chief Complaint   Patient presents with   [facility] Follow Up     [facility] 5/24/21-5/25/21     1. Have you been to the ER, urgent care clinic since your last visit? Hospitalized since your last visit? No    2. Have you seen or consulted any other health care providers outside of the [address] [place] [address] since your last visit? Include any pap smears or colon screening.  Yes Dr. Tomi Hargrove on 5/27/21

## 2021-06-09 ENCOUNTER — OFFICE VISIT (OUTPATIENT)
Dept: CARDIOLOGY CLINIC | Age: 61
End: 2021-06-09
Payer: COMMERCIAL

## 2021-06-09 DIAGNOSIS — I42.0 DILATED CARDIOMYOPATHY (HCC): ICD-10-CM

## 2021-06-09 DIAGNOSIS — Z45.02 ICD (IMPLANTABLE CARDIOVERTER-DEFIBRILLATOR) BATTERY DEPLETION: Primary | ICD-10-CM

## 2021-06-09 PROCEDURE — 93284 PRGRMG EVAL IMPLANTABLE DFB: CPT | Performed by: INTERNAL MEDICINE

## 2021-06-09 NOTE — PROGRESS NOTES
Patient feels well following ICD implantation. Subclavian ICD site approximated well, no discharge. No erythema or heat. Device interrogation WNL. Follow up as planned in 3 mo.    Juan R Gonzalez RN

## 2021-06-24 ENCOUNTER — PATIENT OUTREACH (OUTPATIENT)
Dept: CASE MANAGEMENT | Age: 61
End: 2021-06-24

## 2021-06-24 NOTE — PROGRESS NOTES
Patient has graduated from the Transitions of Care Coordination  program on 6/24/21. Patient/family has the ability to self-manage at this time Care management goals have been completed. Patient was not referred to the [facility] team for further management. Goals Addressed                 This Visit's Progress       General     COMPLETED: Reduce risk for hospitalization        6/24/21- spoke with Ms. Marry Valerio- she is doing-feeling much better. Left arm swelling has resolved. She has no current pain- incision site is sore-tender but healed. She did see cardiology provider on 5/27/21- doppler showed multiple thrombus- she was started on Xarelto. CTN advised PCP would be following. She is seeing him on 7/15/21- asked her to clarify plan with him about length of therapy and when to get a follow up doppler done. Advised cardiology office could do again. She feels she is doing well and does not feel she needs ACM support. She agrees to reach out if anything changes. Note- she sees Hem-Oncology provider and they [facility]     5/27/21- received phone call from Ms. Marry Valerio- she has increased swelling in left arm and hand- overnight. Confirms no other symptoms- still sore. No swelling in chest or neck area. She confirms she did not lie on this side to sleep, has followed moving and lift restrictions. CTN spoke with [facility] office- they will see her at 10:45 today- Ms. Marry Valerio will be able to go into the office today for assessment. Asked her to keep elevated- continue to use ice. [facility]     5/26/21- spoke with Ms. Marry Valerio. She is feeling well. Familiar with discharge instructions including bathing and restrictions for left arm/chest area. She is using ice and acetaminophen for pain control. Has help in the home. She does not check BG at home. Has post procedure appointment in place for follow up with device clinic and PCP.   LLC               Patient has Care Transition Nurse's contact information for any further questions, concerns, or needs.   Patients upcoming visits:    Future Appointments   Date Time Provider Rasheed Zahida   7/15/2021  9:20 AM Allyn Ivy MD [facility] BS AMB   7/22/2021  9:30 AM Kindra Loera  [facility] BS AMB   9/14/2021  9:45 AM PACEMAKER, [facility] BS AMB   9/14/2021 10:15 AM Markell Carver MD [facility] BS AMB   11/9/2021 10:00 AM Daniel Hess MD [facility] BS AMB

## 2021-07-01 DIAGNOSIS — C90.01 MULTIPLE MYELOMA IN REMISSION (HCC): ICD-10-CM

## 2021-07-01 RX ORDER — LENALIDOMIDE 10 MG/1
10 CAPSULE ORAL
Qty: 21 CAPSULE | Refills: 0 | Status: CANCELLED | OUTPATIENT
Start: 2021-07-01

## 2021-07-01 RX ORDER — LENALIDOMIDE 10 MG/1
10 CAPSULE ORAL
Qty: 21 CAPSULE | Refills: 0 | Status: SHIPPED | OUTPATIENT
Start: 2021-07-01 | End: 2021-09-28 | Stop reason: SDUPTHER

## 2021-07-15 ENCOUNTER — OFFICE VISIT (OUTPATIENT)
Dept: FAMILY MEDICINE CLINIC | Age: 61
End: 2021-07-15
Payer: COMMERCIAL

## 2021-07-15 VITALS
HEART RATE: 69 BPM | WEIGHT: 203 LBS | BODY MASS INDEX: 34.66 KG/M2 | OXYGEN SATURATION: 97 % | RESPIRATION RATE: 16 BRPM | DIASTOLIC BLOOD PRESSURE: 55 MMHG | HEIGHT: 64 IN | SYSTOLIC BLOOD PRESSURE: 109 MMHG | TEMPERATURE: 97.6 F

## 2021-07-15 DIAGNOSIS — E11.9 DIABETES MELLITUS WITHOUT COMPLICATION (HCC): ICD-10-CM

## 2021-07-15 DIAGNOSIS — E78.00 HYPERCHOLESTEROLEMIA: Primary | ICD-10-CM

## 2021-07-15 LAB
CHOLEST SERPL-MCNC: 152 MG/DL
HBA1C MFR BLD HPLC: 6.8 %
HDLC SERPL-MCNC: 65 MG/DL
HDLC SERPL: 2.3 {RATIO} (ref 0–5)
LDLC SERPL CALC-MCNC: 64.4 MG/DL (ref 0–100)
TRIGL SERPL-MCNC: 113 MG/DL (ref ?–150)
VLDLC SERPL CALC-MCNC: 22.6 MG/DL

## 2021-07-15 PROCEDURE — 99214 OFFICE O/P EST MOD 30 MIN: CPT | Performed by: FAMILY MEDICINE

## 2021-07-15 PROCEDURE — 83036 HEMOGLOBIN GLYCOSYLATED A1C: CPT | Performed by: FAMILY MEDICINE

## 2021-07-15 RX ORDER — AMLODIPINE BESYLATE 5 MG/1
5 TABLET ORAL DAILY
Qty: 90 TABLET | Refills: 4 | Status: SHIPPED | OUTPATIENT
Start: 2021-07-15 | End: 2022-07-18 | Stop reason: SDUPTHER

## 2021-07-15 NOTE — PROGRESS NOTES
HISTORY OF PRESENT ILLNESS  Elham Baum is a 56 y.o. female. HPI Pt. Comes in for blood pressure, cholesterol, and diabetes check. No complaints of chest pain, shortness of breath, TIAs, claudication or edema. Still on xarelto for DVT of L arm. Has had mammogram    ROS    Physical Exam  Vitals and nursing note reviewed. Constitutional:       Appearance: She is well-developed. HENT:      Right Ear: External ear normal.      Left Ear: External ear normal.   Neck:      Thyroid: No thyromegaly. Cardiovascular:      Rate and Rhythm: Normal rate and regular rhythm. Heart sounds: Normal heart sounds. Pulmonary:      Breath sounds: Normal breath sounds. No wheezing. Abdominal:      General: Bowel sounds are normal. There is no distension. Palpations: Abdomen is soft. There is no mass. Tenderness: There is no abdominal tenderness. Lymphadenopathy:      Cervical: No cervical adenopathy. ASSESSMENT and PLAN  Orders Placed This Encounter    LIPID PANEL    AMB POC HEMOGLOBIN A1C    amLODIPine (NORVASC) 5 mg tablet     Orders Placed This Encounter    LIPID PANEL     Standing Status:   Future     Number of Occurrences:   1     Standing Expiration Date:   1/15/2023    AMB POC HEMOGLOBIN A1C    amLODIPine (NORVASC) 5 mg tablet     Sig: Take 1 Tablet by mouth daily. For blood pressure     Dispense:  90 Tablet     Refill:  4     Orders Placed This Encounter    LIPID PANEL    AMB POC HEMOGLOBIN A1C    amLODIPine (NORVASC) 5 mg tablet     Diagnoses and all orders for this visit:    1. Hypercholesterolemia  -     LIPID PANEL; Future    2. Diabetes mellitus without complication (HCC)  -     AMB POC HEMOGLOBIN A1C    Other orders  -     amLODIPine (NORVASC) 5 mg tablet; Take 1 Tablet by mouth daily. For blood pressure      Follow-up and Dispositions    · Return in about 6 months (around 1/15/2022). Continue xarelto  1-2 mor months.

## 2021-07-15 NOTE — PROGRESS NOTES
Chief Complaint   Patient presents with    Hypertension    Diabetes    Cholesterol Problem    Follow Up Chronic Condition     1. Have you been to the ER, urgent care clinic since your last visit? Hospitalized since your last visit? No    2. Have you seen or consulted any other health care providers outside of the [address] [place] [address] since your last visit? Include any pap smears or colon screening. Yes Cardiology - pacemaker. and Dr. Roseanna Solomon - opthamology     Patient has question regarding B12 dosage and vitamin d.

## 2021-07-22 ENCOUNTER — VIRTUAL VISIT (OUTPATIENT)
Dept: ONCOLOGY | Age: 61
End: 2021-07-22
Payer: COMMERCIAL

## 2021-07-22 DIAGNOSIS — C90.01 MULTIPLE MYELOMA IN REMISSION (HCC): Primary | ICD-10-CM

## 2021-07-22 PROCEDURE — 99213 OFFICE O/P EST LOW 20 MIN: CPT | Performed by: INTERNAL MEDICINE

## 2021-07-22 NOTE — PROGRESS NOTES
Jose Vazquez is a 56 y.o. female  Chief Complaint   Patient presents with    Follow-up     Multiple Myeloma     1. Have you been to the ER, urgent care clinic since your last visit? Hospitalized since your last visit? No    2. Have you seen or consulted any other health care providers outside of the [address] [place] [address] since your last visit? Include any pap smears or colon screening.  No

## 2021-07-22 NOTE — PROGRESS NOTES
Hematology Follow Up    REASON FOR VISIT: Multiple Myeloma    TYPE OF VISIT  Conor Hernandez is a 56 y.o. female who is seen by synchronous (real-time) audio-video technology on 7/22/2021 for follow up of Multiple Myeloma on Revlimid     TREATMENT:   3/24/17- 9/15/17: Aurther Erp X 8   10/20/17: Autologous transplant at [facility]  2/22/18 -  Revlimid     HISTORY OF PRESENT ILLNESS: Ms. Delvis Merino is a 56 y.o. female with DM and  Multiple Myeloma who presents to follow up after the ASCT and is on maintenance Revlimid 10mg daily. She was reduced to 5 mg due to cytopenias. Seen for follow up. She is on Revlimid 10 mg daily. Colonoscopy was normal 2020. She no longer has a diarrhea, has no rash or leg swelling, has no new pain. Oncologic history  Patient had left facial numbness which started in the summer of 2016. This started abruptly and was not associated with rashes, pain, jaw weakness. She has had some intermittent hyperemia of the L eye. No tearing. She has been evaluated by Dr. Angel Oliva with Neurology and an extensive work up was only notable for presence of a 0.3 g/dl M spike. Other tests were unremarkable: Vitamin B12 411, thyroid function test normal, ACE 25, BHARATI normal CBC normal, CMP normal, CRP 1.17, CT head and cervical spine unremarkable. Further evaluation revealed findings consistent with Multiple Myeloma    CBC 2/24 Unremarkable. Kappa 17 mg/dl, Lambda 2416 (ratio 0.01), SPEP 0.2 g/dl M spike, HANSEL showed monoclonal free lambda light chains, UPEP negative, Beta 2 Microglobulin 1.8 mg/L, Skeletal survey negative for lytic lesions, Bone marrow biopsy on 2/24/17 showed a Normocellular marrow with mild monoclonal plasmacytosis (15-20%). Trilineage hematopoiesis with maturation. She had a very good partial response and 8 cycles of VRD and then proceeded on to receive an autologous stem cell transplant on 10/20/17 at [facility]. She had a CR posttransplant.  Started maintenance Revlimid in Jan 2018      Past Medical History:   Diagnosis Date    Cardiomyopathy     Diabetes mellitus type 2, controlled ([facility] 12/10/2015    Heart failure ([facility]     Hyperlipidemia     Hypertension     controlled    Multiple myeloma ([facility]     Orthostatic hypotension     Secondary cardiomyopathy, unspecified     Sinoatrial node dysfunction ([facility]     Vitamin B12 deficiency 3/31/2010       Past Surgical History:   Procedure Laterality Date    COLONOSCOPY N/A 1/22/2020    COLONOSCOPY performed by Matthias [facility]um MD at [facility] ENDOSCOPY    HX HYSTERECTOMY      HX PACEMAKER      aicd       Allergies   Allergen Reactions    Ace Inhibitors Cough    Compazine [Prochlorperazine] Nausea Only     rash       Current Outpatient Medications   Medication Sig Dispense Refill    amLODIPine (NORVASC) 5 mg tablet Take 1 Tablet by mouth daily. For blood pressure 90 Tablet 4    lenalidomide (Revlimid) 10 mg cap Take 1 Capsule by mouth every twenty-one (21) days. Take 1 cap by mouth on days 1-21 followed by 7 days off in a 28 day cycle [facility] # [id number] 21 Capsule 0    rivaroxaban (Xarelto) 20 mg tab tablet Take 1 Tablet by mouth daily (with breakfast). 90 Tablet 2    acetaminophen (Tylenol Extra Strength) 500 mg tablet Take 500 mg by mouth every six (6) hours as needed for Pain.  naloxone (Narcan) 4 mg/actuation nasal spray Use 1 spray intranasally, then discard. Repeat with new spray every 2 min as needed for opioid overdose symptoms, alternating nostrils. 1 Each 0    glimepiride (AMARYL) 1 mg tablet Take 1 Tab by mouth Daily (before breakfast). For diabetes 90 Tab 3    atorvastatin (LIPITOR) 40 mg tablet TAKE 1 TABLET BY MOUTH EVERY DAY FOR CHOLESTEROL 90 Tab 3    cyanocobalamin (Vitamin B-12) 100 mcg tablet Take 100 mcg by mouth daily.       carvediloL (COREG) 25 mg tablet TAKE 1 TABLET BY MOUTH TWICE A  Tab 1    latanoprost (XALATAN) 0.005 % ophthalmic solution INSTILL 1 DROP IN EACH EYE EVERY DAY      multivitamin, tx-iron-ca-min (THERA-M W/ IRON) 9 mg iron-400 mcg tab tablet Take 1 Tab by mouth daily.  aspirin delayed-release 81 mg tablet TAKE 1 TABLET BY MOUTH EVERY DAY  4    loperamide (IMMODIUM) 2 mg tablet Take 2 mg by mouth as needed for Diarrhea. Social History     Socioeconomic History    Marital status:      Spouse name: Not on file    Number of children: Not on file    Years of education: Not on file    Highest education level: Not on file   Tobacco Use    Smoking status: Never Smoker    Smokeless tobacco: Never Used   Substance and Sexual Activity    Alcohol use: No    Drug use: No    Sexual activity: Yes     Partners: Male   Social History Narrative    , 2 children, 28 and 31. Works at [employer], for 35 years. 5 grandchildren     Social Determinants of Health     Financial Resource Strain:     Difficulty of Paying Living Expenses:    Food Insecurity:     Worried About Running Out of Food in the Last Year:     [address] in the Last Year:    Transportation Needs:     Lack of Transportation (Medical):      Lack of Transportation (Non-Medical):    Physical Activity:     Days of Exercise per Week:     Minutes of Exercise per Session:    Stress:     Feeling of Stress :    Social Connections:     Frequency of Communication with Friends and Family:     Frequency of Social Gatherings with Friends and Family:     Attends Hinduism Services:     Active Member of Clubs or Organizations:     Attends Club or Organization Meetings:     Marital Status:        Family History   Problem Relation Age of Onset    Cancer Mother     Heart Disease Mother         pacemaker    Diabetes Mother    Andrea Reyna Mother     Hypertension Sister     Hypertension Brother     Diabetes Brother     Hypertension Sister     Hypertension Sister     Hypertension Sister     Hypertension Sister     Diabetes Sister        ROS  As reviewed in the HPI all others reviewed and negative. ECOG PS is 0    Physical Examination:     Due to this being a TeleHealth evaluation, many elements of the physical examination are unable to be assessed. Constitutional: Appears well-developed and well-nourished in no apparent distress   Mental status: Alert and awake, Oriented to person/place/time, Able to follow commands  Eyes: EOM normal, Sclera normal, No visible ocular discharge  HENT: Normocephalic, atraumatic; Mouth/Throat: Moist mucous membranes, External Ears normal  Neck: No visualized mass  Neurological: No facial asymmetry (Cranial nerve 7 motor function), No gaze palsy  Skin: No significant exanthematous lesions or discoloration noted on facial skin  Psychiatric: Normal affect, normal judgment/insight. No hallucinations       LABS  Lab Results   Component Value Date/Time    WBC 3.6 07/13/2021 01:47 PM    HGB 12.9 07/13/2021 01:47 PM    HCT 39.2 07/13/2021 01:47 PM    PLATELET 204 08/42/8595 01:47 PM    .2 (H) 07/13/2021 01:47 PM    ABS. NEUTROPHILS 1.6 (L) 07/13/2021 01:47 PM     Lab Results   Component Value Date/Time    Sodium 141 07/13/2021 01:47 PM    Potassium 3.9 07/13/2021 01:47 PM    Chloride 111 (H) 07/13/2021 01:47 PM    CO2 25 07/13/2021 01:47 PM    Glucose 114 (H) 07/13/2021 01:47 PM    BUN 14 07/13/2021 01:47 PM    Creatinine 0.95 07/13/2021 01:47 PM    GFR est AA >60 07/13/2021 01:47 PM    GFR est non-AA 60 (L) 07/13/2021 01:47 PM    Calcium 9.2 07/13/2021 01:47 PM     Lab Results   Component Value Date/Time    Alk.  phosphatase 96 07/13/2021 01:47 PM    Protein, total 6.7 07/13/2021 01:47 PM    Protein, total 7.1 07/13/2021 01:47 PM    Albumin 4.0 07/13/2021 01:47 PM    Globulin 3.1 07/13/2021 01:47 PM    A-G Ratio 1.3 07/13/2021 01:47 PM     Lab Results   Component Value Date/Time    Iron % saturation 38 01/04/2021 10:32 AM    TIBC 277 01/04/2021 10:32 AM    Ferritin 163 (H) 01/04/2021 10:32 AM    Vitamin B12 411 12/07/2016 02:58 PM     02/16/2017 09:18 AM Beta-2 Microglobulin, serum 1.5 08/28/2019 08:22 AM    C-Reactive Protein, Cardiac 1.17 12/07/2016 02:58 PM    TSH 1.100 12/07/2016 02:58 PM    M-Mika Not Observed 07/13/2021 01:47 PM    M-Mika Not Observed 01/04/2021 10:32 AM    Hep C Virus Ab <0.1 05/17/2016 09:56 AM     Lab Results   Component Value Date/Time    INR 1.0 05/04/2021 10:41 AM    aPTT 31.5 07/27/2010 03:57 PM     7/17/2020  FLC normal    Bone marrow biopsy reviewed    FISH molecular analysis:    Positive for IgH gene rearrangement (IgH complex FISH study pending); Normal results with 1, 5, 9, 13, 15, and 17 (TP53) probe sets. External records reviewed from [address] [place] [address] transplant transplant bone marrow biopsy done on 12/19/17 showed no evidence of plasma cells. Variable cellularity from 0-50%, flow LUIS, FISH negative    ASSESSMENT  Ms. Karlyn Fabry is a 56 y.o. female with standard risk multiple myeloma status post 6 cycles of twice daily and autologous stem cell transplant on 10/20/17. She is in a complete remission. We will continue with close surveillance in conjunction with her care team at [facility]  Multiple myeloma  S/P BMT in CR  On revlimid maintenance of 10mg daily following transplant at [address] [place] [address]. Dropped to 5 mg due to recurrent grade 3 neutropenia (kleber 0.4). She has done well with this, rash has not recurred. As there was a temporary increase in M spike and hence Revlimid was increased again to 10 mg. She is doing well on this with grade 1 diarrhea     Counts reviewed 7/2021  System by system review today is largely negative  Repeat cbc, cmp, gammopathy eval in 3 months    Follow with BMT as scheduled October 2021    Neuropathy   Overall stable. DM  Per PCP. Anemia  Resolved    Diarrhea  Resolved    RTC in 6 months, labs every 3 months       Signed by: Salud Batres MD                     July 22, 2021    I was in the office while conducting this encounter. The patient was at her home.     Consent:  She and/or her healthcare decision maker is aware that this patient-initiated Telehealth encounter is a billable service, with coverage as determined by her insurance carrier. She is aware that she may receive a bill and has provided verbal consent to proceed: Yes    Pursuant to the emergency declaration under the [address] and the [place] [address] waiver authority and the [employer] Act, this Virtual  Visit was conducted, with patient's (and/or legal guardian's) consent, to reduce the patient's risk of exposure to COVID-19 and provide necessary medical care. Services were provided through a video synchronous discussion virtually to substitute for in-person clinic visit.

## 2021-09-14 ENCOUNTER — OFFICE VISIT (OUTPATIENT)
Dept: CARDIOLOGY CLINIC | Age: 61
End: 2021-09-14

## 2021-09-14 ENCOUNTER — OFFICE VISIT (OUTPATIENT)
Dept: CARDIOLOGY CLINIC | Age: 61
End: 2021-09-14
Payer: COMMERCIAL

## 2021-09-14 VITALS
HEIGHT: 64 IN | BODY MASS INDEX: 35.17 KG/M2 | OXYGEN SATURATION: 98 % | DIASTOLIC BLOOD PRESSURE: 70 MMHG | RESPIRATION RATE: 18 BRPM | SYSTOLIC BLOOD PRESSURE: 118 MMHG | HEART RATE: 60 BPM | WEIGHT: 206 LBS

## 2021-09-14 DIAGNOSIS — E11.21 TYPE 2 DIABETES MELLITUS WITH NEPHROPATHY (HCC): ICD-10-CM

## 2021-09-14 DIAGNOSIS — I42.0 DILATED CARDIOMYOPATHY (HCC): Primary | ICD-10-CM

## 2021-09-14 DIAGNOSIS — I49.5 SSS (SICK SINUS SYNDROME) (HCC): ICD-10-CM

## 2021-09-14 DIAGNOSIS — I10 ESSENTIAL HYPERTENSION: ICD-10-CM

## 2021-09-14 DIAGNOSIS — E66.01 SEVERE OBESITY (HCC): ICD-10-CM

## 2021-09-14 DIAGNOSIS — I42.0 DILATED CARDIOMYOPATHY (HCC): ICD-10-CM

## 2021-09-14 DIAGNOSIS — Z45.02 ICD (IMPLANTABLE CARDIOVERTER-DEFIBRILLATOR) BATTERY DEPLETION: Primary | ICD-10-CM

## 2021-09-14 DIAGNOSIS — E78.2 MIXED HYPERLIPIDEMIA: ICD-10-CM

## 2021-09-14 DIAGNOSIS — Z45.02 ICD (IMPLANTABLE CARDIOVERTER-DEFIBRILLATOR) BATTERY DEPLETION: ICD-10-CM

## 2021-09-14 DIAGNOSIS — I50.22 CHRONIC SYSTOLIC HEART FAILURE (HCC): ICD-10-CM

## 2021-09-14 PROCEDURE — 99214 OFFICE O/P EST MOD 30 MIN: CPT | Performed by: INTERNAL MEDICINE

## 2021-09-14 PROCEDURE — 93000 ELECTROCARDIOGRAM COMPLETE: CPT | Performed by: INTERNAL MEDICINE

## 2021-09-14 PROCEDURE — 93284 PRGRMG EVAL IMPLANTABLE DFB: CPT | Performed by: INTERNAL MEDICINE

## 2021-09-14 NOTE — LETTER
9/14/2021    Patient: Kg Montoya   YOB: 1960   Date of Visit: 9/14/2021     Randall Koenig MD  [address] [place] [address]  Via In [place]    Dear Randall Koenig MD,      Thank you for referring Ms. Elaido Escobar to [address] [place] [address] for evaluation. My notes for this consultation are attached. If you have questions, please do not hesitate to call me. I look forward to following your patient along with you.       Sincerely,    Esperanza Dorman MD

## 2021-09-14 NOTE — PROGRESS NOTES
Chief Complaint   Patient presents with    Post OP Follow Up     3 month post ICD placement - done 5/24/21 - denies cardiac sx      1. Have you been to the ER, urgent care clinic since your last visit? Hospitalized since your last visit? No     2. Have you seen or consulted any other health care providers outside of the [address] [place] [address] since your last visit? Include any pap smears or colon screening.   No

## 2021-09-14 NOTE — PROGRESS NOTES
ELECTROPHYSIOLOGY        Subjective:      Kenneth Sanchez is a 56 y.o. female is here for EP follow up. Kenneth Sanchez  is on [address], reports no melena, hematuria, or obvious signs of bleeding. No falls.        Patient Active Problem List    Diagnosis Date Noted    ICD (implantable cardioverter-defibrillator) battery depletion 05/24/2021    Implantable cardioverter-defibrillator (ICD) at end of battery life 05/24/2021    Severe obesity ([address] 09/23/2019    Chemotherapy induced neutropenia ([address] 07/25/2018    Controlled type 2 diabetes mellitus without complication ([address] 91/11/3744    Status post bone transplant 01/15/2018    Type 2 diabetes mellitus with nephropathy ([address] 01/11/2018    Type 2 diabetes mellitus with diabetic neuropathy ([address] 01/11/2018    Multiple myeloma ([address]     Neuropathy 04/21/2017    Rash 04/21/2017    Controlled type 2 diabetes mellitus without complication, without long-term current use of insulin ([address] 03/31/2017    Multiple myeloma not having achieved remission ([address] 03/06/2017    Diabetes mellitus type 2, controlled ([address] 12/10/2015    Essential hypertension 11/17/2015    Hyperlipidemia     AICD (automatic cardioverter/defibrillator) present 09/19/2013    Chronic systolic heart failure ([address] 04/09/2012    Obesity, unspecified 04/09/2012    Other left bundle branch block 04/09/2012    Cardiomyopathy (HCC)--resolved 03/28/2011    Vitamin B12 deficiency 03/31/2010      Shobha Leigh MD  Past Medical History:   Diagnosis Date    Atrial fibrillation [facility])     CAD (coronary artery disease)     Cardiomyopathy     Clotting disorder ([address]     Diabetes mellitus type 2, controlled ([address] 12/10/2015    Glaucoma     Heart failure ([address]     Hyperlipidemia     Hypertension     controlled    ICD (implantable cardioverter-defibrillator) in place     Multiple myeloma ([address]     Orthostatic hypotension     Secondary cardiomyopathy, unspecified     [facility]     Vitamin B12 deficiency 3/31/2010      Past Surgical History:   Procedure Laterality Date    COLONOSCOPY N/A 1/22/2020    COLONOSCOPY performed by Casimer Cogan, MD at [facility] ENDOSCOPY    HX HYSTERECTOMY      HX PACEMAKER      aicd     Allergies   Allergen Reactions    Ace Inhibitors Cough    Compazine [Prochlorperazine] Nausea Only     rash      Family History   Problem Relation Age of Onset   Fabi [place] Cancer Mother     Heart Disease Mother         pacemaker    Diabetes Mother     Breast Cancer Mother     Hypertension Sister     Hypertension Brother     Diabetes Brother     Hypertension Sister     Hypertension Sister     Hypertension Sister     Hypertension Sister     Diabetes Sister     negative for cardiac disease  Social History     Socioeconomic History    Marital status:      Spouse name: Not on file    Number of children: Not on file    Years of education: Not on file    Highest education level: Not on file   Tobacco Use    Smoking status: Never Smoker    Smokeless tobacco: Never Used   Vaping Use    Vaping Use: Never used   Substance and Sexual Activity    Alcohol use: No    Drug use: No    Sexual activity: Yes     Partners: Male   Social History Narrative    , 2 children, 28 and 31. Works at [employer], for 35 years. 5 grandchildren     Social Determinants of Health     Financial Resource Strain:     Difficulty of Paying Living Expenses:    Food Insecurity:     Worried About Running Out of Food in the Last Year:     [address] in the Last Year:    Transportation Needs:     Lack of Transportation (Medical):      Lack of Transportation (Non-Medical):    Physical Activity:     Days of Exercise per Week:     Minutes of Exercise per Session:    Stress:     Feeling of Stress :    Social Connections:     Frequency of Communication with Friends and Family:     Frequency of Social Gatherings with Friends and Family:     Attends [employer] Services:     Active Member of Clubs or Organizations:     Attends Club or Organization Meetings:     Marital Status:      Current Outpatient Medications   Medication Sig    amLODIPine (NORVASC) 5 mg tablet Take 1 Tablet by mouth daily. For blood pressure    lenalidomide (Revlimid) 10 mg cap Take 1 Capsule by mouth every twenty-one (21) days. Take 1 cap by mouth on days 1-21 followed by 7 days off in a 28 day cycle [facility] # [id number]    rivaroxaban (Xarelto) 20 mg tab tablet Take 1 Tablet by mouth daily (with breakfast).  acetaminophen (Tylenol Extra Strength) 500 mg tablet Take 500 mg by mouth every six (6) hours as needed for Pain.  glimepiride (AMARYL) 1 mg tablet Take 1 Tab by mouth Daily (before breakfast). For diabetes    atorvastatin (LIPITOR) 40 mg tablet TAKE 1 TABLET BY MOUTH EVERY DAY FOR CHOLESTEROL    cyanocobalamin (Vitamin B-12) 100 mcg tablet Take 100 mcg by mouth daily.  carvediloL (COREG) 25 mg tablet TAKE 1 TABLET BY MOUTH TWICE A DAY    latanoprost (XALATAN) 0.005 % ophthalmic solution INSTILL 1 DROP IN EACH EYE EVERY DAY    multivitamin, tx-iron-ca-min (THERA-M W/ IRON) 9 mg iron-400 mcg tab tablet Take 1 Tab by mouth daily.  aspirin delayed-release 81 mg tablet TAKE 1 TABLET BY MOUTH EVERY DAY    loperamide (IMMODIUM) 2 mg tablet Take 2 mg by mouth as needed for Diarrhea.  naloxone (Narcan) 4 mg/actuation nasal spray Use 1 spray intranasally, then discard. Repeat with new spray every 2 min as needed for opioid overdose symptoms, alternating nostrils. (Patient not taking: Reported on 9/14/2021)     No current facility-administered medications for this visit. Vitals:    09/14/21 1019   BP: 118/70   Pulse: 60   Resp: 18   SpO2: 98%   Weight: 206 lb (93.4 kg)   Height: 5' 4\" (1.626 m)       I have reviewed the nurses notes, vitals, problem list, allergy list, medical history, family, social history and medications.     Review of Symptoms:  11 systems reviewed, negative other than as stated in the HPI      Physical Exam:      General: Well developed, in no acute distress. HEENT: Eyes - PERRL  Heart:  Normal S1/S2 negative S3 or S4. Regular, no murmur  Respiratory: Clear bilaterally x 4, no wheezing or rales  Extremities:  No edema, no cyanosis. Musculoskeletal: No clubbing  Neuro: A&Ox3, speech clear  Skin: No visible rashes or lesions. Non diaphoretic.  No visible ulcers  Vascular: 2+ pulses symmetric in all extremities  Psych - judgement intact and orientation is wnl       Cardiographics    Ek21  Biv Paced    Results for orders placed or performed during the hospital encounter of 21   EKG, 12 LEAD, INITIAL   Result Value Ref Range    Ventricular Rate 69 BPM    Atrial Rate 69 BPM    P-R Interval 120 ms    QRS Duration 150 ms    Q-T Interval 484 ms    QTC Calculation (Bezet) 518 ms    Calculated P Axis 57 degrees    Calculated R Axis -26 degrees    Calculated T Axis 44 degrees    Diagnosis       Atrial-sensed ventricular-paced rhythm  Biventricular pacemaker detected  Confirmed by Mildred Russ ([id number]) on 2021 7:51:42 PM     Results for orders placed or performed in visit on 03/31/10   AMB POC EKG ROUTINE W/ 12 LEADS, INTER & REP    Narrative    LBBB, left ventricular hypertrophy         Lab Results   Component Value Date/Time    WBC 3.6 2021 01:47 PM    HGB 12.9 2021 01:47 PM    HCT 39.2 2021 01:47 PM    PLATELET 385 5575 01:47 PM    .2 (H) 2021 01:47 PM      Lab Results   Component Value Date/Time    Sodium 141 2021 01:47 PM    Potassium 3.9 2021 01:47 PM    Chloride 111 (H) 2021 01:47 PM    CO2 25 2021 01:47 PM    Anion gap 5 2021 01:47 PM    Glucose 114 (H) 2021 01:47 PM    BUN 14 2021 01:47 PM    Creatinine 0.95 2021 01:47 PM    BUN/Creatinine ratio 15 2021 01:47 PM    GFR est AA >60 2021 01:47 PM    GFR est non-AA 60 (L) 2021 01:47 PM    Calcium 9.2 07/13/2021 01:47 PM    Bilirubin, total 1.0 07/13/2021 01:47 PM    Alk. phosphatase 96 07/13/2021 01:47 PM    Protein, total 6.7 07/13/2021 01:47 PM    Protein, total 7.1 07/13/2021 01:47 PM    Albumin 4.0 07/13/2021 01:47 PM    Globulin 3.1 07/13/2021 01:47 PM    A-G Ratio 1.3 07/13/2021 01:47 PM    ALT (SGPT) 33 07/13/2021 01:47 PM      Lab Results   Component Value Date/Time    TSH 1.100 12/07/2016 02:58 PM           Assessment:           ICD-10-CM ICD-9-CM    1. Dilated cardiomyopathy (HCC)  I42.0 425.4 AMB POC EKG ROUTINE W/ 12 LEADS, INTER & REP   2. Essential hypertension  I10 401.9    3. Severe obesity ([address]  E66.01 278.01    4. ICD (implantable cardioverter-defibrillator) battery depletion  Z45.02 V53.32    5. SSS (sick sinus syndrome) ([facility])  I49.5 427.81    6. Mixed hyperlipidemia  E78.2 272.2    7. Chronic systolic heart failure ([facility])  I50.22 428.22    8. Type 2 diabetes mellitus with nephropathy ([facility])  E11.21 250.40      583.81      Orders Placed This Encounter    AMB POC EKG ROUTINE W/ 12 LEADS, INTER & REP     Order Specific Question:   Reason for Exam:     Answer:   routine        Plan:     Ms Brian Valentine is here s/p Lead testing c/w fracture of RV ICD lead and CS lead. New RV and CS leads implanted and gen change of biv icd performed, 5/24/21. She is 4% AP and 88% RVP, 100% LVP with 9 years remaining. No events noted. EKG biv paced, normotensive. During this visit,  the patient and I had a comprehensive discussion of device management using principles of shared decision making. we reviewed device therapy, including the potential risks and benefits of device management. These risks include death, myocardial infarction, stroke, cardiac perforation, vascular injury, injury, pacing induced cardiomyopathy, inappropriate shocks (defibrillator) and other less severe complications. The patient demonstrated a clear understanding of these issues during out discussion.  Our plans, determined together after thorough consideration, are outlined else where in this note. Is enrolled in remote monitoring and I will see him back in 1 year. Addressed all patient questions and concerns at this visit. Continue medical management for cardiomyopathy. Discussed side effects, efficacy and good medication compliance with pt re: bb. Thank you for allowing me to participate in Aarti Loco 's care. Cecilia Dominguez NP    Patient seen and examined by me with nurse practitioner. I personally performed all components of the history, physical, and medical decision making and agree with the assessment and plan with minor modifications as noted. A paced 4% for sick sinus. biv paced for cardiomyopathy. Finishing oac sp dvt. Stable and compliant with med rx for cardiomyopathy and chf. F/u in one year. Edith Rodriguez MD, [facility]          On this date 09/14/2021 I have spent 31 minutes reviewing previous notes, test results and face to face with the patient discussing the diagnosis and importance of compliance with the treatment plan as well as documenting on the day of the visit.

## 2021-09-28 DIAGNOSIS — C90.01 MULTIPLE MYELOMA IN REMISSION (HCC): ICD-10-CM

## 2021-09-28 RX ORDER — LENALIDOMIDE 10 MG/1
10 CAPSULE ORAL
Qty: 21 CAPSULE | Refills: 0 | Status: SHIPPED | OUTPATIENT
Start: 2021-09-28 | End: 2021-10-20 | Stop reason: SDUPTHER

## 2021-10-20 DIAGNOSIS — C90.01 MULTIPLE MYELOMA IN REMISSION (HCC): ICD-10-CM

## 2021-10-20 RX ORDER — LENALIDOMIDE 10 MG/1
10 CAPSULE ORAL
Qty: 21 CAPSULE | Refills: 0 | Status: SHIPPED | OUTPATIENT
Start: 2021-10-20 | End: 2021-11-17 | Stop reason: SDUPTHER

## 2021-11-09 ENCOUNTER — OFFICE VISIT (OUTPATIENT)
Dept: CARDIOLOGY CLINIC | Age: 61
End: 2021-11-09
Payer: COMMERCIAL

## 2021-11-09 VITALS
WEIGHT: 201.6 LBS | RESPIRATION RATE: 19 BRPM | BODY MASS INDEX: 33.59 KG/M2 | OXYGEN SATURATION: 99 % | HEIGHT: 65 IN | DIASTOLIC BLOOD PRESSURE: 78 MMHG | SYSTOLIC BLOOD PRESSURE: 138 MMHG | HEART RATE: 79 BPM

## 2021-11-09 DIAGNOSIS — E78.2 MIXED HYPERLIPIDEMIA: ICD-10-CM

## 2021-11-09 DIAGNOSIS — I42.0 DILATED CARDIOMYOPATHY (HCC): Primary | ICD-10-CM

## 2021-11-09 DIAGNOSIS — I10 ESSENTIAL HYPERTENSION: ICD-10-CM

## 2021-11-09 DIAGNOSIS — Z45.02 ICD (IMPLANTABLE CARDIOVERTER-DEFIBRILLATOR) BATTERY DEPLETION: ICD-10-CM

## 2021-11-09 DIAGNOSIS — Z95.810 AICD (AUTOMATIC CARDIOVERTER/DEFIBRILLATOR) PRESENT: ICD-10-CM

## 2021-11-09 PROCEDURE — 99213 OFFICE O/P EST LOW 20 MIN: CPT | Performed by: SPECIALIST

## 2021-11-09 RX ORDER — GABAPENTIN 300 MG/1
CAPSULE ORAL
COMMUNITY
Start: 2021-10-11

## 2021-11-09 NOTE — PROGRESS NOTES
HISTORY OF PRESENT ILLNESS  Ricardo Perez is a 59 y.o. female     SUMMARY:   Problem List  Date Reviewed: 11/9/2021          Codes Class Noted    ICD (implantable cardioverter-defibrillator) battery depletion (Chronic) ICD-10-CM: Z45.02  ICD-9-CM: V53.32  5/24/2021        Implantable cardioverter-defibrillator (ICD) at end of battery life ICD-10-CM: Z45.02  ICD-9-CM: V53.32  5/24/2021        Severe obesity ([facility] ICD-10-CM: E66.01  ICD-9-CM: 278.01  9/23/2019        Chemotherapy induced neutropenia ([facility] ICD-10-CM: D70.1, T45.1X5A  ICD-9-CM: 288.03, E933.1  7/25/2018        Controlled type 2 diabetes mellitus without complication (HCC) (Chronic) ICD-10-CM: E11.9  ICD-9-CM: 250.00  2/27/2018        Status post bone transplant ICD-10-CM: Z94.6  ICD-9-CM: V42.4  1/15/2018        Type 2 diabetes mellitus with nephropathy ([facility] ICD-10-CM: E11.21  ICD-9-CM: 250.40, 583.81  1/11/2018        Type 2 diabetes mellitus with diabetic neuropathy ([facility] ICD-10-CM: E11.40  ICD-9-CM: 250.60, 357.2  1/11/2018        Multiple myeloma ([facility] ICD-10-CM: C90.00  ICD-9-CM: 203.00  Unknown        Neuropathy ICD-10-CM: G62.9  ICD-9-CM: 355.9  4/21/2017        Rash ICD-10-CM: R21  ICD-9-CM: 782.1  4/21/2017        Controlled type 2 diabetes mellitus without complication, without long-term current use of insulin ([facility] ICD-10-CM: E11.9  ICD-9-CM: 250.00  3/31/2017        Multiple myeloma not having achieved remission ([facility] ICD-10-CM: C90.00  ICD-9-CM: 203.00  3/6/2017        Diabetes mellitus type 2, controlled ([facility] ICD-10-CM: E11.9  ICD-9-CM: 250.00  12/10/2015        Essential hypertension ICD-10-CM: I10  ICD-9-CM: 401.9  11/17/2015        Hyperlipidemia ICD-10-CM: E78.5  ICD-9-CM: 272.4  Unknown        AICD (automatic cardioverter/defibrillator) present ICD-10-CM: Z95.810  ICD-9-CM: V45.02  9/19/2013        Chronic systolic heart failure (HCC) (Chronic) ICD-10-CM: C09.40  ICD-9-CM: 428.22  4/9/2012        Obesity, unspecified ICD-10-CM: E66.9  ICD-9-CM: 278.00  4/9/2012        Other left bundle branch block ICD-10-CM: I44.7  ICD-9-CM: 426.3  4/9/2012        Cardiomyopathy (HCC)--resolved (Chronic) ICD-10-CM: I42.9  ICD-9-CM: 425.4  3/28/2011        Vitamin B12 deficiency ICD-10-CM: E53.8  ICD-9-CM: 266.2  3/31/2010              Current Outpatient Medications on File Prior to Visit   Medication Sig    gabapentin (NEURONTIN) 300 mg capsule 300 mg = 1 CAP each dose, PO, tid, # 270 CAP, 3 Refills, Pharmacy: [facility]/pharmacy #[id number]   lenalidomide (Revlimid) 10 mg cap Take 1 Capsule by mouth every twenty-one (21) days. Take 1 cap by mouth on days 1-21 followed by 7 days off in a 28 day cycle [facility] #[id number]    amLODIPine (NORVASC) 5 mg tablet Take 1 Tablet by mouth daily. For blood pressure    acetaminophen (Tylenol Extra Strength) 500 mg tablet Take 500 mg by mouth every six (6) hours as needed for Pain.  glimepiride (AMARYL) 1 mg tablet Take 1 Tab by mouth Daily (before breakfast). For diabetes    atorvastatin (LIPITOR) 40 mg tablet TAKE 1 TABLET BY MOUTH EVERY DAY FOR CHOLESTEROL    cyanocobalamin (Vitamin B-12) 100 mcg tablet Take 100 mcg by mouth daily.  carvediloL (COREG) 25 mg tablet TAKE 1 TABLET BY MOUTH TWICE A DAY    latanoprost (XALATAN) 0.005 % ophthalmic solution INSTILL 1 DROP IN EACH EYE EVERY DAY    multivitamin, tx-iron-ca-min (THERA-M W/ IRON) 9 mg iron-400 mcg tab tablet Take 1 Tab by mouth daily.  aspirin delayed-release 81 mg tablet TAKE 1 TABLET BY MOUTH EVERY DAY    loperamide (IMMODIUM) 2 mg tablet Take 2 mg by mouth as needed for Diarrhea.  rivaroxaban (Xarelto) 20 mg tab tablet Take 1 Tablet by mouth daily (with breakfast). (Patient not taking: Reported on 11/9/2021)     No current facility-administered medications on file prior to visit.        CARDIOLOGY STUDIES TO DATE:  [facility] Bi-V ICD,DOI 5/24/21 (gen change, and new RV and LV), NOT MRI Conditional , on remote    3/16 normal echo  5/21  echo lvef 50-55%, lvh, mild to mod tr without pul htn  Chief Complaint   Patient presents with    Follow-up     6 mo appt. Reported no cardiac symptoms today. HPI :  She is doing great. Had aicd update and may and had arm clot which has resolved. She is walking and has lost some weight. bp well controlled and no problems with her meds. CARDIAC ROS:   negative for chest pain, dyspnea, palpitations, syncope, orthopnea, paroxysmal nocturnal dyspnea, exertional chest pressure/discomfort, claudication, lower extremity edema    Family History   Problem Relation Age of Onset    Cancer Mother     Heart Disease Mother         pacemaker    Diabetes Mother     Breast Cancer Mother     Hypertension Sister     Hypertension Brother     Diabetes Brother     Hypertension Sister     Hypertension Sister     Hypertension Sister     Hypertension Sister     Diabetes Sister        Past Medical History:   Diagnosis Date    [facility]     CAD (coronary artery disease)     Cardiomyopathy     Clotting disorder ([address]     Diabetes mellitus type 2, controlled ([address] 12/10/2015    Glaucoma     Heart failure ([address]     Hyperlipidemia     Hypertension     controlled    ICD (implantable cardioverter-defibrillator) in place     Multiple myeloma ([address]     Orthostatic hypotension     Secondary cardiomyopathy, unspecified     Sinoatrial node dysfunction ([address]     Vitamin B12 deficiency 3/31/2010       GENERAL ROS:  A comprehensive review of systems was negative except for that written in the HPI.     Visit Vitals  /78 (BP 1 Location: Right upper arm, BP Patient Position: Sitting, BP Cuff Size: Large adult)   Pulse 79   Resp 19   Ht 5' 4.5\" (1.638 m)   Wt 201 lb 9.6 oz (91.4 kg)   SpO2 99%   BMI 34.07 kg/m²       Wt Readings from Last 3 Encounters:   11/09/21 201 lb 9.6 oz (91.4 kg)   09/14/21 206 lb (93.4 kg)   07/15/21 203 lb (92.1 kg)            BP Readings from Last 3 Encounters:   11/09/21 138/78   09/14/21 118/70   07/15/21 (!) 109/55       PHYSICAL EXAM  General appearance: alert, cooperative, no distress, appears stated age  Neurologic: Alert and oriented X 3  Neck: supple, symmetrical, trachea midline, no adenopathy, no carotid bruit and no JVD  Lungs: clear to auscultation bilaterally  Heart: regular rate and rhythm, S1, S2 normal, no murmur, click, rub or gallop  Extremities: extremities normal, atraumatic, no cyanosis or edema    Lab Results   Component Value Date/Time    Cholesterol, total 152 07/15/2021 10:21 AM    Cholesterol, total 167 01/18/2021 11:38 AM    Cholesterol, total 151 09/23/2019 09:27 AM    Cholesterol, total 150 03/21/2019 10:17 AM    Cholesterol, total 149 09/21/2018 10:07 AM    HDL Cholesterol 65 07/15/2021 10:21 AM    HDL Cholesterol 64 01/18/2021 11:38 AM    HDL Cholesterol 64 09/23/2019 09:27 AM    HDL Cholesterol 60 03/21/2019 10:17 AM    HDL Cholesterol 62 09/21/2018 10:07 AM    LDL, calculated 64.4 07/15/2021 10:21 AM    LDL, calculated 78 01/18/2021 11:38 AM    LDL, calculated 73 09/23/2019 09:27 AM    LDL, calculated 75 03/21/2019 10:17 AM    LDL, calculated 75 09/21/2018 10:07 AM    LDL, calculated 79 05/17/2017 09:25 AM    Triglyceride 113 07/15/2021 10:21 AM    Triglyceride 147 01/18/2021 11:38 AM    Triglyceride 69 09/23/2019 09:27 AM    Triglyceride 75 03/21/2019 10:17 AM    Triglyceride 61 09/21/2018 10:07 AM    CHOL/HDL Ratio 2.3 07/15/2021 10:21 AM    CHOL/HDL Ratio 4.3 10/08/2009 10:45 AM     ASSESSMENT :      She is stable and assymptomatic, well compensated on a good medical regimen and needs no cardiac testing at this time. current treatment plan is effective, no change in therapy  lab results and schedule of future lab studies reviewed with patient  reviewed diet, exercise and weight control    Encounter Diagnoses   Name Primary?     Dilated cardiomyopathy ([facility] Yes    Essential hypertension     ICD (implantable cardioverter-defibrillator) battery depletion     Mixed hyperlipidemia     AICD (automatic cardioverter/defibrillator) present      Orders Placed This Encounter    gabapentin (NEURONTIN) 300 mg capsule       Follow-up and Dispositions    · Return in about 6 months (around 5/9/2022). Tomasa Estes MD  11/9/2021  Please note that this dictation was completed with [employer], the computer voice recognition software. Quite often unanticipated grammatical, syntax, homophones, and other interpretive errors are inadvertently transcribed by the computer software. Please disregard these errors. Please excuse any errors that have escaped final proofreading. Thank you.

## 2021-11-09 NOTE — PROGRESS NOTES
1. Have you been to the ER, urgent care clinic since your last visit? Hospitalized since your last visit? No    2. Have you seen or consulted any other health care providers outside of the [address] [place] [address] since your last visit? Include any pap smears or colon screening. No      Chief Complaint   Patient presents with    Follow-up     6 mo appt. Reported no cardiac symptoms today.

## 2021-11-17 DIAGNOSIS — C90.01 MULTIPLE MYELOMA IN REMISSION (HCC): ICD-10-CM

## 2021-11-17 RX ORDER — LENALIDOMIDE 10 MG/1
10 CAPSULE ORAL
Qty: 21 CAPSULE | Refills: 0 | Status: SHIPPED | OUTPATIENT
Start: 2021-11-17 | End: 2022-01-12 | Stop reason: SDUPTHER

## 2021-11-17 RX ORDER — LENALIDOMIDE 10 MG/1
CAPSULE ORAL
Qty: 21 CAPSULE | Refills: 0 | OUTPATIENT
Start: 2021-11-17

## 2021-12-16 ENCOUNTER — OFFICE VISIT (OUTPATIENT)
Dept: CARDIOLOGY CLINIC | Age: 61
End: 2021-12-16
Payer: COMMERCIAL

## 2021-12-16 DIAGNOSIS — Z95.810 AICD (AUTOMATIC CARDIOVERTER/DEFIBRILLATOR) PRESENT: Primary | ICD-10-CM

## 2021-12-16 DIAGNOSIS — I42.0 DILATED CARDIOMYOPATHY (HCC): ICD-10-CM

## 2021-12-16 PROCEDURE — 93295 DEV INTERROG REMOTE 1/2/MLT: CPT | Performed by: INTERNAL MEDICINE

## 2021-12-16 PROCEDURE — 93296 REM INTERROG EVL PM/IDS: CPT | Performed by: INTERNAL MEDICINE

## 2021-12-28 ENCOUNTER — TRANSCRIBE ORDER (OUTPATIENT)
Dept: SCHEDULING | Age: 61
End: 2021-12-28

## 2021-12-28 DIAGNOSIS — Z12.31 VISIT FOR SCREENING MAMMOGRAM: Primary | ICD-10-CM

## 2022-01-12 ENCOUNTER — DOCUMENTATION ONLY (OUTPATIENT)
Dept: ONCOLOGY | Age: 62
End: 2022-01-12

## 2022-01-12 DIAGNOSIS — C90.01 MULTIPLE MYELOMA IN REMISSION (HCC): ICD-10-CM

## 2022-01-12 RX ORDER — LENALIDOMIDE 10 MG/1
10 CAPSULE ORAL
Qty: 21 CAPSULE | Refills: 0 | Status: SHIPPED | OUTPATIENT
Start: 2022-01-12 | End: 2022-02-08 | Stop reason: SDUPTHER

## 2022-01-12 NOTE — PROGRESS NOTES
Oral Chemotherapy     Viry Max is a  59 y. o.female  diagnosed with MM . Ms. Jacobo Kaba is being treated with lenalidomide. Medication name: lenalidomide    Dose:  10 mg   Frequency: daily  Administration schedule: 21 days on, 7 days off  Ordering provider: Laura Perry MD      [facility] [facility] prescriber survey completed and Refill sent to [facility] 7/2021  Next visist 1/25/22       Leonila Sheikh, MICHAELD, BCOP, BCPS    For Pharmacy Admin Tracking Only     CPA in place:  Yes   Recommendation Provided To: Provider: 1 via Note to Provider  and Pharmacy: 1   Intervention Detail: Refill(s) Provided     Intervention Accepted By: Provider: 1 and Pharmacy: 1       Time Spent (min): 15

## 2022-01-17 LAB
ALBUMIN SERPL ELPH-MCNC: 3.7 G/DL (ref 2.9–4.4)
ALBUMIN SERPL-MCNC: 4.4 G/DL (ref 3.8–4.8)
ALBUMIN/GLOB SERPL: 1.3 {RATIO} (ref 0.7–1.7)
ALBUMIN/GLOB SERPL: 1.9 {RATIO} (ref 1.2–2.2)
ALP SERPL-CCNC: 85 IU/L (ref 44–121)
ALPHA1 GLOB SERPL ELPH-MCNC: 0.2 G/DL (ref 0–0.4)
ALPHA2 GLOB SERPL ELPH-MCNC: 0.7 G/DL (ref 0.4–1)
ALT SERPL-CCNC: 29 IU/L (ref 0–32)
AST SERPL-CCNC: 21 IU/L (ref 0–40)
B-GLOBULIN SERPL ELPH-MCNC: 1 G/DL (ref 0.7–1.3)
BASOPHILS # BLD AUTO: 0 X10E3/UL (ref 0–0.2)
BASOPHILS NFR BLD AUTO: 1 %
BILIRUB SERPL-MCNC: 1.2 MG/DL (ref 0–1.2)
BUN SERPL-MCNC: 10 MG/DL (ref 8–27)
BUN/CREAT SERPL: 10 (ref 12–28)
CALCIUM SERPL-MCNC: 8.9 MG/DL (ref 8.7–10.3)
CHLORIDE SERPL-SCNC: 105 MMOL/L (ref 96–106)
CO2 SERPL-SCNC: 23 MMOL/L (ref 20–29)
CREAT SERPL-MCNC: 1.01 MG/DL (ref 0.57–1)
EOSINOPHIL # BLD AUTO: 0.2 X10E3/UL (ref 0–0.4)
EOSINOPHIL NFR BLD AUTO: 4 %
ERYTHROCYTE [DISTWIDTH] IN BLOOD BY AUTOMATED COUNT: 14.8 % (ref 11.7–15.4)
GAMMA GLOB SERPL ELPH-MCNC: 1.1 G/DL (ref 0.4–1.8)
GLOBULIN SER CALC-MCNC: 2.3 G/DL (ref 1.5–4.5)
GLOBULIN SER-MCNC: 3 G/DL (ref 2.2–3.9)
GLUCOSE SERPL-MCNC: 131 MG/DL (ref 65–99)
HCT VFR BLD AUTO: 39.2 % (ref 34–46.6)
HGB BLD-MCNC: 13.4 G/DL (ref 11.1–15.9)
IGA SERPL-MCNC: 152 MG/DL (ref 87–352)
IGG SERPL-MCNC: 1132 MG/DL (ref 586–1602)
IGM SERPL-MCNC: 8 MG/DL (ref 26–217)
IMM GRANULOCYTES # BLD AUTO: 0 X10E3/UL (ref 0–0.1)
IMM GRANULOCYTES NFR BLD AUTO: 0 %
INTERPRETATION SERPL IEP-IMP: ABNORMAL
KAPPA LC FREE SER-MCNC: 21.8 MG/L (ref 3.3–19.4)
KAPPA LC FREE/LAMBDA FREE SER: 1.46 {RATIO} (ref 0.26–1.65)
LAMBDA LC FREE SERPL-MCNC: 14.9 MG/L (ref 5.7–26.3)
LYMPHOCYTES # BLD AUTO: 1.5 X10E3/UL (ref 0.7–3.1)
LYMPHOCYTES NFR BLD AUTO: 41 %
M PROTEIN SERPL ELPH-MCNC: ABNORMAL G/DL
MCH RBC QN AUTO: 34.3 PG (ref 26.6–33)
MCHC RBC AUTO-ENTMCNC: 34.2 G/DL (ref 31.5–35.7)
MCV RBC AUTO: 100 FL (ref 79–97)
MONOCYTES # BLD AUTO: 0.4 X10E3/UL (ref 0.1–0.9)
MONOCYTES NFR BLD AUTO: 10 %
NEUTROPHILS # BLD AUTO: 1.6 X10E3/UL (ref 1.4–7)
NEUTROPHILS NFR BLD AUTO: 44 %
PLATELET # BLD AUTO: 206 X10E3/UL (ref 150–450)
PLEASE NOTE:, 149534: ABNORMAL
POTASSIUM SERPL-SCNC: 3.5 MMOL/L (ref 3.5–5.2)
PROT SERPL-MCNC: 6.7 G/DL (ref 6–8.5)
RBC # BLD AUTO: 3.91 X10E6/UL (ref 3.77–5.28)
SODIUM SERPL-SCNC: 143 MMOL/L (ref 134–144)
WBC # BLD AUTO: 3.6 X10E3/UL (ref 3.4–10.8)

## 2022-01-18 ENCOUNTER — OFFICE VISIT (OUTPATIENT)
Dept: FAMILY MEDICINE CLINIC | Age: 62
End: 2022-01-18
Payer: COMMERCIAL

## 2022-01-18 VITALS
TEMPERATURE: 98.4 F | HEIGHT: 65 IN | SYSTOLIC BLOOD PRESSURE: 129 MMHG | BODY MASS INDEX: 33.82 KG/M2 | HEART RATE: 67 BPM | RESPIRATION RATE: 18 BRPM | DIASTOLIC BLOOD PRESSURE: 73 MMHG | OXYGEN SATURATION: 98 % | WEIGHT: 203 LBS

## 2022-01-18 DIAGNOSIS — E11.9 DIABETES MELLITUS WITHOUT COMPLICATION (HCC): ICD-10-CM

## 2022-01-18 DIAGNOSIS — E78.00 HYPERCHOLESTEROLEMIA: Primary | ICD-10-CM

## 2022-01-18 PROBLEM — E11.22 TYPE 2 DIABETES MELLITUS WITH CHRONIC KIDNEY DISEASE (HCC): Status: ACTIVE | Noted: 2022-01-18

## 2022-01-18 LAB
CHOLEST SERPL-MCNC: 156 MG/DL
EST. AVERAGE GLUCOSE BLD GHB EST-MCNC: 146 MG/DL
HBA1C MFR BLD: 6.7 % (ref 4–5.6)
HDLC SERPL-MCNC: 65 MG/DL
HDLC SERPL: 2.4 {RATIO} (ref 0–5)
LDLC SERPL CALC-MCNC: 67.2 MG/DL (ref 0–100)
TRIGL SERPL-MCNC: 119 MG/DL (ref ?–150)
VLDLC SERPL CALC-MCNC: 23.8 MG/DL

## 2022-01-18 PROCEDURE — 99213 OFFICE O/P EST LOW 20 MIN: CPT | Performed by: FAMILY MEDICINE

## 2022-01-18 PROCEDURE — 91300 COVID-19, MRNA, LNP-S, PF, 30MCG/0.3ML DOSE(PFIZER): CPT | Performed by: FAMILY MEDICINE

## 2022-01-18 PROCEDURE — 0003A COVID-19, MRNA, LNP-S, PF, 30MCG/0.3ML DOSE(PFIZER): CPT | Performed by: FAMILY MEDICINE

## 2022-01-18 NOTE — PROGRESS NOTES
HISTORY OF PRESENT ILLNESS  Susan Tyson is a 59 y.o. female. HPI Pt. Comes in for blood pressure, cholesterol, and diabetes check. Not checking blood sugar at home. No complaints of chest pain, shortness of breath, TIAs, claudication or edema. Has been to bone marrow transplant center, was told that sugar was a little high. ROS    Physical Exam  Vitals and nursing note reviewed. Constitutional:       Appearance: She is well-developed. HENT:      Right Ear: External ear normal.      Left Ear: External ear normal.   Neck:      Thyroid: No thyromegaly. Cardiovascular:      Rate and Rhythm: Normal rate and regular rhythm. Heart sounds: Normal heart sounds. Pulmonary:      Breath sounds: Normal breath sounds. No wheezing. Abdominal:      General: Bowel sounds are normal. There is no distension. Palpations: Abdomen is soft. There is no mass. Tenderness: There is no abdominal tenderness. Lymphadenopathy:      Cervical: No cervical adenopathy. ASSESSMENT and PLAN  Orders Placed This Encounter    COVID-19, PFIZER, MRNA, LNP-S, PF, 30MCG/0.3ML DOSE([id number], 0001A, 0002A)    HEMOGLOBIN A1C WITH EAG    LIPID PANEL     Diagnoses and all orders for this visit:    1. Hypercholesterolemia  -     LIPID PANEL; Future  -     COVID-19, MRNA, LNP-S, PF, 30MCG/0.3ML DOSE(PFIZER)    2. Diabetes mellitus without complication ([facility]  -     HEMOGLOBIN A1C WITH EAG; Future      Follow-up and Dispositions    · Return in about 6 months (around 7/18/2022).

## 2022-01-18 NOTE — PROGRESS NOTES
Chief Complaint   Patient presents with    Follow-up     6 month chol/diabetes   Covid vaccine given via right arm. Pt observed for 15 minutes. Verbal order per Dr. Alexandro Blackwell. Visit Vitals  /73 (BP 1 Location: Left upper arm, BP Patient Position: Sitting, BP Cuff Size: Large adult)   Pulse 67   Temp 98.4 °F (36.9 °C) (Oral)   Resp 18   Ht 5' 4.5\" (1.638 m)   Wt 203 lb (92.1 kg)   SpO2 98%   BMI 34.31 kg/m²        1. Have you been to the ER, urgent care clinic since your last visit? Hospitalized since your last visit? No    2. Have you seen or consulted any other health care providers outside of the [address] [place] [address] since your last visit? Include any pap smears or colon screening. No     Health Maintenance Due   Topic Date Due    Eye Exam Retinal or Dilated  04/15/2018    Pneumococcal 0-64 years (2 of 4 - PPSV23) 04/30/2019    Foot Exam Q1  09/21/2019    MICROALBUMIN Q1  09/23/2020    Shingrix Vaccine Age 50> (2 of 2) 12/25/2020    COVID-19 Vaccine (3 - Pfizer risk 4-dose series) 06/12/2021    Flu Vaccine (1) 09/01/2021    Breast Cancer Screen Mammogram  11/11/2021        3 most recent PHQ Screens 1/18/2022   Little interest or pleasure in doing things Not at all   Feeling down, depressed, irritable, or hopeless Not at all   Total Score PHQ 2 0        Fall Risk Assessment, last 12 mths 3/11/2021   Able to walk? Yes   Fall in past 12 months? 0   Do you feel unsteady?  0   Are you worried about falling 0       Learning Assessment 9/19/2019   PRIMARY LEARNER Patient   HIGHEST LEVEL OF EDUCATION - PRIMARY LEARNER  -   BARRIERS PRIMARY LEARNER -   CO-LEARNER CAREGIVER -   PRIMARY LANGUAGE ENGLISH    NEED -   LEARNER PREFERENCE PRIMARY DEMONSTRATION     LISTENING   LEARNING SPECIAL TOPICS -   ANSWERED BY self   RELATIONSHIP -

## 2022-01-25 ENCOUNTER — VIRTUAL VISIT (OUTPATIENT)
Dept: ONCOLOGY | Age: 62
End: 2022-01-25
Payer: COMMERCIAL

## 2022-01-25 DIAGNOSIS — C90.01 MULTIPLE MYELOMA IN REMISSION (HCC): Primary | ICD-10-CM

## 2022-01-25 DIAGNOSIS — E66.01 SEVERE OBESITY (HCC): ICD-10-CM

## 2022-01-25 PROCEDURE — 99214 OFFICE O/P EST MOD 30 MIN: CPT | Performed by: INTERNAL MEDICINE

## 2022-01-25 NOTE — PROGRESS NOTES
Hematology Follow Up    REASON FOR VISIT: Multiple Myeloma    TYPE OF VISIT  Nate Arellano is a 59 y.o. female who is seen by synchronous (real-time) audio-video technology on 1/25/2022 for follow up of Multiple Myeloma on Revlimid     TREATMENT:   3/24/17- 9/15/17: Dot Mohinderak X 8   10/20/17: Autologous transplant at [facility]  2/22/18 -  Revlimid     HISTORY OF PRESENT ILLNESS: Ms. Yaima Brownlee is a 59 y.o. female with DM and  Multiple Myeloma who presents to follow up after the ASCT and is on maintenance Revlimid 10mg daily. She was reduced to 5 mg due to cytopenias. Seen for follow up. She is on Revlimid 10 mg daily. Colonoscopy was normal 2020. She is on Revlimid 21 days on and 1 week off. Has no new pain, has no fevers, has no rash or diarrhea. Oncologic history  Patient had left facial numbness which started in the summer of 2016. This started abruptly and was not associated with rashes, pain, jaw weakness. She has had some intermittent hyperemia of the L eye. No tearing. She has been evaluated by Dr. Simon Kam with Neurology and an extensive work up was only notable for presence of a 0.3 g/dl M spike. Other tests were unremarkable: Vitamin B12 411, thyroid function test normal, ACE 25, BHARATI normal CBC normal, CMP normal, CRP 1.17, CT head and cervical spine unremarkable. Further evaluation revealed findings consistent with Multiple Myeloma    CBC 2/24 Unremarkable. Kappa 17 mg/dl, Lambda 2416 (ratio 0.01), SPEP 0.2 g/dl M spike, HANSEL showed monoclonal free lambda light chains, UPEP negative, Beta 2 Microglobulin 1.8 mg/L, Skeletal survey negative for lytic lesions, Bone marrow biopsy on 2/24/17 showed a Normocellular marrow with mild monoclonal plasmacytosis (15-20%). Trilineage hematopoiesis with maturation. She had a very good partial response and 8 cycles of VRD and then proceeded on to receive an autologous stem cell transplant on 10/20/17 at [facility]. She had a CR posttransplant.  Started maintenance Revlimid in Jan 2018      Past Medical History:   Diagnosis Date    Atrial fibrillation ([facility]     CAD (coronary artery disease)     Cardiomyopathy     Clotting disorder ([facility]     Diabetes mellitus type 2, controlled ([facility] 12/10/2015    Glaucoma     Heart failure ([facility]     Hyperlipidemia     Hypertension     controlled    ICD (implantable cardioverter-defibrillator) in place     Multiple myeloma ([facility]     Orthostatic hypotension     Secondary cardiomyopathy, unspecified     Sinoatrial node dysfunction ([facility]     Vitamin B12 deficiency 3/31/2010       Past Surgical History:   Procedure Laterality Date    COLONOSCOPY N/A 1/22/2020    COLONOSCOPY performed by Mic Colón MD at [facility] ENDOSCOPY    HX HYSTERECTOMY      HX PACEMAKER      aicd       Allergies   Allergen Reactions    Ace Inhibitors Cough    Compazine [Prochlorperazine] Nausea Only     rash       Current Outpatient Medications   Medication Sig Dispense Refill    lenalidomide (Revlimid) 10 mg cap Take 1 Capsule by mouth every twenty-one (21) days. Take 1 cap by mouth on days 1-21 followed by 7 days off in a 28 [id number] Caps[facility] 0    gabapentin (NEURONTIN) 300 mg capsule 300 mg = 1 CAP each dose, PO, tid, # 270 CAP, 3 Refills, Pharmacy: [facility]/pharmacy #[id number]     amLODIPine (NORVASC) 5 mg tablet Take 1 Tablet by mouth daily. For blood pressure 90 Tablet 4    acetaminophen (Tylenol Extra Strength) 500 mg tablet Take 500 mg by mouth every six (6) hours as needed for Pain.  glimepiride (AMARYL) 1 mg tablet Take 1 Tab by mouth Daily (before breakfast). For diabetes 90 Tab 3    atorvastatin (LIPITOR) 40 mg tablet TAKE 1 TABLET BY MOUTH EVERY DAY FOR CHOLESTEROL 90 Tab 3    cyanocobalamin (Vitamin B-12) 100 mcg tablet Take 100 mcg by mouth daily.       carvediloL (COREG) 25 mg tablet TAKE 1 TABLET BY MOUTH TWICE A  Tab 1    latanoprost (XALATAN) 0.005 % ophthalmic solution INSTILL 1 DROP IN [facility] EYE EVERY DAY      multivitamin, tx-iron-ca-min (THERA-M W/ IRON) 9 mg iron-400 mcg tab tablet Take 1 Tab by mouth daily.  aspirin delayed-release 81 mg tablet TAKE 1 TABLET BY MOUTH EVERY DAY  4    loperamide (IMMODIUM) 2 mg tablet Take 2 mg by mouth as needed for Diarrhea. Social History     Socioeconomic History    Marital status:    Tobacco Use    Smoking status: Never Smoker    Smokeless tobacco: Never Used   Vaping Use    Vaping Use: Never used   Substance and Sexual Activity    Alcohol use: No    Drug use: No    Sexual activity: Yes     Partners: Male   Social History Narrative    , 2 children, 28 and 31. Works at [employer], for 35 years. 5 grandchildren       Family History   Problem Relation Age of Onset   [place] Cancer Mother     Heart Disease Mother         pacemaker   [place] Diabetes Mother    [place] Breast Cancer Mother     Hypertension Sister     Hypertension Brother     Diabetes Brother     Hypertension Sister     Hypertension Sister     Hypertension Sister     Hypertension Sister     Diabetes Sister        ROS  As reviewed in the HPI all others reviewed and negative. ECOG PS is 0    Physical Examination:     Due to this being a TeleHealth evaluation, many elements of the physical examination are unable to be assessed. Constitutional: Appears well-developed and well-nourished in no apparent distress   Mental status: Alert and awake, Oriented to person/place/time, Able to follow commands  Eyes: EOM normal, Sclera normal, No visible ocular discharge  HENT: Normocephalic, atraumatic; Mouth/Throat: Moist mucous membranes, External Ears normal  Neck: No visualized mass  Neurological: No facial asymmetry (Cranial nerve 7 motor function), No gaze palsy  Skin: No significant exanthematous lesions or discoloration noted on facial skin  Psychiatric: Normal affect, normal judgment/insight.  No hallucinations       LABS  Lab Results   Component Value Date/Time    WBC 3.6 01/13/2022 09:52 AM    HGB 13.4 01/13/2022 09:52 AM    HCT 39.2 01/13/2022 09:52 AM    PLATELET 631 52/31/6748 09:52 AM     (H) 01/13/2022 09:52 AM    ABS. NEUTROPHILS 1.6 01/13/2022 09:52 AM     Lab Results   Component Value Date/Time    Sodium 143 01/13/2022 09:52 AM    Potassium 3.5 01/13/2022 09:52 AM    Chloride 105 01/13/2022 09:52 AM    CO2 23 01/13/2022 09:52 AM    Glucose 131 (H) 01/13/2022 09:52 AM    BUN 10 01/13/2022 09:52 AM    Creatinine 1.01 (H) 01/13/2022 09:52 AM    GFR est AA 68 01/13/2022 09:52 AM    GFR est non-AA 59 (L) 01/13/2022 09:52 AM    Calcium 8.9 01/13/2022 09:52 AM     Lab Results   Component Value Date/Time    Alk. phosphatase 85 01/13/2022 09:52 AM    Protein, total 6.7 01/13/2022 09:52 AM    Albumin 4.4 01/13/2022 09:52 AM    Globulin 3.1 07/13/2021 01:47 PM    A-G Ratio 1.9 01/13/2022 09:52 AM     Lab Results   Component Value Date/Time    Iron % saturation 38 01/04/2021 10:32 AM    TIBC 277 01/04/2021 10:32 AM    Ferritin 163 (H) 01/04/2021 10:32 AM    Vitamin B12 411 12/07/2016 02:58 PM     02/16/2017 09:18 AM    Beta-2 Microglobulin, serum 1.5 08/28/2019 08:22 AM    C-Reactive Protein, Cardiac 1.17 12/07/2016 02:58 PM    TSH 1.100 12/07/2016 02:58 PM    M-Mika Not Observed 01/13/2022 09:52 AM    M-Mika Not Observed 07/13/2021 01:47 PM    Hep C Virus Ab <0.1 05/17/2016 09:56 AM     Lab Results   Component Value Date/Time    INR 1.0 05/04/2021 10:41 AM    aPTT 31.5 07/27/2010 03:57 PM     7/17/2020  FLC normal    Bone marrow biopsy reviewed    FISH molecular analysis:    Positive for IgH gene rearrangement (IgH complex FISH study pending); Normal results with 1, 5, 9, 13, 15, and 17 (TP53) probe sets. External records reviewed from [facility] transplant transplant bone marrow biopsy done on 12/19/17 showed no evidence of plasma cells.   Variable cellularity from 0-50%, flow LUIS, FISH negative    ASSESSMENT  Ms. Rolo Armstrong is a 59 y.o. female with standard risk multiple myeloma status post 6 cycles of twice daily and autologous stem cell transplant on 10/20/17. She is in a complete remission. We will continue with close surveillance in conjunction with her care team at [facility]  Multiple myeloma  S/P BMT in CR  On revlimid maintenance of 10mg daily following transplant at [facility]. Dropped to 5 mg due to recurrent grade 3 neutropenia (kleber 0.4). She has done well with this, rash has not recurred. As there was a temporary increase in M spike and hence Revlimid was increased again to 10 mg. She is doing well on this with grade 1 diarrhea     Counts reviewed 1/2022  System by system review today is largely negative  Repeat cbc, cmp, gammopathy eval in 3-6 months    Follow with BMT as scheduled October 2022    Neuropathy   Overall stable. DM  Per PCP. Anemia  Resolved    Diarrhea  Resolved    RTC in 6 months, labs every 3 months , See me in 6 months      Signed by: Efren Scott MD                     January 25, 2022    The patient was evaluated through a synchronous (real-time) audio-video encounter. The patient (or guardian if applicable) is aware that this is a billable service, which includes applicable co-pays. This Virtual Visit was conducted with patient's (and/or legal guardian's) consent. The visit was conducted pursuant to the emergency declaration under the [address] [place] [address], [address] [place] [address] waiver authority and the [employer] General Act. Patient identification was verified, and a caregiver was present when appropriate. The patient was located in a state where the provider was licensed to provide care.

## 2022-01-25 NOTE — PROGRESS NOTES
Felecia Plunkett is a 59 y.o. female  Chief Complaint   Patient presents with    Follow-up    Multiple Myeloma     1. Have you been to the ER, urgent care clinic since your last visit? Hospitalized since your last visit? No    2. Have you seen or consulted any other health care providers outside of the [address] [place] [address] since your last visit? Include any pap smears or colon screening.  No

## 2022-02-08 ENCOUNTER — DOCUMENTATION ONLY (OUTPATIENT)
Dept: ONCOLOGY | Age: 62
End: 2022-02-08

## 2022-02-08 DIAGNOSIS — C90.01 MULTIPLE MYELOMA IN REMISSION (HCC): ICD-10-CM

## 2022-02-08 RX ORDER — LENALIDOMIDE 10 MG/1
10 CAPSULE ORAL
Qty: 21 CAPSULE | Refills: 0 | Status: SHIPPED | OUTPATIENT
Start: 2022-02-08 | End: 2022-03-16 | Stop reason: SDUPTHER

## 2022-02-08 NOTE — PROGRESS NOTES
Oral Chemotherapy      Susan Tyson is a  59 y. o.female  diagnosed with MM . Ms. Berenice Ayala is being treated with lenalidomide.      Medication name: lenalidomide     Dose:  10 mg   Frequency: daily  Administration schedule: 21 days on, 7 days off  Ordering provider: Kathy Short MD        [employer] [facility] prescriber survey completed and Refill sent to [address] [place] [address] 1/25/22  [facility] 7/26/22        MICHAEL EstradaD, BCOP, [facility] [address] in place:  Yes   Recommendation Provided To: Provider: 1 via Note to Provider  and Other: 1   Intervention Detail: Refill(s) Provided     Intervention Accepted By: Provider: 1 and Other: 1   Time Spent (min): 15

## 2022-03-02 ENCOUNTER — HOSPITAL ENCOUNTER (OUTPATIENT)
Dept: MAMMOGRAPHY | Age: 62
Discharge: HOME | End: 2022-03-02
Attending: FAMILY MEDICINE
Payer: COMMERCIAL

## 2022-03-02 DIAGNOSIS — Z12.31 VISIT FOR SCREENING MAMMOGRAM: ICD-10-CM

## 2022-03-02 PROCEDURE — 77067 SCR MAMMO BI INCL CAD: CPT

## 2022-03-16 ENCOUNTER — DOCUMENTATION ONLY (OUTPATIENT)
Dept: ONCOLOGY | Age: 62
End: 2022-03-16

## 2022-03-16 DIAGNOSIS — C90.01 MULTIPLE MYELOMA IN REMISSION (HCC): ICD-10-CM

## 2022-03-16 RX ORDER — LENALIDOMIDE 10 MG/1
10 CAPSULE ORAL
Qty: 21 CAPSULE | Refills: 0 | Status: SHIPPED | OUTPATIENT
Start: 2022-03-16 | End: 2022-04-06 | Stop reason: SDUPTHER

## 2022-03-16 NOTE — PROGRESS NOTES
Oral Chemotherapy      Gabriella Ansari is a [id number] y. o.female  diagnosed with MM . Ms. Ansari is being treated with lenalidomide.      Medication name: lenalidomide     Dose:  10 mg   Frequency: daily  Administration schedule: 21 days on, 7 days off  Ordering provider: Kathy Short MD        [facility] Auth # [id number] and Refill sent to [address] [place] [address] 1/25/22  [facility] 7/26/22        Zarina Kimbrough, MICHAELD, BCOP, [place] [address] in place:  Yes   Recommendation Provided To: Provider: 1 via Note to Provider  and Other: 1   Intervention Detail: Refill(s) Provided     Intervention Accepted By: Provider: 1 and Other: 1   Time Spent (min): 15

## 2022-03-17 ENCOUNTER — OFFICE VISIT (OUTPATIENT)
Dept: CARDIOLOGY CLINIC | Age: 62
End: 2022-03-17
Payer: COMMERCIAL

## 2022-03-17 DIAGNOSIS — I42.0 DILATED CARDIOMYOPATHY (HCC): ICD-10-CM

## 2022-03-17 DIAGNOSIS — Z95.810 AICD (AUTOMATIC CARDIOVERTER/DEFIBRILLATOR) PRESENT: Primary | ICD-10-CM

## 2022-03-17 PROCEDURE — 93295 DEV INTERROG REMOTE 1/2/MLT: CPT | Performed by: INTERNAL MEDICINE

## 2022-03-17 PROCEDURE — 93296 REM INTERROG EVL PM/IDS: CPT | Performed by: INTERNAL MEDICINE

## 2022-03-18 PROBLEM — D70.1 CHEMOTHERAPY INDUCED NEUTROPENIA (HCC): Status: ACTIVE | Noted: 2018-07-25

## 2022-03-18 PROBLEM — T45.1X5A CHEMOTHERAPY INDUCED NEUTROPENIA (HCC): Status: ACTIVE | Noted: 2018-07-25

## 2022-03-18 PROBLEM — Z45.02 ICD (IMPLANTABLE CARDIOVERTER-DEFIBRILLATOR) BATTERY DEPLETION: Status: ACTIVE | Noted: 2021-05-24

## 2022-03-18 PROBLEM — R21 RASH: Status: ACTIVE | Noted: 2017-04-21

## 2022-03-19 PROBLEM — E11.21 TYPE 2 DIABETES MELLITUS WITH NEPHROPATHY (HCC): Status: ACTIVE | Noted: 2018-01-11

## 2022-03-19 PROBLEM — E11.22 TYPE 2 DIABETES MELLITUS WITH CHRONIC KIDNEY DISEASE (HCC): Status: ACTIVE | Noted: 2022-01-18

## 2022-03-19 PROBLEM — C90.00 MULTIPLE MYELOMA NOT HAVING ACHIEVED REMISSION (HCC): Status: ACTIVE | Noted: 2017-03-06

## 2022-03-19 PROBLEM — E11.40 TYPE 2 DIABETES MELLITUS WITH DIABETIC NEUROPATHY (HCC): Status: ACTIVE | Noted: 2018-01-11

## 2022-03-19 PROBLEM — E11.9 CONTROLLED TYPE 2 DIABETES MELLITUS WITHOUT COMPLICATION (HCC): Status: ACTIVE | Noted: 2018-02-27

## 2022-03-19 PROBLEM — E66.01 SEVERE OBESITY (HCC): Status: ACTIVE | Noted: 2019-09-23

## 2022-03-19 PROBLEM — Z45.02 IMPLANTABLE CARDIOVERTER-DEFIBRILLATOR (ICD) AT END OF BATTERY LIFE: Status: ACTIVE | Noted: 2021-05-24

## 2022-03-19 PROBLEM — G62.9 NEUROPATHY: Status: ACTIVE | Noted: 2017-04-21

## 2022-03-19 PROBLEM — Z94.6: Status: ACTIVE | Noted: 2018-01-15

## 2022-03-19 PROBLEM — E11.9 CONTROLLED TYPE 2 DIABETES MELLITUS WITHOUT COMPLICATION, WITHOUT LONG-TERM CURRENT USE OF INSULIN (HCC): Status: ACTIVE | Noted: 2017-03-31

## 2022-04-06 ENCOUNTER — TELEPHONE (OUTPATIENT)
Dept: ONCOLOGY | Age: 62
End: 2022-04-06

## 2022-04-06 DIAGNOSIS — C90.01 MULTIPLE MYELOMA IN REMISSION (HCC): ICD-10-CM

## 2022-04-06 RX ORDER — LENALIDOMIDE 10 MG/1
10 CAPSULE ORAL
Qty: 21 CAPSULE | Refills: 0 | Status: SHIPPED | OUTPATIENT
Start: 2022-04-06 | End: 2022-05-04 | Stop reason: SDUPTHER

## 2022-04-06 NOTE — TELEPHONE ENCOUNTER
Oral Chemotherapy      Demetra Ansari is a [id number] y. o.female  diagnosed with MM . Ms. Ansari is being treated with lenalidomide.      Medication name: lenalidomide     Dose:  10 mg   Frequency: daily  Administration schedule: 21 days on, 7 days off  Ordering provider: Kathy Short MD        [employer] prescriber survey completed Auth # [id number] and Refill sent to [facility]     Last OV 1/25/22  Next visist 7/26/22        MICHAEL CooperD, BCOP, [place] [address] in place:  Yes   Recommendation Provided To: Provider: 1 via Note to Provider  and Other: 1   Intervention Detail: Refill(s) Provided     Intervention Accepted By: Provider: 1 and Other: 1   Time Spent (min): 15

## 2022-04-21 LAB
ALBUMIN SERPL ELPH-MCNC: 3.4 G/DL (ref 2.9–4.4)
ALBUMIN SERPL-MCNC: 4 G/DL (ref 3.8–4.8)
ALBUMIN/GLOB SERPL: 1.1 {RATIO} (ref 0.7–1.7)
ALBUMIN/GLOB SERPL: 1.6 {RATIO} (ref 1.2–2.2)
ALP SERPL-CCNC: 84 IU/L (ref 44–121)
ALPHA1 GLOB SERPL ELPH-MCNC: 0.2 G/DL (ref 0–0.4)
ALPHA2 GLOB SERPL ELPH-MCNC: 0.8 G/DL (ref 0.4–1)
ALT SERPL-CCNC: 19 IU/L (ref 0–32)
AST SERPL-CCNC: 15 IU/L (ref 0–40)
B-GLOBULIN SERPL ELPH-MCNC: 1 G/DL (ref 0.7–1.3)
BASOPHILS # BLD AUTO: 0 X10E3/UL (ref 0–0.2)
BASOPHILS NFR BLD AUTO: 0 %
BILIRUB SERPL-MCNC: 1.7 MG/DL (ref 0–1.2)
BUN SERPL-MCNC: 10 MG/DL (ref 8–27)
BUN/CREAT SERPL: 10 (ref 12–28)
CALCIUM SERPL-MCNC: 8.5 MG/DL (ref 8.7–10.3)
CHLORIDE SERPL-SCNC: 104 MMOL/L (ref 96–106)
CO2 SERPL-SCNC: 21 MMOL/L (ref 20–29)
CREAT SERPL-MCNC: 1 MG/DL (ref 0.57–1)
EGFR: 63 ML/MIN/1.73
EOSINOPHIL # BLD AUTO: 0.1 X10E3/UL (ref 0–0.4)
EOSINOPHIL NFR BLD AUTO: 3 %
ERYTHROCYTE [DISTWIDTH] IN BLOOD BY AUTOMATED COUNT: 14.5 % (ref 11.7–15.4)
GAMMA GLOB SERPL ELPH-MCNC: 1.1 G/DL (ref 0.4–1.8)
GLOBULIN SER CALC-MCNC: 2.5 G/DL (ref 1.5–4.5)
GLOBULIN SER-MCNC: 3.1 G/DL (ref 2.2–3.9)
GLUCOSE SERPL-MCNC: 178 MG/DL (ref 65–99)
HCT VFR BLD AUTO: 39.9 % (ref 34–46.6)
HGB BLD-MCNC: 13.6 G/DL (ref 11.1–15.9)
IGA SERPL-MCNC: 140 MG/DL (ref 87–352)
IGG SERPL-MCNC: 1045 MG/DL (ref 586–1602)
IGM SERPL-MCNC: 10 MG/DL (ref 26–217)
IMM GRANULOCYTES # BLD AUTO: 0 X10E3/UL (ref 0–0.1)
IMM GRANULOCYTES NFR BLD AUTO: 0 %
INTERPRETATION SERPL IEP-IMP: ABNORMAL
KAPPA LC FREE SER-MCNC: 19.6 MG/L (ref 3.3–19.4)
KAPPA LC FREE/LAMBDA FREE SER: 1.72 {RATIO} (ref 0.26–1.65)
LAMBDA LC FREE SERPL-MCNC: 11.4 MG/L (ref 5.7–26.3)
LYMPHOCYTES # BLD AUTO: 1.4 X10E3/UL (ref 0.7–3.1)
LYMPHOCYTES NFR BLD AUTO: 38 %
M PROTEIN SERPL ELPH-MCNC: ABNORMAL G/DL
MCH RBC QN AUTO: 34.1 PG (ref 26.6–33)
MCHC RBC AUTO-ENTMCNC: 34.1 G/DL (ref 31.5–35.7)
MCV RBC AUTO: 100 FL (ref 79–97)
MONOCYTES # BLD AUTO: 0.3 X10E3/UL (ref 0.1–0.9)
MONOCYTES NFR BLD AUTO: 8 %
NEUTROPHILS # BLD AUTO: 1.9 X10E3/UL (ref 1.4–7)
NEUTROPHILS NFR BLD AUTO: 51 %
PLATELET # BLD AUTO: 229 X10E3/UL (ref 150–450)
PLEASE NOTE:, 149534: ABNORMAL
POTASSIUM SERPL-SCNC: 3.6 MMOL/L (ref 3.5–5.2)
PROT SERPL-MCNC: 6.5 G/DL (ref 6–8.5)
RBC # BLD AUTO: 3.99 X10E6/UL (ref 3.77–5.28)
SODIUM SERPL-SCNC: 142 MMOL/L (ref 134–144)
WBC # BLD AUTO: 3.7 X10E3/UL (ref 3.4–10.8)

## 2022-04-28 RX ORDER — GLIMEPIRIDE 1 MG/1
1 TABLET ORAL
Qty: 90 TABLET | Refills: 3 | Status: SHIPPED | OUTPATIENT
Start: 2022-04-28 | End: 2022-09-13

## 2022-04-28 RX ORDER — CARVEDILOL 25 MG/1
TABLET ORAL
Qty: 180 TABLET | Refills: 1 | Status: SHIPPED | OUTPATIENT
Start: 2022-04-28 | End: 2022-11-02

## 2022-04-28 RX ORDER — ATORVASTATIN CALCIUM 40 MG/1
TABLET, FILM COATED ORAL
Qty: 90 TABLET | Refills: 3 | Status: SHIPPED | OUTPATIENT
Start: 2022-04-28

## 2022-05-04 DIAGNOSIS — C90.01 MULTIPLE MYELOMA IN REMISSION (HCC): ICD-10-CM

## 2022-05-04 RX ORDER — LENALIDOMIDE 10 MG/1
10 CAPSULE ORAL
Qty: 21 CAPSULE | Refills: 0 | Status: SHIPPED | OUTPATIENT
Start: 2022-05-04 | End: 2022-05-31

## 2022-05-04 NOTE — TELEPHONE ENCOUNTER
Oral Chemotherapy      Calvin Ansari is a [id number] y. o.female  diagnosed with MM . Ms. Ansari is being treated with lenalidomide.      Medication name: lenalidomide     Dose:  10 mg   Frequency: daily  Administration schedule: 21 days on, 7 days off  Ordering provider: Kathy Short MD        [employer] prescriber survey completed Auth # [id number] Refill sent to [facility]     Last OV 1/25/22  Next visit 7/26/22        Freda Taylor, PHARMD, [facility], [address] [place] [address] in place:  Yes   Recommendation Provided To: Patient/Caregiver: 1 via Telephone and Other: 1   Intervention Detail: Refill(s) Provided     Intervention Accepted By: Patient/Caregiver: 1 and Other: 1   Time Spent (min): 15

## 2022-05-10 ENCOUNTER — OFFICE VISIT (OUTPATIENT)
Dept: CARDIOLOGY CLINIC | Age: 62
End: 2022-05-10
Payer: COMMERCIAL

## 2022-05-10 VITALS
RESPIRATION RATE: 18 BRPM | DIASTOLIC BLOOD PRESSURE: 60 MMHG | HEART RATE: 63 BPM | SYSTOLIC BLOOD PRESSURE: 106 MMHG | HEIGHT: 64 IN | BODY MASS INDEX: 34.38 KG/M2 | OXYGEN SATURATION: 99 % | WEIGHT: 201.4 LBS

## 2022-05-10 DIAGNOSIS — I49.5 SSS (SICK SINUS SYNDROME) (HCC): ICD-10-CM

## 2022-05-10 DIAGNOSIS — Z95.810 AICD (AUTOMATIC CARDIOVERTER/DEFIBRILLATOR) PRESENT: ICD-10-CM

## 2022-05-10 DIAGNOSIS — I42.0 DILATED CARDIOMYOPATHY (HCC): Primary | ICD-10-CM

## 2022-05-10 DIAGNOSIS — E78.2 MIXED HYPERLIPIDEMIA: ICD-10-CM

## 2022-05-10 DIAGNOSIS — I10 ESSENTIAL HYPERTENSION: ICD-10-CM

## 2022-05-10 DIAGNOSIS — I82.A12 DVT OF AXILLARY VEIN, ACUTE LEFT (HCC): ICD-10-CM

## 2022-05-10 PROBLEM — N18.30 CHRONIC RENAL DISEASE, STAGE III (HCC): Status: ACTIVE | Noted: 2022-05-10

## 2022-05-10 PROCEDURE — 99213 OFFICE O/P EST LOW 20 MIN: CPT | Performed by: SPECIALIST

## 2022-05-10 NOTE — PROGRESS NOTES
1. Have you been to the ER, urgent care clinic since your last visit? Hospitalized since your last visit? No    2. Have you seen or consulted any other health care providers outside of the [address] [place] [address] since your last visit? Include any pap smears or colon screening. No      Chief Complaint   Patient presents with    Follow-up     6 mo follow up.

## 2022-05-10 NOTE — PROGRESS NOTES
HISTORY OF PRESENT ILLNESS  Alicia Huerta is a 59 y.o. female     SUMMARY:   Problem List  Date Reviewed: 5/10/2022          Codes Class Noted    DVT of axillary vein, acute left ([facility] ICD-10-CM: I82. A12  ICD-9-CM: 453.84  5/10/2022        SSS (sick sinus syndrome) (HCC) ICD-10-CM: I49.5  ICD-9-CM: 427.81  5/10/2022        Chronic renal disease, stage III ICD-10-CM: N18.30  ICD-9-CM: 585.3  5/10/2022        Type 2 diabetes mellitus with chronic kidney disease ([facility] ICD-10-CM: E11.22  ICD-9-CM: 250.40, 585.9  1/18/2022        ICD (implantable cardioverter-defibrillator) battery depletion (Chronic) ICD-10-CM: Z45.02  ICD-9-CM: V53.32  5/24/2021        Implantable cardioverter-defibrillator (ICD) at end of battery life ICD-10-CM: Z45.02  ICD-9-CM: V53.32  5/24/2021        Severe obesity ([facility] ICD-10-CM: E66.01  ICD-9-CM: 278.01  9/23/2019        Chemotherapy induced neutropenia (HCC) ICD-10-CM: D70.1, T45.1X5A  ICD-9-CM: 288.03, E933.1  7/25/2018        Controlled type 2 diabetes mellitus without complication (HCC) (Chronic) ICD-10-CM: E11.9  ICD-9-CM: 250.00  2/27/2018        Status post bone transplant ICD-10-CM: Z94.6  ICD-9-CM: V42.4  1/15/2018        Type 2 diabetes mellitus with nephropathy ([facility] ICD-10-CM: E11.21  ICD-9-CM: 250.40, 583.81  1/11/2018        Type 2 diabetes mellitus with diabetic neuropathy ([facility] ICD-10-CM: E11.40  ICD-9-CM: 250.60, 357.2  1/11/2018        Multiple myeloma in remission ([facility] ICD-10-CM: C90.01  ICD-9-CM: 203.01  Unknown        Neuropathy ICD-10-CM: G62.9  ICD-9-CM: 355.9  4/21/2017        Rash ICD-10-CM: R21  ICD-9-CM: 782.1  4/21/2017        Controlled type 2 diabetes mellitus without complication, without long-term current use of insulin ([facility] ICD-10-CM: E11.9  ICD-9-CM: 250.00  3/31/2017        Multiple myeloma not having achieved remission ([facility] ICD-10-CM: C90.00  ICD-9-CM: 203.00  3/6/2017        Diabetes mellitus type 2, controlled ([facility] ICD-10-CM: E11.9  ICD-9-CM: 250.00  12/10/2015        Essential hypertension ICD-10-CM: I10  ICD-9-CM: 401.9  11/17/2015        Hyperlipidemia ICD-10-CM: E78.5  ICD-9-CM: 272.4  Unknown        AICD (automatic cardioverter/defibrillator) present ICD-10-CM: Z95.810  ICD-9-CM: V45.02  9/19/2013        Chronic systolic heart failure (HCC) (Chronic) ICD-10-CM: I50.22  ICD-9-CM: 428.22  4/9/2012        Obesity, unspecified ICD-10-CM: E66.9  ICD-9-CM: 278.00  4/9/2012        Other left bundle branch block ICD-10-CM: I44.7  ICD-9-CM: 426.3  4/9/2012        Cardiomyopathy (HCC)--resolved (Chronic) ICD-10-CM: I42.9  ICD-9-CM: 425.4  3/28/2011        Vitamin B12 deficiency ICD-10-CM: E53.8  ICD-9-CM: 266.2  3/31/2010              Current Outpatient Medications on File Prior to Visit   Medication Sig    lenalidomide (Revlimid) 10 mg cap Take 1 Capsule by mouth every twenty-one (21) days. Take 1 cap by mouth on days 1-21 followed by 7 days off in a 28 day cycle [email or web address] #[id number] (Patient taking differently: Take 10 mg by mouth daily. Take 1 cap by mouth on days 1-21 followed by 7 days off in a 28 day cycle [employer] #[id number])    atorvastatin (LIPITOR) 40 mg tablet TAKE 1 TABLET BY MOUTH EVERY DAY FOR CHOLESTEROL    glimepiride (AMARYL) 1 mg tablet TAKE 1 TAB BY MOUTH DAILY (BEFORE BREAKFAST). FOR DIABETES    carvediloL (COREG) 25 mg tablet TAKE 1 TABLET BY MOUTH TWICE A DAY    gabapentin (NEURONTIN) 300 mg capsule 300 mg = 1 CAP each dose, PO, tid, # 270 CAP, 3 Refills, Pharmacy: [facility]/pharmacy #[id number]   amLODIPine (NORVASC) 5 mg tablet Take 1 Tablet by mouth daily. For blood pressure    acetaminophen (Tylenol Extra Strength) 500 mg tablet Take 500 mg by mouth every six (6) hours as needed for Pain.  cyanocobalamin (Vitamin B-12) 100 mcg tablet Take 100 mcg by mouth daily.     latanoprost (XALATAN) 0.005 % ophthalmic solution INSTILL 1 DROP IN EACH EYE EVERY DAY    multivitamin, tx-iron-ca-min (THERA-M W/ IRON) 9 mg iron-400 mcg tab tablet Take 1 Tab by mouth daily.  aspirin delayed-release 81 mg tablet TAKE 1 TABLET BY MOUTH EVERY DAY    loperamide (IMMODIUM) 2 mg tablet Take 2 mg by mouth as needed for Diarrhea. No current facility-administered medications on file prior to visit. CARDIOLOGY STUDIES TO DATE:  [facility] Bi-V ICD,DOI 5/24/21 (gen change, and new RV and LV), NOT MRI Conditional , on remote    3/16 normal echo    5/21  echo lvef 50-55%, lvh, mild to mod tr without pul htn    Chief Complaint   Patient presents with    Follow-up     6 mo follow up. HPI :  Needs to do remarkably well. She is going to follow-up with her prior electrophysiologist for her defibrillator checks. Her lipid profile in January looked great and her hemoglobin A1c was 6.7. She is tolerating her chemo for myeloma without any real trouble. She is somewhat active but not exercising.   CARDIAC ROS:   negative for chest pain, dyspnea, palpitations, syncope, orthopnea, paroxysmal nocturnal dyspnea, exertional chest pressure/discomfort, claudication, lower extremity edema    Family History   Problem Relation Age of Onset    Cancer Mother     Heart Disease Mother         pacemaker    Diabetes Mother     Breast Cancer Mother 54    Hypertension Sister     Hypertension Brother     Diabetes Brother     Hypertension Sister     Hypertension Sister     Hypertension Sister     Hypertension Sister     Diabetes Sister        Past Medical History:   Diagnosis Date    Atrial fibrillation ([address]     CAD (coronary artery disease)     Cardiomyopathy     Clotting disorder ([address]     Diabetes mellitus type 2, controlled ([address] 12/10/2015    Glaucoma     Heart failure ([address]     Hyperlipidemia     Hypertension     controlled    ICD (implantable cardioverter-defibrillator) in place     Multiple myeloma ([address]     Orthostatic hypotension     Secondary cardiomyopathy, unspecified     Sinoatrial node dysfunction ([address]     Vitamin B12 deficiency 3/31/2010 GENERAL ROS:  A comprehensive review of systems was negative except for that written in the HPI.     Visit Vitals  /60 (BP 1 Location: Right upper arm, BP Patient Position: Sitting, BP Cuff Size: Large adult)   Pulse 63   Resp 18   Ht 5' 4\" (1.626 m)   Wt 201 lb 6.4 oz (91.4 kg)   SpO2 99%   BMI 34.57 kg/m²       Wt Readings from Last 3 Encounters:   05/10/22 201 lb 6.4 oz (91.4 kg)   01/18/22 203 lb (92.1 kg)   11/09/21 201 lb 9.6 oz (91.4 kg)            BP Readings from Last 3 Encounters:   05/10/22 106/60   01/18/22 129/73   11/09/21 138/78       PHYSICAL EXAM  General appearance: alert, cooperative, no distress, appears stated age  Neurologic: Alert and oriented X 3  Neck: supple, symmetrical, trachea midline, no adenopathy, no carotid bruit and no JVD  Lungs: clear to auscultation bilaterally  Heart: regular rate and rhythm, S1, S2 normal, no murmur, click, rub or gallop  Extremities: extremities normal, atraumatic, no cyanosis or edema    Lab Results   Component Value Date/Time    Cholesterol, total 156 01/18/2022 09:22 AM    Cholesterol, total 152 07/15/2021 10:21 AM    Cholesterol, total 167 01/18/2021 11:38 AM    Cholesterol, total 151 09/23/2019 09:27 AM    Cholesterol, total 150 03/21/2019 10:17 AM    HDL Cholesterol 65 01/18/2022 09:22 AM    HDL Cholesterol 65 07/15/2021 10:21 AM    HDL Cholesterol 64 01/18/2021 11:38 AM    HDL Cholesterol 64 09/23/2019 09:27 AM    HDL Cholesterol 60 03/21/2019 10:17 AM    LDL, calculated 67.2 01/18/2022 09:22 AM    LDL, calculated 64.4 07/15/2021 10:21 AM    LDL, calculated 78 01/18/2021 11:38 AM    LDL, calculated 73 09/23/2019 09:27 AM    LDL, calculated 75 03/21/2019 10:17 AM    LDL, calculated 75 09/21/2018 10:07 AM    Triglyceride 119 01/18/2022 09:22 AM    Triglyceride 113 07/15/2021 10:21 AM    Triglyceride 147 01/18/2021 11:38 AM    Triglyceride 69 09/23/2019 09:27 AM    Triglyceride 75 03/21/2019 10:17 AM    CHOL/HDL Ratio 2.4 01/18/2022 09:22 AM CHOL/HDL Ratio 2.3 07/15/2021 10:21 AM    CHOL/HDL Ratio 4.3 10/08/2009 10:45 AM     ASSESSMENT :      She is stable and asymptomatic, well compensated on a good medical regimen and needs no cardiac testing at this time. current treatment plan is effective, no change in therapy  lab results and schedule of future lab studies reviewed with patient  reviewed diet, exercise and weight control    Encounter Diagnoses   Name Primary?  Dilated cardiomyopathy ([facility] Yes    Essential hypertension     DVT of axillary vein, acute left (HCC)     SSS (sick sinus syndrome) (HCC)     AICD (automatic cardioverter/defibrillator) present     Mixed hyperlipidemia      No orders of the defined types were placed in this encounter. Follow-up and Dispositions    · Return in about 1 year (around 5/10/2023). Bibi Moralez MD  5/10/2022  Please note that this dictation was completed with [employer], the computer voice recognition software. Quite often unanticipated grammatical, syntax, homophones, and other interpretive errors are inadvertently transcribed by the computer software. Please disregard these errors. Please excuse any errors that have escaped final proofreading. Thank you.

## 2022-05-12 ENCOUNTER — PATIENT OUTREACH (OUTPATIENT)
Dept: CASE MANAGEMENT | Age: 62
End: 2022-05-12

## 2022-05-12 NOTE — PROGRESS NOTES
Ambulatory Care Management Note    Date/Time:  2022 4:53 PM    This patient was received as a referral from Daily assignment. Ambulatory Care Manager outreached to patient today to offer care management services. Introduction to self and role of care manager provided. Patient accepted care management services at this time. Follow up call scheduled at this time. Patient has Ambulatory Care Manager's contact number for for any questions or concerns. Heart Failure Education outreach Date/Time: 2022 10:43 PM    Ambulatory Care Manager (ACM) contacted the patient by telephone to perform Ambulatory Care Coordination. Verified name and  with patient as identifiers. Provided introduction to self, and explanation of the Ambulatory Care Manager's role. ACM reviewed that a Health Healthy tips for the Holiday packet has been sent to [employer]. ACM reviewed CHF zones, daily weights, fluid restriction, the importance of low sodium diet with the caregiver. Instructed patient to call their PCP if they have a weight gain of 3 lbs in 2 days or 5 lbs in a week. Patient reminded that there is a physician on call 24 hours a day / 7 days a week should the patient have questions or concerns. The patient verbalized understanding.

## 2022-05-20 ENCOUNTER — PATIENT OUTREACH (OUTPATIENT)
Dept: CASE MANAGEMENT | Age: 62
End: 2022-05-20

## 2022-05-20 NOTE — PROGRESS NOTES
Ambulatory Care Management Note    Date/Time:  5/20/2022 12:32 PM    This Ambulatory Care Manager (ACM) reviewed and updated the following screenings during this call; general assessment, disease specific assessment , self management assessment, SDOH assessments, ACP assessment and note and medication reconciliation     Patient's challenges to self-management identified:   functional physical ability, ineffective coping, lack of knowledge about disease, level of motivation, medical condition, medication management, multi health system providers, PCP relationship, polypharmacy, support system and utilization of services      Medication Management:  good adherence and good understanding    Advance Care Planning:   Does patient have an Advance Directive:  health care decision makers updated    [employer], Referrals, and Durable Medical Equipment:       Health Maintenance Due   Topic Date Due    Eye Exam Retinal or Dilated  04/15/2018    Foot Exam Q1  09/21/2019    Pneumococcal 0-64 years (2 - PPSV23 or PCV20) 03/05/2020    MICROALBUMIN Q1  09/23/2020    Shingrix Vaccine Age 50> (2 of 2) 12/25/2020    COVID-19 Vaccine (4 - Booster for Muir Peter series) 04/18/2022     Health Maintenance Reviewed:yes    Patient was asked to consider health care goals that they would like to focus on with this ACM. ACM will follow up with patient to discuss goals and establish care plan in the next 7-14 days.        PCP/Specialist follow up:   Future Appointments   Date Time Provider Rasheed Teague   6/16/2022  4:00 PM REMOTE_[facility] BS AMB   7/18/2022  9:40 AM Jada To MD [facility] BS AMB   7/26/2022 10:00 AM [address] BS AMB   9/28/2022 10:15 AM PACEMAKER, [facility] BS AMB   9/28/2022 10:40 AM Diane Mahajan, ANP [facility] BS AMB   5/11/2023 10:00 AM Juline Ganser, MD [facility] BS AMB

## 2022-05-31 DIAGNOSIS — C90.01 MULTIPLE MYELOMA IN REMISSION (HCC): ICD-10-CM

## 2022-05-31 RX ORDER — LENALIDOMIDE 10 MG/1
10 CAPSULE ORAL DAILY
Qty: 21 CAPSULE | Refills: 0 | Status: SHIPPED | OUTPATIENT
Start: 2022-05-31 | End: 2022-06-28

## 2022-05-31 NOTE — TELEPHONE ENCOUNTER
Oral Chemotherapy      Mika Ansari is a [id number] y. o.female  diagnosed with MM . Ms. Ansari is being treated with lenalidomide.      Medication name: lenalidomide     Dose:  10 mg   Frequency: daily  Administration schedule: 21 days on, 7 days off  Ordering Ester Wellington MD        [employer] prescriber survey completed Auth # [id number] PZD Refill sent to [facility]     Last OV 1/25/22  Next visit 7/26/22        Shadia Esteban, PHARMD, [facility], [address] [place] [address] in place:  Yes   Recommendation Provided To: Patient/Caregiver: 1 via Telephone and Other: 1   Intervention Detail: Refill(s) Provided      Intervention Accepted By: Patient/Caregiver: 1 and Other: 1   Time Spent (min): 15

## 2022-06-02 ENCOUNTER — PATIENT OUTREACH (OUTPATIENT)
Dept: CASE MANAGEMENT | Age: 62
End: 2022-06-02

## 2022-06-03 NOTE — PROGRESS NOTES
Ambulatory Care Management Note      Date/Time:  6/2/2022 4:14 PM    This patient was received as a referral from Daily assignment. Top Challenges reviewed with the provider   · Need ACP  · [facility]     Ambulatory  contacted patient for discussion and case management of self care  Summary of patients top problems:   1. CHF-5/21  echo lvef 50-55%, lvh, mild to mod tr without pul htn,  History of chronic systolic heart failure  2. Chronic pain- multiple myeloma and Neuropathy- chemotherapy induced neutropenia. Patient's challenges to self management identified:   functional physical ability, lack of knowledge about disease, level of motivation, medical condition, medication management, multi health system providers, PCP relationship, polypharmacy, support system, transportation and utilization of services      Medication Management:  good adherence and good understanding    Advance Care Planning:   Does patient have an Advance Directive:  health care decision makers updated    [employer], Referrals, and Durable Medical Equipment:     PCP/Specialist follow up:   Future Appointments   Date Time Provider Rasheed Teague   6/16/2022  4:00 PM [facility]OTE[facility]   7/18/2022  9:40 AM Jada To MD [facility]   7/26/2022 10:00 AM [address] [facility]   9/28/2022 10:15 AM PACEMAKER, [facility]   9/28/2022 10:40 AM Diane Mahajan, ANP [facility]   5/11/2023 10:00 AM Gabrielle Bansal MD [address] Attends follow up appointments on schedule by end of episode      06/02/22   Reviewed all scheduled appointments   Patient verbalized understanding of attending all appointments   Patient will attend each appointment as scheduled.  ACM will follow again in 12-14 days.   pmk       Coordinate pain and self management plan for patient by end of episode      06/02/22    Patient saw Jose Manuel Vaca MD on Tuesday May 10, 2022. The following issues were addressed:  o Essential hypertension  o DVT of axillary vein, acute left (HCC)  o SSS (sick sinus syndrome) (HCC)  o Dilated cardiomyopathy ([address]  o AICD (automatic cardioverter/defibrillator) present  o Mixed hyperlipidemia  o Patient admits to feeling well, says BP and BS is at goal.  o Compliant with a low sodium, low carb diet     Will include short walks into daily activity for 20-30 mins 3x weekly   Will Continue all medications  as ordered   ACM will follow again in 12-14 days. Pmk             Patient verbalized understanding of all information discussed. Patient has this Ambulatory Care Manager's contact information for any further questions, concerns, or needs.

## 2022-06-16 ENCOUNTER — PATIENT OUTREACH (OUTPATIENT)
Dept: CASE MANAGEMENT | Age: 62
End: 2022-06-16

## 2022-06-17 NOTE — PROGRESS NOTES
06/17/22  Goals Addressed                 This Visit's Progress     Attends follow up appointments on schedule by end of episode   On track     06/02/22   Reviewed all scheduled appointments   Patient verbalized understanding of attending all appointments   Patient will attend each appointment as scheduled.  ACM will follow again in 12-14 days. Pmk    06/17/22    Outreach completed   Has followed up with appointment as scheduled   Reviewed all upcoming appointments   Patient verbalized knowledge and will attend   ACM will follow up again in  12-14 days. pmk       Coordinate pain and self management plan for patient by end of episode   On track     06/02/22    Patient saw Alexandro Joseph MD on Tuesday May 10, 2022. The following issues were addressed:  o Essential hypertension  o DVT of axillary vein, acute left (HCC)  o SSS (sick sinus syndrome) (HCC)  o Dilated cardiomyopathy ([address]  o AICD (automatic cardioverter/defibrillator) present  o Mixed hyperlipidemia  o Patient admits to feeling well, says BP and BS is at goal.  o Compliant with a low sodium, low carb diet     Will include short walks into daily activity for 20-30 mins 3x weekly   Will Continue all medications  as ordered   ACM will follow again in 12-14 days. Pmk    06/17/22   Outreach completed   Will change positions every 30 minutes. If you must sit for long periods of time, take breaks from sitting.  Will get up and walk around, or lie in a comfortable position.  Try using a heating pad on a low or medium setting for 15 to 20 minutes every 2 or 3 hours.  Try a warm shower in place of one session with the heating pad. You can also try an ice pack for 10 to 15 minutes every 2 to 3 hours. Put a thin cloth between the ice pack and your skin.  Take short walks several times a day. You can start with 5 to 10 minutes, 3 or 4 times a day, and work up to longer walks.     Walk on level surfaces and avoid hills and stairs until your back is better.  Learn how to use good posture, safe lifting techniques, and proper body mechanics.  ACM will follow again in 12-14 4ays.  pmk

## 2022-06-23 NOTE — PROCEDURES
Colonoscopy Procedure Note    Indications:   Screening colonoscopy    Referring Physician: Celine Curry MD  Anesthesia/Sedation: MAC anesthesia Propofol  Endoscopist:  Dr. Fela Baker    Procedure in Detail:  Informed consent was obtained for the procedure, including sedation. Risks of perforation, hemorrhage, adverse drug reaction, and aspiration were discussed. The patient was placed in the left lateral decubitus position. Based on the pre-procedure assessment, including review of the patient's medical history, medications, allergies, and review of systems, she had been deemed to be an appropriate candidate for moderate sedation; she was therefore sedated with the medications listed above. The patient was monitored continuously with ECG tracing, pulse oximetry, blood pressure monitoring, and direct observations. A rectal examination was performed. The [id number] was inserted into the rectum and advanced under direct vision to the cecum, which was identified by the ileocecal valve and appendiceal orifice. The quality of the colonic preparation was adequate. A careful inspection was made as the colonoscope was withdrawn, including a retroflexed view of the rectum; findings and interventions are described below. Appropriate photodocumentation was obtained. Findings:     1. Scope advanced to the cecum. 2.  Preparation was adequate. 3.  Normal mucosa throughout s/p random R and L sided bxs. 4. Internal hemorrhoids. Therapies:  See above    Specimen: Specimens were collected as described above and sent to pathology. Complications: None were encountered during the procedure. EBL: < 10 ml.     Recommendations:   -f/u path   -repeat colonoscopy in 10 years  Signed By: Lubna Mace MD                        January 22, 2020 Labs/EKG/Imaging Studies/Not Applicable

## 2022-06-28 DIAGNOSIS — C90.01 MULTIPLE MYELOMA IN REMISSION (HCC): ICD-10-CM

## 2022-06-28 RX ORDER — LENALIDOMIDE 10 MG/1
CAPSULE ORAL
Qty: 21 CAPSULE | Refills: 0 | Status: SHIPPED | OUTPATIENT
Start: 2022-06-28 | End: 2022-06-29 | Stop reason: SDUPTHER

## 2022-06-29 DIAGNOSIS — C90.01 MULTIPLE MYELOMA IN REMISSION (HCC): ICD-10-CM

## 2022-06-29 RX ORDER — LENALIDOMIDE 10 MG/1
10 CAPSULE ORAL DAILY
Qty: 21 CAPSULE | Refills: 0 | Status: SHIPPED | OUTPATIENT
Start: 2022-06-29 | End: 2022-07-27

## 2022-06-29 NOTE — TELEPHONE ENCOUNTER
Oral Chemotherapy      Meagan Ansari is a [id number] y. o.female  diagnosed with MM . Ms. Ansari is being treated with lenalidomide.      Medication name: lenalidomide     Dose:  10 mg   Frequency: daily  Administration schedule: 21 days on, 7 days off  Ordering provider: Kathy Short MD        [facility] Auth # [id number] [facility]ill sent to [facility]     Last OV 1/25/22  Next visit 7/26/22        MICHAEL ArvizuD, BCOP, BCPS    For Pharmacy Admin Tracking Only     CPA in place:  Yes   Recommendation Provided To: Patient/Caregiver: 1 via Telephone and Other: 1   Intervention Detail: Refill(s) Provided     Intervention Accepted By: Patient/Caregiver: 1 and Other: 1   Time Spent (min): 15

## 2022-07-01 ENCOUNTER — PATIENT OUTREACH (OUTPATIENT)
Dept: CASE MANAGEMENT | Age: 62
End: 2022-07-01

## 2022-07-01 NOTE — PROGRESS NOTES
Goals Addressed                 This Visit's Progress     Attends follow up appointments on schedule by end of episode   On track     06/02/22   Reviewed all scheduled appointments   Patient verbalized understanding of attending all appointments   Patient will attend each appointment as scheduled.  ACM will follow again in 12-14 days. Pmk    06/17/22    Outreach completed   Has followed up with appointment as scheduled   Reviewed all upcoming appointments   Patient verbalized knowledge and will attend   ACM will follow up again in  12-14 days. pmk    07/01/22   Has followed up as scheduled   Reminded of all scheduled follow up visits   Educated on  CHF       Coordinate pain and self management plan for patient by end of episode   On track     06/02/22    Patient saw Zulma Hahn MD on Tuesday May 10, 2022. The following issues were addressed:  o Essential hypertension  o DVT of axillary vein, acute left (HCC)  o SSS (sick sinus syndrome) (HCC)  o Dilated cardiomyopathy ([address]  o AICD (automatic cardioverter/defibrillator) present  o Mixed hyperlipidemia  o Patient admits to feeling well, says BP and BS is at goal.  o Compliant with a low sodium, low carb diet     Will include short walks into daily activity for 20-30 mins 3x weekly   Will Continue all medications  as ordered   ACM will follow again in 12-14 days. Pmk    06/17/22   Outreach completed   Will change positions every 30 minutes. If you must sit for long periods of time, take breaks from sitting.  Will get up and walk around, or lie in a comfortable position.  Try using a heating pad on a low or medium setting for 15 to 20 minutes every 2 or 3 hours.  Try a warm shower in place of one session with the heating pad. You can also try an ice pack for 10 to 15 minutes every 2 to 3 hours. Put a thin cloth between the ice pack and your skin.  Take short walks several times a day.  You can start with 5 to 10 minutes, 3 or 4 times a day, and work up to longer walks.  Walk on level surfaces and avoid hills and stairs until your back is better.  Learn how to use good posture, safe lifting techniques, and proper body mechanics.  ACM will follow again in 12-14 4ays. Pmk    22   Outreach completed   Patient feels she is doing better   Continues to follow up with oncology   Continue all treatments as scheduled   No physical activity plan at this time.  ACM will follow again in 12-14 days. [facility]          Heart Failure Education outreach Date/Time: 2022 9:32 AM    Ambulatory Care Manager (LEONCIO) contacted the patient by telephone to perform Ambulatory Care Coordination. Verified name and  with patient as identifiers. Provided introduction to self, and explanation of the Ambulatory Care Manager's role. ACM reviewed that a Health Healthy tips for the Holiday packet has been sent to [facility] [employer]. ACM reviewed the importance of low sodium diet with the patient. Instructed patient to call their Cardiologist if they have a weight gain of 3 lbs in 2 days or 5 lbs in a week. Patient reminded that there is a physician on call 24 hours a day / 7 days a week should the patient have questions or concerns. The patient verbalized understanding.

## 2022-07-18 ENCOUNTER — OFFICE VISIT (OUTPATIENT)
Dept: FAMILY MEDICINE CLINIC | Age: 62
End: 2022-07-18
Payer: COMMERCIAL

## 2022-07-18 ENCOUNTER — PATIENT OUTREACH (OUTPATIENT)
Dept: CASE MANAGEMENT | Age: 62
End: 2022-07-18

## 2022-07-18 VITALS
OXYGEN SATURATION: 97 % | SYSTOLIC BLOOD PRESSURE: 124 MMHG | HEIGHT: 65 IN | BODY MASS INDEX: 33.26 KG/M2 | WEIGHT: 199.6 LBS | HEART RATE: 62 BPM | TEMPERATURE: 98 F | RESPIRATION RATE: 16 BRPM | DIASTOLIC BLOOD PRESSURE: 64 MMHG

## 2022-07-18 DIAGNOSIS — E78.00 HYPERCHOLESTEROLEMIA: Primary | ICD-10-CM

## 2022-07-18 DIAGNOSIS — E11.9 DIABETES MELLITUS WITHOUT COMPLICATION (HCC): ICD-10-CM

## 2022-07-18 DIAGNOSIS — Z23 ENCOUNTER FOR ADMINISTRATION OF COVID-19 VACCINE: ICD-10-CM

## 2022-07-18 LAB
ALBUMIN SERPL-MCNC: 3.4 G/DL (ref 3.5–5)
ALBUMIN/GLOB SERPL: 1.1 {RATIO} (ref 1.1–2.2)
ALP SERPL-CCNC: 82 U/L (ref 45–117)
ALT SERPL-CCNC: 26 U/L (ref 12–78)
ANION GAP SERPL CALC-SCNC: 7 MMOL/L (ref 5–15)
AST SERPL-CCNC: 13 U/L (ref 15–37)
BILIRUB SERPL-MCNC: 1.5 MG/DL (ref 0.2–1)
BUN SERPL-MCNC: 10 MG/DL (ref 6–20)
BUN/CREAT SERPL: 10 (ref 12–20)
CALCIUM SERPL-MCNC: 9 MG/DL (ref 8.5–10.1)
CHLORIDE SERPL-SCNC: 108 MMOL/L (ref 97–108)
CHOLEST SERPL-MCNC: 136 MG/DL
CO2 SERPL-SCNC: 28 MMOL/L (ref 21–32)
CREAT SERPL-MCNC: 0.96 MG/DL (ref 0.55–1.02)
EST. AVERAGE GLUCOSE BLD GHB EST-MCNC: 154 MG/DL
GLOBULIN SER CALC-MCNC: 3.1 G/DL (ref 2–4)
GLUCOSE SERPL-MCNC: 173 MG/DL (ref 65–100)
HBA1C MFR BLD: 7 % (ref 4–5.6)
HDLC SERPL-MCNC: 59 MG/DL
HDLC SERPL: 2.3 {RATIO} (ref 0–5)
LDLC SERPL CALC-MCNC: 55.4 MG/DL (ref 0–100)
POTASSIUM SERPL-SCNC: 3.4 MMOL/L (ref 3.5–5.1)
PROT SERPL-MCNC: 6.5 G/DL (ref 6.4–8.2)
SODIUM SERPL-SCNC: 143 MMOL/L (ref 136–145)
TRIGL SERPL-MCNC: 108 MG/DL (ref ?–150)
VLDLC SERPL CALC-MCNC: 21.6 MG/DL

## 2022-07-18 PROCEDURE — 3044F HG A1C LEVEL LT 7.0%: CPT | Performed by: FAMILY MEDICINE

## 2022-07-18 PROCEDURE — 99213 OFFICE O/P EST LOW 20 MIN: CPT | Performed by: FAMILY MEDICINE

## 2022-07-18 PROCEDURE — 0054A COVID-19, PFIZER GRAY TOP, DO NOT DILUTE, (AGE 12 Y+), IM, 30MCG/0.3 ML: CPT | Performed by: FAMILY MEDICINE

## 2022-07-18 PROCEDURE — 91305 COVID-19, PFIZER GRAY TOP, DO NOT DILUTE, (AGE 12 Y+), IM, 30MCG/0.3 ML: CPT | Performed by: FAMILY MEDICINE

## 2022-07-18 RX ORDER — AMLODIPINE BESYLATE 5 MG/1
5 TABLET ORAL DAILY
Qty: 90 TABLET | Refills: 4 | Status: SHIPPED | OUTPATIENT
Start: 2022-07-18

## 2022-07-18 NOTE — PROGRESS NOTES
Chief Complaint   Patient presents with    Hypertension    Cholesterol Problem    Diabetes    Follow Up Chronic Condition       1. \"Have you been to the ER, urgent care clinic since your last visit? Hospitalized since your last visit? \" No    2. \"Have you seen or consulted any other health care providers outside of the [address] [place] [address] since your last visit? \" Yes [facility]ilogy and oncology     3. For patients aged 39-70: Has the patient had a colonoscopy / FIT/ Cologuard? Yes - no Care Gap present      If the patient is female:    4. For patients aged 41-77: Has the patient had a mammogram within the past 2 years? Yes - no Care Gap present      5. For patients aged 21-65: Has the patient had a pap smear? Yes - no Care Gap present    Health Maintenance Due   Topic Date Due    Eye Exam Retinal or Dilated  04/15/2018    Foot Exam Q1  09/21/2019    Pneumococcal 0-64 years (2 - PPSV23 or PCV20) 03/05/2020    MICROALBUMIN Q1  09/23/2020    Shingrix Vaccine Age 50> (2 of 2) 12/25/2020    COVID-19 Vaccine (4 - Booster for Pfizer series) 04/18/2022     Verbal Order received from Dr. Shabnam Gomez  for Marceil Numbers given IM  in left arm.

## 2022-07-18 NOTE — PROGRESS NOTES
HISTORY OF PRESENT ILLNESS  Don Kam is a 59 y.o. female. HPI Pt. Comes in for blood pressure, cholesterol, and diabetes check. No complaints of chest pain, shortness of breath, TIAs, claudication or edema. Sees Dr. Don Yo at [facility] for myeloma. ROS    Physical Exam  Vitals and nursing note reviewed. Constitutional:       Appearance: She is well-developed. HENT:      Right Ear: External ear normal.      Left Ear: External ear normal.   Neck:      Thyroid: No thyromegaly. Cardiovascular:      Rate and Rhythm: Normal rate and regular rhythm. Heart sounds: Normal heart sounds. Pulmonary:      Breath sounds: Normal breath sounds. No wheezing. Abdominal:      General: Bowel sounds are normal. There is no distension. Palpations: Abdomen is soft. There is no mass. Tenderness: There is no abdominal tenderness. Lymphadenopathy:      Cervical: No cervical adenopathy. ASSESSMENT and PLAN  Orders Placed This Encounter    COVID-19, PFIZER GRAY top, DO NOT Dilute, (age 15 y+), IM, 83JTR/1.4 mL    METABOLIC PANEL, COMPREHENSIVE    LIPID PANEL    HEMOGLOBIN A1C WITH EAG    amLODIPine (NORVASC) 5 mg tablet     Diagnoses and all orders for this visit:    1. Encounter for administration of COVID-19 vaccine  -     COVID-19, PFIZER GRAY TOP, DO NOT DILUTE, (AGE 12 Y+), IM, 30MCG/0.3 ML    2. Hypercholesterolemia  -     LIPID PANEL; Future    3. Diabetes mellitus without complication (HCC)  -     METABOLIC PANEL, COMPREHENSIVE; Future  -     HEMOGLOBIN A1C WITH EAG; Future    Other orders  -     amLODIPine (NORVASC) 5 mg tablet; Take 1 Tablet by mouth daily.  For blood pressure

## 2022-07-20 LAB
ALBUMIN SERPL ELPH-MCNC: 3.6 G/DL (ref 2.9–4.4)
ALBUMIN SERPL-MCNC: 4.3 G/DL (ref 3.8–4.8)
ALBUMIN/GLOB SERPL: 1.3 {RATIO} (ref 0.7–1.7)
ALBUMIN/GLOB SERPL: 1.9 {RATIO} (ref 1.2–2.2)
ALP SERPL-CCNC: 87 IU/L (ref 44–121)
ALPHA1 GLOB SERPL ELPH-MCNC: 0.2 G/DL (ref 0–0.4)
ALPHA2 GLOB SERPL ELPH-MCNC: 0.8 G/DL (ref 0.4–1)
ALT SERPL-CCNC: 17 IU/L (ref 0–32)
AST SERPL-CCNC: 19 IU/L (ref 0–40)
B-GLOBULIN SERPL ELPH-MCNC: 1 G/DL (ref 0.7–1.3)
BASOPHILS # BLD AUTO: 0 X10E3/UL (ref 0–0.2)
BASOPHILS NFR BLD AUTO: 1 %
BILIRUB SERPL-MCNC: 1.7 MG/DL (ref 0–1.2)
BUN SERPL-MCNC: 9 MG/DL (ref 8–27)
BUN/CREAT SERPL: 9 (ref 12–28)
CALCIUM SERPL-MCNC: 9 MG/DL (ref 8.7–10.3)
CHLORIDE SERPL-SCNC: 102 MMOL/L (ref 96–106)
CO2 SERPL-SCNC: 23 MMOL/L (ref 20–29)
CREAT SERPL-MCNC: 1 MG/DL (ref 0.57–1)
EGFR: 63 ML/MIN/1.73
EOSINOPHIL # BLD AUTO: 0.1 X10E3/UL (ref 0–0.4)
EOSINOPHIL NFR BLD AUTO: 3 %
ERYTHROCYTE [DISTWIDTH] IN BLOOD BY AUTOMATED COUNT: 14.5 % (ref 11.7–15.4)
GAMMA GLOB SERPL ELPH-MCNC: 1 G/DL (ref 0.4–1.8)
GLOBULIN SER CALC-MCNC: 2.3 G/DL (ref 1.5–4.5)
GLOBULIN SER-MCNC: 3 G/DL (ref 2.2–3.9)
GLUCOSE SERPL-MCNC: 163 MG/DL (ref 65–99)
HCT VFR BLD AUTO: 40.1 % (ref 34–46.6)
HGB BLD-MCNC: 13.5 G/DL (ref 11.1–15.9)
IGA SERPL-MCNC: 159 MG/DL (ref 87–352)
IGG SERPL-MCNC: 1100 MG/DL (ref 586–1602)
IGM SERPL-MCNC: 7 MG/DL (ref 26–217)
IMM GRANULOCYTES # BLD AUTO: 0 X10E3/UL (ref 0–0.1)
IMM GRANULOCYTES NFR BLD AUTO: 0 %
INTERPRETATION SERPL IEP-IMP: ABNORMAL
KAPPA LC FREE SER-MCNC: 19.1 MG/L (ref 3.3–19.4)
KAPPA LC FREE/LAMBDA FREE SER: 1.33 {RATIO} (ref 0.26–1.65)
LAMBDA LC FREE SERPL-MCNC: 14.4 MG/L (ref 5.7–26.3)
LYMPHOCYTES # BLD AUTO: 1.5 X10E3/UL (ref 0.7–3.1)
LYMPHOCYTES NFR BLD AUTO: 34 %
M PROTEIN SERPL ELPH-MCNC: ABNORMAL G/DL
MCH RBC QN AUTO: 34 PG (ref 26.6–33)
MCHC RBC AUTO-ENTMCNC: 33.7 G/DL (ref 31.5–35.7)
MCV RBC AUTO: 101 FL (ref 79–97)
MONOCYTES # BLD AUTO: 0.2 X10E3/UL (ref 0.1–0.9)
MONOCYTES NFR BLD AUTO: 6 %
NEUTROPHILS # BLD AUTO: 2.4 X10E3/UL (ref 1.4–7)
NEUTROPHILS NFR BLD AUTO: 56 %
PLATELET # BLD AUTO: 211 X10E3/UL (ref 150–450)
PLEASE NOTE:, 149534: ABNORMAL
POTASSIUM SERPL-SCNC: 3.3 MMOL/L (ref 3.5–5.2)
PROT SERPL-MCNC: 6.6 G/DL (ref 6–8.5)
RBC # BLD AUTO: 3.97 X10E6/UL (ref 3.77–5.28)
SODIUM SERPL-SCNC: 142 MMOL/L (ref 134–144)
WBC # BLD AUTO: 4.3 X10E3/UL (ref 3.4–10.8)

## 2022-07-20 NOTE — PROGRESS NOTES
Goals Addressed                 This Visit's Progress     Attends follow up appointments on schedule by end of episode   On track     06/02/22   Reviewed all scheduled appointments   Patient verbalized understanding of attending all appointments   Patient will attend each appointment as scheduled.  ACM will follow again in 12-14 days. Pmk    06/17/22    Outreach completed   Has followed up with appointment as scheduled   Reviewed all upcoming appointments   Patient verbalized knowledge and will attend   ACM will follow up again in  12-14 days. pmk    07/01/22   Has followed up as scheduled   Reminded of all scheduled follow up visits   Educated on  CHF Tuck Me In TIps for the holidays   ACM will follow in 12-14 days. Pmk    07/19/22   Outreach completed   Has followed up with appointment as scheduled   Reviewed all upcoming appointments   Patient verbalized knowledge and will attend   ACM will follow up again in  12-14 days. pmk       Coordinate pain and self management plan for patient by end of episode   On track     06/02/22    Patient saw Katelyn Bojorquez MD on Tuesday May 10, 2022. The following issues were addressed:  o Essential hypertension  o DVT of axillary vein, acute left (HCC)  o SSS (sick sinus syndrome) (HCC)  o Dilated cardiomyopathy ([facility]  o AICD (automatic cardioverter/defibrillator) present  o Mixed hyperlipidemia  o Patient admits to feeling well, says BP and BS is at goal.  o Compliant with a low sodium, low carb diet     Will include short walks into daily activity for 20-30 mins 3x weekly   Will Continue all medications  as ordered   ACM will follow again in 12-14 days. Pmk    06/17/22   Outreach completed   Will change positions every 30 minutes. If you must sit for long periods of time, take breaks from sitting.  Will get up and walk around, or lie in a comfortable position.    Try using a heating pad on a low or medium setting for 15 to 20 minutes every 2 or 3 hours.  Try a warm shower in place of one session with the heating pad. You can also try an ice pack for 10 to 15 minutes every 2 to 3 hours. Put a thin cloth between the ice pack and your skin.  Take short walks several times a day. You can start with 5 to 10 minutes, 3 or 4 times a day, and work up to longer walks.  Walk on level surfaces and avoid hills and stairs until your back is better.  Learn how to use good posture, safe lifting techniques, and proper body mechanics.  ACM will follow again in 12-14 4ays. Pmk    07/01/22   Outreach completed   Patient feels she is doing better   Continues to follow up with oncology   Continue all treatments as scheduled   No physical activity plan at this time.  ACM will follow again in 12-14 days. Pmk    07/19/22   Continue to admit to doing better   Taking medications as ordered   Review medication regimen with patient or caretaker.  Validate knowledge of name, dose, and purpose of each medication.  Assess preferred method of tracking medication    Assess for medication side effects: dysrhythmias, anorexia, nausea, dizziness, orthostatic hypotension, vomiting, diarrhea, bradycardia, headache, visual changes, malaise, behavioral changes, increasing CHF and/or lack of response to medication.  ACM will follow again in 12-14 days.  pmk

## 2022-07-26 ENCOUNTER — OFFICE VISIT (OUTPATIENT)
Dept: ONCOLOGY | Age: 62
End: 2022-07-26
Payer: COMMERCIAL

## 2022-07-26 VITALS
SYSTOLIC BLOOD PRESSURE: 122 MMHG | RESPIRATION RATE: 18 BRPM | HEART RATE: 63 BPM | DIASTOLIC BLOOD PRESSURE: 76 MMHG | BODY MASS INDEX: 33.46 KG/M2 | WEIGHT: 198 LBS | OXYGEN SATURATION: 98 % | TEMPERATURE: 97.8 F

## 2022-07-26 DIAGNOSIS — C90.01 MULTIPLE MYELOMA IN REMISSION (HCC): Primary | ICD-10-CM

## 2022-07-26 DIAGNOSIS — C90.01 MULTIPLE MYELOMA IN REMISSION (HCC): ICD-10-CM

## 2022-07-26 PROCEDURE — 99214 OFFICE O/P EST MOD 30 MIN: CPT | Performed by: INTERNAL MEDICINE

## 2022-07-26 NOTE — PROGRESS NOTES
Luis Campbell is a 59 y.o. female    Chief Complaint   Patient presents with    Follow-up     Multiple Myeloma on Revlimid       1. Have you been to the ER, urgent care clinic since your last visit? Hospitalized since your last visit? No    2. Have you seen or consulted any other health care providers outside of the [address] [place] [address] since your last visit? Include any pap smears or colon screening.  No

## 2022-07-26 NOTE — PROGRESS NOTES
Hematology Follow Up    REASON FOR VISIT: Multiple Myeloma    TYPE OF VISIT  Ese Casiano is a 59 y.o. female who is seen for follow up of Multiple Myeloma on Revlimid     TREATMENT:   3/24/17- 9/15/17: Charles Gil X 8   10/20/17: Autologous transplant at [facility]  2/22/18 -  Revlimid     HISTORY OF PRESENT ILLNESS: Ms. Luis Franco is a 59 y.o. female with DM and  Multiple Myeloma who presents to follow up after the ASCT and is on maintenance Revlimid 10mg daily. She was reduced to 5 mg due to cytopenias. Seen for follow up. She is on Revlimid 10 mg daily. Colonoscopy was normal 2020. I see Piedad Lombard today in follow up. She is feeling well. She is taking revlimid 21 days on a 1 week off. No new pain, fevers, rash, or diarrhea. Oncologic history  Patient had left facial numbness which started in the summer of 2016. This started abruptly and was not associated with rashes, pain, jaw weakness. She has had some intermittent hyperemia of the L eye. No tearing. She has been evaluated by Dr. Faustina Grande with Neurology and an extensive work up was only notable for presence of a 0.3 g/dl M spike. Other tests were unremarkable: Vitamin B12 411, thyroid function test normal, ACE 25, BHARATI normal CBC normal, CMP normal, CRP 1.17, CT head and cervical spine unremarkable. Further evaluation revealed findings consistent with Multiple Myeloma    CBC 2/24 Unremarkable. Kappa 17 mg/dl, Lambda 2416 (ratio 0.01), SPEP 0.2 g/dl M spike, HANSEL showed monoclonal free lambda light chains, UPEP negative, Beta 2 Microglobulin 1.8 mg/L, Skeletal survey negative for lytic lesions, Bone marrow biopsy on 2/24/17 showed a Normocellular marrow with mild monoclonal plasmacytosis (15-20%). Trilineage hematopoiesis with maturation. She had a very good partial response and 8 cycles of VRD and then proceeded on to receive an autologous stem cell transplant on 10/20/17 at [facility]. She had a CR posttransplant.  Started maintenance Revlimid in Jan 2018      Past Medical History:   Diagnosis Date    Atrial fibrillation (HCC)     CAD (coronary artery disease)     Cardiomyopathy     Clotting disorder ([facility]     Diabetes mellitus type 2, controlled ([facility] 12/10/2015    Glaucoma     Heart failure ([facility]     Hyperlipidemia     Hypertension     controlled    ICD (implantable cardioverter-defibrillator) in place     Multiple myeloma ([facility]     Orthostatic hypotension     Secondary cardiomyopathy, unspecified     Sinoatrial node dysfunction ([facility]     Vitamin B12 deficiency 3/31/2010       Past Surgical History:   Procedure Laterality Date    COLONOSCOPY N/A 1/22/2020    COLONOSCOPY performed by Cecilio Au MD at [facility]COPY    HX HYSTERECTOMY      HX PACEMAKER      aicd       Allergies   Allergen Reactions    Ace Inhibitors Cough    Compazine [Prochlorperazine] Nausea Only     rash       Current Outpatient Medications   Medication Sig Dispense Refill    amLODIPine (NORVASC) 5 mg tablet Take 1 Tablet by mouth daily. For blood pressure 90 Tablet 4    Revlimid 10 mg cap Take 1 Capsule by mouth daily. Take 1 cap by mouth on days 1-21 followed by 7 days off in a 28 day cycle [facility] #[id number] 21 Capsule 0    atorvastatin (LIPITOR) 40 mg tablet TAKE 1 TABLET BY MOUTH EVERY DAY FOR CHOLESTEROL 90 Tablet 3    glimepiride (AMARYL) 1 mg tablet TAKE 1 TAB BY MOUTH DAILY (BEFORE BREAKFAST). FOR DIABETES 90 Tablet 3    carvediloL (COREG) 25 mg tablet TAKE 1 TABLET BY MOUTH TWICE A  Tablet 1    gabapentin (NEURONTIN) 300 mg capsule 300 mg = 1 CAP each dose, PO, tid, # 270 CAP, 3 Refills, Pharmacy: [facility]/pharmacy #[id number]     acetaminophen (TYLENOL) 500 mg tablet Take 500 mg by mouth every six (6) hours as needed for Pain. cyanocobalamin (VITAMIN B12) 100 mcg tablet Take 100 mcg by mouth daily. latanoprost (XALATAN) 0.005 % ophthalmic solution INSTILL 1 DROP IN EACH EYE EVERY DAY      multivitamin, tx-iron-ca-min (THERA-M W/ IRON) 9 mg iron-400 mcg tab tablet Take 1 Tab by mouth daily. aspirin delayed-release 81 mg tablet TAKE 1 TABLET BY MOUTH EVERY DAY  4    loperamide (IMMODIUM) 2 mg tablet Take 2 mg by mouth as needed for Diarrhea. Social History     Socioeconomic History    Marital status:    Tobacco Use    Smoking status: Never    Smokeless tobacco: Never   Vaping Use    Vaping Use: Never used   Substance and Sexual Activity    Alcohol use: No    Drug use: No    Sexual activity: Yes     Partners: Male   Social History Narrative    , 2 children, 29 and 31. Works at [employer], for 35 years. 5 grandchildren       Family History   Problem Relation Age of Onset    Cancer Mother     Heart Disease Mother         pacemaker    Diabetes Mother     Breast Cancer Mother 54    Hypertension Sister     Hypertension Brother     Diabetes Brother     Hypertension Sister     Hypertension Sister     Hypertension Sister     Hypertension Sister     Diabetes Sister        ROS  As reviewed in the HPI all others reviewed and negative. ECOG PS is 0    Physical Examination:       Constitutional: Appears well-developed and well-nourished in no apparent distress   Mental status: Alert and awake, Oriented to person/place/time, Able to follow commands  Eyes: EOM normal, Sclera normal, No visible ocular discharge  HENT: Normocephalic, atraumatic; Mouth/Throat: Moist mucous membranes, External Ears normal  Neck: No visualized mass  Neurological: No facial asymmetry (Cranial nerve 7 motor function), No gaze palsy  Skin: No significant exanthematous lesions or discoloration noted on facial skin  Psychiatric: Normal affect, normal judgment/insight. No hallucinations       LABS  Lab Results   Component Value Date/Time    WBC 4.3 07/15/2022 08:54 AM    HGB 13.5 07/15/2022 08:54 AM    HCT 40.1 07/15/2022 08:54 AM    PLATELET 324 20/46/6873 08:54 AM     (H) 07/15/2022 08:54 AM    ABS.  NEUTROPHILS 2.4 07/15/2022 08:54 AM     Lab Results   Component Value Date/Time    Sodium 143 07/18/2022 11:11 AM    Potassium 3.4 (L) 07/18/2022 11:11 AM    Chloride 108 07/18/2022 11:11 AM    CO2 28 07/18/2022 11:11 AM    Glucose 173 (H) 07/18/2022 11:11 AM    BUN 10 07/18/2022 11:11 AM    Creatinine 0.96 07/18/2022 11:11 AM    GFR est AA >60 07/18/2022 11:11 AM    GFR est non-AA 59 (L) 07/18/2022 11:11 AM    Calcium 9.0 07/18/2022 11:11 AM     Lab Results   Component Value Date/Time    Alk. phosphatase 82 07/18/2022 11:11 AM    Protein, total 6.5 07/18/2022 11:11 AM    Albumin 3.4 (L) 07/18/2022 11:11 AM    Globulin 3.1 07/18/2022 11:11 AM    A-G Ratio 1.1 07/18/2022 11:11 AM     Lab Results   Component Value Date/Time    Iron % saturation 38 01/04/2021 10:32 AM    TIBC 277 01/04/2021 10:32 AM    Ferritin 163 (H) 01/04/2021 10:32 AM    Vitamin B12 411 12/07/2016 02:58 PM     02/16/2017 09:18 AM    Beta-2 Microglobulin, serum 1.5 08/28/2019 08:22 AM    C-Reactive Protein, Cardiac 1.17 12/07/2016 02:58 PM    TSH 1.100 12/07/2016 02:58 PM    M-Mika Not Observed 07/15/2022 08:54 AM    M-Mika Not Observed 07/13/2021 01:47 PM    Hep C Virus Ab <0.1 05/17/2016 09:56 AM     Lab Results   Component Value Date/Time    INR 1.0 05/04/2021 10:41 AM    aPTT 31.5 07/27/2010 03:57 PM     7/17/2020  FLC normal    Bone marrow biopsy reviewed    FISH molecular analysis:    Positive for IgH gene rearrangement (IgH complex FISH study pending); Normal results with 1, 5, 9, 13, 15, and 17 (TP53) probe sets. External records reviewed from [address] [place] [address] transplant transplant bone marrow biopsy done on 12/19/17 showed no evidence of plasma cells. Variable cellularity from 0-50%, flow LUIS, FISH negative    ASSESSMENT  Ms. Suzanne Echevarria is a 59 y.o. female with standard risk multiple myeloma status post 6 cycles of twice daily and autologous stem cell transplant on 10/20/17. She is in a complete remission.  We will continue with close surveillance in conjunction with her care team at [facility]  Multiple myeloma  S/P BMT in CR  On revlimid maintenance of 10mg daily following transplant at [facility]. Dropped to 5 mg due to recurrent grade 3 neutropenia (kleber 0.4). She has done well with this, rash has not recurred. As there was a temporary increase in M spike and hence Revlimid was increased again to 10 mg. She is doing well on this with grade 1 diarrhea     Clinically stable. CBC/CMP/gammopathy panel reviewed 7/2022  Continue maintenance revlimid as written    Follow with BMT as scheduled October 2022    Neuropathy  Overall stable. DM  Per PCP     Anemia  Resolved    Diarrhea  Resolved    [facility] 6 months[facility]     I personally saw and evaluated the patient and performed the key components of medical decision making. The history, physical exam, and documentation were performed by Mayela Lira NP. I reviewed and verified the above documentation and modified it as needed. Renata Scott is in [address] [place] [address], labs reviewed  Tolerating Revlimid  She has been uptodate with vaccinations.  Discussed Evusheld and she will also discuss this with [facility]     Signed by: Teresa Bob MD                     July 26, 2022

## 2022-07-27 RX ORDER — LENALIDOMIDE 10 MG/1
CAPSULE ORAL
Qty: 21 CAPSULE | Refills: 0 | Status: SHIPPED | OUTPATIENT
Start: 2022-07-27 | End: 2022-08-23

## 2022-07-27 NOTE — TELEPHONE ENCOUNTER
Oral Chemotherapy      Hewitt Alpers is a  59 y. o.female  diagnosed with MM . Ms. Ladi Randall is being treated with lenalidomide.      Medication name: lenalidomide     Dose:  10 mg   Frequency: daily  Administration schedule: 21 days on, 7 days off  Ordering provider: Maame Vieyra MD        [address] prescriber survey completed Auth # [id number] and Refill sent to [address] 7/26/22  Next visit 1/26/23        Rona Abel, MICHAELD, BCOP, [address] [place] [address] in place: Yes  Recommendation Provided To: Patient/Caregiver: 1 via Telephone  Intervention Detail: Refill(s) Provided    Intervention Accepted By: Patient/Caregiver: 1  Time Spent (min): 15

## 2022-08-02 ENCOUNTER — PATIENT OUTREACH (OUTPATIENT)
Dept: CASE MANAGEMENT | Age: 62
End: 2022-08-02

## 2022-08-03 NOTE — PROGRESS NOTES
Goals Addressed                   This Visit's Progress     Attends follow up appointments on schedule by end of episode        06/02/22  Reviewed all scheduled appointments  Patient verbalized understanding of attending all appointments  Patient will attend each appointment as scheduled. ACM will follow again in 12-14 days. Pmk    06/17/22   Outreach completed  Has followed up with appointment as scheduled  Reviewed all upcoming appointments  Patient verbalized knowledge and will attend  ACM will follow up again in  12-14 days. pmk    07/01/22  Has followed up as scheduled  Reminded of all scheduled follow up visits  Educated on  CHF T[employer] TIps for the holidays  ACM will follow in 12-14 days. Pmk    07/19/22  Outreach completed  Has followed up with appointment as scheduled  Reviewed all upcoming appointments  Patient verbalized knowledge and will attend  ACM will follow up again in  12-14 days. pmk  08/03/22   Outreach Completed  Patient says  she had follow up with Oncology and PCP this month  ACM reviewed all scheduled follow up visits  Appointment with Cardiology on 9/28/22 was canceled by provider, patient did not know about this, she will call to verify  Patient will attend all scheduled visits as scheduled. pmk       Coordinate pain and self management plan for patient by end of episode        06/02/22   Patient saw Jose Kat MD on Tuesday May 10, 2022. The following issues were addressed:  Essential hypertension  DVT of axillary vein, acute left (HCC)  SSS (sick sinus syndrome) (HCC)  Dilated cardiomyopathy ([facility] [address]  AICD (automatic cardioverter/defibrillator) present  Mixed hyperlipidemia  Patient admits to feeling well, says BP and BS is at goal.  Compliant with a low sodium, low carb diet    Will include short walks into daily activity for 20-30 mins 3x weekly  Will Continue all medications  as ordered  ACM will follow again in 12-14 days.  Pmk    06/17/22  Outreach completed  Will change positions every 30 minutes. If you must sit for long periods of time, take breaks from sitting. Will get up and walk around, or lie in a comfortable position. Try using a heating pad on a low or medium setting for 15 to 20 minutes every 2 or 3 hours. Try a warm shower in place of one session with the heating pad. You can also try an ice pack for 10 to 15 minutes every 2 to 3 hours. Put a thin cloth between the ice pack and your skin. Take short walks several times a day. You can start with 5 to 10 minutes, 3 or 4 times a day, and work up to longer walks. Walk on level surfaces and avoid hills and stairs until your back is better. Learn how to use good posture, safe lifting techniques, and proper body mechanics. ACM will follow again in 12-14 4ays. Pmk    07/01/22  Outreach completed  Patient feels she is doing better  Continues to follow up with oncology  Continue all treatments as scheduled  No physical activity plan at this time. ACM will follow again in 12-14 days. Pmk    07/19/22  Continue to admit to doing better  Taking medications as ordered  Review medication regimen with patient or caretaker. Validate knowledge of name, dose, and purpose of each medication. Assess preferred method of tracking medication   Assess for medication side effects: dysrhythmias, anorexia, nausea, dizziness, orthostatic hypotension, vomiting, diarrhea, bradycardia, headache, visual changes, malaise, behavioral changes, increasing CHF and/or lack of response to medication. ACM will follow again in 12-14 days. Pmk    08/03/22  Admits to feeling well  Completed Oncology appointment, says patient is in remission  Continues on maintenance Revlimid 10mg daily  Will Follow with BMT as scheduled October 2022  Educated on the Importance of Supportive resources in place to maintain patient in the community (ie. Home Health, DME equipment, refer to, medication assistant plan, [facility] etc.)  ACM contact information given. Will follow up in 10-14 days.  [facility]

## 2022-08-23 DIAGNOSIS — C90.01 MULTIPLE MYELOMA IN REMISSION (HCC): ICD-10-CM

## 2022-08-23 RX ORDER — LENALIDOMIDE 10 MG/1
CAPSULE ORAL
Qty: 21 CAPSULE | Refills: 0 | Status: SHIPPED | OUTPATIENT
Start: 2022-08-23 | End: 2022-09-21

## 2022-08-23 RX ORDER — LENALIDOMIDE 10 MG/1
CAPSULE ORAL
Qty: 21 CAPSULE | Refills: 0 | Status: SHIPPED | OUTPATIENT
Start: 2022-08-23 | End: 2022-08-23

## 2022-08-23 NOTE — TELEPHONE ENCOUNTER
Oral Chemotherapy      Maria L Brumfield is a  59 y. o.female  diagnosed with MM . Ms. Elzbieta Ocasio is being treated with lenalidomide.      Medication name: lenalidomide     Dose:  10 mg   Frequency: daily  Administration schedule: 21 days on, 7 days off  Ordering provider: Jenaro Jason MD        [address] prescriber survey completed Auth # [id number] and Refill sent to [address] 7/26/22  Next visit 1/26/23        Tenzin Plaza, PHARMD, [facility], [address] [place] [address] in place: Yes  Recommendation Provided To: Patient/Caregiver: 1 via Telephone  Intervention Detail: Refill(s) Provided    Intervention Accepted By: Patient/Caregiver: 1  Time Spent (min): 15

## 2022-09-13 ENCOUNTER — OFFICE VISIT (OUTPATIENT)
Dept: FAMILY MEDICINE CLINIC | Age: 62
End: 2022-09-13
Payer: COMMERCIAL

## 2022-09-13 DIAGNOSIS — E11.9 CONTROLLED TYPE 2 DIABETES MELLITUS WITHOUT COMPLICATION, WITHOUT LONG-TERM CURRENT USE OF INSULIN (HCC): Primary | ICD-10-CM

## 2022-09-13 DIAGNOSIS — H53.8 BLURRED VISION: ICD-10-CM

## 2022-09-13 LAB
GLUCOSE POC: 104 MG/DL
HBA1C MFR BLD HPLC: 7.2 %

## 2022-09-13 PROCEDURE — 83036 HEMOGLOBIN GLYCOSYLATED A1C: CPT | Performed by: FAMILY MEDICINE

## 2022-09-13 PROCEDURE — 99213 OFFICE O/P EST LOW 20 MIN: CPT | Performed by: FAMILY MEDICINE

## 2022-09-13 PROCEDURE — 3051F HG A1C>EQUAL 7.0%<8.0%: CPT | Performed by: FAMILY MEDICINE

## 2022-09-13 PROCEDURE — 82962 GLUCOSE BLOOD TEST: CPT | Performed by: FAMILY MEDICINE

## 2022-09-13 RX ORDER — GLIMEPIRIDE 2 MG/1
2 TABLET ORAL
Qty: 90 TABLET | Refills: 3 | Status: SHIPPED | OUTPATIENT
Start: 2022-09-13

## 2022-09-13 NOTE — PROGRESS NOTES
HISTORY OF PRESENT ILLNESS  Maria L Brumfield is a 59 y.o. female. HPI Has been having blurred vision in both eyes for 2 months. Saw her optometrist, was sent to Dr. Jose Painting- was told that had cataracts and glaucoma. Using drops in eyes. Some dry mouth. Nocturia x 3. Doesn't have a glucometer at home. A1C was 7.9  2 months ago. Taking glimiperide 1 mg for diabetes. Had similar problem in the past due to hyperglycemia. ROS    Physical Exam  Vitals and nursing note reviewed. Constitutional:       Appearance: She is well-developed. HENT:      Right Ear: External ear normal.      Left Ear: External ear normal.   Neck:      Thyroid: No thyromegaly. Cardiovascular:      Rate and Rhythm: Normal rate and regular rhythm. Heart sounds: Normal heart sounds. Pulmonary:      Breath sounds: Normal breath sounds. No wheezing. Abdominal:      General: Bowel sounds are normal. There is no distension. Palpations: Abdomen is soft. There is no mass. Tenderness: There is no abdominal tenderness. Lymphadenopathy:      Cervical: No cervical adenopathy. A1C 7.2 and glucose 105- told pt that I dont think her visual problems are related to diabetes. Her vision on the eye chart is 20/40 bilaterally  ASSESSMENT and PLAN  Orders Placed This Encounter    AMB POC HEMOGLOBIN A1C    AMB POC GLUCOSE BLOOD, BY GLUCOSE MONITORING DEVICE    glimepiride (AMARYL) 2 mg tablet     Diagnoses and all orders for this visit:    1. Controlled type 2 diabetes mellitus without complication, without long-term current use of insulin ([facility])  -     AMB POC HEMOGLOBIN A1C  -     AMB POC GLUCOSE BLOOD, BY GLUCOSE MONITORING DEVICE    2. Blurred vision    Other orders  -     glimepiride (AMARYL) 2 mg tablet; Take 1 Tablet by mouth Daily (before breakfast).  For diabetes

## 2022-09-13 NOTE — PROGRESS NOTES
Identified pt with two pt identifiers(name and ). Reviewed record in preparation for visit and have obtained necessary documentation. All patient medications has been reviewed. No chief complaint on file. 3 most recent PHQ Screens 2022   Little interest or pleasure in doing things Not at all   Feeling down, depressed, irritable, or hopeless Not at all   Total Score PHQ 2 0   Trouble falling or staying asleep, or sleeping too much Not at all   Feeling tired or having little energy Not at all   Poor appetite, weight loss, or overeating Not at all   Feeling bad about yourself - or that you are a failure or have let yourself or your family down Not at all   Trouble concentrating on things such as school, work, reading, or watching TV Not at all   Moving or speaking so slowly that other people could have noticed; or the opposite being so fidgety that others notice Not at all   Thoughts of being better off dead, or hurting yourself in some way Not at all   PHQ 9 Score 0   How difficult have these problems made it for you to do your work, take care of your home and get along with others Not difficult at all     Abuse Screening Questionnaire 3/11/2021   Do you ever feel afraid of your partner? N   Are you in a relationship with someone who physically or mentally threatens you? N   Is it safe for you to go home? Y       Health Maintenance Due   Topic    Foot Exam Q1     Pneumococcal 0-64 years (2 - PPSV23 or PCV20)    MICROALBUMIN Q1     Shingrix Vaccine Age 50> (2 of 2)    Flu Vaccine (1)     Health Maintenance Review: Patient reminded of \"due or due soon\" health maintenance. I have asked the patient to contact his/her primary care provider (PCP) for follow-up on his/her health maintenance. There were no vitals filed for this visit.     Wt Readings from Last 3 Encounters:   22 198 lb (89.8 kg)   22 199 lb 9.6 oz (90.5 kg)   05/10/22 201 lb 6.4 oz (91.4 kg)     Temp Readings from Last 3 Encounters:   07/26/22 97.8 °F (36.6 °C)   07/18/22 98 °F (36.7 °C) (Oral)   01/18/22 98.4 °F (36.9 °C) (Oral)     BP Readings from Last 3 Encounters:   07/26/22 122/76   07/18/22 124/64   05/10/22 106/60     Pulse Readings from Last 3 Encounters:   07/26/22 63   07/18/22 62   05/10/22 63       1. \"Have you been to the ER, urgent care clinic since your last visit? Hospitalized since your last visit? \" No    2. \"Have you seen or consulted any other health care providers outside of the [address] [place] [address] since your last visit? \" No     3. For patients aged 39-70: Has the patient had a colonoscopy / FIT/ Cologuard? No      If the patient is female:    4. For patients aged 41-77: Has the patient had a mammogram within the past 2 years? No      5. For patients aged 21-65: Has the patient had a pap smear?  No

## 2022-09-20 DIAGNOSIS — C90.01 MULTIPLE MYELOMA IN REMISSION (HCC): ICD-10-CM

## 2022-09-21 RX ORDER — LENALIDOMIDE 10 MG/1
CAPSULE ORAL
Qty: 21 CAPSULE | Refills: 0 | Status: SHIPPED | OUTPATIENT
Start: 2022-09-21 | End: 2022-10-13

## 2022-09-21 NOTE — TELEPHONE ENCOUNTER
Oral Chemotherapy      Magdaleno Gutierrez is a  59 y. o.female  diagnosed with MM . Ms. Sameera Pedraaz is being treated with lenalidomide.      Medication name: lenalidomide     Dose:  10 mg   Frequency: daily  Administration schedule: 21 days on, 7 days off  Ordering provider: Santo Caba MD        [address] prescriber survey completed Auth # [id number] and Refill sent to [address] 7/26/22  Next visit 1/26/23        Lizy Rollins, PHARMD, BCOP, [address] in [address]: Yes  Recommendation Provided To: Patient/Caregiver: 1 via Telephone  Intervention Detail: Refill(s) Provided    Intervention Accepted By: Patient/Caregiver: 1  Time Spent (min): 15

## 2022-10-07 ENCOUNTER — TRANSCRIBE ORDER (OUTPATIENT)
Dept: SCHEDULING | Age: 62
End: 2022-10-07

## 2022-10-07 DIAGNOSIS — C90.00 MULTIPLE MYELOMA, REMISSION STATUS UNSPECIFIED (HCC): Primary | ICD-10-CM

## 2022-10-13 DIAGNOSIS — C90.01 MULTIPLE MYELOMA IN REMISSION (HCC): ICD-10-CM

## 2022-10-13 RX ORDER — LENALIDOMIDE 10 MG/1
CAPSULE ORAL
Qty: 21 CAPSULE | Refills: 0 | Status: SHIPPED | OUTPATIENT
Start: 2022-10-13

## 2022-10-13 NOTE — TELEPHONE ENCOUNTER
Oral Chemotherapy      Luan Reardon is a  59 y. o.female  diagnosed with MM . Ms. Eduardo Cooper is being treated with lenalidomide.      Medication name: lenalidomide     Dose:  10 mg   Frequency: daily  Administration schedule: 21 days on, 7 days off  Ordering provider: Catherine Subramanian MD        [address] prescriber survey completed Auth # [id number] and Refill sent to [address] 7/26/22  Next visit 1/26/23        Benjy Remy, PHARMD, BCOP, [address] in place: Yes  Recommendation Provided To: Patient/Caregiver: 1 via Telephone  Intervention Detail: Refill(s) Provided    Intervention Accepted By: Patient/Caregiver: 1  Time Spent (min): 15

## 2022-10-17 ENCOUNTER — HOSPITAL ENCOUNTER (OUTPATIENT)
Dept: BONE DENSITY | Age: 62
Discharge: HOME | End: 2022-10-17
Attending: INTERNAL MEDICINE
Payer: COMMERCIAL

## 2022-10-17 DIAGNOSIS — C90.00 MULTIPLE MYELOMA, REMISSION STATUS UNSPECIFIED (HCC): ICD-10-CM

## 2022-10-17 PROCEDURE — 77080 DXA BONE DENSITY AXIAL: CPT

## 2022-11-02 RX ORDER — CARVEDILOL 25 MG/1
TABLET ORAL
Qty: 180 TABLET | Refills: 1 | Status: SHIPPED | OUTPATIENT
Start: 2022-11-02

## 2022-11-08 DIAGNOSIS — C90.01 MULTIPLE MYELOMA IN REMISSION (HCC): ICD-10-CM

## 2022-11-09 RX ORDER — LENALIDOMIDE 10 MG/1
CAPSULE ORAL
Qty: 21 CAPSULE | Refills: 0 | Status: SHIPPED | OUTPATIENT
Start: 2022-11-09 | End: 2022-11-10 | Stop reason: SDUPTHER

## 2022-11-10 DIAGNOSIS — C90.01 MULTIPLE MYELOMA IN REMISSION (HCC): ICD-10-CM

## 2022-11-10 RX ORDER — LENALIDOMIDE 10 MG/1
CAPSULE ORAL
Qty: 21 CAPSULE | Refills: 0 | Status: SHIPPED | OUTPATIENT
Start: 2022-11-10

## 2022-11-10 NOTE — TELEPHONE ENCOUNTER
Oral Chemotherapy      Cristi Pereira is a  59 y. o.female  diagnosed with MM . Ms. Law Hobson is being treated with lenalidomide.      Medication name: lenalidomide     Dose:  10 mg   Frequency: daily  Administration schedule: 21 days on, 7 days off  Ordering provider: Jessica Ramos MD        [address] prescriber survey completed Auth # [id number] and Refill sent to [address] 7/26/22  Next visit 1/26/23        Abena Estevez, PHARMD, BCOP, [place] [address] in place: Yes  Recommendation Provided To: Patient/Caregiver: 1 via Telephone  Intervention Detail: Refill(s) Provided    Intervention Accepted By: Patient/Caregiver: 1  Time Spent (min): 15

## 2022-12-05 DIAGNOSIS — C90.01 MULTIPLE MYELOMA IN REMISSION (HCC): ICD-10-CM

## 2022-12-05 RX ORDER — LENALIDOMIDE 10 MG/1
CAPSULE ORAL
Qty: 21 CAPSULE | Refills: 0 | Status: SHIPPED | OUTPATIENT
Start: 2022-12-05

## 2022-12-05 NOTE — TELEPHONE ENCOUNTER
Oral Chemotherapy      Eduardo Jama is a  59 y. o.female  diagnosed with MM . Ms. Bruno Romero is being treated with lenalidomide.      Medication name: lenalidomide     Dose:  10 mg   Frequency: daily  Administration schedule: 21 days on, 7 days off  Ordering provider: Jeral Lesches, MD        [address] prescriber survey completed Auth # [id number] and Refill sent to [address] 7/26/22  Next visit 1/26/23        Art Byrd, PHARMD, BCOP, [address] in place: Yes  Recommendation Provided To: Patient/Caregiver: 1 via Telephone  Intervention Detail: Refill(s) Provided    Intervention Accepted By: Patient/Caregiver: 1  Time Spent (min): 15

## 2023-01-12 DIAGNOSIS — C90.01 MULTIPLE MYELOMA IN REMISSION (HCC): ICD-10-CM

## 2023-01-12 RX ORDER — LENALIDOMIDE 10 MG/1
CAPSULE ORAL
Qty: 21 CAPSULE | Refills: 0 | Status: ACTIVE | OUTPATIENT
Start: 2023-01-12 | End: 2023-01-13 | Stop reason: SDUPTHER

## 2023-01-13 DIAGNOSIS — C90.01 MULTIPLE MYELOMA IN REMISSION (HCC): ICD-10-CM

## 2023-01-13 RX ORDER — LENALIDOMIDE 10 MG/1
CAPSULE ORAL
Qty: 21 CAPSULE | Refills: 0 | Status: SHIPPED | OUTPATIENT
Start: 2023-01-13

## 2023-01-13 NOTE — TELEPHONE ENCOUNTER
Oral Chemotherapy      Lavonne Mata is a  59 y. o.female  diagnosed with MM . Ms. Pauly Trimble is being treated with lenalidomide.      Medication name: lenalidomide     Dose:  10 mg   Frequency: daily  Administration schedule: 21 days on, 7 days off  Ordering provider: Lurdes Sales MD        [address] prescriber survey completed Auth # [id number] and Refill sent to [place] [address] 7/26/22  Next visit 1/26/23        Casandra Mason, PHARMD, BC, [address] [place] [address]    Program: Medical Group  CPA in place: Yes  Recommendation Provided To: Patient/Caregiver: 1 via Telephone  Intervention Detail: Refill(s) Provided  Intervention Accepted By: Patient/Caregiver: 1    Time Spent (min): 15

## 2023-01-18 ENCOUNTER — OFFICE VISIT (OUTPATIENT)
Dept: FAMILY MEDICINE CLINIC | Age: 63
End: 2023-01-18

## 2023-01-18 VITALS
TEMPERATURE: 98.5 F | HEART RATE: 62 BPM | HEIGHT: 65 IN | BODY MASS INDEX: 33.12 KG/M2 | SYSTOLIC BLOOD PRESSURE: 128 MMHG | DIASTOLIC BLOOD PRESSURE: 68 MMHG | WEIGHT: 198.8 LBS | OXYGEN SATURATION: 98 % | RESPIRATION RATE: 16 BRPM

## 2023-01-18 DIAGNOSIS — E78.00 HYPERCHOLESTEROLEMIA: ICD-10-CM

## 2023-01-18 DIAGNOSIS — Z23 ENCOUNTER FOR ADMINISTRATION OF COVID-19 VACCINE: Primary | ICD-10-CM

## 2023-01-18 DIAGNOSIS — E11.9 CONTROLLED TYPE 2 DIABETES MELLITUS WITHOUT COMPLICATION, WITHOUT LONG-TERM CURRENT USE OF INSULIN (HCC): ICD-10-CM

## 2023-01-18 NOTE — PROGRESS NOTES
Chief Complaint   Patient presents with    Diabetes    Cholesterol Problem    Hypertension    Follow-up       1. \"Have you been to the ER, urgent care clinic since your last visit? Hospitalized since your last visit? \" No    2. \"Have you seen or consulted any other health care providers outside of the [address] [place] [address] since your last visit? \" Yes [facility]  - Dr. Adan Crump      3. For patients aged 39-70: Has the patient had a colonoscopy / FIT/ Cologuard? Yes - no Care Gap present      If the patient is female:    4. For patients aged 41-77: Has the patient had a mammogram within the past 2 years? Yes - no Care Gap present      5. For patients aged 21-65: Has the patient had a pap smear? Yes - no Care Gap present    Health Maintenance Due   Topic Date Due    Foot Exam Q1  09/21/2019    Pneumococcal 0-64 years (2 - PPSV23 if available, else PCV20) 03/05/2020    Diabetic Alb to Cr ratio (uACR) test  09/23/2020    Shingles Vaccine (2 of 2) 12/25/2020    COVID-19 Vaccine (5 - Booster for Pfizer series) 09/12/2022     Verbal Order received from Dr. Radha Cox  for Milana 6027 bivalent  given IM  in left arm.

## 2023-01-18 NOTE — PROGRESS NOTES
HISTORY OF PRESENT ILLNESS  Urbano Escamilla is a 57 y.o. female. HPI Pt. Comes in for blood pressure, cholesterol, and diabetes check. No complaints of chest pain, shortness of breath, TIAs, claudication or edema. No hypoglycemic episodes. Has problems with numbness in feet. ROS    Physical Exam  Vitals and nursing note reviewed. Constitutional:       Appearance: She is well-developed. HENT:      Right Ear: External ear normal.      Left Ear: External ear normal.   Neck:      Thyroid: No thyromegaly. Cardiovascular:      Rate and Rhythm: Normal rate and regular rhythm. Heart sounds: Normal heart sounds. Pulmonary:      Breath sounds: Normal breath sounds. No wheezing. Abdominal:      General: Bowel sounds are normal. There is no distension. Palpations: Abdomen is soft. There is no mass. Tenderness: There is no abdominal tenderness. Lymphadenopathy:      Cervical: No cervical adenopathy. ASSESSMENT and PLAN  Orders Placed This Encounter    COVID-19, PFIZER Bivalent BOOSTER, (age 12y+), IM, 30 mcg/0.3 mL    MICROALBUMIN, UR, RAND W/ MICROALB/CREAT RATIO    HEMOGLOBIN A1C WITH EAG    LIPID PANEL     Diagnoses and all orders for this visit:    1. Encounter for administration of COVID-19 vaccine  -     COVID-19, PFIZER BIVALENT BOOSTER, (AGE 12Y+), IM, 30 MCG/0.3 ML    2. Controlled type 2 diabetes mellitus without complication, without long-term current use of insulin (HCC)  -     MICROALBUMIN, UR, RAND W/ MICROALB/CREAT RATIO; Future  -     HEMOGLOBIN A1C WITH EAG; Future    3. Hypercholesterolemia  -     LIPID PANEL; Future      Follow-up and Dispositions    Return in about 6 months (around 7/18/2023).

## 2023-01-19 LAB
CHOLEST SERPL-MCNC: 177 MG/DL
CREAT UR-MCNC: 507 MG/DL
EST. AVERAGE GLUCOSE BLD GHB EST-MCNC: 143 MG/DL
HBA1C MFR BLD: 6.6 % (ref 4–5.6)
HDLC SERPL-MCNC: 69 MG/DL
HDLC SERPL: 2.6 (ref 0–5)
LDLC SERPL CALC-MCNC: 83.4 MG/DL (ref 0–100)
MICROALBUMIN UR-MCNC: 5.53 MG/DL
MICROALBUMIN/CREAT UR-RTO: 11 MG/G (ref 0–30)
TRIGL SERPL-MCNC: 123 MG/DL (ref ?–150)
VLDLC SERPL CALC-MCNC: 24.6 MG/DL

## 2023-01-26 ENCOUNTER — OFFICE VISIT (OUTPATIENT)
Dept: ONCOLOGY | Age: 63
End: 2023-01-26
Payer: COMMERCIAL

## 2023-01-26 VITALS
OXYGEN SATURATION: 96 % | TEMPERATURE: 98.1 F | WEIGHT: 200 LBS | SYSTOLIC BLOOD PRESSURE: 123 MMHG | HEART RATE: 65 BPM | BODY MASS INDEX: 33.8 KG/M2 | RESPIRATION RATE: 18 BRPM | DIASTOLIC BLOOD PRESSURE: 76 MMHG

## 2023-01-26 DIAGNOSIS — C90.01 MULTIPLE MYELOMA IN REMISSION (HCC): Primary | ICD-10-CM

## 2023-01-26 PROCEDURE — 3078F DIAST BP <80 MM HG: CPT | Performed by: INTERNAL MEDICINE

## 2023-01-26 PROCEDURE — 3074F SYST BP LT 130 MM HG: CPT | Performed by: INTERNAL MEDICINE

## 2023-01-26 PROCEDURE — 99214 OFFICE O/P EST MOD 30 MIN: CPT | Performed by: INTERNAL MEDICINE

## 2023-01-26 NOTE — PROGRESS NOTES
Hematology Follow Up    REASON FOR VISIT: Multiple Myeloma    TYPE OF VISIT  Cherelle Anders is a 57 y.o. female who is seen for follow up of Multiple Myeloma on Revlimid     TREATMENT:   3/24/17- 9/15/17: Natacha Vences X 8   10/20/17: Autologous transplant at [facility]  2/22/18 -  Revlimid     HISTORY OF PRESENT ILLNESS: Ms. Ye Christopher is a 57 y.o. female with DM and  Multiple Myeloma who presents to follow up after the ASCT and is on maintenance Revlimid 10mg daily. She was reduced to 5 mg due to cytopenias. Seen for follow up. She is on Revlimid 10 mg daily. Colonoscopy was normal 2020. I see Jonny Nails today in follow up. She is feeling well. She is taking revlimid 21 days on a 1 week off. Saw [facility] in October. Has rare diarrhea that is diet induced. Denies nausea/vomiting. No aches or pain. No fevers/chills. No other complaits. Oncologic history  Patient had left facial numbness which started in the summer of 2016. This started abruptly and was not associated with rashes, pain, jaw weakness. She has had some intermittent hyperemia of the L eye. No tearing. She has been evaluated by Dr. Sinai Lynn with Neurology and an extensive work up was only notable for presence of a 0.3 g/dl M spike. Other tests were unremarkable: Vitamin B12 411, thyroid function test normal, ACE 25, BHARATI normal CBC normal, CMP normal, CRP 1.17, CT head and cervical spine unremarkable. Further evaluation revealed findings consistent with Multiple Myeloma    CBC 2/24 Unremarkable. Kappa 17 mg/dl, Lambda 2416 (ratio 0.01), SPEP 0.2 g/dl M spike, HANSEL showed monoclonal free lambda light chains, UPEP negative, Beta 2 Microglobulin 1.8 mg/L, Skeletal survey negative for lytic lesions, Bone marrow biopsy on 2/24/17 showed a Normocellular marrow with mild monoclonal plasmacytosis (15-20%). Trilineage hematopoiesis with maturation.   She had a very good partial response and 8 cycles of VRD and then proceeded on to receive an autologous stem cell transplant on 10/20/17 at [facility]. She had a CR posttransplant. Started maintenance Revlimid in Jan 2018. Past Medical History:   Diagnosis Date    Atrial fibrillation ([facility]     CAD (coronary artery disease)     Cardiomyopathy     Clotting disorder ([facility]     Diabetes mellitus type 2, controlled ([facility] 12/10/2015    Glaucoma     Heart failure ([facility]     Hyperlipidemia     Hypertension     controlled    ICD (implantable cardioverter-defibrillator) in place     Multiple myeloma ([facility]     Orthostatic hypotension     Secondary cardiomyopathy, unspecified     Sinoatrial node dysfunction ([facility]     Vitamin B12 deficiency 3/31/2010       Past Surgical History:   Procedure Laterality Date    COLONOSCOPY N/A 1/22/2020    COLONOSCOPY performed by Eduardo Crowley MD at [facility]    HX HYSTERECTOMY      HX PACEMAKER      aicd       Allergies   Allergen Reactions    Ace Inhibitors Cough    Compazine [Prochlorperazine] Nausea Only     rash       Current Outpatient Medications   Medication Sig Dispense Refill    Revlimid 10 mg cap TAKE 1 CAPSULE BY MOUTH ONCE DAILY FOR 21 DAYS ON AND 7 DAYS OFF   [employer] #[id number] 21 Capsule 0    carvediloL (COREG) 25 mg tablet TAKE 1 TABLET BY MOUTH TWICE A  Tablet 1    glimepiride (AMARYL) 2 mg tablet Take 1 Tablet by mouth Daily (before breakfast). For diabetes 90 Tablet 3    amLODIPine (NORVASC) 5 mg tablet Take 1 Tablet by mouth daily. For blood pressure 90 Tablet 4    atorvastatin (LIPITOR) 40 mg tablet TAKE 1 TABLET BY MOUTH EVERY DAY FOR CHOLESTEROL 90 Tablet 3    acetaminophen (TYLENOL) 500 mg tablet Take 500 mg by mouth every six (6) hours as needed for Pain. cyanocobalamin (VITAMIN B12) 100 mcg tablet Take 100 mcg by mouth daily. latanoprost (XALATAN) 0.005 % ophthalmic solution INSTILL 1 DROP IN EACH EYE EVERY DAY      multivitamin, tx-iron-ca-min (THERA-M W/ IRON) 9 mg iron-400 mcg tab tablet Take 1 Tab by mouth daily.       aspirin delayed-release 81 mg tablet TAKE 1 TABLET BY MOUTH EVERY DAY  4    loperamide (IMMODIUM) 2 mg tablet Take 2 mg by mouth as needed for Diarrhea. Social History     Socioeconomic History    Marital status:    Tobacco Use    Smoking status: Never    Smokeless tobacco: Never   Vaping Use    Vaping Use: Never used   Substance and Sexual Activity    Alcohol use: No    Drug use: No    Sexual activity: Yes     Partners: Male   Social History Narrative    , 2 children, 29 and 31. Works at [employer], for 35 years. 5 grandchildren     Social Determinants of Health     Financial Resource Strain: Low Risk     Difficulty of Paying Living Expenses: Not very hard   Food Insecurity: No Food Insecurity    Worried About Running Out of Food in the Last Year: Never true    Ran Out of Food in the Last Year: Never true       Family History   Problem Relation Age of Onset    Cancer Mother     Heart Disease Mother         pacemaker    Diabetes Mother     Breast Cancer Mother 54    Hypertension Sister     Hypertension Brother     Diabetes Brother     Hypertension Sister     Hypertension Sister     Hypertension Sister     Hypertension Sister     Diabetes Sister        ROS  As reviewed in the HPI all others reviewed and negative. ECOG PS is 0    Physical Examination:   Visit Vitals  /76 (BP 1 Location: Right arm, BP Patient Position: Sitting)   Pulse 65   Temp 98.1 °F (36.7 °C)   Resp 18   Wt 200 lb (90.7 kg)   SpO2 96%   BMI 33.80 kg/m²       General: Appears well-developed and well-nourished in no apparent distress   HENT: OP clear   Resp: normal respiratory effort  CV: no LE edema   Skin: warm, dry   Psychiatric: Normal affect, normal judgment/insight. LABS  Lab Results   Component Value Date/Time    WBC 3.3 (L) 11/08/2022 08:37 AM    HGB 13.6 11/08/2022 08:37 AM    HCT 39.2 11/08/2022 08:37 AM    PLATELET 060 27/94/3801 08:37 AM     (H) 11/08/2022 08:37 AM    ABS.  NEUTROPHILS 1.5 11/08/2022 08:37 AM     Lab Results   Component Value Date/Time    Sodium 139 11/08/2022 08:37 AM    Potassium 3.8 11/08/2022 08:37 AM    Chloride 104 11/08/2022 08:37 AM    CO2 21 11/08/2022 08:37 AM    Glucose 179 (H) 11/08/2022 08:37 AM    BUN 10 11/08/2022 08:37 AM    Creatinine 1.02 (H) 11/08/2022 08:37 AM    GFR est AA >60 07/18/2022 11:11 AM    GFR est non-AA 59 (L) 07/18/2022 11:11 AM    Calcium 8.8 11/08/2022 08:37 AM     Lab Results   Component Value Date/Time    Alk. phosphatase 86 11/08/2022 08:37 AM    Protein, total 6.7 11/08/2022 08:37 AM    Albumin 4.3 11/08/2022 08:37 AM    Globulin 3.1 07/18/2022 11:11 AM    A-G Ratio 1.8 11/08/2022 08:37 AM     Lab Results   Component Value Date/Time    Iron % saturation 38 01/04/2021 10:32 AM    TIBC 277 01/04/2021 10:32 AM    Ferritin 163 (H) 01/04/2021 10:32 AM    Vitamin B12 411 12/07/2016 02:58 PM     02/16/2017 09:18 AM    Beta-2 Microglobulin, serum 1.5 08/28/2019 08:22 AM    C-Reactive Protein, Cardiac 1.17 12/07/2016 02:58 PM    TSH 1.100 12/07/2016 02:58 PM    M-Mika Not Observed 11/08/2022 08:37 AM    M-Mika Not Observed 07/13/2021 01:47 PM    Hep C Virus Ab <0.1 05/17/2016 09:56 AM     Lab Results   Component Value Date/Time    INR 1.0 05/04/2021 10:41 AM    aPTT 31.5 07/27/2010 03:57 PM     7/17/2020  FLC normal    Bone marrow biopsy reviewed    FISH molecular analysis:    Positive for IgH gene rearrangement (IgH complex FISH study pending); Normal results with 1, 5, 9, 13, 15, and 17 (TP53) probe sets. External records reviewed from [facility] transplant transplant bone marrow biopsy done on 12/19/17 showed no evidence of plasma cells. Variable cellularity from 0-50%, flow LUIS, FISH negative    ASSESSMENT  Ms. Sachin Randall is a 57 y.o. female with standard risk multiple myeloma status post 6 cycles of twice daily and autologous stem cell transplant on 10/20/17. She is in a complete remission.  We will continue with close surveillance in conjunction with her care team at [facility]  Multiple myeloma  S/P BMT in CR  On revlimid maintenance of 10mg daily following transplant at [facility]. Dropped to 5 mg due to recurrent grade 3 neutropenia (kleber 0.4). She has done well with this, rash has not recurred. As there was a temporary increase in M spike and hence Revlimid was increased again to 10 mg. She is doing well on this with grade 1 diarrhea     Clinically stable. CBC/CMP/gammopathy panel reviewed 11/2022   Continue maintenance revlimid as written    Follow with BMT as scheduled October 2023     Neuropathy  Overall stable. DM  Per PCP     Anemia  Resolved    Diarrhea  Resolved    RTC in 6 months, labs every 3 months at [facility] - standing orders provided     I personally saw and evaluated the patient and performed the key components of medical decision making. The history, physical exam, and documentation were performed by Shobha Figueredo NP. I reviewed and verified the above documentation and modified it as needed. [facility] is doing really well. She is tolerating Revlimid. Has no leg edema or rash. Her labs and recent BMT evaluation continue to show a CR. We will continue until progression. We discussed regular health maintenance and encouraged her to keep up with her mammogram and colonoscopies.   Signed by: Christi Vences MD                     January 26, 2023

## 2023-01-26 NOTE — PROGRESS NOTES
Alexandru Carr is a 57 y.o. female    Chief Complaint   Patient presents with    Follow-up     Multiple Myeloma on Revlimid       1. Have you been to the ER, urgent care clinic since your last visit? Hospitalized since your last visit? No    2. Have you seen or consulted any other health care providers outside of the [address] [place] [address] since your last visit? Include any pap smears or colon screening.  No

## 2023-02-07 DIAGNOSIS — C90.01 MULTIPLE MYELOMA IN REMISSION (HCC): ICD-10-CM

## 2023-02-07 RX ORDER — LENALIDOMIDE 10 MG/1
CAPSULE ORAL
Qty: 21 CAPSULE | Refills: 0 | Status: ACTIVE | OUTPATIENT
Start: 2023-02-07

## 2023-02-07 NOTE — TELEPHONE ENCOUNTER
Oral Chemotherapy      Mihai Watkins is a  59 y. o.female  diagnosed with MM . Ms. Suzi Boland is being treated with lenalidomide.      Medication name: lenalidomide     Dose:  10 mg   Frequency: daily  Administration schedule: 21 days on, 7 days off  Ordering provider: Naun Gilliland MD        [address] prescriber survey completed Auth # [id number] and Refill sent to [place] [id number] 1/26/23  Next visit 7/26/23        Ivana Ledesma, PHARMD, [facility], [address] [place] [address] Only    Program: Medical Group  CPA in place: Yes  Recommendation Provided To: Patient/Caregiver: 1 via Telephone  Intervention Detail: Refill(s) Provided  Intervention Accepted By: Patient/Caregiver: 1    Time Spent (min): 15

## 2023-02-21 ENCOUNTER — TELEPHONE (OUTPATIENT)
Dept: FAMILY MEDICINE CLINIC | Age: 63
End: 2023-02-21

## 2023-02-21 NOTE — TELEPHONE ENCOUNTER
----- Message from [employer] sent at 2/21/2023  1:45 PM EST -----  Subject: Message to Provider    QUESTIONS  Information for Provider? Pt returned call to get a post op appt for   cataract surgery. Surgery 5- Procedure is Cataract removal w/IOL   implant she is not sure which eye will be done first, the second eye will   be done on 5-. Surgeon is Dr. Henri Adam. Please call patient   back.  She is willing to schedule with anyone who can perform the pre op.   ---------------------------------------------------------------------------  --------------  [id number] [employer]  [phone]; OK to leave message on voicemail  ---------------------------------------------------------------------------  --------------  SCRIPT ANSWERS  undefined

## 2023-02-22 ENCOUNTER — TELEPHONE (OUTPATIENT)
Dept: FAMILY MEDICINE CLINIC | Age: 63
End: 2023-02-22

## 2023-02-22 NOTE — TELEPHONE ENCOUNTER
----- Message from Мария Lawrence sent at 2/21/2023  1:45 PM EST -----  Subject: Message to Provider    QUESTIONS  Information for Provider? Pt returned call to get a post op appt for   cataract surgery. Surgery 5- Procedure is Cataract removal w/IOL   implant she is not sure which eye will be done first, the second eye will   be done on 5-. Surgeon is Dr. Lisa Baez. Please call patient   back.  She is willing to schedule with anyone who can perform the pre op.   ---------------------------------------------------------------------------  --------------  [address]  [phone]; OK to leave message on voicemail  ---------------------------------------------------------------------------  --------------  SCRIPT ANSWERS  undefined

## 2023-02-22 NOTE — TELEPHONE ENCOUNTER
Spoke with Allan Love to try and figure out a resolution. We are completely booked for time frame needed by Dr. Brook Marin for pre op for cataract surgery for patient. All providers at [facility] have a decreased schedule. Unable to do pre op from 5/8/-5/12 for patient.

## 2023-03-02 LAB
ALBUMIN SERPL ELPH-MCNC: 3.6 G/DL (ref 2.9–4.4)
ALBUMIN SERPL-MCNC: 4.4 G/DL (ref 3.8–4.8)
ALBUMIN/GLOB SERPL: 1.3 {RATIO} (ref 0.7–1.7)
ALBUMIN/GLOB SERPL: 2 {RATIO} (ref 1.2–2.2)
ALP SERPL-CCNC: 88 IU/L (ref 44–121)
ALPHA1 GLOB SERPL ELPH-MCNC: 0.2 G/DL (ref 0–0.4)
ALPHA2 GLOB SERPL ELPH-MCNC: 0.8 G/DL (ref 0.4–1)
ALT SERPL-CCNC: 23 IU/L (ref 0–32)
AST SERPL-CCNC: 19 IU/L (ref 0–40)
B-GLOBULIN SERPL ELPH-MCNC: 1 G/DL (ref 0.7–1.3)
BASOPHILS # BLD AUTO: 0 X10E3/UL (ref 0–0.2)
BASOPHILS NFR BLD AUTO: 1 %
BILIRUB SERPL-MCNC: 1.5 MG/DL (ref 0–1.2)
BUN SERPL-MCNC: 8 MG/DL (ref 8–27)
BUN/CREAT SERPL: 9 (ref 12–28)
CALCIUM SERPL-MCNC: 8.6 MG/DL (ref 8.7–10.3)
CHLORIDE SERPL-SCNC: 104 MMOL/L (ref 96–106)
CO2 SERPL-SCNC: 23 MMOL/L (ref 20–29)
CREAT SERPL-MCNC: 0.94 MG/DL (ref 0.57–1)
EGFRCR SERPLBLD CKD-EPI 2021: 68 ML/MIN/1.73
EOSINOPHIL # BLD AUTO: 0.1 X10E3/UL (ref 0–0.4)
EOSINOPHIL NFR BLD AUTO: 3 %
ERYTHROCYTE [DISTWIDTH] IN BLOOD BY AUTOMATED COUNT: 14.4 % (ref 11.7–15.4)
GAMMA GLOB SERPL ELPH-MCNC: 1.1 G/DL (ref 0.4–1.8)
GLOBULIN SER CALC-MCNC: 2.2 G/DL (ref 1.5–4.5)
GLOBULIN SER-MCNC: 3 G/DL (ref 2.2–3.9)
GLUCOSE SERPL-MCNC: 136 MG/DL (ref 70–99)
HCT VFR BLD AUTO: 41.2 % (ref 34–46.6)
HGB BLD-MCNC: 14 G/DL (ref 11.1–15.9)
IGA SERPL-MCNC: 155 MG/DL (ref 87–352)
IGG SERPL-MCNC: 1089 MG/DL (ref 586–1602)
IGM SERPL-MCNC: 8 MG/DL (ref 26–217)
IMM GRANULOCYTES # BLD AUTO: 0 X10E3/UL (ref 0–0.1)
IMM GRANULOCYTES NFR BLD AUTO: 0 %
INTERPRETATION SERPL IEP-IMP: ABNORMAL
KAPPA LC FREE SER-MCNC: 23.3 MG/L (ref 3.3–19.4)
KAPPA LC FREE/LAMBDA FREE SER: 1.83 {RATIO} (ref 0.26–1.65)
LABORATORY COMMENT REPORT: ABNORMAL
LAMBDA LC FREE SERPL-MCNC: 12.7 MG/L (ref 5.7–26.3)
LYMPHOCYTES # BLD AUTO: 1.4 X10E3/UL (ref 0.7–3.1)
LYMPHOCYTES NFR BLD AUTO: 34 %
M PROTEIN SERPL ELPH-MCNC: ABNORMAL G/DL
MCH RBC QN AUTO: 34.1 PG (ref 26.6–33)
MCHC RBC AUTO-ENTMCNC: 34 G/DL (ref 31.5–35.7)
MCV RBC AUTO: 101 FL (ref 79–97)
MONOCYTES # BLD AUTO: 0.5 X10E3/UL (ref 0.1–0.9)
MONOCYTES NFR BLD AUTO: 12 %
NEUTROPHILS # BLD AUTO: 2.1 X10E3/UL (ref 1.4–7)
NEUTROPHILS NFR BLD AUTO: 50 %
PLATELET # BLD AUTO: 214 X10E3/UL (ref 150–450)
POTASSIUM SERPL-SCNC: 3.8 MMOL/L (ref 3.5–5.2)
PROT SERPL-MCNC: 6.6 G/DL (ref 6–8.5)
RBC # BLD AUTO: 4.1 X10E6/UL (ref 3.77–5.28)
SODIUM SERPL-SCNC: 142 MMOL/L (ref 134–144)
WBC # BLD AUTO: 4.2 X10E3/UL (ref 3.4–10.8)

## 2023-03-07 ENCOUNTER — TRANSCRIBE ORDER (OUTPATIENT)
Dept: SCHEDULING | Age: 63
End: 2023-03-07

## 2023-03-07 DIAGNOSIS — Z12.31 OTHER SCREENING MAMMOGRAM: Primary | ICD-10-CM

## 2023-03-08 DIAGNOSIS — C90.01 MULTIPLE MYELOMA IN REMISSION (HCC): ICD-10-CM

## 2023-03-08 RX ORDER — LENALIDOMIDE 10 MG/1
CAPSULE ORAL
Qty: 21 CAPSULE | Refills: 0 | Status: SHIPPED | OUTPATIENT
Start: 2023-03-08 | End: 2023-03-09 | Stop reason: SDUPTHER

## 2023-03-09 DIAGNOSIS — C90.01 MULTIPLE MYELOMA IN REMISSION (HCC): ICD-10-CM

## 2023-03-09 RX ORDER — LENALIDOMIDE 10 MG/1
CAPSULE ORAL
Qty: 21 CAPSULE | Refills: 0 | Status: ACTIVE | OUTPATIENT
Start: 2023-03-09

## 2023-03-09 NOTE — TELEPHONE ENCOUNTER
Oral Chemotherapy      Bre Garcias is a  59 y. o.female  diagnosed with MM . Ms. Gaurav Garrido is being treated with lenalidomide.      Medication name: lenalidomide     Dose:  10 mg   Frequency: daily  Administration schedule: 21 days on, 7 days off  Ordering provider: Elaine Moon MD        [address] prescriber survey completed Auth # [id number] and Refill sent to [place] [phone] 1/26/23  Next visit 7/26/23        Harsh Jeffrey, PHARMD, [facility], [address] [place] [address] Only    Program: Medical Group  CPA in place: Yes  Recommendation Provided To: Patient/Caregiver: 1 via Telephone  Intervention Detail: Refill(s) Provided  Intervention Accepted By: Patient/Caregiver: 1    Time Spent (min): 15

## 2023-04-04 RX ORDER — LENALIDOMIDE 10 MG/1
CAPSULE ORAL
Qty: 21 CAPSULE | Refills: 0 | Status: ACTIVE
Start: 2023-04-04

## 2023-04-04 NOTE — TELEPHONE ENCOUNTER
Oral Chemotherapy      Rosa Cobb is a  59 y. o.female  diagnosed with MM . Ms. Vannesa Narayanan is being treated with lenalidomide.      Medication name: lenalidomide     Dose:  10 mg   Frequency: daily  Administration schedule: 21 days on, 7 days off  Ordering provider: Sasha Traore MD        [address] prescriber survey completed Auth # [id number] and Refill sent to [place] [id number] 1/26/23  Next visit 7/26/23        Cordelia Kehr, PHARMD, [facility], [address] [place] [address] Only    Program: Medical Group  CPA in place: Yes  Recommendation Provided To: Patient/Caregiver: 1 via Telephone  Intervention Detail: Refill(s) Provided  Intervention Accepted By: Patient/Caregiver: 1    Time Spent (min): 15

## 2023-04-19 ENCOUNTER — TRANSCRIBE ORDER (OUTPATIENT)
Dept: REGISTRATION | Age: 63
End: 2023-04-19

## 2023-04-19 ENCOUNTER — HOSPITAL ENCOUNTER (OUTPATIENT)
Dept: MAMMOGRAPHY | Age: 63
Discharge: HOME | End: 2023-04-19
Attending: FAMILY MEDICINE
Payer: COMMERCIAL

## 2023-04-19 DIAGNOSIS — Z12.31 VISIT FOR SCREENING MAMMOGRAM: Primary | ICD-10-CM

## 2023-04-19 DIAGNOSIS — Z12.31 VISIT FOR SCREENING MAMMOGRAM: ICD-10-CM

## 2023-04-19 PROCEDURE — 77063 BREAST TOMOSYNTHESIS BI: CPT

## 2023-04-21 DIAGNOSIS — C90.01 MULTIPLE MYELOMA IN REMISSION (HCC): Primary | ICD-10-CM

## 2023-04-22 DIAGNOSIS — Z12.31 OTHER SCREENING MAMMOGRAM: Primary | ICD-10-CM

## 2023-04-27 DIAGNOSIS — C90.01 MULTIPLE MYELOMA IN REMISSION (HCC): ICD-10-CM

## 2023-04-28 RX ORDER — LENALIDOMIDE 10 MG/1
CAPSULE ORAL
Qty: 21 CAPSULE | Refills: 0 | Status: ACTIVE | OUTPATIENT
Start: 2023-04-28

## 2023-04-28 NOTE — TELEPHONE ENCOUNTER
Oral Chemotherapy      Milli Chavarria is a  59 y. o.female  diagnosed with MM . Ms. Denae Navarro is being treated with lenalidomide.      Medication name: lenalidomide     Dose:  10 mg   Frequency: daily  Administration schedule: 21 days on, 7 days off  Ordering provider: Betty Jara MD        [facility] # [id number] and Refill sent to [place] [id number] 1/26/23  Next visit 7/26/23        Richard Gilliland, PHARMD, [facility], [address] Only    Program: Medical Group  CPA in place: Yes  Recommendation Provided To: Patient/Caregiver: 1 via Telephone  Intervention Detail: Refill(s) Provided  Intervention Accepted By: Patient/Caregiver: 1    Time Spent (min): 15

## 2023-05-05 SDOH — ECONOMIC STABILITY: INCOME INSECURITY: HOW HARD IS IT FOR YOU TO PAY FOR THE VERY BASICS LIKE FOOD, HOUSING, MEDICAL CARE, AND HEATING?: NOT HARD AT ALL

## 2023-05-05 SDOH — ECONOMIC STABILITY: FOOD INSECURITY: WITHIN THE PAST 12 MONTHS, YOU WORRIED THAT YOUR FOOD WOULD RUN OUT BEFORE YOU GOT MONEY TO BUY MORE.: NEVER TRUE

## 2023-05-05 SDOH — ECONOMIC STABILITY: HOUSING INSECURITY
IN THE LAST 12 MONTHS, WAS THERE A TIME WHEN YOU DID NOT HAVE A STEADY PLACE TO SLEEP OR SLEPT IN A SHELTER (INCLUDING NOW)?: NO

## 2023-05-05 SDOH — ECONOMIC STABILITY: FOOD INSECURITY: WITHIN THE PAST 12 MONTHS, THE FOOD YOU BOUGHT JUST DIDN'T LAST AND YOU DIDN'T HAVE MONEY TO GET MORE.: NEVER TRUE

## 2023-05-05 SDOH — ECONOMIC STABILITY: TRANSPORTATION INSECURITY
IN THE PAST 12 MONTHS, HAS LACK OF TRANSPORTATION KEPT YOU FROM MEETINGS, WORK, OR FROM GETTING THINGS NEEDED FOR DAILY LIVING?: NO

## 2023-05-08 ENCOUNTER — OFFICE VISIT (OUTPATIENT)
Age: 63
End: 2023-05-08
Payer: COMMERCIAL

## 2023-05-08 DIAGNOSIS — H25.013 CORTICAL AGE-RELATED CATARACT OF BOTH EYES: Primary | ICD-10-CM

## 2023-05-08 PROCEDURE — 99213 OFFICE O/P EST LOW 20 MIN: CPT | Performed by: FAMILY MEDICINE

## 2023-05-08 RX ORDER — PREDNISOLONE ACETATE 10 MG/ML
SUSPENSION/ DROPS OPHTHALMIC
COMMUNITY
Start: 2023-05-05

## 2023-05-08 SDOH — ECONOMIC STABILITY: INCOME INSECURITY: HOW HARD IS IT FOR YOU TO PAY FOR THE VERY BASICS LIKE FOOD, HOUSING, MEDICAL CARE, AND HEATING?: NOT VERY HARD

## 2023-05-08 SDOH — ECONOMIC STABILITY: FOOD INSECURITY: WITHIN THE PAST 12 MONTHS, THE FOOD YOU BOUGHT JUST DIDN'T LAST AND YOU DIDN'T HAVE MONEY TO GET MORE.: NEVER TRUE

## 2023-05-08 SDOH — ECONOMIC STABILITY: FOOD INSECURITY: WITHIN THE PAST 12 MONTHS, YOU WORRIED THAT YOUR FOOD WOULD RUN OUT BEFORE YOU GOT MONEY TO BUY MORE.: NEVER TRUE

## 2023-05-08 ASSESSMENT — PATIENT HEALTH QUESTIONNAIRE - PHQ9
SUM OF ALL RESPONSES TO PHQ9 QUESTIONS 1 & 2: 0
2. FEELING DOWN, DEPRESSED OR HOPELESS: 0
1. LITTLE INTEREST OR PLEASURE IN DOING THINGS: 0
SUM OF ALL RESPONSES TO PHQ QUESTIONS 1-9: 0

## 2023-05-08 NOTE — PROGRESS NOTES
Chief Complaint   Patient presents with    Pre-op Exam       1. \"Have you been to the ER, urgent care clinic since your last visit? Hospitalized since your last visit? \" no    2. \"Have you seen or consulted any other health care providers outside of the [address] [place] [address] since your last visit? \" Dr. Giron Aiyana - oncology     3. For patients aged 39-70: Has the patient had a colonoscopy / FIT/ Cologuard? Yes      If the patient is female:    4. For patients aged 41-77: Has the patient had a mammogram within the past 2 years? N/A      5. For patients aged 21-65: Has the patient had a pap smear?  N/A    Health Maintenance Due   Topic Date Due    HIV screen  Never done    Pneumococcal 0-64 years Vaccine (2 - PPSV23 if available, else PCV20) 04/30/2019    Diabetic foot exam  09/21/2019    Shingles vaccine (2 of 2) 12/25/2020
Subjective:      Patient ID: Berenice Guerrero is a 57 y.o. female. HPI Pt seen for preop physical. Going to have bilateral cataract surgery. Doing well, no dyspnea. Still on meds for myeloma. Sees oncologist every 3 months. Seeing cardiology later this week. .[facility]    Review of Systems    Objective:   Physical Exam  Eyes:      Comments: Bilateral cataracts   Cardiovascular:      Rate and Rhythm: Normal rate and regular rhythm. Heart sounds: No murmur heard. No gallop. Pulmonary:      Effort: Pulmonary effort is normal.      Breath sounds: Normal breath sounds. Abdominal:      General: Abdomen is flat. There is no distension. Palpations: Abdomen is soft. Tenderness: There is no abdominal tenderness. Musculoskeletal:      Right lower leg: No edema. Left lower leg: No edema. Assessment:    Bilateral cataracts      Plan:      Cleared for surgery. Peop form completed and faxed.          Karen Tovar MD
tablet TAKE 1 TABLET BY MOUTH EVERY DAY      atorvastatin (LIPITOR) 40 MG tablet TAKE 1 TABLET BY MOUTH EVERY DAY FOR CHOLESTEROL      carvedilol (COREG) 25 MG tablet TAKE 1 TABLET BY MOUTH TWICE A DAY      cyanocobalamin 100 MCG tablet Take by mouth daily      glimepiride (AMARYL) 2 MG tablet Take by mouth every morning (before breakfast)      latanoprost (XALATAN) 0.005 % ophthalmic solution INSTILL 1 DROP IN EACH EYE EVERY DAY      lenalidomide (REVLIMID) 10 MG chemo capsule TAKE 1 CAPSULE BY MOUTH ONCE DAILY FOR 21 DAYS ON AND 7 DAYS OFF   [facility] #[id number]      loperamide (IMODIUM A-D) 2 MG tablet Take by mouth as needed       No current facility-administered medications for this visit. Review of Systems   Genitourinary:  Negative for difficulty urinating and dysuria. Physical Exam  Eyes:      Comments: Bilateral cataracts   Cardiovascular:      Rate and Rhythm: Normal rate and regular rhythm. Heart sounds: Normal heart sounds. No murmur heard. Pulmonary:      Effort: Pulmonary effort is normal.      Breath sounds: Normal breath sounds. Abdominal:      General: Abdomen is flat. Bowel sounds are normal.      Palpations: Abdomen is soft. There is no mass. Tenderness: There is no abdominal tenderness. Musculoskeletal:      Right lower leg: No edema. Left lower leg: No edema.      1. Cortical age-related cataract of both eyes    Preop form co pleted and faxed to ophthalmologist.

## 2023-05-11 ENCOUNTER — OFFICE VISIT (OUTPATIENT)
Age: 63
End: 2023-05-11
Payer: COMMERCIAL

## 2023-05-11 VITALS
RESPIRATION RATE: 15 BRPM | SYSTOLIC BLOOD PRESSURE: 154 MMHG | WEIGHT: 201 LBS | OXYGEN SATURATION: 99 % | DIASTOLIC BLOOD PRESSURE: 84 MMHG | HEIGHT: 65 IN | BODY MASS INDEX: 33.49 KG/M2 | HEART RATE: 65 BPM

## 2023-05-11 DIAGNOSIS — I49.5 SICK SINUS SYNDROME (HCC): ICD-10-CM

## 2023-05-11 DIAGNOSIS — I10 ESSENTIAL (PRIMARY) HYPERTENSION: Primary | ICD-10-CM

## 2023-05-11 DIAGNOSIS — I42.0 DILATED CARDIOMYOPATHY (HCC): ICD-10-CM

## 2023-05-11 LAB
BASOPHILS # BLD AUTO: 0 X10E3/UL (ref 0–0.2)
BASOPHILS NFR BLD AUTO: 1 %
EOSINOPHIL # BLD AUTO: 0.1 X10E3/UL (ref 0–0.4)
EOSINOPHIL NFR BLD AUTO: 1 %
ERYTHROCYTE [DISTWIDTH] IN BLOOD BY AUTOMATED COUNT: 14.7 % (ref 11.7–15.4)
HCT VFR BLD AUTO: 39.6 % (ref 34–46.6)
HGB BLD-MCNC: 13.4 G/DL (ref 11.1–15.9)
IMM GRANULOCYTES # BLD AUTO: 0 X10E3/UL (ref 0–0.1)
IMM GRANULOCYTES NFR BLD AUTO: 0 %
LYMPHOCYTES # BLD AUTO: 1.4 X10E3/UL (ref 0.7–3.1)
LYMPHOCYTES NFR BLD AUTO: 34 %
MCH RBC QN AUTO: 34.2 PG (ref 26.6–33)
MCHC RBC AUTO-ENTMCNC: 33.8 G/DL (ref 31.5–35.7)
MCV RBC AUTO: 101 FL (ref 79–97)
MONOCYTES # BLD AUTO: 0.5 X10E3/UL (ref 0.1–0.9)
MONOCYTES NFR BLD AUTO: 12 %
NEUTROPHILS # BLD AUTO: 2.1 X10E3/UL (ref 1.4–7)
NEUTROPHILS NFR BLD AUTO: 52 %
PLATELET # BLD AUTO: 248 X10E3/UL (ref 150–450)
RBC # BLD AUTO: 3.92 X10E6/UL (ref 3.77–5.28)
WBC # BLD AUTO: 4 X10E3/UL (ref 3.4–10.8)

## 2023-05-11 PROCEDURE — 3079F DIAST BP 80-89 MM HG: CPT | Performed by: SPECIALIST

## 2023-05-11 PROCEDURE — 3077F SYST BP >= 140 MM HG: CPT | Performed by: SPECIALIST

## 2023-05-11 PROCEDURE — 99213 OFFICE O/P EST LOW 20 MIN: CPT | Performed by: SPECIALIST

## 2023-05-11 RX ORDER — MELATONIN
1000 DAILY
COMMUNITY

## 2023-05-11 RX ORDER — GLUCOSAMINE/D3/BOSWELLIA SERRA 1500MG-400
1 TABLET ORAL DAILY
COMMUNITY

## 2023-05-11 ASSESSMENT — PATIENT HEALTH QUESTIONNAIRE - PHQ9
SUM OF ALL RESPONSES TO PHQ QUESTIONS 1-9: 0
SUM OF ALL RESPONSES TO PHQ9 QUESTIONS 1 & 2: 0
2. FEELING DOWN, DEPRESSED OR HOPELESS: 0
1. LITTLE INTEREST OR PLEASURE IN DOING THINGS: 0

## 2023-05-11 NOTE — PROGRESS NOTES
2 MG tablet Take by mouth every morning (before breakfast)      latanoprost (XALATAN) 0.005 % ophthalmic solution       lenalidomide (REVLIMID) 10 MG chemo capsule TAKE 1 CAPSULE BY MOUTH ONCE DAILY FOR 21 DAYS ON AND 7 DAYS OFF   [employer] #[id number]      loperamide (IMODIUM A-D) 2 MG tablet Take by mouth as needed      prednisoLONE acetate (PRED FORTE) 1 % ophthalmic suspension        No current facility-administered medications for this visit. CARDIOLOGY STUDIES TO DATE:  [facility] Bi[facility]V ICD,DOI 5/24/21 (gen change, and new RV and LV), NOT MRI Conditional , on remote     3/16 normal echo     5/21  echo lvef 50-55%, lvh, mild to mod tr without pul htn    Chief Complaint   Patient presents with    Follow-up       HPI :  She is doing great with no cardiac complaints or problems with her medications. Recent  lipid profile looked great. Her blood pressure is little elevated this morning but she has not taken any of her medicines and she recently saw her primary care later in the day after taking her meds and it was just fine. She is getting ready have cataract surgery. She is keeping follow-up on her AICD.     CARDIAC ROS:   negative for chest pain, claudication, dyspnea, irregular heart beat, lower extremity edema, orthopnea, palpitations, paroxysmal nocturnal dyspnea, and syncope    Family History   Problem Relation Age of Onset    Diabetes Mother     Breast Cancer Mother 54    Hypertension Sister     Hypertension Brother     Diabetes Brother     Hypertension Sister     Hypertension Sister     Hypertension Sister     Hypertension Sister     Diabetes Sister     Cancer Mother     Heart Disease Mother         pacemaker       Past Medical History:   Diagnosis Date    Atrial fibrillation ([address]     CAD (coronary artery disease)     Cardiomyopathy ([address]     Clotting disorder ([address]     Diabetes mellitus type 2, controlled ([address] 12/10/2015    Glaucoma     Heart failure ([address]     Hyperlipidemia     Hypertension     controlled

## 2023-05-12 LAB
ALBUMIN SERPL-MCNC: 4.1 G/DL (ref 3.8–4.8)
ALBUMIN/GLOB SERPL: 1.6 {RATIO} (ref 1.2–2.2)
ALP SERPL-CCNC: 85 IU/L (ref 44–121)
ALT SERPL-CCNC: 24 IU/L (ref 0–32)
AST SERPL-CCNC: 17 IU/L (ref 0–40)
BILIRUB SERPL-MCNC: 1.7 MG/DL (ref 0–1.2)
BUN SERPL-MCNC: 10 MG/DL (ref 8–27)
BUN/CREAT SERPL: 10 (ref 12–28)
CALCIUM SERPL-MCNC: 8.7 MG/DL (ref 8.7–10.3)
CHLORIDE SERPL-SCNC: 105 MMOL/L (ref 96–106)
CO2 SERPL-SCNC: 20 MMOL/L (ref 20–29)
CREAT SERPL-MCNC: 1.04 MG/DL (ref 0.57–1)
EGFRCR SERPLBLD CKD-EPI 2021: 60 ML/MIN/1.73
GLOBULIN SER CALC-MCNC: 2.6 G/DL (ref 1.5–4.5)
GLUCOSE SERPL-MCNC: 148 MG/DL (ref 70–99)
POTASSIUM SERPL-SCNC: 3.9 MMOL/L (ref 3.5–5.2)
SODIUM SERPL-SCNC: 141 MMOL/L (ref 134–144)

## 2023-05-15 ENCOUNTER — ANESTHESIA EVENT (OUTPATIENT)
Facility: HOSPITAL | Age: 63
End: 2023-05-15
Payer: COMMERCIAL

## 2023-05-15 RX ORDER — CYCLOPENTOLATE HYDROCHLORIDE 20 MG/ML
1 SOLUTION/ DROPS OPHTHALMIC SEE ADMIN INSTRUCTIONS
Status: COMPLETED | OUTPATIENT
Start: 2023-05-16 | End: 2023-05-16

## 2023-05-15 RX ORDER — SODIUM CHLORIDE 9 MG/ML
INJECTION, SOLUTION INTRAVENOUS PRN
Status: CANCELLED | OUTPATIENT
Start: 2023-05-15

## 2023-05-15 RX ORDER — SODIUM CHLORIDE 9 MG/ML
INJECTION, SOLUTION INTRAVENOUS CONTINUOUS
Status: DISCONTINUED | OUTPATIENT
Start: 2023-05-16 | End: 2023-05-16 | Stop reason: HOSPADM

## 2023-05-15 RX ORDER — PHENYLEPHRINE HCL 2.5 %
1 DROPS OPHTHALMIC (EYE) SEE ADMIN INSTRUCTIONS
Status: CANCELLED | OUTPATIENT
Start: 2023-05-16

## 2023-05-15 RX ORDER — DICLOFENAC SODIUM 1 MG/ML
1 SOLUTION/ DROPS OPHTHALMIC SEE ADMIN INSTRUCTIONS
Status: COMPLETED | OUTPATIENT
Start: 2023-05-16 | End: 2023-05-16

## 2023-05-15 RX ORDER — FENTANYL CITRATE 50 UG/ML
25 INJECTION, SOLUTION INTRAMUSCULAR; INTRAVENOUS EVERY 5 MIN PRN
Status: CANCELLED | OUTPATIENT
Start: 2023-05-15

## 2023-05-15 RX ORDER — SODIUM CHLORIDE 0.9 % (FLUSH) 0.9 %
5-40 SYRINGE (ML) INJECTION PRN
Status: CANCELLED | OUTPATIENT
Start: 2023-05-15

## 2023-05-15 RX ORDER — TROPICAMIDE 10 MG/ML
1 SOLUTION/ DROPS OPHTHALMIC SEE ADMIN INSTRUCTIONS
Status: COMPLETED | OUTPATIENT
Start: 2023-05-16 | End: 2023-05-16

## 2023-05-15 RX ORDER — DIPHENHYDRAMINE HYDROCHLORIDE 50 MG/ML
12.5 INJECTION INTRAMUSCULAR; INTRAVENOUS
Status: CANCELLED | OUTPATIENT
Start: 2023-05-15 | End: 2023-05-16

## 2023-05-15 RX ORDER — PROPARACAINE HYDROCHLORIDE 5 MG/ML
1 SOLUTION/ DROPS OPHTHALMIC SEE ADMIN INSTRUCTIONS
Status: DISCONTINUED | OUTPATIENT
Start: 2023-05-16 | End: 2023-05-16 | Stop reason: HOSPADM

## 2023-05-15 RX ORDER — MEPERIDINE HYDROCHLORIDE 25 MG/ML
12.5 INJECTION INTRAMUSCULAR; INTRAVENOUS; SUBCUTANEOUS EVERY 5 MIN PRN
Status: CANCELLED | OUTPATIENT
Start: 2023-05-15

## 2023-05-15 RX ORDER — DROPERIDOL 2.5 MG/ML
0.62 INJECTION, SOLUTION INTRAMUSCULAR; INTRAVENOUS
Status: CANCELLED | OUTPATIENT
Start: 2023-05-15 | End: 2023-05-16

## 2023-05-16 ENCOUNTER — ANESTHESIA (OUTPATIENT)
Facility: HOSPITAL | Age: 63
End: 2023-05-16
Payer: COMMERCIAL

## 2023-05-16 ENCOUNTER — HOSPITAL ENCOUNTER (OUTPATIENT)
Facility: HOSPITAL | Age: 63
Discharge: HOME | End: 2023-05-16
Attending: OPHTHALMOLOGY | Admitting: OPHTHALMOLOGY
Payer: COMMERCIAL

## 2023-05-16 VITALS
TEMPERATURE: 97.9 F | BODY MASS INDEX: 32.99 KG/M2 | DIASTOLIC BLOOD PRESSURE: 63 MMHG | SYSTOLIC BLOOD PRESSURE: 129 MMHG | RESPIRATION RATE: 18 BRPM | HEIGHT: 65 IN | OXYGEN SATURATION: 99 % | HEART RATE: 60 BPM | WEIGHT: 198 LBS

## 2023-05-16 LAB
ALBUMIN SERPL ELPH-MCNC: 3.9 G/DL (ref 2.9–4.4)
ALBUMIN/GLOB SERPL: 1.4 {RATIO} (ref 0.7–1.7)
ALPHA1 GLOB SERPL ELPH-MCNC: 0.2 G/DL (ref 0–0.4)
ALPHA2 GLOB SERPL ELPH-MCNC: 0.7 G/DL (ref 0.4–1)
B-GLOBULIN SERPL ELPH-MCNC: 0.9 G/DL (ref 0.7–1.3)
GAMMA GLOB SERPL ELPH-MCNC: 1 G/DL (ref 0.4–1.8)
GLOBULIN SER-MCNC: 2.8 G/DL (ref 2.2–3.9)
GLUCOSE BLD STRIP.AUTO-MCNC: 166 MG/DL (ref 65–117)
IGA SERPL-MCNC: 159 MG/DL (ref 87–352)
IGG SERPL-MCNC: 1046 MG/DL (ref 586–1602)
IGM SERPL-MCNC: 7 MG/DL (ref 26–217)
INTERPRETATION SERPL IEP-IMP: ABNORMAL
KAPPA LC FREE SER-MCNC: 19.9 MG/L (ref 3.3–19.4)
KAPPA LC FREE/LAMBDA FREE SER: 2.19 {RATIO} (ref 0.26–1.65)
LABORATORY COMMENT REPORT: ABNORMAL
LAMBDA LC FREE SERPL-MCNC: 9.1 MG/L (ref 5.7–26.3)
M PROTEIN SERPL ELPH-MCNC: ABNORMAL G/DL
PROT SERPL-MCNC: 6.7 G/DL (ref 6–8.5)
SERVICE CMNT-IMP: ABNORMAL

## 2023-05-16 PROCEDURE — 2500000003 HC RX 250 WO HCPCS: Performed by: OPHTHALMOLOGY

## 2023-05-16 PROCEDURE — V2787 ASTIGMATISM-CORRECT FUNCTION: HCPCS | Performed by: OPHTHALMOLOGY

## 2023-05-16 PROCEDURE — 2500000003 HC RX 250 WO HCPCS: Performed by: NURSE ANESTHETIST, CERTIFIED REGISTERED

## 2023-05-16 PROCEDURE — 6370000000 HC RX 637 (ALT 250 FOR IP)

## 2023-05-16 PROCEDURE — 3700000000 HC ANESTHESIA ATTENDED CARE: Performed by: OPHTHALMOLOGY

## 2023-05-16 PROCEDURE — 7100000000 HC PACU RECOVERY - FIRST 15 MIN: Performed by: OPHTHALMOLOGY

## 2023-05-16 PROCEDURE — 2709999900 HC NON-CHARGEABLE SUPPLY: Performed by: OPHTHALMOLOGY

## 2023-05-16 PROCEDURE — 3600000003 HC SURGERY LEVEL 3 BASE: Performed by: OPHTHALMOLOGY

## 2023-05-16 PROCEDURE — 6370000000 HC RX 637 (ALT 250 FOR IP): Performed by: OPHTHALMOLOGY

## 2023-05-16 PROCEDURE — 82962 GLUCOSE BLOOD TEST: CPT

## 2023-05-16 PROCEDURE — 7100000010 HC PHASE II RECOVERY - FIRST 15 MIN: Performed by: OPHTHALMOLOGY

## 2023-05-16 PROCEDURE — 3600000013 HC SURGERY LEVEL 3 ADDTL 15MIN: Performed by: OPHTHALMOLOGY

## 2023-05-16 PROCEDURE — 6360000002 HC RX W HCPCS: Performed by: NURSE ANESTHETIST, CERTIFIED REGISTERED

## 2023-05-16 PROCEDURE — 6360000002 HC RX W HCPCS: Performed by: OPHTHALMOLOGY

## 2023-05-16 PROCEDURE — 3700000001 HC ADD 15 MINUTES (ANESTHESIA): Performed by: OPHTHALMOLOGY

## 2023-05-16 PROCEDURE — 2580000003 HC RX 258: Performed by: OPHTHALMOLOGY

## 2023-05-16 PROCEDURE — 7100000001 HC PACU RECOVERY - ADDTL 15 MIN: Performed by: OPHTHALMOLOGY

## 2023-05-16 DEVICE — ACRYSOF(R) IQ TORIC ASTIGMATISM IOL, SINGLE-PIECE ACRYLIC FOLDABLE LENS, UV WITH BLUE LIGHTFILTER, 13.0MM LENGTH, 6.0MM BICONVEX TORICASPHERIC OPTIC, 1.50 D CYLINDER.
Type: IMPLANTABLE DEVICE | Site: EYE | Status: FUNCTIONAL
Brand: ACRYSOF®

## 2023-05-16 RX ORDER — PHENYLEPHRINE HCL 2.5 %
DROPS OPHTHALMIC (EYE)
Status: COMPLETED
Start: 2023-05-16 | End: 2023-05-16

## 2023-05-16 RX ORDER — SODIUM CHLORIDE, SODIUM LACTATE, POTASSIUM CHLORIDE, CALCIUM CHLORIDE 600; 310; 30; 20 MG/100ML; MG/100ML; MG/100ML; MG/100ML
INJECTION, SOLUTION INTRAVENOUS CONTINUOUS
Status: DISCONTINUED | OUTPATIENT
Start: 2023-05-16 | End: 2023-05-16 | Stop reason: HOSPADM

## 2023-05-16 RX ORDER — LIDOCAINE HYDROCHLORIDE 10 MG/ML
1 INJECTION, SOLUTION EPIDURAL; INFILTRATION; INTRACAUDAL; PERINEURAL
Status: DISCONTINUED | OUTPATIENT
Start: 2023-05-16 | End: 2023-05-16 | Stop reason: HOSPADM

## 2023-05-16 RX ORDER — SODIUM CHLORIDE 0.9 % (FLUSH) 0.9 %
5-40 SYRINGE (ML) INJECTION PRN
Status: DISCONTINUED | OUTPATIENT
Start: 2023-05-16 | End: 2023-05-16 | Stop reason: HOSPADM

## 2023-05-16 RX ORDER — NEOMYCIN SULFATE, POLYMYXIN B SULFATE, AND DEXAMETHASONE 3.5; 10000; 1 MG/G; [USP'U]/G; MG/G
OINTMENT OPHTHALMIC ONCE
Status: COMPLETED | OUTPATIENT
Start: 2023-05-16 | End: 2023-05-16

## 2023-05-16 RX ORDER — TETRACAINE HYDROCHLORIDE 5 MG/ML
1 SOLUTION OPHTHALMIC ONCE
Status: DISCONTINUED | OUTPATIENT
Start: 2023-05-16 | End: 2023-05-16 | Stop reason: HOSPADM

## 2023-05-16 RX ORDER — SODIUM CHLORIDE 9 MG/ML
INJECTION, SOLUTION INTRAVENOUS PRN
Status: DISCONTINUED | OUTPATIENT
Start: 2023-05-16 | End: 2023-05-16 | Stop reason: HOSPADM

## 2023-05-16 RX ADMIN — SODIUM CHLORIDE: 9 INJECTION, SOLUTION INTRAVENOUS at 08:16

## 2023-05-16 RX ADMIN — SODIUM CHLORIDE: 9 INJECTION, SOLUTION INTRAVENOUS at 08:28

## 2023-05-16 RX ADMIN — PHENYLEPHRINE HYDROCHLORIDE 1 DROP: 25 SOLUTION/ DROPS OPHTHALMIC at 08:17

## 2023-05-16 RX ADMIN — PHENYLEPHRINE HYDROCHLORIDE 1 DROP: 25 SOLUTION/ DROPS OPHTHALMIC at 08:22

## 2023-05-16 RX ADMIN — TROPICAMIDE 1 DROP: 10 SOLUTION/ DROPS OPHTHALMIC at 08:27

## 2023-05-16 RX ADMIN — PROPOFOL 50 MG: 10 INJECTION, EMULSION INTRAVENOUS at 09:30

## 2023-05-16 RX ADMIN — DICLOFENAC SODIUM 1 DROP: 1 SOLUTION/ DROPS OPHTHALMIC at 08:16

## 2023-05-16 RX ADMIN — DICLOFENAC SODIUM 1 DROP: 1 SOLUTION/ DROPS OPHTHALMIC at 08:27

## 2023-05-16 RX ADMIN — TROPICAMIDE 1 DROP: 10 SOLUTION/ DROPS OPHTHALMIC at 08:22

## 2023-05-16 RX ADMIN — PROPARACAINE HYDROCHLORIDE 1 DROP: 5 SOLUTION/ DROPS OPHTHALMIC at 08:16

## 2023-05-16 RX ADMIN — PROPARACAINE HYDROCHLORIDE 1 DROP: 5 SOLUTION/ DROPS OPHTHALMIC at 08:27

## 2023-05-16 RX ADMIN — LIDOCAINE HYDROCHLORIDE 100 MG: 20 INJECTION, SOLUTION EPIDURAL; INFILTRATION; INTRACAUDAL; PERINEURAL at 09:30

## 2023-05-16 RX ADMIN — CYCLOPENTOLATE HYDROCHLORIDE 1 DROP: 20 SOLUTION/ DROPS OPHTHALMIC at 08:22

## 2023-05-16 RX ADMIN — DICLOFENAC SODIUM 1 DROP: 1 SOLUTION/ DROPS OPHTHALMIC at 08:22

## 2023-05-16 RX ADMIN — TROPICAMIDE 1 DROP: 10 SOLUTION/ DROPS OPHTHALMIC at 08:16

## 2023-05-16 RX ADMIN — CYCLOPENTOLATE HYDROCHLORIDE 1 DROP: 20 SOLUTION/ DROPS OPHTHALMIC at 08:27

## 2023-05-16 RX ADMIN — CYCLOPENTOLATE HYDROCHLORIDE 1 DROP: 20 SOLUTION/ DROPS OPHTHALMIC at 08:16

## 2023-05-16 RX ADMIN — PHENYLEPHRINE HYDROCHLORIDE 1 DROP: 25 SOLUTION/ DROPS OPHTHALMIC at 08:27

## 2023-05-16 ASSESSMENT — PAIN - FUNCTIONAL ASSESSMENT: PAIN_FUNCTIONAL_ASSESSMENT: 0-10

## 2023-05-16 NOTE — DISCHARGE INSTRUCTIONS
Dr. Carmen Bertrand and [address].  [place], [address] [place] [address]  [phone]    Cataract Post Operative Instructions    Diet - you may eat a normal diet but avoid spicy or greasy tonight. Activity - Limit heavy lifting - do not lift more than 20 pounds for the next 7 days. Leave your eye patch on tonight. Your eye should remain closed under the patch. Your patch will be removed in Dr. Elsa Sosa office tomorrow. Most people do not have significant pain after cataract surgery. You may take any over-the-counter pain medication that you normally take as needed. You may resume all of your pre-operative medications. You should have your postoperative drops. If you do not[facility] You will start eyedrops TOMORROW. You have an appointment with Dr. Carlos A Reyna tomorrow in her office. Date: ___05/17/2023_____________ Time: _____10 am____________    If you need to speak to Dr. Carlos A Reyna after business hours, please call [phone]. DO NOT TAKE SLEEPING MEDICATIONS OR ANTIANXIETY MEDICATIONS WHILE TAKING NARCOTIC PAIN MEDICATIONS,  ESPECIALLY THE NIGHT OF ANESTHESIA. CPAP PATIENTS BE SURE TO WEAR MACHINE WHENEVER NAPPING OR SLEEPING. DISCHARGE SUMMARY from Nurse    The following personal items collected during your admission are returned to you:   Dental Appliance:    Vision:    Hearing Aid:    Jewelry:    Clothing:    Other Valuables:    Valuables sent to safe:        PATIENT INSTRUCTIONS:    Anesthesia Discharge Instructions for Procedural Area requiring Sedation (MAC Anesthesia, Cath Lab, Endo and Radiology): You have been given medications during your procedure that may affect your memory and mental judgement for the next 24 hours. During this time frame for your safety, please follow the instructions listed below :   Have a responsible adult to drive you home and be with you for at least 24 hours.

## 2023-05-16 NOTE — OP NOTE
Operative Note      Patient: Ryan Eason  YOB: 1960  MRN: [id number]    Date of Procedure: 5/16/2023    Pre-Op Diagnosis Codes:     * Combined forms of age-related cataract of left eye [H25.812]    Post-Op Diagnosis: Same       Procedure(s):  LEFT EYE PHACOEMULSIFICATION WITH INTRA OCULAR LENS IMPLANT (MAC WITH RETROBULBAR)    Surgeon(s):  Flores Thompson MD    Assistant:   * No surgical staff found *    Anesthesia: Monitor Anesthesia Care    Estimated Blood Loss (mL): Minimal    Complications: None    Specimens:   * No specimens in log *    Implants:  Implant Name Type Inv. Item Serial No.  Lot No. LRB No. Used Action   LENS IO +140 DIOPT L13MM DIA6MM 0DEG HAPTIC ANG A CONSTANT - [id number] Other LENS IO +140 DIOPT L13MM DIA6MM 0DEG HAPTIC ANG A CONSTANT [id number] [employer] N/A Left 1 Implanted         Drains: * No LDAs found *    Findings: none      Detailed Description of Procedure:   . The surgeon marked the toric reference axis with the patient sitting upright. After informed consent was obtained, the patient was brought into the operating suite. MAC with sedation and a retrobulbar block using a 50/50 mixture of lidocaine 2% and Marcaine 0.75% with added Amphatase was administered without complication. The patient was prepped and draped in the usual sterile ophthalmic fashion. A wire lid speculum was placed. The steep toric axis was marked. A paracentesis was made using a 75 blade. The eye was filled with Provisc. A 2.8mm keratome was used to make a temporal clear corneal incision. A cystotome and Utrada forcep was used to make a continuous curvilinear capsulorrhexis without complication. Hydrodisection was performed until the nuclear rotated freely in the capsular bag. The cataract was removed using a two handed divide and conquer technique using a [place] as a second instrument. Irrigation/aspiration was used to remove the remaining cortex.

## 2023-05-16 NOTE — PERIOP NOTE
Nicolette Dys  1960  [id number]    Situation:  Verbal report given from: Deana  DONALD  Procedure: Procedure(s):  LEFT EYE PHACOEMULSIFICATION WITH INTRA OCULAR LENS IMPLANT (MAC WITH RETROBULBAR)    Background:    Preoperative diagnosis: Combined forms of age-related cataract of left eye [H25.812]    Postoperative diagnosis: * No post-op diagnosis entered *    :  Dr. Danny Hanson    Assessment:  Intra-procedure medications       Vital signs stable      Recommendation:    Discharged to home after instructions reviewed with patient. Recommend rest until local anesthetic has worn off.

## 2023-05-16 NOTE — ANESTHESIA PRE PROCEDURE
05/16/23 134/64   05/11/23 (!) 154/84   01/26/23 123/76       NPO Status: Time of last liquid consumption: 2300                        Time of last solid consumption: 2300                        Date of last liquid consumption: 05/15/23                        Date of last solid food consumption: 05/15/23    BMI:   Wt Readings from Last 3 Encounters:   05/16/23 89.8 kg (198 lb)   05/11/23 91.2 kg (201 lb)   01/26/23 90.7 kg (200 lb)     Body mass index is 33.46 kg/m².     CBC:   Lab Results   Component Value Date/Time    WBC 4.0 05/11/2023 10:36 AM    RBC 3.92 05/11/2023 10:36 AM    HGB 13.4 05/11/2023 10:36 AM    HCT 39.6 05/11/2023 10:36 AM     05/11/2023 10:36 AM    RDW 14.7 05/11/2023 10:36 AM     05/11/2023 10:36 AM       CMP:   Lab Results   Component Value Date/Time     05/11/2023 10:36 AM    K 3.9 05/11/2023 10:36 AM     05/11/2023 10:36 AM    CO2 20 05/11/2023 10:36 AM    BUN 10 05/11/2023 10:36 AM    CREATININE 1.04 05/11/2023 10:36 AM    GFRAA >60 07/18/2022 11:11 AM    AGRATIO 1.6 05/11/2023 10:36 AM    LABGLOM 60 05/11/2023 10:36 AM    GLUCOSE 148 05/11/2023 10:36 AM    PROT 6.6 02/28/2023 09:37 AM    CALCIUM 8.7 05/11/2023 10:36 AM    BILITOT 1.7 05/11/2023 10:36 AM    ALKPHOS 85 05/11/2023 10:36 AM    AST 17 05/11/2023 10:36 AM    ALT 24 05/11/2023 10:36 AM       POC Tests:   Recent Labs     05/16/23  0812   POCGLU 166*       Coags:   Lab Results   Component Value Date/Time    PROTIME 10.9 05/04/2021 10:41 AM    INR 1.0 05/04/2021 10:41 AM       HCG (If Applicable): No results found for: PREGTESTUR, PREGSERUM, HCG, HCGQUANT     ABGs: No results found for: PHART, PO2ART, YQQ0JYF, CKF9VGZ, BEART, S5MVGBTW     Type & Screen (If Applicable):  No results found for: LABABO, LABRH    Drug/Infectious Status (If Applicable):  Lab Results   Component Value Date/Time    HEPCAB <0.1 05/17/2016 09:56 AM       COVID-19 Screening (If Applicable):   Lab Results   Component Value Date/Time

## 2023-05-16 NOTE — PROGRESS NOTES
Permission received to review discharge instructions and discuss private health information with , Perry Velásquez. Patient states that  will be with them for at least 24 hours following today's procedure.

## 2023-05-16 NOTE — ANESTHESIA POSTPROCEDURE EVALUATION
Department of Anesthesiology  Postprocedure Note    Patient: Aileen Ohara  MRN: [id number]  YOB: 1960  Date of evaluation: 5/16/2023      Procedure Summary     Date: 05/16/23 Room / Location: [facility] ASU B3 / [facility] AMBULATORY OR    Anesthesia Start: 0925 Anesthesia Stop: 4805    Procedure: LEFT EYE PHACOEMULSIFICATION WITH INTRA OCULAR LENS IMPLANT (MAC WITH RETROBULBAR) (Left: Eye) Diagnosis:       Combined forms of age-related cataract of left eye      (Combined forms of age-related cataract of left eye [H25.812])    Surgeons: Robert Mccord MD Responsible Provider: Sandra Bethea MD    Anesthesia Type: MAC ASA Status: 3          Anesthesia Type: No value filed.     Curtis Phase I: Curtis Score: 10    Curtis Phase II: Curtis Score: 10      Anesthesia Post Evaluation    Patient location during evaluation: PACU  Patient participation: complete - patient participated  Level of consciousness: awake and alert  Pain score: 0  Airway patency: patent  Nausea & Vomiting: no nausea and no vomiting  Complications: no  Cardiovascular status: hemodynamically stable  Respiratory status: acceptable  Hydration status: euvolemic

## 2023-05-18 ENCOUNTER — TRANSCRIBE ORDERS (OUTPATIENT)
Facility: HOSPITAL | Age: 63
End: 2023-05-18

## 2023-05-18 DIAGNOSIS — Z12.31 ENCOUNTER FOR SCREENING MAMMOGRAM FOR MALIGNANT NEOPLASM OF BREAST: Primary | ICD-10-CM

## 2023-05-24 DIAGNOSIS — C90.01 MULTIPLE MYELOMA IN REMISSION (HCC): Primary | ICD-10-CM

## 2023-05-24 RX ORDER — LENALIDOMIDE 10 MG/1
10 CAPSULE ORAL DAILY
Qty: 21 CAPSULE | Refills: 0 | Status: ACTIVE | OUTPATIENT
Start: 2023-05-24

## 2023-05-24 NOTE — TELEPHONE ENCOUNTER
Oral Chemotherapy      Lisa Forde is a  59 y. o.female  diagnosed with MM . Ms. Sivakumar Moran is being treated with lenalidomide.      Medication name: lenalidomide     Dose:  10 mg   Frequency: daily  Administration schedule: 21 days on, 7 days off  Ordering provider: Garo Walsh MD        REMS prescriber survey completed Auth # [id number] and Refill sent to [place] [address] 1/26/23  Next visit 7/26/23        Aysha Decker, FRANCESCOD, [facility], [address] [place] [address]    Program: Medical Group  CPA in place:  Yes  Recommendation Provided To: Patient/Caregiver: 1 via Telephone  Intervention Detail: Refill(s) Provided  Intervention Accepted By: Patient/Caregiver: 1    Time Spent (min): 15

## 2023-05-26 ENCOUNTER — ANESTHESIA EVENT (OUTPATIENT)
Facility: HOSPITAL | Age: 63
End: 2023-05-26
Payer: COMMERCIAL

## 2023-05-30 ENCOUNTER — HOSPITAL ENCOUNTER (OUTPATIENT)
Facility: HOSPITAL | Age: 63
Discharge: HOME | End: 2023-05-30
Attending: OPHTHALMOLOGY | Admitting: OPHTHALMOLOGY
Payer: COMMERCIAL

## 2023-05-30 ENCOUNTER — ANESTHESIA (OUTPATIENT)
Facility: HOSPITAL | Age: 63
End: 2023-05-30
Payer: COMMERCIAL

## 2023-05-30 VITALS
DIASTOLIC BLOOD PRESSURE: 82 MMHG | BODY MASS INDEX: 33.15 KG/M2 | WEIGHT: 199 LBS | OXYGEN SATURATION: 100 % | HEART RATE: 60 BPM | RESPIRATION RATE: 15 BRPM | SYSTOLIC BLOOD PRESSURE: 152 MMHG | HEIGHT: 65 IN | TEMPERATURE: 97.9 F

## 2023-05-30 LAB
GLUCOSE BLD STRIP.AUTO-MCNC: 138 MG/DL (ref 65–117)
SERVICE CMNT-IMP: ABNORMAL

## 2023-05-30 PROCEDURE — 3600000003 HC SURGERY LEVEL 3 BASE: Performed by: OPHTHALMOLOGY

## 2023-05-30 PROCEDURE — 6360000002 HC RX W HCPCS: Performed by: NURSE ANESTHETIST, CERTIFIED REGISTERED

## 2023-05-30 PROCEDURE — 7100000000 HC PACU RECOVERY - FIRST 15 MIN: Performed by: OPHTHALMOLOGY

## 2023-05-30 PROCEDURE — V2787 ASTIGMATISM-CORRECT FUNCTION: HCPCS | Performed by: OPHTHALMOLOGY

## 2023-05-30 PROCEDURE — 7100000010 HC PHASE II RECOVERY - FIRST 15 MIN: Performed by: OPHTHALMOLOGY

## 2023-05-30 PROCEDURE — 6370000000 HC RX 637 (ALT 250 FOR IP): Performed by: OPHTHALMOLOGY

## 2023-05-30 PROCEDURE — 2500000003 HC RX 250 WO HCPCS: Performed by: NURSE ANESTHETIST, CERTIFIED REGISTERED

## 2023-05-30 PROCEDURE — 82962 GLUCOSE BLOOD TEST: CPT

## 2023-05-30 PROCEDURE — 7100000011 HC PHASE II RECOVERY - ADDTL 15 MIN: Performed by: OPHTHALMOLOGY

## 2023-05-30 PROCEDURE — 6360000002 HC RX W HCPCS: Performed by: OPHTHALMOLOGY

## 2023-05-30 PROCEDURE — 2580000003 HC RX 258: Performed by: ANESTHESIOLOGY

## 2023-05-30 PROCEDURE — 3700000000 HC ANESTHESIA ATTENDED CARE: Performed by: OPHTHALMOLOGY

## 2023-05-30 PROCEDURE — 3700000001 HC ADD 15 MINUTES (ANESTHESIA): Performed by: OPHTHALMOLOGY

## 2023-05-30 PROCEDURE — 2709999900 HC NON-CHARGEABLE SUPPLY: Performed by: OPHTHALMOLOGY

## 2023-05-30 PROCEDURE — 2500000003 HC RX 250 WO HCPCS: Performed by: OPHTHALMOLOGY

## 2023-05-30 PROCEDURE — 3600000013 HC SURGERY LEVEL 3 ADDTL 15MIN: Performed by: OPHTHALMOLOGY

## 2023-05-30 DEVICE — ACRYSOF(R) IQ TORIC ASTIGMATISM IOL, SINGLE-PIECE ACRYLIC FOLDABLE LENS, UV WITH BLUE LIGHTFILTER, 13.0MM LENGTH, 6.0MM BICONVEX TORICASPHERIC OPTIC, 1.50 D CYLINDER.
Type: IMPLANTABLE DEVICE | Site: EYE | Status: FUNCTIONAL
Brand: ACRYSOF®

## 2023-05-30 RX ORDER — DICLOFENAC SODIUM 1 MG/ML
1 SOLUTION/ DROPS OPHTHALMIC
Status: COMPLETED | OUTPATIENT
Start: 2023-05-30 | End: 2023-05-30

## 2023-05-30 RX ORDER — SODIUM CHLORIDE 9 MG/ML
INJECTION, SOLUTION INTRAVENOUS PRN
Status: DISCONTINUED | OUTPATIENT
Start: 2023-05-30 | End: 2023-06-01 | Stop reason: HOSPADM

## 2023-05-30 RX ORDER — CYCLOPENTOLATE HYDROCHLORIDE 20 MG/ML
1 SOLUTION/ DROPS OPHTHALMIC
Status: COMPLETED | OUTPATIENT
Start: 2023-05-30 | End: 2023-05-30

## 2023-05-30 RX ORDER — FENTANYL CITRATE 50 UG/ML
25 INJECTION, SOLUTION INTRAMUSCULAR; INTRAVENOUS EVERY 5 MIN PRN
Status: DISCONTINUED | OUTPATIENT
Start: 2023-05-30 | End: 2023-06-01 | Stop reason: HOSPADM

## 2023-05-30 RX ORDER — PHENYLEPHRINE HCL 2.5 %
1 DROPS OPHTHALMIC (EYE)
Status: COMPLETED | OUTPATIENT
Start: 2023-05-30 | End: 2023-05-30

## 2023-05-30 RX ORDER — NEOMYCIN SULFATE, POLYMYXIN B SULFATE, AND DEXAMETHASONE 3.5; 10000; 1 MG/G; [USP'U]/G; MG/G
OINTMENT OPHTHALMIC ONCE
Status: COMPLETED | OUTPATIENT
Start: 2023-05-30 | End: 2023-05-30

## 2023-05-30 RX ORDER — SODIUM CHLORIDE 0.9 % (FLUSH) 0.9 %
5-40 SYRINGE (ML) INJECTION PRN
Status: DISCONTINUED | OUTPATIENT
Start: 2023-05-30 | End: 2023-06-01 | Stop reason: HOSPADM

## 2023-05-30 RX ORDER — ONDANSETRON 2 MG/ML
4 INJECTION INTRAMUSCULAR; INTRAVENOUS
Status: DISCONTINUED | OUTPATIENT
Start: 2023-05-30 | End: 2023-05-31 | Stop reason: HOSPADM

## 2023-05-30 RX ORDER — LIDOCAINE HYDROCHLORIDE 20 MG/ML
INJECTION, SOLUTION EPIDURAL; INFILTRATION; INTRACAUDAL; PERINEURAL PRN
Status: DISCONTINUED | OUTPATIENT
Start: 2023-05-30 | End: 2023-05-30 | Stop reason: SDUPTHER

## 2023-05-30 RX ORDER — SODIUM CHLORIDE, POTASSIUM CHLORIDE, CALCIUM CHLORIDE, MAGNESIUM CHLORIDE, SODIUM ACETATE, AND SODIUM CITRATE 6.4; .75; .48; .3; 3.9; 1.7 MG/ML; MG/ML; MG/ML; MG/ML; MG/ML; MG/ML
15 SOLUTION IRRIGATION ONCE
Status: COMPLETED | OUTPATIENT
Start: 2023-05-30 | End: 2023-05-30

## 2023-05-30 RX ORDER — SODIUM CHLORIDE, SODIUM LACTATE, POTASSIUM CHLORIDE, CALCIUM CHLORIDE 600; 310; 30; 20 MG/100ML; MG/100ML; MG/100ML; MG/100ML
INJECTION, SOLUTION INTRAVENOUS CONTINUOUS
Status: DISCONTINUED | OUTPATIENT
Start: 2023-05-30 | End: 2023-06-01 | Stop reason: HOSPADM

## 2023-05-30 RX ORDER — TROPICAMIDE 10 MG/ML
1 SOLUTION/ DROPS OPHTHALMIC
Status: COMPLETED | OUTPATIENT
Start: 2023-05-30 | End: 2023-05-30

## 2023-05-30 RX ORDER — TETRACAINE HYDROCHLORIDE 5 MG/ML
1 SOLUTION OPHTHALMIC ONCE
Status: DISCONTINUED | OUTPATIENT
Start: 2023-05-30 | End: 2023-06-01 | Stop reason: HOSPADM

## 2023-05-30 RX ORDER — PROPARACAINE HYDROCHLORIDE 5 MG/ML
1 SOLUTION/ DROPS OPHTHALMIC
Status: COMPLETED | OUTPATIENT
Start: 2023-05-30 | End: 2023-05-30

## 2023-05-30 RX ORDER — LIDOCAINE HYDROCHLORIDE 10 MG/ML
1 INJECTION, SOLUTION EPIDURAL; INFILTRATION; INTRACAUDAL; PERINEURAL
Status: DISCONTINUED | OUTPATIENT
Start: 2023-05-30 | End: 2023-05-31 | Stop reason: HOSPADM

## 2023-05-30 RX ORDER — MEPERIDINE HYDROCHLORIDE 25 MG/ML
12.5 INJECTION INTRAMUSCULAR; INTRAVENOUS; SUBCUTANEOUS EVERY 5 MIN PRN
Status: DISCONTINUED | OUTPATIENT
Start: 2023-05-30 | End: 2023-06-01 | Stop reason: HOSPADM

## 2023-05-30 RX ORDER — DIPHENHYDRAMINE HYDROCHLORIDE 50 MG/ML
12.5 INJECTION INTRAMUSCULAR; INTRAVENOUS
Status: DISCONTINUED | OUTPATIENT
Start: 2023-05-30 | End: 2023-05-31 | Stop reason: HOSPADM

## 2023-05-30 RX ADMIN — TROPICAMIDE 1 DROP: 10 SOLUTION/ DROPS OPHTHALMIC at 07:40

## 2023-05-30 RX ADMIN — CYCLOPENTOLATE HYDROCHLORIDE 1 DROP: 20 SOLUTION/ DROPS OPHTHALMIC at 07:40

## 2023-05-30 RX ADMIN — LIDOCAINE HYDROCHLORIDE 40 MG: 20 INJECTION, SOLUTION EPIDURAL; INFILTRATION; INTRACAUDAL; PERINEURAL at 08:26

## 2023-05-30 RX ADMIN — DICLOFENAC SODIUM 1 DROP: 1 SOLUTION/ DROPS OPHTHALMIC at 07:45

## 2023-05-30 RX ADMIN — PROPARACAINE HYDROCHLORIDE 1 DROP: 5 SOLUTION/ DROPS OPHTHALMIC at 07:40

## 2023-05-30 RX ADMIN — CYCLOPENTOLATE HYDROCHLORIDE 1 DROP: 20 SOLUTION/ DROPS OPHTHALMIC at 07:54

## 2023-05-30 RX ADMIN — PROPARACAINE HYDROCHLORIDE 1 DROP: 5 SOLUTION/ DROPS OPHTHALMIC at 07:54

## 2023-05-30 RX ADMIN — PHENYLEPHRINE HYDROCHLORIDE 1 DROP: 25 SOLUTION/ DROPS OPHTHALMIC at 07:54

## 2023-05-30 RX ADMIN — PHENYLEPHRINE HYDROCHLORIDE 1 DROP: 25 SOLUTION/ DROPS OPHTHALMIC at 07:40

## 2023-05-30 RX ADMIN — TROPICAMIDE 1 DROP: 10 SOLUTION/ DROPS OPHTHALMIC at 07:45

## 2023-05-30 RX ADMIN — TROPICAMIDE 1 DROP: 10 SOLUTION/ DROPS OPHTHALMIC at 07:54

## 2023-05-30 RX ADMIN — PHENYLEPHRINE HYDROCHLORIDE 1 DROP: 25 SOLUTION/ DROPS OPHTHALMIC at 07:45

## 2023-05-30 RX ADMIN — SODIUM CHLORIDE: 9 INJECTION, SOLUTION INTRAVENOUS at 07:45

## 2023-05-30 RX ADMIN — CYCLOPENTOLATE HYDROCHLORIDE 1 DROP: 20 SOLUTION/ DROPS OPHTHALMIC at 07:45

## 2023-05-30 RX ADMIN — DICLOFENAC SODIUM 1 DROP: 1 SOLUTION/ DROPS OPHTHALMIC at 07:40

## 2023-05-30 RX ADMIN — DICLOFENAC SODIUM 1 DROP: 1 SOLUTION/ DROPS OPHTHALMIC at 07:54

## 2023-05-30 RX ADMIN — PROPOFOL 70 MG: 10 INJECTION, EMULSION INTRAVENOUS at 08:26

## 2023-05-30 ASSESSMENT — PAIN SCALES - GENERAL
PAINLEVEL_OUTOF10: 0

## 2023-05-30 ASSESSMENT — PAIN - FUNCTIONAL ASSESSMENT: PAIN_FUNCTIONAL_ASSESSMENT: 0-10

## 2023-05-30 NOTE — DISCHARGE INSTRUCTIONS
Dr. Lazarus Haste and [address].  [address] [facility] [address], [address] [facility]  [phone]    Cataract Post Operative Instructions    Diet - you may eat a normal diet but avoid spicy or greasy tonight. Activity - Limit heavy lifting - do not lift more than 20 pounds for the next 7 days. Leave your eye patch on tonight. Your eye should remain closed under the patch. Your patch will be removed in Dr. Mazariegos Needs office tomorrow. Most people do not have significant pain after cataract surgery. You may take any over-the-counter pain medication that you normally take as needed. You may resume all of your pre-operative medications. You should have your postoperative drops. If you do not[facility] You will start eyedrops TOMORROW. You have an appointment with Dr. Laurence De Jesus tomorrow in her office. Date: _______05/31/2023_________ Time: ___1000 am______________    If you need to speak to Dr. Laurence De Jesus after business hours, please call [phone]. DO NOT TAKE SLEEPING MEDICATIONS OR ANTIANXIETY MEDICATIONS WHILE TAKING NARCOTIC PAIN MEDICATIONS,  ESPECIALLY THE NIGHT OF ANESTHESIA. CPAP PATIENTS BE SURE TO WEAR MACHINE WHENEVER NAPPING OR SLEEPING. DISCHARGE SUMMARY from Nurse    The following personal items collected during your admission are returned to you:   Dental Appliance:    Vision:    Hearing Aid:    Jewelry:    Clothing:    Other Valuables:    Valuables sent to safe:        PATIENT INSTRUCTIONS:    Anesthesia Discharge Instructions for Procedural Area requiring Sedation (MAC Anesthesia, Cath Lab, Endo and Radiology): You have been given medications during your procedure that may affect your memory and mental judgement for the next 24 hours. During this time frame for your safety, please follow the instructions listed below :   Have a responsible adult to drive you home and be with you for at least 24 hours.

## 2023-05-30 NOTE — PROGRESS NOTES
Permission received to review discharge instructions and discuss private health information with , Billy Garcias. Patient states that family/friend will be with them for at least 24 hours following today's procedure.

## 2023-05-30 NOTE — ANESTHESIA POSTPROCEDURE EVALUATION
Department of Anesthesiology  Postprocedure Note    Patient: Trent Wiggins  MRN: [id number]  YOB: 1960  Date of evaluation: 5/30/2023      Procedure Summary     Date: 05/30/23 Room / Location: [facility] ASU B3 / [facility] AMBULATORY OR    Anesthesia Start: 7253 Anesthesia Stop: 1660    Procedure: RIGHT EYE SECOND EYE PHACOEMULSIFICATION WITH INTRA OCULAR LENS IMPLANT (MAC WITH RETROBULBAR) (Right: Eye) Diagnosis:       Combined forms of age-related cataract of right eye      (Combined forms of age-related cataract of right eye [H25.811])    Surgeons: Cole Barrow MD Responsible Provider: Zandra Eddy MD    Anesthesia Type: Regional, MAC ASA Status: 3          Anesthesia Type: Regional, MAC    Curtis Phase I: Curtis Score: 10    Curtis Phase II: Curtis Score: 10      Anesthesia Post Evaluation    Patient location during evaluation: PACU  Patient participation: complete - patient participated  Level of consciousness: awake and alert  Pain score: 0  Airway patency: patent  Nausea & Vomiting: no nausea and no vomiting  Complications: no  Cardiovascular status: hemodynamically stable  Respiratory status: acceptable  Hydration status: euvolemic  Multimodal analgesia pain management approach

## 2023-05-30 NOTE — ANESTHESIA PRE PROCEDURE
PREGSERUM, HCG, HCGQUANT     ABGs: No results found for: PHART, PO2ART, DQV9WES, UCW0ISG, BEART, C8KCBICL     Type & Screen (If Applicable):  No results found for: LABABO, LABRH    Drug/Infectious Status (If Applicable):  Lab Results   Component Value Date/Time    HEPCAB <0.1 05/17/2016 09:56 AM       COVID-19 Screening (If Applicable):   Lab Results   Component Value Date/Time    COVID19 Not Detected 05/20/2021 06:41 AM    COVID19 Please find results under separate order 05/20/2021 06:41 AM           Anesthesia Evaluation  Patient summary reviewed and Nursing notes reviewed  Airway: Mallampati: I  TM distance: >3 FB   Neck ROM: full  Mouth opening: > = 3 FB   Dental: normal exam         Pulmonary:Negative Pulmonary ROS breath sounds clear to auscultation                             Cardiovascular:  Exercise tolerance: good (>4 METS),   (+) hypertension:, pacemaker ( for SSS): AICD, CAD:, dysrhythmias: atrial fibrillation, hyperlipidemia      ECG reviewed  Rhythm: regular  Rate: normal  Echocardiogram reviewed    Cleared by cardiology     Beta Blocker:  Dose within 24 Hrs      ROS comment: Cardiomyopathy    05/21 ECHO= EF 50-55%, mild-mod TR    EKG= LBBB  Cardiology note 05/23: Stable, compensated     Neuro/Psych:   Negative Neuro/Psych ROS              GI/Hepatic/Renal: Neg GI/Hepatic/Renal ROS            Endo/Other:    (+) DiabetesType II DM, , malignancy/cancer (Multiple Myeloma). ROS comment: Cataracts Abdominal:             Vascular:   + DVT, . Other Findings:             Anesthesia Plan      MAC     ASA 3       Induction: intravenous. Anesthetic plan and risks discussed with patient. Plan discussed with CRNA.     Attending anesthesiologist reviewed and agrees with Preprocedure content                Bessy Quijano MD   5/30/2023

## 2023-05-30 NOTE — OP NOTE
polish was used as needed. The bag was inflated using Provisc. An intraocular lens was placed into the bag without complication taking care to align the toric axis. Irrigation/aspiration was used to remove the viscoelastic. The chamber was filled with BSS and the wound hydrated. The wound was found to be watertight. Subconjunctival injections of ancef and decadron were given. The lid speculum and drape was removed. The eye was dressed with ointment, eye pads, and eye shield. The patient was taken to the recover room having tolerated the procedure well.        Electronically signed by Ashlee Florence MD on 5/30/2023 at 1:16 PM

## 2023-05-31 ENCOUNTER — CLINICAL DOCUMENTATION (OUTPATIENT)
Age: 63
End: 2023-05-31

## 2023-05-31 PROCEDURE — 6360000002 HC RX W HCPCS: Performed by: OPHTHALMOLOGY

## 2023-06-21 ENCOUNTER — TELEPHONE (OUTPATIENT)
Age: 63
End: 2023-06-21

## 2023-06-26 DIAGNOSIS — C90.01 MULTIPLE MYELOMA IN REMISSION (HCC): ICD-10-CM

## 2023-06-26 RX ORDER — LENALIDOMIDE 10 MG/1
10 CAPSULE ORAL DAILY
Qty: 21 CAPSULE | Refills: 0 | Status: ACTIVE | OUTPATIENT
Start: 2023-06-26

## 2023-07-10 DIAGNOSIS — C90.01 MULTIPLE MYELOMA IN REMISSION (HCC): ICD-10-CM

## 2023-07-11 DIAGNOSIS — C90.01 MULTIPLE MYELOMA IN REMISSION (HCC): Primary | ICD-10-CM

## 2023-07-11 LAB
ALBUMIN SERPL-MCNC: 3.6 G/DL (ref 3.5–5)
ALBUMIN/GLOB SERPL: 1.2 (ref 1.1–2.2)
ALP SERPL-CCNC: 91 U/L (ref 45–117)
ALT SERPL-CCNC: 33 U/L (ref 12–78)
ANION GAP SERPL CALC-SCNC: 7 MMOL/L (ref 5–15)
AST SERPL-CCNC: 23 U/L (ref 15–37)
BILIRUB SERPL-MCNC: 1.1 MG/DL (ref 0.2–1)
BUN SERPL-MCNC: 8 MG/DL (ref 6–20)
BUN/CREAT SERPL: 9 (ref 12–20)
CALCIUM SERPL-MCNC: 9.1 MG/DL (ref 8.5–10.1)
CHLORIDE SERPL-SCNC: 111 MMOL/L (ref 97–108)
CO2 SERPL-SCNC: 21 MMOL/L (ref 21–32)
CREAT SERPL-MCNC: 0.88 MG/DL (ref 0.55–1.02)
GLOBULIN SER CALC-MCNC: 3.1 G/DL (ref 2–4)
GLUCOSE SERPL-MCNC: 142 MG/DL (ref 65–100)
POTASSIUM SERPL-SCNC: 4.2 MMOL/L (ref 3.5–5.1)
PROT SERPL-MCNC: 6.7 G/DL (ref 6.4–8.2)
SODIUM SERPL-SCNC: 139 MMOL/L (ref 136–145)

## 2023-07-12 ENCOUNTER — TELEPHONE (OUTPATIENT)
Age: 63
End: 2023-07-12

## 2023-07-12 NOTE — TELEPHONE ENCOUNTER
1337:    Returned call to patient and confirmed x2 identifiers   Informed new orders placed     Patient verbalized understanding   No new questions or concerns at this time

## 2023-07-12 NOTE — TELEPHONE ENCOUNTER
Pt called to say she needs a new order for blood work because they didn't draw enough. Call back number is [phone].

## 2023-07-13 DIAGNOSIS — C90.01 MULTIPLE MYELOMA IN REMISSION (HCC): ICD-10-CM

## 2023-07-17 LAB
ALBUMIN SERPL ELPH-MCNC: 3.7 G/DL (ref 2.9–4.4)
ALBUMIN/GLOB SERPL: 1.2 {RATIO} (ref 0.7–1.7)
ALPHA1 GLOB SERPL ELPH-MCNC: 0.2 G/DL (ref 0–0.4)
ALPHA2 GLOB SERPL ELPH-MCNC: 0.8 G/DL (ref 0.4–1)
B-GLOBULIN SERPL ELPH-MCNC: 1.1 G/DL (ref 0.7–1.3)
GAMMA GLOB SERPL ELPH-MCNC: 1.1 G/DL (ref 0.4–1.8)
GLOBULIN SER-MCNC: 3.2 G/DL (ref 2.2–3.9)
IGA SERPL-MCNC: 170 MG/DL (ref 87–352)
IGG SERPL-MCNC: 1095 MG/DL (ref 586–1602)
IGM SERPL-MCNC: 6 MG/DL (ref 26–217)
INTERPRETATION SERPL IEP-IMP: ABNORMAL
KAPPA LC FREE SER-MCNC: 22.5 MG/L (ref 3.3–19.4)
KAPPA LC FREE/LAMBDA FREE SER: 1.91 {RATIO} (ref 0.26–1.65)
LABORATORY COMMENT REPORT: ABNORMAL
LAMBDA LC FREE SERPL-MCNC: 11.8 MG/L (ref 5.7–26.3)
M PROTEIN SERPL ELPH-MCNC: ABNORMAL G/DL
PROT SERPL-MCNC: 6.9 G/DL (ref 6–8.5)

## 2023-07-17 RX ORDER — ATORVASTATIN CALCIUM 40 MG/1
TABLET, FILM COATED ORAL
Qty: 90 TABLET | Refills: 3 | Status: SHIPPED | OUTPATIENT
Start: 2023-07-17

## 2023-07-18 ENCOUNTER — OFFICE VISIT (OUTPATIENT)
Age: 63
End: 2023-07-18
Payer: COMMERCIAL

## 2023-07-18 VITALS
SYSTOLIC BLOOD PRESSURE: 128 MMHG | OXYGEN SATURATION: 94 % | DIASTOLIC BLOOD PRESSURE: 63 MMHG | TEMPERATURE: 97.8 F | HEART RATE: 63 BPM | BODY MASS INDEX: 32.49 KG/M2 | RESPIRATION RATE: 16 BRPM | HEIGHT: 65 IN | WEIGHT: 195 LBS

## 2023-07-18 DIAGNOSIS — E11.9 TYPE 2 DIABETES MELLITUS WITHOUT COMPLICATION, WITHOUT LONG-TERM CURRENT USE OF INSULIN (HCC): Primary | ICD-10-CM

## 2023-07-18 LAB
CREAT UR-MCNC: 305 MG/DL
HBA1C MFR BLD: 6.9 %
Lab: NORMAL
MICROALBUMIN UR-MCNC: 5.08 MG/DL
MICROALBUMIN/CREAT UR-RTO: 17 MG/G (ref 0–30)

## 2023-07-18 PROCEDURE — 83036 HEMOGLOBIN GLYCOSYLATED A1C: CPT | Performed by: FAMILY MEDICINE

## 2023-07-18 PROCEDURE — 3044F HG A1C LEVEL LT 7.0%: CPT | Performed by: FAMILY MEDICINE

## 2023-07-18 PROCEDURE — 99213 OFFICE O/P EST LOW 20 MIN: CPT | Performed by: FAMILY MEDICINE

## 2023-07-18 PROCEDURE — 3074F SYST BP LT 130 MM HG: CPT | Performed by: FAMILY MEDICINE

## 2023-07-18 PROCEDURE — 3078F DIAST BP <80 MM HG: CPT | Performed by: FAMILY MEDICINE

## 2023-07-18 NOTE — PROGRESS NOTES
Katrina Olvera (:  1960) is a 57 y.o. female,Established patient, here for evaluation of the following chief complaint(s):  Hypertension, Diabetes, Cholesterol Problem, and Follow-up         ASSESSMENT/PLAN:  1. Type 2 diabetes mellitus without complication, without long-term current use of insulin (HCC)  -     Microalbumin / Creatinine Urine Ratio; Future  -     AMB POC HEMOGLOBIN A1C      No follow-ups on file. Subjective   SUBJECTIVE/OBJECTIVE:  HPI Pt. Comes in for blood pressure, cholesterol, and diabetes check. No complaints of chest pain, shortness of breath, TIAs, claudication or edema. Walks around store and house- no problems. Does housework. Myeloma is doing well. Review of Systems       Objective   Physical Exam  Cardiovascular:      Rate and Rhythm: Normal rate and regular rhythm. Heart sounds: Normal heart sounds. No murmur heard. Pulmonary:      Effort: Pulmonary effort is normal.      Breath sounds: Normal breath sounds. Abdominal:      General: Abdomen is flat. Bowel sounds are normal.      Palpations: Abdomen is soft. Musculoskeletal:      Right lower leg: No edema. Left lower leg: No edema. An electronic signature was used to authenticate this note.     --Patience Galloway MD

## 2023-07-18 NOTE — PROGRESS NOTES
Chief Complaint   Patient presents with    Hypertension    Diabetes    Cholesterol Problem    Follow-up         1. \"Have you been to the ER, urgent care clinic since your last visit? Hospitalized since your last visit? \" Yes - [facility] - 5/30/23    2. \"Have you seen or consulted any other health care providers outside of the [address] [place] [address] since your last visit? \" Yes - [facility] - Dr. Angie Lisa     3. For patients aged 43-73: Has the patient had a colonoscopy / FIT/ Cologuard? yes      If the patient is female:    4. For patients aged 43-66: Has the patient had a mammogram within the past 2 years? yes      5.  For patients aged 21-65: Has the patient had a pap smear? yes      Health Maintenance Due   Topic Date Due    HIV screen  Never done    Pneumococcal 0-64 years Vaccine (2 - PPSV23 if available, else PCV20) 04/30/2019    Diabetic foot exam  09/21/2019    Shingles vaccine (2 of 2) 12/25/2020    Diabetic retinal exam  07/18/2023

## 2023-07-20 DIAGNOSIS — C90.01 MULTIPLE MYELOMA IN REMISSION (HCC): ICD-10-CM

## 2023-07-21 DIAGNOSIS — C90.01 MULTIPLE MYELOMA IN REMISSION (HCC): ICD-10-CM

## 2023-07-21 RX ORDER — LENALIDOMIDE 10 MG/1
10 CAPSULE ORAL DAILY
Qty: 21 CAPSULE | Refills: 0 | Status: ACTIVE | OUTPATIENT
Start: 2023-07-21

## 2023-07-21 RX ORDER — LENALIDOMIDE 10 MG/1
CAPSULE ORAL
Qty: 21 CAPSULE | Refills: 0 | OUTPATIENT
Start: 2023-07-21

## 2023-07-21 NOTE — TELEPHONE ENCOUNTER
Oral Chemotherapy      Angelina Acosta is a  57 y. o.female  diagnosed with MM . Ms. Ramon Louis is being treated with lenalidomide.      Medication name: lenalidomide     Dose:  10 mg   Frequency: daily  Administration schedule: 21 days on, 7 days off  Ordering provider: Lila Jarrett MD        REMS prescriber survey completed Auth # [id number] and Refill sent to [address] [place] [address] 1/26/23  Next visit 7/26/23        Chuck Snell, PHARMD, [facility], [address] [place] [address]    Program: Medical Group  CPA in place:  Yes  Recommendation Provided To: Patient/Caregiver: 1 via Telephone  Intervention Detail: Refill(s) Provided  Intervention Accepted By: Patient/Caregiver: 1    Time Spent (min): 15

## 2023-08-14 ENCOUNTER — NURSE TRIAGE (OUTPATIENT)
Dept: OTHER | Facility: CLINIC | Age: 63
End: 2023-08-14

## 2023-08-14 DIAGNOSIS — C90.01 MULTIPLE MYELOMA IN REMISSION (HCC): ICD-10-CM

## 2023-08-14 NOTE — TELEPHONE ENCOUNTER
Patient wants a return call regarding having cough and congestion and sob due to cough.   Please give her a call @ [phone]

## 2023-08-14 NOTE — TELEPHONE ENCOUNTER
Location of patient: [facility] call from Carmen Hagen at [employer] with Need. Subjective: Caller states \"cough and SOB at times\"     Current Symptoms: productive cough with green mucus. Runny nose with green mucus. SOB with coughing    Denies headache, chest pain    Onset: 3 weeks ago; unchanged    Associated Symptoms: reduced appetite, increased wakefulness    Pain Severity: 0/10; N/A; none    Temperature: none     What has been tried: allergy/cold tablets, mucus relief cough syrup. LMP: NA Pregnant: NA    Recommended disposition: See in Office Today or Tomorrow    Care advice provided, patient verbalizes understanding; denies any other questions or concerns; instructed to call back for any new or worsening symptoms. Vita Pascual at [employer] calling patient to schedule    Attention Provider: Thank you for allowing me to participate in the care of your patient. The patient was connected to triage in response to information provided to the ECC/PSC. Please do not respond through this encounter as the response is not directed to a shared pool.         Reason for Disposition   Patient wants to be seen    Protocols used: Cough-ADULT-OH

## 2023-08-15 ENCOUNTER — TELEPHONE (OUTPATIENT)
Age: 63
End: 2023-08-15

## 2023-08-15 RX ORDER — LENALIDOMIDE 10 MG/1
CAPSULE ORAL
Qty: 21 CAPSULE | Refills: 0 | Status: ACTIVE | OUTPATIENT
Start: 2023-08-15

## 2023-08-15 NOTE — TELEPHONE ENCOUNTER
Spoke with Ruby Fabian and patient received last shipment on July 25th. Should be on her final week of tablets. Will wait and let PharmD for office refill script.

## 2023-08-15 NOTE — TELEPHONE ENCOUNTER
Orders Placed This Encounter    REVLIMID 10 MG chemo capsule     Sig: TAKE 1 CAPSULE BY MOUTH ONCE DAILY FOR 21 DAYS ON AND 7 DAYS OFF     Dispense:  21 capsule     Refill:  0     8/15/23 [facility] Refill [id number]     Follow Up Scheduled 8/15/2023    For Pharmacy Admin Tracking Only    Program: Medical Group  CPA in place:  Yes  Time Spent (min): 5

## 2023-08-15 NOTE — TELEPHONE ENCOUNTER
Rep from [facility] called requesting refill for Revlimid.  Call back number is [phone] and fax is [phone]

## 2023-08-17 RX ORDER — DEXTROMETHORPHAN HYDROBROMIDE AND PROMETHAZINE HYDROCHLORIDE 15; 6.25 MG/5ML; MG/5ML
5 SYRUP ORAL 4 TIMES DAILY PRN
Qty: 240 ML | Refills: 2 | Status: SHIPPED | OUTPATIENT
Start: 2023-08-17 | End: 2023-09-22

## 2023-08-18 RX ORDER — GLIMEPIRIDE 2 MG/1
TABLET ORAL
Qty: 90 TABLET | Refills: 3 | Status: SHIPPED | OUTPATIENT
Start: 2023-08-18

## 2023-08-18 RX ORDER — AMLODIPINE BESYLATE 5 MG/1
TABLET ORAL
Qty: 90 TABLET | Refills: 4 | Status: SHIPPED | OUTPATIENT
Start: 2023-08-18

## 2023-08-22 NOTE — PROGRESS NOTES
Hematology Follow Up    REASON FOR VISIT: Multiple Myeloma    TYPE OF VISIT  Katrina Olvera is a 57 y.o. female who is seen for follow up of Multiple Myeloma on Revlimid     TREATMENT:   3/24/17- 9/15/17: Leane Neither X 8   10/20/17: Autologous transplant at [facility]  2/22/18 -  Revlimid     HISTORY OF PRESENT ILLNESS: Ms. Colman Gottron is a 57 y.o. female with DM and  Multiple Myeloma who presents to follow up after the ASCT and is on maintenance Revlimid 10mg daily. She was reduced to 5 mg due to cytopenias. Seen for follow up. She is on Revlimid 10 mg daily. Colonoscopy was normal 2020. I see Jay Burns today in follow up. She is feeling well. She is taking revlimid 21 days on a 1 week off. Saw [facility] in October. No diarrhea. Denies nausea/vomiting. No aches or pain. No fevers/chills. No other complaits. Oncologic history  Patient had left facial numbness which started in the summer of 2016. This started abruptly and was not associated with rashes, pain, jaw weakness. She has had some intermittent hyperemia of the L eye. No tearing. She has been evaluated by Dr. Leighton Naik with Neurology and an extensive work up was only notable for presence of a 0.3 g/dl M spike. Other tests were unremarkable: Vitamin B12 411, thyroid function test normal, ACE 25, SHERRIE normal CBC normal, CMP normal, CRP 1.17, CT head and cervical spine unremarkable. Further evaluation revealed findings consistent with Multiple Myeloma    CBC 2/24 Unremarkable. Kappa 17 mg/dl, Lambda 2416 (ratio 0.01), SPEP 0.2 g/dl M spike, HASMUKH showed monoclonal free lambda light chains, UPEP negative, Beta 2 Microglobulin 1.8 mg/L, Skeletal survey negative for lytic lesions, Bone marrow biopsy on 2/24/17 showed a Normocellular marrow with mild monoclonal plasmacytosis (15-20%). Trilineage hematopoiesis with maturation. She had a very good partial response and 8 cycles of VRD and then proceeded on to receive an autologous stem cell transplant on 10/20/17 at [facility].   She had a CR

## 2023-08-23 ENCOUNTER — OFFICE VISIT (OUTPATIENT)
Age: 63
End: 2023-08-23
Payer: COMMERCIAL

## 2023-08-23 VITALS
RESPIRATION RATE: 18 BRPM | OXYGEN SATURATION: 96 % | BODY MASS INDEX: 33.87 KG/M2 | SYSTOLIC BLOOD PRESSURE: 135 MMHG | DIASTOLIC BLOOD PRESSURE: 84 MMHG | WEIGHT: 200.4 LBS

## 2023-08-23 DIAGNOSIS — C90.01 MULTIPLE MYELOMA IN REMISSION (HCC): Primary | ICD-10-CM

## 2023-08-23 PROCEDURE — 99214 OFFICE O/P EST MOD 30 MIN: CPT | Performed by: INTERNAL MEDICINE

## 2023-08-23 PROCEDURE — 3079F DIAST BP 80-89 MM HG: CPT | Performed by: INTERNAL MEDICINE

## 2023-08-23 PROCEDURE — 3075F SYST BP GE 130 - 139MM HG: CPT | Performed by: INTERNAL MEDICINE

## 2023-08-23 NOTE — PROGRESS NOTES
Irais Vicente is a 57 y.o. female    Chief Complaint   Patient presents with    Follow-up     Multiple Myeloma on Revlimid       1. Have you been to the ER, urgent care clinic since your last visit? Hospitalized since your last visit? Yes , last staurday patient 1st   2. Have you seen or consulted any other health care providers outside of the [address] [place] [address] Avenue since your last visit? Include any pap smears or colon screening.  No

## 2023-09-04 DIAGNOSIS — C90.01 MULTIPLE MYELOMA IN REMISSION (HCC): ICD-10-CM

## 2023-09-04 RX ORDER — LENALIDOMIDE 10 MG/1
10 CAPSULE ORAL DAILY
Qty: 21 CAPSULE | Refills: 0 | Status: ACTIVE | OUTPATIENT
Start: 2023-09-04

## 2023-09-04 NOTE — TELEPHONE ENCOUNTER
Oral Chemotherapy      Cliff Murphy is a  57 y. o.female  diagnosed with MM . Ms. Yaneth Leiva is being treated with lenalidomide.      Medication name: lenalidomide     Dose:  10 mg   Frequency: daily  Administration schedule: 21 days on, 7 days off  Ordering provider: Lazaro Ruelas MD        REMS prescriber survey completed Auth # [id number] and Refill sent to [address] [place] [address] 8/23/23  Next visit 2/28/24        Aury Luo, PHARMD, [facility], [address] [place] [address]    Program: Medical Group  CPA in place:  Yes  Recommendation Provided To: Patient/Caregiver: 1 via Telephone  Intervention Detail: Refill(s) Provided  Intervention Accepted By: Patient/Caregiver: 1    Time Spent (min): 15

## 2023-10-13 DIAGNOSIS — C90.01 MULTIPLE MYELOMA IN REMISSION (HCC): ICD-10-CM

## 2023-10-13 RX ORDER — LENALIDOMIDE 10 MG/1
10 CAPSULE ORAL DAILY
Qty: 21 CAPSULE | Refills: 0 | Status: ACTIVE | OUTPATIENT
Start: 2023-10-13

## 2023-10-13 NOTE — TELEPHONE ENCOUNTER
Oral Chemotherapy      Nuris Carpenter is a  57 y. o.female  diagnosed with MM . Ms. Court Swan is being treated with lenalidomide.      Medication name: lenalidomide     Dose:  10 mg   Frequency: daily  Administration schedule: 21 days on, 7 days off  Ordering provider: Isael Jacobs MD        REMS prescriber survey completed Auth # [id number] and Refill sent to [address] [place] [address] 8/23/23  Next visit 2/28/24        Carleen Rodriguez PHARMD, [facility], [address] [place] [address]    Program: Medical Group  CPA in place:  Yes  Recommendation Provided To: Patient/Caregiver: 1 via Telephone  Intervention Detail: Refill(s) Provided  Intervention Accepted By: Patient/Caregiver: 1    Time Spent (min): 15

## 2023-11-09 DIAGNOSIS — C90.01 MULTIPLE MYELOMA IN REMISSION (HCC): ICD-10-CM

## 2023-11-09 RX ORDER — LENALIDOMIDE 10 MG/1
10 CAPSULE ORAL DAILY
Qty: 21 CAPSULE | Refills: 0 | Status: ACTIVE | OUTPATIENT
Start: 2023-11-09

## 2023-11-09 NOTE — TELEPHONE ENCOUNTER
Oral Chemotherapy      Timothy Salvador is a  57 y. o.female  diagnosed with MM . Ms. Becki Hathaway is being treated with lenalidomide.      Medication name: lenalidomide     Dose:  10 mg   Frequency: daily  Administration schedule: 21 days on, 7 days off  Ordering provider: Scarlet Cheatham MD        REMS prescriber survey completed Auth # [id number] and Refill sent to [facility]     Last OV 8/23/23  Next visit 2/28/24        Shireen Acevedo, FRANCESCOD, [facility], [address] [facility]    Program: Medical Group  CPA in place:  Yes  Recommendation Provided To: Patient/Caregiver: 1 via Telephone  Intervention Detail: Refill(s) Provided  Intervention Accepted By: Patient/Caregiver: 1    Time Spent (min): 15

## 2023-12-04 DIAGNOSIS — C90.01 MULTIPLE MYELOMA IN REMISSION (HCC): ICD-10-CM

## 2023-12-04 RX ORDER — LENALIDOMIDE 10 MG/1
CAPSULE ORAL
Qty: 21 CAPSULE | Refills: 0 | OUTPATIENT
Start: 2023-12-04

## 2023-12-04 RX ORDER — LENALIDOMIDE 10 MG/1
10 CAPSULE ORAL DAILY
Qty: 21 CAPSULE | Refills: 0 | Status: ACTIVE | OUTPATIENT
Start: 2023-12-04

## 2023-12-04 NOTE — TELEPHONE ENCOUNTER
Oral Chemotherapy      Guero Manzo is a  70 y. o.female  diagnosed with MM . Ms. Kei Eddy is being treated with lenalidomide.      Medication name: lenalidomide     Dose:  10 mg   Frequency: daily  Administration schedule: 21 days on, 7 days off  Ordering provider: Luz Lezama MD        REMS prescriber survey completed Auth # [id number] and Refill sent to [facility]     Last OV 8/23/23  Next visit 2/28/24        FRANCESCO ChristieD, BCOP, [facility]    Program: Medical Group  CPA in place:  Yes  Recommendation Provided To: Patient/Caregiver: 1 via Telephone  Intervention Detail: Refill(s) Provided  Intervention Accepted By: Patient/Caregiver: 1    Time Spent (min): 15

## 2023-12-08 ENCOUNTER — TELEPHONE (OUTPATIENT)
Age: 63
End: 2023-12-08

## 2023-12-08 RX ORDER — CARVEDILOL 25 MG/1
TABLET ORAL
Qty: 180 TABLET | Refills: 1 | Status: SHIPPED | OUTPATIENT
Start: 2023-12-08

## 2023-12-08 NOTE — TELEPHONE ENCOUNTER
Requested Prescriptions     Pending Prescriptions Disp Refills    carvedilol (COREG) 25 MG tablet [Pharmacy Med Name: CARVEDILOL 25 MG TABLET] 180 tablet 1     Sig: TAKE 1 TABLET BY MOUTH TWICE A DAY     LOV: 7/18/2023  NOV: 1/18/2023

## 2023-12-08 NOTE — TELEPHONE ENCOUNTER
1:38pm: attempted to call pt back went straight to ,  has not been set up unable to leave a message. Will attempt to call back at a later date and time.

## 2023-12-08 NOTE — TELEPHONE ENCOUNTER
Pt called and LVM asking to talk to a nurse about the [employer] drug program wanting to change pts Revlimid medication to a different brand medication. Pts preferred RX is Revlimid.       # [phone]

## 2023-12-12 ENCOUNTER — TELEPHONE (OUTPATIENT)
Age: 63
End: 2023-12-12

## 2023-12-12 NOTE — TELEPHONE ENCOUNTER
11:38am: pt verified x2, pt stated that [facility] sent her a letter stating that her Revlimid is not a preferred medicine and to see if she can have lenalidomde filled instead starting in January 2024. Informed her I would send this over to our pharmacist and follow up with her.

## 2024-01-03 DIAGNOSIS — C90.01 MULTIPLE MYELOMA IN REMISSION (HCC): ICD-10-CM

## 2024-01-04 LAB
ALBUMIN SERPL-MCNC: 3.6 G/DL (ref 3.5–5)
ALBUMIN/GLOB SERPL: 1.1 (ref 1.1–2.2)
ALP SERPL-CCNC: 90 U/L (ref 45–117)
ALT SERPL-CCNC: 35 U/L (ref 12–78)
ANION GAP SERPL CALC-SCNC: 6 MMOL/L (ref 5–15)
AST SERPL-CCNC: 15 U/L (ref 15–37)
BASOPHILS # BLD: 0 K/UL (ref 0–0.1)
BASOPHILS NFR BLD: 1 % (ref 0–1)
BILIRUB SERPL-MCNC: 1.6 MG/DL (ref 0.2–1)
BUN SERPL-MCNC: 9 MG/DL (ref 6–20)
BUN/CREAT SERPL: 9 (ref 12–20)
CALCIUM SERPL-MCNC: 8.3 MG/DL (ref 8.5–10.1)
CHLORIDE SERPL-SCNC: 110 MMOL/L (ref 97–108)
CO2 SERPL-SCNC: 24 MMOL/L (ref 21–32)
CREAT SERPL-MCNC: 0.99 MG/DL (ref 0.55–1.02)
DIFFERENTIAL METHOD BLD: ABNORMAL
EOSINOPHIL # BLD: 0.1 K/UL (ref 0–0.4)
EOSINOPHIL NFR BLD: 2 % (ref 0–7)
ERYTHROCYTE [DISTWIDTH] IN BLOOD BY AUTOMATED COUNT: 15.2 % (ref 11.5–14.5)
GLOBULIN SER CALC-MCNC: 3.4 G/DL (ref 2–4)
GLUCOSE SERPL-MCNC: 167 MG/DL (ref 65–100)
HCT VFR BLD AUTO: 41.2 % (ref 35–47)
HGB BLD-MCNC: 13.9 G/DL (ref 11.5–16)
IMM GRANULOCYTES # BLD AUTO: 0 K/UL (ref 0–0.04)
IMM GRANULOCYTES NFR BLD AUTO: 0 % (ref 0–0.5)
LYMPHOCYTES # BLD: 2.1 K/UL (ref 0.8–3.5)
LYMPHOCYTES NFR BLD: 42 % (ref 12–49)
MCH RBC QN AUTO: 34.1 PG (ref 26–34)
MCHC RBC AUTO-ENTMCNC: 33.7 G/DL (ref 30–36.5)
MCV RBC AUTO: 101 FL (ref 80–99)
MONOCYTES # BLD: 0.6 K/UL (ref 0–1)
MONOCYTES NFR BLD: 13 % (ref 5–13)
NEUTS SEG # BLD: 2.1 K/UL (ref 1.8–8)
NEUTS SEG NFR BLD: 42 % (ref 32–75)
NRBC # BLD: 0 K/UL (ref 0–0.01)
NRBC BLD-RTO: 0 PER 100 WBC
PLATELET # BLD AUTO: 215 K/UL (ref 150–400)
PMV BLD AUTO: 9.9 FL (ref 8.9–12.9)
POTASSIUM SERPL-SCNC: 3.6 MMOL/L (ref 3.5–5.1)
PROT SERPL-MCNC: 7 G/DL (ref 6.4–8.2)
SODIUM SERPL-SCNC: 140 MMOL/L (ref 136–145)
WBC # BLD AUTO: 5 K/UL (ref 3.6–11)

## 2024-01-09 DIAGNOSIS — C90.01 MULTIPLE MYELOMA IN REMISSION (HCC): ICD-10-CM

## 2024-01-09 RX ORDER — LENALIDOMIDE 10 MG/1
10 CAPSULE ORAL DAILY
Qty: 21 CAPSULE | Refills: 0 | Status: ACTIVE | OUTPATIENT
Start: 2024-01-09

## 2024-01-09 RX ORDER — LENALIDOMIDE 10 MG/1
10 CAPSULE ORAL DAILY
Qty: 21 CAPSULE | Refills: 0 | Status: SHIPPED | OUTPATIENT
Start: 2024-01-09 | End: 2024-01-09

## 2024-01-09 NOTE — TELEPHONE ENCOUNTER
Oral Chemotherapy      Hailey Quintero is a  63 y.o.female  diagnosed with MM . Ms. Quintero is being treated with lenalidomide.     Medication name: lenalidomide     Dose:  10 mg   Frequency: daily  Administration schedule: 21 days on, 7 days off  Ordering provider: Linda oCok MD        REMS prescriber survey completed Auth # [id number] and Refill sent to [facility]     Last OV 8/23/23  Next visit 2/28/24        Estrella Lewis, FRANCESCOD, BCOP, BCPS    For Pharmacy Admin Tracking Only    Program: Medical Group  CPA in place:  Yes  Recommendation Provided To: Patient/Caregiver: 1 via Telephone  Intervention Detail: Refill(s) Provided  Intervention Accepted By: Patient/Caregiver: 1    Time Spent (min): 15

## 2024-02-06 DIAGNOSIS — C90.01 MULTIPLE MYELOMA IN REMISSION (HCC): ICD-10-CM

## 2024-02-07 ENCOUNTER — OFFICE VISIT (OUTPATIENT)
Age: 64
End: 2024-02-07
Payer: COMMERCIAL

## 2024-02-07 ENCOUNTER — TELEPHONE (OUTPATIENT)
Age: 64
End: 2024-02-07

## 2024-02-07 VITALS
SYSTOLIC BLOOD PRESSURE: 123 MMHG | RESPIRATION RATE: 18 BRPM | WEIGHT: 199 LBS | HEART RATE: 62 BPM | TEMPERATURE: 98.2 F | BODY MASS INDEX: 33.15 KG/M2 | HEIGHT: 65 IN | DIASTOLIC BLOOD PRESSURE: 68 MMHG | OXYGEN SATURATION: 98 %

## 2024-02-07 DIAGNOSIS — Z23 NEED FOR PROPHYLACTIC VACCINATION AGAINST STREPTOCOCCUS PNEUMONIAE (PNEUMOCOCCUS): ICD-10-CM

## 2024-02-07 DIAGNOSIS — E11.9 TYPE 2 DIABETES MELLITUS WITHOUT COMPLICATION, WITHOUT LONG-TERM CURRENT USE OF INSULIN (HCC): Primary | ICD-10-CM

## 2024-02-07 LAB
CHOLEST SERPL-MCNC: 150 MG/DL
EST. AVERAGE GLUCOSE BLD GHB EST-MCNC: 157 MG/DL
HBA1C MFR BLD: 7.1 % (ref 4–5.6)
HDLC SERPL-MCNC: 67 MG/DL
HDLC SERPL: 2.2 (ref 0–5)
LDLC SERPL CALC-MCNC: 62.4 MG/DL (ref 0–100)
TRIGL SERPL-MCNC: 103 MG/DL
VLDLC SERPL CALC-MCNC: 20.6 MG/DL

## 2024-02-07 PROCEDURE — 90677 PCV20 VACCINE IM: CPT | Performed by: FAMILY MEDICINE

## 2024-02-07 PROCEDURE — 90471 IMMUNIZATION ADMIN: CPT | Performed by: FAMILY MEDICINE

## 2024-02-07 PROCEDURE — 3078F DIAST BP <80 MM HG: CPT | Performed by: FAMILY MEDICINE

## 2024-02-07 PROCEDURE — 3074F SYST BP LT 130 MM HG: CPT | Performed by: FAMILY MEDICINE

## 2024-02-07 PROCEDURE — 90480 ADMN SARSCOV2 VAC 1/ONLY CMP: CPT | Performed by: FAMILY MEDICINE

## 2024-02-07 PROCEDURE — 1123F ACP DISCUSS/DSCN MKR DOCD: CPT | Performed by: FAMILY MEDICINE

## 2024-02-07 PROCEDURE — 91320 SARSCV2 VAC 30MCG TRS-SUC IM: CPT | Performed by: FAMILY MEDICINE

## 2024-02-07 PROCEDURE — 99213 OFFICE O/P EST LOW 20 MIN: CPT | Performed by: FAMILY MEDICINE

## 2024-02-07 RX ORDER — LENALIDOMIDE 10 MG/1
10 CAPSULE ORAL DAILY
Qty: 21 CAPSULE | Refills: 0 | Status: ACTIVE | OUTPATIENT
Start: 2024-02-07

## 2024-02-07 RX ORDER — LENALIDOMIDE 10 MG/1
CAPSULE ORAL
Qty: 21 CAPSULE | Refills: 0 | Status: ACTIVE | OUTPATIENT
Start: 2024-02-07 | End: 2024-02-07

## 2024-02-07 ASSESSMENT — PATIENT HEALTH QUESTIONNAIRE - PHQ9
SUM OF ALL RESPONSES TO PHQ9 QUESTIONS 1 & 2: 0
SUM OF ALL RESPONSES TO PHQ QUESTIONS 1-9: 0
1. LITTLE INTEREST OR PLEASURE IN DOING THINGS: 0
2. FEELING DOWN, DEPRESSED OR HOPELESS: 0
SUM OF ALL RESPONSES TO PHQ QUESTIONS 1-9: 0

## 2024-02-07 NOTE — TELEPHONE ENCOUNTER
[facility] called regarding RX Revlimid 10 MG capsule that was prescribed today but RX needs an authorization number    [facility] said the generic brand has been having supply issues so they need the brand name of RX Revlimid prescribed    CB # [phone] opt 3 opt 2    Fax # [phone]

## 2024-02-07 NOTE — TELEPHONE ENCOUNTER
Oral Chemotherapy      Hailey Quintero is a  63 y.o.female  diagnosed with MM . Ms. Quintero is being treated with lenalidomide.     Medication name: lenalidomide     Dose:  10 mg   Frequency: daily  Administration schedule: 21 days on, 7 days off  Ordering provider: Linda Cook MD        REMS prescriber survey completed Auth # [id number] and Refill sent to [facility]     Last OV 8/23/23  Next visit 2/28/24        Estrella Lewis, FRANCESCOD, BCOP, BCPS    For Pharmacy Admin Tracking Only    Program: Medical Group  CPA in place:  Yes  Recommendation Provided To: Patient/Caregiver: 1 via Telephone  Intervention Detail: Refill(s) Provided  Intervention Accepted By: Patient/Caregiver: 1    Time Spent (min): 15

## 2024-02-07 NOTE — PROGRESS NOTES
Hailey Quintero (:  1960) is a 63 y.o. female,Established patient, here for evaluation of the following chief complaint(s):  Follow-up (6 months) and Hypertension         ASSESSMENT/PLAN:  1. Type 2 diabetes mellitus without complication, without long-term current use of insulin (HCC)  -     Hemoglobin A1C; Future  -     Lipid Panel; Future  2. Need for prophylactic vaccination against Streptococcus pneumoniae (pneumococcus)  -     Pneumococcal, PCV20, PREVNAR 20, (age 18 yrs+), IM, PF      No follow-ups on file.         Subjective   SUBJECTIVE/OBJECTIVE:  HPI In for checkup. Breathing has been doing ok. Myeloma is doing well. Goes to [facility] once a year. Still taking revlimid. Also sees Dr. Giron every 3 months. Needs diabetes and cholesterol checked. No complaints of chest pain, shortness of breath, TIAs, claudication or edema., Has neuropathy in feet. Does housework without getting tired. UTD on  colonoscopy, mammogram and paps.     Review of Systems       Objective   Physical Exam  Cardiovascular:      Rate and Rhythm: Normal rate and regular rhythm.      Pulses: Normal pulses.      Heart sounds: Normal heart sounds. No murmur heard.  Pulmonary:      Effort: Pulmonary effort is normal.      Breath sounds: Normal breath sounds.   Abdominal:      General: Abdomen is flat. Bowel sounds are normal.      Palpations: Abdomen is soft.   Musculoskeletal:      Right lower leg: No edema.      Left lower leg: No edema.                An electronic signature was used to authenticate this note.    --Danis Meadows MD

## 2024-02-07 NOTE — PROGRESS NOTES
Room: 4  I have reviewed all needed documentation in preparation for visit. Verified patient by name and date of birth. Rooming procedures conducted per protocol. Reviewed allergies and medications with patient.   Hailey Quintero is a 63 y.o. female for Follow-up (6 months) and Hypertension       Vitals:    02/07/24 0830   BP: 123/68   Site: Left Upper Arm   Position: Sitting   Cuff Size: Large Adult   Pulse: 62   Resp: 18   Temp: 98.2 °F (36.8 °C)   TempSrc: Oral   SpO2: 98%   Weight: 90.3 kg (199 lb)   Height: 1.638 m (5' 4.5\")      Pain Score: Zero    No LMP recorded. Patient has had a hysterectomy.      Health Maintenance Due   Topic Date Due    HIV screen  Never done    Respiratory Syncytial Virus (RSV) Pregnant or age 60 yrs+ (1 - 1-dose 60+ series) Never done    Pneumococcal 65+ years Vaccine (2 - PPSV23 or PCV20) 04/30/2019    Diabetic foot exam  09/21/2019    Shingles vaccine (2 of 2) 12/25/2020    Diabetic retinal exam  07/18/2023    COVID-19 Vaccine (6 - 2023-24 season) 09/01/2023    Lipids  01/18/2024        \"Have you been to the ER, urgent care clinic since your last visit?  Hospitalized since your last visit?\"    NO    “Have you seen or consulted any other health care providers outside of [facility] since your last visit?”    NO       Recent Travel Screening and Travel History documentation:     Travel Screening       Question Response    Have you been in contact with someone who was sick? No / Unsure    Do you have any of the following new or worsening symptoms? None of these    Have you traveled internationally or domestically in the last month? No          Travel History   Travel since 01/07/24    No documented travel since 01/07/24

## 2024-02-08 DIAGNOSIS — C90.01 MULTIPLE MYELOMA IN REMISSION (HCC): ICD-10-CM

## 2024-02-08 RX ORDER — LENALIDOMIDE 10 MG/1
CAPSULE ORAL
Qty: 21 CAPSULE | Refills: 0 | OUTPATIENT
Start: 2024-02-08

## 2024-02-13 ENCOUNTER — TELEPHONE (OUTPATIENT)
Age: 64
End: 2024-02-13

## 2024-02-13 NOTE — TELEPHONE ENCOUNTER
12:51pm: pt verified x2, pt is having problem with her Revlimid. PT stated her copay is around 2k for this medication. She had a ceci through [employer] but stated the money ran out today. She is concerned about running out of her medication because she cannot afford it.

## 2024-02-13 NOTE — TELEPHONE ENCOUNTER
TC:    Pt. Called to inform Dr. Cook/ Team that, due to financial issues, she may not be able to continue medication.    Pt. Has questions about how this will effect her care plan.    Please advise.    CB# is [phone]

## 2024-02-28 ENCOUNTER — OFFICE VISIT (OUTPATIENT)
Age: 64
End: 2024-02-28
Payer: MEDICARE

## 2024-02-28 VITALS
RESPIRATION RATE: 18 BRPM | DIASTOLIC BLOOD PRESSURE: 77 MMHG | SYSTOLIC BLOOD PRESSURE: 122 MMHG | HEART RATE: 59 BPM | WEIGHT: 197 LBS | OXYGEN SATURATION: 97 % | BODY MASS INDEX: 33.29 KG/M2 | TEMPERATURE: 97.9 F

## 2024-02-28 DIAGNOSIS — C90.01 MULTIPLE MYELOMA IN REMISSION (HCC): Primary | ICD-10-CM

## 2024-02-28 PROCEDURE — 3078F DIAST BP <80 MM HG: CPT

## 2024-02-28 PROCEDURE — 3017F COLORECTAL CA SCREEN DOC REV: CPT

## 2024-02-28 PROCEDURE — G8399 PT W/DXA RESULTS DOCUMENT: HCPCS

## 2024-02-28 PROCEDURE — 99213 OFFICE O/P EST LOW 20 MIN: CPT

## 2024-02-28 PROCEDURE — 3074F SYST BP LT 130 MM HG: CPT

## 2024-02-28 PROCEDURE — 1090F PRES/ABSN URINE INCON ASSESS: CPT

## 2024-02-28 PROCEDURE — 1036F TOBACCO NON-USER: CPT

## 2024-02-28 PROCEDURE — 1123F ACP DISCUSS/DSCN MKR DOCD: CPT

## 2024-02-28 PROCEDURE — G8417 CALC BMI ABV UP PARAM F/U: HCPCS

## 2024-02-28 PROCEDURE — G8427 DOCREV CUR MEDS BY ELIG CLIN: HCPCS

## 2024-02-28 PROCEDURE — G8484 FLU IMMUNIZE NO ADMIN: HCPCS

## 2024-02-28 NOTE — PROGRESS NOTES
Hailey Quintero is a 63 y.o. female    Chief Complaint   Patient presents with    Follow-up     Multiple Myeloma on Revlimid       1. Have you been to the ER, urgent care clinic since your last visit?  Hospitalized since your last visit? Yes, [facility]    2. Have you seen or consulted any other health care providers outside of the [facility] since your last visit?  Include any pap smears or colon screening. No      
biopsy done on 12/19/17 showed no evidence of plasma cells.  Variable cellularity from 0-50%, flow LEDY, FISH negative    ASSESSMENT  Ms. Quintero is a 63 y.o.  female with standard risk multiple myeloma status post 6 cycles of twice daily and autologous stem cell transplant on 10/20/17.  She is in a complete remission. We will continue with close surveillance in conjunction with her care team at [facility]    PLAN  Multiple myeloma  S/P BMT in CR  On revlimid maintenance of 10mg daily following transplant at [facility].  Dropped to 5 mg due to recurrent grade 3 neutropenia (cassandra 0.4).   She has done well with this, rash has not recurred. As there was a temporary increase in M spike and hence Revlimid was increased again to 10 mg. She is doing well on this with grade 1 diarrhea     Clinically stable. CBC/CMP/gammopathy panel reviewed 1/2024  Continue maintenance revlimid as written.  Sent direct message to financial  to investigate OOP expense in regards to Revlimid    Follow with BMT as scheduled     Neuropathy  Overall stable.     DM  Per PCP     Anemia  Resolved    Diarrhea  Resolved    [facility] 6 [facility]       Signed By: NHI Best NP

## 2024-03-15 DIAGNOSIS — C90.01 MULTIPLE MYELOMA IN REMISSION (HCC): ICD-10-CM

## 2024-03-18 ENCOUNTER — TELEPHONE (OUTPATIENT)
Age: 64
End: 2024-03-18

## 2024-03-18 NOTE — TELEPHONE ENCOUNTER
Patient called to get a refill on her Revlimid.  She stated that the specialty pharm didn't have any refill for her.  Please advises patient.  Cb # [phone]

## 2024-03-19 RX ORDER — LENALIDOMIDE 10 MG/1
10 CAPSULE ORAL DAILY
Qty: 21 CAPSULE | Refills: 0 | Status: ACTIVE | OUTPATIENT
Start: 2024-03-19

## 2024-03-19 NOTE — TELEPHONE ENCOUNTER
Oral Chemotherapy      Hailey Quintero is a  63 y.o.female  diagnosed with MM . Ms. Quintero is being treated with lenalidomide.     Medication name: lenalidomide     Dose:  10 mg   Frequency: daily  Administration schedule: 21 days on, 7 days off  Ordering provider: Linda Cook MD        REMS prescriber survey completed Auth # [id number] and Refill sent to [facility]     Last OV 2/28/24  Next visit 8/29/24        Estrella Lewis, FRANCESCOD, BCOP, BCPS    For Pharmacy Admin Tracking Only    Program: Medical Group  CPA in place:  Yes  Recommendation Provided To: Patient/Caregiver: 1 via Telephone  Intervention Detail: Refill(s) Provided  Intervention Accepted By: Patient/Caregiver: 1    Time Spent (min): 15

## 2024-04-02 DIAGNOSIS — C90.01 MULTIPLE MYELOMA IN REMISSION (HCC): ICD-10-CM

## 2024-04-02 LAB
ALBUMIN SERPL-MCNC: 3.9 G/DL (ref 3.5–5)
ALBUMIN/GLOB SERPL: 1.2 (ref 1.1–2.2)
ALP SERPL-CCNC: 96 U/L (ref 45–117)
ALT SERPL-CCNC: 35 U/L (ref 12–78)
ANION GAP SERPL CALC-SCNC: 4 MMOL/L (ref 5–15)
AST SERPL-CCNC: 14 U/L (ref 15–37)
BASOPHILS # BLD: 0.1 K/UL (ref 0–0.1)
BASOPHILS NFR BLD: 1 % (ref 0–1)
BILIRUB SERPL-MCNC: 1.5 MG/DL (ref 0.2–1)
BUN SERPL-MCNC: 7 MG/DL (ref 6–20)
BUN/CREAT SERPL: 7 (ref 12–20)
CALCIUM SERPL-MCNC: 9 MG/DL (ref 8.5–10.1)
CHLORIDE SERPL-SCNC: 108 MMOL/L (ref 97–108)
CO2 SERPL-SCNC: 25 MMOL/L (ref 21–32)
CREAT SERPL-MCNC: 0.99 MG/DL (ref 0.55–1.02)
DIFFERENTIAL METHOD BLD: ABNORMAL
EOSINOPHIL # BLD: 0.1 K/UL (ref 0–0.4)
EOSINOPHIL NFR BLD: 3 % (ref 0–7)
ERYTHROCYTE [DISTWIDTH] IN BLOOD BY AUTOMATED COUNT: 14.7 % (ref 11.5–14.5)
GLOBULIN SER CALC-MCNC: 3.3 G/DL (ref 2–4)
GLUCOSE SERPL-MCNC: 163 MG/DL (ref 65–100)
HCT VFR BLD AUTO: 40.8 % (ref 35–47)
HGB BLD-MCNC: 14.2 G/DL (ref 11.5–16)
IMM GRANULOCYTES # BLD AUTO: 0 K/UL (ref 0–0.04)
IMM GRANULOCYTES NFR BLD AUTO: 0 % (ref 0–0.5)
LYMPHOCYTES # BLD: 2.1 K/UL (ref 0.8–3.5)
LYMPHOCYTES NFR BLD: 47 % (ref 12–49)
MCH RBC QN AUTO: 34.5 PG (ref 26–34)
MCHC RBC AUTO-ENTMCNC: 34.8 G/DL (ref 30–36.5)
MCV RBC AUTO: 99 FL (ref 80–99)
MONOCYTES # BLD: 0.3 K/UL (ref 0–1)
MONOCYTES NFR BLD: 6 % (ref 5–13)
NEUTS SEG # BLD: 1.9 K/UL (ref 1.8–8)
NEUTS SEG NFR BLD: 43 % (ref 32–75)
NRBC # BLD: 0 K/UL (ref 0–0.01)
NRBC BLD-RTO: 0 PER 100 WBC
PLATELET # BLD AUTO: 203 K/UL (ref 150–400)
PMV BLD AUTO: 10.6 FL (ref 8.9–12.9)
POTASSIUM SERPL-SCNC: 3.7 MMOL/L (ref 3.5–5.1)
PROT SERPL-MCNC: 7.2 G/DL (ref 6.4–8.2)
RBC # BLD AUTO: 4.12 M/UL (ref 3.8–5.2)
SODIUM SERPL-SCNC: 137 MMOL/L (ref 136–145)
WBC # BLD AUTO: 4.5 K/UL (ref 3.6–11)

## 2024-04-02 NOTE — PROGRESS NOTES
Jenaro Barr is a 59 y.o. female for Multiple Myeloma. 1. Have you been to the ER, urgent care clinic since your last visit? Hospitalized since your last visit? No    2. Have you seen or consulted any other health care providers outside of the [address] [place] [address] since your last visit? Include any pap smears or colon screening.  No [Negative] : Heme/Lymph [FreeTextEntry5] : see HPI

## 2024-04-08 LAB
ALBUMIN SERPL ELPH-MCNC: 3.8 G/DL (ref 2.9–4.4)
ALBUMIN/GLOB SERPL: 1.4 (ref 0.7–1.7)
ALPHA1 GLOB SERPL ELPH-MCNC: 0.2 G/DL (ref 0–0.4)
ALPHA2 GLOB SERPL ELPH-MCNC: 0.7 G/DL (ref 0.4–1)
B-GLOBULIN SERPL ELPH-MCNC: 1 G/DL (ref 0.7–1.3)
GAMMA GLOB SERPL ELPH-MCNC: 0.9 G/DL (ref 0.4–1.8)
GLOBULIN SER-MCNC: 2.8 G/DL (ref 2.2–3.9)
IGA SERPL-MCNC: 163 MG/DL (ref 87–352)
IGG SERPL-MCNC: 1155 MG/DL (ref 586–1602)
IGM SERPL-MCNC: 22 MG/DL (ref 26–217)
INTERPRETATION SERPL IEP-IMP: ABNORMAL
KAPPA LC FREE SER-MCNC: 19.9 MG/L (ref 3.3–19.4)
KAPPA LC FREE/LAMBDA FREE SER: 1.43 (ref 0.26–1.65)
LAMBDA LC FREE SERPL-MCNC: 13.9 MG/L (ref 5.7–26.3)
M PROTEIN SERPL ELPH-MCNC: ABNORMAL G/DL
PROT SERPL-MCNC: 6.6 G/DL (ref 6–8.5)

## 2024-04-10 ENCOUNTER — TELEPHONE (OUTPATIENT)
Age: 64
End: 2024-04-10

## 2024-04-10 ENCOUNTER — TRANSCRIBE ORDERS (OUTPATIENT)
Facility: HOSPITAL | Age: 64
End: 2024-04-10

## 2024-04-10 DIAGNOSIS — Z12.31 VISIT FOR SCREENING MAMMOGRAM: Primary | ICD-10-CM

## 2024-04-10 NOTE — TELEPHONE ENCOUNTER
VM left by Veronica, at [facility]. Pt needs a refill on Revlimid prescription. Pt will run out of Revlimid in about 1 week. Insurance no longer carrying the brand. Generic brand covered by insurance. Can generic brand be used to refill?    #[phone] (direct extension)

## 2024-04-12 DIAGNOSIS — C90.01 MULTIPLE MYELOMA IN REMISSION (HCC): ICD-10-CM

## 2024-04-12 RX ORDER — LENALIDOMIDE 10 MG/1
10 CAPSULE ORAL DAILY
Qty: 21 CAPSULE | Refills: 0 | Status: ACTIVE | OUTPATIENT
Start: 2024-04-12

## 2024-04-12 NOTE — TELEPHONE ENCOUNTER
Oral Chemotherapy      Hailey Quintero is a  63 y.o.female  diagnosed with MM . Ms. Quintero is being treated with lenalidomide.     Medication name: lenalidomide     Dose:  10 mg   Frequency: daily  Administration schedule: 21 days on, 7 days off  Ordering provider: Linda Cook MD        REMS prescriber survey completed Auth # [id number]  and Refill sent to [facility]. Generic sent per instructions from pharmacy and requirement from insurance.      Last OV 2/28/24  Next visit 8/29/24        Estrella Lewis, FRANCESCOD, BCOP, BCPS    For Pharmacy Admin Tracking Only    Program: Medical Group  CPA in place:  Yes  Recommendation Provided To: Patient/Caregiver: 1 via Telephone  Intervention Detail: Refill(s) Provided  Intervention Accepted By: Patient/Caregiver: 1    Time Spent (min): 15

## 2024-05-01 ENCOUNTER — CLINICAL DOCUMENTATION (OUTPATIENT)
Age: 64
End: 2024-05-01

## 2024-05-01 NOTE — PROGRESS NOTES
In February patient was informed that her funds for her ceci would .Was unable to find free drug for her as she was over income. Reached out to [employer] to ask for additional funds but was denied.     Forwarded new script for Revlamid OFELIA-1 (to avoid 8K oop) to [facility] and patient was assisted with balance of her OOP. Medicare is now paying.     [facility] will reapply for grants or do internal funding in 2025.

## 2024-05-07 ENCOUNTER — HOSPITAL ENCOUNTER (OUTPATIENT)
Facility: HOSPITAL | Age: 64
Discharge: HOME | End: 2024-05-10
Attending: FAMILY MEDICINE
Payer: MEDICARE

## 2024-05-07 DIAGNOSIS — Z12.31 VISIT FOR SCREENING MAMMOGRAM: ICD-10-CM

## 2024-05-07 PROCEDURE — 77063 BREAST TOMOSYNTHESIS BI: CPT

## 2024-05-08 DIAGNOSIS — C90.01 MULTIPLE MYELOMA IN REMISSION (HCC): ICD-10-CM

## 2024-05-08 RX ORDER — LENALIDOMIDE 10 MG/1
10 CAPSULE ORAL DAILY
Qty: 21 CAPSULE | Refills: 0 | Status: ACTIVE | OUTPATIENT
Start: 2024-05-08

## 2024-05-08 NOTE — TELEPHONE ENCOUNTER
Oral Chemotherapy      Hailey Quintero is a  63 y.o.female  diagnosed with MM . Ms. Quintero is being treated with lenalidomide.     Medication name: lenalidomide     Dose:  10 mg   Frequency: daily  Administration schedule: 21 days on, 7 days off  Ordering provider: Linda Cook MD        REMS prescriber survey completed Auth # [id number]  and Refill sent to [facility]. Generic sent per instructions from pharmacy and requirement from insurance.      Last OV 2/28/24  Next visit 8/29/24        Estrella Lewis, FRANCESCOD, BCOP, BCPS    For Pharmacy Admin Tracking Only    Program: Medical Group  CPA in place:  Yes  Recommendation Provided To: Patient/Caregiver: 1 via Telephone  Intervention Detail: Refill(s) Provided  Intervention Accepted By: Patient/Caregiver: 1    Time Spent (min): 15

## 2024-05-14 ENCOUNTER — OFFICE VISIT (OUTPATIENT)
Age: 64
End: 2024-05-14
Payer: MEDICARE

## 2024-05-14 VITALS
SYSTOLIC BLOOD PRESSURE: 132 MMHG | WEIGHT: 196 LBS | BODY MASS INDEX: 33.46 KG/M2 | HEART RATE: 67 BPM | HEIGHT: 64 IN | OXYGEN SATURATION: 98 % | DIASTOLIC BLOOD PRESSURE: 78 MMHG

## 2024-05-14 DIAGNOSIS — I10 ESSENTIAL (PRIMARY) HYPERTENSION: ICD-10-CM

## 2024-05-14 DIAGNOSIS — I49.5 SICK SINUS SYNDROME (HCC): ICD-10-CM

## 2024-05-14 DIAGNOSIS — I42.0 DILATED CARDIOMYOPATHY (HCC): Primary | ICD-10-CM

## 2024-05-14 DIAGNOSIS — Z95.810 AICD (AUTOMATIC CARDIOVERTER/DEFIBRILLATOR) PRESENT: ICD-10-CM

## 2024-05-14 PROCEDURE — 3017F COLORECTAL CA SCREEN DOC REV: CPT | Performed by: SPECIALIST

## 2024-05-14 PROCEDURE — 99214 OFFICE O/P EST MOD 30 MIN: CPT | Performed by: SPECIALIST

## 2024-05-14 PROCEDURE — 1090F PRES/ABSN URINE INCON ASSESS: CPT | Performed by: SPECIALIST

## 2024-05-14 PROCEDURE — G8427 DOCREV CUR MEDS BY ELIG CLIN: HCPCS | Performed by: SPECIALIST

## 2024-05-14 PROCEDURE — 3075F SYST BP GE 130 - 139MM HG: CPT | Performed by: SPECIALIST

## 2024-05-14 PROCEDURE — 3078F DIAST BP <80 MM HG: CPT | Performed by: SPECIALIST

## 2024-05-14 PROCEDURE — G8399 PT W/DXA RESULTS DOCUMENT: HCPCS | Performed by: SPECIALIST

## 2024-05-14 PROCEDURE — 1123F ACP DISCUSS/DSCN MKR DOCD: CPT | Performed by: SPECIALIST

## 2024-05-14 PROCEDURE — G8417 CALC BMI ABV UP PARAM F/U: HCPCS | Performed by: SPECIALIST

## 2024-05-14 PROCEDURE — 1036F TOBACCO NON-USER: CPT | Performed by: SPECIALIST

## 2024-05-14 ASSESSMENT — PATIENT HEALTH QUESTIONNAIRE - PHQ9
1. LITTLE INTEREST OR PLEASURE IN DOING THINGS: NOT AT ALL
2. FEELING DOWN, DEPRESSED OR HOPELESS: NOT AT ALL
SUM OF ALL RESPONSES TO PHQ QUESTIONS 1-9: 0
SUM OF ALL RESPONSES TO PHQ9 QUESTIONS 1 & 2: 0

## 2024-05-14 NOTE — PROGRESS NOTES
HISTORY OF PRESENT ILLNESS  Hailey Quintero is a 63 y.o. female     SUMMARY:   Patient Active Problem List   Diagnosis    Diabetes mellitus type 2, controlled (HCC)    Chemotherapy induced neutropenia (HCC)    ICD (implantable cardioverter-defibrillator) battery depletion    Rash    Hyperlipidemia    Chronic systolic heart failure (HCC)    Severe obesity (HCC)    Controlled type 2 diabetes mellitus without complication, without long-term current use of insulin (HCC)    Multiple myeloma in remission (HCC)    Controlled type 2 diabetes mellitus without complication (HCC)    Implantable cardioverter-defibrillator (ICD) at end of battery life    Essential hypertension    Type 2 diabetes mellitus with chronic kidney disease (HCC)    AICD (automatic cardioverter/defibrillator) present    Multiple myeloma not having achieved remission (HCC)    Type 2 diabetes mellitus with diabetic neuropathy (HCC)    Type 2 diabetes mellitus with nephropathy (HCC)    Status post bone transplant    Neuropathy    Vitamin B12 deficiency    Cardiomyopathy (HCC)    Obesity, unspecified    DVT of axillary vein, acute left (HCC)    SSS (sick sinus syndrome) (HCC)    Chronic renal disease, stage III (HCC)    Blurred vision            CARDIOLOGY STUDIES TO DATE:  [facility] Bi-V ICD,DOI 5/24/21 (gen change, and new RV and LV), NOT MRI Conditional , on remote     3/16 normal echo     5/21  echo lvef 50-55%, lvh, mild to mod tr without pul htn    Chief Complaint   Patient presents with    Follow-up     Yearly check up    Denies chest pain,sob,palpitations swelling or wanting to harm self       HPI :  She is doing great with no cardiac complaints or problems with her medications.  Her weight is actually down about 5 pounds from when we last met.  Most recent lipid profile looked great and her A1c was 7.1.  She is keeping up with her defibrillator checks and she likes to walk when the weather permits.  Lots of trouble with her allergies this

## 2024-06-04 DIAGNOSIS — C90.01 MULTIPLE MYELOMA IN REMISSION (HCC): ICD-10-CM

## 2024-06-04 RX ORDER — LENALIDOMIDE 10 MG/1
10 CAPSULE ORAL DAILY
Qty: 21 CAPSULE | Refills: 0 | Status: ACTIVE | OUTPATIENT
Start: 2024-06-04

## 2024-06-04 NOTE — TELEPHONE ENCOUNTER
Oral Chemotherapy      Hailey Quintero is a  63 y.o.female  diagnosed with MM . Ms. Quintero is being treated with lenalidomide.     Medication name: lenalidomide     Dose:  10 mg   Frequency: daily  Administration schedule: 21 days on, 7 days off  Ordering provider: Linda Cook MD        REMS prescriber survey completed Auth # [id number]  and Refill sent to [facility]. Generic sent per instructions from pharmacy and requirement from insurance.      Last OV 2/28/24  Next visit 8/29/24        Estrella Lewis, FRANCESCOD, BCOP, BCPS    For Pharmacy Admin Tracking Only    Program: Medical Group  CPA in place:  Yes  Recommendation Provided To: Patient/Caregiver: 1 via Telephone  Intervention Detail: Refill(s) Provided  Intervention Accepted By: Patient/Caregiver: 1    Time Spent (min): 10

## 2024-06-07 RX ORDER — CARVEDILOL 25 MG/1
TABLET ORAL
Qty: 180 TABLET | Refills: 3 | Status: SHIPPED | OUTPATIENT
Start: 2024-06-07

## 2024-06-07 NOTE — TELEPHONE ENCOUNTER
Last appointment: 2/7/24  Next appointment: 8/9/24  Previous refill encounter(s): 12/8/23 #180 with 1 refill    Requested Prescriptions     Pending Prescriptions Disp Refills    carvedilol (COREG) 25 MG tablet [Pharmacy Med Name: CARVEDILOL 25 MG TABLET] 180 tablet 3     Sig: TAKE 1 TABLET BY MOUTH TWICE A DAY         For Pharmacy Admin Tracking Only    Program: Medication Refill  CPA in place:    Recommendation Provided To:   Intervention Detail: New Rx: 1, reason: Patient Preference  Intervention Accepted By:   Gap Closed?:    Time Spent (min): 5

## 2024-06-25 DIAGNOSIS — C90.01 MULTIPLE MYELOMA IN REMISSION (HCC): ICD-10-CM

## 2024-06-25 DIAGNOSIS — C90.01 MULTIPLE MYELOMA IN REMISSION (HCC): Primary | ICD-10-CM

## 2024-06-25 LAB
ALBUMIN SERPL-MCNC: 3.6 G/DL (ref 3.5–5)
ALBUMIN/GLOB SERPL: 1.1 (ref 1.1–2.2)
ALP SERPL-CCNC: 93 U/L (ref 45–117)
ALT SERPL-CCNC: 28 U/L (ref 12–78)
ANION GAP SERPL CALC-SCNC: 5 MMOL/L (ref 5–15)
AST SERPL-CCNC: 13 U/L (ref 15–37)
BASOPHILS # BLD: 0 K/UL (ref 0–0.1)
BASOPHILS NFR BLD: 1 % (ref 0–1)
BILIRUB SERPL-MCNC: 2.1 MG/DL (ref 0.2–1)
BUN SERPL-MCNC: 7 MG/DL (ref 6–20)
BUN/CREAT SERPL: 7 (ref 12–20)
CALCIUM SERPL-MCNC: 9.3 MG/DL (ref 8.5–10.1)
CHLORIDE SERPL-SCNC: 109 MMOL/L (ref 97–108)
CO2 SERPL-SCNC: 26 MMOL/L (ref 21–32)
CREAT SERPL-MCNC: 1.07 MG/DL (ref 0.55–1.02)
DIFFERENTIAL METHOD BLD: ABNORMAL
EOSINOPHIL # BLD: 0.1 K/UL (ref 0–0.4)
EOSINOPHIL NFR BLD: 2 % (ref 0–7)
ERYTHROCYTE [DISTWIDTH] IN BLOOD BY AUTOMATED COUNT: 15 % (ref 11.5–14.5)
GLOBULIN SER CALC-MCNC: 3.2 G/DL (ref 2–4)
GLUCOSE SERPL-MCNC: 199 MG/DL (ref 65–100)
HCT VFR BLD AUTO: 40.9 % (ref 35–47)
HGB BLD-MCNC: 13.8 G/DL (ref 11.5–16)
IMM GRANULOCYTES # BLD AUTO: 0 K/UL (ref 0–0.04)
IMM GRANULOCYTES NFR BLD AUTO: 0 % (ref 0–0.5)
LYMPHOCYTES # BLD: 1.7 K/UL (ref 0.8–3.5)
LYMPHOCYTES NFR BLD: 45 % (ref 12–49)
MCH RBC QN AUTO: 33.3 PG (ref 26–34)
MCHC RBC AUTO-ENTMCNC: 33.7 G/DL (ref 30–36.5)
MCV RBC AUTO: 98.8 FL (ref 80–99)
MONOCYTES # BLD: 0.2 K/UL (ref 0–1)
MONOCYTES NFR BLD: 6 % (ref 5–13)
NEUTS SEG # BLD: 1.7 K/UL (ref 1.8–8)
NEUTS SEG NFR BLD: 46 % (ref 32–75)
NRBC # BLD: 0 K/UL (ref 0–0.01)
NRBC BLD-RTO: 0 PER 100 WBC
PLATELET # BLD AUTO: 219 K/UL (ref 150–400)
PMV BLD AUTO: 10.6 FL (ref 8.9–12.9)
POTASSIUM SERPL-SCNC: 3.4 MMOL/L (ref 3.5–5.1)
PROT SERPL-MCNC: 6.8 G/DL (ref 6.4–8.2)
RBC # BLD AUTO: 4.14 M/UL (ref 3.8–5.2)
SODIUM SERPL-SCNC: 140 MMOL/L (ref 136–145)
WBC # BLD AUTO: 3.7 K/UL (ref 3.6–11)

## 2024-06-28 DIAGNOSIS — C90.01 MULTIPLE MYELOMA IN REMISSION (HCC): ICD-10-CM

## 2024-06-28 RX ORDER — LENALIDOMIDE 10 MG/1
10 CAPSULE ORAL DAILY
Qty: 21 CAPSULE | Refills: 0 | Status: ACTIVE | OUTPATIENT
Start: 2024-06-28

## 2024-06-28 NOTE — TELEPHONE ENCOUNTER
Oral Chemotherapy      Hailey Quintero is a  63 y.o.female  diagnosed with MM . Ms. Quintero is being treated with lenalidomide.     Medication name: lenalidomide     Dose:  10 mg   Frequency: daily  Administration schedule: 21 days on, 7 days off  Ordering provider: Linda Cook MD        REMS prescriber survey completed Auth # [id number]  and Refill sent to [facility]. Generic sent per instructions from pharmacy and requirement from insurance.      Last OV 2/28/24  Next visit 8/29/24        Estrella Lewis, FRANCESCOD, BCOP, BCPS    For Pharmacy Admin Tracking Only    Program: Medical Group  CPA in place:  Yes  Recommendation Provided To: Patient/Caregiver: 1 via Telephone  Intervention Detail: Refill(s) Provided  Intervention Accepted By: Patient/Caregiver: 1    Time Spent (min): 10

## 2024-07-01 LAB
ALBUMIN SERPL ELPH-MCNC: 3.6 G/DL (ref 2.9–4.4)
ALBUMIN/GLOB SERPL: 1.3 (ref 0.7–1.7)
ALPHA1 GLOB SERPL ELPH-MCNC: 0.2 G/DL (ref 0–0.4)
ALPHA2 GLOB SERPL ELPH-MCNC: 0.7 G/DL (ref 0.4–1)
B-GLOBULIN SERPL ELPH-MCNC: 1 G/DL (ref 0.7–1.3)
GAMMA GLOB SERPL ELPH-MCNC: 0.9 G/DL (ref 0.4–1.8)
GLOBULIN SER-MCNC: 2.8 G/DL (ref 2.2–3.9)
IGA SERPL-MCNC: 168 MG/DL (ref 87–352)
IGG SERPL-MCNC: 1116 MG/DL (ref 586–1602)
IGM SERPL-MCNC: 10 MG/DL (ref 26–217)
INTERPRETATION SERPL IEP-IMP: ABNORMAL
KAPPA LC FREE SER-MCNC: 19.4 MG/L (ref 3.3–19.4)
KAPPA LC FREE/LAMBDA FREE SER: 1.39 (ref 0.26–1.65)
LAMBDA LC FREE SERPL-MCNC: 14 MG/L (ref 5.7–26.3)
M PROTEIN SERPL ELPH-MCNC: ABNORMAL G/DL
PROT SERPL-MCNC: 6.4 G/DL (ref 6–8.5)

## 2024-07-19 RX ORDER — ATORVASTATIN CALCIUM 40 MG/1
TABLET, FILM COATED ORAL
Qty: 90 TABLET | Refills: 3 | Status: SHIPPED | OUTPATIENT
Start: 2024-07-19

## 2024-07-19 NOTE — TELEPHONE ENCOUNTER
Last appointment: 2/7/24  Next appointment: 8/9/24  Previous refill encounter(s): 7/17/23    Requested Prescriptions     Pending Prescriptions Disp Refills    atorvastatin (LIPITOR) 40 MG tablet [Pharmacy Med Name: ATORVASTATIN 40 MG TABLET] 90 tablet 3     Sig: TAKE 1 TABLET BY MOUTH EVERY DAY FOR CHOLESTEROL         For Pharmacy Admin Tracking Only    Program: Medication Refill  CPA in place:    Recommendation Provided To:   Intervention Detail: New Rx: 1, reason: Patient Preference  Intervention Accepted By:   Gap Closed?:    Time Spent (min): 5

## 2024-07-30 DIAGNOSIS — C90.01 MULTIPLE MYELOMA IN REMISSION (HCC): ICD-10-CM

## 2024-07-30 RX ORDER — LENALIDOMIDE 10 MG/1
10 CAPSULE ORAL DAILY
Qty: 21 CAPSULE | Refills: 0 | Status: ACTIVE | OUTPATIENT
Start: 2024-07-30

## 2024-07-30 NOTE — TELEPHONE ENCOUNTER
Oral Chemotherapy      Hailey Quintero is a  63 y.o.female  diagnosed with MM . Ms. Quintero is being treated with lenalidomide.     Medication name: lenalidomide     Dose:  10 mg   Frequency: daily  Administration schedule: 21 days on, 7 days off  Ordering provider: Linda Cook MD        REMS prescriber survey completed Auth # [id number]  and Refill sent to [facility]. Generic sent per instructions from pharmacy and requirement from insurance.      Last OV 2/28/24  Next visit 8/29/24        Estrella Lewis, FRANCESCOD, BCOP, BCPS    For Pharmacy Admin Tracking Only    Program: Medical Group  CPA in place:  Yes  Recommendation Provided To: Patient/Caregiver: 1 via Telephone  Intervention Detail: Refill(s) Provided  Intervention Accepted By: Patient/Caregiver: 1    Time Spent (min): 15

## 2024-08-09 ENCOUNTER — OFFICE VISIT (OUTPATIENT)
Age: 64
End: 2024-08-09
Payer: MEDICARE

## 2024-08-09 DIAGNOSIS — Z00.00 ENCOUNTER FOR MEDICARE ANNUAL WELLNESS EXAM: Primary | ICD-10-CM

## 2024-08-09 DIAGNOSIS — E78.00 PURE HYPERCHOLESTEROLEMIA, UNSPECIFIED: ICD-10-CM

## 2024-08-09 DIAGNOSIS — E11.9 TYPE 2 DIABETES MELLITUS WITHOUT COMPLICATION, WITHOUT LONG-TERM CURRENT USE OF INSULIN (HCC): ICD-10-CM

## 2024-08-09 LAB
ANION GAP SERPL CALC-SCNC: 4 MMOL/L (ref 5–15)
BUN SERPL-MCNC: 7 MG/DL (ref 6–20)
BUN/CREAT SERPL: 8 (ref 12–20)
CALCIUM SERPL-MCNC: 8.6 MG/DL (ref 8.5–10.1)
CHLORIDE SERPL-SCNC: 110 MMOL/L (ref 97–108)
CHOLEST SERPL-MCNC: 152 MG/DL
CO2 SERPL-SCNC: 26 MMOL/L (ref 21–32)
CREAT SERPL-MCNC: 0.93 MG/DL (ref 0.55–1.02)
GLUCOSE SERPL-MCNC: 146 MG/DL (ref 65–100)
GLUCOSE, POC: 171 MG/DL
HBA1C MFR BLD: 7.6 %
HDLC SERPL-MCNC: 74 MG/DL
HDLC SERPL: 2.1 (ref 0–5)
LDLC SERPL CALC-MCNC: 58.2 MG/DL (ref 0–100)
POTASSIUM SERPL-SCNC: 3.6 MMOL/L (ref 3.5–5.1)
SODIUM SERPL-SCNC: 140 MMOL/L (ref 136–145)
TRIGL SERPL-MCNC: 99 MG/DL
VLDLC SERPL CALC-MCNC: 19.8 MG/DL

## 2024-08-09 PROCEDURE — 99213 OFFICE O/P EST LOW 20 MIN: CPT | Performed by: FAMILY MEDICINE

## 2024-08-09 PROCEDURE — 82962 GLUCOSE BLOOD TEST: CPT | Performed by: FAMILY MEDICINE

## 2024-08-09 PROCEDURE — 83036 HEMOGLOBIN GLYCOSYLATED A1C: CPT | Performed by: FAMILY MEDICINE

## 2024-08-09 SDOH — ECONOMIC STABILITY: FOOD INSECURITY: WITHIN THE PAST 12 MONTHS, THE FOOD YOU BOUGHT JUST DIDN'T LAST AND YOU DIDN'T HAVE MONEY TO GET MORE.: NEVER TRUE

## 2024-08-09 SDOH — ECONOMIC STABILITY: INCOME INSECURITY: HOW HARD IS IT FOR YOU TO PAY FOR THE VERY BASICS LIKE FOOD, HOUSING, MEDICAL CARE, AND HEATING?: NOT VERY HARD

## 2024-08-09 SDOH — ECONOMIC STABILITY: FOOD INSECURITY: WITHIN THE PAST 12 MONTHS, YOU WORRIED THAT YOUR FOOD WOULD RUN OUT BEFORE YOU GOT MONEY TO BUY MORE.: NEVER TRUE

## 2024-08-09 ASSESSMENT — PATIENT HEALTH QUESTIONNAIRE - PHQ9
SUM OF ALL RESPONSES TO PHQ QUESTIONS 1-9: 0
1. LITTLE INTEREST OR PLEASURE IN DOING THINGS: NOT AT ALL
2. FEELING DOWN, DEPRESSED OR HOPELESS: NOT AT ALL
SUM OF ALL RESPONSES TO PHQ9 QUESTIONS 1 & 2: 0
SUM OF ALL RESPONSES TO PHQ QUESTIONS 1-9: 0

## 2024-08-09 ASSESSMENT — LIFESTYLE VARIABLES
HOW OFTEN DO YOU HAVE A DRINK CONTAINING ALCOHOL: NEVER
HOW MANY STANDARD DRINKS CONTAINING ALCOHOL DO YOU HAVE ON A TYPICAL DAY: PATIENT DOES NOT DRINK

## 2024-08-09 NOTE — PROGRESS NOTES
Chief Complaint   Patient presents with    Medicare AWV       \"Have you been to the ER, urgent care clinic since your last visit?  Hospitalized since your last visit?\"    NO    “Have you seen or consulted any other health care providers outside of [facility] since your last visit?”    24- CARDIOLOGY-DR. SPENCER [facility]IR[facility] AND [id number]. [facility]            Click Here for Release of Records Request       There were no vitals filed for this visit.   Health Maintenance Due   Topic Date Due    HIV screen  Never done    Respiratory Syncytial Virus (RSV) Pregnant or age 60 yrs+ (1 - 1-dose 60+ series) Never done    Diabetic foot exam  2019    Shingles vaccine (2 of 2) 2020    Diabetic retinal exam  2023    Annual Wellness Visit (Medicare)  Never done    COVID-19 Vaccine ( season) 2024    Flu vaccine (1) 2024    Diabetic Alb to Cr ratio (uACR) test  2024        The patient, Hailey Quintero, identity was verified by name and .

## 2024-08-09 NOTE — PROGRESS NOTES
Hailey Quintero (:  1960) is a 63 y.o. female,Established patient, here for evaluation of the following chief complaint(s):  Medicare AWV      Assessment & Plan   1. Encounter for Medicare annual wellness exam  2. Type 2 diabetes mellitus without complication, without long-term current use of insulin (HCC)  -     AMB POC GLUCOSE BLOOD, BY GLUCOSE MONITORING DEVICE  -     AMB POC HEMOGLOBIN A1C  -     Basic Metabolic Panel; Future  -     empagliflozin (JARDIANCE) 10 MG tablet; Take 1 tablet by mouth daily FOR DIABETES AND HEART, Disp-90 tablet, R-3Normal  3. Pure hypercholesterolemia, unspecified  -     Lipid Panel; Future      Return in about 6 months (around 2025).       Subjective   HPI in for medicare wellness exam.   Sees Dr. Cook (oncology), Dr. Pantoja (cardiologist), Dr. Melgoza (EP), eye doctor. Had mammogram 3 months ago. Had colonoscopy in - told to come back in 10 years. UTD on immunizations. Recommended getting rsv. Would want  Eric Quintero and children to be her mPOAS IF SHE BECAME INCAPACITATED. Also needs blood pressure and diabetes checked.   Review of Systems   HENT:  Negative for hearing loss.    Neurological:         No falls, canes. Independent in adls. Memory good.    Psychiatric/Behavioral:          Not depressed. No alcohol.           Objective   Physical Exam  Cardiovascular:      Rate and Rhythm: Normal rate and regular rhythm.      Heart sounds: Normal heart sounds. No murmur heard.  Pulmonary:      Effort: Pulmonary effort is normal.      Breath sounds: Normal breath sounds.   Abdominal:      General: Abdomen is flat. Bowel sounds are normal.      Palpations: Abdomen is soft.   Musculoskeletal:      Right lower leg: No edema.      Left lower leg: No edema.                An electronic signature was used to authenticate this note.    --Danis Meadows MD

## 2024-08-19 RX ORDER — AMLODIPINE BESYLATE 5 MG/1
5 TABLET ORAL DAILY
Qty: 90 TABLET | Refills: 4 | Status: SHIPPED | OUTPATIENT
Start: 2024-08-19

## 2024-08-26 DIAGNOSIS — C90.01 MULTIPLE MYELOMA IN REMISSION (HCC): ICD-10-CM

## 2024-08-26 RX ORDER — LENALIDOMIDE 10 MG/1
10 CAPSULE ORAL DAILY
Qty: 21 CAPSULE | Refills: 0 | Status: ACTIVE | OUTPATIENT
Start: 2024-08-26

## 2024-08-26 NOTE — TELEPHONE ENCOUNTER
Oral Chemotherapy      Hailey Quintero is a  63 y.o.female  diagnosed with MM . Ms. Quintero is being treated with lenalidomide.     Medication name: lenalidomide     Dose:  10 mg   Frequency: daily  Administration schedule: 21 days on, 7 days off  Ordering provider: Linda Cook MD        REMS prescriber survey completed Auth # [id number] and Refill sent to [facility]. Generic sent per instructions from pharmacy and requirement from insurance.      Last OV 2/28/24  Next visit 8/29/24        Estrella Lewis, FRANCESCOD, BCOP, BCPS    For Pharmacy Admin Tracking Only    Program: Medical Group  CPA in place:  Yes  Recommendation Provided To: Patient/Caregiver: 1 via Telephone  Intervention Detail: Refill(s) Provided  Intervention Accepted By: Patient/Caregiver: 1    Time Spent (min): 10

## 2024-08-28 NOTE — PROGRESS NOTES
Hematology Follow Up    REASON FOR VISIT: Multiple Myeloma    TYPE OF VISIT  Hailey Quintero is a 63 y.o. female who is seen for follow up of Multiple Myeloma on Revlimid     TREATMENT:   3/24/17- 9/15/17: VRD X 8   10/20/17: Autologous transplant at [facility]  2/22/18 -  Revlimid     Oncologic history  Patient had left facial numbness which started in the summer of 2016. This started abruptly and was not associated with rashes, pain, jaw weakness. She has had some intermittent hyperemia of the L eye. No tearing. She has been evaluated by Dr. George with Neurology and an extensive work up was only notable for presence of a 0.3 g/dl M spike. Other tests were unremarkable: Vitamin B12 411, thyroid function test normal, ACE 25, SHERRIE normal CBC normal, CMP normal, CRP 1.17, CT head and cervical spine unremarkable.     Further evaluation revealed findings consistent with Multiple Myeloma    CBC 2/24 Unremarkable. Kappa 17 mg/dl, Lambda 2416 (ratio 0.01), SPEP 0.2 g/dl M spike, HASMUKH showed monoclonal free lambda light chains, UPEP negative, Beta 2 Microglobulin 1.8 mg/L, Skeletal survey negative for lytic lesions, Bone marrow biopsy on 2/24/17 showed a Normocellular marrow with mild monoclonal plasmacytosis (15-20%).   Trilineage hematopoiesis with maturation.  She had a very good partial response and 8 cycles of VRD and then proceeded on to receive an autologous stem cell transplant on 10/20/17 at [facility].  She had a CR posttransplant. Started maintenance Revlimid in Jan 2018.       HISTORY OF PRESENT ILLNESS: Ms. Quintero is a 63 y.o. female with DM and  Multiple Myeloma who presents to follow up after the ASCT and is on maintenance Revlimid 10mg daily. She was reduced to 5 mg due to cytopenias.  Then restarted on Revlimid 10 mg daily.    She presents for follow up.  She is feeling well. No other complaints today. Rare diarrhea. Sugar bothers her.     Review of systems was obtained and pertinent findings reviewed above. Past medical

## 2024-08-29 ENCOUNTER — OFFICE VISIT (OUTPATIENT)
Age: 64
End: 2024-08-29
Payer: MEDICARE

## 2024-08-29 VITALS
DIASTOLIC BLOOD PRESSURE: 77 MMHG | HEART RATE: 62 BPM | TEMPERATURE: 98.3 F | BODY MASS INDEX: 33.64 KG/M2 | OXYGEN SATURATION: 94 % | SYSTOLIC BLOOD PRESSURE: 148 MMHG | WEIGHT: 196 LBS | RESPIRATION RATE: 18 BRPM

## 2024-08-29 DIAGNOSIS — C90.01 MULTIPLE MYELOMA IN REMISSION (HCC): Primary | ICD-10-CM

## 2024-08-29 PROCEDURE — 99213 OFFICE O/P EST LOW 20 MIN: CPT | Performed by: INTERNAL MEDICINE

## 2024-08-29 PROCEDURE — 1123F ACP DISCUSS/DSCN MKR DOCD: CPT | Performed by: INTERNAL MEDICINE

## 2024-08-29 PROCEDURE — 3077F SYST BP >= 140 MM HG: CPT | Performed by: INTERNAL MEDICINE

## 2024-08-29 PROCEDURE — G8399 PT W/DXA RESULTS DOCUMENT: HCPCS | Performed by: INTERNAL MEDICINE

## 2024-08-29 PROCEDURE — G8428 CUR MEDS NOT DOCUMENT: HCPCS | Performed by: INTERNAL MEDICINE

## 2024-08-29 PROCEDURE — G8417 CALC BMI ABV UP PARAM F/U: HCPCS | Performed by: INTERNAL MEDICINE

## 2024-08-29 PROCEDURE — 1036F TOBACCO NON-USER: CPT | Performed by: INTERNAL MEDICINE

## 2024-08-29 PROCEDURE — 3017F COLORECTAL CA SCREEN DOC REV: CPT | Performed by: INTERNAL MEDICINE

## 2024-08-29 PROCEDURE — 1090F PRES/ABSN URINE INCON ASSESS: CPT | Performed by: INTERNAL MEDICINE

## 2024-08-29 PROCEDURE — 3078F DIAST BP <80 MM HG: CPT | Performed by: INTERNAL MEDICINE

## 2024-08-29 NOTE — PROGRESS NOTES
Hailey Quintero is a 63 y.o. female    Chief Complaint   Patient presents with    Follow-up     Multiple Myeloma        1. Have you been to the ER, urgent care clinic since your last visit?  Hospitalized since your last visit?No    2. Have you seen or consulted any other health care providers outside of the [facility] since your last visit?  Include any pap smears or colon screening. No

## 2024-09-09 RX ORDER — GLIMEPIRIDE 2 MG/1
TABLET ORAL
Qty: 90 TABLET | Refills: 3 | Status: SHIPPED | OUTPATIENT
Start: 2024-09-09

## 2024-09-25 DIAGNOSIS — C90.01 MULTIPLE MYELOMA IN REMISSION (HCC): ICD-10-CM

## 2024-09-25 LAB
ALBUMIN SERPL-MCNC: 3.6 G/DL (ref 3.5–5)
ALBUMIN/GLOB SERPL: 1.3 (ref 1.1–2.2)
ALP SERPL-CCNC: 92 U/L (ref 45–117)
ALT SERPL-CCNC: 34 U/L (ref 12–78)
ANION GAP SERPL CALC-SCNC: 6 MMOL/L (ref 2–12)
AST SERPL-CCNC: 19 U/L (ref 15–37)
BASOPHILS # BLD: 0 K/UL (ref 0–0.1)
BASOPHILS NFR BLD: 1 % (ref 0–1)
BILIRUB SERPL-MCNC: 2.1 MG/DL (ref 0.2–1)
BUN SERPL-MCNC: 7 MG/DL (ref 6–20)
BUN/CREAT SERPL: 7 (ref 12–20)
CALCIUM SERPL-MCNC: 8.6 MG/DL (ref 8.5–10.1)
CHLORIDE SERPL-SCNC: 108 MMOL/L (ref 97–108)
CO2 SERPL-SCNC: 26 MMOL/L (ref 21–32)
CREAT SERPL-MCNC: 0.95 MG/DL (ref 0.55–1.02)
DIFFERENTIAL METHOD BLD: ABNORMAL
EOSINOPHIL # BLD: 0.1 K/UL (ref 0–0.4)
EOSINOPHIL NFR BLD: 3 % (ref 0–7)
ERYTHROCYTE [DISTWIDTH] IN BLOOD BY AUTOMATED COUNT: 14.4 % (ref 11.5–14.5)
GLOBULIN SER CALC-MCNC: 2.8 G/DL (ref 2–4)
GLUCOSE SERPL-MCNC: 148 MG/DL (ref 65–100)
HCT VFR BLD AUTO: 40.4 % (ref 35–47)
HGB BLD-MCNC: 13.6 G/DL (ref 11.5–16)
IMM GRANULOCYTES # BLD AUTO: 0 K/UL (ref 0–0.04)
IMM GRANULOCYTES NFR BLD AUTO: 0 % (ref 0–0.5)
LYMPHOCYTES # BLD: 1.9 K/UL (ref 0.8–3.5)
LYMPHOCYTES NFR BLD: 44 % (ref 12–49)
MCH RBC QN AUTO: 33.4 PG (ref 26–34)
MCHC RBC AUTO-ENTMCNC: 33.7 G/DL (ref 30–36.5)
MCV RBC AUTO: 99.3 FL (ref 80–99)
MONOCYTES # BLD: 0.5 K/UL (ref 0–1)
MONOCYTES NFR BLD: 12 % (ref 5–13)
NEUTS SEG # BLD: 1.7 K/UL (ref 1.8–8)
NEUTS SEG NFR BLD: 40 % (ref 32–75)
NRBC # BLD: 0 K/UL (ref 0–0.01)
NRBC BLD-RTO: 0 PER 100 WBC
PLATELET # BLD AUTO: 209 K/UL (ref 150–400)
PMV BLD AUTO: 10.1 FL (ref 8.9–12.9)
POTASSIUM SERPL-SCNC: 3.5 MMOL/L (ref 3.5–5.1)
PROT SERPL-MCNC: 6.4 G/DL (ref 6.4–8.2)
RBC # BLD AUTO: 4.07 M/UL (ref 3.8–5.2)
SODIUM SERPL-SCNC: 140 MMOL/L (ref 136–145)
WBC # BLD AUTO: 4.4 K/UL (ref 3.6–11)

## 2024-09-26 DIAGNOSIS — C90.01 MULTIPLE MYELOMA IN REMISSION (HCC): ICD-10-CM

## 2024-09-26 RX ORDER — LENALIDOMIDE 10 MG/1
10 CAPSULE ORAL DAILY
Qty: 21 CAPSULE | Refills: 0 | Status: ACTIVE | OUTPATIENT
Start: 2024-09-26

## 2024-09-29 LAB
ALBUMIN SERPL ELPH-MCNC: 3.7 G/DL (ref 2.9–4.4)
ALBUMIN/GLOB SERPL: 1.3 (ref 0.7–1.7)
ALPHA1 GLOB SERPL ELPH-MCNC: 0.2 G/DL (ref 0–0.4)
ALPHA2 GLOB SERPL ELPH-MCNC: 0.7 G/DL (ref 0.4–1)
B-GLOBULIN SERPL ELPH-MCNC: 1 G/DL (ref 0.7–1.3)
GAMMA GLOB SERPL ELPH-MCNC: 1 G/DL (ref 0.4–1.8)
GLOBULIN SER-MCNC: 2.9 G/DL (ref 2.2–3.9)
IGA SERPL-MCNC: 180 MG/DL (ref 87–352)
IGG SERPL-MCNC: 1076 MG/DL (ref 586–1602)
IGM SERPL-MCNC: 8 MG/DL (ref 26–217)
INTERPRETATION SERPL IEP-IMP: ABNORMAL
KAPPA LC FREE SER-MCNC: 17.1 MG/L (ref 3.3–19.4)
KAPPA LC FREE/LAMBDA FREE SER: 1.2 (ref 0.26–1.65)
LAMBDA LC FREE SERPL-MCNC: 14.2 MG/L (ref 5.7–26.3)
M PROTEIN SERPL ELPH-MCNC: ABNORMAL G/DL
PROT SERPL-MCNC: 6.6 G/DL (ref 6–8.5)

## 2024-10-24 DIAGNOSIS — C90.01 MULTIPLE MYELOMA IN REMISSION (HCC): ICD-10-CM

## 2024-10-24 RX ORDER — LENALIDOMIDE 10 MG/1
10 CAPSULE ORAL DAILY
Qty: 21 CAPSULE | Refills: 0 | Status: ACTIVE | OUTPATIENT
Start: 2024-10-24

## 2024-10-24 NOTE — TELEPHONE ENCOUNTER
Oral Chemotherapy      Hailey Quintero is a  63 y.o.female  diagnosed with MM . Ms. Quintero is being treated with lenalidomide.     Medication name: lenalidomide     Dose:  10 mg   Frequency: daily  Administration schedule: 21 days on, 7 days off every 28 days  Ordering provider: Linda Cook MD        REMS prescriber survey completed Auth # [id number]   Refill sent to [facility]. Generic sent per instructions from pharmacy and requirement from insurance.      Last OV 8/29/24  Next visit 2/28/25        Estrella Lewis, FRANCESCOD, BCOP, BCPS    For Pharmacy Admin Tracking Only    Program: Medical Group  CPA in place:  Yes  Recommendation Provided To: Patient/Caregiver: 1 via Telephone  Intervention Detail: Refill(s) Provided  Intervention Accepted By: Patient/Caregiver: 1    Time Spent (min): 10

## 2024-11-19 DIAGNOSIS — C90.01 MULTIPLE MYELOMA IN REMISSION (HCC): ICD-10-CM

## 2024-11-19 RX ORDER — LENALIDOMIDE 10 MG/1
10 CAPSULE ORAL DAILY
Qty: 21 CAPSULE | Refills: 0 | Status: ACTIVE | OUTPATIENT
Start: 2024-11-19

## 2024-11-19 NOTE — TELEPHONE ENCOUNTER
Oral Chemotherapy      Hailey Quintero is a  63 y.o.female  diagnosed with MM . Ms. Quintero is being treated with lenalidomide.     Medication name: lenalidomide     Dose:  10 mg   Frequency: daily  Administration schedule: 21 days on, 7 days off every 28 days  Ordering provider: Linda Cook MD        REMS prescriber survey completed Auth # [id number]  Refill sent to [facility]. Generic sent per instructions from pharmacy and requirement from insurance.      Last OV 8/29/24  Next visit 2/28/25        Estrella Lewis, FRANCESCOD, BCOP, BCPS    For Pharmacy Admin Tracking Only    Program: Medical Group  CPA in place:  Yes  Recommendation Provided To: Patient/Caregiver: 1 via Telephone  Intervention Detail: Refill(s) Provided  Intervention Accepted By: Patient/Caregiver: 1    Time Spent (min): 10

## 2024-12-17 DIAGNOSIS — C90.01 MULTIPLE MYELOMA IN REMISSION (HCC): ICD-10-CM

## 2024-12-17 RX ORDER — LENALIDOMIDE 10 MG/1
10 CAPSULE ORAL DAILY
Qty: 21 CAPSULE | Refills: 0 | Status: ACTIVE | OUTPATIENT
Start: 2024-12-17

## 2024-12-17 NOTE — TELEPHONE ENCOUNTER
Oral Chemotherapy      Hailey Quintero is a  63 y.o.female  diagnosed with MM . Ms. Quintero is being treated with lenalidomide.     Medication name: lenalidomide     Dose:  10 mg   Frequency: daily  Administration schedule: 21 days on, 7 days off every 28 days  Ordering provider: Linda Cook MD        REMS prescriber survey completed Auth # [id number]  Refill sent to [facility]. Generic sent per instructions from pharmacy and requirement from insurance.      Last OV 8/29/24  Next visit 2/28/25        Estrella Lewis, FRANCESCOD, BCOP, BCPS    For Pharmacy Admin Tracking Only    Program: Medical Group  CPA in place:  Yes  Recommendation Provided To: Patient/Caregiver: 1 via Telephone  Intervention Detail: Refill(s) Provided  Intervention Accepted By: Patient/Caregiver: 1    Time Spent (min): 10

## 2025-01-03 DIAGNOSIS — C90.01 MULTIPLE MYELOMA IN REMISSION (HCC): ICD-10-CM

## 2025-01-03 LAB
ALBUMIN SERPL-MCNC: 3.6 G/DL (ref 3.5–5)
ALBUMIN/GLOB SERPL: 1.2 (ref 1.1–2.2)
ALP SERPL-CCNC: 85 U/L (ref 45–117)
ALT SERPL-CCNC: 41 U/L (ref 12–78)
ANION GAP SERPL CALC-SCNC: 7 MMOL/L (ref 2–12)
AST SERPL-CCNC: 19 U/L (ref 15–37)
BASOPHILS # BLD: 0.1 K/UL (ref 0–0.1)
BASOPHILS NFR BLD: 1 % (ref 0–1)
BILIRUB SERPL-MCNC: 1.8 MG/DL (ref 0.2–1)
BUN SERPL-MCNC: 9 MG/DL (ref 6–20)
BUN/CREAT SERPL: 9 (ref 12–20)
CALCIUM SERPL-MCNC: 8.8 MG/DL (ref 8.5–10.1)
CHLORIDE SERPL-SCNC: 108 MMOL/L (ref 97–108)
CO2 SERPL-SCNC: 23 MMOL/L (ref 21–32)
CREAT SERPL-MCNC: 1.04 MG/DL (ref 0.55–1.02)
DIFFERENTIAL METHOD BLD: ABNORMAL
EOSINOPHIL # BLD: 0.1 K/UL (ref 0–0.4)
EOSINOPHIL NFR BLD: 3 % (ref 0–7)
ERYTHROCYTE [DISTWIDTH] IN BLOOD BY AUTOMATED COUNT: 15.5 % (ref 11.5–14.5)
GLOBULIN SER CALC-MCNC: 3.1 G/DL (ref 2–4)
GLUCOSE SERPL-MCNC: 147 MG/DL (ref 65–100)
HCT VFR BLD AUTO: 39.5 % (ref 35–47)
HGB BLD-MCNC: 13.4 G/DL (ref 11.5–16)
IMM GRANULOCYTES # BLD AUTO: 0 K/UL (ref 0–0.04)
IMM GRANULOCYTES NFR BLD AUTO: 0 % (ref 0–0.5)
LYMPHOCYTES # BLD: 1.9 K/UL (ref 0.8–3.5)
LYMPHOCYTES NFR BLD: 45 % (ref 12–49)
MCH RBC QN AUTO: 34.1 PG (ref 26–34)
MCHC RBC AUTO-ENTMCNC: 33.9 G/DL (ref 30–36.5)
MCV RBC AUTO: 100.5 FL (ref 80–99)
MONOCYTES # BLD: 0.4 K/UL (ref 0–1)
MONOCYTES NFR BLD: 10 % (ref 5–13)
NEUTS SEG # BLD: 1.7 K/UL (ref 1.8–8)
NEUTS SEG NFR BLD: 41 % (ref 32–75)
NRBC # BLD: 0 K/UL (ref 0–0.01)
NRBC BLD-RTO: 0 PER 100 WBC
PLATELET # BLD AUTO: 249 K/UL (ref 150–400)
PMV BLD AUTO: 10.5 FL (ref 8.9–12.9)
POTASSIUM SERPL-SCNC: 3.6 MMOL/L (ref 3.5–5.1)
PROT SERPL-MCNC: 6.7 G/DL (ref 6.4–8.2)
RBC # BLD AUTO: 3.93 M/UL (ref 3.8–5.2)
SODIUM SERPL-SCNC: 138 MMOL/L (ref 136–145)
WBC # BLD AUTO: 4.2 K/UL (ref 3.6–11)

## 2025-01-14 LAB
ALBUMIN SERPL ELPH-MCNC: NORMAL G/DL
ALBUMIN/GLOB SERPL: NORMAL
ALPHA1 GLOB SERPL ELPH-MCNC: NORMAL G/DL
ALPHA2 GLOB SERPL ELPH-MCNC: NORMAL G/DL
B-GLOBULIN SERPL ELPH-MCNC: NORMAL G/DL
GAMMA GLOB SERPL ELPH-MCNC: NORMAL G/DL
GLOBULIN SER-MCNC: NORMAL G/DL
IGA SERPL-MCNC: NORMAL MG/DL
IGG SERPL-MCNC: NORMAL MG/DL
IGM SERPL-MCNC: NORMAL MG/DL
INTERPRETATION SERPL IEP-IMP: NORMAL
KAPPA LC FREE SER-MCNC: 18.7 MG/L (ref 3.3–19.4)
KAPPA LC FREE/LAMBDA FREE SER: 1.53 (ref 0.26–1.65)
LAMBDA LC FREE SERPL-MCNC: 12.2 MG/L (ref 5.7–26.3)
M PROTEIN SERPL ELPH-MCNC: NORMAL G/DL
PROT SERPL-MCNC: 6.7 G/DL (ref 6–8.5)

## 2025-01-16 DIAGNOSIS — C90.01 MULTIPLE MYELOMA IN REMISSION (HCC): ICD-10-CM

## 2025-01-16 RX ORDER — LENALIDOMIDE 10 MG/1
10 CAPSULE ORAL DAILY
Qty: 21 CAPSULE | Refills: 0 | Status: ACTIVE | OUTPATIENT
Start: 2025-01-16

## 2025-01-16 NOTE — TELEPHONE ENCOUNTER
Oral Chemotherapy      Hailey Quintero is a  63 y.o.female  diagnosed with MM . Ms. Quintero is being treated with lenalidomide.     Medication name: lenalidomide     Dose:  10 mg   Frequency: daily  Administration schedule: 21 days on, 7 days off every 28 days  Ordering provider: Linda Cook MD        REMS prescriber survey completed Auth # [id number]  Refill sent to [facility]. Generic sent per instructions from pharmacy and requirement from insurance.      Last OV 8/29/24  Next visit 2/28/25        Estrella Lewis, PHARMD, BCOP, BCPS      For Pharmacy Admin Tracking Only    Program: Medical Group  CPA in place:  Yes  Recommendation Provided To: Patient/Caregiver: 1 via Telephone  Intervention Detail: Refill(s) Provided  Intervention Accepted By: Patient/Caregiver: 1    Time Spent (min): 10

## 2025-02-06 PROBLEM — Z45.02 IMPLANTABLE CARDIOVERTER-DEFIBRILLATOR (ICD) AT END OF BATTERY LIFE: Status: RESOLVED | Noted: 2021-05-24 | Resolved: 2025-02-06

## 2025-02-06 PROBLEM — E11.21 TYPE 2 DIABETES MELLITUS WITH NEPHROPATHY (HCC): Status: RESOLVED | Noted: 2018-01-11 | Resolved: 2025-02-06

## 2025-02-06 PROBLEM — C90.00 MULTIPLE MYELOMA NOT HAVING ACHIEVED REMISSION (HCC): Status: RESOLVED | Noted: 2017-03-06 | Resolved: 2025-02-06

## 2025-02-06 PROBLEM — D70.1 CHEMOTHERAPY INDUCED NEUTROPENIA: Status: RESOLVED | Noted: 2018-07-25 | Resolved: 2025-02-06

## 2025-02-06 PROBLEM — T45.1X5A CHEMOTHERAPY INDUCED NEUTROPENIA: Status: RESOLVED | Noted: 2018-07-25 | Resolved: 2025-02-06

## 2025-02-06 PROBLEM — E11.9 CONTROLLED TYPE 2 DIABETES MELLITUS WITHOUT COMPLICATION, WITHOUT LONG-TERM CURRENT USE OF INSULIN (HCC): Status: RESOLVED | Noted: 2017-03-31 | Resolved: 2025-02-06

## 2025-02-06 PROBLEM — I82.A12 DVT OF AXILLARY VEIN, ACUTE LEFT (HCC): Status: RESOLVED | Noted: 2022-05-10 | Resolved: 2025-02-06

## 2025-02-06 PROBLEM — R21 RASH: Status: RESOLVED | Noted: 2017-04-21 | Resolved: 2025-02-06

## 2025-02-06 PROBLEM — E11.9 CONTROLLED TYPE 2 DIABETES MELLITUS WITHOUT COMPLICATION (HCC): Status: RESOLVED | Noted: 2018-02-27 | Resolved: 2025-02-06

## 2025-02-06 PROBLEM — G62.9 NEUROPATHY: Status: RESOLVED | Noted: 2017-04-21 | Resolved: 2025-02-06

## 2025-02-06 PROBLEM — Z45.02 ICD (IMPLANTABLE CARDIOVERTER-DEFIBRILLATOR) BATTERY DEPLETION: Status: RESOLVED | Noted: 2021-05-24 | Resolved: 2025-02-06

## 2025-02-10 ENCOUNTER — OFFICE VISIT (OUTPATIENT)
Age: 65
End: 2025-02-10
Payer: MEDICARE

## 2025-02-10 VITALS
HEIGHT: 65 IN | TEMPERATURE: 96.6 F | HEART RATE: 68 BPM | RESPIRATION RATE: 16 BRPM | BODY MASS INDEX: 32.65 KG/M2 | DIASTOLIC BLOOD PRESSURE: 74 MMHG | WEIGHT: 196 LBS | SYSTOLIC BLOOD PRESSURE: 147 MMHG | OXYGEN SATURATION: 98 %

## 2025-02-10 DIAGNOSIS — Z23 NEEDS FLU SHOT: ICD-10-CM

## 2025-02-10 DIAGNOSIS — Z23 ENCOUNTER FOR ADMINISTRATION OF COVID-19 VACCINE: ICD-10-CM

## 2025-02-10 DIAGNOSIS — I50.22 CHRONIC SYSTOLIC HEART FAILURE (HCC): ICD-10-CM

## 2025-02-10 DIAGNOSIS — E11.22 TYPE 2 DIABETES MELLITUS WITH STAGE 3A CHRONIC KIDNEY DISEASE, WITHOUT LONG-TERM CURRENT USE OF INSULIN (HCC): ICD-10-CM

## 2025-02-10 DIAGNOSIS — N18.31 TYPE 2 DIABETES MELLITUS WITH STAGE 3A CHRONIC KIDNEY DISEASE, WITHOUT LONG-TERM CURRENT USE OF INSULIN (HCC): ICD-10-CM

## 2025-02-10 DIAGNOSIS — N18.31 STAGE 3A CHRONIC KIDNEY DISEASE (HCC): ICD-10-CM

## 2025-02-10 DIAGNOSIS — I10 ESSENTIAL HYPERTENSION: ICD-10-CM

## 2025-02-10 DIAGNOSIS — I49.5 SICK SINUS SYNDROME (HCC): ICD-10-CM

## 2025-02-10 DIAGNOSIS — E11.9 TYPE 2 DIABETES MELLITUS WITHOUT COMPLICATION, WITHOUT LONG-TERM CURRENT USE OF INSULIN (HCC): Primary | ICD-10-CM

## 2025-02-10 DIAGNOSIS — E78.00 HYPERCHOLESTEROLEMIA: ICD-10-CM

## 2025-02-10 DIAGNOSIS — C90.01 MULTIPLE MYELOMA IN REMISSION (HCC): ICD-10-CM

## 2025-02-10 LAB
CHOLEST SERPL-MCNC: 160 MG/DL
CREAT UR-MCNC: 250 MG/DL
GLUCOSE, POC: 246 MG/DL
HBA1C MFR BLD: 7.2 %
HDLC SERPL-MCNC: 67 MG/DL
HDLC SERPL: 2.4 (ref 0–5)
LDLC SERPL CALC-MCNC: 69.6 MG/DL (ref 0–100)
MICROALBUMIN UR-MCNC: 8.75 MG/DL
MICROALBUMIN/CREAT UR-RTO: 35 MG/G (ref 0–30)
TRIGL SERPL-MCNC: 117 MG/DL
VLDLC SERPL CALC-MCNC: 23.4 MG/DL

## 2025-02-10 PROCEDURE — 82962 GLUCOSE BLOOD TEST: CPT | Performed by: FAMILY MEDICINE

## 2025-02-10 PROCEDURE — G0008 ADMIN INFLUENZA VIRUS VAC: HCPCS | Performed by: FAMILY MEDICINE

## 2025-02-10 PROCEDURE — 99214 OFFICE O/P EST MOD 30 MIN: CPT | Performed by: FAMILY MEDICINE

## 2025-02-10 PROCEDURE — 90480 ADMN SARSCOV2 VAC 1/ONLY CMP: CPT | Performed by: FAMILY MEDICINE

## 2025-02-10 PROCEDURE — 83036 HEMOGLOBIN GLYCOSYLATED A1C: CPT | Performed by: FAMILY MEDICINE

## 2025-02-10 RX ORDER — HYDROCHLOROTHIAZIDE 12.5 MG/1
12.5 CAPSULE ORAL EVERY MORNING
Qty: 90 CAPSULE | Refills: 3 | Status: SHIPPED | OUTPATIENT
Start: 2025-02-10

## 2025-02-10 SDOH — ECONOMIC STABILITY: FOOD INSECURITY: WITHIN THE PAST 12 MONTHS, YOU WORRIED THAT YOUR FOOD WOULD RUN OUT BEFORE YOU GOT MONEY TO BUY MORE.: NEVER TRUE

## 2025-02-10 SDOH — ECONOMIC STABILITY: FOOD INSECURITY: WITHIN THE PAST 12 MONTHS, THE FOOD YOU BOUGHT JUST DIDN'T LAST AND YOU DIDN'T HAVE MONEY TO GET MORE.: NEVER TRUE

## 2025-02-10 ASSESSMENT — PATIENT HEALTH QUESTIONNAIRE - PHQ9
SUM OF ALL RESPONSES TO PHQ QUESTIONS 1-9: 0
2. FEELING DOWN, DEPRESSED OR HOPELESS: NOT AT ALL
SUM OF ALL RESPONSES TO PHQ9 QUESTIONS 1 & 2: 0
1. LITTLE INTEREST OR PLEASURE IN DOING THINGS: NOT AT ALL
SUM OF ALL RESPONSES TO PHQ QUESTIONS 1-9: 0

## 2025-02-10 NOTE — PROGRESS NOTES
Chief Complaint   Patient presents with    Diabetes    Hypertension    Cholesterol Problem    Follow-up Chronic Condition       \"Have you been to the ER, urgent care clinic since your last visit?  Hospitalized since your last visit?\"    NO    “Have you seen or consulted any other health care providers outside of [facility] since your last visit?”      CARDIOLOGY - DR JIGNESH SOLO - RESULTS IN CHART  ONCOLOGY -  DR. ISSAC BROWN            Click Here for Release of Records Request       Vitals:    02/10/25 0911   BP: (!) 147/74   Pulse: 68   Resp: 16   Temp: (!) 96.6 °F (35.9 °C)   SpO2: 98%      Health Maintenance Due   Topic Date Due    Respiratory Syncytial Virus (RSV) Pregnant or age 60 yrs+ (1 - Risk 60-74 years 1-dose series) Never done    Diabetic foot exam  2019    Shingles vaccine (2 of 2) 2020    Diabetic retinal exam  2023    Diabetic Alb to Cr ratio (uACR) test  2024        The patient, Hailey Quintero, identity was verified by name and .

## 2025-02-10 NOTE — PROGRESS NOTES
Hailey Quintero (:  1960) is a 64 y.o. female,Established patient, here for evaluation of the following chief complaint(s):  Diabetes, Hypertension, Cholesterol Problem, and Follow-up Chronic Condition         Assessment & Plan  Type 2 diabetes mellitus without complication, without long-term current use of insulin ([facility])       Orders:    AMB POC GLUCOSE BLOOD, BY GLUCOSE MONITORING DEVICE    AMB POC HEMOGLOBIN A1C    HM DIABETES FOOT EXAM    Albumin/Creatinine Ratio, Urine; Future    Needs flu shot       Orders:    Influenza, FLUAD Trivalent, (age 65 y+), IM, Preservative Free, 0.5mL    Encounter for administration of COVID-19 vaccine       Orders:    COVID-19, COMIRNATY, (age 12y+), IM, PF, 30mcg/0.3mL    Multiple myeloma in remission (HCC)    Followed by Dr. Cook         Chronic systolic heart failure ([facility])    Just had repeat echo 3 weeks ago.         Sick sinus syndrome (HCC)    Has a pacemaker         Type 2 diabetes mellitus with stage 3a chronic kidney disease, without long-term current use of insulin ([facility])            Stage 3a chronic kidney disease (HCC)   Will continue to follow gfr.          Essential hypertension       Orders:    hydroCHLOROthiazide 12.5 MG capsule; Take 1 capsule by mouth every morning For blood pressure    Hypercholesterolemia       Orders:    Lipid Panel; Future      No follow-ups on file.       Subjective   HPI Pt. Comes in for blood pressure, cholesterol, and diabetes check. Has seen Dr. Melgoza for pacemaker checkup. Had an echo in January. No complaints of chest pain, shortness of breath, TIAs, claudication or edema. Sees Dr. Cook every 3 months. Still on revlimid.         Review of Systems       Objective   Physical Exam  Cardiovascular:      Rate and Rhythm: Normal rate and regular rhythm.      Heart sounds: Normal heart sounds. No murmur heard.  Pulmonary:      Effort: Pulmonary effort is normal.      Breath sounds: Normal breath sounds.   Abdominal:      General:

## 2025-02-10 NOTE — ASSESSMENT & PLAN NOTE
Orders:    hydroCHLOROthiazide 12.5 MG capsule; Take 1 capsule by mouth every morning For blood pressure

## 2025-02-18 RX ORDER — LOSARTAN POTASSIUM 25 MG/1
25 TABLET ORAL DAILY
Qty: 30 TABLET | Refills: 12 | Status: SHIPPED | OUTPATIENT
Start: 2025-02-18

## 2025-02-18 NOTE — PROGRESS NOTES
Pc with pt. Will start losaretan for microalbuminuria. P[t to also ask pharmacist how much jardiance would cost her.

## 2025-02-19 DIAGNOSIS — C90.01 MULTIPLE MYELOMA IN REMISSION (HCC): ICD-10-CM

## 2025-02-19 RX ORDER — LENALIDOMIDE 10 MG/1
10 CAPSULE ORAL DAILY
Qty: 21 CAPSULE | Refills: 0 | Status: ACTIVE | OUTPATIENT
Start: 2025-02-19

## 2025-02-19 NOTE — TELEPHONE ENCOUNTER
Oral Chemotherapy      Hailey Quintero is a  63 y.o.female  diagnosed with MM . Ms. Quintero is being treated with lenalidomide.     Medication name: lenalidomide     Dose:  10 mg   Frequency: daily  Administration schedule: 21 days on, 7 days off every 28 days  Ordering provider: Linda Cook MD        REMS prescriber survey completed Auth # [id number]  Refill sent to [facility]. Generic sent per instructions from pharmacy and requirement from insurance.      Last OV 8/29/24  Next visit 2/28/25        Estrella Lewis, PHARMD, BCOP, BCPS      For Pharmacy Admin Tracking Only    Program: Medical Group  CPA in place:  Yes  Recommendation Provided To: Patient/Caregiver: 1 via Telephone  Intervention Detail: Refill(s) Provided  Intervention Accepted By: Patient/Caregiver: 1    Time Spent (min): 10

## 2025-02-27 NOTE — PROGRESS NOTES
[facility] [place] [facility]  [address], [address] [facility] [address]  W: [phone]  F: [phone]    REASON FOR VISIT: Multiple Myeloma    TYPE OF VISIT  Hailey Quintero is a 64 y.o. female who is seen for follow up of Multiple Myeloma on Revlimid     TREATMENT:   3/24/17- 9/15/17: VRD X 8   10/20/17: Autologous transplant at [facility]  2/22/18 -  Revlimid 10mg daily    Oncologic history  Patient had left facial numbness which started in the summer of 2016. This started abruptly and was not associated with rashes, pain, jaw weakness. She has had some intermittent hyperemia of the L eye. No tearing. She has been evaluated by Dr. George with Neurology and an extensive work up was only notable for presence of a 0.3 g/dl M spike. Other tests were unremarkable: Vitamin B12 411, thyroid function test normal, ACE 25, SHERRIE normal CBC normal, CMP normal, CRP 1.17, CT head and cervical spine unremarkable.     Further evaluation revealed findings consistent with Multiple Myeloma    CBC 2/24 Unremarkable. Kappa 17 mg/dl, Lambda 2416 (ratio 0.01), SPEP 0.2 g/dl M spike, HASMUKH showed monoclonal free lambda light chains, UPEP negative, Beta 2 Microglobulin 1.8 mg/L, Skeletal survey negative for lytic lesions, Bone marrow biopsy on 2/24/17 showed a Normocellular marrow with mild monoclonal plasmacytosis (15-20%).   Trilineage hematopoiesis with maturation.  She had a very good partial response and 8 cycles of VRD and then proceeded on to receive an autologous stem cell transplant on 10/20/17 at [facility].  She had a CR posttransplant. Started maintenance Revlimid in Jan 2018.       HISTORY OF PRESENT ILLNESS: Ms. Quintero is a 64 y.o. female with DM and  Multiple Myeloma who presents to follow up after the ASCT and is on maintenance Revlimid 10mg daily. She was reduced to 5 mg due to cytopenias.  Then restarted on Revlimid 10 mg daily.    Here for follow up.   Tolerating Revlimid well.   Has diarrhea, sometimes related to diet

## 2025-02-28 ENCOUNTER — OFFICE VISIT (OUTPATIENT)
Age: 65
End: 2025-02-28
Payer: MEDICARE

## 2025-02-28 VITALS
DIASTOLIC BLOOD PRESSURE: 75 MMHG | HEART RATE: 64 BPM | RESPIRATION RATE: 16 BRPM | TEMPERATURE: 98 F | SYSTOLIC BLOOD PRESSURE: 122 MMHG | OXYGEN SATURATION: 96 % | BODY MASS INDEX: 32.45 KG/M2 | WEIGHT: 192 LBS

## 2025-02-28 DIAGNOSIS — C90.01 MULTIPLE MYELOMA IN REMISSION (HCC): Primary | ICD-10-CM

## 2025-02-28 PROCEDURE — 1160F RVW MEDS BY RX/DR IN RCRD: CPT

## 2025-02-28 PROCEDURE — 99214 OFFICE O/P EST MOD 30 MIN: CPT

## 2025-02-28 PROCEDURE — G8427 DOCREV CUR MEDS BY ELIG CLIN: HCPCS

## 2025-02-28 PROCEDURE — 1126F AMNT PAIN NOTED NONE PRSNT: CPT

## 2025-02-28 PROCEDURE — 1036F TOBACCO NON-USER: CPT

## 2025-02-28 PROCEDURE — G8417 CALC BMI ABV UP PARAM F/U: HCPCS

## 2025-02-28 PROCEDURE — 3074F SYST BP LT 130 MM HG: CPT

## 2025-02-28 PROCEDURE — 1123F ACP DISCUSS/DSCN MKR DOCD: CPT

## 2025-02-28 PROCEDURE — 1159F MED LIST DOCD IN RCRD: CPT

## 2025-02-28 PROCEDURE — 3078F DIAST BP <80 MM HG: CPT

## 2025-02-28 PROCEDURE — 1090F PRES/ABSN URINE INCON ASSESS: CPT

## 2025-02-28 PROCEDURE — 3017F COLORECTAL CA SCREEN DOC REV: CPT

## 2025-02-28 PROCEDURE — G8399 PT W/DXA RESULTS DOCUMENT: HCPCS

## 2025-02-28 RX ORDER — EMPAGLIFLOZIN 10 MG/1
TABLET, FILM COATED ORAL
COMMUNITY
Start: 2025-02-19

## 2025-02-28 NOTE — PROGRESS NOTES
Hailey Quintero is a 64 y.o. female    Chief Complaint   Patient presents with    Follow-up     Multiple Myeloma            1. Have you been to the ER, urgent care clinic since your last visit?  Hospitalized since your last visit?No    2. Have you seen or consulted any other health care providers outside of the [facility] since your last visit?  Include any pap smears or colon screening. No

## 2025-03-19 DIAGNOSIS — C90.01 MULTIPLE MYELOMA IN REMISSION (HCC): ICD-10-CM

## 2025-03-19 RX ORDER — LENALIDOMIDE 10 MG/1
10 CAPSULE ORAL DAILY
Qty: 21 CAPSULE | Refills: 0 | Status: ACTIVE | OUTPATIENT
Start: 2025-03-19

## 2025-03-19 NOTE — TELEPHONE ENCOUNTER
Oral Chemotherapy      Hailey Quintero is a  63 y.o.female  diagnosed with MM . Ms. Quintero is being treated with lenalidomide.     Medication name: lenalidomide     Dose:  10 mg   Frequency: daily  Administration schedule: 21 days on, 7 days off every 28 days  Ordering provider: Linda Cook MD        REMS prescriber survey completed Auth # [id number]  Refill sent to [facility].      Last OV 2/28/25  Next visit 9/5/25        Estrella Lewis, PHARMD, BCOP, BCPS              For Pharmacy Admin Tracking Only    Program: Medical Group  CPA in place:  Yes  Recommendation Provided To: Patient/Caregiver: 1 via Telephone  Intervention Detail: Refill(s) Provided  Intervention Accepted By: Patient/Caregiver: 1    Time Spent (min): 10

## 2025-03-31 ENCOUNTER — TELEPHONE (OUTPATIENT)
Age: 65
End: 2025-03-31

## 2025-03-31 NOTE — TELEPHONE ENCOUNTER
[facility] told her she has [facility] and should follow up with her PCP  does not have anything available soon please give her a call [phone]

## 2025-04-01 ENCOUNTER — HOSPITAL ENCOUNTER (EMERGENCY)
Facility: HOSPITAL | Age: 65
Discharge: HOME | End: 2025-04-01
Attending: STUDENT IN AN ORGANIZED HEALTH CARE EDUCATION/TRAINING PROGRAM
Payer: MEDICARE

## 2025-04-01 VITALS
DIASTOLIC BLOOD PRESSURE: 50 MMHG | RESPIRATION RATE: 18 BRPM | HEART RATE: 93 BPM | TEMPERATURE: 98.6 F | SYSTOLIC BLOOD PRESSURE: 93 MMHG | HEIGHT: 65 IN | WEIGHT: 188 LBS | BODY MASS INDEX: 31.32 KG/M2 | OXYGEN SATURATION: 100 %

## 2025-04-01 DIAGNOSIS — J18.9 COMMUNITY ACQUIRED PNEUMONIA, UNSPECIFIED LATERALITY: Primary | ICD-10-CM

## 2025-04-01 DIAGNOSIS — E87.6 HYPOKALEMIA: ICD-10-CM

## 2025-04-01 PROCEDURE — 6370000000 HC RX 637 (ALT 250 FOR IP): Performed by: STUDENT IN AN ORGANIZED HEALTH CARE EDUCATION/TRAINING PROGRAM

## 2025-04-01 PROCEDURE — 99283 EMERGENCY DEPT VISIT LOW MDM: CPT

## 2025-04-01 RX ORDER — DOXYCYCLINE HYCLATE 100 MG
100 TABLET ORAL ONCE
Status: COMPLETED | OUTPATIENT
Start: 2025-04-01 | End: 2025-04-01

## 2025-04-01 RX ORDER — SODIUM CHLORIDE, SODIUM LACTATE, POTASSIUM CHLORIDE, AND CALCIUM CHLORIDE .6; .31; .03; .02 G/100ML; G/100ML; G/100ML; G/100ML
1000 INJECTION, SOLUTION INTRAVENOUS ONCE
Status: DISCONTINUED | OUTPATIENT
Start: 2025-04-01 | End: 2025-04-01

## 2025-04-01 RX ADMIN — DOXYCYCLINE HYCLATE 100 MG: 100 TABLET, FILM COATED ORAL at 10:52

## 2025-04-01 RX ADMIN — POTASSIUM BICARBONATE 20 MEQ: 782 TABLET, EFFERVESCENT ORAL at 10:52

## 2025-04-01 RX ADMIN — AMOXICILLIN AND CLAVULANATE POTASSIUM 1 TABLET: 875; 125 TABLET, FILM COATED ORAL at 10:52

## 2025-04-01 ASSESSMENT — PAIN DESCRIPTION - LOCATION: LOCATION: RIB CAGE

## 2025-04-01 ASSESSMENT — PAIN DESCRIPTION - ONSET: ONSET: ON-GOING

## 2025-04-01 ASSESSMENT — PAIN - FUNCTIONAL ASSESSMENT
PAIN_FUNCTIONAL_ASSESSMENT: 0-10
PAIN_FUNCTIONAL_ASSESSMENT: PREVENTS OR INTERFERES SOME ACTIVE ACTIVITIES AND ADLS

## 2025-04-01 ASSESSMENT — PAIN DESCRIPTION - DESCRIPTORS: DESCRIPTORS: ACHING

## 2025-04-01 ASSESSMENT — PAIN DESCRIPTION - FREQUENCY: FREQUENCY: INTERMITTENT

## 2025-04-01 ASSESSMENT — PAIN SCALES - GENERAL: PAINLEVEL_OUTOF10: 5

## 2025-04-01 ASSESSMENT — PAIN DESCRIPTION - ORIENTATION: ORIENTATION: LEFT

## 2025-04-01 ASSESSMENT — PAIN DESCRIPTION - PAIN TYPE: TYPE: ACUTE PAIN

## 2025-04-14 NOTE — ED PROVIDER NOTES
understanding conveyed, and agreed upon. The patient is to follow up as recommended or return to ER should their symptoms worsen.      PATIENT REFERRED TO:  Danis Meadows MD  [address]  [facility][1] [address] [phone]    Schedule an appointment as soon as possible for a visit in 1 week           DISCHARGE MEDICATIONS:     Medication List        ASK your doctor about these medications      acetaminophen 500 MG tablet  Commonly known as: TYLENOL     amLODIPine 5 MG tablet  Commonly known as: NORVASC  TAKE 1 TABLET BY MOUTH EVERY DAY FOR BLOOD PRESSURE     aspirin 81 MG EC tablet     atorvastatin 40 MG tablet  Commonly known as: LIPITOR  TAKE 1 TABLET BY MOUTH EVERY DAY FOR CHOLESTEROL     carvedilol 25 MG tablet  Commonly known as: COREG  TAKE 1 TABLET BY MOUTH TWICE A DAY     cyanocobalamin 100 MCG tablet     glimepiride 2 MG tablet  Commonly known as: AMARYL  TAKE 1 TABLET BY MOUTH DAILY (BEFORE BREAKFAST). FOR DIABETES     hydroCHLOROthiazide 12.5 MG capsule  Take 1 capsule by mouth every morning For blood pressure     Jardiance 10 MG tablet  Generic drug: empagliflozin     lenalidomide 10 MG chemo capsule  Commonly known as: REVLIMID  Take 1 capsule by mouth daily For 21 days followed by 7 days off every 28 days. Auth # [id number] Female not of reproductive potential     loperamide 2 MG tablet  Commonly known as: IMODIUM A-D     losartan 25 MG tablet  Commonly known as: COZAAR  Take 1 tablet by mouth daily For kidneys     MULTI-VITAMIN PO     vitamin D3 25 MCG (1000 UT) Tabs tablet  Commonly known as: CHOLECALCIFEROL                DISCONTINUED MEDICATIONS:  Discharge Medication List as of 4/1/2025 10:47 AM          I am the Primary Clinician of Record.   Diego Haney MD (electronically signed)      (Please note that parts of this dictation were completed with voice recognition software. Quite often unanticipated grammatical, syntax, homophones, and other interpretive errors are inadvertently

## 2025-04-15 DIAGNOSIS — C90.01 MULTIPLE MYELOMA IN REMISSION (HCC): ICD-10-CM

## 2025-04-16 DIAGNOSIS — C90.01 MULTIPLE MYELOMA IN REMISSION (HCC): ICD-10-CM

## 2025-04-16 LAB
ALBUMIN SERPL-MCNC: 3.2 G/DL (ref 3.5–5)
ALBUMIN/GLOB SERPL: 0.8 (ref 1.1–2.2)
ALP SERPL-CCNC: 128 U/L (ref 45–117)
ALT SERPL-CCNC: 60 U/L (ref 12–78)
ANION GAP SERPL CALC-SCNC: 7 MMOL/L (ref 2–12)
AST SERPL-CCNC: 30 U/L (ref 15–37)
BASOPHILS # BLD: 0.06 K/UL (ref 0–0.1)
BASOPHILS NFR BLD: 1.4 % (ref 0–1)
BILIRUB SERPL-MCNC: 0.7 MG/DL (ref 0.2–1)
BUN SERPL-MCNC: 11 MG/DL (ref 6–20)
BUN/CREAT SERPL: 9 (ref 12–20)
CALCIUM SERPL-MCNC: 9.1 MG/DL (ref 8.5–10.1)
CHLORIDE SERPL-SCNC: 106 MMOL/L (ref 97–108)
CO2 SERPL-SCNC: 25 MMOL/L (ref 21–32)
CREAT SERPL-MCNC: 1.24 MG/DL (ref 0.55–1.02)
DIFFERENTIAL METHOD BLD: ABNORMAL
EOSINOPHIL # BLD: 0.21 K/UL (ref 0–0.4)
EOSINOPHIL NFR BLD: 4.8 % (ref 0–7)
ERYTHROCYTE [DISTWIDTH] IN BLOOD BY AUTOMATED COUNT: 15 % (ref 11.5–14.5)
GLOBULIN SER CALC-MCNC: 4 G/DL (ref 2–4)
GLUCOSE SERPL-MCNC: 150 MG/DL (ref 65–100)
HCT VFR BLD AUTO: 37.7 % (ref 35–47)
HGB BLD-MCNC: 12.7 G/DL (ref 11.5–16)
IMM GRANULOCYTES # BLD AUTO: 0.01 K/UL (ref 0–0.04)
IMM GRANULOCYTES NFR BLD AUTO: 0.2 % (ref 0–0.5)
LYMPHOCYTES # BLD: 2.1 K/UL (ref 0.8–3.5)
LYMPHOCYTES NFR BLD: 47.6 % (ref 12–49)
MCH RBC QN AUTO: 33.4 PG (ref 26–34)
MCHC RBC AUTO-ENTMCNC: 33.7 G/DL (ref 30–36.5)
MCV RBC AUTO: 99.2 FL (ref 80–99)
MONOCYTES # BLD: 0.44 K/UL (ref 0–1)
MONOCYTES NFR BLD: 10 % (ref 5–13)
NEUTS SEG # BLD: 1.59 K/UL (ref 1.8–8)
NEUTS SEG NFR BLD: 36 % (ref 32–75)
NRBC # BLD: 0 K/UL (ref 0–0.01)
NRBC BLD-RTO: 0 PER 100 WBC
PLATELET # BLD AUTO: 418 K/UL (ref 150–400)
PMV BLD AUTO: 10.1 FL (ref 8.9–12.9)
POTASSIUM SERPL-SCNC: 4.2 MMOL/L (ref 3.5–5.1)
PROT SERPL-MCNC: 7.2 G/DL (ref 6.4–8.2)
RBC # BLD AUTO: 3.8 M/UL (ref 3.8–5.2)
SODIUM SERPL-SCNC: 138 MMOL/L (ref 136–145)
WBC # BLD AUTO: 4.4 K/UL (ref 3.6–11)

## 2025-04-16 RX ORDER — LENALIDOMIDE 10 MG/1
10 CAPSULE ORAL DAILY
Qty: 21 CAPSULE | Refills: 0 | Status: ACTIVE | OUTPATIENT
Start: 2025-04-16

## 2025-04-16 NOTE — TELEPHONE ENCOUNTER
Oral Chemotherapy      Hailey Quintero is a  63 y.o.female  diagnosed with MM . Ms. Quintero is being treated with lenalidomide.     Medication name: lenalidomide     Dose:  10 mg   Frequency: daily  Administration schedule: 21 days on, 7 days off every 28 days  Ordering provider: Linda Cook MD        REMS prescriber survey completed Auth # [id number]  Refill sent to [facility].      Last OV 2/28/25  Next visit 9/5/25        Estrella Lewis, PHARMD, BCOP, BCPS      For Pharmacy Admin Tracking Only    Program: Medical Group  CPA in place:  Yes  Recommendation Provided To: Patient/Caregiver: 1 via Telephone  Intervention Detail: Refill(s) Provided  Intervention Accepted By: Patient/Caregiver: 1    Time Spent (min): 10

## 2025-04-18 LAB
ALBUMIN SERPL ELPH-MCNC: 3 G/DL (ref 2.9–4.4)
ALBUMIN/GLOB SERPL: 0.8 (ref 0.7–1.7)
ALPHA1 GLOB SERPL ELPH-MCNC: 0.3 G/DL (ref 0–0.4)
ALPHA2 GLOB SERPL ELPH-MCNC: 1.2 G/DL (ref 0.4–1)
B-GLOBULIN SERPL ELPH-MCNC: 1.2 G/DL (ref 0.7–1.3)
GAMMA GLOB SERPL ELPH-MCNC: 1.2 G/DL (ref 0.4–1.8)
GLOBULIN SER-MCNC: 3.9 G/DL (ref 2.2–3.9)
IGA SERPL-MCNC: 312 MG/DL (ref 87–352)
IGG SERPL-MCNC: 1370 MG/DL (ref 586–1602)
IGM SERPL-MCNC: 128 MG/DL (ref 26–217)
INTERPRETATION SERPL IEP-IMP: ABNORMAL
KAPPA LC FREE SER-MCNC: 34.8 MG/L (ref 3.3–19.4)
KAPPA LC FREE/LAMBDA FREE SER: 1.74 (ref 0.26–1.65)
LAMBDA LC FREE SERPL-MCNC: 20 MG/L (ref 5.7–26.3)
M PROTEIN SERPL ELPH-MCNC: ABNORMAL G/DL
PROT SERPL-MCNC: 6.9 G/DL (ref 6–8.5)

## 2025-04-28 ENCOUNTER — OFFICE VISIT (OUTPATIENT)
Age: 65
End: 2025-04-28
Payer: MEDICARE

## 2025-04-28 VITALS
WEIGHT: 189 LBS | SYSTOLIC BLOOD PRESSURE: 159 MMHG | OXYGEN SATURATION: 95 % | TEMPERATURE: 97 F | DIASTOLIC BLOOD PRESSURE: 70 MMHG | RESPIRATION RATE: 1 BRPM | HEART RATE: 67 BPM | BODY MASS INDEX: 31.49 KG/M2 | HEIGHT: 65 IN

## 2025-04-28 DIAGNOSIS — J98.4 PNEUMONITIS: Primary | ICD-10-CM

## 2025-04-28 PROCEDURE — 1159F MED LIST DOCD IN RCRD: CPT | Performed by: FAMILY MEDICINE

## 2025-04-28 PROCEDURE — G8417 CALC BMI ABV UP PARAM F/U: HCPCS | Performed by: FAMILY MEDICINE

## 2025-04-28 PROCEDURE — 3017F COLORECTAL CA SCREEN DOC REV: CPT | Performed by: FAMILY MEDICINE

## 2025-04-28 PROCEDURE — 1123F ACP DISCUSS/DSCN MKR DOCD: CPT | Performed by: FAMILY MEDICINE

## 2025-04-28 PROCEDURE — 3077F SYST BP >= 140 MM HG: CPT | Performed by: FAMILY MEDICINE

## 2025-04-28 PROCEDURE — 3078F DIAST BP <80 MM HG: CPT | Performed by: FAMILY MEDICINE

## 2025-04-28 PROCEDURE — 1036F TOBACCO NON-USER: CPT | Performed by: FAMILY MEDICINE

## 2025-04-28 PROCEDURE — 1090F PRES/ABSN URINE INCON ASSESS: CPT | Performed by: FAMILY MEDICINE

## 2025-04-28 PROCEDURE — G8427 DOCREV CUR MEDS BY ELIG CLIN: HCPCS | Performed by: FAMILY MEDICINE

## 2025-04-28 PROCEDURE — 1126F AMNT PAIN NOTED NONE PRSNT: CPT | Performed by: FAMILY MEDICINE

## 2025-04-28 PROCEDURE — 99213 OFFICE O/P EST LOW 20 MIN: CPT | Performed by: FAMILY MEDICINE

## 2025-04-28 PROCEDURE — G8399 PT W/DXA RESULTS DOCUMENT: HCPCS | Performed by: FAMILY MEDICINE

## 2025-04-28 RX ORDER — CODEINE PHOSPHATE AND GUAIFENESIN 10; 100 MG/5ML; MG/5ML
5 SOLUTION ORAL 4 TIMES DAILY PRN
COMMUNITY
Start: 2025-04-03 | End: 2025-05-01

## 2025-04-28 RX ORDER — DEXTROMETHORPHAN HYDROBROMIDE AND PROMETHAZINE HYDROCHLORIDE 15; 6.25 MG/5ML; MG/5ML
5 SYRUP ORAL 4 TIMES DAILY PRN
COMMUNITY
Start: 2025-03-29 | End: 2025-05-01

## 2025-04-28 NOTE — PROGRESS NOTES
Chief Complaint   Patient presents with    Follow-up     ER        \"Have you been to the ER, urgent care clinic since your last visit?  Hospitalized since your last visit?\"      25 [facility] - pneumonia  “Have you seen or consulted any other health care providers outside of [facility] since your last visit?”    NO            Click Here for Release of Records Request       Vitals:    25 1025   BP: (!) 159/70   Pulse: 67   Resp: (!) 1   Temp: 97 °F (36.1 °C)   SpO2: 95%      Health Maintenance Due   Topic Date Due    Respiratory Syncytial Virus (RSV) Pregnant or age 60 yrs+ (1 - Risk 60-74 years 1-dose series) Never done    Shingles vaccine (2 of 2) 2020    Diabetic retinal exam  2023    Breast cancer screen  2025        The patient, Hailey Quintero, identity was verified by name and .

## 2025-04-28 NOTE — PROGRESS NOTES
Hailey Quintero (:  1960) is a 64 y.o. female,Established patient, here for evaluation of the following chief complaint(s):  Follow-up (ER )         Assessment & Plan  Pneumonitis       Orders:    XR CHEST (2 VIEWS); Future  repeat cxr after may 15.     Pt has an appt in 6 weeks, to keep it.       Subjective   HPI Around 4-1, [facility] times and eventually in the ER. Was dxed with pneumonia, sent home on augmentin and doxycycline. Still having some cough, L sided chest pain and L posterior thoracic pain. No fever, chills, dyspnea. Cough stil present, but nonproductive.  Starting to get appetite back. Had anorexia for 3 weeks.     Review of Systems       Objective   Physical Exam  Cardiovascular:      Rate and Rhythm: Normal rate and regular rhythm.      Heart sounds: Normal heart sounds. No murmur heard.  Pulmonary:      Effort: Pulmonary effort is normal.      Breath sounds: Normal breath sounds.   Abdominal:      General: Abdomen is flat. Bowel sounds are normal.      Palpations: Abdomen is soft.   Musculoskeletal:      Right lower leg: No edema.      Left lower leg: No edema.                  An electronic signature was used to authenticate this note.    --Danis Meadows MD

## 2025-05-01 ENCOUNTER — OFFICE VISIT (OUTPATIENT)
Age: 65
End: 2025-05-01
Payer: MEDICARE

## 2025-05-01 VITALS
DIASTOLIC BLOOD PRESSURE: 70 MMHG | SYSTOLIC BLOOD PRESSURE: 162 MMHG | HEART RATE: 67 BPM | WEIGHT: 185 LBS | OXYGEN SATURATION: 98 % | HEIGHT: 65 IN | BODY MASS INDEX: 30.82 KG/M2

## 2025-05-01 DIAGNOSIS — Z95.810 AICD (AUTOMATIC CARDIOVERTER/DEFIBRILLATOR) PRESENT: ICD-10-CM

## 2025-05-01 DIAGNOSIS — I49.5 SICK SINUS SYNDROME (HCC): ICD-10-CM

## 2025-05-01 DIAGNOSIS — I10 ESSENTIAL (PRIMARY) HYPERTENSION: Primary | ICD-10-CM

## 2025-05-01 DIAGNOSIS — I42.0 DILATED CARDIOMYOPATHY (HCC): ICD-10-CM

## 2025-05-01 PROCEDURE — 1123F ACP DISCUSS/DSCN MKR DOCD: CPT | Performed by: SPECIALIST

## 2025-05-01 PROCEDURE — 1160F RVW MEDS BY RX/DR IN RCRD: CPT | Performed by: SPECIALIST

## 2025-05-01 PROCEDURE — 3078F DIAST BP <80 MM HG: CPT | Performed by: SPECIALIST

## 2025-05-01 PROCEDURE — G8427 DOCREV CUR MEDS BY ELIG CLIN: HCPCS | Performed by: SPECIALIST

## 2025-05-01 PROCEDURE — 99214 OFFICE O/P EST MOD 30 MIN: CPT | Performed by: SPECIALIST

## 2025-05-01 PROCEDURE — G8399 PT W/DXA RESULTS DOCUMENT: HCPCS | Performed by: SPECIALIST

## 2025-05-01 PROCEDURE — 1159F MED LIST DOCD IN RCRD: CPT | Performed by: SPECIALIST

## 2025-05-01 PROCEDURE — 1036F TOBACCO NON-USER: CPT | Performed by: SPECIALIST

## 2025-05-01 PROCEDURE — 1126F AMNT PAIN NOTED NONE PRSNT: CPT | Performed by: SPECIALIST

## 2025-05-01 PROCEDURE — 3017F COLORECTAL CA SCREEN DOC REV: CPT | Performed by: SPECIALIST

## 2025-05-01 PROCEDURE — 3077F SYST BP >= 140 MM HG: CPT | Performed by: SPECIALIST

## 2025-05-01 PROCEDURE — 1090F PRES/ABSN URINE INCON ASSESS: CPT | Performed by: SPECIALIST

## 2025-05-01 PROCEDURE — G8417 CALC BMI ABV UP PARAM F/U: HCPCS | Performed by: SPECIALIST

## 2025-05-01 ASSESSMENT — PATIENT HEALTH QUESTIONNAIRE - PHQ9
2. FEELING DOWN, DEPRESSED OR HOPELESS: NOT AT ALL
SUM OF ALL RESPONSES TO PHQ QUESTIONS 1-9: 0
1. LITTLE INTEREST OR PLEASURE IN DOING THINGS: NOT AT ALL

## 2025-05-01 NOTE — PROGRESS NOTES
HISTORY OF PRESENT ILLNESS  Hailey Quintero is a 64 y.o. female     SUMMARY:   Patient Active Problem List   Diagnosis    Hyperlipidemia    Chronic systolic heart failure (HCC)    Severe obesity (HCC)    Multiple myeloma in remission (HCC)    Essential hypertension    Type 2 diabetes mellitus with chronic kidney disease (HCC)    AICD (automatic cardioverter/defibrillator) present    Type 2 diabetes mellitus with diabetic neuropathy (HCC)    Status post bone transplant    Vitamin B12 deficiency    Cardiomyopathy (HCC)    Obesity, unspecified    SSS (sick sinus syndrome) (HCC)    Chronic renal disease, stage III (HCC)    Blurred vision            CARDIOLOGY STUDIES TO DATE:  3/16 normal echo     5/21  echo lvef 50-55%, lvh, mild to mod tr without pul htn  5/21 [facility] Bi-V ICD,DOI (gen change, and new RV and LV), NOT MRI Conditional , on remote    1/25 [facility], echo lvef 45-50%, otherwise normal    Chief Complaint   Patient presents with    Hypertension       HPI :  She is doing great with no cardiac complaints or problems with her medications.  She recently had pneumonia which was able to be treated as an outpatient and she is got follow-up with her primary in a couple of weeks.  Her blood pressure is elevated this morning but turns out she does not take her medications till around 10:00.  When she last saw her primary it was also elevated however a few weeks before that it was actually low during the middle of the day.  Her weight is stable.  She is somewhat active and walks some but does not exercise regularly.  She is keeping up with her device checks and she had an echo done by her electrophysiologist in January which showed a slight decrease in her ejection fraction.  Most recent blood work included lipid profile looked good except for a slightly elevated creatinine.    CARDIAC ROS:   negative for chest pain, claudication, dyspnea, irregular heart beat, lower extremity edema, orthopnea,

## 2025-05-01 NOTE — PROGRESS NOTES
1. Have you been to the ER, urgent care clinic since your last visit?  Hospitalized since your last visit? Yes. Was seen at [facility] on 04/01/25 due to pneumonia    2. Have you seen or consulted any other health care providers outside of the [facility] since your last visit?  Include any pap smears or colon screening. No

## 2025-05-14 DIAGNOSIS — C90.01 MULTIPLE MYELOMA IN REMISSION (HCC): ICD-10-CM

## 2025-05-14 RX ORDER — LENALIDOMIDE 10 MG/1
10 CAPSULE ORAL DAILY
Qty: 21 CAPSULE | Refills: 0 | Status: ACTIVE | OUTPATIENT
Start: 2025-05-14

## 2025-05-14 NOTE — TELEPHONE ENCOUNTER
Oral Chemotherapy      Hailey Quintero is a  64 y.o.female  diagnosed with MM . Ms. Qiuntero is being treated with lenalidomide.     Medication name: lenalidomide     Dose:  10 mg   Frequency: daily  Administration schedule: 21 days on, 7 days off every 28 days  Ordering provider: Linda Cook MD        REMS prescriber survey completed Auth # [id number]  Refill sent to [facility].      Last OV 2/28/25  Next visit 9/5/25        Estrella Lewis, PHARMD, BCOP, BCPS      For Pharmacy Admin Tracking Only    Program: Medical Group  CPA in place:  Yes  Recommendation Provided To: Patient/Caregiver: 1 via Telephone  Intervention Detail: Refill(s) Provided  Intervention Accepted By: Patient/Caregiver: 1    Time Spent (min): 10

## 2025-05-16 ENCOUNTER — HOSPITAL ENCOUNTER (OUTPATIENT)
Facility: HOSPITAL | Age: 65
Discharge: HOME | End: 2025-05-19
Payer: MEDICARE

## 2025-05-16 DIAGNOSIS — J98.4 PNEUMONITIS: ICD-10-CM

## 2025-05-16 PROCEDURE — 71046 X-RAY EXAM CHEST 2 VIEWS: CPT

## 2025-05-19 ENCOUNTER — TELEPHONE (OUTPATIENT)
Age: 65
End: 2025-05-19

## 2025-05-28 ENCOUNTER — OFFICE VISIT (OUTPATIENT)
Age: 65
End: 2025-05-28
Payer: MEDICARE

## 2025-05-28 VITALS
BODY MASS INDEX: 30.72 KG/M2 | DIASTOLIC BLOOD PRESSURE: 54 MMHG | WEIGHT: 184.6 LBS | TEMPERATURE: 97.9 F | HEART RATE: 60 BPM | OXYGEN SATURATION: 97 % | SYSTOLIC BLOOD PRESSURE: 124 MMHG | RESPIRATION RATE: 20 BRPM

## 2025-05-28 DIAGNOSIS — R07.9 LEFT-SIDED CHEST PAIN: ICD-10-CM

## 2025-05-28 DIAGNOSIS — E78.00 HYPERCHOLESTEROLEMIA: ICD-10-CM

## 2025-05-28 DIAGNOSIS — I10 ESSENTIAL HYPERTENSION: Primary | ICD-10-CM

## 2025-05-28 DIAGNOSIS — E11.9 TYPE 2 DIABETES MELLITUS WITHOUT COMPLICATION, WITHOUT LONG-TERM CURRENT USE OF INSULIN (HCC): ICD-10-CM

## 2025-05-28 LAB
CHOLEST SERPL-MCNC: 168 MG/DL
EST. AVERAGE GLUCOSE BLD GHB EST-MCNC: 143 MG/DL
HBA1C MFR BLD: 6.6 % (ref 4–5.6)
HDLC SERPL-MCNC: 75 MG/DL
HDLC SERPL: 2.2 (ref 0–5)
LDLC SERPL CALC-MCNC: 61 MG/DL (ref 0–100)
TRIGL SERPL-MCNC: 160 MG/DL
VLDLC SERPL CALC-MCNC: 32 MG/DL

## 2025-05-28 PROCEDURE — G8427 DOCREV CUR MEDS BY ELIG CLIN: HCPCS | Performed by: FAMILY MEDICINE

## 2025-05-28 PROCEDURE — G8417 CALC BMI ABV UP PARAM F/U: HCPCS | Performed by: FAMILY MEDICINE

## 2025-05-28 PROCEDURE — 3078F DIAST BP <80 MM HG: CPT | Performed by: FAMILY MEDICINE

## 2025-05-28 PROCEDURE — 1126F AMNT PAIN NOTED NONE PRSNT: CPT | Performed by: FAMILY MEDICINE

## 2025-05-28 PROCEDURE — 3051F HG A1C>EQUAL 7.0%<8.0%: CPT | Performed by: FAMILY MEDICINE

## 2025-05-28 PROCEDURE — 99213 OFFICE O/P EST LOW 20 MIN: CPT | Performed by: FAMILY MEDICINE

## 2025-05-28 PROCEDURE — 3074F SYST BP LT 130 MM HG: CPT | Performed by: FAMILY MEDICINE

## 2025-05-28 PROCEDURE — 3017F COLORECTAL CA SCREEN DOC REV: CPT | Performed by: FAMILY MEDICINE

## 2025-05-28 PROCEDURE — G8399 PT W/DXA RESULTS DOCUMENT: HCPCS | Performed by: FAMILY MEDICINE

## 2025-05-28 PROCEDURE — 1123F ACP DISCUSS/DSCN MKR DOCD: CPT | Performed by: FAMILY MEDICINE

## 2025-05-28 PROCEDURE — 1090F PRES/ABSN URINE INCON ASSESS: CPT | Performed by: FAMILY MEDICINE

## 2025-05-28 PROCEDURE — 2022F DILAT RTA XM EVC RTNOPTHY: CPT | Performed by: FAMILY MEDICINE

## 2025-05-28 PROCEDURE — 1159F MED LIST DOCD IN RCRD: CPT | Performed by: FAMILY MEDICINE

## 2025-05-28 PROCEDURE — 1036F TOBACCO NON-USER: CPT | Performed by: FAMILY MEDICINE

## 2025-05-28 PROCEDURE — 3046F HEMOGLOBIN A1C LEVEL >9.0%: CPT | Performed by: FAMILY MEDICINE

## 2025-05-28 ASSESSMENT — PATIENT HEALTH QUESTIONNAIRE - PHQ9
SUM OF ALL RESPONSES TO PHQ QUESTIONS 1-9: 0
2. FEELING DOWN, DEPRESSED OR HOPELESS: NOT AT ALL
1. LITTLE INTEREST OR PLEASURE IN DOING THINGS: NOT AT ALL
SUM OF ALL RESPONSES TO PHQ QUESTIONS 1-9: 0

## 2025-05-28 NOTE — PROGRESS NOTES
Hailey Quintero (:  1960) is a 64 y.o. female,Established patient, here for evaluation of the following chief complaint(s):  4 week follow up (Patient is here today for 4 week follow up for Pneumonia. )         Assessment & Plan  Essential hypertension   Chronic, at goal (stable), continue current treatment plan         Hypercholesterolemia       Orders:    Lipid Panel; Future    Type 2 diabetes mellitus without complication, without long-term current use of insulin (HCC)       Orders:    Hemoglobin A1C; Future    Left-sided chest pain    Most likely due to recent pneumonia.            No follow-ups on file.       Subjective   HPI In for follow up. Cough is much better. Has some pleuritic pain in L anterior lower chest that goes around chest to L posterior thoracic area. Overall feels like this pain is slowly getting better. Mild dyspnea if gets up and walks. Otherwise doing ok. Got a letter to schedule a mammogram. Had colonoscopy in , repeat in 10 years. Also needs blood pressure, cholesterol and diabetes checked.     Review of Systems       Objective   Physical Exam  Cardiovascular:      Rate and Rhythm: Normal rate and regular rhythm.      Heart sounds: Normal heart sounds. No murmur heard.  Pulmonary:      Effort: Pulmonary effort is normal.      Breath sounds: Normal breath sounds.   Abdominal:      General: Abdomen is flat. Bowel sounds are normal.      Palpations: Abdomen is soft.   Musculoskeletal:      Right lower leg: No edema.      Left lower leg: No edema.                  An electronic signature was used to authenticate this note.    --Danis Meadows MD

## 2025-05-28 NOTE — PROGRESS NOTES
Chief Complaint   Patient presents with    4 week follow up     Patient is here today for 4 week follow up for Pneumonia.        \"Have you been to the ER, urgent care clinic since your last visit?  Hospitalized since your last visit?\"    NO    “Have you seen or consulted any other health care providers outside of [facility] since your last visit?”    NO    Have you had a mammogram?”   NO    Date of last Mammogram: 2024             Click Here for Release of Records Request       There were no vitals filed for this visit.   Health Maintenance Due   Topic Date Due    Respiratory Syncytial Virus (RSV) Pregnant or age 60 yrs+ (1 - Risk 60-74 years 1-dose series) Never done    Shingles vaccine (2 of 2) 2020    Diabetic retinal exam  2023    Breast cancer screen  2025        The patient, Hailey Quintero, identity was verified by name and .

## 2025-05-29 ENCOUNTER — TRANSCRIBE ORDERS (OUTPATIENT)
Facility: HOSPITAL | Age: 65
End: 2025-05-29

## 2025-05-29 DIAGNOSIS — Z12.31 ENCOUNTER FOR SCREENING MAMMOGRAM FOR MALIGNANT NEOPLASM OF BREAST: Primary | ICD-10-CM

## 2025-05-30 ENCOUNTER — RESULTS FOLLOW-UP (OUTPATIENT)
Age: 65
End: 2025-05-30

## 2025-06-05 RX ORDER — CARVEDILOL 25 MG/1
25 TABLET ORAL 2 TIMES DAILY
Qty: 180 TABLET | Refills: 3 | Status: SHIPPED | OUTPATIENT
Start: 2025-06-05

## 2025-06-05 NOTE — TELEPHONE ENCOUNTER
Last appointment: 5/28/25  Next appointment: none  Previous refill encounter(s): 6/7/24    Requested Prescriptions     Pending Prescriptions Disp Refills    carvedilol (COREG) 25 MG tablet [Pharmacy Med Name: CARVEDILOL 25 MG TABLET] 180 tablet 3     Sig: TAKE 1 TABLET BY MOUTH TWICE A DAY         For Pharmacy Admin Tracking Only    Program: Medication Refill  CPA in place:    Recommendation Provided To:   Intervention Detail: New Rx: 1, reason: Patient Preference  Intervention Accepted By:   Gap Closed?:    Time Spent (min): 5

## 2025-06-12 DIAGNOSIS — C90.01 MULTIPLE MYELOMA IN REMISSION (HCC): ICD-10-CM

## 2025-06-12 RX ORDER — LENALIDOMIDE 10 MG/1
10 CAPSULE ORAL DAILY
Qty: 21 CAPSULE | Refills: 0 | Status: ACTIVE | OUTPATIENT
Start: 2025-06-12

## 2025-06-12 NOTE — TELEPHONE ENCOUNTER
Oral Chemotherapy      Hailey Quintero is a  64 y.o.female  diagnosed with MM . Ms. Quintero is being treated with lenalidomide.     Medication name: lenalidomide     Dose:  10 mg   Frequency: daily  Administration schedule: 21 days on, 7 days off every 28 days  Ordering provider: Linda Cook MD        REMS prescriber survey completed Auth # [id number]  Refill sent to [facility].      Last OV 2/28/25  Next visit 9/5/25        Estrella Lewis, FRANCESCOD, BCOP, BCPS    For Pharmacy Admin Tracking Only    Program: Medical Group  CPA in place:  Yes  Recommendation Provided To: Patient/Caregiver: 1 via Telephone  Intervention Detail: Refill(s) Provided  Intervention Accepted By: Patient/Caregiver: 1    Time Spent (min): 10

## 2025-07-02 ENCOUNTER — HOSPITAL ENCOUNTER (OUTPATIENT)
Facility: HOSPITAL | Age: 65
Discharge: HOME | End: 2025-07-05
Attending: FAMILY MEDICINE
Payer: MEDICARE

## 2025-07-02 DIAGNOSIS — Z12.31 ENCOUNTER FOR SCREENING MAMMOGRAM FOR MALIGNANT NEOPLASM OF BREAST: ICD-10-CM

## 2025-07-02 PROCEDURE — 77063 BREAST TOMOSYNTHESIS BI: CPT

## 2025-07-07 DIAGNOSIS — C90.01 MULTIPLE MYELOMA IN REMISSION (HCC): ICD-10-CM

## 2025-07-07 RX ORDER — LENALIDOMIDE 10 MG/1
10 CAPSULE ORAL DAILY
Qty: 21 CAPSULE | Refills: 0 | Status: ACTIVE | OUTPATIENT
Start: 2025-07-07

## 2025-07-07 NOTE — TELEPHONE ENCOUNTER
Oral Chemotherapy      Hailey Quintero is a  64 y.o.female  diagnosed with MM . Ms. Quintero is being treated with lenalidomide.     Medication name: lenalidomide     Dose:  10 mg   Frequency: daily  Administration schedule: 21 days on, 7 days off every 28 days  Ordering provider: Linda Cook MD        REMS prescriber survey completed Auth # [id number]  Refill sent to [facility].      Last OV 2/28/25  Next visit 9/5/25        Estrella Lewis, PHARMD, BCOP, BCPS

## 2025-07-09 DIAGNOSIS — C90.01 MULTIPLE MYELOMA IN REMISSION (HCC): ICD-10-CM

## 2025-07-09 PROCEDURE — 36415 COLL VENOUS BLD VENIPUNCTURE: CPT

## 2025-07-09 PROCEDURE — 85025 COMPLETE CBC W/AUTO DIFF WBC: CPT

## 2025-07-09 PROCEDURE — 80053 COMPREHEN METABOLIC PANEL: CPT

## 2025-07-09 PROCEDURE — 83521 IG LIGHT CHAINS FREE EACH: CPT

## 2025-07-09 PROCEDURE — 84165 PROTEIN E-PHORESIS SERUM: CPT

## 2025-07-09 PROCEDURE — 86334 IMMUNOFIX E-PHORESIS SERUM: CPT

## 2025-07-09 PROCEDURE — 82784 ASSAY IGA/IGD/IGG/IGM EACH: CPT

## 2025-07-10 ENCOUNTER — HOSPITAL ENCOUNTER (OUTPATIENT)
Facility: HOSPITAL | Age: 65
Setting detail: SPECIMEN
Discharge: HOME | End: 2025-07-13

## 2025-07-10 DIAGNOSIS — C90.01 MULTIPLE MYELOMA IN REMISSION (HCC): ICD-10-CM

## 2025-07-10 LAB
ALBUMIN SERPL-MCNC: 3.5 G/DL (ref 3.5–5)
ALBUMIN/GLOB SERPL: 1.2 (ref 1.1–2.2)
ALP SERPL-CCNC: 77 U/L (ref 45–117)
ALT SERPL-CCNC: 33 U/L (ref 12–78)
ANION GAP SERPL CALC-SCNC: 11 MMOL/L (ref 2–12)
AST SERPL-CCNC: 20 U/L (ref 15–37)
BASOPHILS # BLD: 0.03 K/UL (ref 0–0.1)
BASOPHILS NFR BLD: 0.8 % (ref 0–1)
BILIRUB SERPL-MCNC: 1.9 MG/DL (ref 0.2–1)
BUN SERPL-MCNC: 10 MG/DL (ref 6–20)
BUN/CREAT SERPL: 8 (ref 12–20)
CALCIUM SERPL-MCNC: 8.6 MG/DL (ref 8.5–10.1)
CHLORIDE SERPL-SCNC: 101 MMOL/L (ref 97–108)
CO2 SERPL-SCNC: 25 MMOL/L (ref 21–32)
CREAT SERPL-MCNC: 1.22 MG/DL (ref 0.55–1.02)
DIFFERENTIAL METHOD BLD: ABNORMAL
EOSINOPHIL # BLD: 0.19 K/UL (ref 0–0.4)
EOSINOPHIL NFR BLD: 5 % (ref 0–7)
ERYTHROCYTE [DISTWIDTH] IN BLOOD BY AUTOMATED COUNT: 15.1 % (ref 11.5–14.5)
GLOBULIN SER CALC-MCNC: 3 G/DL (ref 2–4)
GLUCOSE SERPL-MCNC: 242 MG/DL (ref 65–100)
HCT VFR BLD AUTO: 40.1 % (ref 35–47)
HGB BLD-MCNC: 13.4 G/DL (ref 11.5–16)
IMM GRANULOCYTES # BLD AUTO: 0 K/UL (ref 0–0.04)
IMM GRANULOCYTES NFR BLD AUTO: 0 % (ref 0–0.5)
LYMPHOCYTES # BLD: 1.74 K/UL (ref 0.8–3.5)
LYMPHOCYTES NFR BLD: 45.5 % (ref 12–49)
MCH RBC QN AUTO: 34.4 PG (ref 26–34)
MCHC RBC AUTO-ENTMCNC: 33.4 G/DL (ref 30–36.5)
MCV RBC AUTO: 103.1 FL (ref 80–99)
MONOCYTES # BLD: 0.49 K/UL (ref 0–1)
MONOCYTES NFR BLD: 12.8 % (ref 5–13)
NEUTS SEG # BLD: 1.37 K/UL (ref 1.8–8)
NEUTS SEG NFR BLD: 35.9 % (ref 32–75)
NRBC # BLD: 0 K/UL (ref 0–0.01)
NRBC BLD-RTO: 0 PER 100 WBC
PLATELET # BLD AUTO: 208 K/UL (ref 150–400)
PMV BLD AUTO: 10.4 FL (ref 8.9–12.9)
POTASSIUM SERPL-SCNC: 2.7 MMOL/L (ref 3.5–5.1)
PROT SERPL-MCNC: 6.5 G/DL (ref 6.4–8.2)
RBC # BLD AUTO: 3.89 M/UL (ref 3.8–5.2)
SODIUM SERPL-SCNC: 137 MMOL/L (ref 136–145)
WBC # BLD AUTO: 3.8 K/UL (ref 3.6–11)

## 2025-07-11 DIAGNOSIS — E87.6 HYPOKALEMIA: Primary | ICD-10-CM

## 2025-07-11 RX ORDER — POTASSIUM CHLORIDE 1500 MG/1
40 TABLET, EXTENDED RELEASE ORAL DAILY
Qty: 20 TABLET | Refills: 0 | Status: SHIPPED | OUTPATIENT
Start: 2025-07-11 | End: 2025-07-21

## 2025-07-11 NOTE — PROGRESS NOTES
HIPAA x 3  SW pt to let her know that Jacklyn Weldon NP reviewed her recent lab results and her potassium is critically low so she sent a prescription for oral potassium to her [facility] pharmacy in [facility]. Pt voiced understanding and was appreciative of my call.

## 2025-07-14 RX ORDER — ATORVASTATIN CALCIUM 40 MG/1
TABLET, FILM COATED ORAL
Qty: 90 TABLET | Refills: 3 | Status: SHIPPED | OUTPATIENT
Start: 2025-07-14

## 2025-07-15 LAB
ALBUMIN SERPL ELPH-MCNC: 3.3 G/DL (ref 2.9–4.4)
ALBUMIN/GLOB SERPL: 1.3 (ref 0.7–1.7)
ALPHA1 GLOB SERPL ELPH-MCNC: 0.1 G/DL (ref 0–0.4)
ALPHA2 GLOB SERPL ELPH-MCNC: 0.7 G/DL (ref 0.4–1)
B-GLOBULIN SERPL ELPH-MCNC: 0.9 G/DL (ref 0.7–1.3)
GAMMA GLOB SERPL ELPH-MCNC: 0.9 G/DL (ref 0.4–1.8)
GLOBULIN SER-MCNC: 2.7 G/DL (ref 2.2–3.9)
IGA SERPL-MCNC: 208 MG/DL (ref 87–352)
IGG SERPL-MCNC: 1050 MG/DL (ref 586–1602)
IGM SERPL-MCNC: 6 MG/DL (ref 26–217)
INTERPRETATION SERPL IEP-IMP: ABNORMAL
KAPPA LC FREE SER-MCNC: 28.1 MG/L (ref 3.3–19.4)
KAPPA LC FREE/LAMBDA FREE SER: 1.78 (ref 0.26–1.65)
LAMBDA LC FREE SERPL-MCNC: 15.8 MG/L (ref 5.7–26.3)
M PROTEIN SERPL ELPH-MCNC: ABNORMAL G/DL
PROT SERPL-MCNC: 6 G/DL (ref 6–8.5)

## 2025-08-05 ENCOUNTER — TELEPHONE (OUTPATIENT)
Age: 65
End: 2025-08-05

## 2025-08-05 DIAGNOSIS — E87.6 HYPOKALEMIA: Primary | ICD-10-CM

## 2025-08-07 DIAGNOSIS — C90.01 MULTIPLE MYELOMA IN REMISSION (HCC): ICD-10-CM

## 2025-08-07 DIAGNOSIS — E87.6 HYPOKALEMIA: ICD-10-CM

## 2025-08-07 RX ORDER — LENALIDOMIDE 10 MG/1
10 CAPSULE ORAL DAILY
Qty: 21 CAPSULE | Refills: 0 | Status: ACTIVE | OUTPATIENT
Start: 2025-08-07

## 2025-08-08 ENCOUNTER — TELEPHONE (OUTPATIENT)
Age: 65
End: 2025-08-08

## 2025-08-08 LAB
ALBUMIN SERPL-MCNC: 3.9 G/DL (ref 3.5–5.2)
ALBUMIN/GLOB SERPL: 1.3 (ref 1.1–2.2)
ALP SERPL-CCNC: 81 U/L (ref 35–104)
ALT SERPL-CCNC: 29 U/L (ref 10–35)
ANION GAP SERPL CALC-SCNC: 15 MMOL/L (ref 2–14)
AST SERPL-CCNC: 21 U/L (ref 10–35)
BILIRUB SERPL-MCNC: 1.5 MG/DL (ref 0–1.2)
BUN SERPL-MCNC: 14 MG/DL (ref 8–23)
BUN/CREAT SERPL: 13 (ref 12–20)
CALCIUM SERPL-MCNC: 9.2 MG/DL (ref 8.8–10.2)
CHLORIDE SERPL-SCNC: 101 MMOL/L (ref 98–107)
CO2 SERPL-SCNC: 25 MMOL/L (ref 20–29)
CREAT SERPL-MCNC: 1.06 MG/DL (ref 0.6–1)
GLOBULIN SER CALC-MCNC: 3.1 G/DL (ref 2–4)
GLUCOSE SERPL-MCNC: 149 MG/DL (ref 65–100)
POTASSIUM SERPL-SCNC: 3.5 MMOL/L (ref 3.5–5.1)
PROT SERPL-MCNC: 7 G/DL (ref 6.4–8.3)
SODIUM SERPL-SCNC: 141 MMOL/L (ref 136–145)

## 2025-08-12 RX ORDER — EMPAGLIFLOZIN 10 MG/1
TABLET, FILM COATED ORAL
Qty: 90 TABLET | Refills: 3 | Status: SHIPPED | OUTPATIENT
Start: 2025-08-12

## 2025-08-27 RX ORDER — GLIMEPIRIDE 2 MG/1
TABLET ORAL
Qty: 90 TABLET | Refills: 3 | Status: SHIPPED | OUTPATIENT
Start: 2025-08-27

## 2025-08-28 ENCOUNTER — HOSPITAL ENCOUNTER (OUTPATIENT)
Facility: HOSPITAL | Age: 65
Discharge: HOME | End: 2025-08-31
Payer: MEDICARE

## 2025-08-28 VITALS — HEIGHT: 64 IN | WEIGHT: 195 LBS | BODY MASS INDEX: 33.29 KG/M2

## 2025-08-28 DIAGNOSIS — C90.01 MULTIPLE MYELOMA IN REMISSION (HCC): ICD-10-CM

## 2025-08-28 LAB
GLUCOSE BLD STRIP.AUTO-MCNC: 158 MG/DL (ref 65–117)
SERVICE CMNT-IMP: ABNORMAL

## 2025-08-28 PROCEDURE — 82962 GLUCOSE BLOOD TEST: CPT

## 2025-08-28 PROCEDURE — 78816 PET IMAGE W/CT FULL BODY: CPT

## 2025-08-28 PROCEDURE — A9609 HC RX DIAGNOSTIC RADIOPHARMACEUTICAL: HCPCS

## 2025-08-28 PROCEDURE — 3430000000 HC RX DIAGNOSTIC RADIOPHARMACEUTICAL

## 2025-08-28 RX ORDER — FLUDEOXYGLUCOSE F-18 500 MCI/ML
10 INJECTION INTRAVENOUS ONCE
Status: COMPLETED | OUTPATIENT
Start: 2025-08-28 | End: 2025-08-28

## 2025-08-28 RX ADMIN — FLUDEOXYGLUCOSE F-18 10.79 MILLICURIE: 500 INJECTION INTRAVENOUS at 09:50

## 2025-09-02 ENCOUNTER — TELEPHONE (OUTPATIENT)
Age: 65
End: 2025-09-02

## 2025-09-02 DIAGNOSIS — C90.01 MULTIPLE MYELOMA IN REMISSION (HCC): ICD-10-CM

## 2025-09-02 RX ORDER — LENALIDOMIDE 10 MG/1
10 CAPSULE ORAL DAILY
Qty: 21 CAPSULE | Refills: 0 | Status: ACTIVE | OUTPATIENT
Start: 2025-09-02

## 2025-09-05 ENCOUNTER — OFFICE VISIT (OUTPATIENT)
Age: 65
End: 2025-09-05
Payer: MEDICARE

## 2025-09-05 VITALS
DIASTOLIC BLOOD PRESSURE: 75 MMHG | OXYGEN SATURATION: 95 % | WEIGHT: 187 LBS | RESPIRATION RATE: 20 BRPM | TEMPERATURE: 98.2 F | HEART RATE: 60 BPM | BODY MASS INDEX: 31.92 KG/M2 | SYSTOLIC BLOOD PRESSURE: 112 MMHG | HEIGHT: 64 IN

## 2025-09-05 DIAGNOSIS — C90.01 MULTIPLE MYELOMA IN REMISSION (HCC): Primary | ICD-10-CM

## 2025-09-05 PROCEDURE — 99214 OFFICE O/P EST MOD 30 MIN: CPT

## 2025-09-05 ASSESSMENT — PATIENT HEALTH QUESTIONNAIRE - PHQ9
SUM OF ALL RESPONSES TO PHQ QUESTIONS 1-9: 0
1. LITTLE INTEREST OR PLEASURE IN DOING THINGS: NOT AT ALL
SUM OF ALL RESPONSES TO PHQ QUESTIONS 1-9: 0
SUM OF ALL RESPONSES TO PHQ QUESTIONS 1-9: 0
2. FEELING DOWN, DEPRESSED OR HOPELESS: NOT AT ALL
SUM OF ALL RESPONSES TO PHQ QUESTIONS 1-9: 0

## (undated) DEVICE — AGENT VISCOELASTIC SODIUM HYALURONATE 10 MG/ML PROVISC

## (undated) DEVICE — SYRINGE MED 30ML STD CLR PLAS LUERLOCK TIP N CTRL DISP

## (undated) DEVICE — GUIDE COR SNUS L40CM DIA9FR 0.035IN STD CRV ADV UNIQUE

## (undated) DEVICE — GDWIRE WHISPER HITORQ EDS CSJ -- ACUITY SOLD BY BX ONLY 4648

## (undated) DEVICE — CUSTOM KT PTCA INFL DEV K05 00053H

## (undated) DEVICE — SOLUTION IV 250ML 0.9% SOD CHL CLR INJ FLX BG CONT PRT CLSR

## (undated) DEVICE — FORCEPS BX L240CM JAW DIA2.8MM L CAP W/ NDL MIC MESH TOOTH

## (undated) DEVICE — RADIFOCUS GLIDEWIRE: Brand: GLIDEWIRE

## (undated) DEVICE — GLOVE SURG SZ 65 THK91MIL LTX FREE SYN POLYISOPRENE

## (undated) DEVICE — NEONATAL-ADULT SPO2 SENSOR: Brand: NELLCOR

## (undated) DEVICE — PACK CATRCT CUST AS835752] ALCON LABORATORIES INC]

## (undated) DEVICE — NC TREK CORONARY DILATATION CATHETER 2.5 MM X 20 MM / RAPID-EXCHANGE: Brand: NC TREK

## (undated) DEVICE — SET ADMIN 16ML TBNG L100IN 2 Y INJ SITE IV PIGGY BK DISP

## (undated) DEVICE — CATH IV AUTOGRD BC PNK 20GA 25 -- INSYTE

## (undated) DEVICE — ELECTRODE,RADIOTRANSLUCENT,FOAM,5PK: Brand: MEDLINE

## (undated) DEVICE — SUT SLK 0 30IN SH BLK --

## (undated) DEVICE — CATHETER DIAG 5FR L75CM ID0.046IN HK CRV VEIN SEL UNIQUE

## (undated) DEVICE — Z DISCONTINUED PER MEDLINE LINE GAS SAMPLING O2/CO2 LNG AD 13 FT NSL W/ TBNG FILTERLINE

## (undated) DEVICE — SYRINGE MED 10ML LUERLOCK TIP W/O SFTY DISP

## (undated) DEVICE — MEDI-TRACE CADENCE ADULT, DEFIBRILLATION ELECTRODE -RTS  (10 PR/PK) - PHILIPS: Brand: MEDI-TRACE CADENCE

## (undated) DEVICE — KENDALL DL 5 LEADS DUAL CONNECT SYSTEM BLENDED CASE - SINGLE-PATIENT-USE: Brand: SUSTAINABLE TECHNOLOGIES

## (undated) DEVICE — SYR 3ML LL TIP 1/10ML GRAD --

## (undated) DEVICE — NEEDLE HYPO 30GA L0.5IN BGE POLYPR HUB S STL REG BVL STR

## (undated) DEVICE — SLIT FULL HDL-2.8MM ANG C-CUT: Brand: SHARPOINT

## (undated) DEVICE — 3M™ IOBAN™ 2 ANTIMICROBIAL INCISE DRAPE 6650EZ: Brand: IOBAN™ 2

## (undated) DEVICE — PACEMAKER PACK: Brand: MEDLINE INDUSTRIES, INC.

## (undated) DEVICE — STOPCOCK IV 4 W TRNSPAR

## (undated) DEVICE — SENSOR PLSE OXMTR AD CBL L3FT ADH TRANSMISSIVE

## (undated) DEVICE — SOLUTION IRRIG 500ML STRL H2O NONPYROGENIC

## (undated) DEVICE — APPLICATOR BNDG 1MM ADH PREMIERPRO EXOFIN

## (undated) DEVICE — DEFIBRILLATOR CARD 72GM W6.17XH7.95CM THK0.99CM RA IS-1 RV
Type: IMPLANTABLE DEVICE | Status: NON-FUNCTIONAL
Removed: 2021-05-24

## (undated) DEVICE — Device

## (undated) DEVICE — TRAY,IRRIGATION,PISTON SYRINGE,60ML,STRL: Brand: MEDLINE

## (undated) DEVICE — 1200 GUARD II KIT W/5MM TUBE W/O VAC TUBE: Brand: GUARDIAN

## (undated) DEVICE — CONTAINER SPEC 20 ML LID NEUT BUFF FORMALIN 10 % POLYPR STS

## (undated) DEVICE — ABSORBABLE WOUND CLOSURE DEVICE: Brand: V-LOC 90

## (undated) DEVICE — SOLIDIFIER MEDC 1200ML -- CONVERT TO 356117

## (undated) DEVICE — REM POLYHESIVE ADULT PATIENT RETURN ELECTRODE: Brand: VALLEYLAB

## (undated) DEVICE — BAG SPEC BIOHZRD 10 X 10 IN --

## (undated) DEVICE — SUTURE V-LOC 180 SZ 2-0 L12IN ABSRB VLT GS-21 L37MM 1/2 CIR VLOCM0315

## (undated) DEVICE — CATHETER ETER IV 22GA L1IN POLYUR STR RADPQ INTROCAN SFTY

## (undated) DEVICE — CATHETER DIAG 6FR L110CM INTRO 6FR BLLN DIA9MM 1CC PULM ART

## (undated) DEVICE — PLASMABLADE PS200-040 4.0: Brand: PLASMABLADE™

## (undated) DEVICE — TOWEL 4 PLY TISS 19X30 SUE WHT

## (undated) DEVICE — TREK CORONARY DILATATION CATHETER 2.50 MM X 20 MM / RAPID-EXCHANGE: Brand: TREK

## (undated) DEVICE — NEEDLE HYPO 18GA L1.5IN PNK S STL HUB POLYPR SHLD REG BVL

## (undated) DEVICE — SYR 10ML LUER LOK 1/5ML GRAD --

## (undated) DEVICE — SYSTEM INTRO 9.5FR L13CM STD WHT CAP HEMSTAT SPLITTABLE

## (undated) DEVICE — SLEEVE PRGM HD CVR PACE STRL --

## (undated) DEVICE — KIT ACCS INTRO 4FR L10CM NDL 21GA L7CM GWIRE L40CM